# Patient Record
Sex: FEMALE | Race: OTHER | HISPANIC OR LATINO | Employment: UNEMPLOYED | URBAN - METROPOLITAN AREA
[De-identification: names, ages, dates, MRNs, and addresses within clinical notes are randomized per-mention and may not be internally consistent; named-entity substitution may affect disease eponyms.]

---

## 2017-10-19 ENCOUNTER — GENERIC CONVERSION - ENCOUNTER (OUTPATIENT)
Dept: OTHER | Facility: OTHER | Age: 62
End: 2017-10-19

## 2018-01-15 NOTE — MISCELLANEOUS
Please contact our office at your convenience  It is important that we speak to you  REGARDING:   Porfavor contacte a nuestra oficina  Es muy  importante que hablemos con usted   SOBRE:          Scheduling an Appointment/ Programar jacey Sylvia          Rescheduling an Appointment/ Re-programar  jacey Sylvia     Cancelling an Appointment/Cancelar rondon Sylvia     Your Lab/ X-ray Results/Resultados         Updating your Phone number or Address/ Actualizar ronodn [employer]           X Verify your Primary Providor/ Verificar rondon Proveedor primario     Other/Otro:    Please Contact Our Office to Schedule Your Appointment  It  is Very Important That You See Your Provider for your  Routine Medical Care  If you are seeing a New Providor,  Please Contact our Office so we can update our Records  Thank You  Comuníquese con nuestra oficina para programar rondon sylvia  Es Muy Importante que usted vipul rondon proveedor  para rondon   [address] [facility] Floor de [place]  Si usted esta viendo un Proveedor  [place], [place] con Ricks [employer]o oficina para actualizar   nuestro registros  Leslie

## 2018-06-08 LAB
ABSOL LYMPHOCYTES (HISTORICAL): 1.6 K/UL (ref 0.5–4)
ALBUMIN SERPL BCP-MCNC: 3.9 G/DL (ref 3–5.2)
ALP SERPL-CCNC: 83 U/L (ref 43–122)
ALT SERPL W P-5'-P-CCNC: 25 U/L (ref 9–52)
ANION GAP SERPL CALCULATED.3IONS-SCNC: 10 MMOL/L (ref 5–14)
AST SERPL W P-5'-P-CCNC: 20 U/L (ref 14–36)
BASOPHILS # BLD AUTO: 0.1 K/UL (ref 0–0.1)
BASOPHILS # BLD AUTO: 1 % (ref 0–1)
BILIRUB SERPL-MCNC: 0.3 MG/DL
BUN SERPL-MCNC: 25 MG/DL (ref 5–25)
CALCIUM SERPL-MCNC: 9.4 MG/DL (ref 8.4–10.2)
CHLORIDE SERPL-SCNC: 104 MEQ/L (ref 97–108)
CHOLEST SERPL-MCNC: 177 MG/DL
CHOLEST/HDLC SERPL: 2.8 {RATIO}
CO2 SERPL-SCNC: 27 MMOL/L (ref 22–30)
COMMENT (HISTORICAL): ABNORMAL
CREATINE, SERUM (HISTORICAL): 0.77 MG/DL (ref 0.6–1.2)
DEPRECATED RDW RBC AUTO: 26.5 %
EGFR (HISTORICAL): >60 ML/MIN/1.73 M2
EOSINOPHIL # BLD AUTO: 0.2 K/UL (ref 0–0.4)
EOSINOPHIL NFR BLD AUTO: 3 % (ref 0–6)
EST. AVERAGE GLUCOSE BLD GHB EST-MCNC: 146 MG/DL
GLUCOSE FASTING (HISTORICAL): 124 MG/DL (ref 70–99)
HBA1C MFR BLD HPLC: 6.7 %
HCT VFR BLD AUTO: 35.6 % (ref 36–46)
HDLC SERPL-MCNC: 64 MG/DL
HGB BLD-MCNC: 11.4 G/DL (ref 12–16)
LDL/HDL RATIO (HISTORICAL): 1.5
LDLC SERPL CALC-MCNC: 94 MG/DL
LYMPHOCYTES NFR BLD AUTO: 25 % (ref 25–45)
MCH RBC QN AUTO: 24.3 PG (ref 26–34)
MCHC RBC AUTO-ENTMCNC: 32 % (ref 31–36)
MCV RBC AUTO: 76 FL (ref 80–100)
MONOCYTES # BLD AUTO: 0.4 K/UL (ref 0.2–0.9)
MONOCYTES NFR BLD AUTO: 7 % (ref 1–10)
NEUTROPHILS ABS COUNT (HISTORICAL): 4.1 K/UL (ref 1.8–7.8)
NEUTS SEG NFR BLD AUTO: 64 % (ref 45–65)
PLATELET # BLD AUTO: 369 K/MCL (ref 150–450)
POTASSIUM SERPL-SCNC: 5.1 MEQ/L (ref 3.6–5)
RBC # BLD AUTO: 4.68 M/MCL (ref 4–5.2)
RBC MORPHOLOGY (HISTORICAL): ABNORMAL
SODIUM SERPL-SCNC: 141 MEQ/L (ref 137–147)
T4 FREE SERPL-MCNC: 1.13 NG/DL (ref 0.78–2.19)
TOTAL PROTEIN (HISTORICAL): 7.5 G/DL (ref 5.9–8.4)
TRIGL SERPL-MCNC: 95 MG/DL
TSH SERPL DL<=0.05 MIU/L-ACNC: 1.36 UIU/ML (ref 0.47–4.68)
VITAMIN D25-HYDROXY (HISTORICAL): 28.3 NG/ML (ref 30–100)
VLDLC SERPL CALC-MCNC: 19 MG/DL (ref 0–40)
WBC # BLD AUTO: 6.4 K/MCL (ref 4.5–11)

## 2018-10-26 ENCOUNTER — PATIENT OUTREACH (OUTPATIENT)
Dept: FAMILY MEDICINE CLINIC | Facility: CLINIC | Age: 63
End: 2018-10-26

## 2018-10-26 ENCOUNTER — OFFICE VISIT (OUTPATIENT)
Dept: FAMILY MEDICINE CLINIC | Facility: CLINIC | Age: 63
End: 2018-10-26
Payer: COMMERCIAL

## 2018-10-26 VITALS
WEIGHT: 154 LBS | RESPIRATION RATE: 17 BRPM | DIASTOLIC BLOOD PRESSURE: 96 MMHG | OXYGEN SATURATION: 98 % | SYSTOLIC BLOOD PRESSURE: 150 MMHG | BODY MASS INDEX: 29.1 KG/M2 | HEART RATE: 76 BPM | TEMPERATURE: 97.5 F

## 2018-10-26 DIAGNOSIS — Z79.4 CONTROLLED TYPE 2 DIABETES MELLITUS WITHOUT COMPLICATION, WITH LONG-TERM CURRENT USE OF INSULIN (HCC): Primary | ICD-10-CM

## 2018-10-26 DIAGNOSIS — Z59.89 DOES NOT HAVE HEALTH INSURANCE: ICD-10-CM

## 2018-10-26 DIAGNOSIS — E11.9 CONTROLLED TYPE 2 DIABETES MELLITUS WITHOUT COMPLICATION, WITH LONG-TERM CURRENT USE OF INSULIN (HCC): Primary | ICD-10-CM

## 2018-10-26 DIAGNOSIS — I10 ESSENTIAL HYPERTENSION: ICD-10-CM

## 2018-10-26 DIAGNOSIS — Z23 NEED FOR INFLUENZA VACCINATION: ICD-10-CM

## 2018-10-26 PROCEDURE — 3725F SCREEN DEPRESSION PERFORMED: CPT | Performed by: FAMILY MEDICINE

## 2018-10-26 PROCEDURE — 99213 OFFICE O/P EST LOW 20 MIN: CPT | Performed by: FAMILY MEDICINE

## 2018-10-26 RX ORDER — CARVEDILOL 6.25 MG/1
6.25 TABLET ORAL 2 TIMES DAILY WITH MEALS
Qty: 60 TABLET | Refills: 0 | Status: SHIPPED | OUTPATIENT
Start: 2018-10-26 | End: 2019-11-26 | Stop reason: ALTCHOICE

## 2018-10-26 RX ORDER — LISINOPRIL 40 MG/1
TABLET ORAL EVERY 24 HOURS
COMMUNITY
Start: 2017-07-31 | End: 2018-10-26 | Stop reason: SDUPTHER

## 2018-10-26 RX ORDER — ATORVASTATIN CALCIUM 20 MG/1
40 TABLET, FILM COATED ORAL EVERY 24 HOURS
COMMUNITY
Start: 2019-08-08 | End: 2019-08-08 | Stop reason: ALTCHOICE

## 2018-10-26 RX ORDER — GABAPENTIN 300 MG/1
CAPSULE ORAL 3 TIMES DAILY
COMMUNITY
Start: 2017-01-09 | End: 2019-11-26

## 2018-10-26 RX ORDER — BISOPROLOL FUMARATE AND HYDROCHLOROTHIAZIDE 5; 6.25 MG/1; MG/1
1 TABLET ORAL DAILY
COMMUNITY
End: 2019-11-26 | Stop reason: ALTCHOICE

## 2018-10-26 RX ORDER — AMLODIPINE BESYLATE 10 MG/1
10 TABLET ORAL EVERY 24 HOURS
Qty: 30 TABLET | Refills: 0 | Status: SHIPPED | OUTPATIENT
Start: 2018-10-26 | End: 2020-05-12 | Stop reason: SDUPTHER

## 2018-10-26 RX ORDER — LISINOPRIL 40 MG/1
40 TABLET ORAL EVERY 24 HOURS
Qty: 30 TABLET | Refills: 0 | Status: SHIPPED | OUTPATIENT
Start: 2018-10-26 | End: 2019-08-08 | Stop reason: SDUPTHER

## 2018-10-26 RX ORDER — GLIMEPIRIDE 4 MG/1
1 TABLET ORAL DAILY
COMMUNITY
End: 2019-11-26 | Stop reason: ALTCHOICE

## 2018-10-26 RX ORDER — AMLODIPINE BESYLATE 10 MG/1
TABLET ORAL EVERY 24 HOURS
COMMUNITY
Start: 2017-07-31 | End: 2018-10-26 | Stop reason: SDUPTHER

## 2018-10-26 RX ORDER — ASPIRIN 81 MG
100 TABLET, DELAYED RELEASE (ENTERIC COATED) ORAL EVERY 24 HOURS
COMMUNITY
Start: 2018-04-30 | End: 2019-11-26 | Stop reason: ALTCHOICE

## 2018-10-26 RX ORDER — LEVOTHYROXINE SODIUM 0.07 MG/1
1 TABLET ORAL DAILY
COMMUNITY
End: 2019-11-26 | Stop reason: ALTCHOICE

## 2018-10-26 SDOH — ECONOMIC STABILITY - INCOME SECURITY: OTHER PROBLEMS RELATED TO HOUSING AND ECONOMIC CIRCUMSTANCES: Z59.89

## 2018-10-26 NOTE — ASSESSMENT & PLAN NOTE
Lab Results   Component Value Date    HGBA1C 6 7 (H) 06/08/2018       No results for input(s): POCGLU in the last 72 hours      Blood Sugar Average: Last 72 hrs:     Patient currently has no insurance and is out of her insulin  Advised that we would have to send her to [facility] to purchase Novolin N OTC at a cost of $24 99 along with syringes and needles  Discontinued Basaglar 28U due to cost and switched to Novolin N 14U BID before breakfast and before dinner for continued diabetic control  Patient is controlled  Counseled extensively on diet and exercise  Glucometer not working, advised patient to check battery, if still not working will order new one once patient is insured  Switched PO regimen from janumet BID to metformin alone due to cost

## 2018-10-26 NOTE — ASSESSMENT & PLAN NOTE
Patient uncontrolled, currently out of medications and has no medical insurance  Counseled on diet and exercise, particularly consumption of low sodium and processed foods  Educated on home blood pressure logs  Continue current medication regimen with amlodipine, lisinopril, and carvedilol  All medications were sent to pharmacy, patient will have to pay out of pocket until her medical insurance is reinstated

## 2018-10-26 NOTE — PROGRESS NOTES
Assessment/Plan:    Essential hypertension  Patient uncontrolled, currently out of medications and has no medical insurance  Counseled on diet and exercise, particularly consumption of low sodium and processed foods  Educated on home blood pressure logs  Continue current medication regimen with amlodipine, lisinopril, and carvedilol  All medications were sent to pharmacy, patient will have to pay out of pocket until her medical insurance is reinstated       Controlled diabetes mellitus ([address] )  Lab Results   Component Value Date    HGBA1C 6 7 (H) 06/08/2018       No results for input(s): POCGLU in the last 72 hours  Blood Sugar Average: Last 72 hrs:     Patient currently has no insurance and is out of her insulin  Advised that we would have to send [facility] to [facility] to purchase Novolin N OTC at a cost of $24 99 along with syringes and needles  Discontinued Basaglar 28U due to cost and switched to Novolin N 14U BID before breakfast and before dinner for continued diabetic control  Patient is controlled  Counseled extensively on diet and exercise  Glucometer not working, advised patient to check battery, if still not working will order new one once patient is insured  Switched PO regimen from janumet BID to metformin alone due to cost         Diagnoses and all orders for this visit:    Controlled type 2 diabetes mellitus without complication, with long-term current use of insulin ([facility])  -     metFORMIN (GLUCOPHAGE) 1000 MG tablet; Take 1 tablet (1,000 mg total) by mouth 2 (two) times a day with meals  -     Ambulatory referral to social work care management program; Future    Need for influenza vaccination  -     influenza vaccine, [id number], quadrivalent, recombinant, PF, 0 5 mL, for patients 18 yr+ ([facility])    Essential hypertension  -     amLODIPine (NORVASC) 10 mg tablet; Take 1 tablet (10 mg total) by mouth every 24 hours  -     carvedilol (COREG) 6 25 mg tablet;  Take 1 tablet (6 25 mg total) by mouth 2 (two) times a day with meals  -     lisinopril (ZESTRIL) 40 mg tablet; Take 1 tablet (40 mg total) by mouth every 24 hours    Does not have health insurance  -     Ambulatory referral to social work care management program; Future    Other orders  -     bisoprolol-hydrochlorothiazide (ZIAC) 5-6 25 MG per tablet; Take 1 tablet by mouth daily  -     Docusate Sodium 100 MG capsule; Take 100 mg by mouth every 24 hours  -     gabapentin (NEURONTIN) 300 mg capsule; 3 (three) times a day  -     glimepiride (AMARYL) 4 mg tablet; Take 1 tablet by mouth daily  -     SITagliptin-MetFORMIN HCl ER (JANUMET XR)  MG TB24; every 24 hours  -     insulin glargine (LANTUS SOLOSTAR) 100 units/mL injection pen; inject 28 units by subcutaneous route at bedtime  -     levothyroxine 75 mcg tablet; Take 1 tablet by mouth daily  -     atorvastatin (LIPITOR) 20 mg tablet; every 24 hours  -     Discontinue: lisinopril (ZESTRIL) 40 mg tablet; every 24 hours  -     Discontinue: amLODIPine (NORVASC) 10 mg tablet; every 24 hours  -     insulin NPH (HumuLIN N,NovoLIN N) 100 Units/mL subcutaneous injection; Inject 14 Units under the skin 2 (two) times a day          Subjective:      Patient ID: Virgilio Peterson is a 59 y o  female  This is a 60 yo uninsured F who comes into the clinic today for follow up of her diabetes and HTN  Patient has no complaints today ans has been working on getting her insurance reinstated for ~1 month  She had to cancel her previous endocrine appointment due to not having insurance  Medication Refill   Pertinent negatives include no abdominal pain, arthralgias, chest pain, chills, congestion, coughing, fatigue, fever, headaches, myalgias, nausea, neck pain, rash, sore throat or vomiting         The following portions of the patient's history were reviewed and updated as appropriate: allergies, current medications, past family history, past medical history, past social history, past surgical history and problem list     Review of Systems   Constitutional: Negative for appetite change, chills, fatigue, fever and unexpected weight change  HENT: Negative for congestion, ear pain, rhinorrhea, sinus pain, sinus pressure and sore throat  Eyes: Negative for visual disturbance  Respiratory: Negative for cough, chest tightness, shortness of breath and wheezing  Cardiovascular: Negative for chest pain, palpitations and leg swelling  Gastrointestinal: Negative for abdominal pain, constipation, diarrhea, nausea and vomiting  Genitourinary: Negative for dysuria and flank pain  Musculoskeletal: Negative for arthralgias, back pain, myalgias and neck pain  Skin: Negative for rash  Neurological: Negative for dizziness, light-headedness and headaches  Psychiatric/Behavioral: Negative for suicidal ideas  Objective:      /96 (BP Location: Left arm, Patient Position: Sitting, Cuff Size: Adult)   Pulse 76   Temp 97 5 °F (36 4 °C) (Temporal)   Resp 17   Wt 69 9 kg (154 lb)   SpO2 98%   BMI 29 10 kg/m²          Physical Exam   Constitutional: She is oriented to person, place, and time  She appears well-developed and well-nourished  HENT:   Head: Normocephalic and atraumatic  Pulmonary/Chest: Effort normal    Abdominal: She exhibits no distension  Musculoskeletal: Normal range of motion  Neurological: She is alert and oriented to person, place, and time  Skin: Skin is warm and dry  Psychiatric: She has a normal mood and affect   Her behavior is normal  Judgment and thought content normal

## 2018-12-11 ENCOUNTER — TRANSCRIBE ORDERS (OUTPATIENT)
Dept: ADMINISTRATIVE | Facility: HOSPITAL | Age: 63
End: 2018-12-11

## 2018-12-11 ENCOUNTER — APPOINTMENT (OUTPATIENT)
Dept: LAB | Facility: HOSPITAL | Age: 63
End: 2018-12-11
Payer: COMMERCIAL

## 2018-12-11 DIAGNOSIS — Z79.899 NEED FOR PROPHYLACTIC CHEMOTHERAPY: Primary | ICD-10-CM

## 2018-12-11 DIAGNOSIS — E55.9 AVITAMINOSIS D: ICD-10-CM

## 2018-12-11 DIAGNOSIS — E66.3 SEVERELY OVERWEIGHT: ICD-10-CM

## 2018-12-11 DIAGNOSIS — E13.43 DIABETIC AUTONOMIC NEUROPATHY ASSOCIATED WITH OTHER SPECIFIED DIABETES MELLITUS (HCC): ICD-10-CM

## 2018-12-11 DIAGNOSIS — E78.2 MIXED HYPERLIPIDEMIA: ICD-10-CM

## 2018-12-11 DIAGNOSIS — I10 ESSENTIAL HYPERTENSION, MALIGNANT: ICD-10-CM

## 2018-12-11 DIAGNOSIS — Z79.899 NEED FOR PROPHYLACTIC CHEMOTHERAPY: ICD-10-CM

## 2018-12-11 LAB
ALBUMIN SERPL BCP-MCNC: 3.9 G/DL (ref 3–5.2)
ALP SERPL-CCNC: 79 U/L (ref 43–122)
ALT SERPL W P-5'-P-CCNC: 25 U/L (ref 9–52)
ANION GAP SERPL CALCULATED.3IONS-SCNC: 4 MMOL/L (ref 5–14)
AST SERPL W P-5'-P-CCNC: 20 U/L (ref 14–36)
BASOPHILS # BLD AUTO: 0 THOUSANDS/ΜL (ref 0–0.1)
BASOPHILS NFR BLD AUTO: 1 % (ref 0–1)
BILIRUB SERPL-MCNC: 0.2 MG/DL
BUN SERPL-MCNC: 24 MG/DL (ref 5–25)
CALCIUM SERPL-MCNC: 9.3 MG/DL (ref 8.4–10.2)
CHLORIDE SERPL-SCNC: 101 MMOL/L (ref 97–108)
CHOLEST SERPL-MCNC: 206 MG/DL
CO2 SERPL-SCNC: 31 MMOL/L (ref 22–30)
CREAT SERPL-MCNC: 0.68 MG/DL (ref 0.6–1.2)
EOSINOPHIL # BLD AUTO: 0.2 THOUSAND/ΜL (ref 0–0.4)
EOSINOPHIL NFR BLD AUTO: 3 % (ref 0–6)
ERYTHROCYTE [DISTWIDTH] IN BLOOD BY AUTOMATED COUNT: 15.6 %
EST. AVERAGE GLUCOSE BLD GHB EST-MCNC: 260 MG/DL
GFR SERPL CREATININE-BSD FRML MDRD: 94 ML/MIN/1.73SQ M
GLUCOSE P FAST SERPL-MCNC: 184 MG/DL (ref 70–99)
HBA1C MFR BLD: 10.7 % (ref 4.2–6.3)
HCT VFR BLD AUTO: 40.4 % (ref 36–46)
HDLC SERPL-MCNC: 53 MG/DL (ref 40–59)
HGB BLD-MCNC: 13 G/DL (ref 12–16)
LDLC SERPL CALC-MCNC: 122 MG/DL
LYMPHOCYTES # BLD AUTO: 1.8 THOUSANDS/ΜL (ref 0.5–4)
LYMPHOCYTES NFR BLD AUTO: 34 % (ref 25–45)
MCH RBC QN AUTO: 27.1 PG (ref 26–34)
MCHC RBC AUTO-ENTMCNC: 32.2 G/DL (ref 31–36)
MCV RBC AUTO: 84 FL (ref 80–100)
MONOCYTES # BLD AUTO: 0.5 THOUSAND/ΜL (ref 0.2–0.9)
MONOCYTES NFR BLD AUTO: 9 % (ref 1–10)
NEUTROPHILS # BLD AUTO: 2.9 THOUSANDS/ΜL (ref 1.8–7.8)
NEUTS SEG NFR BLD AUTO: 53 % (ref 45–65)
NONHDLC SERPL-MCNC: 153 MG/DL
PLATELET # BLD AUTO: 294 THOUSANDS/UL (ref 150–450)
PMV BLD AUTO: 9 FL (ref 8.9–12.7)
POTASSIUM SERPL-SCNC: 4.6 MMOL/L (ref 3.6–5)
PROT SERPL-MCNC: 7.2 G/DL (ref 5.9–8.4)
RBC # BLD AUTO: 4.8 MILLION/UL (ref 4–5.2)
SODIUM SERPL-SCNC: 136 MMOL/L (ref 137–147)
TRIGL SERPL-MCNC: 153 MG/DL
TSH SERPL DL<=0.05 MIU/L-ACNC: 5.25 UIU/ML (ref 0.47–4.68)
WBC # BLD AUTO: 5.4 THOUSAND/UL (ref 4.5–11)

## 2018-12-11 PROCEDURE — 80061 LIPID PANEL: CPT

## 2018-12-11 PROCEDURE — 85025 COMPLETE CBC W/AUTO DIFF WBC: CPT

## 2018-12-11 PROCEDURE — 36415 COLL VENOUS BLD VENIPUNCTURE: CPT

## 2018-12-11 PROCEDURE — 83036 HEMOGLOBIN GLYCOSYLATED A1C: CPT | Performed by: INTERNAL MEDICINE

## 2018-12-11 PROCEDURE — 80053 COMPREHEN METABOLIC PANEL: CPT

## 2018-12-11 PROCEDURE — 84443 ASSAY THYROID STIM HORMONE: CPT

## 2019-03-12 ENCOUNTER — TRANSCRIBE ORDERS (OUTPATIENT)
Dept: ADMINISTRATIVE | Facility: HOSPITAL | Age: 64
End: 2019-03-12

## 2019-03-12 ENCOUNTER — APPOINTMENT (OUTPATIENT)
Dept: LAB | Facility: HOSPITAL | Age: 64
End: 2019-03-12
Payer: COMMERCIAL

## 2019-03-12 DIAGNOSIS — Z79.899 NEED FOR PROPHYLACTIC CHEMOTHERAPY: Primary | ICD-10-CM

## 2019-03-12 DIAGNOSIS — E66.3 SEVERELY OVERWEIGHT: ICD-10-CM

## 2019-03-12 DIAGNOSIS — E78.00 PURE HYPERCHOLESTEROLEMIA: ICD-10-CM

## 2019-03-12 DIAGNOSIS — Z79.4 ENCOUNTER FOR LONG-TERM (CURRENT) USE OF INSULIN (HCC): ICD-10-CM

## 2019-03-12 DIAGNOSIS — Z79.899 NEED FOR PROPHYLACTIC CHEMOTHERAPY: ICD-10-CM

## 2019-03-12 DIAGNOSIS — I10 ESSENTIAL HYPERTENSION, MALIGNANT: ICD-10-CM

## 2019-03-12 DIAGNOSIS — E11.21 DIABETIC GLOMERULOPATHY (HCC): ICD-10-CM

## 2019-03-12 DIAGNOSIS — E55.9 AVITAMINOSIS D: ICD-10-CM

## 2019-03-12 LAB
ALBUMIN SERPL BCP-MCNC: 3.7 G/DL (ref 3–5.2)
ALP SERPL-CCNC: 76 U/L (ref 43–122)
ALT SERPL W P-5'-P-CCNC: 26 U/L (ref 9–52)
ANION GAP SERPL CALCULATED.3IONS-SCNC: 6 MMOL/L (ref 5–14)
AST SERPL W P-5'-P-CCNC: 29 U/L (ref 14–36)
BILIRUB SERPL-MCNC: 0.2 MG/DL
BUN SERPL-MCNC: 19 MG/DL (ref 5–25)
CALCIUM SERPL-MCNC: 9.2 MG/DL (ref 8.4–10.2)
CHLORIDE SERPL-SCNC: 102 MMOL/L (ref 97–108)
CHOLEST SERPL-MCNC: 153 MG/DL
CO2 SERPL-SCNC: 29 MMOL/L (ref 22–30)
CREAT SERPL-MCNC: 0.78 MG/DL (ref 0.6–1.2)
CREAT UR-MCNC: 48.3 MG/DL
EST. AVERAGE GLUCOSE BLD GHB EST-MCNC: 203 MG/DL
GFR SERPL CREATININE-BSD FRML MDRD: 82 ML/MIN/1.73SQ M
GLUCOSE P FAST SERPL-MCNC: 136 MG/DL (ref 70–99)
HBA1C MFR BLD: 8.7 % (ref 4.2–6.3)
HDLC SERPL-MCNC: 51 MG/DL (ref 40–59)
LDLC SERPL CALC-MCNC: 72 MG/DL
MICROALBUMIN UR-MCNC: 854 MG/L (ref 0–20)
MICROALBUMIN/CREAT 24H UR: 1768 MG/G CREATININE (ref 0–30)
NONHDLC SERPL-MCNC: 102 MG/DL
POTASSIUM SERPL-SCNC: 5.4 MMOL/L (ref 3.6–5)
PROT SERPL-MCNC: 7 G/DL (ref 5.9–8.4)
SODIUM SERPL-SCNC: 137 MMOL/L (ref 137–147)
T4 FREE SERPL-MCNC: 0.91 NG/DL (ref 0.76–1.46)
TRIGL SERPL-MCNC: 150 MG/DL
TSH SERPL DL<=0.05 MIU/L-ACNC: 6.45 UIU/ML (ref 0.47–4.68)

## 2019-03-12 PROCEDURE — 84439 ASSAY OF FREE THYROXINE: CPT

## 2019-03-12 PROCEDURE — 83036 HEMOGLOBIN GLYCOSYLATED A1C: CPT | Performed by: INTERNAL MEDICINE

## 2019-03-12 PROCEDURE — 82570 ASSAY OF URINE CREATININE: CPT | Performed by: INTERNAL MEDICINE

## 2019-03-12 PROCEDURE — 36415 COLL VENOUS BLD VENIPUNCTURE: CPT | Performed by: INTERNAL MEDICINE

## 2019-03-12 PROCEDURE — 3062F POS MACROALBUMINURIA REV: CPT | Performed by: INTERNAL MEDICINE

## 2019-03-12 PROCEDURE — 82043 UR ALBUMIN QUANTITATIVE: CPT | Performed by: INTERNAL MEDICINE

## 2019-03-12 PROCEDURE — 80053 COMPREHEN METABOLIC PANEL: CPT

## 2019-03-12 PROCEDURE — 84443 ASSAY THYROID STIM HORMONE: CPT

## 2019-03-12 PROCEDURE — 80061 LIPID PANEL: CPT

## 2019-03-12 PROCEDURE — 86376 MICROSOMAL ANTIBODY EACH: CPT

## 2019-03-13 LAB — THYROPEROXIDASE AB SERPL-ACNC: 12 IU/ML (ref 0–34)

## 2019-07-09 ENCOUNTER — TRANSCRIBE ORDERS (OUTPATIENT)
Dept: ADMINISTRATIVE | Facility: HOSPITAL | Age: 64
End: 2019-07-09

## 2019-07-09 ENCOUNTER — APPOINTMENT (OUTPATIENT)
Dept: LAB | Facility: HOSPITAL | Age: 64
End: 2019-07-09
Payer: COMMERCIAL

## 2019-07-09 DIAGNOSIS — E55.9 VITAMIN D DEFICIENCY DISEASE: ICD-10-CM

## 2019-07-09 DIAGNOSIS — E66.3 SEVERELY OVERWEIGHT: ICD-10-CM

## 2019-07-09 DIAGNOSIS — E78.5 HYPERLIPIDEMIA, UNSPECIFIED HYPERLIPIDEMIA TYPE: ICD-10-CM

## 2019-07-09 DIAGNOSIS — E11.21 DIABETIC GLOMERULOPATHY (HCC): ICD-10-CM

## 2019-07-09 DIAGNOSIS — I10 ESSENTIAL HYPERTENSION, MALIGNANT: ICD-10-CM

## 2019-07-09 DIAGNOSIS — Z79.4 ENCOUNTER FOR LONG-TERM (CURRENT) USE OF INSULIN (HCC): ICD-10-CM

## 2019-07-09 DIAGNOSIS — R94.6 NONSPECIFIC ABNORMAL RESULTS OF THYROID FUNCTION STUDY: ICD-10-CM

## 2019-07-09 DIAGNOSIS — R94.6 NONSPECIFIC ABNORMAL RESULTS OF THYROID FUNCTION STUDY: Primary | ICD-10-CM

## 2019-07-09 LAB
ALBUMIN SERPL BCP-MCNC: 3.8 G/DL (ref 3–5.2)
ALP SERPL-CCNC: 69 U/L (ref 43–122)
ALT SERPL W P-5'-P-CCNC: 19 U/L (ref 9–52)
ANION GAP SERPL CALCULATED.3IONS-SCNC: 9 MMOL/L (ref 5–14)
AST SERPL W P-5'-P-CCNC: 21 U/L (ref 14–36)
BILIRUB SERPL-MCNC: 0.4 MG/DL
BUN SERPL-MCNC: 28 MG/DL (ref 5–25)
CALCIUM SERPL-MCNC: 9.2 MG/DL (ref 8.4–10.2)
CHLORIDE SERPL-SCNC: 103 MMOL/L (ref 97–108)
CO2 SERPL-SCNC: 28 MMOL/L (ref 22–30)
CREAT SERPL-MCNC: 0.82 MG/DL (ref 0.6–1.2)
EST. AVERAGE GLUCOSE BLD GHB EST-MCNC: 226 MG/DL
GFR SERPL CREATININE-BSD FRML MDRD: 76 ML/MIN/1.73SQ M
GLUCOSE P FAST SERPL-MCNC: 114 MG/DL (ref 70–99)
HBA1C MFR BLD: 9.5 % (ref 4.2–6.3)
POTASSIUM SERPL-SCNC: 4.8 MMOL/L (ref 3.6–5)
PROT SERPL-MCNC: 7.3 G/DL (ref 5.9–8.4)
SODIUM SERPL-SCNC: 140 MMOL/L (ref 137–147)
T4 FREE SERPL-MCNC: 0.95 NG/DL (ref 0.76–1.46)
TSH SERPL DL<=0.05 MIU/L-ACNC: 5.19 UIU/ML (ref 0.47–4.68)

## 2019-07-09 PROCEDURE — 84439 ASSAY OF FREE THYROXINE: CPT

## 2019-07-09 PROCEDURE — 83036 HEMOGLOBIN GLYCOSYLATED A1C: CPT

## 2019-07-09 PROCEDURE — 36415 COLL VENOUS BLD VENIPUNCTURE: CPT

## 2019-07-09 PROCEDURE — 84443 ASSAY THYROID STIM HORMONE: CPT

## 2019-07-09 PROCEDURE — 80053 COMPREHEN METABOLIC PANEL: CPT

## 2019-07-11 LAB
LEFT EYE DIABETIC RETINOPATHY: NORMAL
RIGHT EYE DIABETIC RETINOPATHY: NORMAL

## 2019-07-11 PROCEDURE — 2022F DILAT RTA XM EVC RTNOPTHY: CPT | Performed by: INTERNAL MEDICINE

## 2019-08-08 ENCOUNTER — OFFICE VISIT (OUTPATIENT)
Dept: FAMILY MEDICINE CLINIC | Facility: CLINIC | Age: 64
End: 2019-08-08

## 2019-08-08 VITALS
HEART RATE: 76 BPM | BODY MASS INDEX: 28.53 KG/M2 | SYSTOLIC BLOOD PRESSURE: 130 MMHG | RESPIRATION RATE: 16 BRPM | OXYGEN SATURATION: 95 % | WEIGHT: 151 LBS | TEMPERATURE: 97.6 F | DIASTOLIC BLOOD PRESSURE: 76 MMHG

## 2019-08-08 DIAGNOSIS — I10 ESSENTIAL HYPERTENSION: ICD-10-CM

## 2019-08-08 DIAGNOSIS — Z00.00 HEALTHCARE MAINTENANCE: ICD-10-CM

## 2019-08-08 DIAGNOSIS — Z11.59 ENCOUNTER FOR HEPATITIS C SCREENING TEST FOR LOW RISK PATIENT: ICD-10-CM

## 2019-08-08 DIAGNOSIS — Z79.4 CONTROLLED TYPE 2 DIABETES MELLITUS WITHOUT COMPLICATION, WITH LONG-TERM CURRENT USE OF INSULIN (HCC): Primary | ICD-10-CM

## 2019-08-08 DIAGNOSIS — E11.9 CONTROLLED TYPE 2 DIABETES MELLITUS WITHOUT COMPLICATION, WITH LONG-TERM CURRENT USE OF INSULIN (HCC): Primary | ICD-10-CM

## 2019-08-08 PROCEDURE — 3078F DIAST BP <80 MM HG: CPT | Performed by: INTERNAL MEDICINE

## 2019-08-08 PROCEDURE — 99214 OFFICE O/P EST MOD 30 MIN: CPT | Performed by: INTERNAL MEDICINE

## 2019-08-08 PROCEDURE — 4010F ACE/ARB THERAPY RXD/TAKEN: CPT | Performed by: INTERNAL MEDICINE

## 2019-08-08 PROCEDURE — 3075F SYST BP GE 130 - 139MM HG: CPT | Performed by: INTERNAL MEDICINE

## 2019-08-08 RX ORDER — LANCETS
EACH MISCELLANEOUS 2 TIMES DAILY
Refills: 5 | COMMUNITY
Start: 2019-07-14 | End: 2020-02-11 | Stop reason: SDUPTHER

## 2019-08-08 RX ORDER — ISOPROPYL ALCOHOL 70 ML/100ML
SWAB TOPICAL
Refills: 3 | COMMUNITY
Start: 2019-07-17

## 2019-08-08 RX ORDER — ATORVASTATIN CALCIUM 40 MG/1
40 TABLET, FILM COATED ORAL DAILY
Qty: 90 TABLET | Refills: 3 | Status: SHIPPED | OUTPATIENT
Start: 2019-08-08 | End: 2020-08-26 | Stop reason: SDUPTHER

## 2019-08-08 RX ORDER — BLOOD SUGAR DIAGNOSTIC
STRIP MISCELLANEOUS 2 TIMES DAILY
Refills: 5 | COMMUNITY
Start: 2019-07-20 | End: 2020-12-08 | Stop reason: SDUPTHER

## 2019-08-08 RX ORDER — LISINOPRIL 40 MG/1
40 TABLET ORAL EVERY 24 HOURS
Qty: 30 TABLET | Refills: 3 | Status: SHIPPED | OUTPATIENT
Start: 2019-08-08 | End: 2020-05-12 | Stop reason: SDUPTHER

## 2019-08-08 RX ORDER — ERGOCALCIFEROL 1.25 MG/1
CAPSULE ORAL
Refills: 2 | COMMUNITY
Start: 2019-07-22 | End: 2019-11-26

## 2019-08-08 NOTE — ASSESSMENT & PLAN NOTE
-Patient completed mammogram (05/28/19) and cervical exam (05/24/19) in [facility]  Tests were unremarkable  - Colonoscopy (04/05/17) with normal findings   -Patient counseled on Hepatitis C screening, patient agreed to my recommendations   Hepatitis C order sent for patient to complete prior to next visit

## 2019-08-08 NOTE — ASSESSMENT & PLAN NOTE
Lab Results   Component Value Date    HGBA1C 9 5 (H) 07/09/2019       No results for input(s): POCGLU in the last 72 hours  Blood Sugar Average: Last 72 hrs:  -Uncontrolled, HbA1C not at goal (preferable to be around 7)  -Patient advised to continue taking her diabetes medications daily: Basaglar 32 units in the morning, Janumet XR 50/1000 BID and Jardiance 25 mg     -Patient counseled to check her BG fasting, 2 hours after breakfast, 2 hours after lunch, and 2 hours after dinner  The readings should be done 2 weeks prior to her endocrinologist appt  -Patient has an endocrinologist appt scheduled for end of September  Will leave any changes with diabetes medications at the discretion of her endocrinologist  -Continue to adhere to a strict diabetic diet and exercise regimen  -Last ophthalmology appt (07/11/19) showing mild, non-proliferative diabetic retinopathy  Continue to follow up for further care   -Patient is not optimally controlled with Atorvastatin  Advised patient she needs to be on a moderate intensity regimen   Patient agreed with my plan, Atorvastatin 40 mg, 1 tablet daily started today  -Lipid panel lab to be completed prior to next endocrinology visit in September  -F/u in 3 months

## 2019-08-08 NOTE — ASSESSMENT & PLAN NOTE
-Well-controlled BP, at goal  -Patient advised to adhere with her BP meds: Amlodipine 10 mg tablet, Carvedilol 6 25 mg BID and Lisinopril 40 mg tablet   -Lisinopril 40 mg refilled today   -Low salt diet and exercise regimen

## 2019-08-08 NOTE — PROGRESS NOTES
Assessment/Plan:    Controlled diabetes mellitus ([facility] [address] )  Lab Results   Component Value Date    HGBA1C 9 5 (H) 07/09/2019       No results for input(s): POCGLU in the last 72 hours  Blood Sugar Average: Last 72 hrs:  -Uncontrolled, HbA1C not at goal (preferable to be around 7)  -Patient advised to continue taking her diabetes medications daily: Basaglar 32 units in the morning, Janumet XR 50/1000 BID and Jardiance 25 mg     -Patient counseled to check her BG fasting, 2 hours after breakfast, 2 hours after lunch, and 2 hours after dinner  The readings should be done 2 weeks prior to her endocrinologist appt  -Patient has an endocrinologist appt scheduled for end of September  Will leave any changes with diabetes medications at the discretion of her endocrinologist  -Continue to adhere to a strict diabetic diet and exercise regimen  -Last ophthalmology appt (07/11/19) showing mild, non-proliferative diabetic retinopathy  Continue to follow up for further care   -Patient is not optimally controlled with Atorvastatin  Advised patient she needs to be on a moderate intensity regimen  Patient agreed with my plan, Atorvastatin 40 mg, 1 tablet daily started today  -Lipid panel lab to be completed prior to next endocrinology visit in September  -F/u in 3 months       Essential hypertension  -Well-controlled BP, at goal  -Patient advised to adhere with her BP meds: Amlodipine 10 mg tablet, Carvedilol 6 25 mg BID and Lisinopril 40 mg tablet   -Lisinopril 40 mg refilled today   -Low salt diet and exercise regimen     Healthcare maintenance  -Patient completed mammogram (05/28/19) and cervical exam (05/24/19) in [place]  Tests were unremarkable  - Colonoscopy (04/05/17) with normal findings   -Patient counseled on Hepatitis C screening, patient agreed to my recommendations   Hepatitis C order sent for patient to complete prior to next visit         Diagnoses and all orders for this visit:    Controlled type 2 diabetes mellitus without complication, with long-term current use of insulin ([facility])  -     atorvastatin (LIPITOR) 40 mg tablet; Take 1 tablet (40 mg total) by mouth daily  -     Lipid Panel with Direct LDL reflex; Future    Essential hypertension  -     lisinopril (ZESTRIL) 40 mg tablet; Take 1 tablet (40 mg total) by mouth every 24 hours    Encounter for hepatitis C screening test for low risk patient  -     Hepatitis C antibody; Future    Healthcare maintenance    Other orders  -     Cancel: Mammo screening bilateral w 3d & cad; Future  -     ergocalciferol (VITAMIN D2) 50,000 units; TAKE ONE SOFTGEL CAPSULE BY MOUTH ONCE EVERY WEEK WITH DINNER  -     ACCU-CHEK FASTCLIX LANCETS MISC; 2 (two) times a day Test  -     ACCU-CHEK GUIDE test strip; 2 (two) times a day Test  -     Alcohol Swabs (EASY TOUCH ALCOHOL PREP MEDIUM) 70 % PADS; USE 2 TO 3 X PER DAY          Subjective:      Patient ID: Melyssa Melendrez is a 62 y o  female with a PMH of essential HTN, type 2 DM, subclinical hypothyroidism who presents today to the office for follow up of her chronic conditions  Patient states she is compliant with taking all her medications  Patient's most recent HbA1C is 9 5 (07/09/2019)  Patient reports she checks her BG at home, in the am range of 110 and in the afternoon around 140  Patient denies polyuria, polydipsia, or polyphagia  Patient's describes numbness in her lower extremities, specially her toes b/l  Patient is taking Gabapentin 300 mg TID  Patient adds she went to [facility] and had her mammogram and cervical exam completed  Patient brings a copy of her tests with her today  Patient denies fevers, chills, headaches, dizziness, chest pain, shortness of breath, abdominal pain, nausea, vomiting, diarrhea or constipation        HPI    The following portions of the patient's history were reviewed and updated as appropriate: allergies, current medications, past family history, past medical history, past social history, past surgical history and problem list     Review of Systems   Constitutional: Positive for fatigue  Negative for appetite change, chills and fever  HENT: Negative for congestion, sinus pressure, sinus pain, sore throat and trouble swallowing  Eyes: Negative for visual disturbance  Respiratory: Negative for cough, chest tightness and shortness of breath  Cardiovascular: Negative for chest pain, palpitations and leg swelling  Gastrointestinal: Negative for abdominal distention, abdominal pain, blood in stool, constipation, diarrhea, nausea and vomiting  Endocrine: Negative for cold intolerance, heat intolerance, polydipsia, polyphagia and polyuria  Genitourinary: Negative for difficulty urinating, dysuria, frequency and urgency  Musculoskeletal: Negative for back pain and gait problem  Skin: Negative for rash  Neurological: Positive for numbness  Negative for dizziness, tremors and headaches  LE numbness and warmth, right and left toes predominantly    Hematological: Negative for adenopathy  Psychiatric/Behavioral: The patient is not nervous/anxious  Objective:      /76 (BP Location: Left arm, Patient Position: Sitting, Cuff Size: Adult)   Pulse 76   Temp 97 6 °F (36 4 °C)   Resp 16   Wt 68 5 kg (151 lb)   SpO2 95%   BMI 28 53 kg/m²     Patient's shoes and socks removed  Right Foot/Ankle   Right Foot Inspection  Skin Exam: skin not intact, no warmth and no erythema                          Toe Exam: ROM and strength within normal limits  Sensory       Monofilament testing: diminished  Vascular    The right DP pulse is 2+  The right PT pulse is 2+  Left Foot/Ankle  Left Foot Inspection  Skin Exam: skin not intact, no warmth and no erythema                         Toe Exam: ROM and strength within normal limits                   Sensory       Monofilament: diminished  Vascular    The left DP pulse is 2+  The left PT pulse is 2+     Assign Risk Category:  No deformity present; Loss of protective sensation;        Risk: 1           Physical Exam   Constitutional: She is oriented to person, place, and time  She appears well-developed and well-nourished  No distress  HENT:   Head: Normocephalic and atraumatic  Right Ear: External ear normal    Left Ear: External ear normal    Mouth/Throat: Oropharynx is clear and moist    Eyes: Pupils are equal, round, and reactive to light  Conjunctivae and EOM are normal    Neck: Normal range of motion  Neck supple  Cardiovascular: Normal rate, regular rhythm, normal heart sounds and intact distal pulses  Pulses:       Dorsalis pedis pulses are 2+ on the right side, and 2+ on the left side  Posterior tibial pulses are 2+ on the right side, and 2+ on the left side  Pulmonary/Chest: Effort normal and breath sounds normal  No respiratory distress  Abdominal: Soft  Bowel sounds are normal  She exhibits no distension  There is no tenderness  There is no rebound and no guarding  Musculoskeletal: Normal range of motion  Feet:    1+ pitting edema in RLE and LLE    Feet:   Right Foot:   Skin Integrity: Negative for erythema or warmth  Left Foot:   Skin Integrity: Negative for erythema or warmth  Lymphadenopathy:     She has no cervical adenopathy  Neurological: She is alert and oriented to person, place, and time  She displays normal reflexes  She exhibits normal muscle tone  Skin: Skin is warm  No rash noted  No erythema  Psychiatric: She has a normal mood and affect  Her behavior is normal    Vitals reviewed

## 2019-08-16 ENCOUNTER — APPOINTMENT (OUTPATIENT)
Dept: LAB | Facility: HOSPITAL | Age: 64
End: 2019-08-16
Payer: COMMERCIAL

## 2019-08-16 ENCOUNTER — TELEPHONE (OUTPATIENT)
Dept: FAMILY MEDICINE CLINIC | Facility: CLINIC | Age: 64
End: 2019-08-16

## 2019-08-16 DIAGNOSIS — Z79.4 CONTROLLED TYPE 2 DIABETES MELLITUS WITHOUT COMPLICATION, WITH LONG-TERM CURRENT USE OF INSULIN (HCC): ICD-10-CM

## 2019-08-16 DIAGNOSIS — E11.9 CONTROLLED TYPE 2 DIABETES MELLITUS WITHOUT COMPLICATION, WITH LONG-TERM CURRENT USE OF INSULIN (HCC): ICD-10-CM

## 2019-08-16 DIAGNOSIS — Z11.59 ENCOUNTER FOR HEPATITIS C SCREENING TEST FOR LOW RISK PATIENT: ICD-10-CM

## 2019-08-16 LAB
CHOLEST SERPL-MCNC: 154 MG/DL
HDLC SERPL-MCNC: 49 MG/DL (ref 40–59)
LDLC SERPL CALC-MCNC: 82 MG/DL
TRIGL SERPL-MCNC: 116 MG/DL

## 2019-08-16 PROCEDURE — 86803 HEPATITIS C AB TEST: CPT

## 2019-08-16 PROCEDURE — 36415 COLL VENOUS BLD VENIPUNCTURE: CPT

## 2019-08-16 PROCEDURE — 80061 LIPID PANEL: CPT

## 2019-08-16 NOTE — TELEPHONE ENCOUNTER
Called patient to discuss lipid panel, no response  Left a voicemail to call me back  Will follow up

## 2019-08-17 LAB — HCV AB SER QL: NORMAL

## 2019-08-19 ENCOUNTER — TELEPHONE (OUTPATIENT)
Dept: FAMILY MEDICINE CLINIC | Facility: CLINIC | Age: 64
End: 2019-08-19

## 2019-08-19 NOTE — TELEPHONE ENCOUNTER
Called the patient today to discuss lab results  Explained to the patient that her triglycerides are elevated and this can be attributed to her sugars  I advised patient to adhere to a diabetic diet and continue to exercise daily  Also, patient was started on Atorvastatin 40 mg during last visit and advised to continue taking 1 tablet daily  Patient understands the plan and voices no concerns at this time

## 2019-10-21 ENCOUNTER — TRANSCRIBE ORDERS (OUTPATIENT)
Dept: ADMINISTRATIVE | Facility: HOSPITAL | Age: 64
End: 2019-10-21

## 2019-10-21 ENCOUNTER — APPOINTMENT (OUTPATIENT)
Dept: LAB | Facility: HOSPITAL | Age: 64
End: 2019-10-21
Payer: COMMERCIAL

## 2019-10-21 DIAGNOSIS — E55.9 AVITAMINOSIS D: ICD-10-CM

## 2019-10-21 DIAGNOSIS — I10 ESSENTIAL HYPERTENSION, MALIGNANT: ICD-10-CM

## 2019-10-21 DIAGNOSIS — E78.5 HYPERLIPIDEMIA, UNSPECIFIED HYPERLIPIDEMIA TYPE: ICD-10-CM

## 2019-10-21 DIAGNOSIS — Z79.4 ENCOUNTER FOR LONG-TERM (CURRENT) USE OF INSULIN (HCC): ICD-10-CM

## 2019-10-21 DIAGNOSIS — E66.3 SEVERELY OVERWEIGHT: ICD-10-CM

## 2019-10-21 DIAGNOSIS — E11.65 TYPE II DIABETES MELLITUS WITH HYPEROSMOLARITY, UNCONTROLLED (HCC): ICD-10-CM

## 2019-10-21 DIAGNOSIS — E11.00 TYPE II DIABETES MELLITUS WITH HYPEROSMOLARITY, UNCONTROLLED (HCC): ICD-10-CM

## 2019-10-21 DIAGNOSIS — R94.6 NONSPECIFIC ABNORMAL RESULTS OF THYROID FUNCTION STUDY: ICD-10-CM

## 2019-10-21 DIAGNOSIS — Z79.899 ENCOUNTER FOR LONG-TERM (CURRENT) USE OF OTHER MEDICATIONS: ICD-10-CM

## 2019-10-21 DIAGNOSIS — Z79.899 ENCOUNTER FOR LONG-TERM (CURRENT) USE OF OTHER MEDICATIONS: Primary | ICD-10-CM

## 2019-10-21 LAB
25(OH)D3 SERPL-MCNC: 33.3 NG/ML (ref 30–100)
ALBUMIN SERPL BCP-MCNC: 3.7 G/DL (ref 3–5.2)
ALP SERPL-CCNC: 78 U/L (ref 43–122)
ALT SERPL W P-5'-P-CCNC: 29 U/L (ref 9–52)
ANION GAP SERPL CALCULATED.3IONS-SCNC: 5 MMOL/L (ref 5–14)
AST SERPL W P-5'-P-CCNC: 33 U/L (ref 14–36)
BILIRUB SERPL-MCNC: 0.2 MG/DL
BUN SERPL-MCNC: 35 MG/DL (ref 5–25)
CALCIUM SERPL-MCNC: 8.8 MG/DL (ref 8.4–10.2)
CHLORIDE SERPL-SCNC: 105 MMOL/L (ref 97–108)
CO2 SERPL-SCNC: 32 MMOL/L (ref 22–30)
CREAT SERPL-MCNC: 1.13 MG/DL (ref 0.6–1.2)
EST. AVERAGE GLUCOSE BLD GHB EST-MCNC: 180 MG/DL
GFR SERPL CREATININE-BSD FRML MDRD: 52 ML/MIN/1.73SQ M
GLUCOSE P FAST SERPL-MCNC: 91 MG/DL (ref 70–99)
HBA1C MFR BLD: 7.9 % (ref 4.2–6.3)
POTASSIUM SERPL-SCNC: 5 MMOL/L (ref 3.6–5)
PROT SERPL-MCNC: 7.1 G/DL (ref 5.9–8.4)
SODIUM SERPL-SCNC: 142 MMOL/L (ref 137–147)
TSH SERPL DL<=0.05 MIU/L-ACNC: 6.12 UIU/ML (ref 0.47–4.68)

## 2019-10-21 PROCEDURE — 84443 ASSAY THYROID STIM HORMONE: CPT

## 2019-10-21 PROCEDURE — 83036 HEMOGLOBIN GLYCOSYLATED A1C: CPT | Performed by: INTERNAL MEDICINE

## 2019-10-21 PROCEDURE — 80053 COMPREHEN METABOLIC PANEL: CPT

## 2019-10-21 PROCEDURE — 82306 VITAMIN D 25 HYDROXY: CPT

## 2019-10-21 PROCEDURE — 36415 COLL VENOUS BLD VENIPUNCTURE: CPT

## 2019-11-26 ENCOUNTER — OFFICE VISIT (OUTPATIENT)
Dept: FAMILY MEDICINE CLINIC | Facility: CLINIC | Age: 64
End: 2019-11-26

## 2019-11-26 ENCOUNTER — TELEPHONE (OUTPATIENT)
Dept: FAMILY MEDICINE CLINIC | Facility: CLINIC | Age: 64
End: 2019-11-26

## 2019-11-26 VITALS
BODY MASS INDEX: 28.52 KG/M2 | OXYGEN SATURATION: 99 % | WEIGHT: 155 LBS | TEMPERATURE: 97.3 F | SYSTOLIC BLOOD PRESSURE: 158 MMHG | RESPIRATION RATE: 20 BRPM | DIASTOLIC BLOOD PRESSURE: 84 MMHG | HEART RATE: 68 BPM | HEIGHT: 62 IN

## 2019-11-26 DIAGNOSIS — R01.1 SYSTOLIC MURMUR: ICD-10-CM

## 2019-11-26 DIAGNOSIS — I50.32 CHRONIC DIASTOLIC HEART FAILURE (HCC): ICD-10-CM

## 2019-11-26 DIAGNOSIS — R79.89 ELEVATED TSH: ICD-10-CM

## 2019-11-26 DIAGNOSIS — I10 ESSENTIAL HYPERTENSION: ICD-10-CM

## 2019-11-26 DIAGNOSIS — E11.65 TYPE 2 DIABETES MELLITUS WITH HYPERGLYCEMIA, UNSPECIFIED WHETHER LONG TERM INSULIN USE (HCC): Primary | ICD-10-CM

## 2019-11-26 DIAGNOSIS — E78.49 OTHER HYPERLIPIDEMIA: ICD-10-CM

## 2019-11-26 DIAGNOSIS — Z28.21 REFUSED INFLUENZA VACCINE: ICD-10-CM

## 2019-11-26 PROBLEM — E78.5 HYPERLIPEMIA: Status: ACTIVE | Noted: 2019-10-17

## 2019-11-26 PROCEDURE — 99214 OFFICE O/P EST MOD 30 MIN: CPT | Performed by: FAMILY MEDICINE

## 2019-11-26 RX ORDER — CARVEDILOL 12.5 MG/1
25 TABLET ORAL 2 TIMES DAILY WITH MEALS
Refills: 1 | COMMUNITY
Start: 2019-11-17 | End: 2020-05-12 | Stop reason: SDUPTHER

## 2019-11-26 RX ORDER — ERGOCALCIFEROL (VITAMIN D2) 1250 MCG
50000 CAPSULE ORAL
COMMUNITY
Start: 2019-10-22

## 2019-11-26 RX ORDER — FUROSEMIDE 20 MG/1
20 TABLET ORAL DAILY
COMMUNITY
Start: 2019-10-22 | End: 2020-01-15 | Stop reason: SDUPTHER

## 2019-11-26 RX ORDER — GABAPENTIN 300 MG/1
300 CAPSULE ORAL 3 TIMES DAILY
COMMUNITY
Start: 2019-09-23 | End: 2019-12-26 | Stop reason: SDUPTHER

## 2019-11-26 NOTE — ASSESSMENT & PLAN NOTE
Wt Readings from Last 3 Encounters:   11/26/19 70 3 kg (155 lb)   08/08/19 68 5 kg (151 lb)   10/26/18 69 9 kg (154 lb)     Recent diagnosis of diastolic heart failure in October 2019 per [address] chart review   Patient was diuresed during hospitalization and discharged home on Lasix 20 mg  Patient compliant with taking Lasix 20 mg daily  Currently, patient reports improvement in her LE edema as well as resolved dyspnea   Most recent echo (10/17/2019) results: Normal left ventricular chamber size  Normal left ventricular systolic function  Normal regional wall motion  Interventricular septal wall thickness is mildy increased  Posterior wall thickness is mildly increased  Estimated left ventricular ejection fraction is 60%  Normal right ventricular size and function  Mild pulmonary hypertension (PASP 38-50 mm Hg)  Mild diastolic dysfunction with normal filling pressures  Restricted leaflet motion with aortic valve calcification  Unable to exclude    bicuspid aortic valve  Mild aortic stenosis    No aortic regurgitation     Daily weights  Continue to restrict salt intake in the food and exercise regimen   Will monitor and f/u in 2-3 months

## 2019-11-26 NOTE — ASSESSMENT & PLAN NOTE
BP slightly elevated during this visit, 158/84  BP goal <140/90  Patient admits compliance with her medications: Amlodipine 10 mg, Coreg 12 5 BID, Lisinopril 40 mg and recent add on Lasix 20 mg daily  Patient advised to cut down on salt at home and adhere to an exercise regimen   Will follow in 2-3 months

## 2019-11-26 NOTE — ASSESSMENT & PLAN NOTE
ASCVD risk: 10 6%  Patient requiring moderate intensity statin therapy  Continue Lipitor 40 mg daily

## 2019-11-26 NOTE — TELEPHONE ENCOUNTER
Patient wanted to change pharmacy to  Ashlee Schilling [address] [facility]

## 2019-11-26 NOTE — PROGRESS NOTES
Assessment/Plan:    Type 2 diabetes mellitus with hyperglycemia (HCC)    Lab Results   Component Value Date    HGBA1C 7 9 (H) 10/21/2019     Controlled at this time  Most current HbA1c is 7 9, trending down from 9 5  Patient reports checking BG at home, lowest reading  in the morning and highest 150-170 in the afternoon  Patient forgot to bring the logs with her to the appointment  Advised patient to continue to check BG at home and bring the log next time    Patient admits to one episode of hypoglycemia at home (BG 70) and resolved after a sugary snack  Patient follow us with Dr Maritza De La Torre for her diabetes management  Patient states his office is too far and would like to come to our office for further care and management  Patient's current regimen, per Dr Maritza De La Torre last note, is as follows :Basaglar 30 units qhs, Jardiance 25 mg daily, Janumet XR  mg BID  Patient compliant with taking all these meds daily  No side effects reported   Continue to adhere to a diabetic diet and exercise regimen  Continue Gabapentin 300 mg TID daily   F/u in 2-3 months       Essential hypertension  BP slightly elevated during this visit, 158/84  BP goal <140/90  Patient admits compliance with her medications: Amlodipine 10 mg, Coreg 12 5 BID, Lisinopril 40 mg and recent add on Lasix 20 mg daily  Patient advised to cut down on salt at home and adhere to an exercise regimen   Will follow in 2-3 months       Chronic diastolic heart failure ([facility] [address] )  Wt Readings from Last 3 Encounters:   11/26/19 70 3 kg (155 lb)   08/08/19 68 5 kg (151 lb)   10/26/18 69 9 kg (154 lb)     Recent diagnosis of diastolic heart failure in October 2019 per [facility] chart review   Patient was diuresed during hospitalization and discharged home on Lasix 20 mg     Patient compliant with taking Lasix 20 mg daily  Currently, patient reports improvement in her LE edema as well as resolved dyspnea   Most recent echo (10/17/2019) results: Normal left ventricular chamber size  Normal left ventricular systolic function  Normal regional wall motion  Interventricular septal wall thickness is mildy increased  Posterior wall thickness is mildly increased  Estimated left ventricular ejection fraction is 60%  Normal right ventricular size and function  Mild pulmonary hypertension (PASP 38-50 mm Hg)  Mild diastolic dysfunction with normal filling pressures  Restricted leaflet motion with aortic valve calcification  Unable to exclude    bicuspid aortic valve  Mild aortic stenosis    No aortic regurgitation  Daily weights  Continue to restrict salt intake in the food and exercise regimen   Will monitor and f/u in 2-3 months             Elevated TSH  Patient's most recent TSH 6 120  Patient asymptomatic for hypothyroidism   This is possibly a subclinical hypothyroidism case, will order T4 and TSH surveillance   Will continue to monitor         Systolic murmur  3/6 systolic murmur, mild AS per most recent echo   Echo (10/17/2019): Normal left ventricular chamber size  Normal left ventricular systolic function  Normal regional wall motion  Interventricular septal wall thickness is mildy increased  Posterior wall thickness is mildly increased  Estimated left ventricular ejection fraction is 60%  Normal right ventricular size and function  Mild pulmonary hypertension (PASP 38-50 mm Hg)  Mild diastolic dysfunction with normal filling pressures  Restricted leaflet motion with aortic valve calcification  Unable to exclude    bicuspid aortic valve  Mild aortic stenosis    No aortic regurgitation     Will continue to monitor      Hyperlipemia  ASCVD risk: 10 6%  Patient requiring moderate intensity statin therapy  Continue Lipitor 40 mg daily     Refused influenza vaccine  Counseled patient about influenza complications in elderly population and higher risks of hospitalization with the flu in unvaccinated population   Patient refused flu vaccine       Diagnoses and all orders for this visit:    Type 2 diabetes mellitus with hyperglycemia, unspecified whether long term insulin use (HCC)    Chronic diastolic heart failure (HCC)  -     Basic metabolic panel; Future    Elevated TSH  -     TSH, 3rd generation with Free T4 reflex; Future    Essential hypertension    Systolic murmur    Other hyperlipidemia    Refused influenza vaccine    Other orders  -     ergocalciferol (ERGOCALCIFEROL) 1 25 MG (63524 UT) capsule; Take 50,000 Units by mouth every 7 days  -     furosemide (LASIX) 20 mg tablet; Take 20 mg by mouth daily  -     gabapentin (NEURONTIN) 300 mg capsule; Take 300 mg by mouth 3 (three) times a day  -     insulin glargine (BASAGLAR KWIKPEN) 100 units/mL injection pen; Inject 30 Units under the skin daily at bedtime  -     Empagliflozin (JARDIANCE) 25 MG TABS; Take 1 tablet by mouth daily with breakfast  -     carvedilol (COREG) 12 5 mg tablet; Take 12 5 mg by mouth 2 (two) times a day with meals          Subjective:      Patient ID: Burgess Horn is a 62 y o  female who presents today to the office for follow up visit of her chronic medical conditions  HPI     Patient states she was hospitalized on October 21, 2019 for worsening shortness of breath, uncontrolled HTN and lower extremity edema  Patient was diagnosed during her hospitalization with chronic diastolic heart failure per echo study and started on Lasix 20 mg daily  Patient states she is compliant with taking all her other medications  Patient is voicing concern about the location of her endocrinologist being too far and would like to see a specialist closer  Currently, patient is doing well and has no other concerns       The following portions of the patient's history were reviewed and updated as appropriate: allergies, current medications, past family history, past medical history, past social history, past surgical history and problem list     Review of Systems   Constitutional: Negative for chills, fatigue and fever    HENT: Negative for sore throat and trouble swallowing  Eyes: Negative for visual disturbance  Respiratory: Negative for cough and shortness of breath  Cardiovascular: Positive for leg swelling  Negative for chest pain and palpitations  Gastrointestinal: Negative for abdominal distention, abdominal pain, blood in stool, constipation, diarrhea, nausea and vomiting  Genitourinary: Negative for dysuria and hematuria  Musculoskeletal: Negative for gait problem  Skin: Negative for rash  Neurological: Negative for dizziness, weakness and headaches  Psychiatric/Behavioral: Negative for agitation  Objective:      /84   Pulse 68   Temp (!) 97 3 °F (36 3 °C) (Skin)   Resp 20   Ht 5' 2" (1 575 m)   Wt 70 3 kg (155 lb)   SpO2 99%   BMI 28 35 kg/m²          Physical Exam   Constitutional: She is oriented to person, place, and time  She appears well-developed and well-nourished  No distress  HENT:   Head: Normocephalic and atraumatic  Nose: Nose normal    Mouth/Throat: Oropharynx is clear and moist    Eyes: Conjunctivae and EOM are normal    Neck: Normal range of motion  Neck supple  Cardiovascular: Normal rate, regular rhythm and intact distal pulses  Murmur heard  3/6 systolic murmur    Pulmonary/Chest: Effort normal and breath sounds normal  No respiratory distress  Abdominal: Soft  Bowel sounds are normal  There is no tenderness  Musculoskeletal: She exhibits edema  1+ pitting edema b/l LE   Neurological: She is alert and oriented to person, place, and time  She displays normal reflexes  She exhibits normal muscle tone  Skin: Skin is warm  No rash noted  She is not diaphoretic  Psychiatric: She has a normal mood and affect  Her behavior is normal    Nursing note and vitals reviewed

## 2019-11-26 NOTE — ASSESSMENT & PLAN NOTE
Lab Results   Component Value Date    HGBA1C 7 9 (H) 10/21/2019     Controlled at this time  Most current HbA1c is 7 9, trending down from 9 5  Patient reports checking BG at home, lowest reading  in the morning and highest 150-170 in the afternoon  Patient forgot to bring the logs with her to the appointment  Advised patient to continue to check BG at home and bring the log next time    Patient admits to one episode of hypoglycemia at home (BG 70) and resolved after a sugary snack  Patient follow us with Dr Paris Ocampo for her diabetes management  Patient states his office is too far and would like to come to our office for further care and management  Patient's current regimen, per Dr Paris Ocampo last note, is as follows :Basaglar 30 units qhs, Jardiance 25 mg daily, Janumet XR  mg BID  Patient compliant with taking all these meds daily   No side effects reported   Continue to adhere to a diabetic diet and exercise regimen  Continue Gabapentin 300 mg TID daily   F/u in 2-3 months

## 2019-11-26 NOTE — ASSESSMENT & PLAN NOTE
Patient's most recent TSH 6 120  Patient asymptomatic for hypothyroidism   This is possibly a subclinical hypothyroidism case, will order T4 and TSH surveillance   Will continue to monitor

## 2019-11-26 NOTE — ASSESSMENT & PLAN NOTE
3/6 systolic murmur, mild AS per most recent echo   Echo (10/17/2019): Normal left ventricular chamber size  Normal left ventricular systolic function  Normal regional wall motion  Interventricular septal wall thickness is mildy increased  Posterior wall thickness is mildly increased  Estimated left ventricular ejection fraction is 60%  Normal right ventricular size and function  Mild pulmonary hypertension (PASP 38-50 mm Hg)  Mild diastolic dysfunction with normal filling pressures  Restricted leaflet motion with aortic valve calcification  Unable to exclude    bicuspid aortic valve  Mild aortic stenosis    No aortic regurgitation     Will continue to monitor

## 2019-11-27 PROBLEM — Z28.21 REFUSED INFLUENZA VACCINE: Status: ACTIVE | Noted: 2019-11-27

## 2019-11-27 NOTE — ASSESSMENT & PLAN NOTE
Counseled patient about influenza complications in elderly population and higher risks of hospitalization with the flu in unvaccinated population   Patient refused flu vaccine

## 2019-12-12 ENCOUNTER — LAB (OUTPATIENT)
Dept: LAB | Facility: HOSPITAL | Age: 64
End: 2019-12-12
Payer: COMMERCIAL

## 2019-12-12 DIAGNOSIS — I50.32 CHRONIC DIASTOLIC HEART FAILURE (HCC): ICD-10-CM

## 2019-12-12 DIAGNOSIS — R79.89 ELEVATED TSH: ICD-10-CM

## 2019-12-12 LAB
ANION GAP SERPL CALCULATED.3IONS-SCNC: 8 MMOL/L (ref 5–14)
BUN SERPL-MCNC: 28 MG/DL (ref 5–25)
CALCIUM SERPL-MCNC: 9.5 MG/DL (ref 8.4–10.2)
CHLORIDE SERPL-SCNC: 104 MMOL/L (ref 97–108)
CO2 SERPL-SCNC: 26 MMOL/L (ref 22–30)
CREAT SERPL-MCNC: 1 MG/DL (ref 0.6–1.2)
GFR SERPL CREATININE-BSD FRML MDRD: 60 ML/MIN/1.73SQ M
GLUCOSE P FAST SERPL-MCNC: 95 MG/DL (ref 70–99)
POTASSIUM SERPL-SCNC: 5.1 MMOL/L (ref 3.6–5)
SODIUM SERPL-SCNC: 138 MMOL/L (ref 137–147)
T4 FREE SERPL-MCNC: 1.1 NG/DL (ref 0.76–1.46)
TSH SERPL DL<=0.05 MIU/L-ACNC: 6.15 UIU/ML (ref 0.47–4.68)

## 2019-12-12 PROCEDURE — 84439 ASSAY OF FREE THYROXINE: CPT

## 2019-12-12 PROCEDURE — 36415 COLL VENOUS BLD VENIPUNCTURE: CPT

## 2019-12-12 PROCEDURE — 84443 ASSAY THYROID STIM HORMONE: CPT

## 2019-12-12 PROCEDURE — 80048 BASIC METABOLIC PNL TOTAL CA: CPT

## 2019-12-13 NOTE — RESULT ENCOUNTER NOTE
Called patient today to discuss lab results  I instructed patient as follows:    1  BMP: borderline elevation in K  Advised patient to avoid foods high in K such as bananas, avocados, beans, lentils  Discussed with patient that we can repeat blood work in 2-3 months when she comes back for follow up  2  TSH and free T4: Subclinical hypothyroidism, with elevation of TSH remaining stable and normal T4  Will monitor, patient asymptomatic  3  Stage 2 mild CKD: 2/2 to DM T2 and HTN  Will continue to monitor

## 2019-12-26 DIAGNOSIS — Z79.4 TYPE 2 DIABETES MELLITUS WITH HYPERGLYCEMIA, WITH LONG-TERM CURRENT USE OF INSULIN (HCC): Primary | ICD-10-CM

## 2019-12-26 DIAGNOSIS — E11.65 TYPE 2 DIABETES MELLITUS WITH HYPERGLYCEMIA, WITH LONG-TERM CURRENT USE OF INSULIN (HCC): Primary | ICD-10-CM

## 2019-12-26 RX ORDER — GABAPENTIN 300 MG/1
300 CAPSULE ORAL 3 TIMES DAILY
Qty: 90 CAPSULE | Refills: 2 | Status: SHIPPED | OUTPATIENT
Start: 2019-12-26 | End: 2020-01-20 | Stop reason: SDUPTHER

## 2020-01-15 ENCOUNTER — TELEPHONE (OUTPATIENT)
Dept: FAMILY MEDICINE CLINIC | Facility: CLINIC | Age: 65
End: 2020-01-15

## 2020-01-15 DIAGNOSIS — I50.32 CHRONIC DIASTOLIC HEART FAILURE (HCC): ICD-10-CM

## 2020-01-15 DIAGNOSIS — I10 ESSENTIAL HYPERTENSION: Primary | ICD-10-CM

## 2020-01-15 RX ORDER — FUROSEMIDE 20 MG/1
20 TABLET ORAL DAILY
Qty: 90 TABLET | Refills: 3 | Status: SHIPPED | OUTPATIENT
Start: 2020-01-15 | End: 2020-01-20 | Stop reason: SDUPTHER

## 2020-01-20 ENCOUNTER — TELEPHONE (OUTPATIENT)
Dept: FAMILY MEDICINE CLINIC | Facility: CLINIC | Age: 65
End: 2020-01-20

## 2020-01-20 DIAGNOSIS — I50.32 CHRONIC DIASTOLIC HEART FAILURE (HCC): ICD-10-CM

## 2020-01-20 DIAGNOSIS — E11.65 TYPE 2 DIABETES MELLITUS WITH HYPERGLYCEMIA, WITH LONG-TERM CURRENT USE OF INSULIN (HCC): ICD-10-CM

## 2020-01-20 DIAGNOSIS — I10 ESSENTIAL HYPERTENSION: ICD-10-CM

## 2020-01-20 DIAGNOSIS — Z79.4 TYPE 2 DIABETES MELLITUS WITH HYPERGLYCEMIA, WITH LONG-TERM CURRENT USE OF INSULIN (HCC): ICD-10-CM

## 2020-01-20 RX ORDER — FUROSEMIDE 20 MG/1
20 TABLET ORAL DAILY
Qty: 90 TABLET | Refills: 3 | Status: SHIPPED | OUTPATIENT
Start: 2020-01-20 | End: 2020-05-12 | Stop reason: ALTCHOICE

## 2020-01-20 RX ORDER — GABAPENTIN 300 MG/1
300 CAPSULE ORAL 3 TIMES DAILY
Qty: 90 CAPSULE | Refills: 2 | Status: SHIPPED | OUTPATIENT
Start: 2020-01-20 | End: 2020-02-11 | Stop reason: SDUPTHER

## 2020-01-20 NOTE — TELEPHONE ENCOUNTER
Pt is requesting refills on;    furosemide (LASIX) 20 mg tablet    gabapentin (NEURONTIN) 300 mg capsule

## 2020-02-11 ENCOUNTER — OFFICE VISIT (OUTPATIENT)
Dept: FAMILY MEDICINE CLINIC | Facility: CLINIC | Age: 65
End: 2020-02-11

## 2020-02-11 VITALS
OXYGEN SATURATION: 96 % | DIASTOLIC BLOOD PRESSURE: 82 MMHG | WEIGHT: 160 LBS | RESPIRATION RATE: 16 BRPM | TEMPERATURE: 98 F | BODY MASS INDEX: 29.26 KG/M2 | HEART RATE: 75 BPM | SYSTOLIC BLOOD PRESSURE: 140 MMHG

## 2020-02-11 DIAGNOSIS — I10 ESSENTIAL HYPERTENSION: ICD-10-CM

## 2020-02-11 DIAGNOSIS — E78.49 OTHER HYPERLIPIDEMIA: ICD-10-CM

## 2020-02-11 DIAGNOSIS — E11.65 TYPE 2 DIABETES MELLITUS WITH HYPERGLYCEMIA, WITH LONG-TERM CURRENT USE OF INSULIN (HCC): Primary | ICD-10-CM

## 2020-02-11 DIAGNOSIS — Z79.4 TYPE 2 DIABETES MELLITUS WITH HYPERGLYCEMIA, WITH LONG-TERM CURRENT USE OF INSULIN (HCC): Primary | ICD-10-CM

## 2020-02-11 DIAGNOSIS — Z00.00 HEALTHCARE MAINTENANCE: ICD-10-CM

## 2020-02-11 LAB — SL AMB POCT HEMOGLOBIN AIC: 7.7 (ref ?–6.5)

## 2020-02-11 PROCEDURE — 1036F TOBACCO NON-USER: CPT | Performed by: FAMILY MEDICINE

## 2020-02-11 PROCEDURE — T1015 CLINIC SERVICE: HCPCS | Performed by: FAMILY MEDICINE

## 2020-02-11 PROCEDURE — 3077F SYST BP >= 140 MM HG: CPT | Performed by: FAMILY MEDICINE

## 2020-02-11 PROCEDURE — 83036 HEMOGLOBIN GLYCOSYLATED A1C: CPT | Performed by: FAMILY MEDICINE

## 2020-02-11 PROCEDURE — 3079F DIAST BP 80-89 MM HG: CPT | Performed by: FAMILY MEDICINE

## 2020-02-11 PROCEDURE — 99214 OFFICE O/P EST MOD 30 MIN: CPT | Performed by: FAMILY MEDICINE

## 2020-02-11 RX ORDER — GABAPENTIN 300 MG/1
300 CAPSULE ORAL 3 TIMES DAILY
Qty: 90 CAPSULE | Refills: 2 | Status: SHIPPED | OUTPATIENT
Start: 2020-02-11 | End: 2020-05-12 | Stop reason: SDUPTHER

## 2020-02-11 RX ORDER — LANCETS
EACH MISCELLANEOUS 2 TIMES DAILY
Qty: 102 EACH | Refills: 5 | Status: SHIPPED | OUTPATIENT
Start: 2020-02-11 | End: 2020-11-20 | Stop reason: SDUPTHER

## 2020-02-11 RX ORDER — ATORVASTATIN CALCIUM 10 MG/1
TABLET, FILM COATED ORAL
COMMUNITY
Start: 2020-01-15 | End: 2020-08-26 | Stop reason: ALTCHOICE

## 2020-02-11 NOTE — ASSESSMENT & PLAN NOTE
Well Controlled  BP goal reviewed with patient <140/90  Patient compliant with her medications: Amlodipine 10 mg, Coreg 12 5 BID, Lisinopril 40 mg and Lasix 20 mg daily  Low salt diet and exercise regimen at least 30 min x 4 days/week

## 2020-02-11 NOTE — PROGRESS NOTES
Assessment/Plan:    Type 2 diabetes mellitus with hyperglycemia (HCC)    Lab Results   Component Value Date    HGBA1C 7 9 (H) 10/21/2019       Suboptimally controlled   HbA1c today's visit 7 7, trending down from 7 9  HbA1c goal reviewed with patient between 7-8  Current regimen: Basaglar 30 units qhs, Jardiance 25 mg daily, Janumet XR  mg BID  Endorses compliance with Basaglar and Janumet  Intermittently taking Jardiance in the evenings   BG ranges at home between , reviewed logs  Hypoglycemia events noted in the mornings  Explained to patient that Jardiance needs to be taken every day as well as Janumet  TDI requirement is 20 units  Will modify current Basaglar regimen from 30 units-->20 units daily, starting tomorrow morning since she's injected 30 units today  Continue Gabapentin 300 mg TID daily, refill given   Diabetic eye exam (07/11/2019): mild diabetic retinopathy b/l   Continue to adhere to a diabetic diet and exercise regimen  Reinforced the importance of counting carbs, no more than 150 g carbohydrates/day  AVS carbohydrate counting handout given   F/u in 3 months     Essential hypertension  Well Controlled  BP goal reviewed with patient <140/90  Patient compliant with her medications: Amlodipine 10 mg, Coreg 12 5 BID, Lisinopril 40 mg and Lasix 20 mg daily  Low salt diet and exercise regimen at least 30 min x 4 days/week      Hyperlipemia  ASCVD risk: 10 6%, on Lipitor 40 mg daily   Lipid panel (08/16/2019)  Continue same regimen   Compliment with low carb diet and exercise regimen     Healthcare maintenance  Patient states she completed mammogram (05/28/19) and cervical exam (05/24/19) in [facility] with benign findings   Advised patient to bring copies to the office to scan them as records   Colonoscopy (04/05/17) with normal findings   Hepatitis C Ab normal (08/16/2019)  Will schedule patient for annual visit next visit        Diagnoses and all orders for this visit:    Type 2 diabetes mellitus with hyperglycemia, with long-term current use of insulin (HCC)  -     gabapentin (NEURONTIN) 300 mg capsule; Take 1 capsule (300 mg total) by mouth 3 (three) times a day  -     ACCU-CHEK FASTCLIX LANCETS MISC; Inject under the skin 2 (two) times a day Test  -     POCT hemoglobin A1c    Essential hypertension    Other hyperlipidemia    Healthcare maintenance    Other orders  -     atorvastatin (LIPITOR) 10 mg tablet; TAKE 1 TABLET (10 MG TOTAL) BY MOUTH NIGHTLY  Subjective:      Patient ID: Funmilayo Reed is a 62 y o  female who presents today to the office for follow up visit of her chronic medical conditions  HPI     Patient reports being compliant at home with all her medications  Records BG at home in the mornings and evenings, and brings the log with her today  Noted hypoglycemia events (lowest 60) in the morning  Evening BG readings are recorded around 185 the highest   Patient reports that occasionally, based on her BG readings at night, she does not take Jardiance 25 mg to prevent hypoglycemia  States she tries to adhere to a healthy diabetic diet, however admits she doesn't count carbs  The following portions of the patient's history were reviewed and updated as appropriate: allergies, current medications, past family history, past medical history, past social history, past surgical history and problem list     Review of Systems   Constitutional: Negative for chills, fatigue and fever  HENT: Negative for sore throat and trouble swallowing  Eyes: Negative for visual disturbance  Respiratory: Negative for cough and shortness of breath  Cardiovascular: Negative for chest pain, palpitations and leg swelling  Gastrointestinal: Negative for abdominal distention, abdominal pain, blood in stool, constipation, diarrhea, nausea and vomiting  Genitourinary: Negative for dysuria and hematuria  Musculoskeletal: Negative for gait problem  Skin: Negative for rash  Neurological: Negative for dizziness, weakness and headaches  Psychiatric/Behavioral: Negative for agitation  Objective:      /82 (BP Location: Left arm, Patient Position: Sitting, Cuff Size: Large)   Pulse 75   Temp 98 °F (36 7 °C) (Temporal)   Resp 16   Wt 72 6 kg (160 lb)   SpO2 96%   BMI 29 26 kg/m²          Physical Exam   Constitutional: She is oriented to person, place, and time  She appears well-developed and well-nourished  No distress  HENT:   Head: Normocephalic and atraumatic  Nose: Nose normal    Mouth/Throat: Oropharynx is clear and moist    Eyes: Conjunctivae and EOM are normal    Neck: Normal range of motion  Neck supple  Cardiovascular: Normal rate and regular rhythm  Murmur heard  3/6 crescendo-decrescendo systolic murmur    Pulmonary/Chest: Effort normal and breath sounds normal    Abdominal: Soft  Bowel sounds are normal  She exhibits no distension  There is no tenderness  There is no guarding  Musculoskeletal: Normal range of motion  She exhibits edema  +1 BLE pitting edema, stable    Neurological: She is alert and oriented to person, place, and time  Skin: Skin is warm  No rash noted  She is not diaphoretic  Psychiatric: She has a normal mood and affect  Her behavior is normal  Judgment and thought content normal    Nursing note and vitals reviewed

## 2020-02-11 NOTE — ASSESSMENT & PLAN NOTE
Lab Results   Component Value Date    HGBA1C 7 9 (H) 10/21/2019       Suboptimally controlled   HbA1c today's visit 7 7, trending down from 7 9  HbA1c goal reviewed with patient between 7-8  Current regimen: Basaglar 30 units qhs, Jardiance 25 mg daily, Janumet XR  mg BID  Endorses compliance with Basaglar and Janumet  Intermittently taking Jardiance in the evenings   BG ranges at home between , reviewed logs  Hypoglycemia events noted in the mornings  Explained to patient that Jardiance needs to be taken every day as well as Janumet  TDI requirement is 20 units  Will modify current Basaglar regimen from 30 units-->20 units daily, starting tomorrow morning since she's injected 30 units today  Continue Gabapentin 300 mg TID daily, refill given   Diabetic eye exam (07/11/2019): mild diabetic retinopathy b/l   Continue to adhere to a diabetic diet and exercise regimen  Reinforced the importance of counting carbs, no more than 150 g carbohydrates/day   AVS carbohydrate counting handout given   F/u in 3 months

## 2020-02-11 NOTE — PATIENT INSTRUCTIONS
No mas de 150 g carbohydratos/patricia   Diabetes medicinas: Basaglar 20 units/patricia (empezando manana), Jardiance 25 mg and Janumet    Llamar la officina si tiene hypoglycemia en la manana despues de los cambios     Conteo básico de carbohidratos   CUIDADO AMBULATORIO:   El conteo de carbohidratos  es jacey manera de planificar onelia comidas contando la cantidad de carbohidratos de los alimentos  Isela Herber son los azúcares, almidones y fibras que se encuentran en las frutas, granos, verduras y productos lácteos  Los carbohidratos [employer] niveles de azúcar en la luda  El conteo de carbohidratos puede ayudarle a comer la cantidad [place] de carbohidratos para mantener onelia niveles de glucosa (azúcar) en luda bajo control  Lo que necesita saber sobre la planificación de las comidas utilizando el conteo de carbohidratos:  · Un dietista o un médico le ayudará a desarrollar un plan alimenticio saludable que trabajará mejor para usted  Le enseñarán cuántos carbohidratos debe consumir o beber en cada comida o merienda  Rondon plan alimenticio estará basado en rondon edad, peso, dieta usual y [facility]  Si usted tiene diabetes, también incluirá rondon nivel de azúcar en luda y medicamentos para la diabetes  Cuando usted está informado de la cantidad de carbohidratos que debe consumir, entonces puede decidir los tipos de alimentos que quiere comer  · Necesitará saber qué alimentos contienen carbohidratos y cuántos contienen  Lleva la cuenta de la cantidad de carbohidratos en alimentos y meriendas para poder seguir rondon plan alimenticio  No evite los carbohidratos ni omita comidas  Si usted no consume suficiente cantidad de carbohidratos u omite comidas, los niveles de azúcar en rondon luda pueden bajar excesivamente    Alimentos que contienen carbohidratos:   · Panes:  Cada porción de comida en la lista siguiente contiene cerca de 15 g de carbohidratos     ¨ 1 rebanada de pan (1 onza) o 1 tortilla de [place] o maíz (6 pulgadas)    ¨ La ½ de pan para hamburguesa o ¼ de jacey rosquilla tony (aproximadamente 1 onza)    ¨ 1 panqueque (4 pulgadas en diámetro y ¼ de [place])    · Cereales y granos:  El tamaño de las porciones de cereales listos para comer pueden variar  Morgan el tamaño de la porción y la cantidad de carbohidratos alistados en la etiqueta alimenticia  Cada porción de comida en la lista siguiente contiene cerca de 15 g de carbohidratos     ¨ ¾ taza de cereal seco, sin endulzar, listo para comer o ¼ taza de granola baja en grasa     ¨ ½ taza de leonardo u otros cereales cocidos     ¨ ?  taza de arroz o pasta cocida    · Frijoles y vegetales con almidón:  Cada porción de comida en la lista siguiente contiene cerca de 15 g de carbohidratos     ¨ ½ de taza de maíz, chícharos verdes, camote o puré de papa    ¨ ¼ de jacey papa tony horneada    ¨ ½ taza de frijoles, lentejas y guisantes (garbanzo, neri, habichuela, garcia, partido, de simeon anna)    · Gilbert y meriendas:  Cada porción de comida en la lista siguiente contiene cerca de 15 g de carbohidratos     ¨ 3 galletas de harina cuadradas u 8 galletas en forma de animalitos     ¨ 6 galletas saladas    ¨ 3 tazas de palomitas de [place] o ¾ onzas de pretzels, helena fritas o totopos    · Frutas:  Cada porción de comida en la lista siguiente contiene cerca de 15 g de carbohidratos     ¨ 1 pieza pequeña (4 onzas) de fruta fresca o ¾ a 1 taza de fruta fresca    ¨ ½ taza de fruta enlatada o congelada, envasada en jugo natural    ¨ ½ taza (4 onzas) de jugo de fruta sin azúcar    ¨ 2 cucharadas de fruta seca    · Alimentos azucarados o postres:  Cada porción de comida en la lista siguiente contiene cerca de 15 g de carbohidratos     ¨ 1 elizabeth de 2 pulgadas de pastel sin glaseado o brownie     ¨ 2 galletas dulces pequeñas    ¨ ½ taza de helado, yogur congelado o yogur congelado sin lactosa    ¨ ¼ de taza de sorbete    ¨ 1 cucharada de [place] regular, mermelada o jalea    ¨ 2 cucharadas de jarabe ligero    · Leche y yogur:  Armandbetty Barone contienen cerca de 12 g de carbohidratos por ración  ¨ 1 taza de leche sin grasa o baja en grasa    ¨ 1 taza de leche de soja    ¨ 1 taza de yogur sin grasa que ha sido endulzado con endulzante artificial    · Verduras sin almidón:  Cada porción de comida contiene cerca de 5 g de carbohidratos Rc porciones de vegetales sin almidón cuentan jazz 1 porción de carbohidratos  ¨ ½ de taza de verduras cocidas o 1 taza de verduras crudas [place] incluyen remolachas, bróculi, repollo, coliflor, pepino, champiñones, tomates y calabaza  ¨ ½ taza de jugo de verduras  Cómo utilizar el conteo de carbohidratos para planificar las comidas:   · [employer] las cantidades de carbohidratos usando el tamaño de las porciones:      ¨ Ejemplo de jacey montse de pasta:  Planifica comer pasta, ensalada mixta y un vaso de 8 onzas de [place]  Ramos médico le dice que puede consumir 4 porciones de carbohidratos para la montse  Jacey porción de carbohidratos de pasta es ? de taza  Jacey taza de pasta será igual a 3 porciones de carbohidratos  Un vaso de 8 onzas de leche se cuenta jazz 1 porción de carbohidratos  Estas cantidades de alimentos sería igual a 4 porciones de carbohidratos  Jacey taza de ensalada mixta no cuenta para las porciones de carbohidratos  · Cuente la cantidad de carbohidratos utilizando las etiquetas alimenticias:  Busque la cantidad total de los carbohidratos en los alimentos envasados leyendo la etiqueta alimenticia  Las etiquetas alimenticias explican el tamaño de la porción del alimento y la cantidad total de los carbohidratos en cada porción  Busque el tamaño de la porción en la etiqueta alimenticia y después decida cuántas porciones comerá  Multiplique el número de porciones que planea comer por la cantidad de carbohidratos por porción  ¨ Ejemplo de jacey merienda con jacey keegan singh:   Ramos plan de comidas le permite consumir 2 porciones de carbohidratos (30 gramos) jazz bocadillo  Planea comer 1 paquete de barras de granola, el cual contiene 2 barras  Según la etiqueta alimenticia, el tamaño de la porción en brinda paquete es 1 keegan  Cada porción (1 keegan) contiene 25 gramos de carbohidratos  La cantidad total de carbohidratos en el paquete de barras de granola sería 50 gramos  Basándose en ramos plan alimenticio, usted debe de comer sólo 1 keegan de granola  Acuda a onelia consultas de control con ramos médico según le indicaron  Anote onelia preguntas para que se acuerde de hacerlas divina onelia visitas  © 2017 [address] Information is for End User's use only and may not be sold, redistributed or otherwise used for commercial purposes  All illustrations and images included in CareNotes® are the copyrighted property of [employer]  or Landen Minaya  Esta información es sólo para uso en educación  Ramos intención no es darle un consejo médico sobre enfermedades o tratamientos  Colsulte con ramos Colette Nome farmacéutico antes de seguir cualquier régimen médico para saber si es seguro y efectivo para usted

## 2020-02-11 NOTE — ASSESSMENT & PLAN NOTE
ASCVD risk: 10 6%, on Lipitor 40 mg daily   Lipid panel (08/16/2019)  Continue same regimen   Compliment with low carb diet and exercise regimen

## 2020-02-11 NOTE — ASSESSMENT & PLAN NOTE
Patient states she completed mammogram (05/28/19) and cervical exam (05/24/19) in [facility] with benign findings   Advised patient to bring copies to the office to scan them as records   Colonoscopy (04/05/17) with normal findings   Hepatitis C Ab normal (08/16/2019)  Will schedule patient for annual visit next visit

## 2020-05-11 PROBLEM — Z12.11 ENCOUNTER FOR SCREENING COLONOSCOPY: Status: ACTIVE | Noted: 2020-05-11

## 2020-05-11 PROBLEM — Z12.31 ENCOUNTER FOR SCREENING MAMMOGRAM FOR BREAST CANCER: Status: ACTIVE | Noted: 2020-05-11

## 2020-05-12 ENCOUNTER — OFFICE VISIT (OUTPATIENT)
Dept: FAMILY MEDICINE CLINIC | Facility: CLINIC | Age: 65
End: 2020-05-12

## 2020-05-12 VITALS
BODY MASS INDEX: 29.63 KG/M2 | WEIGHT: 161 LBS | TEMPERATURE: 97.9 F | DIASTOLIC BLOOD PRESSURE: 80 MMHG | OXYGEN SATURATION: 95 % | RESPIRATION RATE: 16 BRPM | HEIGHT: 62 IN | HEART RATE: 73 BPM | SYSTOLIC BLOOD PRESSURE: 180 MMHG

## 2020-05-12 DIAGNOSIS — I50.32 CHRONIC DIASTOLIC HEART FAILURE (HCC): ICD-10-CM

## 2020-05-12 DIAGNOSIS — E78.49 OTHER HYPERLIPIDEMIA: ICD-10-CM

## 2020-05-12 DIAGNOSIS — Z12.11 ENCOUNTER FOR SCREENING COLONOSCOPY: ICD-10-CM

## 2020-05-12 DIAGNOSIS — R79.89 ELEVATED TSH: ICD-10-CM

## 2020-05-12 DIAGNOSIS — I10 ESSENTIAL HYPERTENSION: ICD-10-CM

## 2020-05-12 DIAGNOSIS — Z79.4 TYPE 2 DIABETES MELLITUS WITH HYPERGLYCEMIA, WITH LONG-TERM CURRENT USE OF INSULIN (HCC): ICD-10-CM

## 2020-05-12 DIAGNOSIS — Z12.31 ENCOUNTER FOR SCREENING MAMMOGRAM FOR BREAST CANCER: ICD-10-CM

## 2020-05-12 DIAGNOSIS — E11.65 TYPE 2 DIABETES MELLITUS WITH HYPERGLYCEMIA, WITH LONG-TERM CURRENT USE OF INSULIN (HCC): ICD-10-CM

## 2020-05-12 DIAGNOSIS — Z00.00 ENCOUNTER FOR WELLNESS EXAMINATION IN ADULT: Primary | ICD-10-CM

## 2020-05-12 LAB — SL AMB POCT HEMOGLOBIN AIC: 7.7 (ref ?–6.5)

## 2020-05-12 PROCEDURE — T1015 CLINIC SERVICE: HCPCS | Performed by: FAMILY MEDICINE

## 2020-05-12 PROCEDURE — 3008F BODY MASS INDEX DOCD: CPT | Performed by: FAMILY MEDICINE

## 2020-05-12 PROCEDURE — 99396 PREV VISIT EST AGE 40-64: CPT | Performed by: FAMILY MEDICINE

## 2020-05-12 PROCEDURE — 3051F HG A1C>EQUAL 7.0%<8.0%: CPT | Performed by: FAMILY MEDICINE

## 2020-05-12 PROCEDURE — 83036 HEMOGLOBIN GLYCOSYLATED A1C: CPT | Performed by: FAMILY MEDICINE

## 2020-05-12 RX ORDER — GABAPENTIN 300 MG/1
300 CAPSULE ORAL 3 TIMES DAILY
Qty: 90 CAPSULE | Refills: 2 | Status: SHIPPED | OUTPATIENT
Start: 2020-05-12 | End: 2020-09-22 | Stop reason: SDUPTHER

## 2020-05-12 RX ORDER — AMLODIPINE BESYLATE 10 MG/1
10 TABLET ORAL EVERY 24 HOURS
Qty: 30 TABLET | Refills: 0 | Status: SHIPPED | OUTPATIENT
Start: 2020-05-12 | End: 2020-08-17

## 2020-05-12 RX ORDER — CARVEDILOL 25 MG/1
25 TABLET ORAL 2 TIMES DAILY WITH MEALS
Start: 2020-05-12 | End: 2021-01-06 | Stop reason: SDUPTHER

## 2020-05-12 RX ORDER — HYDROCHLOROTHIAZIDE 12.5 MG/1
12.5 TABLET ORAL DAILY
Qty: 30 TABLET | Refills: 1 | Status: SHIPPED | OUTPATIENT
Start: 2020-05-12 | End: 2020-06-30 | Stop reason: SDUPTHER

## 2020-05-12 RX ORDER — FUROSEMIDE 20 MG/1
20 TABLET ORAL DAILY
Qty: 90 TABLET | Refills: 3 | Status: CANCELLED | OUTPATIENT
Start: 2020-05-12 | End: 2021-05-12

## 2020-05-12 RX ORDER — LISINOPRIL 40 MG/1
40 TABLET ORAL EVERY 24 HOURS
Qty: 30 TABLET | Refills: 3 | Status: SHIPPED | OUTPATIENT
Start: 2020-05-12 | End: 2020-06-30 | Stop reason: SDUPTHER

## 2020-05-19 ENCOUNTER — LAB (OUTPATIENT)
Dept: LAB | Facility: HOSPITAL | Age: 65
End: 2020-05-19
Payer: COMMERCIAL

## 2020-05-19 ENCOUNTER — CLINICAL SUPPORT (OUTPATIENT)
Dept: FAMILY MEDICINE CLINIC | Facility: CLINIC | Age: 65
End: 2020-05-19

## 2020-05-19 DIAGNOSIS — I10 ESSENTIAL HYPERTENSION: ICD-10-CM

## 2020-05-19 DIAGNOSIS — Z01.30 BP CHECK: Primary | ICD-10-CM

## 2020-05-19 DIAGNOSIS — R79.89 ELEVATED TSH: ICD-10-CM

## 2020-05-19 DIAGNOSIS — N18.30 CHRONIC RENAL DISEASE, STAGE 3, MODERATELY DECREASED GLOMERULAR FILTRATION RATE (GFR) BETWEEN 30-59 ML/MIN/1.73 SQUARE METER (HCC): Primary | ICD-10-CM

## 2020-05-19 LAB
ANION GAP SERPL CALCULATED.3IONS-SCNC: 6 MMOL/L (ref 5–14)
BUN SERPL-MCNC: 32 MG/DL (ref 5–25)
CALCIUM SERPL-MCNC: 9 MG/DL (ref 8.4–10.2)
CHLORIDE SERPL-SCNC: 103 MMOL/L (ref 97–108)
CO2 SERPL-SCNC: 28 MMOL/L (ref 22–30)
CREAT SERPL-MCNC: 1.21 MG/DL (ref 0.6–1.2)
GFR SERPL CREATININE-BSD FRML MDRD: 47 ML/MIN/1.73SQ M
GLUCOSE P FAST SERPL-MCNC: 97 MG/DL (ref 70–99)
POTASSIUM SERPL-SCNC: 5.1 MMOL/L (ref 3.6–5)
SODIUM SERPL-SCNC: 137 MMOL/L (ref 137–147)
TSH SERPL DL<=0.05 MIU/L-ACNC: 4.21 UIU/ML (ref 0.47–4.68)

## 2020-05-19 PROCEDURE — 80048 BASIC METABOLIC PNL TOTAL CA: CPT

## 2020-05-19 PROCEDURE — 84443 ASSAY THYROID STIM HORMONE: CPT

## 2020-05-19 PROCEDURE — 36415 COLL VENOUS BLD VENIPUNCTURE: CPT

## 2020-05-19 PROCEDURE — 3066F NEPHROPATHY DOC TX: CPT | Performed by: FAMILY MEDICINE

## 2020-05-21 ENCOUNTER — TELEPHONE (OUTPATIENT)
Dept: FAMILY MEDICINE CLINIC | Facility: CLINIC | Age: 65
End: 2020-05-21

## 2020-06-04 ENCOUNTER — APPOINTMENT (OUTPATIENT)
Dept: LAB | Facility: HOSPITAL | Age: 65
End: 2020-06-04
Payer: COMMERCIAL

## 2020-06-04 DIAGNOSIS — N18.30 CHRONIC RENAL DISEASE, STAGE 3, MODERATELY DECREASED GLOMERULAR FILTRATION RATE (GFR) BETWEEN 30-59 ML/MIN/1.73 SQUARE METER (HCC): ICD-10-CM

## 2020-06-04 LAB
ANION GAP SERPL CALCULATED.3IONS-SCNC: 9 MMOL/L (ref 5–14)
BUN SERPL-MCNC: 35 MG/DL (ref 5–25)
CALCIUM SERPL-MCNC: 8.9 MG/DL (ref 8.4–10.2)
CHLORIDE SERPL-SCNC: 102 MMOL/L (ref 97–108)
CO2 SERPL-SCNC: 26 MMOL/L (ref 22–30)
CREAT SERPL-MCNC: 1.19 MG/DL (ref 0.6–1.2)
GFR SERPL CREATININE-BSD FRML MDRD: 48 ML/MIN/1.73SQ M
GLUCOSE P FAST SERPL-MCNC: 196 MG/DL (ref 70–99)
POTASSIUM SERPL-SCNC: 4.9 MMOL/L (ref 3.6–5)
SODIUM SERPL-SCNC: 137 MMOL/L (ref 137–147)

## 2020-06-04 PROCEDURE — 36415 COLL VENOUS BLD VENIPUNCTURE: CPT

## 2020-06-04 PROCEDURE — 80048 BASIC METABOLIC PNL TOTAL CA: CPT

## 2020-06-30 ENCOUNTER — OFFICE VISIT (OUTPATIENT)
Dept: FAMILY MEDICINE CLINIC | Facility: CLINIC | Age: 65
End: 2020-06-30

## 2020-06-30 VITALS
RESPIRATION RATE: 14 BRPM | BODY MASS INDEX: 28.35 KG/M2 | WEIGHT: 155 LBS | HEART RATE: 64 BPM | SYSTOLIC BLOOD PRESSURE: 146 MMHG | DIASTOLIC BLOOD PRESSURE: 78 MMHG | OXYGEN SATURATION: 98 % | TEMPERATURE: 97 F

## 2020-06-30 DIAGNOSIS — K59.09 OTHER CONSTIPATION: ICD-10-CM

## 2020-06-30 DIAGNOSIS — Z79.4 TYPE 2 DIABETES MELLITUS WITH HYPERGLYCEMIA, WITH LONG-TERM CURRENT USE OF INSULIN (HCC): ICD-10-CM

## 2020-06-30 DIAGNOSIS — I10 ESSENTIAL HYPERTENSION: Primary | ICD-10-CM

## 2020-06-30 DIAGNOSIS — Z12.31 ENCOUNTER FOR SCREENING MAMMOGRAM FOR BREAST CANCER: ICD-10-CM

## 2020-06-30 DIAGNOSIS — Z12.11 ENCOUNTER FOR SCREENING COLONOSCOPY: ICD-10-CM

## 2020-06-30 DIAGNOSIS — E11.65 TYPE 2 DIABETES MELLITUS WITH HYPERGLYCEMIA, WITH LONG-TERM CURRENT USE OF INSULIN (HCC): ICD-10-CM

## 2020-06-30 DIAGNOSIS — N18.30 STAGE 3 CHRONIC KIDNEY DISEASE (HCC): ICD-10-CM

## 2020-06-30 PROBLEM — Z00.00 HEALTHCARE MAINTENANCE: Status: RESOLVED | Noted: 2019-08-08 | Resolved: 2020-06-30

## 2020-06-30 PROCEDURE — 3051F HG A1C>EQUAL 7.0%<8.0%: CPT | Performed by: FAMILY MEDICINE

## 2020-06-30 PROCEDURE — 1036F TOBACCO NON-USER: CPT | Performed by: FAMILY MEDICINE

## 2020-06-30 PROCEDURE — 4010F ACE/ARB THERAPY RXD/TAKEN: CPT | Performed by: FAMILY MEDICINE

## 2020-06-30 PROCEDURE — 99213 OFFICE O/P EST LOW 20 MIN: CPT | Performed by: FAMILY MEDICINE

## 2020-06-30 PROCEDURE — 3066F NEPHROPATHY DOC TX: CPT | Performed by: FAMILY MEDICINE

## 2020-06-30 PROCEDURE — 3077F SYST BP >= 140 MM HG: CPT | Performed by: FAMILY MEDICINE

## 2020-06-30 PROCEDURE — 3078F DIAST BP <80 MM HG: CPT | Performed by: FAMILY MEDICINE

## 2020-06-30 RX ORDER — LISINOPRIL 40 MG/1
40 TABLET ORAL EVERY 24 HOURS
Qty: 30 TABLET | Refills: 3 | Status: SHIPPED | OUTPATIENT
Start: 2020-06-30 | End: 2020-11-09

## 2020-06-30 RX ORDER — HYDROCHLOROTHIAZIDE 12.5 MG/1
12.5 TABLET ORAL DAILY
Qty: 30 TABLET | Refills: 1 | Status: SHIPPED | OUTPATIENT
Start: 2020-06-30 | End: 2020-09-09

## 2020-06-30 RX ORDER — DOCUSATE SODIUM 100 MG/1
100 CAPSULE, LIQUID FILLED ORAL 2 TIMES DAILY PRN
Qty: 20 CAPSULE | Refills: 0 | Status: SHIPPED | OUTPATIENT
Start: 2020-06-30 | End: 2021-01-06 | Stop reason: ALTCHOICE

## 2020-07-01 ENCOUNTER — TELEPHONE (OUTPATIENT)
Dept: FAMILY MEDICINE CLINIC | Facility: CLINIC | Age: 65
End: 2020-07-01

## 2020-07-01 NOTE — TELEPHONE ENCOUNTER
Patient was originally scheduled for VASC test 6/9/2020 but was a no show to scheduled appointment     Will attempt to reach again

## 2020-07-01 NOTE — TELEPHONE ENCOUNTER
[id number] spoke w/ patient and asked to call and schedule previously missed imaging  Patient given central scheduling number and will call to schedule

## 2020-07-01 NOTE — TELEPHONE ENCOUNTER
----- Message from Fred Koch MD sent at 6/30/2020  5:32 PM EDT -----  Regarding: renal studies  Shannon Manus,     I placed a VAS renal artery stenosis for this patient  Just wanted to make you aware so that this patient can be scheduled       Thank you,  Dr Noemi Ortiz

## 2020-07-07 NOTE — TELEPHONE ENCOUNTER
[id number] spoke w/ patient in regard to scheduling VASC test  Patient stated she had all scheduling information, and would be waiting for son to call and make appointment

## 2020-07-31 ENCOUNTER — HOSPITAL ENCOUNTER (OUTPATIENT)
Dept: NON INVASIVE DIAGNOSTICS | Facility: HOSPITAL | Age: 65
Discharge: HOME | End: 2020-07-31
Payer: COMMERCIAL

## 2020-07-31 DIAGNOSIS — I10 ESSENTIAL HYPERTENSION: ICD-10-CM

## 2020-07-31 PROCEDURE — 93975 VASCULAR STUDY: CPT

## 2020-08-01 PROCEDURE — 93975 VASCULAR STUDY: CPT | Performed by: SURGERY

## 2020-08-17 DIAGNOSIS — I10 ESSENTIAL HYPERTENSION: ICD-10-CM

## 2020-08-17 RX ORDER — AMLODIPINE BESYLATE 10 MG/1
TABLET ORAL
Qty: 30 TABLET | Refills: 3 | Status: SHIPPED | OUTPATIENT
Start: 2020-08-17 | End: 2020-11-20 | Stop reason: SDUPTHER

## 2020-08-26 DIAGNOSIS — Z79.4 CONTROLLED TYPE 2 DIABETES MELLITUS WITHOUT COMPLICATION, WITH LONG-TERM CURRENT USE OF INSULIN (HCC): ICD-10-CM

## 2020-08-26 DIAGNOSIS — E11.9 CONTROLLED TYPE 2 DIABETES MELLITUS WITHOUT COMPLICATION, WITH LONG-TERM CURRENT USE OF INSULIN (HCC): ICD-10-CM

## 2020-08-26 RX ORDER — ATORVASTATIN CALCIUM 40 MG/1
40 TABLET, FILM COATED ORAL DAILY
Qty: 90 TABLET | Refills: 3 | Status: SHIPPED | OUTPATIENT
Start: 2020-08-26 | End: 2021-01-06 | Stop reason: SDUPTHER

## 2020-09-09 DIAGNOSIS — I10 ESSENTIAL HYPERTENSION: ICD-10-CM

## 2020-09-09 RX ORDER — HYDROCHLOROTHIAZIDE 12.5 MG/1
TABLET ORAL
Qty: 30 TABLET | Refills: 0 | Status: SHIPPED | OUTPATIENT
Start: 2020-09-09 | End: 2020-09-30 | Stop reason: SDUPTHER

## 2020-09-21 DIAGNOSIS — E11.65 TYPE 2 DIABETES MELLITUS WITH HYPERGLYCEMIA, WITH LONG-TERM CURRENT USE OF INSULIN (HCC): ICD-10-CM

## 2020-09-21 DIAGNOSIS — Z79.4 TYPE 2 DIABETES MELLITUS WITH HYPERGLYCEMIA, WITH LONG-TERM CURRENT USE OF INSULIN (HCC): ICD-10-CM

## 2020-09-22 RX ORDER — GABAPENTIN 300 MG/1
CAPSULE ORAL
Qty: 90 CAPSULE | Refills: 0 | Status: SHIPPED | OUTPATIENT
Start: 2020-09-22 | End: 2020-09-30

## 2020-09-28 PROBLEM — E11.3313 TYPE 2 DIABETES MELLITUS WITH MODERATE NONPROLIFERATIVE RETINOPATHY OF BOTH EYES AND MACULAR EDEMA (HCC): Status: ACTIVE | Noted: 2019-10-17

## 2020-09-28 NOTE — ASSESSMENT & PLAN NOTE
Lab Results   Component Value Date    HGBA1C 9 6 (A) 09/30/2020     Not at goal  HbA1c trending up from 7 7-->9 6  This is most likely attributable to less exercise vs worsening diabetes  Denies recent prolonged illness or non-compliance with taking meds which can raise BG  Advised to bring BG log next visit    Continue Basaglar 20 units in the morning, Jardiance 25 mg daily, Janumet XR  mg BID  Discussed with patient to call office in 1 week to let me know about her fasting BG in order to optimize regimen     Consider d/c Jardiance, increasing Basaglar based on BG levels and continue with Metformin 500 mg BID  Continue to adhere to a diabetic diet, carb counting and exercise regimen  Given Cr clearance <60 (around 53) caution with Gabapentin usage, advise to take twice/day 300 mg and after 2 weeks once/day if she doesn't get symptomatic   Podiatry referral placed today, diabetic foot exam performed today in the office   Follow up with ophthalmology

## 2020-09-28 NOTE — ASSESSMENT & PLAN NOTE
Let mom know I sent rx to pharmacy, start 1-2 weeks before until returning then for 4 more weeks 1 pill/week Stage 3A CKD  GFR range within the last year 48-60; currently GFR maintaining in the high 40s, stable  Most likely etiology for CKD can be attributed to uncontrolled HTN, DM hx and poor PO hydration   For eGFR 30-59: will measure serum Ca, phosphate, vitamin D very 6-12 months and PTH every 12 months  EMILIANO study (07/31/20):  No evidence of significant arterial occlusive disease in the main right and left renal artery  Advised increased hydration and good glycemic control   Avoid nephrotoxic meds and oscillations in BP

## 2020-09-28 NOTE — ASSESSMENT & PLAN NOTE
BMI Counseling: Body mass index is 28 72 kg/m²  The BMI is above normal  Nutrition recommendations include reducing portion sizes, decreasing overall calorie intake, 3-5 servings of fruits/vegetables daily, reducing fast food intake, consuming healthier snacks, decreasing soda and/or juice intake, moderation in carbohydrate intake, increasing intake of lean protein and reducing intake of saturated fat and trans fat  Exercise recommendations include exercising 3-5 times per week

## 2020-09-28 NOTE — PROGRESS NOTES
Assessment/Plan:    Type 2 diabetes mellitus, with long-term current use of insulin (HCC)    Lab Results   Component Value Date    HGBA1C 9 6 (A) 09/30/2020     Not at goal  HbA1c trending up from 7 7-->9 6  This is most likely attributable to less exercise vs worsening diabetes  Denies recent prolonged illness or non-compliance with taking meds which can raise BG  Advised to bring BG log next visit    Continue Basaglar 20 units in the morning, Jardiance 25 mg daily, Janumet XR  mg BID  Discussed with patient to call office in 1 week to let me know about her fasting BG in order to optimize regimen  Consider d/c Jardiance, increasing Basaglar based on BG levels and continue with Metformin 500 mg BID  Continue to adhere to a diabetic diet, carb counting and exercise regimen  Given Cr clearance <60 (around 53) caution with Gabapentin usage, advise to take twice/day 300 mg and after 2 weeks once/day if she doesn't get symptomatic   Podiatry referral placed today, diabetic foot exam performed today in the office   Follow up with ophthalmology     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)    Lab Results   Component Value Date    HGBA1C 7 7 (A) 05/12/2020     Currently undergoing treatment with focal lase b/l  Continue to f/u with [facility] ophthalmology     Essential hypertension  Controlled  Slightly elevated today 148/70  BP goal < 140/90 per JNC 8 criteria  Continue with HCTZ 12 5 mg daily, Amlodipine 10 mg, Coreg 25 mg BID and Lisinopril 40 mg  Low salt diet and exercise regimen   Will check microalbumin/urine cr ratio   Follow up in 3 months     Hyperlipemia  Will repeat lipid panel   Continue with Lipitor 40 mg QHS  Adhere to low fat diet and exercise     BMI 28 0-28 9,adult  BMI Counseling: Body mass index is 28 72 kg/m²   The BMI is above normal  Nutrition recommendations include reducing portion sizes, decreasing overall calorie intake, 3-5 servings of fruits/vegetables daily, reducing fast food intake, consuming healthier snacks, decreasing soda and/or juice intake, moderation in carbohydrate intake, increasing intake of lean protein and reducing intake of saturated fat and trans fat  Exercise recommendations include exercising 3-5 times per week  Stage 3 chronic kidney disease (HCC)  Stage 3A CKD  GFR range within the last year 48-60; currently GFR maintaining in the high 40s, stable  Most likely etiology for CKD can be attributed to uncontrolled HTN, DM hx and poor PO hydration   For eGFR 30-59: will measure serum Ca, phosphate, vitamin D very 6-12 months and PTH every 12 months  EMILIANO study (07/31/20): No evidence of significant arterial occlusive disease in the main right and left renal artery  Advised increased hydration and good glycemic control   Avoid nephrotoxic meds and oscillations in BP    Influenza vaccination declined  Patient refused flu shot today, states she does not get the vaccine as she doesn't get sick with the flu        Diagnoses and all orders for this visit:    Type 2 diabetes mellitus with hyperglycemia, with long-term current use of insulin ([facility])  -     POCT hemoglobin A1c  -     gabapentin (NEURONTIN) 300 mg capsule; Take 1 capsule (300 mg total) by mouth 2 (two) times a day  -     Ambulatory referral to Podiatry; Future    Essential hypertension  -     Microalbumin / creatinine urine ratio  -     hydrochlorothiazide (HYDRODIURIL) 12 5 mg tablet; Take 1 tablet (12 5 mg total) by mouth daily    Type 2 diabetes mellitus with both eyes affected by moderate nonproliferative retinopathy and macular edema, with long-term current use of insulin ([facility])    Stage 3 chronic kidney disease ([facility])  -     PTH, intact; Future  -     CALCIUM & CALCIUM IONIZED; Future  -     Phosphorus; Future  -     Vitamin D 25 hydroxy; Future    Pure hypertriglyceridemia  -     Lipid Panel with Direct LDL reflex;  Future    BMI 28 0-28 9,adult    Screening for HIV (human immunodeficiency virus)  -     HIV 1/2 Antigen/Antibody (4th Generation) w Reflex [facility]; Future    Influenza vaccination declined    Diabetic polyneuropathy associated with type 2 diabetes mellitus ([facility] [address] )  -     Ambulatory referral to Podiatry; Future    Refused diphtheria-tetanus vaccine    Other orders  -     Cancel: influenza vaccine, quadrivalent, recombinant, PF, 0 5 mL, for patients 18 yr+ ([facility])          Subjective:      Patient ID: [place] is a 60 y o  female who presents today to the office for f/u of chronic conditions  HPI    Patient states she checks intermittently fasting BG ranging around 112-120  Denies recent hypoglycemia events  She tried to adhere to a diabetic diet  She is compliant with taking all her meds at home  Recently she stopped walking outdoors as much due to colder weather  Patient mentions she saw ophtalmologist last week, and she has another appt  Tomorrow  She has not f/u with podiatry  Endorses no immediate concerns at this time  The following portions of the patient's history were reviewed and updated as appropriate: allergies, current medications, past family history, past medical history, past social history, past surgical history and problem list     Review of Systems   Constitutional: Negative for chills, fatigue and fever  HENT: Negative for sore throat and trouble swallowing  Eyes: Negative for visual disturbance  Respiratory: Negative for cough and shortness of breath  Cardiovascular: Negative for chest pain and leg swelling  Gastrointestinal: Negative for abdominal distention, abdominal pain, blood in stool, constipation, diarrhea, nausea and vomiting  Genitourinary: Negative for dysuria and hematuria  Musculoskeletal: Negative for gait problem  Skin: Negative for rash  Neurological: Negative for dizziness, weakness and headaches  Psychiatric/Behavioral: Negative for agitation           Objective:      /70 (BP Location: Left arm, Patient Position: Sitting, Cuff Size: Adult)   Pulse 91   Temp 97 6 °F (36 4 °C) (Temporal)   Resp 16   Wt 71 2 kg (157 lb)   SpO2 99%   BMI 28 72 kg/m²        Physical Exam  Vitals signs and nursing note reviewed  Constitutional:       General: She is not in acute distress  Appearance: Normal appearance  She is well-developed  She is not ill-appearing, toxic-appearing or diaphoretic  Comments: Overweight    HENT:      Head: Normocephalic and atraumatic  Nose: Nose normal    Eyes:      Extraocular Movements: Extraocular movements intact  Conjunctiva/sclera: Conjunctivae normal       Pupils: Pupils are equal, round, and reactive to light  Neck:      Musculoskeletal: Normal range of motion and neck supple  Cardiovascular:      Rate and Rhythm: Normal rate and regular rhythm  Pulses: no weak pulses          Dorsalis pedis pulses are 2+ on the right side and 2+ on the left side  Heart sounds: Murmur present  Comments: 3/6 crescendo-decrescendo systolic murmur     Pulmonary:      Effort: Pulmonary effort is normal  No respiratory distress  Breath sounds: Normal breath sounds  Abdominal:      General: Bowel sounds are normal       Palpations: Abdomen is soft  Tenderness: There is no abdominal tenderness  Musculoskeletal: Normal range of motion  General: No tenderness  Feet:       Comments: Trace BLE edema    Feet:      Right foot:      Skin integrity: No erythema  Left foot:      Skin integrity: No erythema  Skin:     General: Skin is warm  Findings: No rash  Neurological:      Mental Status: She is alert and oriented to person, place, and time  Psychiatric:         Mood and Affect: Mood normal          Behavior: Behavior normal          Thought Content: Thought content normal          Judgment: Judgment normal          Patient's shoes and socks removed  Right Foot/Ankle   Right Foot Inspection  Skin Exam: no erythema and no pre-ulcer                          Toe Exam: ROM and strength within normal limitsno swelling and no tenderness  Sensory       Monofilament testing: diminished  Vascular    The right DP pulse is 2+  Left Foot/Ankle  Left Foot Inspection  Skin Exam: no erythema and no pre-ulcer                         Toe Exam: ROM and strength within normal limitsno swelling and no tenderness                   Sensory       Monofilament: diminished  Vascular    The left DP pulse is 2+  Assign Risk Category:  No deformity present; Loss of protective sensation;  No weak pulses       Risk: 1

## 2020-09-28 NOTE — ASSESSMENT & PLAN NOTE
Controlled  Slightly elevated today 148/70  BP goal < 140/90 per JNC 8 criteria  Continue with HCTZ 12 5 mg daily, Amlodipine 10 mg, Coreg 25 mg BID and Lisinopril 40 mg  Low salt diet and exercise regimen   Will check microalbumin/urine cr ratio   Follow up in 3 months

## 2020-09-28 NOTE — ASSESSMENT & PLAN NOTE
Lab Results   Component Value Date    HGBA1C 7 7 (A) 05/12/2020     Currently undergoing treatment with focal lase b/l  Continue to f/u with [facility] ophthalmology

## 2020-09-30 ENCOUNTER — OFFICE VISIT (OUTPATIENT)
Dept: FAMILY MEDICINE CLINIC | Facility: CLINIC | Age: 65
End: 2020-09-30

## 2020-09-30 VITALS
DIASTOLIC BLOOD PRESSURE: 70 MMHG | RESPIRATION RATE: 16 BRPM | WEIGHT: 157 LBS | OXYGEN SATURATION: 99 % | TEMPERATURE: 97.6 F | BODY MASS INDEX: 28.72 KG/M2 | HEART RATE: 91 BPM | SYSTOLIC BLOOD PRESSURE: 148 MMHG

## 2020-09-30 DIAGNOSIS — E11.42 DIABETIC POLYNEUROPATHY ASSOCIATED WITH TYPE 2 DIABETES MELLITUS (HCC): ICD-10-CM

## 2020-09-30 DIAGNOSIS — Z11.4 SCREENING FOR HIV (HUMAN IMMUNODEFICIENCY VIRUS): ICD-10-CM

## 2020-09-30 DIAGNOSIS — E11.65 TYPE 2 DIABETES MELLITUS WITH HYPERGLYCEMIA, WITH LONG-TERM CURRENT USE OF INSULIN (HCC): Primary | ICD-10-CM

## 2020-09-30 DIAGNOSIS — E11.3313 TYPE 2 DIABETES MELLITUS WITH BOTH EYES AFFECTED BY MODERATE NONPROLIFERATIVE RETINOPATHY AND MACULAR EDEMA, WITH LONG-TERM CURRENT USE OF INSULIN (HCC): ICD-10-CM

## 2020-09-30 DIAGNOSIS — Z79.4 TYPE 2 DIABETES MELLITUS WITH HYPERGLYCEMIA, WITH LONG-TERM CURRENT USE OF INSULIN (HCC): Primary | ICD-10-CM

## 2020-09-30 DIAGNOSIS — N18.30 STAGE 3 CHRONIC KIDNEY DISEASE (HCC): ICD-10-CM

## 2020-09-30 DIAGNOSIS — Z79.4 TYPE 2 DIABETES MELLITUS WITH BOTH EYES AFFECTED BY MODERATE NONPROLIFERATIVE RETINOPATHY AND MACULAR EDEMA, WITH LONG-TERM CURRENT USE OF INSULIN (HCC): ICD-10-CM

## 2020-09-30 DIAGNOSIS — I10 ESSENTIAL HYPERTENSION: ICD-10-CM

## 2020-09-30 DIAGNOSIS — Z28.21 INFLUENZA VACCINATION DECLINED: ICD-10-CM

## 2020-09-30 DIAGNOSIS — Z28.21 REFUSED DIPHTHERIA-TETANUS VACCINE: ICD-10-CM

## 2020-09-30 DIAGNOSIS — E78.1 PURE HYPERTRIGLYCERIDEMIA: ICD-10-CM

## 2020-09-30 LAB — SL AMB POCT HEMOGLOBIN AIC: 9.6 (ref ?–6.5)

## 2020-09-30 PROCEDURE — 1036F TOBACCO NON-USER: CPT | Performed by: FAMILY MEDICINE

## 2020-09-30 PROCEDURE — 3725F SCREEN DEPRESSION PERFORMED: CPT | Performed by: FAMILY MEDICINE

## 2020-09-30 PROCEDURE — 99213 OFFICE O/P EST LOW 20 MIN: CPT | Performed by: FAMILY MEDICINE

## 2020-09-30 PROCEDURE — 83036 HEMOGLOBIN GLYCOSYLATED A1C: CPT | Performed by: FAMILY MEDICINE

## 2020-09-30 PROCEDURE — 3046F HEMOGLOBIN A1C LEVEL >9.0%: CPT | Performed by: FAMILY MEDICINE

## 2020-09-30 RX ORDER — HYDROCHLOROTHIAZIDE 12.5 MG/1
12.5 TABLET ORAL DAILY
Qty: 30 TABLET | Refills: 3 | Status: SHIPPED | OUTPATIENT
Start: 2020-09-30 | End: 2020-12-08 | Stop reason: ALTCHOICE

## 2020-09-30 RX ORDER — GABAPENTIN 300 MG/1
300 CAPSULE ORAL 2 TIMES DAILY
Qty: 90 CAPSULE | Refills: 0 | Status: SHIPPED | OUTPATIENT
Start: 2020-09-30 | End: 2021-01-06 | Stop reason: SDUPTHER

## 2020-09-30 NOTE — PATIENT INSTRUCTIONS
Lifestyle Medicine Tip Sheet- Croatian    1  Coma alimentos predominantemente menos procesados, jazz comida rápida, cenas de televisión y tocino  2  Coma cerca de la naturaleza (mercados de agricultores, productos frescos o congelados)    3  Coma jacey dieta predominantemente basada en plantas  a  Verdes frondosos oscuros sarah   b  Frutas y vegetales  c   Granos integrales: sabi integral, apenas, bayas de sabi, quinua, leonardo cortada en ngozi, arroz integral, pasta integral   d  Legumbres: frijoles, frijoles pintos, frijoles blancos, frijoles negros, garbanzos (garbanzos), frijoles lima (maduros, secos), arvejas, lentejas y edamame (frijoles de soya verdes)      4  Al menos la mitad del plato debe contener frutas o verduras  5  El líquido debe ser predominantemente agua (limite el refresco y [place])    6  Usha el tamaño de la porción  7  ¿Qué alimentos daphnie evitar o limitar? - Grasas: Específicamente saturadas y grasas trans  Se encuentran en margarinas, muchas comidas rápidas y algunos productos horneados comprados en la sneha  Las grasas saturadas y grasas trans pueden elevar ronodn nivel de colesterol y rondon probabilidad de contraer enfermedades cardíacas  - Cuando cocine, es mejor no usar aceites, ahley si es necesario, trate de limitar la cantidad de aceite utilizado, ya que el aceite contiene muchas calorías por volumen y es muy poco saludable cuando se calienta divina la cocción   - Azúcar: limite o evite el azúcar, los dulces y los granos refinados  Los granos refinados se encuentran en el pan garcia, el arroz garcia, la mayoría de las formas de pasta y la mayoría de los alimentos "aperitivos" envasados  - Intente no cocinar con lan y evite agregar lan adicional a onelia comidas  - Diannia Putt: los estudios ontiveros demostrado que comer mucha mariah Bonilla riesgo de ciertos problemas de [place], jazz enfermedades cardíacas y cáncer  8  7-9 horas de sueño    9   Ejercicio diario mínimo de 30 minutos (caminando alrededor de la christine)    10  Socialización (amigos y familiares)    - Explora tu vecindario  Ve al parque, pasa tiempo en la biblioteca  Si está interesado, puede leer más sobre las opciones de alimentos saludables en los siguientes sitios web:  a  NutritionHubspan  org  b  Home cooking recipes: https://www jessFanbaseannamaria com/  c  http://brian info/  d  Familydoctor  org  Conteo básico de carbohidratos   CUIDADO AMBULATORIO:   El conteo de carbohidratos  es Natividad de planificar onelia comidas contando la cantidad de carbohidratos de los alimentos  Daren Ours son los azúcares, almidones y fibras que se encuentran en las frutas, granos, verduras y productos lácteos  Los carbohidratos [employer] niveles de azúcar en la luda  El conteo de carbohidratos puede ayudarle a comer la cantidad [place] de carbohidratos para mantener onelia niveles de glucosa (azúcar) en luda bajo control  Lo que necesita saber sobre la planificación de las comidas utilizando el conteo de carbohidratos:  · Un dietista o un médico le ayudará a desarrollar un plan alimenticio saludable que trabajará mejor para usted  Le enseñarán cuántos carbohidratos debe consumir o beber en cada comida o merienda  Rondon plan alimenticio estará basado en rondon edad, peso, dieta usual y [facility]  Si usted tiene diabetes, también incluirá rondon nivel de azúcar en luda y medicamentos para la diabetes  Cuando usted está informado de la cantidad de carbohidratos que debe consumir, entonces puede decidir los tipos de alimentos que quiere comer  · Necesitará saber qué alimentos contienen carbohidratos y cuántos contienen  Lleva la cuenta de la cantidad de carbohidratos en alimentos y meriendas para poder seguir rondon plan alimenticio  No evite los carbohidratos ni omita comidas   Si usted no consume suficiente cantidad de carbohidratos u omite comidas, los niveles de azúcar en rondon luda pueden bajar excesivamente  Alimentos que contienen carbohidratos:   · Panes:  Cada porción de comida en la lista siguiente contiene cerca de 15 g de carbohidratos     ¨ 1 rebanada de pan (1 onza) o 1 tortilla de harina o maíz (6 pulgadas)    ¨ La ½ de pan para hamburguesa o ¼ de jacey rosquilla tony (aproximadamente 1 onza)    ¨ 1 panqueque (4 pulgadas en diámetro y ¼ de [place])    · Cereales y granos:  El tamaño de las porciones de cereales listos para comer pueden variar  Morgan el tamaño de la porción y la cantidad de carbohidratos alistados en la etiqueta alimenticia  Cada porción de comida en la lista siguiente contiene cerca de 15 g de carbohidratos     ¨ ¾ taza de cereal seco, sin endulzar, listo para comer o ¼ taza de granola baja en grasa     ¨ ½ taza de leonardo u otros cereales cocidos     ¨ ?  taza de arroz o pasta cocida    · Frijoles y vegetales con almidón:  Cada porción de comida en la lista siguiente contiene cerca de 15 g de carbohidratos     ¨ ½ de taza de maíz, chícharos verdes, camote o puré de papa    ¨ ¼ de jacey papa tony horneada    ¨ ½ taza de frijoles, lentejas y guisantes (garbanzo, neri, habichuela, garcia, partido, de simeon anna)    · Gilbert y meriendas:  Cada porción de comida en la lista siguiente contiene cerca de 15 g de carbohidratos     ¨ 3 galletas de harina cuadradas u 8 galletas en forma de animalitos     ¨ 6 galletas saladas    ¨ 3 tazas de palomitas de [place] o ¾ onzas de pretzels, helena fritas o totopos    · Frutas:  Cada porción de comida en la lista siguiente contiene cerca de 15 g de carbohidratos     ¨ 1 pieza pequeña (4 onzas) de fruta fresca o ¾ a 1 taza de fruta fresca    ¨ ½ taza de fruta enlatada o congelada, envasada en jugo natural    ¨ ½ taza (4 onzas) de [place] de fruta sin azúcar    ¨ 2 cucharadas de fruta seca    · Alimentos azucarados o postres:  Cada porción de comida en la lista siguiente contiene cerca de 15 g de carbohidratos ¨ 1 elizabeth de 2 pulgadas de pastel sin glaseado o brownie     ¨ 2 galletas dulces pequeñas    ¨ ½ taza de hel[place], yogur congelado o yogur congelado sin lactosa    ¨ ¼ de taza de sorbete    ¨ 1 cucharada de [place] regular, mermelada o [place]    ¨ 2 cucharadas de jarabe ligero    · Exira y yogur:  Los alimentos derivados de la leche contienen cerca de 12 g de carbohidratos por ración  ¨ 1 taza de leche sin grasa o baja en grasa    ¨ 1 taza de leche de soja    ¨ 1 taza de yogur sin grasa que ha sido endulzado con endulzante artificial    · Verduras sin almidón:  Cada porción de comida contiene cerca de 5 g de carbohidratos Rc porciones de vegetales sin almidón cuentan jazz 1 porción de carbohidratos  ¨ ½ de taza de verduras cocidas o 1 taza de verduras crudas Estas incluyen remolachas, bróculi, repollo, coliflor, pepino, champiñones, tomates y calabaza  ¨ ½ taza de jugo de verduras  Cómo utilizar el conteo de carbohidratos para planificar las comidas:   · [employer] las cantidades de carbohidratos usando el tamaño de las porciones:      ¨ Ejemplo de jacey montse de pasta:  Planifica comer pasta, ensalada mixta y un vaso de 8 onzas de [place]  Ramos médico le dice que puede consumir 4 porciones de carbohidratos para la montse  Jacey porción de carbohidratos de pasta es ? de taza  Jacey taza de pasta será igual a 3 porciones de carbohidratos  Un vaso de 8 onzas de leche se cuenta jazz 1 porción de carbohidratos  Estas cantidades de alimentos sería igual a 4 porciones de carbohidratos  Jacey taza de ensalada mixta no cuenta para las porciones de carbohidratos  · Cuente la cantidad de carbohidratos utilizando las etiquetas alimenticias:  Busque la cantidad total de los carbohidratos en los alimentos envasados leyendo la etiqueta alimenticia  Las etiquetas alimenticias explican el tamaño de la porción del alimento y la cantidad total de los carbohidratos en cada porción   Busque el tamaño de la porción en la etiqueta alimenticia y después decida cuántas porciones comerá  Multiplique el número de porciones que planea comer por la cantidad de carbohidratos por porción  ¨ Ejemplo de jacey merienda con jacey keegan de granola: Rondon plan de comidas le permite consumir 2 porciones de carbohidratos (30 gramos) jazz bocadillo  Planea comer 1 paquete de barras de granola, el cual contiene 2 barras  Según la etiqueta alimenticia, el tamaño de la porción en brinda paquete es 1 keegan  Cada porción (1 keegan) contiene 25 gramos de carbohidratos  La cantidad total de carbohidratos en el paquete de barras de granola sería 50 gramos  Basándose en rondon plan alimenticio, usted debe de comer sólo 1 keegan de granola  Acuda a onelia consultas de control con rondon médico según le indicaron  Anote onelia preguntas para que se acuerde de hacerlas divina onelia visitas  © 2017 [facility] Information is for End User's use only and may not be sold, redistributed or otherwise used for commercial purposes  All illustrations and images included in CareNotes® are the copyrighted property of A D A M , Inc  or Landen Minaya  Esta información es sólo para uso en educación  Rondon intención no es darle un consejo médico sobre enfermedades o tratamientos  Colsulte con rondon Cally Poncho farmacéutico antes de seguir cualquier régimen médico para saber si es seguro y efectivo para usted

## 2020-10-01 NOTE — ASSESSMENT & PLAN NOTE
Patient refused flu shot today, states she does not get the vaccine as she doesn't get sick with the flu

## 2020-10-07 ENCOUNTER — LAB (OUTPATIENT)
Dept: LAB | Facility: HOSPITAL | Age: 65
End: 2020-10-07
Payer: COMMERCIAL

## 2020-10-07 DIAGNOSIS — N18.31 STAGE 3A CHRONIC KIDNEY DISEASE (HCC): Primary | ICD-10-CM

## 2020-10-07 DIAGNOSIS — Z11.4 SCREENING FOR HIV (HUMAN IMMUNODEFICIENCY VIRUS): ICD-10-CM

## 2020-10-07 DIAGNOSIS — E78.1 PURE HYPERTRIGLYCERIDEMIA: ICD-10-CM

## 2020-10-07 DIAGNOSIS — N18.30 STAGE 3 CHRONIC KIDNEY DISEASE (HCC): ICD-10-CM

## 2020-10-07 LAB
25(OH)D3 SERPL-MCNC: 62.7 NG/ML (ref 30–100)
CA-I BLD-SCNC: 1.16 MMOL/L (ref 1.12–1.32)
CALCIUM SERPL-MCNC: 9.3 MG/DL (ref 8.4–10.2)
CHOLEST SERPL-MCNC: 168 MG/DL
CREAT UR-MCNC: 90.7 MG/DL
HDLC SERPL-MCNC: 47 MG/DL
LDLC SERPL CALC-MCNC: 93 MG/DL
MICROALBUMIN UR-MCNC: 2050 MG/L (ref 0–20)
MICROALBUMIN/CREAT 24H UR: 2260 MG/G CREATININE (ref 0–30)
PHOSPHATE SERPL-MCNC: 5 MG/DL (ref 2.5–4.8)
PTH-INTACT SERPL-MCNC: 142 PG/ML (ref 16.7–78.9)
TRIGL SERPL-MCNC: 138 MG/DL

## 2020-10-07 PROCEDURE — 82043 UR ALBUMIN QUANTITATIVE: CPT | Performed by: FAMILY MEDICINE

## 2020-10-07 PROCEDURE — 80061 LIPID PANEL: CPT

## 2020-10-07 PROCEDURE — 82330 ASSAY OF CALCIUM: CPT

## 2020-10-07 PROCEDURE — 82570 ASSAY OF URINE CREATININE: CPT | Performed by: FAMILY MEDICINE

## 2020-10-07 PROCEDURE — 36415 COLL VENOUS BLD VENIPUNCTURE: CPT

## 2020-10-07 PROCEDURE — 3062F POS MACROALBUMINURIA REV: CPT | Performed by: FAMILY MEDICINE

## 2020-10-07 PROCEDURE — 82310 ASSAY OF CALCIUM: CPT

## 2020-10-07 PROCEDURE — 84100 ASSAY OF PHOSPHORUS: CPT

## 2020-10-07 PROCEDURE — 83970 ASSAY OF PARATHORMONE: CPT

## 2020-10-07 PROCEDURE — 82306 VITAMIN D 25 HYDROXY: CPT

## 2020-10-07 PROCEDURE — 87389 HIV-1 AG W/HIV-1&-2 AB AG IA: CPT

## 2020-10-08 ENCOUNTER — TELEPHONE (OUTPATIENT)
Dept: NEPHROLOGY | Facility: CLINIC | Age: 65
End: 2020-10-08

## 2020-10-08 LAB — HIV 1+2 AB+HIV1 P24 AG SERPL QL IA: NORMAL

## 2020-11-09 DIAGNOSIS — I10 ESSENTIAL HYPERTENSION: ICD-10-CM

## 2020-11-09 PROCEDURE — 4010F ACE/ARB THERAPY RXD/TAKEN: CPT | Performed by: INTERNAL MEDICINE

## 2020-11-09 RX ORDER — LISINOPRIL 40 MG/1
TABLET ORAL
Qty: 30 TABLET | Refills: 3 | Status: SHIPPED | OUTPATIENT
Start: 2020-11-09 | End: 2020-12-08

## 2020-11-12 ENCOUNTER — CONSULT (OUTPATIENT)
Dept: NEPHROLOGY | Facility: CLINIC | Age: 65
End: 2020-11-12
Payer: COMMERCIAL

## 2020-11-12 VITALS
DIASTOLIC BLOOD PRESSURE: 80 MMHG | HEIGHT: 61 IN | HEART RATE: 78 BPM | SYSTOLIC BLOOD PRESSURE: 160 MMHG | WEIGHT: 161.8 LBS | TEMPERATURE: 97.2 F | BODY MASS INDEX: 30.55 KG/M2

## 2020-11-12 DIAGNOSIS — E78.1 PURE HYPERTRIGLYCERIDEMIA: ICD-10-CM

## 2020-11-12 DIAGNOSIS — Z79.4 TYPE 2 DIABETES MELLITUS WITH BOTH EYES AFFECTED BY MODERATE NONPROLIFERATIVE RETINOPATHY AND MACULAR EDEMA, WITH LONG-TERM CURRENT USE OF INSULIN (HCC): ICD-10-CM

## 2020-11-12 DIAGNOSIS — I10 ESSENTIAL HYPERTENSION: ICD-10-CM

## 2020-11-12 DIAGNOSIS — N18.31 STAGE 3A CHRONIC KIDNEY DISEASE (HCC): Primary | ICD-10-CM

## 2020-11-12 DIAGNOSIS — E11.3313 TYPE 2 DIABETES MELLITUS WITH BOTH EYES AFFECTED BY MODERATE NONPROLIFERATIVE RETINOPATHY AND MACULAR EDEMA, WITH LONG-TERM CURRENT USE OF INSULIN (HCC): ICD-10-CM

## 2020-11-12 PROCEDURE — 99244 OFF/OP CNSLTJ NEW/EST MOD 40: CPT | Performed by: INTERNAL MEDICINE

## 2020-11-12 PROCEDURE — 3066F NEPHROPATHY DOC TX: CPT | Performed by: INTERNAL MEDICINE

## 2020-11-12 PROCEDURE — 3079F DIAST BP 80-89 MM HG: CPT | Performed by: INTERNAL MEDICINE

## 2020-11-12 PROCEDURE — 3008F BODY MASS INDEX DOCD: CPT | Performed by: INTERNAL MEDICINE

## 2020-11-12 PROCEDURE — 3077F SYST BP >= 140 MM HG: CPT | Performed by: INTERNAL MEDICINE

## 2020-11-12 PROCEDURE — 1036F TOBACCO NON-USER: CPT | Performed by: INTERNAL MEDICINE

## 2020-11-17 ENCOUNTER — LAB (OUTPATIENT)
Dept: LAB | Facility: HOSPITAL | Age: 65
End: 2020-11-17
Attending: INTERNAL MEDICINE
Payer: COMMERCIAL

## 2020-11-17 DIAGNOSIS — N18.31 STAGE 3A CHRONIC KIDNEY DISEASE (HCC): ICD-10-CM

## 2020-11-17 LAB
ALBUMIN SERPL BCP-MCNC: 3.9 G/DL (ref 3–5.2)
ANION GAP SERPL CALCULATED.3IONS-SCNC: 6 MMOL/L (ref 5–14)
BUN SERPL-MCNC: 36 MG/DL (ref 5–25)
CALCIUM SERPL-MCNC: 8.7 MG/DL (ref 8.4–10.2)
CHLORIDE SERPL-SCNC: 104 MMOL/L (ref 97–108)
CO2 SERPL-SCNC: 28 MMOL/L (ref 22–30)
CREAT SERPL-MCNC: 1.87 MG/DL (ref 0.6–1.2)
GFR SERPL CREATININE-BSD FRML MDRD: 28 ML/MIN/1.73SQ M
GLUCOSE P FAST SERPL-MCNC: 201 MG/DL (ref 70–99)
PHOSPHATE SERPL-MCNC: 4.2 MG/DL (ref 2.5–4.8)
POTASSIUM SERPL-SCNC: 5 MMOL/L (ref 3.6–5)
SODIUM SERPL-SCNC: 138 MMOL/L (ref 137–147)

## 2020-11-17 PROCEDURE — 36415 COLL VENOUS BLD VENIPUNCTURE: CPT

## 2020-11-17 PROCEDURE — 80069 RENAL FUNCTION PANEL: CPT

## 2020-11-18 ENCOUNTER — TELEPHONE (OUTPATIENT)
Dept: NEPHROLOGY | Facility: CLINIC | Age: 65
End: 2020-11-18

## 2020-11-18 DIAGNOSIS — N18.31 STAGE 3A CHRONIC KIDNEY DISEASE (HCC): Primary | ICD-10-CM

## 2020-11-19 ENCOUNTER — TELEPHONE (OUTPATIENT)
Dept: FAMILY MEDICINE CLINIC | Facility: CLINIC | Age: 65
End: 2020-11-19

## 2020-11-20 DIAGNOSIS — E11.3313 TYPE 2 DIABETES MELLITUS WITH BOTH EYES AFFECTED BY MODERATE NONPROLIFERATIVE RETINOPATHY AND MACULAR EDEMA, WITH LONG-TERM CURRENT USE OF INSULIN (HCC): Primary | ICD-10-CM

## 2020-11-20 DIAGNOSIS — Z79.4 TYPE 2 DIABETES MELLITUS WITH BOTH EYES AFFECTED BY MODERATE NONPROLIFERATIVE RETINOPATHY AND MACULAR EDEMA, WITH LONG-TERM CURRENT USE OF INSULIN (HCC): Primary | ICD-10-CM

## 2020-11-20 DIAGNOSIS — Z79.4 TYPE 2 DIABETES MELLITUS WITH HYPERGLYCEMIA, WITH LONG-TERM CURRENT USE OF INSULIN (HCC): ICD-10-CM

## 2020-11-20 DIAGNOSIS — E11.65 TYPE 2 DIABETES MELLITUS WITH HYPERGLYCEMIA, WITH LONG-TERM CURRENT USE OF INSULIN (HCC): ICD-10-CM

## 2020-11-20 DIAGNOSIS — I10 ESSENTIAL HYPERTENSION: ICD-10-CM

## 2020-11-20 RX ORDER — AMLODIPINE BESYLATE 10 MG/1
10 TABLET ORAL DAILY
Qty: 30 TABLET | Refills: 3 | Status: SHIPPED | OUTPATIENT
Start: 2020-11-20 | End: 2021-01-06 | Stop reason: SDUPTHER

## 2020-11-20 RX ORDER — SITAGLIPTIN AND METFORMIN HYDROCHLORIDE 1000; 50 MG/1; MG/1
1 TABLET, FILM COATED, EXTENDED RELEASE ORAL DAILY
Status: CANCELLED | OUTPATIENT
Start: 2020-11-20

## 2020-11-20 RX ORDER — LANCETS
EACH MISCELLANEOUS 2 TIMES DAILY
Qty: 102 EACH | Refills: 5 | Status: SHIPPED | OUTPATIENT
Start: 2020-11-20 | End: 2020-12-08 | Stop reason: SDUPTHER

## 2020-11-20 RX ORDER — INSULIN GLARGINE 100 [IU]/ML
20 INJECTION, SOLUTION SUBCUTANEOUS
Qty: 5 PEN | Refills: 2 | Status: SHIPPED | OUTPATIENT
Start: 2020-11-20 | End: 2020-12-08

## 2020-11-25 ENCOUNTER — LAB (OUTPATIENT)
Dept: LAB | Facility: HOSPITAL | Age: 65
End: 2020-11-25
Attending: INTERNAL MEDICINE
Payer: COMMERCIAL

## 2020-11-25 DIAGNOSIS — N18.31 STAGE 3A CHRONIC KIDNEY DISEASE (HCC): ICD-10-CM

## 2020-11-25 LAB
ANION GAP SERPL CALCULATED.3IONS-SCNC: 5 MMOL/L (ref 5–14)
BACTERIA UR QL AUTO: ABNORMAL /HPF
BILIRUB UR QL STRIP: NEGATIVE
BUN SERPL-MCNC: 34 MG/DL (ref 5–25)
CALCIUM SERPL-MCNC: 8.5 MG/DL (ref 8.4–10.2)
CHLORIDE SERPL-SCNC: 105 MMOL/L (ref 97–108)
CLARITY UR: CLEAR
CO2 SERPL-SCNC: 27 MMOL/L (ref 22–30)
COLOR UR: ABNORMAL
CREAT SERPL-MCNC: 1.45 MG/DL (ref 0.6–1.2)
GFR SERPL CREATININE-BSD FRML MDRD: 38 ML/MIN/1.73SQ M
GLUCOSE P FAST SERPL-MCNC: 138 MG/DL (ref 70–99)
GLUCOSE UR STRIP-MCNC: ABNORMAL MG/DL
HGB UR QL STRIP.AUTO: NEGATIVE
KETONES UR STRIP-MCNC: NEGATIVE MG/DL
LEUKOCYTE ESTERASE UR QL STRIP: 100
MUCOUS THREADS UR QL AUTO: ABNORMAL
NITRITE UR QL STRIP: NEGATIVE
NON-SQ EPI CELLS URNS QL MICRO: ABNORMAL /HPF
PH UR STRIP.AUTO: 6 [PH]
POTASSIUM SERPL-SCNC: 4.9 MMOL/L (ref 3.6–5)
PROT UR STRIP-MCNC: >=500 MG/DL
RBC #/AREA URNS AUTO: ABNORMAL /HPF
SODIUM SERPL-SCNC: 137 MMOL/L (ref 137–147)
SP GR UR STRIP.AUTO: 1.01 (ref 1–1.04)
UROBILINOGEN UA: NEGATIVE MG/DL
WBC #/AREA URNS AUTO: ABNORMAL /HPF

## 2020-11-25 PROCEDURE — 80048 BASIC METABOLIC PNL TOTAL CA: CPT

## 2020-11-25 PROCEDURE — 36415 COLL VENOUS BLD VENIPUNCTURE: CPT

## 2020-11-25 PROCEDURE — 81001 URINALYSIS AUTO W/SCOPE: CPT

## 2020-12-01 ENCOUNTER — TELEPHONE (OUTPATIENT)
Dept: NEPHROLOGY | Facility: CLINIC | Age: 65
End: 2020-12-01

## 2020-12-01 DIAGNOSIS — N18.31 STAGE 3A CHRONIC KIDNEY DISEASE (HCC): Primary | ICD-10-CM

## 2020-12-08 ENCOUNTER — TELEMEDICINE (OUTPATIENT)
Dept: FAMILY MEDICINE CLINIC | Facility: CLINIC | Age: 65
End: 2020-12-08

## 2020-12-08 DIAGNOSIS — I50.32 CHRONIC DIASTOLIC HEART FAILURE (HCC): ICD-10-CM

## 2020-12-08 DIAGNOSIS — R60.0 BILATERAL LOWER EXTREMITY EDEMA: ICD-10-CM

## 2020-12-08 DIAGNOSIS — E11.65 TYPE 2 DIABETES MELLITUS WITH HYPERGLYCEMIA, WITH LONG-TERM CURRENT USE OF INSULIN (HCC): ICD-10-CM

## 2020-12-08 DIAGNOSIS — Z79.4 TYPE 2 DIABETES MELLITUS WITH HYPERGLYCEMIA, WITH LONG-TERM CURRENT USE OF INSULIN (HCC): ICD-10-CM

## 2020-12-08 DIAGNOSIS — I10 ESSENTIAL HYPERTENSION: ICD-10-CM

## 2020-12-08 DIAGNOSIS — T78.3XXD ANGIOEDEMA, SUBSEQUENT ENCOUNTER: Primary | ICD-10-CM

## 2020-12-08 PROBLEM — T78.3XXA ANGIOEDEMA: Status: ACTIVE | Noted: 2020-12-08

## 2020-12-08 PROCEDURE — G2012 BRIEF CHECK IN BY MD/QHP: HCPCS | Performed by: FAMILY MEDICINE

## 2020-12-08 RX ORDER — BLOOD SUGAR DIAGNOSTIC
1 STRIP MISCELLANEOUS 2 TIMES DAILY
Qty: 100 EACH | Refills: 5 | Status: SHIPPED | OUTPATIENT
Start: 2020-12-08 | End: 2021-01-06 | Stop reason: SDUPTHER

## 2020-12-08 RX ORDER — LANCETS
EACH MISCELLANEOUS 2 TIMES DAILY
Qty: 102 EACH | Refills: 5 | Status: SHIPPED | OUTPATIENT
Start: 2020-12-08 | End: 2021-01-06 | Stop reason: SDUPTHER

## 2020-12-08 RX ORDER — TORSEMIDE 20 MG/1
20 TABLET ORAL DAILY
Qty: 30 TABLET | Refills: 0 | Status: SHIPPED | OUTPATIENT
Start: 2020-12-08 | End: 2021-01-06 | Stop reason: SDUPTHER

## 2020-12-08 RX ORDER — INSULIN GLARGINE 100 [IU]/ML
20 INJECTION, SOLUTION SUBCUTANEOUS
Qty: 5 PEN | Refills: 2 | Status: SHIPPED | OUTPATIENT
Start: 2020-12-08 | End: 2021-01-06 | Stop reason: SDUPTHER

## 2020-12-08 RX ORDER — LANCETS
EACH MISCELLANEOUS 2 TIMES DAILY
Qty: 102 EACH | Refills: 5 | Status: SHIPPED | OUTPATIENT
Start: 2020-12-08 | End: 2020-12-08

## 2020-12-09 ENCOUNTER — TELEPHONE (OUTPATIENT)
Dept: FAMILY MEDICINE CLINIC | Facility: CLINIC | Age: 65
End: 2020-12-09

## 2020-12-14 ENCOUNTER — TELEPHONE (OUTPATIENT)
Dept: FAMILY MEDICINE CLINIC | Facility: CLINIC | Age: 65
End: 2020-12-14

## 2020-12-15 ENCOUNTER — LAB (OUTPATIENT)
Dept: LAB | Facility: HOSPITAL | Age: 65
End: 2020-12-15
Attending: INTERNAL MEDICINE
Payer: COMMERCIAL

## 2020-12-15 DIAGNOSIS — N18.31 STAGE 3A CHRONIC KIDNEY DISEASE (HCC): ICD-10-CM

## 2020-12-15 LAB
ANION GAP SERPL CALCULATED.3IONS-SCNC: 2 MMOL/L (ref 5–14)
BUN SERPL-MCNC: 30 MG/DL (ref 5–25)
CALCIUM SERPL-MCNC: 8.4 MG/DL (ref 8.4–10.2)
CHLORIDE SERPL-SCNC: 102 MMOL/L (ref 97–108)
CO2 SERPL-SCNC: 36 MMOL/L (ref 22–30)
CREAT SERPL-MCNC: 1.37 MG/DL (ref 0.6–1.2)
GFR SERPL CREATININE-BSD FRML MDRD: 41 ML/MIN/1.73SQ M
GLUCOSE P FAST SERPL-MCNC: 63 MG/DL (ref 70–99)
POTASSIUM SERPL-SCNC: 5 MMOL/L (ref 3.6–5)
SODIUM SERPL-SCNC: 140 MMOL/L (ref 137–147)

## 2020-12-15 PROCEDURE — 80048 BASIC METABOLIC PNL TOTAL CA: CPT

## 2020-12-15 PROCEDURE — 36415 COLL VENOUS BLD VENIPUNCTURE: CPT

## 2020-12-16 ENCOUNTER — TELEPHONE (OUTPATIENT)
Dept: NEPHROLOGY | Facility: CLINIC | Age: 65
End: 2020-12-16

## 2020-12-16 DIAGNOSIS — N18.31 STAGE 3A CHRONIC KIDNEY DISEASE (HCC): Primary | ICD-10-CM

## 2021-01-05 NOTE — PROGRESS NOTES
Assessment/Plan:    Type 2 diabetes mellitus, with long-term current use of insulin ([facility])    Lab Results   Component Value Date    HGBA1C 7 6 (A) 01/06/2021     Improving, close to goal   HbA1c goal of 7, currently A1c trending down from 9 6-->7 6  Due to GFR <30 Janumet and Jardiance recently d/c   Continue with Lantus  20 units in the morning    Discussed with patient to record fasting BG at home and bring logs to next visit   Mentions she canceled endocrinology appt as she had the appt today with me  Discussed there is no need to see endocrinology now that her BG are better controlled   Adhere to a diabetic diet   Continue with podiatry and ophthalmology regular check ups     Chronic diastolic heart failure ([facility] [address] )  Wt Readings from Last 3 Encounters:   01/06/21 68 3 kg (150 lb 9 6 oz)   11/12/20 73 4 kg (161 lb 12 8 oz)   09/30/20 71 2 kg (157 lb)     BLE edema resolved as well as b/l periorbital swelling  Denies any chest pain, SOB, GI sx   Weight at home (12/08): 182 lbs, today's weight -->150 lbs (back to her dry weight)  Continue with Torsemide 20 mg daily  Renal function stable  F/u with nephrology in 5 months   Strict ED precautions given if she develops new sx such as SOB and worsening edema  Adhere to low salt diet and trend weights at home               Diagnoses and all orders for this visit:    Type 2 diabetes mellitus with stage 3b chronic kidney disease, with long-term current use of insulin ([facility])  -     POCT hemoglobin A1c    Chronic diastolic heart failure (HCC)    Essential hypertension  -     carvedilol (COREG) 25 mg tablet; Take 1 tablet (25 mg total) by mouth 2 (two) times a day with meals  -     amLODIPine (NORVASC) 10 mg tablet; Take 1 tablet (10 mg total) by mouth daily    Type 2 diabetes mellitus with hyperglycemia, with long-term current use of insulin ([facility])  -     gabapentin (NEURONTIN) 300 mg capsule;  Take 1 capsule (300 mg total) by mouth 2 (two) times a day  -     insulin glargine (Lantus SoloStar) 100 units/mL injection pen; Inject 20 Units under the skin daily with breakfast  -     Accu-Chek Guide test strip; Use 1 each 2 (two) times a day Test  -     Accu-Chek FastClix Lancets MISC; Inject under the skin 2 (two) times a day Test    Bilateral lower extremity edema  -     torsemide (DEMADEX) 20 mg tablet; Take 1 tablet (20 mg total) by mouth daily    Controlled type 2 diabetes mellitus without complication, with long-term current use of insulin (HCC)  -     atorvastatin (LIPITOR) 40 mg tablet; Take 1 tablet (40 mg total) by mouth daily          Subjective:      Patient ID: Conchita Raphael is a 60 y o  female who presents today for chronic conditions f/u visit  HPI    Endorses no immediate concerns  Mentions she is leaving to [facility] on 77/57  Reports her BLE edema resolved since she was started on Torsemide  States she feels well and her weight returned to baseline  Patient states she checks BG at home highest reading 130 and lowest 115  Compliant at home with taking Lantus 20 QHS  Denies any medication side effects with her current home regimen  Denies any recent hypoglycemic events  Tried to adhere to a diabetic diet  Last ophthalmology appointment was 12/21/2020  Currently, patient denies microvascular sx (blurry vision, visual disturbances, polyuria, tingling, numbness, pain) and macrovascular sx (chest pain, palpitation, excertional and rest shortness of breath, lower ext  swelling)  The following portions of the patient's history were reviewed and updated as appropriate: allergies, current medications, past family history, past medical history, past social history, past surgical history and problem list     Review of Systems   Constitutional: Negative for chills, fatigue and fever  HENT: Negative for sore throat and trouble swallowing  Eyes: Negative for visual disturbance  Respiratory: Negative for cough and shortness of breath      Cardiovascular: Negative for chest pain and leg swelling  Gastrointestinal: Negative for abdominal pain, blood in stool, constipation, diarrhea, nausea and vomiting  Genitourinary: Negative for dysuria and hematuria  Musculoskeletal: Negative for gait problem  Skin: Negative for rash  Neurological: Negative for dizziness and headaches  Psychiatric/Behavioral: Negative for agitation  Objective:      /84 (BP Location: Left arm, Patient Position: Sitting, Cuff Size: Standard)   Pulse 77   Temp 97 6 °F (36 4 °C) (Temporal)   Resp 18   Ht 5' 1" (1 549 m)   Wt 68 3 kg (150 lb 9 6 oz)   SpO2 99%   BMI 28 46 kg/m²        Physical Exam  Vitals signs and nursing note reviewed  Constitutional:       General: She is not in acute distress  Appearance: Normal appearance  She is well-developed  She is not ill-appearing, toxic-appearing or diaphoretic  HENT:      Head: Normocephalic and atraumatic  Nose: Nose normal    Eyes:      Extraocular Movements: Extraocular movements intact  Conjunctiva/sclera: Conjunctivae normal       Pupils: Pupils are equal, round, and reactive to light  Neck:      Musculoskeletal: Normal range of motion and neck supple  Cardiovascular:      Rate and Rhythm: Normal rate and regular rhythm  Heart sounds: Murmur ( 3/6 crescendo-decrescendo systolic murmur) present  Pulmonary:      Effort: Pulmonary effort is normal  No respiratory distress  Breath sounds: Normal breath sounds  Abdominal:      General: Bowel sounds are normal       Palpations: Abdomen is soft  Tenderness: There is no abdominal tenderness  Musculoskeletal: Normal range of motion  General: No tenderness  Right lower leg: Edema (+1 pitting edema up to ankle level ) present  Comments: Trace LLE edema    Skin:     General: Skin is warm  Findings: No rash  Neurological:      Mental Status: She is alert and oriented to person, place, and time     Psychiatric:         Mood and Affect: Mood normal          Behavior: Behavior normal          Thought Content:  Thought content normal          Judgment: Judgment normal

## 2021-01-06 ENCOUNTER — OFFICE VISIT (OUTPATIENT)
Dept: FAMILY MEDICINE CLINIC | Facility: CLINIC | Age: 66
End: 2021-01-06

## 2021-01-06 VITALS
WEIGHT: 150.6 LBS | HEIGHT: 61 IN | OXYGEN SATURATION: 99 % | TEMPERATURE: 97.6 F | RESPIRATION RATE: 18 BRPM | DIASTOLIC BLOOD PRESSURE: 84 MMHG | HEART RATE: 77 BPM | BODY MASS INDEX: 28.43 KG/M2 | SYSTOLIC BLOOD PRESSURE: 140 MMHG

## 2021-01-06 DIAGNOSIS — R60.0 BILATERAL LOWER EXTREMITY EDEMA: ICD-10-CM

## 2021-01-06 DIAGNOSIS — Z79.4 TYPE 2 DIABETES MELLITUS WITH HYPERGLYCEMIA, WITH LONG-TERM CURRENT USE OF INSULIN (HCC): ICD-10-CM

## 2021-01-06 DIAGNOSIS — E11.65 TYPE 2 DIABETES MELLITUS WITH HYPERGLYCEMIA, WITH LONG-TERM CURRENT USE OF INSULIN (HCC): ICD-10-CM

## 2021-01-06 DIAGNOSIS — E11.9 CONTROLLED TYPE 2 DIABETES MELLITUS WITHOUT COMPLICATION, WITH LONG-TERM CURRENT USE OF INSULIN (HCC): ICD-10-CM

## 2021-01-06 DIAGNOSIS — I50.32 CHRONIC DIASTOLIC HEART FAILURE (HCC): ICD-10-CM

## 2021-01-06 DIAGNOSIS — E11.22 TYPE 2 DIABETES MELLITUS WITH STAGE 3B CHRONIC KIDNEY DISEASE, WITH LONG-TERM CURRENT USE OF INSULIN (HCC): Primary | ICD-10-CM

## 2021-01-06 DIAGNOSIS — I10 ESSENTIAL HYPERTENSION: ICD-10-CM

## 2021-01-06 DIAGNOSIS — N18.32 TYPE 2 DIABETES MELLITUS WITH STAGE 3B CHRONIC KIDNEY DISEASE, WITH LONG-TERM CURRENT USE OF INSULIN (HCC): Primary | ICD-10-CM

## 2021-01-06 DIAGNOSIS — Z79.4 CONTROLLED TYPE 2 DIABETES MELLITUS WITHOUT COMPLICATION, WITH LONG-TERM CURRENT USE OF INSULIN (HCC): ICD-10-CM

## 2021-01-06 DIAGNOSIS — Z79.4 TYPE 2 DIABETES MELLITUS WITH STAGE 3B CHRONIC KIDNEY DISEASE, WITH LONG-TERM CURRENT USE OF INSULIN (HCC): Primary | ICD-10-CM

## 2021-01-06 LAB — SL AMB POCT HEMOGLOBIN AIC: 7.6 (ref ?–6.5)

## 2021-01-06 PROCEDURE — 83036 HEMOGLOBIN GLYCOSYLATED A1C: CPT | Performed by: FAMILY MEDICINE

## 2021-01-06 PROCEDURE — 1036F TOBACCO NON-USER: CPT | Performed by: FAMILY MEDICINE

## 2021-01-06 PROCEDURE — 3008F BODY MASS INDEX DOCD: CPT | Performed by: FAMILY MEDICINE

## 2021-01-06 PROCEDURE — 3066F NEPHROPATHY DOC TX: CPT | Performed by: FAMILY MEDICINE

## 2021-01-06 PROCEDURE — 99213 OFFICE O/P EST LOW 20 MIN: CPT | Performed by: FAMILY MEDICINE

## 2021-01-06 PROCEDURE — 3077F SYST BP >= 140 MM HG: CPT | Performed by: FAMILY MEDICINE

## 2021-01-06 PROCEDURE — 3079F DIAST BP 80-89 MM HG: CPT | Performed by: FAMILY MEDICINE

## 2021-01-06 PROCEDURE — 3051F HG A1C>EQUAL 7.0%<8.0%: CPT | Performed by: FAMILY MEDICINE

## 2021-01-06 RX ORDER — GABAPENTIN 300 MG/1
300 CAPSULE ORAL 2 TIMES DAILY
Qty: 90 CAPSULE | Refills: 2 | Status: SHIPPED | OUTPATIENT
Start: 2021-01-06 | End: 2021-12-14 | Stop reason: HOSPADM

## 2021-01-06 RX ORDER — AMLODIPINE BESYLATE 10 MG/1
10 TABLET ORAL DAILY
Qty: 30 TABLET | Refills: 3 | Status: SHIPPED | OUTPATIENT
Start: 2021-01-06 | End: 2021-08-05 | Stop reason: SINTOL

## 2021-01-06 RX ORDER — TORSEMIDE 20 MG/1
20 TABLET ORAL DAILY
Qty: 30 TABLET | Refills: 2 | Status: SHIPPED | OUTPATIENT
Start: 2021-01-06 | End: 2021-04-27

## 2021-01-06 RX ORDER — INSULIN GLARGINE 100 [IU]/ML
20 INJECTION, SOLUTION SUBCUTANEOUS
Qty: 5 PEN | Refills: 3 | Status: SHIPPED | OUTPATIENT
Start: 2021-01-06 | End: 2021-04-07

## 2021-01-06 RX ORDER — ATORVASTATIN CALCIUM 40 MG/1
40 TABLET, FILM COATED ORAL DAILY
Qty: 90 TABLET | Refills: 3 | Status: SHIPPED | OUTPATIENT
Start: 2021-01-06

## 2021-01-06 RX ORDER — CARVEDILOL 25 MG/1
25 TABLET ORAL 2 TIMES DAILY WITH MEALS
Qty: 60 TABLET | Refills: 3 | Status: SHIPPED | OUTPATIENT
Start: 2021-01-06 | End: 2022-06-15

## 2021-01-06 RX ORDER — LANCETS
EACH MISCELLANEOUS 2 TIMES DAILY
Qty: 102 EACH | Refills: 5 | Status: SHIPPED | OUTPATIENT
Start: 2021-01-06

## 2021-01-06 RX ORDER — BLOOD SUGAR DIAGNOSTIC
1 STRIP MISCELLANEOUS 2 TIMES DAILY
Qty: 100 EACH | Refills: 5 | Status: SHIPPED | OUTPATIENT
Start: 2021-01-06 | End: 2021-12-14 | Stop reason: HOSPADM

## 2021-01-06 NOTE — ASSESSMENT & PLAN NOTE
Wt Readings from Last 3 Encounters:   01/06/21 68 3 kg (150 lb 9 6 oz)   11/12/20 73 4 kg (161 lb 12 8 oz)   09/30/20 71 2 kg (157 lb)     BLE edema resolved as well as b/l periorbital swelling  Denies any chest pain, SOB, GI sx   Weight at home (12/08): 182 lbs, today's weight -->150 lbs (back to her dry weight)  Continue with Torsemide 20 mg daily  Renal function stable   F/u with nephrology in 5 months   Strict ED precautions given if she develops new sx such as SOB and worsening edema  Adhere to low salt diet and trend weights at home

## 2021-01-06 NOTE — ASSESSMENT & PLAN NOTE
Lab Results   Component Value Date    HGBA1C 7 6 (A) 01/06/2021     Improving, close to goal   HbA1c goal of 7, currently A1c trending down from 9 6-->7 6  Due to GFR <30 Janumet and Jardiance recently d/c   Continue with Lantus  20 units in the morning    Discussed with patient to record fasting BG at home and bring logs to next visit   Mentions she canceled endocrinology appt as she had the appt today with me   Discussed there is no need to see endocrinology now that her BG are better controlled   Adhere to a diabetic diet   Continue with podiatry and ophthalmology regular check ups

## 2021-01-06 NOTE — PATIENT INSTRUCTIONS
Lifestyle Medicine Tip Sheet- Guyanese    1  Coma alimentos predominantemente menos procesados, jazz comida rápida, cenas de televisión y tocino  2  Coma cerca de la naturaleza (mercados de agricultores, productos frescos o congelados)    3  Coma jacey dieta predominantemente basada en plantas  a  Verdes frondosos oscuros sarah   b  Frutas y vegetales  c   Granos integrales: sabi integral, apenas, bayas de sabi, quinua, leonardo cortada en ngozi, arroz integral, pasta integral   d  Legumbres: frijoles, frijoles pintos, frijoles blancos, frijoles negros, garbanzos (garbanzos), frijoles lima (maduros, secos), arvejas, lentejas y edamame (frijoles de soya verdes)      4  Al menos la mitad del plato debe contener frutas o verduras  5  El líquido debe ser predominantemente agua (limite el refresco y [place])    6  Usha el tamaño de la porción  7  ¿Qué alimentos daphnie evitar o limitar? - Grasas: Específicamente saturadas y grasas trans  Se encuentran en margarinas, muchas comidas rápidas y algunos productos horneados comprados en la sneha  Las grasas saturadas y grasas trans pueden elevar rondon nivel de colesterol y rondon probabilidad de contraer enfermedades cardíacas  - Cuando cocine, es mejor no usar aceites, haley si es necesario, trate de limitar la cantidad de aceite utilizado, ya que el aceite contiene muchas calorías por volumen y es muy poco saludable cuando se calienta divina la cocción   - Azúcar: limite o evite el azúcar, los dulces y los granos refinados  Los granos refinados se encuentran en el pan garcia, el arroz garcia, la mayoría de las formas de pasta y la mayoría de los alimentos "aperitivos" envasados  - Intente no cocinar con lan y evite agregar lan adicional a onelia comidas  - Katerin Espino: los estudios ontiveros demostrado que comer mucha mariah Bonilla riesgo de ciertos problemas de [place], jazz enfermedades cardíacas y cáncer  8  7-9 horas de sueño    9   Ejercicio diario mínimo de 30 minutos (caminando alrededor de la christine)    10  Socialización (amigos y familiares)    - Explora tu vecindario  Ve al parque, pasa tiempo en la biblioteca  Si está interesado, puede leer más sobre las opciones de alimentos saludables en los siguientes sitios web:  a  NutritionTapZilla  org  b  Home cooking recipes: https://www ISIS sentronics/  c  http://brian info/  d  Familydoctor  org

## 2021-02-24 DIAGNOSIS — E11.22 TYPE 2 DIABETES MELLITUS WITH STAGE 3B CHRONIC KIDNEY DISEASE, WITH LONG-TERM CURRENT USE OF INSULIN (HCC): Primary | ICD-10-CM

## 2021-02-24 DIAGNOSIS — N18.32 TYPE 2 DIABETES MELLITUS WITH STAGE 3B CHRONIC KIDNEY DISEASE, WITH LONG-TERM CURRENT USE OF INSULIN (HCC): Primary | ICD-10-CM

## 2021-02-24 DIAGNOSIS — Z79.4 TYPE 2 DIABETES MELLITUS WITH STAGE 3B CHRONIC KIDNEY DISEASE, WITH LONG-TERM CURRENT USE OF INSULIN (HCC): Primary | ICD-10-CM

## 2021-02-24 PROCEDURE — 3066F NEPHROPATHY DOC TX: CPT | Performed by: NURSE PRACTITIONER

## 2021-03-05 DIAGNOSIS — N18.32 TYPE 2 DIABETES MELLITUS WITH STAGE 3B CHRONIC KIDNEY DISEASE, WITH LONG-TERM CURRENT USE OF INSULIN (HCC): Primary | ICD-10-CM

## 2021-03-05 DIAGNOSIS — E11.22 TYPE 2 DIABETES MELLITUS WITH STAGE 3B CHRONIC KIDNEY DISEASE, WITH LONG-TERM CURRENT USE OF INSULIN (HCC): Primary | ICD-10-CM

## 2021-03-05 DIAGNOSIS — Z79.4 TYPE 2 DIABETES MELLITUS WITH STAGE 3B CHRONIC KIDNEY DISEASE, WITH LONG-TERM CURRENT USE OF INSULIN (HCC): Primary | ICD-10-CM

## 2021-03-05 RX ORDER — BLOOD SUGAR DIAGNOSTIC
STRIP MISCELLANEOUS
Qty: 100 EACH | Refills: 3 | Status: SHIPPED | OUTPATIENT
Start: 2021-03-05 | End: 2021-12-14 | Stop reason: HOSPADM

## 2021-03-05 RX ORDER — BLOOD-GLUCOSE METER
EACH MISCELLANEOUS 2 TIMES DAILY
Qty: 1 KIT | Refills: 0 | Status: SHIPPED | OUTPATIENT
Start: 2021-03-05 | End: 2021-12-05 | Stop reason: CLARIF

## 2021-03-05 RX ORDER — LANCETS
EACH MISCELLANEOUS
Qty: 100 EACH | Refills: 3 | Status: SHIPPED | OUTPATIENT
Start: 2021-03-05 | End: 2021-12-05 | Stop reason: CLARIF

## 2021-03-15 ENCOUNTER — APPOINTMENT (OUTPATIENT)
Dept: LAB | Facility: HOSPITAL | Age: 66
End: 2021-03-15
Attending: INTERNAL MEDICINE
Payer: COMMERCIAL

## 2021-03-15 DIAGNOSIS — N18.31 STAGE 3A CHRONIC KIDNEY DISEASE (HCC): Primary | ICD-10-CM

## 2021-03-15 DIAGNOSIS — N18.31 STAGE 3A CHRONIC KIDNEY DISEASE (HCC): ICD-10-CM

## 2021-03-15 LAB
ANION GAP SERPL CALCULATED.3IONS-SCNC: 5 MMOL/L (ref 5–14)
BUN SERPL-MCNC: 61 MG/DL (ref 5–25)
CALCIUM SERPL-MCNC: 8.6 MG/DL (ref 8.4–10.2)
CHLORIDE SERPL-SCNC: 99 MMOL/L (ref 97–108)
CO2 SERPL-SCNC: 30 MMOL/L (ref 22–30)
CREAT SERPL-MCNC: 2.96 MG/DL (ref 0.6–1.2)
GFR SERPL CREATININE-BSD FRML MDRD: 16 ML/MIN/1.73SQ M
GLUCOSE SERPL-MCNC: 106 MG/DL (ref 70–99)
POTASSIUM SERPL-SCNC: 6 MMOL/L (ref 3.6–5)
SODIUM SERPL-SCNC: 134 MMOL/L (ref 137–147)

## 2021-03-15 PROCEDURE — 36415 COLL VENOUS BLD VENIPUNCTURE: CPT

## 2021-03-15 PROCEDURE — 80048 BASIC METABOLIC PNL TOTAL CA: CPT

## 2021-03-16 ENCOUNTER — TELEPHONE (OUTPATIENT)
Dept: NEPHROLOGY | Facility: CLINIC | Age: 66
End: 2021-03-16

## 2021-03-16 NOTE — TELEPHONE ENCOUNTER
I have called and spoke with patient personally  She was recently hospitalized at [facility] between 3 kidney injury 03/6-03/11 with acute kidney injury, creatinine 2 2 on admission that improved down to 1 9 on discharge day  Patient had a hospital follow-up visit scheduled with me this afternoon but her repeat labs shows worsening kidney function with creatinine up to 2 96 as well as hyperkalemia with potassium of 6 0  Recommend to go to the emergency department for urgent evaluation and treatment of hyperkalemia as well as acute kidney injury  Patient initially was hesitant and reluctatnt but eventually agreed with plan  She will go to [facility] ER today to be evaluated  Lisa, please cancel apt    Thanks,        I cc patient's PCP to keep her updated

## 2021-03-16 NOTE — TELEPHONE ENCOUNTER
Pt was contacted via translation line,  # [id number]  Pt reported to [facility] ED on 3/6 with complaints of weakness and fatigue  While in the ED pt was found to have elevated BP with systolic's in the 686'Q  REI was also noted with Cr of 2 24, baseline is around 1 4  She was d/c on 3/11 with a Cr of 1 94 and K of 4 2  Repeat labs that were done yesterday 3/15 shows a Cr of 2 96 and K of 6 0  Pt's lab results were discussed with Dr Arielle Bailey via telephone  Recommendations are that pt should report to the ED for evaluation and treatment of REI and hyperkalemia  I have attempted to contact both pt as well as her emergency contact that is listed but was unable to get in contact with either  I have left two messages requesting that the pt report straight to the ED for elevated potassium and creatinine levels  I have also asked that she contact the office to verify that she did receive my message

## 2021-03-16 NOTE — TELEPHONE ENCOUNTER
At this time pt is refusing to seek emergency treatment  Stating that she feels fine and that she would like to keep her appointment for this afternoon  I did go over with pt the risks of not seeking emergency treatment for her potassium level  Pt has verbalized understanding of all that was discussed  Again, I strongly advised that pt seek emergency treatment  I have asked that pt watch for any symptoms associated with high potassium levels such as nausea/vomiting, fatigue weakness, chest pain, palpitations and muscle weakness

## 2021-03-17 ENCOUNTER — TELEPHONE (OUTPATIENT)
Dept: FAMILY MEDICINE CLINIC | Facility: CLINIC | Age: 66
End: 2021-03-17

## 2021-03-17 NOTE — TELEPHONE ENCOUNTER
Called Katheryn Arvizu to see how she was doing after hospital d/c  She said she is doing well and is ok with appt that was scheduled for her April 7 with Dr Phi Christopher  She will call back if she has any issues or concerns before then

## 2021-03-24 DIAGNOSIS — N17.9 AKI (ACUTE KIDNEY INJURY) (HCC): ICD-10-CM

## 2021-03-24 DIAGNOSIS — N18.31 STAGE 3A CHRONIC KIDNEY DISEASE (HCC): Primary | ICD-10-CM

## 2021-03-29 ENCOUNTER — APPOINTMENT (OUTPATIENT)
Dept: LAB | Facility: HOSPITAL | Age: 66
End: 2021-03-29
Attending: INTERNAL MEDICINE
Payer: COMMERCIAL

## 2021-03-29 ENCOUNTER — TELEPHONE (OUTPATIENT)
Dept: NEPHROLOGY | Facility: CLINIC | Age: 66
End: 2021-03-29

## 2021-03-29 DIAGNOSIS — N18.31 STAGE 3A CHRONIC KIDNEY DISEASE (HCC): ICD-10-CM

## 2021-03-29 DIAGNOSIS — N17.9 AKI (ACUTE KIDNEY INJURY) (HCC): ICD-10-CM

## 2021-03-29 PROBLEM — E55.9 VITAMIN D DEFICIENCY: Status: ACTIVE | Noted: 2021-03-29

## 2021-03-29 LAB
ALBUMIN SERPL BCP-MCNC: 3.9 G/DL (ref 3–5.2)
ANION GAP SERPL CALCULATED.3IONS-SCNC: 7 MMOL/L (ref 5–14)
BUN SERPL-MCNC: 55 MG/DL (ref 5–25)
CALCIUM SERPL-MCNC: 8.9 MG/DL (ref 8.4–10.2)
CHLORIDE SERPL-SCNC: 99 MMOL/L (ref 97–108)
CO2 SERPL-SCNC: 31 MMOL/L (ref 22–30)
CREAT SERPL-MCNC: 2.5 MG/DL (ref 0.6–1.2)
GFR SERPL CREATININE-BSD FRML MDRD: 20 ML/MIN/1.73SQ M
GLUCOSE P FAST SERPL-MCNC: 201 MG/DL (ref 70–99)
PHOSPHATE SERPL-MCNC: 5.5 MG/DL (ref 2.5–4.8)
POTASSIUM SERPL-SCNC: 4.7 MMOL/L (ref 3.6–5)
SODIUM SERPL-SCNC: 137 MMOL/L (ref 137–147)

## 2021-03-29 PROCEDURE — 36415 COLL VENOUS BLD VENIPUNCTURE: CPT

## 2021-03-29 PROCEDURE — 80069 RENAL FUNCTION PANEL: CPT

## 2021-03-29 NOTE — TELEPHONE ENCOUNTER
Left message for patient to return call reminding her of appointment on 3/30/21 with Doug Park and there is blood and urine to be done for the appointment

## 2021-03-29 NOTE — PROGRESS NOTES
OFFICE FOLLOW UP - Nephrology   Lela IshmaelWandy 60 y o  female MRN: [id number]       ASSESSMENT and PLAN:  Diagnoses and all orders for this visit:    Stage 3a chronic kidney disease  -     Microalbumin / creatinine urine ratio; Future  -     Renal function panel; Future  -     Urinalysis with microscopic; Future  -     Renal function panel; Standing  -     Renal function panel    Type 2 diabetes mellitus with stage 3b chronic kidney disease, with long-term current use of insulin (HCC)    Essential hypertension    Chronic diastolic heart failure (HCC)    Elevated TSH    Vitamin D deficiency  -     Vitamin D 25 hydroxy; Future  -     Phosphorus; Future         Recent acute kidney injury on CKD stage IIIB:   Baseline creatinine 1 0-1 2 since 2019 with GFR of 40 to 50's  Chronic disease suspected secondary to diabetic nephropathy, hypertensive nephrosclerosis  Most recent renal imaging negative for hydro, mildly increased echogenicity of the renal parenchyma suggesting medical renal disease  Most recent urinalysis shows  protein and glucose  The patient follows with Dr Lizbeth Arrieta  -  Most recent creatinine improved to 2 5  With GFR of 20  Noted to be 2 96 on 03/15 instructed to go to emergency department  -  Maintained on renally dosed Gabapentin   -  Recommend avoiding nephrotoxins including Motrin/ ibuprofen  -  Will check urine microalbumin to creatinine ratio to quantify protein  -  check labs and UA prior to next appointment  -  will follow up in 3 months  -  check monthly RFP  -  understanding kidney disease booklet provided today  Microalbuminuria: Suspected secondary to diabetes  Currently not following with endocrinology and following with PCP for diabetic control   -  check  microalbumin to creatinine ratio prior to next appointment  -  declined enodocrinology consult  Hypertension:  Renal artery Doppler negative for renal artery stenosis  BP acceptable     -  Recommend low-salt diet   -  Recommend checking blood pressure periodically and keeping record  -  Goal systolic blood pressure less than 140/90   -  Currently maintained on  Amlodipine 10 mg daily, carvedilol 25 mg  B i d , torsemide 20 mg daily  Chronic diastolic CHF/pulmonary hypertension:  Most recent weight around 150 lb  Most recent echo per [facility] shows EF of 44%  -  Currently on torsemide 20 mg daily  -  Current weight 148 lbs  -  Recommend checking weight daily  Call office for 2-3 lb weight gain in 1 day or 5 lb in 1 week  -  Instructed to follow 1 8 L per day fluid restriction  Hyperkalemia: resolved  Most recent potassium 4 7  Diabetes:  Complicated by retinopathy, suspected neuropathy  Most recent A1c 12 0  Following with PCP  Currently on renally dosed gabapentin  Goal A1c less than 7%  -  Continue insulin as prescribed  -  Recommend endocrinology follow up  Patient declining   -  Would benefit from dietician consult  Vitamin-D deficiency:  Continues on ergo calciferol 50,000 units weekly  -  Check vitamin-D level  MBD in CKD:  -  Most recent phosphorus level 5 5   -  Instructed to follow low phosphorus diet  -  If phosphorus level remains elevated, consider starting phosphorus binder for remains elevated  -  Will check phosphorus level prior to next appointment  Other:   Hyperlipidemia  Patient will follow up in 3 months  with   Dr Spence  Age related screening: Your primary caregiver may do yearly screening for colorectal cancer  It is recommended in all men and women over 48years old  You may have screening earlier if you have colon disease or a family history of colorectal cancer  HPI: Carol Kamara is a 60 y o  female With past medical history of CKD stage 3, hypertension, diabetes, diastolic CHF/pulmonary hypertension, vitamin-D deficiency, hyperlipidemia, who is here for routine follow-up for CKD stage 3       Patient had emergency visit on 03/16 to [facility] secondary to  asymptomatic hyperkalemia  She was sent in by nephrologist for acute kidney injury and elevated potassium  Unfortunately she missed her office visit due to this  She also had admission to emergency department on 03/06 with hypertension and acute kidney injury  She was placed on amlodipine 10 mg daily and clonidine 0 2 mg 3 times per day  Her Coreg was increased to 25 mg 2 times per day  The patient is primarily [address] speaking   was used for visit today  The patient denies chest pain or shortness of breath  She states she is doing well  She denies nausea, vomiting, diarrhea  She states she is eating and drinking  She denies issues with urination  Her blood pressure room weight is acceptable  She denies utilizing NSAIDs  ROS:   A complete review of systems was done  Pertinent positives and negatives as noted in the HPI, otherwise the review of systems is negative      Allergies: Lisinopril    Medications:   Current Outpatient Medications:     Accu-Chek FastClix Lancets MISC, Inject under the skin 2 (two) times a day Test, Disp: 102 each, Rfl: 5    Accu-Chek Guide test strip, Use 1 each 2 (two) times a day Test, Disp: 100 each, Rfl: 5    Alcohol Swabs (EASY TOUCH ALCOHOL PREP MEDIUM) 70 % PADS, USE 2 TO 3 X PER DAY, Disp: , Rfl: 3    amLODIPine (NORVASC) 10 mg tablet, Take 1 tablet (10 mg total) by mouth daily, Disp: 30 tablet, Rfl: 3    atorvastatin (LIPITOR) 40 mg tablet, Take 1 tablet (40 mg total) by mouth daily, Disp: 90 tablet, Rfl: 3    Blood Glucose Monitoring Suppl (OneTouch Verio) w/Device KIT, Use 2 (two) times a day, Disp: 1 kit, Rfl: 0    carvedilol (COREG) 25 mg tablet, Take 1 tablet (25 mg total) by mouth 2 (two) times a day with meals, Disp: 60 tablet, Rfl: 3    ergocalciferol (ERGOCALCIFEROL) 1 25 MG (41812 UT) capsule, Take 50,000 Units by mouth every 7 days, Disp: , Rfl:     gabapentin (NEURONTIN) 300 mg capsule, Take 1 capsule (300 mg total) by mouth 2 (two) times a day, Disp: 90 capsule, Rfl: 2    glucose blood (OneTouch Verio) test strip, Use as instructed, Disp: 100 each, Rfl: 3    insulin glargine (Lantus SoloStar) 100 units/mL injection pen, Inject 20 Units under the skin daily with breakfast, Disp: 5 pen, Rfl: 3    Lancets (onetouch ultrasoft) lancets, Use as instructed, Disp: 100 each, Rfl: 3    torsemide (DEMADEX) 20 mg tablet, Take 1 tablet (20 mg total) by mouth daily, Disp: 30 tablet, Rfl: 2    Past Medical History:   Diagnosis Date    Chronic kidney disease     Diabetes mellitus ([address] )     Hyperlipidemia     Hypertension     Pneumonia      Past Surgical History:   Procedure Laterality Date    EYE SURGERY       Family History   Problem Relation Age of Onset    Hypertension Mother     Diabetes Father     Hypertension Sister     Diabetes Sister     Hypertension Brother       reports that she has never smoked  She has never used smokeless tobacco       Physical Exam:   Vitals:    03/30/21 0847   BP: 138/94   BP Location: Left arm   Patient Position: Sitting   Cuff Size: Large   Pulse: 94   Resp: 16   Weight: 67 1 kg (148 lb)   Height: 5' 1" (1 549 m)     Body mass index is 27 96 kg/m²      General: no acute distress   Eyes: conjunctivae pink, anicteric sclerae  ENT: mucous membranes moist  Neck: supple, no JVD  Chest: clear to auscultation bilaterally with no wheezes, rale or rhochi  CVS: regular rate and rhythm   Abdomen: soft, non-tender, non-distended  Extremities: no lower extremity edema   Skin: no rash  Neuro: awake and alert       Lab Results:  Results for orders placed or performed in visit on 03/29/21   Renal function panel   Result Value Ref Range    Albumin 3 9 3 0 - 5 2 g/dL    Calcium 8 9 8 4 - 10 2 mg/dL    Phosphorus 5 5 (H) 2 5 - 4 8 mg/dL    BUN 55 (H) 5 - 25 mg/dL    Creatinine 2 50 (H) 0 60 - 1 20 mg/dL    Sodium 137 137 - 147 mmol/L    Potassium 4 7 3 6 - 5 0 mmol/L Chloride 99 97 - 108 mmol/L    CO2 31 (H) 22 - 30 mmol/L    ANION GAP 7 5 - 14 mmol/L    eGFR 20 (L) >60 ml/min/1 73sq m    Glucose, Fasting 201 (H) 70 - 99 mg/dL       Results from last 7 days   Lab Units 03/29/21  1003   POTASSIUM mmol/L 4 7   CHLORIDE mmol/L 99   CO2 mmol/L 31*   BUN mg/dL 55*   CREATININE mg/dL 2 50*   CALCIUM mg/dL 8 9   PHOSPHORUS mg/dL 5 5*         Portions of the record may have been created with voice recognition software  Occasional wrong word or "sound a like" substitutions may have occurred due to the inherent limitations of voice recognition software  Read the chart carefully and recognize, using context, where substitutions have occurred  If you have any questions, please contact the dictating provider

## 2021-03-30 ENCOUNTER — OFFICE VISIT (OUTPATIENT)
Dept: NEPHROLOGY | Facility: CLINIC | Age: 66
End: 2021-03-30
Payer: COMMERCIAL

## 2021-03-30 VITALS
SYSTOLIC BLOOD PRESSURE: 138 MMHG | DIASTOLIC BLOOD PRESSURE: 94 MMHG | HEIGHT: 61 IN | WEIGHT: 148 LBS | HEART RATE: 94 BPM | BODY MASS INDEX: 27.94 KG/M2 | RESPIRATION RATE: 16 BRPM

## 2021-03-30 DIAGNOSIS — E11.22 TYPE 2 DIABETES MELLITUS WITH STAGE 3B CHRONIC KIDNEY DISEASE, WITH LONG-TERM CURRENT USE OF INSULIN (HCC): ICD-10-CM

## 2021-03-30 DIAGNOSIS — N18.31 STAGE 3A CHRONIC KIDNEY DISEASE (HCC): Primary | ICD-10-CM

## 2021-03-30 DIAGNOSIS — I50.32 CHRONIC DIASTOLIC HEART FAILURE (HCC): ICD-10-CM

## 2021-03-30 DIAGNOSIS — I10 ESSENTIAL HYPERTENSION: ICD-10-CM

## 2021-03-30 DIAGNOSIS — R79.89 ELEVATED TSH: ICD-10-CM

## 2021-03-30 DIAGNOSIS — N18.32 TYPE 2 DIABETES MELLITUS WITH STAGE 3B CHRONIC KIDNEY DISEASE, WITH LONG-TERM CURRENT USE OF INSULIN (HCC): ICD-10-CM

## 2021-03-30 DIAGNOSIS — E55.9 VITAMIN D DEFICIENCY: ICD-10-CM

## 2021-03-30 DIAGNOSIS — Z79.4 TYPE 2 DIABETES MELLITUS WITH STAGE 3B CHRONIC KIDNEY DISEASE, WITH LONG-TERM CURRENT USE OF INSULIN (HCC): ICD-10-CM

## 2021-03-30 PROCEDURE — 3080F DIAST BP >= 90 MM HG: CPT | Performed by: NURSE PRACTITIONER

## 2021-03-30 PROCEDURE — 1036F TOBACCO NON-USER: CPT | Performed by: NURSE PRACTITIONER

## 2021-03-30 PROCEDURE — 99214 OFFICE O/P EST MOD 30 MIN: CPT | Performed by: NURSE PRACTITIONER

## 2021-03-30 PROCEDURE — 3075F SYST BP GE 130 - 139MM HG: CPT | Performed by: NURSE PRACTITIONER

## 2021-03-30 PROCEDURE — 3008F BODY MASS INDEX DOCD: CPT | Performed by: NURSE PRACTITIONER

## 2021-03-30 NOTE — PATIENT INSTRUCTIONS
You are here today for follow-up on your kidneys  Your most recent creatinine is slightly improved to 2 5 but is still elevated higher than her baseline  Please continue to avoid any Motrin or ibuprofen  Please continue to hydrate herself  It is safe to take Tylenol for pain  We will check blood work monthly  We will see you in 3 months for routine follow-up  We will repeat urine studies prior to your next appointment  Please continue to follow a diabetic diet  Take all of your medications as prescribed  Please check her blood pressure periodically at home and keep a record  Please call the office if consistently greater than 140/90  Please check her weight daily at home  If you gain 2-3 lb in 1 day or 5 lb in 1 week, please call the office so that we can adjust your water pill  Your potassium level is now normal   We will check a vitamin-D level prior to your next appointment  Please read the handout provided today  Thank you

## 2021-04-07 ENCOUNTER — OFFICE VISIT (OUTPATIENT)
Dept: FAMILY MEDICINE CLINIC | Facility: CLINIC | Age: 66
End: 2021-04-07

## 2021-04-07 VITALS
BODY MASS INDEX: 28.51 KG/M2 | TEMPERATURE: 97.7 F | WEIGHT: 151 LBS | OXYGEN SATURATION: 96 % | DIASTOLIC BLOOD PRESSURE: 104 MMHG | RESPIRATION RATE: 18 BRPM | SYSTOLIC BLOOD PRESSURE: 164 MMHG | HEART RATE: 73 BPM | HEIGHT: 61 IN

## 2021-04-07 DIAGNOSIS — I10 ESSENTIAL HYPERTENSION: ICD-10-CM

## 2021-04-07 DIAGNOSIS — E11.22 TYPE 2 DIABETES MELLITUS WITH STAGE 3B CHRONIC KIDNEY DISEASE, WITH LONG-TERM CURRENT USE OF INSULIN (HCC): Primary | ICD-10-CM

## 2021-04-07 DIAGNOSIS — Z79.4 TYPE 2 DIABETES MELLITUS WITH STAGE 3B CHRONIC KIDNEY DISEASE, WITH LONG-TERM CURRENT USE OF INSULIN (HCC): Primary | ICD-10-CM

## 2021-04-07 DIAGNOSIS — N18.32 TYPE 2 DIABETES MELLITUS WITH STAGE 3B CHRONIC KIDNEY DISEASE, WITH LONG-TERM CURRENT USE OF INSULIN (HCC): Primary | ICD-10-CM

## 2021-04-07 DIAGNOSIS — Z79.4 TYPE 2 DIABETES MELLITUS WITH HYPERGLYCEMIA, WITH LONG-TERM CURRENT USE OF INSULIN (HCC): ICD-10-CM

## 2021-04-07 DIAGNOSIS — E11.65 TYPE 2 DIABETES MELLITUS WITH HYPERGLYCEMIA, WITH LONG-TERM CURRENT USE OF INSULIN (HCC): ICD-10-CM

## 2021-04-07 PROCEDURE — 99213 OFFICE O/P EST LOW 20 MIN: CPT | Performed by: FAMILY MEDICINE

## 2021-04-07 PROCEDURE — 3008F BODY MASS INDEX DOCD: CPT | Performed by: NURSE PRACTITIONER

## 2021-04-07 RX ORDER — CLONIDINE HYDROCHLORIDE 0.2 MG/1
0.2 TABLET ORAL 3 TIMES DAILY
COMMUNITY
Start: 2021-03-11 | End: 2021-06-23 | Stop reason: SDUPTHER

## 2021-04-07 RX ORDER — INSULIN GLARGINE 100 [IU]/ML
30 INJECTION, SOLUTION SUBCUTANEOUS
Start: 2021-04-07 | End: 2021-06-23 | Stop reason: SDUPTHER

## 2021-04-07 RX ORDER — CHLORTHALIDONE 25 MG/1
25 TABLET ORAL DAILY
COMMUNITY
Start: 2021-03-20 | End: 2021-04-07 | Stop reason: ALTCHOICE

## 2021-04-07 RX ORDER — PEN NEEDLE, DIABETIC 32GX 5/32"
NEEDLE, DISPOSABLE MISCELLANEOUS DAILY
Qty: 100 EACH | Refills: 2 | Status: SHIPPED | OUTPATIENT
Start: 2021-04-07

## 2021-04-07 NOTE — ASSESSMENT & PLAN NOTE
Lab Results   Component Value Date    HGBA1C 12 0 (H) 03/07/2021     Not at goal  HbA1c goal of 7, currently A1c trending up from 7 6-->12  Due to GFR <30 Janumet and Jardiance recently d/c  Patient was started in the hospital during her March 2021 admission on Janumet  mg BID  Due to her deteriorating renal function, she cannot be on Janumet  Continue with Lantus 30 units QAM for now   Discussed with patient due to worsening hyperglycemia, difficulty achieving BG goal as well as worsening renal function, she will need to follow with endocrinology for further recommendations and management  She used to see Dr Burke Crum in the past, will call tomorrow and make an appt  sooner than later     Adhere to a diabetic diet and exercise   Continue with podiatry and ophthalmology regular check ups

## 2021-04-07 NOTE — PROGRESS NOTES
Assessment/Plan:    Essential hypertension  BP goal <140/90  Initial /102; Repeat BP: 164/104  Continue with Amlodipine 10 mg HS, Torsemide 20 mg daily, Clonidine 0 2 mg TID  Increased Carvedilol from 1 tablet BID to 1 1/2 BID  D/C Chlorthalidone due to side effects  Renal artery Doppler: negative for renal artery stenosis  Adhere to low salt diet and exercise  F/u in 2-3 weeks for BP check with nurse and in 1 month with me    Type 2 diabetes mellitus, with long-term current use of insulin [facility]    Lab Results   Component Value Date    HGBA1C 12 0 (H) 03/07/2021     Not at goal  HbA1c goal of 7, currently A1c trending up from 7 6-->12  Due to GFR <30 Janumet and Jardiance recently d/c  Patient was started in the hospital during her March 2021 admission on Janumet  mg BID  Due to her deteriorating renal function, she cannot be on Janumet  Continue with Lantus 30 units QAM for now   Discussed with patient due to worsening hyperglycemia, difficulty achieving BG goal as well as worsening renal function, she will need to follow with endocrinology for further recommendations and management  She used to see Dr Buckley Overall in the past, will call tomorrow and make an appt  sooner than later  Adhere to a diabetic diet and exercise   Continue with podiatry and ophthalmology regular check ups        Diagnoses and all orders for this visit:    Type 2 diabetes mellitus with stage 3b chronic kidney disease, with long-term current use of insulin ([facility])  -     Insulin Pen Needle (Unifine Pentips) 32G X 4 MM MISC; Use daily    Essential hypertension    Type 2 diabetes mellitus with hyperglycemia, with long-term current use of insulin (HCC)  -     insulin glargine (Lantus SoloStar) 100 units/mL injection pen; Inject 30 Units under the skin daily with breakfast    Other orders  -     cloNIDine (CATAPRES) 0 2 mg tablet; Take 0 2 mg by mouth Three times a day  -     Discontinue: chlorthalidone 25 mg tablet;  Take 25 mg by mouth daily  -     Cancel: POCT hemoglobin A1c          Subjective:      Patient ID: Renae Pruitt is a 60 y o  female  HPI     Patient presents today for chronic conditions f/u visit  History of Present Illness:     Terese Calixto  is a  60 y o  F with PMH of type 2 diabetes with long term use of insulin over 3 years ago  She was diagnosed with Type 2 Diabetes Mellitus 15 years ago  Reports complications of neuropathy, CKD, retinopathy  Denies hx foot ulcer/PVD  Denies recent severe hypoglycemic or severe hyperglycemic episodes  Compliant with current regimen  Home glucose monitoring: are performed regularly 1x per day  Home blood glucose readings:   Before breakfast: 112-180  Before dinner: She doesn't check  Current regimen: Lantus 30 U QAM (changed from 20-->30 U during March hospitalization)  Was d/c off Janumet and Jardiance due to deteriorating renal function  Last Eye Exam: Monday, 04/05/21  Last Foot Exam: <6 months ago  Has hypertension: Taking Amlodipine 10 mg, Torsemide 20 mg, Carvedilol 25 mg BID and Clonidine 0 3 mg TID  She hasn't been taking Chlorthalidone 2/2 to adverse effects such as lightheadedness, and unsteady gait  Has hyperlipidemia: Taking Lipitor 40 mg HS  The following portions of the patient's history were reviewed and updated as appropriate: allergies, current medications, past family history, past medical history, past social history, past surgical history and problem list     Review of Systems   Constitutional: Negative for chills and fever  HENT: Negative for sore throat and trouble swallowing  Eyes: Negative for visual disturbance  Respiratory: Negative for cough and shortness of breath  Cardiovascular: Positive for leg swelling (improved)  Negative for chest pain  Gastrointestinal: Negative for abdominal pain, blood in stool, constipation, diarrhea, nausea and vomiting  Genitourinary: Negative for dysuria and hematuria  Musculoskeletal: Negative for gait problem  Skin: Negative for rash  Neurological: Negative for dizziness and headaches  Psychiatric/Behavioral: Negative for agitation  Objective:    BP (!) 164/104 (BP Location: Left arm, Patient Position: Sitting, Cuff Size: Standard)   Pulse 73   Temp 97 7 °F (36 5 °C) (Temporal)   Resp 18   Ht 5' 1" (1 549 m)   Wt 68 5 kg (151 lb)   SpO2 96%   BMI 28 53 kg/m²        Physical Exam  Vitals signs and nursing note reviewed  Constitutional:       General: She is not in acute distress  Appearance: Normal appearance  She is well-developed  She is not ill-appearing, toxic-appearing or diaphoretic  HENT:      Head: Normocephalic and atraumatic  Nose: Nose normal       Mouth/Throat:      Mouth: Mucous membranes are moist    Eyes:      Extraocular Movements: Extraocular movements intact  Conjunctiva/sclera: Conjunctivae normal       Pupils: Pupils are equal, round, and reactive to light  Neck:      Musculoskeletal: Normal range of motion and neck supple  Cardiovascular:      Rate and Rhythm: Normal rate and regular rhythm  Heart sounds: Murmur (3/6 crescendo-decrescendo systolic murmur) present  Pulmonary:      Effort: Pulmonary effort is normal  No respiratory distress  Breath sounds: Normal breath sounds  Abdominal:      General: Bowel sounds are normal       Palpations: Abdomen is soft  Tenderness: There is no abdominal tenderness  Musculoskeletal: Normal range of motion  General: No tenderness  Right lower leg: Edema (+1 pitting, improved) present  Left lower leg: Edema (+1 pitting, improved) present  Skin:     General: Skin is warm  Findings: No rash  Neurological:      Mental Status: She is alert and oriented to person, place, and time  Psychiatric:         Mood and Affect: Mood normal          Behavior: Behavior normal          Thought Content:  Thought content normal          Judgment: Judgment normal

## 2021-04-07 NOTE — PATIENT INSTRUCTIONS
Para la eitan presion chayito--> Amlodipine 10 mg jacey tableta en la noche, Torsemide 20 mg, jacey tableta en la manana, Clonidine 0 2 mg gris veces al patricia  Incrementar el Carvedilol para jacey tableta y [employer] al patricia  Regresa en 2 semanas con la infermera para chequeo de presion  Hipertensión   LO QUE NECESITA SABER:   La hipertensión es la presión arterial eitan  La presión arterial es la fuerza que ejerce la luda al [employer] contra las cardenas de las arterias  La hipertensión causa que rondon presión arterial se eleve tanto que rondon corazón se ve forzado a trabajar mucho más candida que lo normal  [place] puede dañar rondon corazón  Puede que no se conozca la causa de la hipertensión  [place] se denomina hipertensión esencial o primaria  La hipertensión causada por otra afección médica, tales jazz la enfermedad renal, se denomina hipertensión secundaria  INSTRUCCIONES SOBRE [facility]:   Llame al 911 en april de presentar lo siguiente:  · Usted tiene dolor en el pecho  · Tiene alguno de los siguientes signos de un ataque cardíaco:      ? Estrujamiento, presión o tensión en rondon pecho    ? Usted también podría presentar alguno de los siguientes:     § Malestar o dolor en rondon espalda, toshia, mandíbula, abdomen, o brazo    § Falta de [employer] o vómitos    § Desvanecimiento o sudor frío repentino    · Usted se siente confundido o tiene dificultad para hablar  · Repentinamente se siente aturdido o con dificultad para respirar  Regrese a la gianluca de emergencias si:  · Usted tiene un candida dolor de jonnie o pérdida de la visión  · Usted tiene debilidad en un brazo o en jacey pierna  Comuníquese con rondon médico si:  · Usted se siente mareado, confundido, somnoliento o jazz si se fuera a desmayar  · Usted ha estado tomando medicamento para la presión arterial haley todavía está en un nivel más elevado del que rondon médico le indicó que debería estar      · Usted tiene preguntas o inquietudes acerca de rondon Melford Bran  Medicamentos: Es posible que usted necesite alguno de los siguientes:  · Los antihipertensivos podrían usarse para ayudar a disminuir la presión arterial  Varios tipos de medicamentos están disponibles  Rondon médico elegirá medicamentos basados en el tipo de hipertensión que tiene  Es posible que necesite más de un tipo de [place]  [place] el medicamento exactamente jazz indicado  · Los diuréticos ayudan a eliminar el exceso de líquido que se acumula en el organismo  [place] ayudará a bajar rondon presión arterial  Es posible que orine más seguido mientras chayito brinda medicamento  · Los medicamentos para el colesterol ayudan a bajar los niveles de [place]  Un nivel bajo de colesterol ayuda a prevenir enfermedades cardíacas y facilita el control de la presión arterial     · [place] onelia medicamentos jazz se le haya indicado  Consulte con rondon médico si usted carolynn que rondon medicamento no le está ayudando o si presenta efectos secundarios  Infórmele si es alérgico a cualquier medicamento  Mantenga jacey lista actualizada de los [place], las vitaminas y los productos herbales que chayito  Incluya los siguientes datos de los medicamentos: cantidad, frecuencia y motivo de administración  Traiga con usted la lista o los envases de las píldoras a onelia citas de seguimiento  Lleve la lista de los medicamentos con usted en april de jacey emergencia  Acuda a onelia consultas de control con rondon médico según le indicaron  Usted necesitará regresar para medir rondon presión arterial y realizar otros exámenes de laboratorio  Anote onelia preguntas para que se acuerde de hacerlas divina onelia visitas  [place] de la hipertensión:      · M[place] Dec presión arterial normal es 119/79 o inferior   Rondon médico podría comprobar rondon presión arterial solamente cada año si se mantiene en un nivel normal     · Jacey presión arterial elevada es de 120/79 a 129/79   A veces esto se llama prehipertensión   Rondon médico puede sugerir cambios de estilo de divina para ayudar a bajar la presión arterial a un nivel normal  Entonces él podría comprobar rondon presión otra vez en 3 a 6 meses  · La etapa 1 de la hipertensión es de 130/80  a 139/89   Rondon médico podría recomendar cambios de estilo de divina, medicamentos y controles cada 3 a 6 meses hasta que rondon presión arterial esté controlada  · La etapa 2 de la hipertensión es de 140/90 o mayor   Rondon médico le recomendará cambios en el estilo de divina y le indicará 2 clases de medicamentos para la hipertensión  También necesitará controlar rondon presión arterial cada mes hasta que esté controlada  Maneje rondon hipertensión:  · Controle rondon presión arterial en casa  Evite fumar, consumir cafeína y hacer ejercicio al menos 30 minutos antes de controlar rondon presión arterial  Siéntese y descanse por 5 minutos antes de tomarse la presión arterial  Extienda rondon brazo y apóyelo en jacey superficie plana  Rondon brazo debe estar a la misma altura que rondon corazón  Siga las instrucciones que vienen con el monitor para la presión arterial  Controle rondon presión arterial 2 veces, con diferencia de 1 minuto, antes de roldan rondon medicamento por la mañana  También controle rondon presión arterial antes de la montse  Mantenga un registro de rondon peso y llévelo con usted a las citas de control  Pregúntele a rondon médico cuál debería ser rondon presión arterial          · Controle cualquier otra condición médica que usted tenga  Algunas condiciones médicas jazz la diabetes pueden aumentar rondon riesgo de hipertensión  [address] rondon médico y tómese onelia medicamentos según dichas instrucciones  · Pregunte [employer]  Ciertos medicamentos pueden aumentar rondon presión arterial  Los ejemplos incluyen las píldoras anticonceptivas orales, los descongestivos, los suplementos herbales y los [place], jazz el ibuprofeno  Rondon médico puede indicarle qué medicamentos son seguros para usted  Estos medicamentos incluyen los recetados y de [place]      Cambios de estilo de divina que usted puede hacer para manejar la hipertensión:  · Limite el sodio (la sal) jazz se le haya indicado  Demasiado sodio puede afectar el equilibrio de líquidos  Revise las etiquetas para buscar alimentos bajos en sodio o sin sal agregada  Algunos alimentos bajos en sodio utilizan sales de potasio para añadir sabor  Demasiado potasio también puede causar problemas de [place]  Rondon médico le dirá qué cantidad de sodio y potasio es sullivan para el consumo en un día  Él puede recomendarle que limite el sodio a 2,300 mg al día  · Siga el plan de comidas recomendado por rondon médico  Un dietista o médico puede darle más información sobre planes de bajo contenido de sodio o el plan de alimentación DASH (enfoques dietéticos para detener la hipertensión)  El plan DASH es bajo en sodio, grasas saturadas y grasa total  Es alto en potasio, calcio y [place]  · Ejercítese para mantener un peso saludable  Realice actividad física por lo menos 30 minutos al día, la mayoría de los días de la [place] ayudará a bajar rondon presión arterial  Pregunte a rondon médico acerca del mejor plan de ejercicio para usted  · [address] [facility] [address]  Es posible que esto contribuya a bajar rondon presión arterial  Aprenda sobre formas de [place], jazz respiración profunda o escuchar música  · Limite el consumo de alcohol según le indicaron  El alcohol puede aumentar la presión arterial  Un trago equivale a 12 onzas de cerveza, 5 onzas de vino o 1 onza y ½ de licor  · No fume  La nicotina y otros químicos en los cigarrillos y cigarros pueden aumentar rondon presión arterial y también provocar daño al pulmón  Pida información a rondon médico si usted actualmente fuma y necesita ayuda para dejar de fumar  Los cigarrillos electrónicos o el tabaco sin humo igualmente contienen nicotina  Consulte con rondon médico antes de [employer]         © Copyright [address] Information is for End User's use only and may not be sold, redistributed or otherwise used for commercial purposes  All illustrations and images included in CareNotes® are the copyrighted property of A MIRTHA A Monica YANCEY  or [address] [place] [address] es sólo para uso en educación  Rondon intención no es darle un consejo médico sobre enfermedades o tratamientos  Colsulte con rondon Edin Pinta farmacéutico antes de seguir cualquier régimen médico para saber si es seguro y efectivo para usted

## 2021-04-07 NOTE — ASSESSMENT & PLAN NOTE
BP goal <140/90  Initial /102; Repeat BP: 164/104  Continue with Amlodipine 10 mg HS, Torsemide 20 mg daily, Clonidine 0 2 mg TID  Increased Carvedilol from 1 tablet BID to 1 1/2 BID  D/C Chlorthalidone due to side effects  Renal artery Doppler: negative for renal artery stenosis  Adhere to low salt diet and exercise     F/u in 2-3 weeks for BP check with nurse and in 1 month with me

## 2021-04-21 ENCOUNTER — CLINICAL SUPPORT (OUTPATIENT)
Dept: FAMILY MEDICINE CLINIC | Facility: CLINIC | Age: 66
End: 2021-04-21

## 2021-04-21 VITALS — SYSTOLIC BLOOD PRESSURE: 142 MMHG | DIASTOLIC BLOOD PRESSURE: 80 MMHG

## 2021-04-21 DIAGNOSIS — I10 HYPERTENSION, UNSPECIFIED TYPE: Primary | ICD-10-CM

## 2021-04-27 ENCOUNTER — APPOINTMENT (OUTPATIENT)
Dept: LAB | Facility: HOSPITAL | Age: 66
End: 2021-04-27
Payer: COMMERCIAL

## 2021-04-27 DIAGNOSIS — N18.6 TYPE 2 DIABETES MELLITUS WITH ESRD (END-STAGE RENAL DISEASE) (HCC): ICD-10-CM

## 2021-04-27 DIAGNOSIS — N18.31 STAGE 3A CHRONIC KIDNEY DISEASE (HCC): ICD-10-CM

## 2021-04-27 DIAGNOSIS — R94.6 ABNORMAL RESULTS OF THYROID FUNCTION STUDIES: Primary | ICD-10-CM

## 2021-04-27 DIAGNOSIS — R60.0 BILATERAL LOWER EXTREMITY EDEMA: ICD-10-CM

## 2021-04-27 DIAGNOSIS — E11.22 TYPE 2 DIABETES MELLITUS WITH ESRD (END-STAGE RENAL DISEASE) (HCC): ICD-10-CM

## 2021-04-27 DIAGNOSIS — E55.9 VITAMIN D DEFICIENCY: ICD-10-CM

## 2021-04-27 LAB
25(OH)D3 SERPL-MCNC: 25.2 NG/ML (ref 30–100)
ALBUMIN SERPL BCP-MCNC: 3.5 G/DL (ref 3–5.2)
ALBUMIN SERPL BCP-MCNC: 3.6 G/DL (ref 3–5.2)
ALP SERPL-CCNC: 74 U/L (ref 43–122)
ALT SERPL W P-5'-P-CCNC: 22 U/L
ANION GAP SERPL CALCULATED.3IONS-SCNC: 5 MMOL/L (ref 5–14)
ANION GAP SERPL CALCULATED.3IONS-SCNC: 6 MMOL/L (ref 5–14)
AST SERPL W P-5'-P-CCNC: 25 U/L (ref 14–36)
BACTERIA UR QL AUTO: ABNORMAL /HPF
BASOPHILS # BLD AUTO: 0 THOUSANDS/ΜL (ref 0–0.1)
BASOPHILS NFR BLD AUTO: 1 % (ref 0–1)
BILIRUB SERPL-MCNC: 0.25 MG/DL
BILIRUB UR QL STRIP: NEGATIVE
BUN SERPL-MCNC: 37 MG/DL (ref 5–25)
BUN SERPL-MCNC: 37 MG/DL (ref 5–25)
CALCIUM SERPL-MCNC: 8.9 MG/DL (ref 8.4–10.2)
CALCIUM SERPL-MCNC: 8.9 MG/DL (ref 8.4–10.2)
CHLORIDE SERPL-SCNC: 103 MMOL/L (ref 97–108)
CHLORIDE SERPL-SCNC: 104 MMOL/L (ref 97–108)
CHOLEST SERPL-MCNC: 174 MG/DL
CLARITY UR: CLEAR
CO2 SERPL-SCNC: 29 MMOL/L (ref 22–30)
CO2 SERPL-SCNC: 32 MMOL/L (ref 22–30)
COLOR UR: ABNORMAL
CREAT SERPL-MCNC: 2.07 MG/DL (ref 0.6–1.2)
CREAT SERPL-MCNC: 2.08 MG/DL (ref 0.6–1.2)
EOSINOPHIL # BLD AUTO: 0.2 THOUSAND/ΜL (ref 0–0.4)
EOSINOPHIL NFR BLD AUTO: 3 % (ref 0–6)
ERYTHROCYTE [DISTWIDTH] IN BLOOD BY AUTOMATED COUNT: 18.7 %
EST. AVERAGE GLUCOSE BLD GHB EST-MCNC: 263 MG/DL
GFR SERPL CREATININE-BSD FRML MDRD: 25 ML/MIN/1.73SQ M
GFR SERPL CREATININE-BSD FRML MDRD: 25 ML/MIN/1.73SQ M
GLUCOSE P FAST SERPL-MCNC: 121 MG/DL (ref 70–99)
GLUCOSE P FAST SERPL-MCNC: 131 MG/DL (ref 70–99)
GLUCOSE UR STRIP-MCNC: NEGATIVE MG/DL
HBA1C MFR BLD: 10.8 %
HCT VFR BLD AUTO: 28.5 % (ref 36–46)
HDLC SERPL-MCNC: 54 MG/DL
HGB BLD-MCNC: 9.3 G/DL (ref 12–16)
HGB UR QL STRIP.AUTO: 10
KETONES UR STRIP-MCNC: NEGATIVE MG/DL
LDLC SERPL CALC-MCNC: 105 MG/DL
LEUKOCYTE ESTERASE UR QL STRIP: 100
LYMPHOCYTES # BLD AUTO: 1.9 THOUSANDS/ΜL (ref 0.5–4)
LYMPHOCYTES NFR BLD AUTO: 30 % (ref 25–45)
MCH RBC QN AUTO: 26.2 PG (ref 26–34)
MCHC RBC AUTO-ENTMCNC: 32.8 G/DL (ref 31–36)
MCV RBC AUTO: 80 FL (ref 80–100)
MONOCYTES # BLD AUTO: 0.7 THOUSAND/ΜL (ref 0.2–0.9)
MONOCYTES NFR BLD AUTO: 11 % (ref 1–10)
NEUTROPHILS # BLD AUTO: 3.6 THOUSANDS/ΜL (ref 1.8–7.8)
NEUTS SEG NFR BLD AUTO: 55 % (ref 45–65)
NITRITE UR QL STRIP: NEGATIVE
NON-SQ EPI CELLS URNS QL MICRO: ABNORMAL /HPF
NONHDLC SERPL-MCNC: 120 MG/DL
PH UR STRIP.AUTO: 6 [PH]
PHOSPHATE SERPL-MCNC: 4.8 MG/DL (ref 2.5–4.8)
PLATELET # BLD AUTO: 250 THOUSANDS/UL (ref 150–450)
PMV BLD AUTO: 8.7 FL (ref 8.9–12.7)
POTASSIUM SERPL-SCNC: 4.9 MMOL/L (ref 3.6–5)
POTASSIUM SERPL-SCNC: 5.1 MMOL/L (ref 3.6–5)
PROT SERPL-MCNC: 6.9 G/DL (ref 5.9–8.4)
PROT UR STRIP-MCNC: ABNORMAL MG/DL
PTH-INTACT SERPL-MCNC: 106.5 PG/ML (ref 16.7–78.9)
RBC # BLD AUTO: 3.57 MILLION/UL (ref 4–5.2)
RBC #/AREA URNS AUTO: ABNORMAL /HPF
SODIUM SERPL-SCNC: 139 MMOL/L (ref 137–147)
SODIUM SERPL-SCNC: 140 MMOL/L (ref 137–147)
SP GR UR STRIP.AUTO: 1.01 (ref 1–1.04)
T4 SERPL-MCNC: 10.1 UG/DL (ref 4.7–13.3)
TRIGL SERPL-MCNC: 73 MG/DL
TSH SERPL DL<=0.05 MIU/L-ACNC: 0.88 UIU/ML (ref 0.47–4.68)
UROBILINOGEN UA: NEGATIVE MG/DL
WBC # BLD AUTO: 6.4 THOUSAND/UL (ref 4.5–11)
WBC #/AREA URNS AUTO: ABNORMAL /HPF

## 2021-04-27 PROCEDURE — 82306 VITAMIN D 25 HYDROXY: CPT

## 2021-04-27 PROCEDURE — 83970 ASSAY OF PARATHORMONE: CPT

## 2021-04-27 PROCEDURE — 80069 RENAL FUNCTION PANEL: CPT | Performed by: NURSE PRACTITIONER

## 2021-04-27 PROCEDURE — 85025 COMPLETE CBC W/AUTO DIFF WBC: CPT

## 2021-04-27 PROCEDURE — 84443 ASSAY THYROID STIM HORMONE: CPT

## 2021-04-27 PROCEDURE — 80053 COMPREHEN METABOLIC PANEL: CPT

## 2021-04-27 PROCEDURE — 36415 COLL VENOUS BLD VENIPUNCTURE: CPT | Performed by: NURSE PRACTITIONER

## 2021-04-27 PROCEDURE — 84436 ASSAY OF TOTAL THYROXINE: CPT

## 2021-04-27 PROCEDURE — 80061 LIPID PANEL: CPT

## 2021-04-27 PROCEDURE — 3046F HEMOGLOBIN A1C LEVEL >9.0%: CPT | Performed by: NURSE PRACTITIONER

## 2021-04-27 PROCEDURE — 81001 URINALYSIS AUTO W/SCOPE: CPT

## 2021-04-27 PROCEDURE — 83036 HEMOGLOBIN GLYCOSYLATED A1C: CPT

## 2021-04-27 RX ORDER — TORSEMIDE 20 MG/1
TABLET ORAL
Qty: 30 TABLET | Refills: 2 | Status: SHIPPED | OUTPATIENT
Start: 2021-04-27 | End: 2021-08-05

## 2021-04-28 ENCOUNTER — TELEPHONE (OUTPATIENT)
Dept: NEPHROLOGY | Facility: CLINIC | Age: 66
End: 2021-04-28

## 2021-04-28 NOTE — TELEPHONE ENCOUNTER
Called patient inform her that her creatinine improved and her potasium was slightly elevated   Patient stated she is not taking any potasium supplement  I made the patient aware to limit her potasium in her food   Pt understood had no question or concern

## 2021-04-28 NOTE — TELEPHONE ENCOUNTER
----- Message from Jesus Vance sent at 4/28/2021  8:29 AM EDT -----  Please call patient letter know her most recent creatinine was 2 07 which is improved from previous  Her potassium is slightly elevated at 5 1  Please ensure she is not taking any potassium supplements  She should limit potassium in her food    She is due for her next follow-up appointment in June   ----- Message -----  From: Lab, Background User  Sent: 4/27/2021  10:26 AM EDT  To: Susie Barriga

## 2021-05-10 ENCOUNTER — OFFICE VISIT (OUTPATIENT)
Dept: FAMILY MEDICINE CLINIC | Facility: CLINIC | Age: 66
End: 2021-05-10

## 2021-05-10 VITALS
HEIGHT: 61 IN | SYSTOLIC BLOOD PRESSURE: 130 MMHG | RESPIRATION RATE: 16 BRPM | BODY MASS INDEX: 30.02 KG/M2 | HEART RATE: 55 BPM | OXYGEN SATURATION: 98 % | TEMPERATURE: 98 F | DIASTOLIC BLOOD PRESSURE: 72 MMHG | WEIGHT: 159 LBS

## 2021-05-10 DIAGNOSIS — E66.09 CLASS 1 OBESITY DUE TO EXCESS CALORIES WITHOUT SERIOUS COMORBIDITY WITH BODY MASS INDEX (BMI) OF 30.0 TO 30.9 IN ADULT: ICD-10-CM

## 2021-05-10 DIAGNOSIS — I10 ESSENTIAL HYPERTENSION: Primary | ICD-10-CM

## 2021-05-10 DIAGNOSIS — E78.1 PURE HYPERTRIGLYCERIDEMIA: ICD-10-CM

## 2021-05-10 PROBLEM — E66.811 CLASS 1 OBESITY DUE TO EXCESS CALORIES WITHOUT SERIOUS COMORBIDITY WITH BODY MASS INDEX (BMI) OF 30.0 TO 30.9 IN ADULT: Status: ACTIVE | Noted: 2020-05-12

## 2021-05-10 PROCEDURE — 99213 OFFICE O/P EST LOW 20 MIN: CPT | Performed by: INTERNAL MEDICINE

## 2021-05-10 RX ORDER — CHLORTHALIDONE 25 MG/1
25 TABLET ORAL DAILY
COMMUNITY
Start: 2021-03-20 | End: 2021-05-10 | Stop reason: ALTCHOICE

## 2021-05-10 RX ORDER — GLIMEPIRIDE 2 MG/1
2 TABLET ORAL DAILY
COMMUNITY
Start: 2021-05-04 | End: 2021-09-17 | Stop reason: HOSPADM

## 2021-05-10 RX ORDER — LINAGLIPTIN 5 MG/1
5 TABLET, FILM COATED ORAL DAILY
COMMUNITY
Start: 2021-05-04 | End: 2022-06-10

## 2021-05-10 RX ORDER — TERAZOSIN 5 MG/1
10 CAPSULE ORAL
COMMUNITY
Start: 2021-05-04 | End: 2022-06-15

## 2021-05-10 NOTE — PROGRESS NOTES
Assessment/Plan:    Essential hypertension  BP goal <140/90, controlled  Patient seen by endocrinology recently and changes made to her HTN regimen  Torsemide was increased to 20 mg BID, and Terazosin 5 mg HS was added  Continue with Amlodipine 10 mg HS, Clonidine 0 2 mg BID and Carvedilol1 1/2 BID  Advised patient to discuss with nephrology at next visit regarding Clonidine medication  For now take medication twice daily given BP well controlled and adverse effect of somnolence and weakness  Renal artery Doppler: negative for renal artery stenosis  Watch weight closely  Fluid overload can also shift weight and result in BP abnormal regulation  Adhere to low salt diet and exercise  Class 1 obesity due to excess calories without serious comorbidity with body mass index (BMI) of 30 0 to 30 9 in adult  BMI Counseling: Body mass index is 30 04 kg/m²  The BMI is above normal  Nutrition recommendations include reducing portion sizes, decreasing overall calorie intake, 3-5 servings of fruits/vegetables daily, reducing fast food intake, consuming healthier snacks, decreasing soda and/or juice intake, moderation in carbohydrate intake, increasing intake of lean protein and reducing intake of saturated fat and trans fat  Exercise recommendations include exercising 3-5 times per week  Hyperlipemia  Last lipid panel (April 2021), stable  TG normalized   Continue with Lipitor 40 mg QHS  Adhere to low fat diet and exercise        Diagnoses and all orders for this visit:    Essential hypertension    Pure hypertriglyceridemia    Class 1 obesity due to excess calories without serious comorbidity with body mass index (BMI) of 30 0 to 30 9 in adult    Other orders  -     glimepiride (AMARYL) 2 mg tablet; Take 2 mg by mouth daily  -     linaGLIPtin (Tradjenta) 5 MG TABS; Take 5 mg by mouth daily  -     terazosin (HYTRIN) 5 mg capsule; Take 5 mg by mouth  -     Discontinue: chlorthalidone 25 mg tablet;  Take 25 mg by mouth daily          Subjective:      Patient ID: Blake Alvarado is a 60 y o  female  HPI     Patient presents today for HTN f/u visit  States she's been taking Clonidine BID instead of TID due to increased somnolence and weakness  Otherwise she endorses compliance with all BP medications as prescribed  She has recently established care with endocrinologist and due to increased BP at the visit she was prescribed Terazosin 5 mg HS and Torsemide was increased BID  Her T2DM was also optimized by endocrinologist with the addition of Glimepiride 2 mg and Tradjenta 5 mg daily in conjunction with decreasing Lantus to 26 U HS  Patient states she is overall doing well and does not endorse any further concerns at this time  Upcoming visit with nephrologist in June  The following portions of the patient's history were reviewed and updated as appropriate: allergies, current medications, past family history, past medical history, past social history, past surgical history and problem list     Review of Systems   Constitutional: Negative for chills and fever  HENT: Negative for sore throat and trouble swallowing  Eyes: Negative for visual disturbance  Respiratory: Negative for cough and shortness of breath  Cardiovascular: Negative for chest pain and leg swelling  Gastrointestinal: Negative for abdominal pain, blood in stool, constipation, diarrhea, nausea and vomiting  Genitourinary: Negative for dysuria and hematuria  Musculoskeletal: Negative for gait problem  Neurological: Negative for dizziness and headaches  Psychiatric/Behavioral: Negative for agitation  Objective:      /72 (BP Location: Left arm, Patient Position: Sitting, Cuff Size: Standard)   Pulse 55   Temp 98 °F (36 7 °C) (Temporal)   Resp 16   Ht 5' 1" (1 549 m)   Wt 72 1 kg (159 lb)   SpO2 98%   BMI 30 04 kg/m²          Physical Exam  Vitals signs and nursing note reviewed     Constitutional: General: She is not in acute distress  Appearance: Normal appearance  She is well-developed  She is obese  She is not ill-appearing, toxic-appearing or diaphoretic  HENT:      Head: Normocephalic and atraumatic  Nose: Nose normal    Eyes:      General:         Right eye: No discharge  Left eye: No discharge  Conjunctiva/sclera: Conjunctivae normal    Neck:      Musculoskeletal: Normal range of motion and neck supple  Cardiovascular:      Rate and Rhythm: Normal rate and regular rhythm  Heart sounds: Murmur (3/6 crescendo-decrescendo systolic murmur) present  Pulmonary:      Effort: Pulmonary effort is normal  No respiratory distress  Breath sounds: Normal breath sounds  Abdominal:      General: Bowel sounds are normal       Palpations: Abdomen is soft  Tenderness: There is no abdominal tenderness  Musculoskeletal: Normal range of motion  General: No tenderness  Right lower leg: Edema (trace) present  Left lower leg: Edema (trace) present  Skin:     General: Skin is warm  Findings: No rash  Neurological:      Mental Status: She is alert and oriented to person, place, and time  Psychiatric:         Mood and Affect: Mood normal          Behavior: Behavior normal          Thought Content:  Thought content normal          Judgment: Judgment normal

## 2021-05-10 NOTE — PATIENT INSTRUCTIONS
Lifestyle Medicine Tip Sheet- Kyrgyz    1  Coma alimentos predominantemente menos procesados, jazz comida rápida, cenas de televisión y tocino  2  Coma cerca de la naturaleza (mercados de agricultores, productos frescos o congelados)    3  Coma jacey dieta predominantemente basada en plantas  a  Verdes frondosos oscuros sarah   b  Frutas y vegetales  c   Granos integrales: sabi integral, apenas, bayas de sabi, quinua, leonardo cortada en ngozi, arroz integral, pasta integral   d  Legumbres: frijoles, frijoles pintos, frijoles blancos, frijoles negros, garbanzos (garbanzos), frijoles lima (maduros, secos), arvejas, lentejas y edamame (frijoles de soya verdes)      4  Al menos la mitad del plato debe contener frutas o verduras  5  El líquido debe ser predominantemente agua (limite el refresco y [place])    6  Usha el tamaño de la porción  7  ¿Qué alimentos daphnie evitar o limitar? - Grasas: Específicamente saturadas y grasas trans  Se encuentran en margarinas, muchas comidas rápidas y algunos productos horneados comprados en la sneha  Las grasas saturadas y grasas trans pueden elevar rondon nivel de colesterol y rondon probabilidad de contraer enfermedades cardíacas  - Cuando cocine, es mejor no usar aceites, haley si es necesario, trate de limitar la cantidad de aceite utilizado, ya que el aceite contiene muchas calorías por volumen y es muy poco saludable cuando se calienta divina la cocción   - Azúcar: limite o evite el azúcar, los dulces y los granos refinados  Los granos refinados se encuentran en el pan garcia, el arroz garcia, la mayoría de las formas de pasta y la mayoría de los alimentos "aperitivos" envasados  - Intente no cocinar con lan y evite agregar lan adicional a onelia comidas  - Sonja Leon: los estudios ontiveros demostrado que comer mucha mariah Bonilla riesgo de ciertos problemas de [place], jazz enfermedades cardíacas y cáncer  8  7-9 horas de sueño    9   Ejercicio diario mínimo de 30 minutos (caminando alrededor de la christine)    10  Socialización (amigos y familiares)    - Explora tu vecindario  Ve al parque, pasa tiempo en la biblioteca  Si está interesado, puede leer más sobre las opciones de alimentos saludables en los siguientes sitios web:  a  NutritionArtspace  org  b  Home cooking recipes: https://www Equities.com/  c  http://brian info/  d  Familydoctor  org  ;mariana

## 2021-05-10 NOTE — ASSESSMENT & PLAN NOTE
BP goal <140/90, controlled  Patient seen by endocrinology recently and changes made to her HTN regimen  Torsemide was increased to 20 mg BID, and Terazosin 5 mg HS was added  Continue with Amlodipine 10 mg HS, Clonidine 0 2 mg BID and Carvedilol1 1/2 BID  Advised patient to discuss with nephrology at next visit regarding Clonidine medication  For now take medication twice daily given BP well controlled and adverse effect of somnolence and weakness  Renal artery Doppler: negative for renal artery stenosis  Watch weight closely  Fluid overload can also shift weight and result in BP abnormal regulation  Adhere to low salt diet and exercise

## 2021-05-10 NOTE — ASSESSMENT & PLAN NOTE
BMI Counseling: Body mass index is 30 04 kg/m²  The BMI is above normal  Nutrition recommendations include reducing portion sizes, decreasing overall calorie intake, 3-5 servings of fruits/vegetables daily, reducing fast food intake, consuming healthier snacks, decreasing soda and/or juice intake, moderation in carbohydrate intake, increasing intake of lean protein and reducing intake of saturated fat and trans fat  Exercise recommendations include exercising 3-5 times per week

## 2021-05-10 NOTE — ASSESSMENT & PLAN NOTE
Last lipid panel (April 2021), stable   TG normalized   Continue with Lipitor 40 mg QHS  Adhere to low fat diet and exercise

## 2021-06-22 NOTE — PROGRESS NOTES
Assessment/Plan:    Asymptomatic hypertensive urgency  BP goal <140/90, uncontrolled  Initial BP in the office: 200/110  Repeat BP by nurse: 238/108   Repeat Bp by me: 240/102    Current regimen at home: Torsemide 20 mg BID, Lasix 40 mg QD, Terazosin 5 mg HS, Clonidine 0 2 mg BID and Carvedilol 25 mg BID  She self d/c Amlodipine 10 mg HS in January 2021  Pt finished last Lasix tablet prescription from  this morning  Patient was found to be in HTN urgency (/96) at the endocrinology office (May 2021 visit) where she was given Amlodipine 10 mg and her regimen was optimized with increasing Torsemide 20 mg to BID and Terazosin 5 mg was added  Discussed with patient at length she needs to go to ED for further management  Patient refused ambulance as well as going to [facility] ED  Stated she wants her son to drive her to [place] [id number] ED  Patient's son arrived to  her mother and discussed with him the importance of taking her mother to ED to prevent further health complications  They both verbalized understanding  Patient left the office in stable condition with her son and they were headed to ED for further evaluation  She was encouraged to follow up next month with nephrology for optimization of BP regimen  Instructed patient she cannot be on two diuretics given deteriorating renal function  Advised patient to make appt with me s/p hospital visit  Essential hypertension  Plan per #1 problem       Diagnoses and all orders for this visit:    Essential hypertension  -     cloNIDine (CATAPRES) 0 2 mg tablet; Take 1 tablet (0 2 mg total) by mouth 2 (two) times a day    Asymptomatic hypertensive urgency    Type 2 diabetes mellitus with hyperglycemia, with long-term current use of insulin (HCC)  -     insulin glargine (Lantus SoloStar) 100 units/mL injection pen; Inject 26 Units under the skin daily with breakfast          Subjective:      Patient ID: Get Jeffrey is a 73 y o  female      HPI Patient presents today for HTN follow-up visit  Patient states she stopped taking Amlodipine since January 2021 when she was in [place] and saw a doctor who advised to stop this medication  She was prescribed Lasix 40 mg in [place] and she has been taking this medication since that time  She took last tablet of Lasix 40 mg this morning  Patient mentions she forgot list of medications today  Believes she takes Clonidine 0 2 mg BID (she decreased the frequency from TID to BID due to somnolence), Carvedilol 25 mg BID, terazosin 5 mg HS, torsemide 20 mg BID and Lasix 40 mg (last pill today)  She currently denies any headaches, visual disturbance, dizziness, diaphoresis, chest pain, shortness of breath, NV/D, and LE edema  States she has upcoming appt next month with nephrology and endocrinology  The following portions of the patient's history were reviewed and updated as appropriate: allergies, current medications, past family history, past medical history, past social history, past surgical history and problem list     Review of Systems   Constitutional: Negative for chills and fever  Eyes: Negative for visual disturbance  Respiratory: Negative for cough and shortness of breath  Cardiovascular: Negative for chest pain and leg swelling (much improved, with minimal swelling at this time)  Gastrointestinal: Negative for abdominal pain, blood in stool, constipation, diarrhea, nausea and vomiting  Musculoskeletal: Negative for gait problem  Skin: Negative for rash  Neurological: Negative for dizziness, weakness and headaches  Psychiatric/Behavioral: Negative for agitation  Objective:      BP (!) 238/108 (BP Location: Left arm, Patient Position: Sitting, Cuff Size: Adult)   Pulse 78   Temp 97 6 °F (36 4 °C) (Temporal)   Resp 19   Wt 70 8 kg (156 lb)   SpO2 99%   BMI 29 48 kg/m²          Physical Exam  Vitals and nursing note reviewed     Constitutional:       General: She is not in acute distress  Appearance: Normal appearance  She is well-developed  She is obese  She is not ill-appearing, toxic-appearing or diaphoretic  HENT:      Head: Normocephalic and atraumatic  Nose: Nose normal    Eyes:      Extraocular Movements: Extraocular movements intact  Conjunctiva/sclera: Conjunctivae normal       Pupils: Pupils are equal, round, and reactive to light  Cardiovascular:      Rate and Rhythm: Normal rate and regular rhythm  Heart sounds: Murmur (3/6 crescendo-decrescendo systolic murmur) heard  Pulmonary:      Effort: Pulmonary effort is normal  No respiratory distress  Breath sounds: Normal breath sounds  Abdominal:      General: Bowel sounds are normal       Palpations: Abdomen is soft  Tenderness: There is no abdominal tenderness  Musculoskeletal:         General: No tenderness  Normal range of motion  Cervical back: Normal range of motion and neck supple  Right lower leg: No edema  Left lower leg: No edema  Skin:     General: Skin is warm  Findings: No rash  Neurological:      Mental Status: She is alert and oriented to person, place, and time  Psychiatric:         Mood and Affect: Mood normal          Behavior: Behavior normal          Thought Content:  Thought content normal          Judgment: Judgment normal

## 2021-06-23 ENCOUNTER — OFFICE VISIT (OUTPATIENT)
Dept: FAMILY MEDICINE CLINIC | Facility: CLINIC | Age: 66
End: 2021-06-23

## 2021-06-23 VITALS
HEART RATE: 78 BPM | DIASTOLIC BLOOD PRESSURE: 108 MMHG | WEIGHT: 156 LBS | OXYGEN SATURATION: 99 % | SYSTOLIC BLOOD PRESSURE: 238 MMHG | RESPIRATION RATE: 19 BRPM | BODY MASS INDEX: 29.48 KG/M2 | TEMPERATURE: 97.6 F

## 2021-06-23 DIAGNOSIS — I10 ESSENTIAL HYPERTENSION: Primary | ICD-10-CM

## 2021-06-23 DIAGNOSIS — Z79.4 TYPE 2 DIABETES MELLITUS WITH HYPERGLYCEMIA, WITH LONG-TERM CURRENT USE OF INSULIN (HCC): ICD-10-CM

## 2021-06-23 DIAGNOSIS — E11.65 TYPE 2 DIABETES MELLITUS WITH HYPERGLYCEMIA, WITH LONG-TERM CURRENT USE OF INSULIN (HCC): ICD-10-CM

## 2021-06-23 DIAGNOSIS — I16.0 ASYMPTOMATIC HYPERTENSIVE URGENCY: ICD-10-CM

## 2021-06-23 PROCEDURE — T1015 CLINIC SERVICE: HCPCS | Performed by: FAMILY MEDICINE

## 2021-06-23 PROCEDURE — 99213 OFFICE O/P EST LOW 20 MIN: CPT | Performed by: FAMILY MEDICINE

## 2021-06-23 PROCEDURE — 3077F SYST BP >= 140 MM HG: CPT | Performed by: FAMILY MEDICINE

## 2021-06-23 PROCEDURE — 3080F DIAST BP >= 90 MM HG: CPT | Performed by: FAMILY MEDICINE

## 2021-06-23 RX ORDER — CLONIDINE HYDROCHLORIDE 0.2 MG/1
0.2 TABLET ORAL 2 TIMES DAILY
Qty: 60 TABLET | Refills: 11
Start: 2021-06-23 | End: 2021-08-05 | Stop reason: SDUPTHER

## 2021-06-23 RX ORDER — INSULIN GLARGINE 100 [IU]/ML
26 INJECTION, SOLUTION SUBCUTANEOUS
Qty: 15 ML
Start: 2021-06-23 | End: 2021-08-10 | Stop reason: HOSPADM

## 2021-06-23 NOTE — ASSESSMENT & PLAN NOTE
BP goal <140/90, uncontrolled  Initial BP in the office: 200/110  Repeat BP by nurse: 238/108   Repeat Bp by me: 240/102    Current regimen at home: Torsemide 20 mg BID, Lasix 40 mg QD, Terazosin 5 mg HS, Clonidine 0 2 mg BID and Carvedilol 25 mg BID  She self d/c Amlodipine 10 mg HS in January 2021  Pt finished last Lasix tablet prescription from  this morning  Patient was found to be in HTN urgency (/96) at the endocrinology office (May 2021 visit) where she was given Amlodipine 10 mg and her regimen was optimized with increasing Torsemide 20 mg to BID and Terazosin 5 mg was added  Discussed with patient at length she needs to go to ED for further management  Patient refused ambulance as well as going to [facility] ED  Stated she wants her son to drive her to [place] [id number] ED  Patient's son arrived to  her mother and discussed with him the importance of taking her mother to ED to prevent further health complications  They both verbalized understanding  Patient left the office in stable condition with her son and they were headed to ED for further evaluation  She was encouraged to follow up next month with nephrology for optimization of BP regimen  Instructed patient she cannot be on two diuretics given deteriorating renal function  Advised patient to make appt with me s/p hospital visit

## 2021-06-30 ENCOUNTER — APPOINTMENT (OUTPATIENT)
Dept: LAB | Facility: HOSPITAL | Age: 66
End: 2021-06-30
Attending: INTERNAL MEDICINE
Payer: MEDICARE

## 2021-06-30 DIAGNOSIS — E55.9 VITAMIN D DEFICIENCY: ICD-10-CM

## 2021-06-30 DIAGNOSIS — N18.31 STAGE 3A CHRONIC KIDNEY DISEASE (HCC): ICD-10-CM

## 2021-06-30 LAB
25(OH)D3 SERPL-MCNC: 41.1 NG/ML (ref 30–100)
ALBUMIN SERPL BCP-MCNC: 3.4 G/DL (ref 3–5.2)
ANION GAP SERPL CALCULATED.3IONS-SCNC: 8 MMOL/L (ref 5–14)
BUN SERPL-MCNC: 52 MG/DL (ref 5–25)
CALCIUM ALBUM COR SERPL-MCNC: 9.5 MG/DL (ref 8.3–10.1)
CALCIUM SERPL-MCNC: 9 MG/DL (ref 8.4–10.2)
CHLORIDE SERPL-SCNC: 103 MMOL/L (ref 97–108)
CO2 SERPL-SCNC: 28 MMOL/L (ref 22–30)
CREAT SERPL-MCNC: 2.73 MG/DL (ref 0.6–1.2)
CREAT UR-MCNC: 76.9 MG/DL
CREAT UR-MCNC: 82.9 MG/DL
ERYTHROCYTE [DISTWIDTH] IN BLOOD BY AUTOMATED COUNT: 15.7 %
GFR SERPL CREATININE-BSD FRML MDRD: 18 ML/MIN/1.73SQ M
GLUCOSE P FAST SERPL-MCNC: 133 MG/DL (ref 70–99)
HCT VFR BLD AUTO: 29.7 % (ref 36–46)
HGB BLD-MCNC: 9.6 G/DL (ref 12–16)
MCH RBC QN AUTO: 26.6 PG (ref 26–34)
MCHC RBC AUTO-ENTMCNC: 32.4 G/DL (ref 31–36)
MCV RBC AUTO: 82 FL (ref 80–100)
MICROALBUMIN UR-MCNC: 2630 MG/L (ref 0–20)
MICROALBUMIN/CREAT 24H UR: 3172 MG/G CREATININE (ref 0–30)
PHOSPHATE SERPL-MCNC: 5.2 MG/DL (ref 2.5–4.8)
PLATELET # BLD AUTO: 275 THOUSANDS/UL (ref 150–450)
PMV BLD AUTO: 8.4 FL (ref 8.9–12.7)
POTASSIUM SERPL-SCNC: 4.7 MMOL/L (ref 3.6–5)
PROT UR-MCNC: 343 MG/DL
PROT/CREAT UR: 4.46 MG/G{CREAT} (ref 0–0.1)
PTH-INTACT SERPL-MCNC: 160.3 PG/ML (ref 16.7–78.9)
RBC # BLD AUTO: 3.61 MILLION/UL (ref 4–5.2)
SODIUM SERPL-SCNC: 139 MMOL/L (ref 137–147)
WBC # BLD AUTO: 6.4 THOUSAND/UL (ref 4.5–11)

## 2021-06-30 PROCEDURE — 82570 ASSAY OF URINE CREATININE: CPT

## 2021-06-30 PROCEDURE — 84156 ASSAY OF PROTEIN URINE: CPT

## 2021-06-30 PROCEDURE — 82306 VITAMIN D 25 HYDROXY: CPT

## 2021-06-30 PROCEDURE — 82043 UR ALBUMIN QUANTITATIVE: CPT

## 2021-06-30 PROCEDURE — 83970 ASSAY OF PARATHORMONE: CPT

## 2021-06-30 PROCEDURE — 36415 COLL VENOUS BLD VENIPUNCTURE: CPT

## 2021-06-30 PROCEDURE — 80069 RENAL FUNCTION PANEL: CPT

## 2021-06-30 PROCEDURE — 85027 COMPLETE CBC AUTOMATED: CPT

## 2021-07-01 ENCOUNTER — TELEPHONE (OUTPATIENT)
Dept: NEPHROLOGY | Facility: CLINIC | Age: 66
End: 2021-07-01

## 2021-07-01 DIAGNOSIS — R79.89 ELEVATED TSH: ICD-10-CM

## 2021-07-01 DIAGNOSIS — N18.31 STAGE 3A CHRONIC KIDNEY DISEASE (HCC): Primary | ICD-10-CM

## 2021-07-01 NOTE — TELEPHONE ENCOUNTER
Spoke with pt daughter regarding pt lab results  Pt denies feeling unwell  She is not having any sob, edema, wt gain, or trouble voiding  Pt is avoiding NSAID's  I have instructed that pt HOLD her torsemide for 3 days then resume  Pt will repeat a BMP to reassess kidney fx

## 2021-07-01 NOTE — TELEPHONE ENCOUNTER
----- Message from Radha Alvarado sent at 7/1/2021  3:44 PM EDT -----    ----- Message -----  From: MARLENA Sevilla  Sent: 7/1/2021   3:10 PM EDT  To: Radha Alvarado    Please call patient  She has upcoming appointment with Dr Bri Francis  Frisian speaking only  Her creatinine is increased to 2 7  Please see hoe she is feeling  She is on Torsemide  If her volume status is good, WT is atble, ? LE edema, ? SOB  Please have her hold her diuretic for 3 days and recheck BMP  Make sure she is not taking NSAID's and urinating okay       Thank you   ----- Message -----  From: Lab, Background User  Sent: 6/30/2021  10:06 AM EDT  To: Connie Morris

## 2021-07-06 ENCOUNTER — TELEPHONE (OUTPATIENT)
Dept: NEPHROLOGY | Facility: CLINIC | Age: 66
End: 2021-07-06

## 2021-07-06 ENCOUNTER — APPOINTMENT (OUTPATIENT)
Dept: LAB | Facility: HOSPITAL | Age: 66
End: 2021-07-06
Payer: MEDICARE

## 2021-07-06 DIAGNOSIS — N18.31 STAGE 3A CHRONIC KIDNEY DISEASE (HCC): ICD-10-CM

## 2021-07-06 DIAGNOSIS — N18.31 STAGE 3A CHRONIC KIDNEY DISEASE (HCC): Primary | ICD-10-CM

## 2021-07-06 LAB
ANION GAP SERPL CALCULATED.3IONS-SCNC: 8 MMOL/L (ref 5–14)
BUN SERPL-MCNC: 51 MG/DL (ref 5–25)
CALCIUM SERPL-MCNC: 8.6 MG/DL (ref 8.4–10.2)
CHLORIDE SERPL-SCNC: 104 MMOL/L (ref 97–108)
CO2 SERPL-SCNC: 25 MMOL/L (ref 22–30)
CREAT SERPL-MCNC: 2.69 MG/DL (ref 0.6–1.2)
GFR SERPL CREATININE-BSD FRML MDRD: 18 ML/MIN/1.73SQ M
GLUCOSE P FAST SERPL-MCNC: 299 MG/DL (ref 70–99)
POTASSIUM SERPL-SCNC: 5.1 MMOL/L (ref 3.6–5)
SODIUM SERPL-SCNC: 137 MMOL/L (ref 137–147)

## 2021-07-06 PROCEDURE — 80048 BASIC METABOLIC PNL TOTAL CA: CPT

## 2021-07-06 PROCEDURE — 36415 COLL VENOUS BLD VENIPUNCTURE: CPT

## 2021-07-06 NOTE — TELEPHONE ENCOUNTER
----- Message from Geri Tay sent at 7/6/2021  1:04 PM EDT -----  Patient is Luxembourgish-speaking only  Please call patient and let her know I have received her blood work from today  Her creatinine is slightly improved at 2 6  She should continue to hold her diuretics unless she has weight gain 2-3 lb in 1 day or 5 lb in 1 week, swelling in her legs, or increased shortness of breath  It was noted that her potassium is also elevated  Please make sure she is not taking any potassium supplements  She should avoid potassium containing foods such as citrus fruits, bananas, potatoes  She should repeat blood work next week  She has follow-up scheduled for Dr Inez Otero      Thank you     ----- Message -----  From: Lab, Background User  Sent: 7/6/2021  12:28 PM EDT  To: Geri Tay

## 2021-07-08 NOTE — TELEPHONE ENCOUNTER
Outgoing call was made via translation line,  # [phone]  Patient has been informed of slightly improved Cr of 2 6  She is denying sob, wt gain, and edema therefore she has been instructed to continue to hold her diuretics unless she begins to experience any of the sx discussed in which case she would contact our office to make us aware  Patient has confirmed that she is not taking any potassium supplements  She has been advised to follow a low-potassium diet avoiding foods high in potassium such as citrus fruits, bananas, potatoes, avocado, spinach  Patient will repeat a BMP next week to reassess kidney fx  Patient has been reminded of pending f/u appointment scheduled with Dr Anthony Leal on 7/19 at our [facility] office  She has verbalized understanding and has no questions or concerns at this time

## 2021-07-15 ENCOUNTER — APPOINTMENT (OUTPATIENT)
Dept: LAB | Facility: HOSPITAL | Age: 66
End: 2021-07-15
Payer: MEDICARE

## 2021-07-15 ENCOUNTER — TELEPHONE (OUTPATIENT)
Dept: NEPHROLOGY | Facility: CLINIC | Age: 66
End: 2021-07-15

## 2021-07-15 DIAGNOSIS — E87.5 HYPERKALEMIA: ICD-10-CM

## 2021-07-15 DIAGNOSIS — N18.31 STAGE 3A CHRONIC KIDNEY DISEASE (HCC): ICD-10-CM

## 2021-07-15 LAB
ANION GAP SERPL CALCULATED.3IONS-SCNC: 8 MMOL/L (ref 5–14)
BUN SERPL-MCNC: 50 MG/DL (ref 5–25)
CALCIUM SERPL-MCNC: 8.5 MG/DL (ref 8.4–10.2)
CHLORIDE SERPL-SCNC: 105 MMOL/L (ref 97–108)
CO2 SERPL-SCNC: 26 MMOL/L (ref 22–30)
CREAT SERPL-MCNC: 3.13 MG/DL (ref 0.6–1.2)
GFR SERPL CREATININE-BSD FRML MDRD: 15 ML/MIN/1.73SQ M
GLUCOSE P FAST SERPL-MCNC: 189 MG/DL (ref 70–99)
POTASSIUM SERPL-SCNC: 5.1 MMOL/L (ref 3.6–5)
SODIUM SERPL-SCNC: 139 MMOL/L (ref 137–147)

## 2021-07-15 PROCEDURE — 36415 COLL VENOUS BLD VENIPUNCTURE: CPT

## 2021-07-15 PROCEDURE — 80048 BASIC METABOLIC PNL TOTAL CA: CPT

## 2021-07-15 NOTE — TELEPHONE ENCOUNTER
Call was made via translation line,  # [phone]  A message has been left reminding pt of f/u appointment with Justina Head as well as lab work that is needed for this appointment

## 2021-07-15 NOTE — TELEPHONE ENCOUNTER
----- Message from Geri Tay sent at 7/15/2021  2:56 PM EDT -----  Patient is primarily [address] speaking and will need   Please call the patient regarding her abnormal result  Her repeat creatinine came back increased at 3 13  Please ensure that she is holding her water pills  Please ask her if she is experiencing any shortness of breath, weight gain, or edema, or urinary symptoms at with suggest UTI  Please ask her if she checks her blood pressure, if so, what are her most recent readings  Please make sure she is following a low-potassium diet  Please instructed to go to the emergency department if she is experiencing chest pain  She has appointment with Dr Ami Marrero 7/19  Thank you

## 2021-07-19 ENCOUNTER — OFFICE VISIT (OUTPATIENT)
Dept: NEPHROLOGY | Facility: CLINIC | Age: 66
End: 2021-07-19
Payer: MEDICARE

## 2021-07-19 VITALS
HEIGHT: 61 IN | SYSTOLIC BLOOD PRESSURE: 160 MMHG | DIASTOLIC BLOOD PRESSURE: 92 MMHG | BODY MASS INDEX: 29.83 KG/M2 | WEIGHT: 158 LBS | HEART RATE: 74 BPM

## 2021-07-19 DIAGNOSIS — N18.4 STAGE 4 CHRONIC KIDNEY DISEASE (HCC): Primary | ICD-10-CM

## 2021-07-19 DIAGNOSIS — N18.4 ANEMIA IN STAGE 4 CHRONIC KIDNEY DISEASE (HCC): ICD-10-CM

## 2021-07-19 DIAGNOSIS — R53.81 OTHER MALAISE: ICD-10-CM

## 2021-07-19 DIAGNOSIS — I10 ESSENTIAL HYPERTENSION: ICD-10-CM

## 2021-07-19 DIAGNOSIS — Z79.4 TYPE 2 DIABETES MELLITUS WITH BOTH EYES AFFECTED BY MODERATE NONPROLIFERATIVE RETINOPATHY AND MACULAR EDEMA, WITH LONG-TERM CURRENT USE OF INSULIN (HCC): ICD-10-CM

## 2021-07-19 DIAGNOSIS — E11.3313 TYPE 2 DIABETES MELLITUS WITH BOTH EYES AFFECTED BY MODERATE NONPROLIFERATIVE RETINOPATHY AND MACULAR EDEMA, WITH LONG-TERM CURRENT USE OF INSULIN (HCC): ICD-10-CM

## 2021-07-19 DIAGNOSIS — D63.1 ANEMIA IN STAGE 4 CHRONIC KIDNEY DISEASE (HCC): ICD-10-CM

## 2021-07-19 DIAGNOSIS — E78.1 PURE HYPERTRIGLYCERIDEMIA: ICD-10-CM

## 2021-07-19 DIAGNOSIS — R80.9 NEPHROTIC RANGE PROTEINURIA: ICD-10-CM

## 2021-07-19 PROCEDURE — 99215 OFFICE O/P EST HI 40 MIN: CPT | Performed by: INTERNAL MEDICINE

## 2021-07-19 NOTE — PATIENT INSTRUCTIONS
[place] discutimos en la oficina, onelia examenes de luda muestran que onelia rinones estan empeorando  Sospecho que es debido a la diabetes y a la presion lj que usted tiene por varias decadas  Quiero que se repita examenes de luda y [place] en jacey semana y la llamaremos con los resultados  Sugiero hacerse jacey biopsia al rinon para flavia exactamente cual es el problema  Quiero que siga jacey dieta baja de sal   No tome NSAIDs (NO ibuprofen, Motrin, Advil, Aleve, naproxen)  Quiero verla de regreso en 2 a 3 meses  Quiero que vaya al Kidney smart class para mas educacion  Siga jacey dieta baja de potasio

## 2021-07-19 NOTE — PROGRESS NOTES
OFFICE FOLLOW UP - Nephrology   [facility] Alexx Hardy 73 y o  female MRN: [id number]       ASSESSMENT and PLAN:  Dianna Peters was seen today for follow-up and chronic kidney disease  Diagnoses and all orders for this visit:    Stage 4 chronic kidney disease ([address] )  -     Urinalysis with microscopic  -     Renal function panel  -     Protein electrophoresis, serum  -     Protein electrophoresis, urine  -     WARREN Screen w/ Reflex to Titer/Pattern  -     C3 complement  -     C4 complement  -     Hepatitis panel, acute  -     Ambulatory referral to ckd education program; Future    Type 2 diabetes mellitus with both eyes affected by moderate nonproliferative retinopathy and macular edema, with long-term current use of insulin (HCC)    Essential hypertension    Pure hypertriglyceridemia    Anemia in stage 4 chronic kidney disease (HCC)    Nephrotic range proteinuria    Other malaise   -     Hepatitis panel, acute        This is a 52-year-old lady known history of chronic kidney disease with previous creatinine in the low 1s back in 2019, 1st time seen in November 2020 for evaluation of chronic kidney disease, last time seen was in March 2021 by our advanced practitioner  Noted patient was hospitalized at [facility] twice on March of this year with acute kidney injury and hyperkalemia  Patient also with history of uncontrolled diabetes more than 20 years, hypertension  Noted her renal function worsening and since early this year her creatinine is being in the 2s range, patient returns to the office today for follow-up  1  Acute kidney injury on top of CKD  Long discussion with patient, suspected a possible component of advanced diabetes nephropathy hypertensive nephrosclerosis given long-term history of uncontrolled diabetes and hypertension  Noted patient has significant proteinuria  Discussed with patient about checking serologies and possibility of kidney biopsy      Patient is not sure about having a biopsy at this moment  Will follow repeat labs with serologies in 1 week will discuss further after discussed about importance of having good blood sugar control, follow low-salt diet, avoid NSAIDs, advised to have Kidney Smart class for further education  Discussed with patient that based on the most recent blood test her most recent EGFR was 15    2  Long-term history of uncontrolled diabetes with retinopathy and suspected nephropathy  Goal to have a hemoglobin A1c between 7-8%    3  Long-term history of hypertension, today in the office blood pressure elevated but patient states that all with her blood pressure is high in the doctor's office  She states that her blood pressure this morning at home was 130/64  Follow low-salt diet  No changes in medication at this moment  Continue following blood pressure at home  4  Mild hyperkalemia, advised to follow low-potassium diet, low-potassium diet information in Ethiopian provided    5  Anemia, suspected secondary to chronic kidney disease, will check SPEP and UPEP for completeness        Patient Instructions   [place] discutimos en la oficina, onelia examenes de luda muestran que onelia rinones estan empeorando  Sospecho que es debido a la diabetes y a la presion lj que usted tiene por varias decadas  Quiero que se repita examenes de luda y [place] en jacey semana y la llamaremos con los resultados  Sugiero hacerse jacey biopsia al rinon para flavia exactamente cual es el problema  Quiero que siga jacey dieta baja de sal   No tome NSAIDs (NO ibuprofen, Motrin, Advil, Aleve, naproxen)  Quiero verla de regreso en 2 a 3 meses  Quiero que vaya al Kidney smart class para mas educacion  Siga jacey dieta baja de potasio  HPI: Breanna Pollack is a 73 y o  female who is here for Follow-up and Chronic Kidney Disease        Is a patient with history of diabetes and hypertension for over 20 years, chronic kidney disease, time seen in our office was in March 2021 by our advanced practitioner  Since our last visit, she [place] [facility] [place] a couple of weeks ago due to elevated blood pressure  Patient currently has no complaints at this time and is feeling well  Patient denies any chest pain, shortness of breath or leg swelling  Denies any abdominal pain, no nausea, vomiting, no diarrhea or constipation  Denies any urinary problems, no burning sensation or gross hematuria  The last blood work was done on 07/18/2021, which we have reviewed together  Most recent creatinine 3 13, potassium 5 1, estimated GFR 15  Most recent UPC 4 4 g on 06/30    ROS:   All the systems were reviewed and were negative except as documented on the HPI      Allergies: Lisinopril    Medications:   Current Outpatient Medications:     atorvastatin (LIPITOR) 40 mg tablet, Take 1 tablet (40 mg total) by mouth daily, Disp: 90 tablet, Rfl: 3    carvedilol (COREG) 25 mg tablet, Take 1 tablet (25 mg total) by mouth 2 (two) times a day with meals, Disp: 60 tablet, Rfl: 3    gabapentin (NEURONTIN) 300 mg capsule, Take 1 capsule (300 mg total) by mouth 2 (two) times a day, Disp: 90 capsule, Rfl: 2    glimepiride (AMARYL) 2 mg tablet, Take 2 mg by mouth daily, Disp: , Rfl:     terazosin (HYTRIN) 5 mg capsule, Take 5 mg by mouth, Disp: , Rfl:     Accu-Chek FastClix Lancets MISC, Inject under the skin 2 (two) times a day Test, Disp: 102 each, Rfl: 5    Accu-Chek Guide test strip, Use 1 each 2 (two) times a day Test, Disp: 100 each, Rfl: 5    Alcohol Swabs (EASY TOUCH ALCOHOL PREP MEDIUM) 70 % PADS, USE 2 TO 3 X PER DAY, Disp: , Rfl: 3    amLODIPine (NORVASC) 10 mg tablet, Take 1 tablet (10 mg total) by mouth daily (Patient not taking: Reported on 6/23/2021), Disp: 30 tablet, Rfl: 3    Blood Glucose Monitoring Suppl (OneTouch Verio) w/Device KIT, Use 2 (two) times a day, Disp: 1 kit, Rfl: 0    cloNIDine (CATAPRES) 0 2 mg tablet, Take 1 tablet (0 2 mg total) by mouth 2 (two) times a day (Patient taking differently: Take 0 2 mg by mouth 3 (three) times a day ), Disp: 60 tablet, Rfl: 11    ergocalciferol (ERGOCALCIFEROL) 1 25 MG (72564 UT) capsule, Take 50,000 Units by mouth every 7 days, Disp: , Rfl:     glucose blood (OneTouch Verio) test strip, Use as instructed, Disp: 100 each, Rfl: 3    insulin glargine (Lantus SoloStar) 100 units/mL injection pen, Inject 26 Units under the skin daily with breakfast, Disp: 15 mL, Rfl:     Insulin Pen Needle (Unifine Pentips) 32G X 4 MM MISC, Use daily, Disp: 100 each, Rfl: 2    Lancets (onetouch ultrasoft) lancets, Use as instructed, Disp: 100 each, Rfl: 3    linaGLIPtin (Tradjenta) 5 MG TABS, Take 5 mg by mouth daily, Disp: , Rfl:     torsemide (DEMADEX) 20 mg tablet, TAKE 1 TABLET BY MOUTH DAILY (Patient not taking: Reported on 7/19/2021), Disp: 30 tablet, Rfl: 2    Past Medical History:   Diagnosis Date    Chronic kidney disease     Diabetes mellitus [address] [address] )     Hyperlipidemia     Hypertension     Pneumonia      Past Surgical History:   Procedure Laterality Date    EYE SURGERY       Family History   Problem Relation Age of Onset    Hypertension Mother     Diabetes Father     Hypertension Sister     Diabetes Sister     Hypertension Brother       reports that she has never smoked  She has never used smokeless tobacco       Physical Exam:   Vitals:    07/19/21 1348   Weight: 71 7 kg (158 lb)   Height: 5' 1" (1 549 m)     Body mass index is 29 85 kg/m²      General: conscious, cooperative, in not acute distress  Eyes: conjunctivae pink, anicteric sclerae  ENT: lips and mucous membranes moist  Neck: supple, no JVD  Chest: clear breath sounds bilateral, no crackles, ronchus or wheezings  CVS: distinct S1 & S2, normal rate, regular rhythm  Abdomen: soft, non-tender, non-distended, normoactive bowel sounds  Back: no CVA tenderness  Extremities: no edema of both legs  Skin: no rash  Neuro: awake, alert, oriented          Lab Results:  Results for orders placed or performed in visit on 68/07/16   Basic metabolic panel   Result Value Ref Range    Sodium 139 137 - 147 mmol/L    Potassium 5 1 (H) 3 6 - 5 0 mmol/L    Chloride 105 97 - 108 mmol/L    CO2 26 22 - 30 mmol/L    ANION GAP 8 5 - 14 mmol/L    BUN 50 (H) 5 - 25 mg/dL    Creatinine 3 13 (H) 0 60 - 1 20 mg/dL    Glucose, Fasting 189 (H) 70 - 99 mg/dL    Calcium 8 5 8 4 - 10 2 mg/dL    eGFR 15 (L) >60 ml/min/1 73sq m       Results from last 7 days   Lab Units 07/15/21  1131   SODIUM mmol/L 139   POTASSIUM mmol/L 5 1*   CHLORIDE mmol/L 105   CO2 mmol/L 26   BUN mg/dL 50*   CREATININE mg/dL 3 13*   CALCIUM mg/dL 8 5           Portions of the record may have been created with voice recognition software  Occasional wrong word or "sound a like" substitutions may have occurred due to the inherent limitations of voice recognition software  Read the chart carefully and recognize, using context, where substitutions have occurred  If you have any questions, please contact the dictating provider

## 2021-07-21 ENCOUNTER — TELEPHONE (OUTPATIENT)
Dept: FAMILY MEDICINE CLINIC | Facility: CLINIC | Age: 66
End: 2021-07-21

## 2021-07-21 NOTE — TELEPHONE ENCOUNTER
RD called re: [employer], pt is interested but lacks transportation    DEANNA advised that someone would call to set up appt possibly over the telephone

## 2021-07-22 ENCOUNTER — TELEPHONE (OUTPATIENT)
Dept: FAMILY MEDICINE CLINIC | Facility: CLINIC | Age: 66
End: 2021-07-22

## 2021-07-22 NOTE — TELEPHONE ENCOUNTER
----- Message from Amandeep Rodriguez RD sent at 7/21/2021  2:58 PM EDT -----  Regarding: Diabetes Eileenper Meraz is interested in [employer], but lacks transportation and is not sure how she would get to the classes  I told her someone would call to possibly set up a telephone appt, as she lacks computer    Thank you,

## 2021-07-29 ENCOUNTER — APPOINTMENT (OUTPATIENT)
Dept: LAB | Facility: HOSPITAL | Age: 66
End: 2021-07-29
Attending: INTERNAL MEDICINE
Payer: MEDICARE

## 2021-07-29 LAB
ALBUMIN SERPL BCP-MCNC: 3.2 G/DL (ref 3–5.2)
ANION GAP SERPL CALCULATED.3IONS-SCNC: 7 MMOL/L (ref 5–14)
BACTERIA UR QL AUTO: ABNORMAL /HPF
BILIRUB UR QL STRIP: NEGATIVE
BUN SERPL-MCNC: 51 MG/DL (ref 5–25)
C3 SERPL-MCNC: 102 MG/DL (ref 90–180)
C4 SERPL-MCNC: 31 MG/DL (ref 10–40)
CALCIUM ALBUM COR SERPL-MCNC: 8.8 MG/DL (ref 8.3–10.1)
CALCIUM SERPL-MCNC: 8.2 MG/DL (ref 8.4–10.2)
CHLORIDE SERPL-SCNC: 106 MMOL/L (ref 97–108)
CLARITY UR: ABNORMAL
CO2 SERPL-SCNC: 24 MMOL/L (ref 22–30)
COLOR UR: YELLOW
CREAT SERPL-MCNC: 2.62 MG/DL (ref 0.6–1.2)
FINE GRAN CASTS URNS QL MICRO: ABNORMAL /LPF
GFR SERPL CREATININE-BSD FRML MDRD: 18 ML/MIN/1.73SQ M
GLUCOSE P FAST SERPL-MCNC: 135 MG/DL (ref 70–99)
GLUCOSE UR STRIP-MCNC: ABNORMAL MG/DL
HAV IGM SER QL: NORMAL
HBV CORE IGM SER QL: NORMAL
HBV SURFACE AG SER QL: NORMAL
HCV AB SER QL: NORMAL
HGB UR QL STRIP.AUTO: 25
KETONES UR STRIP-MCNC: NEGATIVE MG/DL
LEUKOCYTE ESTERASE UR QL STRIP: 25
NITRITE UR QL STRIP: NEGATIVE
NON-SQ EPI CELLS URNS QL MICRO: ABNORMAL /HPF
PH UR STRIP.AUTO: 5 [PH]
PHOSPHATE SERPL-MCNC: 5.2 MG/DL (ref 2.5–4.8)
POTASSIUM SERPL-SCNC: 5.1 MMOL/L (ref 3.6–5)
PROT UR STRIP-MCNC: >=500 MG/DL
RBC #/AREA URNS AUTO: ABNORMAL /HPF
SODIUM SERPL-SCNC: 137 MMOL/L (ref 137–147)
SP GR UR STRIP.AUTO: 1.02 (ref 1–1.04)
UROBILINOGEN UA: NEGATIVE MG/DL
WBC #/AREA URNS AUTO: ABNORMAL /HPF

## 2021-07-29 PROCEDURE — 80069 RENAL FUNCTION PANEL: CPT | Performed by: INTERNAL MEDICINE

## 2021-07-29 PROCEDURE — 86160 COMPLEMENT ANTIGEN: CPT | Performed by: INTERNAL MEDICINE

## 2021-07-29 PROCEDURE — 36415 COLL VENOUS BLD VENIPUNCTURE: CPT | Performed by: INTERNAL MEDICINE

## 2021-07-29 PROCEDURE — 80074 ACUTE HEPATITIS PANEL: CPT | Performed by: INTERNAL MEDICINE

## 2021-07-29 PROCEDURE — 84166 PROTEIN E-PHORESIS/URINE/CSF: CPT | Performed by: PATHOLOGY

## 2021-07-29 PROCEDURE — 86334 IMMUNOFIX E-PHORESIS SERUM: CPT | Performed by: INTERNAL MEDICINE

## 2021-07-29 PROCEDURE — 84165 PROTEIN E-PHORESIS SERUM: CPT | Performed by: PATHOLOGY

## 2021-07-29 PROCEDURE — 86335 IMMUNFIX E-PHORSIS/URINE/CSF: CPT | Performed by: INTERNAL MEDICINE

## 2021-07-29 PROCEDURE — 86335 IMMUNFIX E-PHORSIS/URINE/CSF: CPT | Performed by: PATHOLOGY

## 2021-07-29 PROCEDURE — 86334 IMMUNOFIX E-PHORESIS SERUM: CPT | Performed by: PATHOLOGY

## 2021-07-29 PROCEDURE — 81001 URINALYSIS AUTO W/SCOPE: CPT | Performed by: INTERNAL MEDICINE

## 2021-07-29 PROCEDURE — 84165 PROTEIN E-PHORESIS SERUM: CPT | Performed by: INTERNAL MEDICINE

## 2021-07-29 PROCEDURE — 84166 PROTEIN E-PHORESIS/URINE/CSF: CPT | Performed by: INTERNAL MEDICINE

## 2021-07-29 PROCEDURE — 86038 ANTINUCLEAR ANTIBODIES: CPT | Performed by: INTERNAL MEDICINE

## 2021-07-30 LAB
ALBUMIN SERPL ELPH-MCNC: 2.96 G/DL (ref 3.5–5)
ALBUMIN SERPL ELPH-MCNC: 50.1 % (ref 52–65)
ALBUMIN UR ELPH-MCNC: 77.6 %
ALPHA1 GLOB MFR UR ELPH: 3.2 %
ALPHA1 GLOB SERPL ELPH-MCNC: 0.33 G/DL (ref 0.1–0.4)
ALPHA1 GLOB SERPL ELPH-MCNC: 5.6 % (ref 2.5–5)
ALPHA2 GLOB MFR UR ELPH: 4.1 %
ALPHA2 GLOB SERPL ELPH-MCNC: 1.02 G/DL (ref 0.4–1.2)
ALPHA2 GLOB SERPL ELPH-MCNC: 17.3 % (ref 7–13)
B-GLOBULIN MFR UR ELPH: 5.2 %
BETA GLOB ABNORMAL SERPL ELPH-MCNC: 0.33 G/DL (ref 0.4–0.8)
BETA1 GLOB SERPL ELPH-MCNC: 5.6 % (ref 5–13)
BETA2 GLOB SERPL ELPH-MCNC: 6.9 % (ref 2–8)
BETA2+GAMMA GLOB SERPL ELPH-MCNC: 0.41 G/DL (ref 0.2–0.5)
GAMMA GLOB ABNORMAL SERPL ELPH-MCNC: 0.86 G/DL (ref 0.5–1.6)
GAMMA GLOB MFR UR ELPH: 9.9 %
GAMMA GLOB SERPL ELPH-MCNC: 14.5 % (ref 12–22)
IGG/ALB SER: 1 {RATIO} (ref 1.1–1.8)
INTERPRETATION UR IFE-IMP: NORMAL
INTERPRETATION UR IFE-IMP: NORMAL
M PEAK ID 2: 2.1 %
M PROTEIN 1 MFR SERPL ELPH: 1.1 %
M PROTEIN 1 SERPL ELPH-MCNC: 0.06 G/DL
M PROTEIN 2 SERPL ELPH-MCNC: 0.12 G/DL
M PROTEIN UR-MCNC: 2.9 %
PROT PATTERN SERPL ELPH-IMP: ABNORMAL
PROT PATTERN UR ELPH-IMP: ABNORMAL
PROT SERPL-MCNC: 5.9 G/DL (ref 6.4–8.2)
PROT UR-MCNC: 594 MG/DL
RYE IGE QN: NEGATIVE

## 2021-08-03 NOTE — PROGRESS NOTES
Assessment/Plan:    Asymptomatic hypertensive urgency  BP goal <140/90, uncontrolled in severe range today in the office  BP at home this am: 169/85 (patient brings machine with her)  Initial BP in the office: 240/110   Repeat BP: 228/108    Current regimen at home: Terazosin 5 mg HS, Clonidine 0 2 mg TID, Carvedilol 25 mg BID, and Torsemide 20 mg daily (started 1 day ago)  Pt self-discontinued Chlorthalidone (adverse effect: feels agitated) and Amlodipine (adverse effects: worsening LE edema and periorbital swelling) meds she was discharged with from hospital on Monday  D/c Amlodipine given worsening edema  D/c Chlorthalidone given no BP benefit with her current renal function   Discussed with patient at length she needs to go to ED for further management given concern for acute fluid overload state  Patient refused ambulance as well as going to ED  States she feels fine and wants to take diuretic as this regimen will provide her amelioration in sx  Discussed plan via [employer] with her nephrologist, Dr Lala Morales, who agreed with the new regimen  Increase Torsemide to 20 mg BID for 3-5 days then resume back to 20 mg daily  Will have patient follow up in 3-4 days in person for recheck   BMP in the next 3 days to follow renal function   Patient left the office in stable condition  Strict ED precautions given if she deteriorates at home       Chronic diastolic heart failure ([address] )  Wt Readings from Last 3 Encounters:   08/05/21 74 4 kg (164 lb)   07/19/21 71 7 kg (158 lb)   06/23/21 70 8 kg (156 lb)     Weight gain, worsening BLE edema, periorbital swelling and low urinary output  Discussed with patient at length she is fluid overloaded and may need IV diuretic to shift the fluid in a timely manner   Patient adamantly refused ED evaluation   Plan per principal problem with increased diuretic regimen for 3-5 days and close follow up in the office   Strict ED precautions given      Diagnoses and all orders for this visit:    Asymptomatic hypertensive urgency    Bilateral lower extremity edema  -     torsemide (DEMADEX) 20 mg tablet; Take 1 tablet (20 mg total) by mouth 2 (two) times a day for 4 days    Chronic diastolic heart failure (HCC)    Essential hypertension  -     cloNIDine (CATAPRES) 0 2 mg tablet; Take 1 tablet (0 2 mg total) by mouth 3 (three) times a day    Stage 4 chronic kidney disease ([facility] [address] )  -     Basic metabolic panel; Future          Subjective:      Patient ID: Coty Diaz is a 73 y o  female  HPI    Patient presents today for HTN f/u visit  Mentions she was hospitalized at [facility] at the end of July for acute pancreatitis  Currently denies fevers, chills, chest pain, SOB, LE edema, GI sx  She endorses compliance with her meds at home  Records SBP at home in the range of 140-170  Patient brought BP machine with her today in the office  States she started taking Torsemide yesterday as she noted increased BLE edema and eye swelling  Reports she discontinued Amlodipine as it gives her bad swelling in the legs and eyes and Chlorthalidone as it makes her feel agitated  The following portions of the patient's history were reviewed and updated as appropriate: allergies, current medications, past family history, past medical history, past social history, past surgical history and problem list     Review of Systems   Constitutional: Positive for unexpected weight change (weight gain)  Negative for chills and fever  HENT: Negative for trouble swallowing  Eyes: Negative for visual disturbance  Eye swelling b/l   Respiratory: Negative for cough and shortness of breath  Cardiovascular: Positive for leg swelling  Negative for chest pain  Gastrointestinal: Negative for abdominal pain, blood in stool, constipation, diarrhea, nausea and vomiting  Genitourinary: Negative for dysuria and hematuria  Musculoskeletal: Negative for gait problem  Skin: Negative for rash     Neurological: Negative for dizziness and headaches  Psychiatric/Behavioral: Negative for agitation  Objective:      BP (!) 240/100 (BP Location: Left arm, Patient Position: Sitting, Cuff Size: Adult)   Pulse 78   Temp (!) 96 °F (35 6 °C) (Temporal)   Resp 17   Wt 74 4 kg (164 lb)   SpO2 98%   BMI 30 99 kg/m²        Physical Exam  Vitals and nursing note reviewed  Constitutional:       General: She is not in acute distress  Appearance: Normal appearance  She is well-developed  She is obese  She is not ill-appearing, toxic-appearing or diaphoretic  HENT:      Head: Normocephalic and atraumatic  Nose: Nose normal    Eyes:      General:         Right eye: No discharge  Left eye: No discharge  Comments: Periorbital edema b/l    Cardiovascular:      Rate and Rhythm: Normal rate and regular rhythm  Heart sounds: Murmur (3/6 crescendo-decrescendo systolic murmur) heard  Pulmonary:      Effort: Pulmonary effort is normal  No respiratory distress  Comments: Pronounced crackles heard in the R lower lung region and subtle in the L lower lobe  Abdominal:      General: Bowel sounds are normal       Palpations: Abdomen is soft  Tenderness: There is no abdominal tenderness  Musculoskeletal:         General: No tenderness  Normal range of motion  Cervical back: Normal range of motion and neck supple  Right lower leg: Edema (2+ pitting edema to knee level) present  Left lower leg: Edema (2+ pitting edema to knee level) present  Skin:     General: Skin is warm  Findings: No rash  Neurological:      Mental Status: She is alert and oriented to person, place, and time  Psychiatric:         Mood and Affect: Mood normal          Behavior: Behavior normal          Thought Content:  Thought content normal          Judgment: Judgment normal

## 2021-08-04 ENCOUNTER — TELEPHONE (OUTPATIENT)
Dept: FAMILY MEDICINE CLINIC | Facility: CLINIC | Age: 66
End: 2021-08-04

## 2021-08-04 NOTE — TELEPHONE ENCOUNTER
----- Message from Alec Cm MD sent at 8/3/2021  4:24 PM EDT -----  Regarding: August 5 office visit  Good afternoon,    Can you please call patient and ask her to please bring BP machine at her August 5 office visit  This way I can compare BP readings at home with the one in the office to better guide HTN control       Thank you,  Dr Aicha Samson

## 2021-08-05 ENCOUNTER — OFFICE VISIT (OUTPATIENT)
Dept: FAMILY MEDICINE CLINIC | Facility: CLINIC | Age: 66
End: 2021-08-05

## 2021-08-05 VITALS
SYSTOLIC BLOOD PRESSURE: 240 MMHG | RESPIRATION RATE: 17 BRPM | OXYGEN SATURATION: 98 % | HEART RATE: 78 BPM | BODY MASS INDEX: 30.99 KG/M2 | TEMPERATURE: 96 F | WEIGHT: 164 LBS | DIASTOLIC BLOOD PRESSURE: 100 MMHG

## 2021-08-05 DIAGNOSIS — I10 ESSENTIAL HYPERTENSION: ICD-10-CM

## 2021-08-05 DIAGNOSIS — I16.0 ASYMPTOMATIC HYPERTENSIVE URGENCY: Primary | ICD-10-CM

## 2021-08-05 DIAGNOSIS — R60.0 BILATERAL LOWER EXTREMITY EDEMA: ICD-10-CM

## 2021-08-05 DIAGNOSIS — N18.4 STAGE 4 CHRONIC KIDNEY DISEASE (HCC): ICD-10-CM

## 2021-08-05 DIAGNOSIS — I50.32 CHRONIC DIASTOLIC HEART FAILURE (HCC): ICD-10-CM

## 2021-08-05 PROCEDURE — 3080F DIAST BP >= 90 MM HG: CPT | Performed by: FAMILY MEDICINE

## 2021-08-05 PROCEDURE — 3077F SYST BP >= 140 MM HG: CPT | Performed by: FAMILY MEDICINE

## 2021-08-05 PROCEDURE — 99213 OFFICE O/P EST LOW 20 MIN: CPT | Performed by: FAMILY MEDICINE

## 2021-08-05 RX ORDER — CLONIDINE HYDROCHLORIDE 0.2 MG/1
0.2 TABLET ORAL 3 TIMES DAILY
Start: 2021-08-05 | End: 2021-09-17 | Stop reason: HOSPADM

## 2021-08-05 RX ORDER — TORSEMIDE 20 MG/1
20 TABLET ORAL 2 TIMES DAILY
Qty: 8 TABLET | Refills: 0 | Status: SHIPPED | OUTPATIENT
Start: 2021-08-05 | End: 2021-08-23 | Stop reason: SDUPTHER

## 2021-08-05 NOTE — PATIENT INSTRUCTIONS
Hipertensão   O QUE VOCÊ PRECISA SABER:   Hipertensão é pressão arterial eitan  A pressão arterial é a força de seu sangue movendo-se pelas cardenas de suas artérias  A hipertensão ward com que a pressão arterial fique tão eitan que ward com que seu coração tenha que trabalhar [employer] do que o normal  Isso pode prejudicar o coração  A causa da hipertensão pode ser desconhecida  Isso é chamado de hipertensão essencial ou primária  A hipertensão causada por outro problema de saúde, jazz doença renal, é chamada de hipertensão secundária  INSTRUÇÕES APÓS A EITAN:   Ligue ou peça para alguém ligar para o número de emergência local (911 nos EUA) se:  · Você tiver jewel no peito  · Você tiver qualquer [employer] seguintes sinais de um ataque cardíaco:      ? Compressão, pressão ou jewel no peito    ? Você também pode  ter qualquer [employer] seguintes:     § Jewel ou desconforto emperatriz sandra, pescoço, maxilar, estômago ou braço    § Falta de ar    § Náuseas ou vômito    § Vertigem ou suor frio repentino    · Você estiver confuso ou tiver dificuldade para falar  · Você sentir repentinamente tontura ou dificuldade para respirar  Volte à emergência se:  · Você tiver julien jewel de cabeça forte ou perda de visão  · Você tiver fraqueza em [facility] ou [facility]  Ligue para o seu cuidador se:  · Você sentir que vai desmaiar, que está tonto, confuso ou sonolento  · Você estiver tomando seu medicamento para pressão arterial, mas paulina está mais eitan do que seu médico diz que deveria estar  · Você tiver dúvidas ou preocupações quanto ao seu problema de saúde ou jazz lidar com tawana  Medicamentos: Você pode precisar de:  · Anti-hipertensivos podem ser usados para ajudar a reduzir sua pressão arterial  Existem diversos tipos de medicamentos disponíveis  Seu médico escolherá os medicamentos com base no tipo de hipertensão que você tiver  Você pode precisar de mais de um tipo de medicamento   [address] orient[address]  · Diuréticos ajudam a diminuir fluido em excesso acumulado em seu corpo  Isaac Pollen a diminuir sua pressão arterial  É possível que você urine com mais frequência enquanto chayito brinda medicamento  · Medicamento para o colesterol ajuda a reduzir o nível de colesterol  O baixo nível de colesterol ajuda a prevenir doenças do coração e torna mais fácil controlar a pressão arterial     · [place] seu medicamento conforme orientado  Entre em contato com o seu médico se achar que o medicamento não está ajudando ou se você apresentar efeitos colaterais  Informe a tawana se você for alérgico a algum medicamento  Mantenha julien lista de todos os medicamentos, vitaminas e ervas (fitoterápicos) que você chayito  Inclua a quantidade, quando e por que os [place]  Leve a lista ou os frascos de comprimidos às consultas de acompanhamento  Leve sua lista de medicamentos consigo em april de emergência  Faça o acompanhamento com o médico conforme orientado: Você precisará voltar para verificar sua pressão arterial e fazer outros exames  Anote as dúvidas que você tiver para se lembrar de perguntar sobre elas divina as consultas  Estágios de hipertensão:      · A pressão arterial normal é 119/79 ou mais baixa   É possível que seu médico verifique sua pressão arterial somente julien vez por ano se paulina permanecer em um nível normal     · A pressão arterial lj é de 120/79 a 129/79   Algumas vezes isso é chamado de pré-hipertensão  O médico poderá sugerir CHERI Valdes em seu estilo de divina para ajudar a reduzir sua pressão arterial até um nível normal  Então, tawana(a) poderá verificá-la novamente em 3 ou 6 meses  · A hipertensão de estágio 1 é de 130/80  a 139/89   O médico poderá sugerir CHERI Valdes em seu estilo de divina, [place] e verificações a cada 3 ou 6 meses até que a pressão arterial esteja sob controle  · A hipertensão de estágio 2 é de 140/90 ou mais lj    O médico poderá sugerir CHERI Valdes em seu estilo de divina e fazer com que você use dois tipos de medicamento para hipertensão  Além disso, você também precisará verificar a pressão arterial mensalmente até que paulina esteja sob controle  Controle a hipertensão:  · Verifique sua pressão arterial em casa  Evite fumar, ingerir cafeína e se exercitar pelo menos 30 minutos antes de verificar a pressão arterial  Sente e repouse por 5 minutos antes de tirar a pressão arterial  Estique seu braço e o apoie em julien superfície plana  Seu braço deverá ficar no mesmo nível do coração  Siga as instruções que vêm com o monitor de pressão arterial  Verifique sua pressão arterial duas vezes, com intervalo de 1 minuto entre elas, antes de roldan o [place] [place]  Darrol [place] pressão arterial também antes da refeição noturna  Mantenha um registro de suas leituras de pressão e traga-as para suas consultas de acompanhamento  Pergunte ao seu médico qual deveria ser sua pressão arterial          · Gerencie quaisquer problemas de saúde que você tenha  Problemas de saúde jazz diabetes podem aumentar o risco de você ter hipertensão  Siga as instruções de seu médico e tome todos os medicamentos conforme indicado  · Pergunte sobre todos os medicamentos  Alguns medicamentos podem aumentar a pressão arterial  Entre os exemplos estão: pílulas anticoncepcionais, descongestionantes, suplementos de ervas e AINEs, jazz ibuprofeno  Seu médico pode informar sobre quais medicamentos são seguros para você  Isso inclui medicamentos com e kenia prescrição médica  Mudanças no estilo de divina que você pode fazer para controlar a hipertensão: Seu médico pode recomendar que você trabalhe com julien equipe para controlar a hipertensão  A equipe pode incluir especialistas médicos, jazz um nutricionista, um fisioterapeuta e um terapeuta comportamental  Seus familiares podem ser incluídos para ajudá-lo a criar mudanças de estilo de divina  · Limite o uso de sódio (sal) conforme orientado   Excesso de sódio pode afetar o Azeb Inks de fluidos  Verifique os rótulos para encontrar alimentos com baixo teor de sódio ou kenia adição de sal  Alguns alimentos com baixo teor de sódio usam sais de potássio para afia sabor  Muito potássio também pode causar problemas de saúde  Seu médico dirá a você a quantidade de potássio e sódio sullivan para ingerir em um patricia  Lila(a) pode recomendar que você limite a 2 300 mg de sódio por patricia  · Siga o plano de refeições recomendado por seu médico  Seu nutricionista ou médico podem afia a você mais informações sobre planos de alimentação com baixo teor de sódio ou o plano de alimentação DASH (Dietary Approaches to Stop Hypertension, [place] Dietéticas para Deter a Hipertensão)  O plano DASH (Dietary Approaches to Stop Hypertension -- Medidas para combate à hipertensão por meio de dieta) possui nível baixo de sódio, açúcar processado, gorduras não saudáveis e gordura total  É rico em potássio, cálcio e fibras  Veras podem ser encontrados em legumes e verduras, frutas e alimentos integrais  · Faça atividades físicas ao nils do patricia  A atividade física, jazz o exercício, pode ajudar a controlar sua pressão arterial e seu peso  Faça atividades físicas por pelo menos 30 minutos por patricia, na [employer]  Inclua exercícios aeróbicos, jazz caminhar ou andar de bicicleta  Também inclua treinamento de resistência pelo menos 2 vezes por semana  Seus profissionais de saúde podem ajudá-lo a criar um programa de atividade física  · Diminua o estresse  Isso ajudará a diminuir sua pressão arterial  Conheça diane formas de relaxar, jazz a respiração profunda ou o escutar música  · Limite o consumo de álcool conforme indicado  O consumo de álcool pode aumentar a pressão arterial  Elizabeth dose de bebida alcoólica corresponde a 12 onças de cerveja, 5 onças de vinho ou 1½ onça de licor  · Não fume   Nicotina e outros produtos químicos nos cigarros e charutos podem aumentar sua pressão arterial e também causar danos nos pulmões  Se você fuma e precisa de ajuda para parar de fumar, peça informações a seu médico  Cigarros eletrônicos ou tabaco kenia fumaça também contêm nicotina  Fale com seu médico antes de usar esses produtos  © Copyright [employer] 2021 Information is for End User's use only and may not be sold, redistributed or otherwise used for commercial purposes  All illustrations and images included in CareNotes® are the copyrighted property of A D A M , Inc  or [address] [place] [address] Nathalie   The above information is an  only  It is not intended as medical advice for individual conditions or treatments  Talk to your doctor, nurse or pharmacist before following any medical regimen to see if it is safe and effective for you

## 2021-08-06 NOTE — ASSESSMENT & PLAN NOTE
Wt Readings from Last 3 Encounters:   08/05/21 74 4 kg (164 lb)   07/19/21 71 7 kg (158 lb)   06/23/21 70 8 kg (156 lb)     Weight gain, worsening BLE edema, periorbital swelling and low urinary output  Discussed with patient at length she is fluid overloaded and may need IV diuretic to shift the fluid in a timely manner   Patient adamantly refused ED evaluation   Plan per principal problem with increased diuretic regimen for 3-5 days and close follow up in the office   Strict ED precautions given

## 2021-08-06 NOTE — ASSESSMENT & PLAN NOTE
BP goal <140/90, uncontrolled in severe range today in the office  BP at home this am: 169/85 (patient brings machine with her)  Initial BP in the office: 240/110   Repeat BP: 228/108    Current regimen at home: Terazosin 5 mg HS, Clonidine 0 2 mg TID, Carvedilol 25 mg BID, and Torsemide 20 mg daily (started 1 day ago)  Pt self-discontinued Chlorthalidone (adverse effect: feels agitated) and Amlodipine (adverse effects: worsening LE edema and periorbital swelling) meds she was discharged with from hospital on Monday  D/c Amlodipine given worsening edema  D/c Chlorthalidone given no BP benefit with her current renal function   Discussed with patient at length she needs to go to ED for further management given concern for acute fluid overload state  Patient refused ambulance as well as going to ED  States she feels fine and wants to take diuretic as this regimen will provide her amelioration in sx  Discussed plan via [employer] with her nephrologist, Dr Brittany Sands, who agreed with the new regimen  Increase Torsemide to 20 mg BID for 3-5 days then resume back to 20 mg daily  Will have patient follow up in 3-4 days in person for recheck   BMP in the next 3 days to follow renal function   Patient left the office in stable condition  Strict ED precautions given if she deteriorates at home

## 2021-08-08 ENCOUNTER — APPOINTMENT (EMERGENCY)
Dept: RADIOLOGY | Facility: HOSPITAL | Age: 66
DRG: 305 | End: 2021-08-08
Payer: MEDICARE

## 2021-08-08 ENCOUNTER — HOSPITAL ENCOUNTER (INPATIENT)
Facility: HOSPITAL | Age: 66
LOS: 1 days | Discharge: HOME | DRG: 305 | End: 2021-08-10
Attending: EMERGENCY MEDICINE | Admitting: HOSPITALIST
Payer: MEDICARE

## 2021-08-08 DIAGNOSIS — I16.0 HYPERTENSIVE URGENCY: Primary | ICD-10-CM

## 2021-08-08 DIAGNOSIS — R73.9 HYPERGLYCEMIA: ICD-10-CM

## 2021-08-08 DIAGNOSIS — N18.4 STAGE 4 CHRONIC KIDNEY DISEASE (HCC): ICD-10-CM

## 2021-08-08 DIAGNOSIS — R74.8 ELEVATED LIPASE: ICD-10-CM

## 2021-08-08 DIAGNOSIS — D64.9 ANEMIA: ICD-10-CM

## 2021-08-08 LAB
ALBUMIN SERPL BCP-MCNC: 2.7 G/DL (ref 3.5–5)
ALP SERPL-CCNC: 86 U/L (ref 46–116)
ALT SERPL W P-5'-P-CCNC: 21 U/L (ref 12–78)
ANION GAP SERPL CALCULATED.3IONS-SCNC: 7 MMOL/L (ref 4–13)
AST SERPL W P-5'-P-CCNC: 19 U/L (ref 5–45)
BACTERIA UR QL AUTO: ABNORMAL /HPF
BASOPHILS # BLD AUTO: 0.04 THOUSANDS/ΜL (ref 0–0.1)
BASOPHILS NFR BLD AUTO: 1 % (ref 0–1)
BILIRUB DIRECT SERPL-MCNC: 0.07 MG/DL (ref 0–0.2)
BILIRUB SERPL-MCNC: 0.14 MG/DL (ref 0.2–1)
BILIRUB UR QL STRIP: NEGATIVE
BILIRUB UR QL STRIP: NEGATIVE
BUN SERPL-MCNC: 50 MG/DL (ref 5–25)
CALCIUM SERPL-MCNC: 7.4 MG/DL (ref 8.3–10.1)
CHLORIDE SERPL-SCNC: 101 MMOL/L (ref 100–108)
CLARITY UR: CLEAR
CLARITY UR: CLEAR
CO2 SERPL-SCNC: 31 MMOL/L (ref 21–32)
COLOR UR: YELLOW
COLOR UR: YELLOW
CREAT SERPL-MCNC: 3.05 MG/DL (ref 0.6–1.3)
EOSINOPHIL # BLD AUTO: 0.22 THOUSAND/ΜL (ref 0–0.61)
EOSINOPHIL NFR BLD AUTO: 3 % (ref 0–6)
ERYTHROCYTE [DISTWIDTH] IN BLOOD BY AUTOMATED COUNT: 15.2 % (ref 11.6–15.1)
GFR SERPL CREATININE-BSD FRML MDRD: 15 ML/MIN/1.73SQ M
GLUCOSE SERPL-MCNC: 210 MG/DL (ref 65–140)
GLUCOSE SERPL-MCNC: 251 MG/DL (ref 65–140)
GLUCOSE SERPL-MCNC: 260 MG/DL (ref 65–140)
GLUCOSE UR STRIP-MCNC: ABNORMAL MG/DL
GLUCOSE UR STRIP-MCNC: ABNORMAL MG/DL
HCT VFR BLD AUTO: 27.2 % (ref 34.8–46.1)
HGB BLD-MCNC: 8.5 G/DL (ref 11.5–15.4)
HGB UR QL STRIP.AUTO: ABNORMAL
HGB UR QL STRIP.AUTO: ABNORMAL
IMM GRANULOCYTES # BLD AUTO: 0.02 THOUSAND/UL (ref 0–0.2)
IMM GRANULOCYTES NFR BLD AUTO: 0 % (ref 0–2)
KETONES UR STRIP-MCNC: NEGATIVE MG/DL
KETONES UR STRIP-MCNC: NEGATIVE MG/DL
LEUKOCYTE ESTERASE UR QL STRIP: NEGATIVE
LEUKOCYTE ESTERASE UR QL STRIP: NEGATIVE
LIPASE SERPL-CCNC: 707 U/L (ref 73–393)
LYMPHOCYTES # BLD AUTO: 1.38 THOUSANDS/ΜL (ref 0.6–4.47)
LYMPHOCYTES NFR BLD AUTO: 17 % (ref 14–44)
MCH RBC QN AUTO: 26.2 PG (ref 26.8–34.3)
MCHC RBC AUTO-ENTMCNC: 31.3 G/DL (ref 31.4–37.4)
MCV RBC AUTO: 84 FL (ref 82–98)
MONOCYTES # BLD AUTO: 0.68 THOUSAND/ΜL (ref 0.17–1.22)
MONOCYTES NFR BLD AUTO: 9 % (ref 4–12)
NEUTROPHILS # BLD AUTO: 5.68 THOUSANDS/ΜL (ref 1.85–7.62)
NEUTS SEG NFR BLD AUTO: 70 % (ref 43–75)
NITRITE UR QL STRIP: NEGATIVE
NITRITE UR QL STRIP: NEGATIVE
NON-SQ EPI CELLS URNS QL MICRO: ABNORMAL /HPF
NRBC BLD AUTO-RTO: 0 /100 WBCS
PH UR STRIP.AUTO: 7 [PH] (ref 4.5–8)
PH UR STRIP.AUTO: 7 [PH] (ref 4.5–8)
PLATELET # BLD AUTO: 328 THOUSANDS/UL (ref 149–390)
PMV BLD AUTO: 10.5 FL (ref 8.9–12.7)
POTASSIUM SERPL-SCNC: 4.9 MMOL/L (ref 3.5–5.3)
PROT SERPL-MCNC: 6.7 G/DL (ref 6.4–8.2)
PROT UR STRIP-MCNC: ABNORMAL MG/DL
PROT UR STRIP-MCNC: ABNORMAL MG/DL
RBC # BLD AUTO: 3.25 MILLION/UL (ref 3.81–5.12)
RBC #/AREA URNS AUTO: ABNORMAL /HPF
SODIUM SERPL-SCNC: 139 MMOL/L (ref 136–145)
SP GR UR STRIP.AUTO: 1.01 (ref 1–1.03)
SP GR UR STRIP.AUTO: 1.02 (ref 1–1.03)
TROPONIN I SERPL-MCNC: <0.02 NG/ML
UROBILINOGEN UR QL STRIP.AUTO: 0.2 E.U./DL
UROBILINOGEN UR QL STRIP.AUTO: 0.2 E.U./DL
WBC # BLD AUTO: 8.02 THOUSAND/UL (ref 4.31–10.16)
WBC #/AREA URNS AUTO: ABNORMAL /HPF

## 2021-08-08 PROCEDURE — 85025 COMPLETE CBC W/AUTO DIFF WBC: CPT | Performed by: EMERGENCY MEDICINE

## 2021-08-08 PROCEDURE — 96374 THER/PROPH/DIAG INJ IV PUSH: CPT

## 2021-08-08 PROCEDURE — 71046 X-RAY EXAM CHEST 2 VIEWS: CPT

## 2021-08-08 PROCEDURE — 83690 ASSAY OF LIPASE: CPT | Performed by: EMERGENCY MEDICINE

## 2021-08-08 PROCEDURE — 36415 COLL VENOUS BLD VENIPUNCTURE: CPT | Performed by: EMERGENCY MEDICINE

## 2021-08-08 PROCEDURE — 82948 REAGENT STRIP/BLOOD GLUCOSE: CPT

## 2021-08-08 PROCEDURE — 84484 ASSAY OF TROPONIN QUANT: CPT | Performed by: EMERGENCY MEDICINE

## 2021-08-08 PROCEDURE — 99220 PR INITIAL OBSERVATION CARE/DAY 70 MINUTES: CPT | Performed by: STUDENT IN AN ORGANIZED HEALTH CARE EDUCATION/TRAINING PROGRAM

## 2021-08-08 PROCEDURE — 99285 EMERGENCY DEPT VISIT HI MDM: CPT

## 2021-08-08 PROCEDURE — 99285 EMERGENCY DEPT VISIT HI MDM: CPT | Performed by: EMERGENCY MEDICINE

## 2021-08-08 PROCEDURE — 80048 BASIC METABOLIC PNL TOTAL CA: CPT | Performed by: EMERGENCY MEDICINE

## 2021-08-08 PROCEDURE — 80076 HEPATIC FUNCTION PANEL: CPT | Performed by: EMERGENCY MEDICINE

## 2021-08-08 PROCEDURE — 93005 ELECTROCARDIOGRAM TRACING: CPT

## 2021-08-08 PROCEDURE — 81001 URINALYSIS AUTO W/SCOPE: CPT

## 2021-08-08 RX ORDER — CARVEDILOL 25 MG/1
25 TABLET ORAL 2 TIMES DAILY WITH MEALS
Status: DISCONTINUED | OUTPATIENT
Start: 2021-08-09 | End: 2021-08-10 | Stop reason: HOSPADM

## 2021-08-08 RX ORDER — CLONIDINE HYDROCHLORIDE 0.1 MG/1
0.2 TABLET ORAL 3 TIMES DAILY
Status: DISCONTINUED | OUTPATIENT
Start: 2021-08-08 | End: 2021-08-10 | Stop reason: HOSPADM

## 2021-08-08 RX ORDER — LABETALOL 20 MG/4 ML (5 MG/ML) INTRAVENOUS SYRINGE
10 ONCE
Status: COMPLETED | OUTPATIENT
Start: 2021-08-08 | End: 2021-08-08

## 2021-08-08 RX ORDER — TERAZOSIN 5 MG/1
5 CAPSULE ORAL
Status: DISCONTINUED | OUTPATIENT
Start: 2021-08-08 | End: 2021-08-10 | Stop reason: HOSPADM

## 2021-08-08 RX ORDER — ONDANSETRON 2 MG/ML
4 INJECTION INTRAMUSCULAR; INTRAVENOUS EVERY 6 HOURS PRN
Status: DISCONTINUED | OUTPATIENT
Start: 2021-08-08 | End: 2021-08-10 | Stop reason: HOSPADM

## 2021-08-08 RX ORDER — TORSEMIDE 20 MG/1
20 TABLET ORAL DAILY
Status: DISCONTINUED | OUTPATIENT
Start: 2021-08-09 | End: 2021-08-10 | Stop reason: HOSPADM

## 2021-08-08 RX ORDER — INSULIN GLARGINE 100 [IU]/ML
18 INJECTION, SOLUTION SUBCUTANEOUS EVERY MORNING
Status: DISCONTINUED | OUTPATIENT
Start: 2021-08-09 | End: 2021-08-10 | Stop reason: HOSPADM

## 2021-08-08 RX ORDER — GABAPENTIN 300 MG/1
300 CAPSULE ORAL 2 TIMES DAILY
Status: DISCONTINUED | OUTPATIENT
Start: 2021-08-08 | End: 2021-08-10 | Stop reason: HOSPADM

## 2021-08-08 RX ORDER — HEPARIN SODIUM 5000 [USP'U]/ML
5000 INJECTION, SOLUTION INTRAVENOUS; SUBCUTANEOUS EVERY 8 HOURS SCHEDULED
Status: DISCONTINUED | OUTPATIENT
Start: 2021-08-08 | End: 2021-08-10 | Stop reason: HOSPADM

## 2021-08-08 RX ORDER — HYDRALAZINE HYDROCHLORIDE 10 MG/1
10 TABLET, FILM COATED ORAL DAILY
Status: DISCONTINUED | OUTPATIENT
Start: 2021-08-08 | End: 2021-08-09

## 2021-08-08 RX ORDER — DOCUSATE SODIUM 100 MG/1
100 CAPSULE, LIQUID FILLED ORAL 2 TIMES DAILY
Status: DISCONTINUED | OUTPATIENT
Start: 2021-08-08 | End: 2021-08-10 | Stop reason: HOSPADM

## 2021-08-08 RX ORDER — CALCIUM CARBONATE 200(500)MG
1000 TABLET,CHEWABLE ORAL DAILY PRN
Status: DISCONTINUED | OUTPATIENT
Start: 2021-08-08 | End: 2021-08-10 | Stop reason: HOSPADM

## 2021-08-08 RX ORDER — ACETAMINOPHEN 325 MG/1
650 TABLET ORAL EVERY 6 HOURS PRN
Status: DISCONTINUED | OUTPATIENT
Start: 2021-08-08 | End: 2021-08-10 | Stop reason: HOSPADM

## 2021-08-08 RX ADMIN — LABETALOL 20 MG/4 ML (5 MG/ML) INTRAVENOUS SYRINGE 10 MG: at 20:04

## 2021-08-08 RX ADMIN — LABETALOL 20 MG/4 ML (5 MG/ML) INTRAVENOUS SYRINGE 10 MG: at 19:18

## 2021-08-08 RX ADMIN — HYDRALAZINE HYDROCHLORIDE 10 MG: 10 TABLET, FILM COATED ORAL at 20:58

## 2021-08-08 RX ADMIN — CLONIDINE HYDROCHLORIDE 0.2 MG: 0.1 TABLET ORAL at 22:33

## 2021-08-08 RX ADMIN — HEPARIN SODIUM 5000 UNITS: 5000 INJECTION INTRAVENOUS; SUBCUTANEOUS at 22:33

## 2021-08-08 RX ADMIN — GABAPENTIN 300 MG: 300 CAPSULE ORAL at 22:33

## 2021-08-08 NOTE — ASSESSMENT & PLAN NOTE
Patient presents to the ED with high blood pressure-->235/97  · Was evaluated by PCP on 8/5 and noted to have a BP of 240/110, PCP recommended evaluation in the ED at that time however patient refused  Medication adjustments were made  · amlodipine and chlorthalidone d/c at that time secondary to adverse effects  · Patient was to increase torsemide to 20mg BIDx3-5 days, then return to 20mg thereafter-patient reports that she did start 20 mg b i d  And has been taking this as prescribed  · She reports that she is taking her blood pressure today and noted that it was over 200  Additionally states that she felt off all day  · Status post IV labetalol in the emergency department  · Will add labetalol on a p r n   Basis  · Maintained on terazosin 5 mg HS, clonidine 0 2 t i d , carvedilol 25 b i d , will continue  · Will add hydralazine 10 daily  · Monitor blood pressure per unit routine

## 2021-08-08 NOTE — ED PROVIDER NOTES
History  Chief Complaint   Patient presents with    Hypertension     states with pressure of 190/96,  c/o feeling warm in her abd     74 yo F h/o HTN, IDDM presenting for evaluation of HTN and abdomen feeling "cold"  Mongolian speaking, son at bedside providing translation  She states that sx started this afternoon and describes her abdomen as feeling "cold" (discrepancy from triage)  She denies any abdominal pain, CP, SOB, fever, lightheadedness/dizziness, N/V/D/C, urinary complaints  She checked her BP because of this feeling and noted it was high  She has been having issues with HTN, recently admitted at [facility] 7/23 for HTN, REI and possible pancreatitis  She followed up with her family doctor on 8/5 (3 days ago), per records:  /110 then 228/109  - Terazosin 5 mg HS, Clonidine 0 2 mg TID, Carvedilol 25 mg BID, and Torsemide 20 mg daily (started 1 day ago)  Pt self-discontinued Chlorthalidone (adverse effect: feels agitated) and Amlodipine (adverse effects: worsening LE edema and periorbital swelling) meds she was discharged with from hospital on Monday  Plan: Increase Torsemide to 20 mg BID for 3-5 days then resume back to 20 mg daily  She states that she has been taking all her meds as prescribed  States her Bp this morning was "low" around 110  States accuchecks WNL at home, 120s-130s    MDM: 74 yo F with abnormal abdominal sensation/HTN- will check for end organ damage, follow BP and treat as needed             Wt Readings from Last 3 Encounters:   08/08/21 71 9 kg (158 lb 8 2 oz)   08/05/21 74 4 kg (164 lb)   07/19/21 71 7 kg (158 lb)         Prior to Admission Medications   Prescriptions Last Dose Informant Patient Reported? Taking?    Accu-Chek FastClix Lancets MISC 8/8/2021 at Unknown time  No Yes   Sig: Inject under the skin 2 (two) times a day Test   Accu-Chek Guide test strip 8/8/2021 at Unknown time  No Yes   Sig: Use 1 each 2 (two) times a day Test   Alcohol Swabs (EASY TOUCH ALCOHOL PREP MEDIUM) 70 % PADS 8/8/2021 at Unknown time  Yes Yes   Sig: USE 2 TO 3 X PER DAY   Blood Glucose Monitoring Suppl (OneTouch Verio) w/Device KIT 8/8/2021  No Yes   Sig: Use 2 (two) times a day   Insulin Pen Needle (Unifine Pentips) 32G X 4 MM MISC 8/8/2021 at Unknown time  No Yes   Sig: Use daily   Lancets (onetouch ultrasoft) lancets 8/8/2021 at Unknown time  No Yes   Sig: Use as instructed   atorvastatin (LIPITOR) 40 mg tablet Not Taking at Unknown time  No No   Sig: Take 1 tablet (40 mg total) by mouth daily   Patient not taking: Reported on 8/8/2021   carvedilol (COREG) 25 mg tablet 8/8/2021 at 1500  No Yes   Sig: Take 1 tablet (25 mg total) by mouth 2 (two) times a day with meals   cloNIDine (CATAPRES) 0 2 mg tablet 8/8/2021 at 1500  No Yes   Sig: Take 1 tablet (0 2 mg total) by mouth 3 (three) times a day   ergocalciferol (ERGOCALCIFEROL) 1 25 MG (27435 UT) capsule Unknown at Unknown time  Yes No   Sig: Take 50,000 Units by mouth every 7 days   gabapentin (NEURONTIN) 300 mg capsule 8/8/2021 at 1500  No Yes   Sig: Take 1 capsule (300 mg total) by mouth 2 (two) times a day   glimepiride (AMARYL) 2 mg tablet 8/7/2021 at 1800  Yes Yes   Sig: Take 2 mg by mouth daily   glucose blood (OneTouch Verio) test strip 8/8/2021 at Unknown time  No Yes   Sig: Use as instructed   insulin glargine (Lantus SoloStar) 100 units/mL injection pen 8/8/2021 at 0800  No Yes   Sig: Inject 26 Units under the skin daily with breakfast   linaGLIPtin (Tradjenta) 5 MG TABS 8/8/2021 at 0900  Yes Yes   Sig: Take 5 mg by mouth daily   terazosin (HYTRIN) 5 mg capsule 8/8/2021 at 1500  Yes Yes   Sig: Take 5 mg by mouth   torsemide (DEMADEX) 20 mg tablet 8/8/2021 at 0800  No Yes   Sig: Take 1 tablet (20 mg total) by mouth 2 (two) times a day for 4 days      Facility-Administered Medications: None       Past Medical History:   Diagnosis Date    Chronic kidney disease     Diabetes mellitus ([facility] [address] )     Hyperlipidemia     Hypertension     Pneumonia        Past Surgical History:   Procedure Laterality Date    EYE SURGERY         Family History   Problem Relation Age of Onset    Hypertension Mother     Diabetes Father     Hypertension Sister     Diabetes Sister     Hypertension Brother      I have reviewed and agree with the history as documented  E-Cigarette/Vaping     E-Cigarette/Vaping Substances     Social History     Tobacco Use    Smoking status: Never Smoker    Smokeless tobacco: Never Used    Tobacco comment: NO TOBACCO USE   Substance Use Topics    Alcohol use: Not on file    Drug use: Not on file       Review of Systems   Constitutional: Negative for chills, fever and unexpected weight change  HENT: Negative for ear pain, rhinorrhea and sore throat  Eyes: Negative for pain and visual disturbance  Respiratory: Negative for cough and shortness of breath  Cardiovascular: Negative for chest pain and leg swelling  Gastrointestinal: Negative for abdominal pain, constipation, diarrhea, nausea and vomiting  Endocrine: Negative for polydipsia, polyphagia and polyuria  Genitourinary: Negative for dysuria, frequency, hematuria and urgency  Musculoskeletal: Negative for back pain, myalgias and neck pain  Skin: Negative for color change and rash  Allergic/Immunologic: Negative for environmental allergies and immunocompromised state  Neurological: Negative for dizziness, weakness, light-headedness, numbness and headaches  Hematological: Negative for adenopathy  Does not bruise/bleed easily  Psychiatric/Behavioral: Negative for agitation and confusion  All other systems reviewed and are negative  Physical Exam  Physical Exam  Vitals and nursing note reviewed  Constitutional:       Appearance: Normal appearance  She is well-developed  HENT:      Head: Normocephalic and atraumatic  Nose: Nose normal    Eyes:      Extraocular Movements: Extraocular movements intact        Conjunctiva/sclera: Conjunctivae normal       Pupils: Pupils are equal, round, and reactive to light  Comments: Normal fundoscopic exam   Cardiovascular:      Rate and Rhythm: Normal rate and regular rhythm  Heart sounds: Normal heart sounds  Pulmonary:      Effort: Pulmonary effort is normal  No respiratory distress  Breath sounds: Normal breath sounds  No stridor  No wheezing or rales  Chest:      Chest wall: No tenderness  Abdominal:      General: There is no distension  Palpations: Abdomen is soft  Tenderness: There is no abdominal tenderness  There is no guarding or rebound  Comments: No abdominal ttp, negative Kiser's sign   Musculoskeletal:         General: No swelling, tenderness or deformity  Cervical back: Normal range of motion and neck supple  Skin:     General: Skin is warm and dry  Findings: No rash  Neurological:      General: No focal deficit present  Mental Status: She is alert and oriented to person, place, and time  Motor: No abnormal muscle tone  Coordination: Coordination normal       Comments: Strength equal throughout, sensation grossly intact, normal coordination   Psychiatric:         Thought Content:  Thought content normal          Judgment: Judgment normal          Vital Signs  ED Triage Vitals [08/08/21 1739]   Temperature Pulse Respirations Blood Pressure SpO2   98 1 °F (36 7 °C) 69 18 (!) 235/97 100 %      Temp Source Heart Rate Source Patient Position - Orthostatic VS BP Location FiO2 (%)   Oral Monitor Sitting Left arm --      Pain Score       No Pain           Vitals:    08/08/21 2057 08/08/21 2115 08/08/21 2125 08/08/21 2230   BP: (!) 231/98 (!) 219/95 (!) 186/88 (!) 186/90   Pulse: 78 76 78 80   Patient Position - Orthostatic VS: Lying Lying Lying Lying         Visual Acuity      ED Medications  Medications   cloNIDine (CATAPRES) tablet 0 2 mg (0 2 mg Oral Given 8/8/21 2233)   carvedilol (COREG) tablet 25 mg (has no administration in time range) gabapentin (NEURONTIN) capsule 300 mg (300 mg Oral Given 8/8/21 2233)   insulin glargine (LANTUS) subcutaneous injection 18 Units 0 18 mL (has no administration in time range)   terazosin (HYTRIN) capsule 5 mg (5 mg Oral Given 8/9/21 0002)   torsemide (DEMADEX) tablet 20 mg (has no administration in time range)   acetaminophen (TYLENOL) tablet 650 mg (has no administration in time range)   docusate sodium (COLACE) capsule 100 mg (100 mg Oral Refused 8/8/21 2234)   ondansetron (ZOFRAN) injection 4 mg (has no administration in time range)   calcium carbonate (TUMS) chewable tablet 1,000 mg (has no administration in time range)   heparin (porcine) subcutaneous injection 5,000 Units (5,000 Units Subcutaneous Given 8/8/21 2233)   hydrALAZINE (APRESOLINE) tablet 10 mg (10 mg Oral Given 8/8/21 2058)   Labetalol HCl (NORMODYNE) injection 10 mg (10 mg Intravenous Given 8/8/21 1918)   Labetalol HCl (NORMODYNE) injection 10 mg (10 mg Intravenous Given 8/8/21 2004)       Diagnostic Studies  Results Reviewed     Procedure Component Value Units Date/Time    Urine Microscopic [[id number]]  (Abnormal) Collected: 08/08/21 1826    Lab Status: Final result Specimen: Urine Updated: 08/08/21 2103     RBC, UA 1-2 /hpf      WBC, UA 0-1 /hpf      Epithelial Cells Occasional /hpf      Bacteria, UA Occasional /hpf     Urine Macroscopic, POC [[id number]]  (Abnormal) Collected: 08/08/21 1826    Lab Status: Final result Specimen: Urine Updated: 08/08/21 2023     Color, UA Yellow     Clarity, UA Clear     pH, UA 7 0     Leukocytes, UA Negative     Nitrite, UA Negative     Protein,  (2+) mg/dl      Glucose,  (1/10%) mg/dl      Ketones, UA Negative mg/dl      Urobilinogen, UA 0 2 E U /dl      Bilirubin, UA Negative     Blood, UA Trace     Specific [place], UA 1 015    Narrative:      CLINITEK RESULT    Urine Macroscopic, POC [[id number]]  (Abnormal) Collected: 08/08/21 1820    Lab Status: Final result Specimen: Urine Updated: 08/08/21 2023     Color, UA Yellow     Clarity, UA Clear     pH, UA 7 0     Leukocytes, UA Negative     Nitrite, UA Negative     Protein,  (2+) mg/dl      Glucose,  (1/10%) mg/dl      Ketones, UA Negative mg/dl      Urobilinogen, UA 0 2 E U /dl      Bilirubin, UA Negative     Blood, UA Trace     Specific [place], UA 1 020    Narrative:      CLINITEK RESULT    CBC and differential [[id number]]  (Abnormal) Collected: 08/08/21 1807    Lab Status: Final result Specimen: Blood from Arm, Left Updated: 08/08/21 1959     WBC 8 02 Thousand/uL      RBC 3 25 Million/uL      Hemoglobin 8 5 g/dL      Hematocrit 27 2 %      MCV 84 fL      MCH 26 2 pg      MCHC 31 3 g/dL      RDW 15 2 %      MPV 10 5 fL      Platelets 112 Thousands/uL      nRBC 0 /100 WBCs      Neutrophils Relative 70 %      Immat GRANS % 0 %      Lymphocytes Relative 17 %      Monocytes Relative 9 %      Eosinophils Relative 3 %      Basophils Relative 1 %      Neutrophils Absolute 5 68 Thousands/µL      Immature Grans Absolute 0 02 Thousand/uL      Lymphocytes Absolute 1 38 Thousands/µL      Monocytes Absolute 0 68 Thousand/µL      Eosinophils Absolute 0 22 Thousand/µL      Basophils Absolute 0 04 Thousands/µL     Basic metabolic panel [[id number]]  (Abnormal) Collected: 08/08/21 1807    Lab Status: Final result Specimen: Blood from Arm, Left Updated: 08/08/21 1959     Sodium 139 mmol/L      Potassium 4 9 mmol/L      Chloride 101 mmol/L      CO2 31 mmol/L      ANION GAP 7 mmol/L      BUN 50 mg/dL      Creatinine 3 05 mg/dL      Glucose 260 mg/dL      Calcium 7 4 mg/dL      eGFR 15 ml/min/1 73sq m     Narrative:      [facility] guidelines for Chronic Kidney Disease (CKD):     Stage 1 with normal or high GFR (GFR > 90 mL/min/1 73 square meters)    Stage 2 Mild CKD (GFR = 60-89 mL/min/1 73 square meters)    Stage 3A Moderate CKD (GFR = 45-59 mL/min/1 73 square meters)    Stage 3B Moderate CKD (GFR = 30-44 mL/min/1 73 square meters)   Stage 4 Severe CKD (GFR = 15-29 mL/min/1 73 square meters)    Stage 5 End Stage CKD (GFR <15 mL/min/1 73 square meters)  Note: GFR calculation is accurate only with a steady state creatinine    Lipase [[id number]]  (Abnormal) Collected: 08/08/21 1807    Lab Status: Final result Specimen: Blood from Arm, Left Updated: 08/08/21 1959     Lipase 707 u/L     Hepatic function panel [[id number]]  (Abnormal) Collected: 08/08/21 1807    Lab Status: Final result Specimen: Blood from Arm, Left Updated: 08/08/21 1959     Total Bilirubin 0 14 mg/dL      Bilirubin, Direct 0 07 mg/dL      Alkaline Phosphatase 86 U/L      AST 19 U/L      ALT 21 U/L      Total Protein 6 7 g/dL      Albumin 2 7 g/dL     Troponin I [[id number]]  (Normal) Collected: 08/08/21 1807    Lab Status: Final result Specimen: Blood from Arm, Left Updated: 08/08/21 1959     Troponin I <0 02 ng/mL     Fingerstick Glucose (POCT) [[id number]]  (Abnormal) Collected: 08/08/21 1805    Lab Status: Final result Updated: 08/08/21 1806     POC Glucose 251 mg/dl                  XR chest 2 views    (Results Pending)        CXR interpreted by myself, no acute abn      Procedures  Procedures         ED Course  ED Course as of Aug 09 0116   Sun Aug 08, 2021   1752 EKG: NSR @ 78 bpm, normal axis, prolonged qtc 499, nonspecific st cahnges, t wave inversion lead avL      1812 POC Glucose(!): 251   1859 Lab resulting in Epic is down per paper results, significant for lipase 707, glucose 260, BUN 50, Cr  3 05  troponin is <0 02  Urine: glu 100, prot 100 mg/dL      1904 Cr 2 82 on 7/25, notes stated her baseline was more around 2 1918 Goal - 188 for 20-25% reduction      2003 Hemoglobin(!): 8 5                             SBIRT 22yo+      Most Recent Value   SBIRT (23 yo +)   In order to provide better care to our patients, we are screening all of our patients for alcohol and drug use  Would it be okay to ask you these screening questions?   No Filed at: 08/08/2021 9307 MDM  Number of Diagnoses or Management Options  Anemia  Elevated lipase  Hyperglycemia  Hypertensive urgency  Diagnosis management comments: 74 yo F presenting with abnormal abdominal sensation, hard to describe and found to have very elevated blood pressure on arrival  Continued to remain elevated  This was a recent problem and had recent hospitalization for the same with medication changes  Found to have hyperglycemia and possibly elevated Cr from her baseline  Elevated lipase without abdominal pain/N/V, unable to trend from [facility] where she reports recently having pancreatitis  Gave labetalol in ED for BP  Admitted for further BP control and workup as needed       Amount and/or Complexity of Data Reviewed  Clinical lab tests: ordered and reviewed  Tests in the radiology section of CPT®: ordered and reviewed  Tests in the medicine section of CPT®: ordered and reviewed  Review and summarize past medical records: yes  Independent visualization of images, tracings, or specimens: yes        Disposition  Final diagnoses:   Hypertensive urgency   Elevated lipase   Hyperglycemia   Anemia     Time reflects when diagnosis was documented in both MDM as applicable and the Disposition within this note     Time User Action Codes Description Comment    8/8/2021  7:14 PM Emir Ramírez Add [I16 0] Hypertensive urgency     8/8/2021  7:14 PM Emir Ramírez Add [R74 8] Elevated lipase     8/8/2021  7:21 PM Emir Ramírez Add [R73 9] Hyperglycemia     8/8/2021  8:03 PM Emir Ramírez Add [D64 9] Anemia     8/8/2021  8:25 PM Zoey Bell Add [N18 4] Stage 4 chronic kidney disease [facility])       ED Disposition     ED Disposition Condition Date/Time Comment    Admit Stable Sun Aug 8, 2021  7:15 PM Case was discussed with FELICIA and the patient's admission status was agreed to be Admission Status: inpatient status to the service of Dr [facility]GUEZ [facility]           Follow-up Information    None         Current Discharge Medication List      CONTINUE these medications which have NOT CHANGED    Details   ! ! Accu-Chek FastClix Lancets MISC Inject under the skin 2 (two) times a day Test  Qty: 102 each, Refills: 5    Associated Diagnoses: Type 2 diabetes mellitus with hyperglycemia, with long-term current use of insulin ([address] )      ! !  Accu-Chek Guide test strip Use 1 each 2 (two) times a day Test  Qty: 100 each, Refills: 5    Associated Diagnoses: Type 2 diabetes mellitus with hyperglycemia, with long-term current use of insulin ([facility])      Alcohol Swabs (EASY TOUCH ALCOHOL PREP MEDIUM) 70 % PADS USE 2 TO 3 X PER DAY  Refills: 3      Blood Glucose Monitoring Suppl (OneTouch Verio) w/Device KIT Use 2 (two) times a day  Qty: 1 kit, Refills: 0    Associated Diagnoses: Type 2 diabetes mellitus with stage 3b chronic kidney disease, with long-term current use of insulin ([facility])      carvedilol (COREG) 25 mg tablet Take 1 tablet (25 mg total) by mouth 2 (two) times a day with meals  Qty: 60 tablet, Refills: 3    Associated Diagnoses: Essential hypertension      cloNIDine (CATAPRES) 0 2 mg tablet Take 1 tablet (0 2 mg total) by mouth 3 (three) times a day    Associated Diagnoses: Essential hypertension      gabapentin (NEURONTIN) 300 mg capsule Take 1 capsule (300 mg total) by mouth 2 (two) times a day  Qty: 90 capsule, Refills: 2    Associated Diagnoses: Type 2 diabetes mellitus with hyperglycemia, with long-term current use of insulin ([facility])      glimepiride (AMARYL) 2 mg tablet Take 2 mg by mouth daily      !! glucose blood (OneTouch Verio) test strip Use as instructed  Qty: 100 each, Refills: 3    Associated Diagnoses: Type 2 diabetes mellitus with stage 3b chronic kidney disease, with long-term current use of insulin ([facility])      insulin glargine (Lantus SoloStar) 100 units/mL injection pen Inject 26 Units under the skin daily with breakfast  Qty: 15 mL    Associated Diagnoses: Type 2 diabetes mellitus with hyperglycemia, with long-term current use of insulin ([facility]) Insulin Pen Needle (Unifine Pentips) 32G X 4 MM MISC Use daily  Qty: 100 each, Refills: 2    Associated Diagnoses: Type 2 diabetes mellitus with stage 3b chronic kidney disease, with long-term current use of insulin ([address] )      ! ! Lancets (onetouch ultrasoft) lancets Use as instructed  Qty: 100 each, Refills: 3    Associated Diagnoses: Type 2 diabetes mellitus with stage 3b chronic kidney disease, with long-term current use of insulin ([facility])      linaGLIPtin (Tradjenta) 5 MG TABS Take 5 mg by mouth daily      terazosin (HYTRIN) 5 mg capsule Take 5 mg by mouth      torsemide (DEMADEX) 20 mg tablet Take 1 tablet (20 mg total) by mouth 2 (two) times a day for 4 days  Qty: 8 tablet, Refills: 0    Associated Diagnoses: Bilateral lower extremity edema      atorvastatin (LIPITOR) 40 mg tablet Take 1 tablet (40 mg total) by mouth daily  Qty: 90 tablet, Refills: 3    Associated Diagnoses: Controlled type 2 diabetes mellitus without complication, with long-term current use of insulin ([facility])      ergocalciferol (ERGOCALCIFEROL) 1 25 MG (95516 UT) capsule Take 50,000 Units by mouth every 7 days       ! ! - Potential duplicate medications found  Please discuss with provider  No discharge procedures on file      PDMP Review     None          ED Provider  Electronically Signed by           Cunog Moise DO  08/09/21 0116

## 2021-08-08 NOTE — ED NOTES
Henok Mazariegos (son): [phone] (daughter): [phone]    Please contact and keep updated on Pt's plan of care        [facility]  76/82/[phone]

## 2021-08-09 ENCOUNTER — TELEPHONE (OUTPATIENT)
Dept: NEPHROLOGY | Facility: CLINIC | Age: 66
End: 2021-08-09

## 2021-08-09 DIAGNOSIS — D47.2 BICLONAL GAMMOPATHY: Primary | ICD-10-CM

## 2021-08-09 LAB
ANION GAP SERPL CALCULATED.3IONS-SCNC: 7 MMOL/L (ref 4–13)
ATRIAL RATE: 78 BPM
BASOPHILS # BLD AUTO: 0.04 THOUSANDS/ΜL (ref 0–0.1)
BASOPHILS NFR BLD AUTO: 1 % (ref 0–1)
BUN SERPL-MCNC: 49 MG/DL (ref 5–25)
CALCIUM SERPL-MCNC: 7.7 MG/DL (ref 8.3–10.1)
CHLORIDE SERPL-SCNC: 104 MMOL/L (ref 100–108)
CO2 SERPL-SCNC: 30 MMOL/L (ref 21–32)
CREAT SERPL-MCNC: 2.76 MG/DL (ref 0.6–1.3)
EOSINOPHIL # BLD AUTO: 0.2 THOUSAND/ΜL (ref 0–0.61)
EOSINOPHIL NFR BLD AUTO: 3 % (ref 0–6)
ERYTHROCYTE [DISTWIDTH] IN BLOOD BY AUTOMATED COUNT: 15.2 % (ref 11.6–15.1)
GFR SERPL CREATININE-BSD FRML MDRD: 17 ML/MIN/1.73SQ M
GLUCOSE P FAST SERPL-MCNC: 253 MG/DL (ref 65–99)
GLUCOSE SERPL-MCNC: 152 MG/DL (ref 65–140)
GLUCOSE SERPL-MCNC: 175 MG/DL (ref 65–140)
GLUCOSE SERPL-MCNC: 180 MG/DL (ref 65–140)
GLUCOSE SERPL-MCNC: 253 MG/DL (ref 65–140)
GLUCOSE SERPL-MCNC: 283 MG/DL (ref 65–140)
HCT VFR BLD AUTO: 25.9 % (ref 34.8–46.1)
HGB BLD-MCNC: 8.2 G/DL (ref 11.5–15.4)
IMM GRANULOCYTES # BLD AUTO: 0.01 THOUSAND/UL (ref 0–0.2)
IMM GRANULOCYTES NFR BLD AUTO: 0 % (ref 0–2)
LYMPHOCYTES # BLD AUTO: 1.99 THOUSANDS/ΜL (ref 0.6–4.47)
LYMPHOCYTES NFR BLD AUTO: 33 % (ref 14–44)
MCH RBC QN AUTO: 26.2 PG (ref 26.8–34.3)
MCHC RBC AUTO-ENTMCNC: 31.7 G/DL (ref 31.4–37.4)
MCV RBC AUTO: 83 FL (ref 82–98)
MONOCYTES # BLD AUTO: 0.57 THOUSAND/ΜL (ref 0.17–1.22)
MONOCYTES NFR BLD AUTO: 10 % (ref 4–12)
NEUTROPHILS # BLD AUTO: 3.21 THOUSANDS/ΜL (ref 1.85–7.62)
NEUTS SEG NFR BLD AUTO: 53 % (ref 43–75)
NRBC BLD AUTO-RTO: 0 /100 WBCS
P AXIS: 47 DEGREES
PLATELET # BLD AUTO: 306 THOUSANDS/UL (ref 149–390)
PMV BLD AUTO: 10.3 FL (ref 8.9–12.7)
POTASSIUM SERPL-SCNC: 4 MMOL/L (ref 3.5–5.3)
PR INTERVAL: 168 MS
QRS AXIS: 67 DEGREES
QRSD INTERVAL: 80 MS
QT INTERVAL: 438 MS
QTC INTERVAL: 499 MS
RBC # BLD AUTO: 3.13 MILLION/UL (ref 3.81–5.12)
SODIUM SERPL-SCNC: 141 MMOL/L (ref 136–145)
T WAVE AXIS: 106 DEGREES
VENTRICULAR RATE: 78 BPM
WBC # BLD AUTO: 6.02 THOUSAND/UL (ref 4.31–10.16)

## 2021-08-09 PROCEDURE — 99214 OFFICE O/P EST MOD 30 MIN: CPT | Performed by: INTERNAL MEDICINE

## 2021-08-09 PROCEDURE — 99232 SBSQ HOSP IP/OBS MODERATE 35: CPT | Performed by: HOSPITALIST

## 2021-08-09 PROCEDURE — 85025 COMPLETE CBC W/AUTO DIFF WBC: CPT | Performed by: PHYSICIAN ASSISTANT

## 2021-08-09 PROCEDURE — 80048 BASIC METABOLIC PNL TOTAL CA: CPT | Performed by: PHYSICIAN ASSISTANT

## 2021-08-09 PROCEDURE — 93010 ELECTROCARDIOGRAM REPORT: CPT | Performed by: INTERNAL MEDICINE

## 2021-08-09 PROCEDURE — 82948 REAGENT STRIP/BLOOD GLUCOSE: CPT

## 2021-08-09 RX ORDER — HYDRALAZINE HYDROCHLORIDE 10 MG/1
10 TABLET, FILM COATED ORAL EVERY 8 HOURS SCHEDULED
Status: DISCONTINUED | OUTPATIENT
Start: 2021-08-09 | End: 2021-08-10 | Stop reason: HOSPADM

## 2021-08-09 RX ADMIN — CLONIDINE HYDROCHLORIDE 0.2 MG: 0.1 TABLET ORAL at 21:07

## 2021-08-09 RX ADMIN — CARVEDILOL 25 MG: 25 TABLET, FILM COATED ORAL at 17:26

## 2021-08-09 RX ADMIN — GABAPENTIN 300 MG: 300 CAPSULE ORAL at 17:26

## 2021-08-09 RX ADMIN — TORSEMIDE 20 MG: 20 TABLET ORAL at 08:15

## 2021-08-09 RX ADMIN — HYDRALAZINE HYDROCHLORIDE 10 MG: 10 TABLET, FILM COATED ORAL at 14:35

## 2021-08-09 RX ADMIN — DOCUSATE SODIUM 100 MG: 100 CAPSULE ORAL at 17:26

## 2021-08-09 RX ADMIN — INSULIN GLARGINE 18 UNITS: 100 INJECTION, SOLUTION SUBCUTANEOUS at 08:15

## 2021-08-09 RX ADMIN — HYDRALAZINE HYDROCHLORIDE 10 MG: 10 TABLET, FILM COATED ORAL at 05:10

## 2021-08-09 RX ADMIN — HEPARIN SODIUM 5000 UNITS: 5000 INJECTION INTRAVENOUS; SUBCUTANEOUS at 05:10

## 2021-08-09 RX ADMIN — CLONIDINE HYDROCHLORIDE 0.2 MG: 0.1 TABLET ORAL at 08:15

## 2021-08-09 RX ADMIN — GABAPENTIN 300 MG: 300 CAPSULE ORAL at 08:15

## 2021-08-09 RX ADMIN — TERAZOSIN HYDROCHLORIDE 5 MG: 5 CAPSULE ORAL at 22:08

## 2021-08-09 RX ADMIN — HEPARIN SODIUM 5000 UNITS: 5000 INJECTION INTRAVENOUS; SUBCUTANEOUS at 22:07

## 2021-08-09 RX ADMIN — HEPARIN SODIUM 5000 UNITS: 5000 INJECTION INTRAVENOUS; SUBCUTANEOUS at 14:35

## 2021-08-09 RX ADMIN — CLONIDINE HYDROCHLORIDE 0.2 MG: 0.1 TABLET ORAL at 17:00

## 2021-08-09 RX ADMIN — TERAZOSIN HYDROCHLORIDE 5 MG: 5 CAPSULE ORAL at 00:02

## 2021-08-09 RX ADMIN — CARVEDILOL 25 MG: 25 TABLET, FILM COATED ORAL at 08:15

## 2021-08-09 RX ADMIN — HYDRALAZINE HYDROCHLORIDE 10 MG: 10 TABLET, FILM COATED ORAL at 22:07

## 2021-08-09 RX ADMIN — DOCUSATE SODIUM 100 MG: 100 CAPSULE ORAL at 08:15

## 2021-08-09 NOTE — TELEPHONE ENCOUNTER
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

## 2021-08-09 NOTE — UTILIZATION REVIEW
Initial Clinical Review    Admission: Date/Time/Statement:   Admission Orders (From admission, onward)     Ordered        08/08/21 2002  Place in Observation  Once                   Orders Placed This Encounter   Procedures    Place in Observation     Standing Status:   Standing     Number of Occurrences:   1     Order Specific Question:   Level of Care     Answer:   Med Surg [16]     ED Arrival Information     Expected Arrival Acuity    - 8/8/2021 17:30 Urgent         Means of arrival Escorted by Service Admission type    Walk-In Family Member Hospitalist Urgent         Arrival complaint    high blood pressure        Chief Complaint   Patient presents with    Hypertension     states with pressure of 190/96,  c/o feeling warm in her abd       Initial Presentation:       73year old female presents to ed from home for evaluation and treatment of hypertension  SBP> 200 DBP >100   Clinical assessment significant for Hb 8 5, Bun 50, Cr  34 05,GFR 15, glucose 260  Imaging shows increased diffuse ground glass density  Eklg for non specific ST abnormality, prolonged QT  Treated in ef with iv labetalol, po hydralazine  Admit to observation for asymptomatic hypertension  Plan includes uptitrate hydralazine tid    Consult renal          Date: 8-9-21  Day 2: observation    Consult Nephrology    71-year-old female with a history of resistant hypertension and stage 4 chronic kidney disease with type 2 diabetes presents with hypertensive urgency    She was admitted with blood pressures of 220/100  Above 4 days ago had discontinued chlorthalidone and amlodipine  Also as an outpatient is on clonidine 0 2 mg 3 times daily and carvedilol 25 mg 2 times daily  She has no edema  No current symptoms    Patient's torsemide is on hold his creatinine is elevated  Restarted oral blood pressure medications avoid hypotension  May have better luck with clonidine patch as a rebound from clonidine may have contributed  Secondary workup has been done in the past  CKD itself could induce hypertension has well  Will continue to monitor on oral medications and the addition of hydralazine          ED Triage Vitals [08/08/21 1739]   98 1 °F (36 7 °C) 69 18 (!) 235/97 100 %      Oral Monitor         No Pain          08/09/21 71 6 kg (157 lb 13 6 oz)     Additional Vital Signs:     Date/Time  Temp  Pulse  Resp  BP  MAP   SpO2  O2 Device   08/09/21 12:42:02  --  85  --  151/100  117  93 %  --   08/09/21 0800  --  --  --  --  --  --  None (Room air)   08/09/21 07:02:47  97 8 °F (36 6 °C)  77  16  169/83  112  96 %  --   08/09/21 03:41:44  --  84  --  176/90Abnormal   119  96 %  --   08/08/21 22:30:17  --  80  18  186/90Abnormal   122  93 %  --   08/08/21 21:25:10  98 1 °F (36 7 °C)  78  18  186/88Abnormal   121  98 %  None (Room air)   08/08/21 2115  --  76  16  219/95Abnormal   --  100 %  None (Room air)   08/08/21 2057  --  78  14  231/98Abnormal   --  98 %  None (Room air)   08/08/21 2030  --  78  18  221/94Abnormal   --  100 %  None (Room air)   08/08/21 1953  --  79  15  207/90Abnormal   --  100 %  None (Room air)   08/08/21 1931  --  80  20  219/98Abnormal   --  100 %  None (Room air)   08/08/21 1917  --  77  22  211/98Abnormal   --  100 %  None (Room air)   08/08/21 1830  --  78  20  217/102Abnormal   147  100 %  --   08/08/21 1759  --  --  --  227/105Abnormal   --  --  --   08/08/21 1744  --  --  --  223/102Abnormal   --  --  --             Pertinent Labs/Diagnostic Test Results:       Collection Time Result Time Vent R Atrial R HI Int  QRSD Int  QT Int  QTC Int  P Axis QRS Axis T Wave Ax    08/08/21 17:48:22 08/09/21 08:32:07 78 78 168 80 438 499 47 67 106                     Normal sinus rhythm   Nonspecific ST abnormality   Prolonged QT   Abnormal ECG   No previous ECGs available                     XR chest 2 views   Final  (08/09 1118)      Mild diffuse interstitial prominence with increased groundglass density    Question related to interstitial edema or infiltrate              Results from last 7 days   Lab Units 08/09/21  0515 08/08/21  1807   WBC Thousand/uL 6 02 8 02   HEMOGLOBIN g/dL 8 2* 8 5*   HEMATOCRIT % 25 9* 27 2*   PLATELETS Thousands/uL 306 328   NEUTROS ABS Thousands/µL 3 21 5 68         Results from last 7 days   Lab Units 08/09/21  0515 08/08/21  1807   SODIUM mmol/L 141 139   POTASSIUM mmol/L 4 0 4 9   CHLORIDE mmol/L 104 101   CO2 mmol/L 30 31   ANION GAP mmol/L 7 7   BUN mg/dL 49* 50*   CREATININE mg/dL 2 76* 3 05*   EGFR ml/min/1 73sq m 17 15   CALCIUM mg/dL 7 7* 7 4*     Results from last 7 days   Lab Units 08/08/21  1807   AST U/L 19   ALT U/L 21   ALK PHOS U/L 86   TOTAL PROTEIN g/dL 6 7   ALBUMIN g/dL 2 7*   TOTAL BILIRUBIN mg/dL 0 14*   BILIRUBIN DIRECT mg/dL 0 07     Results from last 7 days   Lab Units 08/09/21  1102 08/09/21  0707 08/08/21  2139 08/08/21  1805   POC GLUCOSE mg/dl 152* 175* 210* 251*     Results from last 7 days   Lab Units 08/09/21  0515 08/08/21  1807   GLUCOSE RANDOM mg/dL 253* 260*       Results from last 7 days   Lab Units 08/08/21  1807   TROPONIN I ng/mL <0 02       Results from last 7 days   Lab Units 08/08/21  1807   LIPASE u/L 707*       Results from last 7 days   Lab Units 08/08/21  1826 08/08/21  1820   CLARITY UA  Clear Clear   COLOR UA  Yellow Yellow   SPEC GRAV UA  1 015 1 020   PH UA  7 0 7 0   GLUCOSE UA mg/dl 100 (1/10%)* 100 (1/10%)*   KETONES UA mg/dl Negative Negative   BLOOD UA  Trace* Trace*   PROTEIN UA mg/dl 100 (2+)* 100 (2+)*   NITRITE UA  Negative Negative   BILIRUBIN UA  Negative Negative   UROBILINOGEN UA E U /dl 0 2 0 2   LEUKOCYTES UA  Negative Negative   WBC UA /hpf 0-1*  --    RBC UA /hpf 1-2*  --    BACTERIA UA /hpf Occasional  --    EPITHELIAL CELLS WET PREP /hpf Occasional  --          ED Treatment:   Medication Administration from 08/08/2021 1730 to 08/08/2021 2126       Date/Time Order Dose Route Action     08/08/2021 1918 Labetalol HCl (NORMODYNE) injection 10 mg 10 mg Intravenous Given     08/08/2021 2004 Labetalol HCl (NORMODYNE) injection 10 mg 10 mg Intravenous Given     08/08/2021 2058 hydrALAZINE (APRESOLINE) tablet 10 mg 10 mg Oral Given        Past Medical History:   Diagnosis    Chronic kidney disease    Diabetes mellitus ([facility] [address] )    Hyperlipidemia    Hypertension    Pneumonia     Present on Admission:   Stage 4 chronic kidney disease ([facility] [address] )   Asymptomatic hypertensive urgency   Chronic diastolic heart failure (HCC)   Anemia in stage 4 chronic kidney disease (HCC)      Admitting Diagnosis:     Anemia [D64 9]  BP (high blood pressure) [I10]  Hyperglycemia [R73 9]  Hypertensive urgency [I16 0]  Elevated lipase [R74 8]  Stage 4 chronic kidney disease (HCC) [N18 4]    Age/Sex: 73 y o  female    Scheduled Medications:  carvedilol, 25 mg, Oral, BID With Meals  cloNIDine, 0 2 mg, Oral, TID  docusate sodium, 100 mg, Oral, BID  gabapentin, 300 mg, Oral, BID  heparin (porcine), 5,000 Units, Subcutaneous, Q8H DEBBIE  hydrALAZINE, 10 mg, Oral, Q8H DEBBIE  insulin glargine, 18 Units, Subcutaneous, QAM  terazosin, 5 mg, Oral, HS  torsemide, 20 mg, Oral, Daily      Continuous IV Infusions:     PRN Meds:  acetaminophen, 650 mg, Oral, Q6H PRN  calcium carbonate, 1,000 mg, Oral, Daily PRN  ondansetron, 4 mg, Intravenous, Q6H PRN        IP CONSULT TO NEPHROLOGY    Network Utilization Review Department  ATTENTION: Please call with any questions or concerns to [phone] and carefully listen to the prompts so that you are directed to the right person  All voicemails are confidential   Mildred Havers all requests for admission clinical reviews, approved or denied determinations and any other requests to dedicated fax number below belonging to the campus where the patient is receiving treatment   List of dedicated fax numbers for the Facilities:  [address] DENIALS (Administrative/Medical Necessity) [phone]   [address] (Maternity/NICU/Pediatrics) [phone] [address] [place] [address] [address] [place] [address] [address] [place] [facility] [facility] [address] [place] [address] [address] [place] [address] [phone]

## 2021-08-09 NOTE — CONSULTS
Consultation - Nephrology   [facility] Ahsan Urrutia 73 y o  female MRN: [id number]  Unit/Bed#: [place] 68 2 -02 Encounter: [id number]      Assessment/Plan     Assessment:   Hypertensive urgency with resistant hypertension    ·  patient home medications-terazosin 5 mg at HS, clonidine 0 2 mg t i d , carvedilol 25 mg b i d , torsemide 20 mg b i d ( recently increased to b i d )  Patient discontinue chlorthalidone, amlodipine  ·  patient has been started on hydralazine 10 mg t i d   ·  consider decreasing torsemide to 20 mg once a day  ·   VAS renal did not show any significant renal artery stenosis  ·  x-ray chest from this morning showed mild diffuse interstitial prominence with increased ground-glass density  ·  stop Bang score-  Unable to ask history part,  Will call patient's family tomorrow  ·   Renal ultrasound in July shows trace right perinephric free fluid without evidence of right or left hydronephrosis  Right kidney- 9 3 cm in length, normal thickness, echogenicity, left kidney 10 7 cm in length normal parenchyma, echogenicity     plan  ·  recommend  Renin aldosterone ratio  ·  recommend sleep apnea test   ·  monitor blood pressure  ·  continue home medications and hydralazine 10 mg t i d   ·  antihypertensive p r n  for SBP greater than 180     REI on CKD stage 4    ·  unable to establish baseline creatinine  ·  creatinine ranged from 2 02  To 3 13  ·  peak creatinine 3 13 on 07/15, on presentation had a creatinine of 3 05  ·  EGFR ranging from 15-18 since June    ·  C3, C4, WARREN normal  ·   Likely associated with hypertensive urgency,  Possible component of diabetic nephropathy, hypertensive nephrosclerosis  ·  proteinuria  ·  blood pressure control-  See plan above  ·  avoid nephrotoxins  ·  avoid hypotension  ·  renally dose medications     2 diabetes mellitus with long-term use of insulin with complications- retinopathy and nephropathy    ·  most recent hemoglobin A1c 9 5  ·  microalbumin to creatinine ratio- 3172 in June 2021,  Significant elevation from October 2020  ·  blood glucose management-  Management per primary  ·  see plan as above for CKD     Anemia in CKD    ·  baseline hemoglobin 9  ·  on presentation -8 5  ·  iron panel done on 07/25,  Ferritin - 71, iron- 69, transferrin- 155,  TIBC-192  ·  Associated with iron deficiency  ·  consider oral iron supplementation  ·  repeat iron panel in 4 weeks         will discuss with attending  Final recommendations will be made by attending      History of Present Illness   Physician Requesting Consult: Simona Gamboa DO  Reason for Consult / Principal Problem:  Hypertensive urgency, stage IV CKD  Hx and PE limited by:   HPI: Mary Hook is a 73y o  year old female  With past medical history of assistant hypertension, stage IV CKD, insulin-dependent diabetes presenting with hypertensive urgency  patient was admitted yesterday for elevated blood pressure, on  Admission had a blood pressure of 223/102  Received labetalol twice 10 mg   she was seen by PCP 4 days ago for significant elevated blood pressure and increased her torsemide  Even with medication complaints patient had increased blood pressure  of note,  Patient  Self-discontinued chlorthalidone, amlodipine for side effects  Patient follows up with Dr Basil singh as outpatient  History obtained from chart review  Patient is Korean-speaking unable to understand questions   denies complaints of chest pain, headache, blurred vision  States does not have any abdominal pain  Normal appetite      Inpatient consult to Nephrology  Consult performed by: Hung Cardozo MD  Consult ordered by: Dasia Munguia PA-C          Review of Systems   Constitutional: Negative for activity change, appetite change, chills, diaphoresis, fatigue, fever and unexpected weight change  HENT: Negative  Eyes: Negative  Respiratory: Negative  Cardiovascular: Negative  Gastrointestinal: Negative  Genitourinary: Negative  Skin: Negative  Historical Information   Past Medical History:   Diagnosis Date    Chronic kidney disease     Diabetes mellitus ([address] )     Hyperlipidemia     Hypertension     Pneumonia      Past Surgical History:   Procedure Laterality Date    EYE SURGERY       Social History   Social History     Substance and Sexual Activity   Alcohol Use None     Social History     Substance and Sexual Activity   Drug Use Not on file     E-Cigarette/Vaping     E-Cigarette/Vaping Substances     Social History     Tobacco Use   Smoking Status Never Smoker   Smokeless Tobacco Never Used   Tobacco Comment    NO TOBACCO USE     Family History   Problem Relation Age of Onset    Hypertension Mother     Diabetes Father     Hypertension Sister     Diabetes Sister     Hypertension Brother        Meds/Allergies   all current active meds have been reviewed    Allergies   Allergen Reactions    Amlodipine Edema and Eye Swelling    Lisinopril Angioedema     Bilateral periorbital edema that was significant       Objective   No intake or output data in the 24 hours ending 08/09/21 0857    Invasive Devices:        Physical Exam  Constitutional:       Appearance: Normal appearance  HENT:      Head: Normocephalic and atraumatic  Mouth/Throat:      Mouth: Mucous membranes are dry  Pharynx: Oropharynx is clear  Cardiovascular:      Rate and Rhythm: Normal rate and regular rhythm  Heart sounds: Murmur heard  Pulmonary:      Effort: Pulmonary effort is normal       Breath sounds: Normal breath sounds  Abdominal:      General: Bowel sounds are normal       Palpations: Abdomen is soft  Musculoskeletal:         General: Normal range of motion  Cervical back: Normal range of motion and neck supple  Skin:     General: Skin is warm and dry  Capillary Refill: Capillary refill takes less than 2 seconds  Neurological:      General: No focal deficit present        Mental Status: She is alert and oriented to person, place, and time  Current Weight: Weight - Scale: 71 6 kg (157 lb 13 6 oz)  First Weight: Weight - Scale: 71 9 kg (158 lb 8 2 oz)    Lab Results:    I have personally reviewed pertinent labs  CBC:   Lab Results   Component Value Date    WBC 6 02 08/09/2021    HGB 8 2 (L) 08/09/2021    HCT 25 9 (L) 08/09/2021    MCV 83 08/09/2021     08/09/2021    MCH 26 2 (L) 08/09/2021    MCHC 31 7 08/09/2021    RDW 15 2 (H) 08/09/2021    MPV 10 3 08/09/2021    NRBC 0 08/09/2021     CMP:   Lab Results   Component Value Date    SODIUM 141 08/09/2021    K 4 0 08/09/2021     08/09/2021    CO2 30 08/09/2021    BUN 49 (H) 08/09/2021    CREATININE 2 76 (H) 08/09/2021    CALCIUM 7 7 (L) 08/09/2021    AST 19 08/08/2021    ALT 21 08/08/2021    ALKPHOS 86 08/08/2021    EGFR 17 08/09/2021     Phosphorus: No results found for: PHOS  Magnesium: No results found for: MG  Urinalysis:   Lab Results   Component Value Date    COLORU Yellow 08/08/2021    CLARITYU Clear 08/08/2021    SPECGRAV 1 015 08/08/2021    PHUR 7 0 08/08/2021    LEUKOCYTESUR Negative 08/08/2021    NITRITE Negative 08/08/2021    GLUCOSEU 100 (1/10%) (A) 08/08/2021    KETONESU Negative 08/08/2021    BILIRUBINUR Negative 08/08/2021    BLOODU Trace (A) 08/08/2021       EKG, Pathology, and Other Studies:  EKG reviewed    Counseling / Coordination of Care  Total floor / unit time spent today  minutes  Greater than 50% of total time was spent with the patient and / or family counseling and / or coordination of care   A description of the counseling / coordination of care:  Please refer to attending's note

## 2021-08-09 NOTE — ASSESSMENT & PLAN NOTE
· With elevated lipase at 7:07 a m   · Of note, was admitted to [facility] approximately 2 weeks ago for acute pancreatitis  · Currently not complaining of any abdominal pain, suspect likely inflammation following acute pancreatitis episode

## 2021-08-09 NOTE — PLAN OF CARE
Problem: PAIN - ADULT  Goal: Verbalizes/displays adequate comfort level or baseline comfort level  Description: Interventions:  - Encourage patient to monitor pain and request assistance  - Assess pain using appropriate pain scale  - Administer analgesics based on type and severity of pain and evaluate response  - Implement non-pharmacological measures as appropriate and evaluate response  - Consider cultural and social influences on pain and pain management  - Notify physician/advanced practitioner if interventions unsuccessful or patient reports new pain  Outcome: Progressing     Problem: INFECTION - ADULT  Goal: Absence or prevention of progression during hospitalization  Description: INTERVENTIONS:  - Assess and monitor for signs and symptoms of infection  - Monitor lab/diagnostic results  - Monitor all insertion sites, i e  indwelling lines, tubes, and drains  - Monitor endotracheal if appropriate and nasal secretions for changes in amount and color  - [place] appropriate cooling/warming therapies per order  - Administer medications as ordered  - Instruct and encourage patient and family to use good hand hygiene technique  - Identify and instruct in appropriate isolation precautions for identified infection/condition  Outcome: Progressing  Goal: Absence of fever/infection during neutropenic period  Description: INTERVENTIONS:  - Monitor WBC    Outcome: Progressing     Problem: SAFETY ADULT  Goal: Patient will remain free of falls  Description: INTERVENTIONS:  - Educate patient/family on patient safety including physical limitations  - Instruct patient to call for assistance with activity   - Consult OT/PT to assist with strengthening/mobility   - Keep Call bell within reach  - Keep bed low and locked with side rails adjusted as appropriate  - Keep care items and personal belongings within reach  - Initiate and maintain comfort rounds  - Make Fall Risk Sign visible to staff  - Apply yellow socks and bracelet for high fall risk patients  - Consider moving patient to room near nurses station  Outcome: Progressing  Goal: Maintain or return to baseline ADL function  Description: INTERVENTIONS:  -  Assess patient's ability to carry out ADLs; assess patient's baseline for ADL function and identify physical deficits which impact ability to perform ADLs (bathing, care of mouth/teeth, toileting, grooming, dressing, etc )  - Assess/evaluate cause of self-care deficits   - Assess range of motion  - Assess patient's mobility; develop plan if impaired  - Assess patient's need for assistive devices and provide as appropriate  - Encourage maximum independence but intervene and supervise when necessary  - Involve family in performance of ADLs  - Assess for home care needs following discharge   - Consider OT consult to assist with ADL evaluation and planning for discharge  - Provide patient education as appropriate  Outcome: Progressing  Goal: Maintains/Returns to pre admission functional level  Description: INTERVENTIONS:  - Perform BMAT or MOVE assessment daily    - Set and communicate daily mobility goal to care team and patient/family/caregiver     - Collaborate with rehabilitation services on mobility goals if consulted  - Out of bed for toileting  - Record patient progress and toleration of activity level   Outcome: Progressing     Problem: DISCHARGE PLANNING  Goal: Discharge to home or other facility with appropriate resources  Description: INTERVENTIONS:  - Identify barriers to discharge w/patient and caregiver  - Arrange for needed discharge resources and transportation as appropriate  - Identify discharge learning needs (meds, wound care, etc )  - Arrange for interpretive services to assist at discharge as needed  - Refer to Case Management Department for coordinating discharge planning if the patient needs post-hospital services based on physician/advanced practitioner order or complex needs related to functional status, cognitive ability, or social support system  Outcome: Progressing     Problem: Knowledge Deficit  Goal: Patient/family/caregiver demonstrates understanding of disease process, treatment plan, medications, and discharge instructions  Description: Complete learning assessment and assess knowledge base  Interventions:  - Provide teaching at level of understanding  - Provide teaching via preferred learning methods  Outcome: Progressing     Problem: NEUROSENSORY - ADULT  Goal: Achieves stable or improved neurological status  Description: INTERVENTIONS  - Monitor and report changes in neurological status  - Monitor vital signs such as temperature, blood pressure, glucose, and any other labs ordered   - Initiate measures to prevent increased intracranial pressure  - Monitor for seizure activity and implement precautions if appropriate      Outcome: Progressing  Goal: Remains free of injury related to seizures activity  Description: INTERVENTIONS  - Maintain airway, patient safety  and administer oxygen as ordered  - Monitor patient for seizure activity, document and report duration and description of seizure to physician/advanced practitioner  - If seizure occurs,  ensure patient safety during seizure  - Reorient patient post seizure  - Seizure pads on all 4 side rails  - Instruct patient/family to notify RN of any seizure activity including if an aura is experienced  - Instruct patient/family to call for assistance with activity based on nursing assessment  - Administer anti-seizure medications if ordered    Outcome: Progressing  Goal: Achieves maximal functionality and self care  Description: INTERVENTIONS  - Monitor swallowing and airway patency with patient fatigue and changes in neurological status  - Encourage and assist patient to increase activity and self care     - Encourage visually impaired, hearing impaired and aphasic patients to use assistive/communication devices  Outcome: Progressing     Problem: CARDIOVASCULAR - ADULT  Goal: Maintains optimal cardiac output and hemodynamic stability  Description: INTERVENTIONS:  - Monitor I/O, vital signs and rhythm  - Monitor for S/S and trends of decreased cardiac output  - Administer and titrate ordered vasoactive medications to optimize hemodynamic stability  - Assess quality of pulses, skin color and temperature  - Assess for signs of decreased coronary artery perfusion  - Instruct patient to report change in severity of symptoms  Outcome: Progressing  Goal: Absence of cardiac dysrhythmias or at baseline rhythm  Description: INTERVENTIONS:  - Continuous cardiac monitoring, vital signs, obtain 12 lead EKG if ordered  - Administer antiarrhythmic and heart rate control medications as ordered  - Monitor electrolytes and administer replacement therapy as ordered  Outcome: Progressing     Problem: RESPIRATORY - ADULT  Goal: Achieves optimal ventilation and oxygenation  Description: INTERVENTIONS:  - Assess for changes in respiratory status  - Assess for changes in mentation and behavior  - Position to facilitate oxygenation and minimize respiratory effort  - Oxygen administered by appropriate delivery if ordered  - Initiate smoking cessation education as indicated  - Encourage broncho-pulmonary hygiene including cough, deep breathe, Incentive Spirometry  - Assess the need for suctioning and aspirate as needed  - Assess and instruct to report SOB or any respiratory difficulty  - Respiratory Therapy support as indicated  Outcome: Progressing     Problem: GASTROINTESTINAL - ADULT  Goal: Minimal or absence of nausea and/or vomiting  Description: INTERVENTIONS:  - Administer IV fluids if ordered to ensure adequate hydration  - Maintain NPO status until nausea and vomiting are resolved  - Nasogastric tube if ordered  - Administer ordered antiemetic medications as needed  - Provide nonpharmacologic comfort measures as appropriate  - Advance diet as tolerated, if ordered  - Consider nutrition services referral to assist patient with adequate nutrition and appropriate food choices  Outcome: Progressing  Goal: Maintains or returns to baseline bowel function  Description: INTERVENTIONS:  - Assess bowel function  - Encourage oral fluids to ensure adequate hydration  - Administer IV fluids if ordered to ensure adequate hydration  - Administer ordered medications as needed  - Encourage mobilization and activity  - Consider nutritional services referral to assist patient with adequate nutrition and appropriate food choices  Outcome: Progressing  Goal: Maintains adequate nutritional intake  Description: INTERVENTIONS:  - Monitor percentage of each meal consumed  - Identify factors contributing to decreased intake, treat as appropriate  - Assist with meals as needed  - Monitor I&O, weight, and lab values if indicated  - Obtain nutrition services referral as needed  Outcome: Progressing  Goal: Oral mucous membranes remain intact  Description: INTERVENTIONS  - Assess oral mucosa and hygiene practices  - Implement preventative oral hygiene regimen  - Implement oral medicated treatments as ordered  - Initiate Nutrition services referral as needed  Outcome: Progressing     Problem: GENITOURINARY - ADULT  Goal: Maintains or returns to baseline urinary function  Description: INTERVENTIONS:  - Assess urinary function  - Encourage oral fluids to ensure adequate hydration if ordered  - Administer IV fluids as ordered to ensure adequate hydration  - Administer ordered medications as needed  - Offer frequent toileting  - Follow urinary retention protocol if ordered  Outcome: Progressing  Goal: Absence of urinary retention  Description: INTERVENTIONS:  - Assess patients ability to void and empty bladder  - Monitor I/O  - Bladder scan as needed  - Discuss with physician/AP medications to alleviate retention as needed  - Discuss catheterization for long term situations as appropriate  Outcome: Progressing Problem: METABOLIC, FLUID AND ELECTROLYTES - ADULT  Goal: Electrolytes maintained within normal limits  Description: INTERVENTIONS:  - Monitor labs and assess patient for signs and symptoms of electrolyte imbalances  - Administer electrolyte replacement as ordered  - Monitor response to electrolyte replacements, including repeat lab results as appropriate  - Instruct patient on fluid and nutrition as appropriate  Outcome: Progressing  Goal: Fluid balance maintained  Description: INTERVENTIONS:  - Monitor labs   - Monitor I/O and WT  - Instruct patient on fluid and nutrition as appropriate  - Assess for signs & symptoms of volume excess or deficit  Outcome: Progressing  Goal: Glucose maintained within target range  Description: INTERVENTIONS:  - Monitor Blood Glucose as ordered  - Assess for signs and symptoms of hyperglycemia and hypoglycemia  - Administer ordered medications to maintain glucose within target range  - Assess nutritional intake and initiate nutrition service referral as needed  Outcome: Progressing     Problem: SKIN/TISSUE INTEGRITY - ADULT  Goal: Skin Integrity remains intact(Skin Breakdown Prevention)  Description: Assess:  -Inspect skin when repositioning, toileting, and assisting with ADLS  -Assess extremities for adequate circulation and sensation     Bed Management:  -Have minimal linens on bed & keep smooth, unwrinkled  -Change linens as needed when moist       Toileting:  -Offer bedside commode    Activity:  -Encourage activity and walks on unit  -Encourage or provide ROM exercises   -Use appropriate equipment to lift or move patient in bed    Skin Care:  -Avoid use of baby powder, tape, friction and shearing, hot water or constrictive clothing  -Do not massage red bony areas    Outcome: Progressing  Goal: Incision(s), wounds(s) or drain site(s) healing without S/S of infection  Description: INTERVENTIONS  - Assess and document dressing, incision, wound bed, drain sites and surrounding tissue  - Provide patient and family education  Outcome: Progressing  Goal: Pressure injury heals and does not worsen  Description: Interventions:  - Implement low air loss mattress or specialty surface (Criteria met)  - Apply silicone foam dressing   - Apply fecal or urinary incontinence containment device   - Utilize friction reducing device or surface for transfers   - Consider nutrition services referral as needed  Outcome: Progressing     Problem: HEMATOLOGIC - ADULT  Goal: Maintains hematologic stability  Description: INTERVENTIONS  - Assess for signs and symptoms of bleeding or hemorrhage  - Monitor labs  - Administer supportive blood products/factors as ordered and appropriate  Outcome: Progressing     Problem: MUSCULOSKELETAL - ADULT  Goal: Maintain or return mobility to safest level of function  Description: INTERVENTIONS:  - Assess patient's ability to carry out ADLs; assess patient's baseline for ADL function and identify physical deficits which impact ability to perform ADLs (bathing, care of mouth/teeth, toileting, grooming, dressing, etc )  - Assess/evaluate cause of self-care deficits   - Assess range of motion  - Assess patient's mobility  - Assess patient's need for assistive devices and provide as appropriate  - Encourage maximum independence but intervene and supervise when necessary  - Involve family in performance of ADLs  - Assess for home care needs following discharge   - Consider OT consult to assist with ADL evaluation and planning for discharge  - Provide patient education as appropriate  Outcome: Progressing  Goal: Maintain proper alignment of affected body part  Description: INTERVENTIONS:  - Support, maintain and protect limb and body alignment  - Provide patient/ family with appropriate education  Outcome: Progressing

## 2021-08-09 NOTE — ASSESSMENT & PLAN NOTE
Lab Results   Component Value Date    EGFR 15 08/08/2021    EGFR 18 (L) 07/29/2021    EGFR 15 (L) 07/15/2021    CREATININE 3 05 (H) 08/08/2021    CREATININE 2 62 (H) 07/29/2021    CREATININE 3 13 (H) 07/15/2021   · Patient is followed closely by Nephrology, last evaluated on 07/19/2021  · Appears that the patient's baseline is around 2 5-3  · Nephrology consult, appreciate recommendations  · Of note, the patient recently increased her home torsemide dose to 20 mg b i d , will decrease back to 20 mg q d  · Avoid hypotension  · BMP in a m

## 2021-08-09 NOTE — PROGRESS NOTES
[address] [place] [address]  Progress Note - Aidee Tanner 1955, 73 y o  female MRN: [id number]  Unit/Bed#: [facility] 87 218-02 Encounter: [id number]  Primary Care Provider: José Antonio Mathis MD   Date and time admitted to hospital: 2021  5:36 PM    * Asymptomatic hypertensive urgency  Assessment & Plan  Patient stopped outpatient blood pressure medications due to side effects    Appreciate Nephrology help  Blood pressure improving with a new antihypertensive regimen    Hopefully will be able to go home tomorrow  I will change it to inpatient status as her length of stay will be greater than 2 midnights as she is still requiring IV antihypertensive medications and her length of stay will be greater than 2 midnights  Stage 4 chronic kidney disease ([address] )  Assessment & Plan  Appreciate Nephrology help    Limits the choice of antihypertensives          Subjective:   Has no complaints  No headache  No vision changes      Objective:     Vitals:   Temp (24hrs), Av 2 °F (36 8 °C), Min:97 8 °F (36 6 °C), Max:98 6 °F (37 °C)    Temp:  [97 8 °F (36 6 °C)-98 6 °F (37 °C)] 98 6 °F (37 °C)  HR:  [69-88] 88  Resp:  [14-22] 16  BP: (151-235)/() 151/100  SpO2:  [93 %-100 %] 96 %  Body mass index is 29 83 kg/m²  Input and Output Summary (last 24 hours):     No intake or output data in the 24 hours ending 21 0996    Physical Exam:     Physical Exam  Vitals and nursing note reviewed  HENT:      Head: Normocephalic and atraumatic  Eyes:      Pupils: Pupils are equal, round, and reactive to light  Cardiovascular:      Rate and Rhythm: Normal rate and regular rhythm  Heart sounds: No murmur heard  No friction rub  No gallop  Pulmonary:      Effort: Pulmonary effort is normal       Breath sounds: Normal breath sounds  No wheezing or rales  Abdominal:      General: Bowel sounds are normal       Palpations: Abdomen is soft  Tenderness: There is no abdominal tenderness  Musculoskeletal:      Right lower leg: No edema  Left lower leg: No edema                 Additional Data:     Labs:    Results from last 7 days   Lab Units 08/09/21  0515   WBC Thousand/uL 6 02   HEMOGLOBIN g/dL 8 2*   HEMATOCRIT % 25 9*   PLATELETS Thousands/uL 306   NEUTROS PCT % 53   LYMPHS PCT % 33   MONOS PCT % 10   EOS PCT % 3     Results from last 7 days   Lab Units 08/09/21  0515 08/08/21  1807   POTASSIUM mmol/L 4 0 4 9   CHLORIDE mmol/L 104 101   CO2 mmol/L 30 31   BUN mg/dL 49* 50*   CREATININE mg/dL 2 76* 3 05*   CALCIUM mg/dL 7 7* 7 4*   ALK PHOS U/L  --  86   ALT U/L  --  21   AST U/L  --  19         Results from last 7 days   Lab Units 08/09/21  1642 08/09/21  1102 08/09/21  0707 08/08/21  2139 08/08/21  1805   POC GLUCOSE mg/dl 283* 152* 175* 210* 251*               * I Have Reviewed All Lab Data     Recent Cultures (last 7 days):             Last 24 Hours Medication List:   Current Facility-Administered Medications   Medication Dose Route Frequency Provider Last Rate    acetaminophen  650 mg Oral Q6H PRN Lisy Davis PA-C      calcium carbonate  1,000 mg Oral Daily PRN Lisy Davis PA-C      carvedilol  25 mg Oral BID With Meals [place], [place]      cloNIDine  0 2 mg Oral TID Lisy Davis PA-C      docusate sodium  100 mg Oral BID Lisy Davis PA-C      gabapentin  300 mg Oral BID Lisy Davis PA-C      heparin (porcine)  5,000 Units Subcutaneous [facility] [place], [place]      hydrALAZINE  10 mg Oral [facility]della [facility]as, [address]      insulin glargine  18 Units Subcutaneous QAM Lisy Davis PA-C      ondansetron  4 mg Intravenous Q6H PRN Lisy Davis PA-C      terazosin  5 mg Oral HS Lisy Davis PA-C      torsemide  20 mg Oral Daily Lisy Davis PA-C           VTE Pharmacologic Prophylaxis:   Pharmacologic: Heparin      Current Length of Stay: 0 day(s)    Current Patient Status: Inpatient       Discharge Plan: home when blood pressure controlled    Code Status: Level 1 - Full Code           Today, Patient Was Seen By: Ely Martinez DO    ** Please Note: Dictation voice to text software may have been used in the creation of this document   **

## 2021-08-09 NOTE — ASSESSMENT & PLAN NOTE
· Hemoglobin 8 5 today, no signs of acute bleeding  · Appears that baseline is 8-9  · Secondary to CKD

## 2021-08-09 NOTE — ASSESSMENT & PLAN NOTE
Lab Results   Component Value Date    HGBA1C 9 5 (H) 07/23/2021       Recent Labs     08/08/21  1805   POCGLU 251*       Blood Sugar Average: Last 72 hrs:  (P) 251   · Patient maintained a Tradjenta, Amaryl as an outpatient, will while inpatient  · Additionally, maintained on Lantus 26 units with breakfast-will decreased 18 units with breakfast while inpatient and titrate appropriately if needed  · Sliding scale insulin a c   Hs  · Hypoglycemia protocol

## 2021-08-09 NOTE — ASSESSMENT & PLAN NOTE
Wt Readings from Last 3 Encounters:   08/08/21 71 9 kg (158 lb 8 2 oz)   08/05/21 74 4 kg (164 lb)   07/19/21 71 7 kg (158 lb)     · Initial diagnosis made in October 2019 at [facility] and  · Patient maintained on torsemide 20 mg q d , continue  · Patient appears to be around baseline weight, euvolemic on exam  · Daily weights, monitor I's and O's

## 2021-08-09 NOTE — H&P
Aaron 57 1955, 73 y o  female MRN: [id number]  Unit/Bed#: ED 08 Encounter: [id number]  Primary Care Provider: Bernabe Katz MD   Date and time admitted to hospital: 8/8/2021  5:36 PM    * Asymptomatic hypertensive urgency  Assessment & Plan  Patient presents to the ED with high blood pressure-->235/97  · Was evaluated by PCP on 8/5 and noted to have a BP of 240/110, PCP recommended evaluation in the ED at that time however patient refused  Medication adjustments were made  · amlodipine and chlorthalidone d/c at that time secondary to adverse effects  · Patient was to increase torsemide to 20mg BIDx3-5 days, then return to 20mg thereafter-patient reports that she did start 20 mg b i d  And has been taking this as prescribed  · She reports that she is taking her blood pressure today and noted that it was over 200  Additionally states that she felt off all day  · Status post IV labetalol in the emergency department  · Will add labetalol on a p r n   Basis  · Maintained on terazosin 5 mg HS, clonidine 0 2 t i d , carvedilol 25 b i d , will continue  · Will add hydralazine 10 daily  · Monitor blood pressure per unit routine    Elevated lipase  Assessment & Plan  · With elevated lipase at 7:07 a m   · Of note, was admitted to [facility] approximately 2 weeks ago for acute pancreatitis  · Currently not complaining of any abdominal pain, suspect likely inflammation following acute pancreatitis episode    Anemia in stage 4 chronic kidney disease ([facility] [address] )  Assessment & Plan  · Hemoglobin 8 5 today, no signs of acute bleeding  · Appears that baseline is 8-9  · Secondary to CKD    Stage 4 chronic kidney disease [facility])  Assessment & Plan  Lab Results   Component Value Date    EGFR 15 08/08/2021    EGFR 18 (L) 07/29/2021    EGFR 15 (L) 07/15/2021    CREATININE 3 05 (H) 08/08/2021    CREATININE 2 62 (H) 07/29/2021    CREATININE 3 13 (H) 07/15/2021   · Patient is followed closely by Nephrology, last evaluated on 07/19/2021  · Appears that the patient's baseline is around 2 5-3  · Nephrology consult, appreciate recommendations  · Of note, the patient recently increased her home torsemide dose to 20 mg b i d , will decrease back to 20 mg q d  · Avoid hypotension  · BMP in a m  Chronic diastolic heart failure (HCC)  Assessment & Plan  Wt Readings from Last 3 Encounters:   08/08/21 71 9 kg (158 lb 8 2 oz)   08/05/21 74 4 kg (164 lb)   07/19/21 71 7 kg (158 lb)     · Initial diagnosis made in October 2019 at [facility] and  · Patient maintained on torsemide 20 mg q d , continue  · Patient appears to be around baseline weight, euvolemic on exam  · Daily weights, monitor I's and O's          Type 2 diabetes mellitus, with long-term current use of insulin ([facility])  Assessment & Plan  Lab Results   Component Value Date    HGBA1C 9 5 (H) 07/23/2021       Recent Labs     08/08/21  1805   POCGLU 251*       Blood Sugar Average: Last 72 hrs:  (P) 251   · Patient maintained a Tradjenta, Amaryl as an outpatient, will while inpatient  · Additionally, maintained on Lantus 26 units with breakfast-will decreased 18 units with breakfast while inpatient and titrate appropriately if needed  · Sliding scale insulin a c  Hs  · Hypoglycemia protocol    VTE Prophylaxis: Heparin  / sequential compression device   Code Status:  Full code  POLST: There is no POLST form on file for this patient (pre-hospital)  Discussion with family:  Discussed plan of care with patient, answered any questions  Anticipated Length of Stay:  Patient will be admitted on an Observation basis with an anticipated length of stay of  less than 2 midnights  Justification for Hospital Stay:  Hypertension    Total Time for Visit, including Counseling / Coordination of Care: 70 minutes  Greater than 50% of this total time spent on direct patient counseling and coordination of care      Chief Complaint:   Hypertension    History of Present Illness:    Lorraine Napier is a 73 y o  female with past medical history of essential hypertension, stage IV CKD, insulin-dependent diabetes who presents with hypertension  History is obtained using   According to the patient, she states that she was evaluated by her primary care provider approximately 4 days ago and he noted that her blood pressure was significantly elevated  At that time, he noted that she should go to the emergency department for further evaluation however she declined at that time and he increased her torsemide from once a day to twice a day  The patient states that she has been doing this as prescribed  She states that she took her blood pressure this morning and noted to be elevated  Additionally, she states that she felt generally unwell over the last 24 hours  Denies any other symptoms including headache, vision changes, chest pain, shortness of breath  She came to the emergency department for further evaluation  Review of Systems:    Review of Systems   Constitutional: Negative for chills, diaphoresis, fatigue and fever  HENT: Negative for ear pain and sore throat  Eyes: Negative for photophobia, pain and visual disturbance  Respiratory: Negative for cough, shortness of breath and wheezing  Cardiovascular: Negative for chest pain, palpitations and leg swelling  Gastrointestinal: Negative for abdominal pain, constipation, diarrhea, nausea and vomiting  Genitourinary: Negative for dysuria, frequency, hematuria and urgency  Musculoskeletal: Negative for arthralgias and back pain  Skin: Negative for color change and rash  Neurological: Negative for dizziness, seizures, syncope, weakness, light-headedness, numbness and headaches         Past Medical and Surgical History:     Past Medical History:   Diagnosis Date    Chronic kidney disease     Diabetes mellitus ([facility] [address] )     Hyperlipidemia     Hypertension     Pneumonia        Past Surgical History:   Procedure Laterality Date    EYE SURGERY         Meds/Allergies:    Prior to Admission medications    Medication Sig Start Date End Date Taking?  Authorizing Provider   Accu-Chek FastClix Lancets MISC Inject under the skin 2 (two) times a day Test 1/6/21  Yes Shirley Weiss MD   Accu-Chek Guide test strip Use 1 each 2 (two) times a day Test 1/6/21  Yes Shirley Weiss MD   Alcohol Swabs (EASY TOUCH ALCOHOL PREP MEDIUM) 70 % PADS USE 2 TO 3 X PER DAY 7/17/19  Yes Historical Provider, MD   Blood Glucose Monitoring Suppl (OneTouch Verio) w/Device KIT Use 2 (two) times a day 3/5/21  Yes Shirley Weiss MD   carvedilol (COREG) 25 mg tablet Take 1 tablet (25 mg total) by mouth 2 (two) times a day with meals 1/6/21  Yes Shirley Weiss MD   cloNIDine (CATAPRES) 0 2 mg tablet Take 1 tablet (0 2 mg total) by mouth 3 (three) times a day 8/5/21 8/5/22 Yes Shirley Weiss MD   gabapentin (NEURONTIN) 300 mg capsule Take 1 capsule (300 mg total) by mouth 2 (two) times a day 1/6/21  Yes Shirley Weiss MD   glimepiride (AMARYL) 2 mg tablet Take 2 mg by mouth daily 5/4/21 5/4/22 Yes Historical Provider, MD   glucose blood (OneTouch Verio) test strip Use as instructed 3/5/21  Yes Shirley Weiss MD   insulin glargine (Lantus SoloStar) 100 units/mL injection pen Inject 26 Units under the skin daily with breakfast 6/23/21  Yes Shirley Weiss MD   Insulin Pen Needle (Unifine Pentips) 32G X 4 MM MISC Use daily 4/7/21  Yes Shirley Weiss MD   Lancets (onetouch ultrasoft) lancets Use as instructed 3/5/21  Yes Shirley Weiss MD   linaGLIPtin (Tradjenta) 5 MG TABS Take 5 mg by mouth daily 5/4/21 5/4/22 Yes Historical Provider, MD   terazosin (HYTRIN) 5 mg capsule Take 5 mg by mouth 5/4/21 5/4/22 Yes Historical Provider, MD   torsemide (DEMADEX) 20 mg tablet Take 1 tablet (20 mg total) by mouth 2 (two) times a day for 4 days 8/5/21 8/9/21 Yes Shirley MD Abhishek   VITAMIN D PO Take by mouth  8/8/21 Yes Historical Provider, MD   atorvastatin (LIPITOR) 40 mg tablet Take 1 tablet (40 mg total) by mouth daily  Patient not taking: Reported on 8/8/2021 1/6/21   Jessie Kovacs MD   ergocalciferol (ERGOCALCIFEROL) 1 25 MG (12027 UT) capsule Take 50,000 Units by mouth every 7 days 10/22/19   Historical Provider, MD     I have reviewed home medications with patient personally  Allergies: Allergies   Allergen Reactions    Amlodipine Edema and Eye Swelling    Lisinopril Angioedema     Bilateral periorbital edema that was significant       Social History:     Marital Status: /Civil Union   Occupation:  Unknown  Patient Pre-hospital Living Situation:  With spouse  Patient Pre-hospital Level of Mobility:  Ambulatory  Patient Pre-hospital Diet Restrictions:  None  Substance Use History:   Social History     Substance and Sexual Activity   Alcohol Use None     Social History     Tobacco Use   Smoking Status Never Smoker   Smokeless Tobacco Never Used   Tobacco Comment    NO TOBACCO USE     Social History     Substance and Sexual Activity   Drug Use Not on file       Family History:    Family History   Problem Relation Age of Onset    Hypertension Mother     Diabetes Father     Hypertension Sister     Diabetes Sister     Hypertension Brother        Physical Exam:     Vitals:   Blood Pressure: (!) 221/94 (08/08/21 2030)  Pulse: 78 (08/08/21 2030)  Temperature: 98 1 °F (36 7 °C) (08/08/21 1739)  Temp Source: Oral (08/08/21 1739)  Respirations: 18 (08/08/21 2030)  Weight - Scale: 71 9 kg (158 lb 8 2 oz) (08/08/21 1739)  SpO2: 100 % (08/08/21 2030)    Physical Exam  Vitals and nursing note reviewed  Constitutional:       General: She is not in acute distress  Appearance: Normal appearance  She is well-developed and normal weight  She is not ill-appearing, toxic-appearing or diaphoretic  HENT:      Head: Normocephalic and atraumatic  Eyes:      General: No scleral icterus       Conjunctiva/sclera: Conjunctivae normal    Cardiovascular:      Rate and Rhythm: Normal rate and regular rhythm  Heart sounds: Murmur heard  No friction rub  No gallop  Pulmonary:      Effort: Pulmonary effort is normal  No respiratory distress  Breath sounds: Normal breath sounds  No stridor  No wheezing, rhonchi or rales  Chest:      Chest wall: No tenderness  Abdominal:      General: Abdomen is flat  Bowel sounds are normal       Palpations: Abdomen is soft  Tenderness: There is no abdominal tenderness  Musculoskeletal:      Cervical back: Neck supple  Right lower leg: Edema present  Left lower leg: Edema present  Comments: 1+ pitting edema   Skin:     General: Skin is warm and dry  Capillary Refill: Capillary refill takes less than 2 seconds  Coloration: Skin is not jaundiced or pale  Findings: No erythema  Neurological:      General: No focal deficit present  Mental Status: She is alert and oriented to person, place, and time  Mental status is at baseline  Cranial Nerves: No cranial nerve deficit  Motor: No weakness  Additional Data:     Lab Results: I have personally reviewed pertinent reports  Results from last 7 days   Lab Units 08/08/21  1807   WBC Thousand/uL 8 02   HEMOGLOBIN g/dL 8 5*   HEMATOCRIT % 27 2*   PLATELETS Thousands/uL 328   NEUTROS PCT % 70   LYMPHS PCT % 17   MONOS PCT % 9   EOS PCT % 3     Results from last 7 days   Lab Units 08/08/21  1807   SODIUM mmol/L 139   POTASSIUM mmol/L 4 9   CHLORIDE mmol/L 101   CO2 mmol/L 31   BUN mg/dL 50*   CREATININE mg/dL 3 05*   ANION GAP mmol/L 7   CALCIUM mg/dL 7 4*   ALBUMIN g/dL 2 7*   TOTAL BILIRUBIN mg/dL 0 14*   ALK PHOS U/L 86   ALT U/L 21   AST U/L 19   GLUCOSE RANDOM mg/dL 260*         Results from last 7 days   Lab Units 08/08/21  1805   POC GLUCOSE mg/dl 251*               Imaging: I have personally reviewed pertinent reports        XR chest 2 views    (Results Pending)         [employer] Records Reviewed: Yes     ** Please Note: This note has been constructed using a voice recognition system   **

## 2021-08-09 NOTE — ASSESSMENT & PLAN NOTE
Patient stopped outpatient blood pressure medications due to side effects    Appreciate Nephrology help  Blood pressure improving with a new antihypertensive regimen    Hopefully will be able to go home tomorrow  I will change it to inpatient status as her length of stay will be greater than 2 midnights as she is still requiring IV antihypertensive medications and her length of stay will be greater than 2 midnights

## 2021-08-10 VITALS
RESPIRATION RATE: 20 BRPM | WEIGHT: 153.44 LBS | TEMPERATURE: 97.9 F | HEART RATE: 68 BPM | BODY MASS INDEX: 28.99 KG/M2 | SYSTOLIC BLOOD PRESSURE: 116 MMHG | DIASTOLIC BLOOD PRESSURE: 54 MMHG | OXYGEN SATURATION: 95 %

## 2021-08-10 PROBLEM — D47.2 MONOCLONAL GAMMOPATHY: Status: ACTIVE | Noted: 2021-08-10

## 2021-08-10 LAB
GLUCOSE SERPL-MCNC: 222 MG/DL (ref 65–140)
GLUCOSE SERPL-MCNC: 421 MG/DL (ref 65–140)
GLUCOSE SERPL-MCNC: 63 MG/DL (ref 65–140)

## 2021-08-10 PROCEDURE — 99239 HOSP IP/OBS DSCHRG MGMT >30: CPT | Performed by: HOSPITALIST

## 2021-08-10 PROCEDURE — 82088 ASSAY OF ALDOSTERONE: CPT | Performed by: INTERNAL MEDICINE

## 2021-08-10 PROCEDURE — 84244 ASSAY OF RENIN: CPT | Performed by: INTERNAL MEDICINE

## 2021-08-10 PROCEDURE — 82948 REAGENT STRIP/BLOOD GLUCOSE: CPT

## 2021-08-10 PROCEDURE — 99232 SBSQ HOSP IP/OBS MODERATE 35: CPT | Performed by: INTERNAL MEDICINE

## 2021-08-10 RX ORDER — INSULIN GLARGINE 100 [IU]/ML
18 INJECTION, SOLUTION SUBCUTANEOUS EVERY MORNING
Qty: 10 ML | Refills: 0 | Status: SHIPPED | OUTPATIENT
Start: 2021-08-11 | End: 2021-12-17 | Stop reason: HOSPADM

## 2021-08-10 RX ORDER — HYDRALAZINE HYDROCHLORIDE 10 MG/1
10 TABLET, FILM COATED ORAL EVERY 8 HOURS SCHEDULED
Qty: 90 TABLET | Refills: 0 | Status: SHIPPED | OUTPATIENT
Start: 2021-08-10 | End: 2021-08-23

## 2021-08-10 RX ADMIN — HEPARIN SODIUM 5000 UNITS: 5000 INJECTION INTRAVENOUS; SUBCUTANEOUS at 06:16

## 2021-08-10 RX ADMIN — CLONIDINE HYDROCHLORIDE 0.2 MG: 0.1 TABLET ORAL at 08:39

## 2021-08-10 RX ADMIN — INSULIN GLARGINE 18 UNITS: 100 INJECTION, SOLUTION SUBCUTANEOUS at 08:38

## 2021-08-10 RX ADMIN — CARVEDILOL 25 MG: 25 TABLET, FILM COATED ORAL at 08:39

## 2021-08-10 RX ADMIN — HYDRALAZINE HYDROCHLORIDE 10 MG: 10 TABLET, FILM COATED ORAL at 06:16

## 2021-08-10 RX ADMIN — GABAPENTIN 300 MG: 300 CAPSULE ORAL at 08:38

## 2021-08-10 RX ADMIN — TORSEMIDE 20 MG: 20 TABLET ORAL at 08:38

## 2021-08-10 RX ADMIN — DOCUSATE SODIUM 100 MG: 100 CAPSULE ORAL at 08:38

## 2021-08-10 NOTE — ASSESSMENT & PLAN NOTE
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

## 2021-08-10 NOTE — UTILIZATION REVIEW
Initial Clinical Review    Admission: Date/Time/Statement:   Admission Orders: Observation 8/8 @ 2002 and changed to Inpatient on 8/9 @ 1725  Pt requiring continued stay for Hypertension and Med adjustments     Ordered        08/09/21 1725  Inpatient Admission  Once         08/08/21 2002  Place in Observation  Once                   Orders Placed This Encounter   Procedures    Inpatient Admission     Standing Status:   Standing     Number of Occurrences:   1     Order Specific Question:   Level of Care     Answer:   Med Surg [16]     Order Specific Question:   Estimated length of stay     Answer:   More than 2 Midnights     Order Specific Question:   Certification     Answer:   I certify that inpatient services are medically necessary for this patient for a duration of greater than two midnights  See H&P and MD Progress Notes for additional information about the patient's course of treatment  ED Arrival Information     Expected Arrival Acuity    - 8/8/2021 17:30 Urgent         Means of arrival Escorted by Service Admission type    [facility] Urgent         Arrival complaint    high blood pressure        Chief Complaint   Patient presents with    Hypertension     states with pressure of 190/96,  c/o feeling warm in her abd       Initial Presentation:   73year old female presents to ed from home for evaluation and treatment of hypertension  SBP> 200 DBP >100   Clinical assessment significant for Hb 8 5, Bun 50, Cr  34 05,GFR 15, glucose 260  Imaging shows increased diffuse ground glass density  Eklg for non specific ST abnormality, prolonged QT  Treated in ef with iv labetalol, po hydralazine  Admit to observation for asymptomatic hypertension  Plan includes uptitrate hydralazine tid  Consult renal      8/9 Consult Nephrology; history of resistant hypertension and stage 4 chronic kidney disease with type 2 diabetes presents with hypertensive urgency  Admitted with BP of 220/100  About 4 days ago had discontinued chlorthalidone and amlodipine  Also as an outpatient is on clonidine 0 2 mg 3 times daily and carvedilol 25 mg 2 times daily  Torsemide is on hold his creatinine is elevated  Restarted oral blood pressure medications avoid hypotension  May have better luck with clonidine patch as a rebound from clonidine may have contributed  Secondary workup has been done in the past  CKD itself could induce hypertension has well  Continue to monitor on oral medications and the addition of hydralazine    8/9 Progress notes; Pt stopped outpatient BP meds due to side effects  BP improving with a new antihypertensive regimen  Nephrology following  Date: 8/10 Day 2:  Per Nephrology; Started on hydralazine tid  Consider decreasing torsemide to 20 mg daily  F/u renin aldosterone ratio  REI; creat ranged from 2 02 to 3 13, peak to 3 13         ED Triage Vitals [08/08/21 1739]   98 1 °F (36 7 °C) 69 18 (!) 235/97 100 %      Oral Monitor         No Pain          08/09/21 71 6 kg (157 lb 13 6 oz)     Additional Vital Signs:   08/10/21 07:10:11  97 9 °F (36 6 °C)  68  20  161/83  109  95 %  None (Room air)  Lying   08/10/21 06:09:25  98 °F (36 7 °C)  71  22  156/82  107  99 %  --  --   08/09/21 20:47:55  98 6 °F (37 °C)  86  18  175/112  Abnormal   133  97 %  None (Room air)       Date/Time  Temp  Pulse  Resp  BP  MAP   SpO2  O2 Device   08/09/21 12:42:02  --  85  --  151/100  117  93 %  --   08/09/21 0800  --  --  --  --  --  --  None (Room air)   08/09/21 07:02:47  97 8 °F (36 6 °C)  77  16  169/83  112  96 %  --   08/09/21 03:41:44  --  84  --  176/90Abnormal   119  96 %  --   08/08/21 22:30:17  --  80  18  186/90Abnormal   122  93 %  --   08/08/21 21:25:10  98 1 °F (36 7 °C)  78  18  186/88Abnormal   121  98 %  None (Room air)   08/08/21 2115  --  76  16  219/95Abnormal   --  100 %  None (Room air)   08/08/21 2057  --  78  14  231/98Abnormal   --  98 %  None (Room air)   08/08/21 2030  --  78  18 221/94Abnormal   --  100 %  None (Room air)   08/08/21 1953  --  79  15  207/90Abnormal   --  100 %  None (Room air)   08/08/21 1931  --  80  20  219/98Abnormal   --  100 %  None (Room air)   08/08/21 1917  --  77  22  211/98Abnormal   --  100 %  None (Room air)   08/08/21 1830  --  78  20  217/102Abnormal   147  100 %  --   08/08/21 1759  --  --  --  227/105Abnormal   --  --  --   08/08/21 1744  --  --  --  223/102Abnormal   --  --  --       Pertinent Labs/Diagnostic Test Results:     Collection Time Result Time Vent R Atrial R TX Int  QRSD Int  QT Int  QTC Int  P Axis QRS Axis T Wave Ax    08/08/21 17:48:22 08/09/21 08:32:07 78 78 168 80 438 499 47 67 106                     Normal sinus rhythm   Nonspecific ST abnormality   Prolonged QT   Abnormal ECG   No previous ECGs available                 XR chest 2 views   Final  (08/09 1118)      Mild diffuse interstitial prominence with increased groundglass density  Question related to interstitial edema or infiltrate              Results from last 7 days   Lab Units 08/09/21  0515 08/08/21  1807   WBC Thousand/uL 6 02 8 02   HEMOGLOBIN g/dL 8 2* 8 5*   HEMATOCRIT % 25 9* 27 2*   PLATELETS Thousands/uL 306 328   NEUTROS ABS Thousands/µL 3 21 5 68         Results from last 7 days   Lab Units 08/09/21  0515 08/08/21  1807   SODIUM mmol/L 141 139   POTASSIUM mmol/L 4 0 4 9   CHLORIDE mmol/L 104 101   CO2 mmol/L 30 31   ANION GAP mmol/L 7 7   BUN mg/dL 49* 50*   CREATININE mg/dL 2 76* 3 05*   EGFR ml/min/1 73sq m 17 15   CALCIUM mg/dL 7 7* 7 4*     Results from last 7 days   Lab Units 08/08/21  1807   AST U/L 19   ALT U/L 21   ALK PHOS U/L 86   TOTAL PROTEIN g/dL 6 7   ALBUMIN g/dL 2 7*   TOTAL BILIRUBIN mg/dL 0 14*   BILIRUBIN DIRECT mg/dL 0 07     Results from last 7 days   Lab Units 08/10/21  1137 08/10/21  0844 08/10/21  0713 08/09/21  2048 08/09/21  1642 08/09/21  1102 08/09/21  0707 08/08/21  2139 08/08/21  1805   POC GLUCOSE mg/dl 421* 222* 63* 180* 283* 152* 175* 210* 251*     Results from last 7 days   Lab Units 08/09/21  0515 08/08/21  1807   GLUCOSE RANDOM mg/dL 253* 260*       Results from last 7 days   Lab Units 08/08/21  1807   TROPONIN I ng/mL <0 02       Results from last 7 days   Lab Units 08/08/21  1807   LIPASE u/L 707*       Results from last 7 days   Lab Units 08/08/21  1826 08/08/21  1820   CLARITY UA  Clear Clear   COLOR UA  Yellow Yellow   SPEC GRAV UA  1 015 1 020   PH UA  7 0 7 0   GLUCOSE UA mg/dl 100 (1/10%)* 100 (1/10%)*   KETONES UA mg/dl Negative Negative   BLOOD UA  Trace* Trace*   PROTEIN UA mg/dl 100 (2+)* 100 (2+)*   NITRITE UA  Negative Negative   BILIRUBIN UA  Negative Negative   UROBILINOGEN UA E U /dl 0 2 0 2   LEUKOCYTES UA  Negative Negative   WBC UA /hpf 0-1*  --    RBC UA /hpf 1-2*  --    BACTERIA UA /hpf Occasional  --    EPITHELIAL CELLS WET PREP /hpf Occasional  --          ED Treatment:   Medication Administration from 08/08/2021 1730 to 08/08/2021 2128       Date/Time Order Dose Route Action     08/08/2021 1918 Labetalol HCl (NORMODYNE) injection 10 mg 10 mg Intravenous Given     08/08/2021 2004 Labetalol HCl (NORMODYNE) injection 10 mg 10 mg Intravenous Given     08/08/2021 2058 hydrALAZINE (APRESOLINE) tablet 10 mg 10 mg Oral Given        Past Medical History:   Diagnosis    Chronic kidney disease    Diabetes mellitus ([facility] )    Hyperlipidemia    Hypertension    Pneumonia     Present on Admission:   [facility]   Asymptomatic hypertensive urgency   Chronic diastolic heart failure (HCC)   Anemia in stage 4 chronic kidney disease (HCC)      Admitting Diagnosis:     Anemia [D64 9]  BP (high blood pressure) [I10]  Hyperglycemia [R73 9]  Hypertensive urgency [I16 0]  Elevated lipase [R74 8]  Stage 4 chronic kidney disease (HCC) [N18 4]    Age/Sex: 73 y o  female    Scheduled Medications:  carvedilol, 25 mg, Oral, BID With Meals  cloNIDine, 0 2 mg, Oral, TID  docusate sodium, 100 mg, Oral, BID  gabapentin, 300 mg, Oral, BID  heparin (porcine), 5,000 Units, Subcutaneous, Q8H DEBBIE  hydrALAZINE, 10 mg, Oral, Q8H DEBBIE  insulin glargine, 18 Units, Subcutaneous, QAM  terazosin, 5 mg, Oral, HS  torsemide, 20 mg, Oral, Daily      Continuous IV Infusions: None     PRN Meds:  acetaminophen, 650 mg, Oral, Q6H PRN  calcium carbonate, 1,000 mg, Oral, Daily PRN  ondansetron, 4 mg, Intravenous, Q6H PRN        IP CONSULT TO NEPHROLOGY    Network Utilization Review Department  ATTENTION: Please call with any questions or concerns to [phone] and carefully listen to the prompts so that you are directed to the right person  All voicemails are confidential   Coleen Rodriguez all requests for admission clinical reviews, approved or denied determinations and any other requests to dedicated fax number below belonging to the campus where the patient is receiving treatment   List of dedicated fax numbers for the Facilities:  [address] DENIALS (Administrative/Medical Necessity) [phone]   [address] (Maternity/NICU/Pediatrics) [phone]   [address] [place] [address] [address] [place] [address] Dr Minerva Penael Christofer [phone] [place] [address] [address] [facility] [place] [address] [phone]

## 2021-08-10 NOTE — NURSING NOTE
Patient discharged to home  Discharge instructions were reviewed with patient and patients granddaughter  IV was removed and patient was instructed to follow up with PCP in 1-2 weeks

## 2021-08-10 NOTE — PLAN OF CARE
Problem: PAIN - ADULT  Goal: Verbalizes/displays adequate comfort level or baseline comfort level  Description: Interventions:  - Encourage patient to monitor pain and request assistance  - Assess pain using appropriate pain scale  - Administer analgesics based on type and severity of pain and evaluate response  - Implement non-pharmacological measures as appropriate and evaluate response  - Consider cultural and social influences on pain and pain management  - Notify physician/advanced practitioner if interventions unsuccessful or patient reports new pain  Outcome: Adequate for Discharge     Problem: INFECTION - ADULT  Goal: Absence or prevention of progression during hospitalization  Description: INTERVENTIONS:  - Assess and monitor for signs and symptoms of infection  - Monitor lab/diagnostic results  - Monitor all insertion sites, i e  indwelling lines, tubes, and drains  - Monitor endotracheal if appropriate and nasal secretions for changes in amount and color  - Marvell appropriate cooling/warming therapies per order  - Administer medications as ordered  - Instruct and encourage patient and family to use good hand hygiene technique  - Identify and instruct in appropriate isolation precautions for identified infection/condition  Outcome: Adequate for Discharge  Goal: Absence of fever/infection during neutropenic period  Description: INTERVENTIONS:  - Monitor WBC    Outcome: Adequate for Discharge     Problem: Knowledge Deficit  Goal: Patient/family/caregiver demonstrates understanding of disease process, treatment plan, medications, and discharge instructions  Description: Complete learning assessment and assess knowledge base    Interventions:  - Provide teaching at level of understanding  - Provide teaching via preferred learning methods  Outcome: Adequate for Discharge     Problem: NEUROSENSORY - ADULT  Goal: Achieves stable or improved neurological status  Description: INTERVENTIONS  - Monitor and report changes in neurological status  - Monitor vital signs such as temperature, blood pressure, glucose, and any other labs ordered   - Initiate measures to prevent increased intracranial pressure  - Monitor for seizure activity and implement precautions if appropriate      Outcome: Adequate for Discharge  Goal: Remains free of injury related to seizures activity  Description: INTERVENTIONS  - Maintain airway, patient safety  and administer oxygen as ordered  - Monitor patient for seizure activity, document and report duration and description of seizure to physician/advanced practitioner  - If seizure occurs,  ensure patient safety during seizure  - Reorient patient post seizure  - Seizure pads on all 4 side rails  - Instruct patient/family to notify RN of any seizure activity including if an aura is experienced  - Instruct patient/family to call for assistance with activity based on nursing assessment  - Administer anti-seizure medications if ordered    Outcome: Adequate for Discharge  Goal: Achieves maximal functionality and self care  Description: INTERVENTIONS  - Monitor swallowing and airway patency with patient fatigue and changes in neurological status  - Encourage and assist patient to increase activity and self care     - Encourage visually impaired, hearing impaired and aphasic patients to use assistive/communication devices  Outcome: Adequate for Discharge     Problem: GASTROINTESTINAL - ADULT  Goal: Minimal or absence of nausea and/or vomiting  Description: INTERVENTIONS:  - Administer IV fluids if ordered to ensure adequate hydration  - Maintain NPO status until nausea and vomiting are resolved  - Nasogastric tube if ordered  - Administer ordered antiemetic medications as needed  - Provide nonpharmacologic comfort measures as appropriate  - Advance diet as tolerated, if ordered  - Consider nutrition services referral to assist patient with adequate nutrition and appropriate food choices  Outcome: Adequate for Discharge  Goal: Maintains or returns to baseline bowel function  Description: INTERVENTIONS:  - Assess bowel function  - Encourage oral fluids to ensure adequate hydration  - Administer IV fluids if ordered to ensure adequate hydration  - Administer ordered medications as needed  - Encourage mobilization and activity  - Consider nutritional services referral to assist patient with adequate nutrition and appropriate food choices  Outcome: Adequate for Discharge  Goal: Maintains adequate nutritional intake  Description: INTERVENTIONS:  - Monitor percentage of each meal consumed  - Identify factors contributing to decreased intake, treat as appropriate  - Assist with meals as needed  - Monitor I&O, weight, and lab values if indicated  - Obtain nutrition services referral as needed  Outcome: Adequate for Discharge  Goal: Oral mucous membranes remain intact  Description: INTERVENTIONS  - Assess oral mucosa and hygiene practices  - Implement preventative oral hygiene regimen  - Implement oral medicated treatments as ordered  - Initiate Nutrition services referral as needed  Outcome: Adequate for Discharge     Problem: RESPIRATORY - ADULT  Goal: Achieves optimal ventilation and oxygenation  Description: INTERVENTIONS:  - Assess for changes in respiratory status  - Assess for changes in mentation and behavior  - Position to facilitate oxygenation and minimize respiratory effort  - Oxygen administered by appropriate delivery if ordered  - Initiate smoking cessation education as indicated  - Encourage broncho-pulmonary hygiene including cough, deep breathe, Incentive Spirometry  - Assess the need for suctioning and aspirate as needed  - Assess and instruct to report SOB or any respiratory difficulty  - Respiratory Therapy support as indicated  Outcome: Adequate for Discharge     Problem: MUSCULOSKELETAL - ADULT  Goal: Maintain or return mobility to safest level of function  Description: INTERVENTIONS:  - Assess patient's ability to carry out ADLs; assess patient's baseline for ADL function and identify physical deficits which impact ability to perform ADLs (bathing, care of mouth/teeth, toileting, grooming, dressing, etc )  - Assess/evaluate cause of self-care deficits   - Assess range of motion  - Assess patient's mobility  - Assess patient's need for assistive devices and provide as appropriate  - Encourage maximum independence but intervene and supervise when necessary  - Involve family in performance of ADLs  - Assess for home care needs following discharge   - Consider OT consult to assist with ADL evaluation and planning for discharge  - Provide patient education as appropriate  Outcome: Adequate for Discharge  Goal: Maintain proper alignment of affected body part  Description: INTERVENTIONS:  - Support, maintain and protect limb and body alignment  - Provide patient/ family with appropriate education  Outcome: Adequate for Discharge

## 2021-08-10 NOTE — PROGRESS NOTES
Progress Note - Nephrology   [address] [facility] 73 y o  female MRN: [id number]  Unit/Bed#: [facility] 2 -02 Encounter: [id number]      Assessment     Hypertensive urgency with resistant hypertension     ·  patient home medications-terazosin 5 mg at HS, clonidine 0 2 mg t i d , carvedilol 25 mg b i d , torsemide 20 mg b i d ( recently increased to b i d )  Patient discontinue chlorthalidone, amlodipine  ·  patient has been started on hydralazine 10 mg t i d   ·  consider decreasing torsemide to 20 mg once a day  ·   VAS renal did not show any significant renal artery stenosis  ·  x-ray chest from this morning showed mild diffuse interstitial prominence with increased ground-glass density  ·  stop Bang score-  Unable to ask history part,  Will call patient's family tomorrow  ·   Renal ultrasound in July shows trace right perinephric free fluid without evidence of right or left hydronephrosis  Right kidney- 9 3 cm in length, normal thickness, echogenicity, left kidney 10 7 cm in length normal parenchyma, echogenicity      plan  ·   will follow-up renin aldosterone ratio  ·  recommend sleep apnea test   ·  monitor blood pressure  ·  continue home medications and hydralazine 10 mg t i d   · Consider increasing hydralazine to 15 mg t i d   · Will do Orthostatic vitals- as patient was complaining of dizziness  ·  antihypertensive p r n  for SBP greater than 180      REI on CKD stage 4     ·  unable to establish baseline creatinine  ·  creatinine ranged from 2 02  To 3 13  ·  peak creatinine 3 13 on 07/15, on presentation had a creatinine of 3 05  ·  EGFR ranging from 15-18 since June    ·  C3, C4, WARREN normal- SPEP-off of on, alpha 2 elevated  · Urine protein elevated  ·   Likely associated with hypertensive urgency,  Possible component of diabetic nephropathy, hypertensive nephrosclerosis  ·  proteinuria  ·  blood pressure control-  See plan above  ·  avoid nephrotoxins  ·  avoid hypotension  ·  renally dose medications      2 diabetes mellitus with long-term use of insulin with complications- retinopathy and nephropathy     ·  most recent hemoglobin A1c 9 5  ·  microalbumin to creatinine ratio- 3172 in June 2021,  Significant elevation from October 2020  ·  blood glucose management-  Management per primary  ·  see plan as above for CKD      Anemia in CKD     ·  baseline hemoglobin 9  ·  on presentation -8 5  ·  iron panel done on 07/25,  Ferritin - 71, iron- 69, transferrin- 155,  TIBC-192  ·  Associated with iron deficiency  ·  consider oral iron supplementation  ·  repeat iron panel in 4 weeks     will discuss with attending  Final recommendations will be made by attending     Subjective:     Patient states no complaints- dizziness when she gets up off the bed,    Objective:     Vitals: Blood pressure 161/83, pulse 68, temperature 97 9 °F (36 6 °C), temperature source Oral, resp  rate 20, weight 69 6 kg (153 lb 7 oz), SpO2 95 %, not currently breastfeeding  ,Body mass index is 28 99 kg/m²  Weight (last 2 days)     Date/Time   Weight    08/10/21 0538   69 6 (153 44)    08/09/21 0600   71 6 (157 85)    08/08/21 1739   71 9 (158 51)              No intake or output data in the 24 hours ending 08/10/21 1057         Physical Exam: Physical Exam  Vitals and nursing note reviewed  Constitutional:       Appearance: Normal appearance  HENT:      Head: Normocephalic and atraumatic  Nose: Nose normal       Mouth/Throat:      Mouth: Mucous membranes are dry  Pharynx: Oropharynx is clear  Eyes:      Extraocular Movements: Extraocular movements intact  Conjunctiva/sclera: Conjunctivae normal    Cardiovascular:      Rate and Rhythm: Normal rate and regular rhythm  Heart sounds: Murmur heard  Pulmonary:      Effort: Pulmonary effort is normal       Breath sounds: Normal breath sounds  Abdominal:      General: Bowel sounds are normal       Palpations: Abdomen is soft     Skin:     General: Skin is warm and dry  Capillary Refill: Capillary refill takes less than 2 seconds  Neurological:      General: No focal deficit present  Mental Status: She is alert and oriented to person, place, and time  Psychiatric:         Mood and Affect: Mood normal          Behavior: Behavior normal          Thought Content: Thought content normal          Judgment: Judgment normal           Lab, Imaging and other studies: I have personally reviewed pertinent labs      Results from last 7 days   Lab Units 08/09/21  0515 08/08/21  1807   SODIUM mmol/L 141 139   POTASSIUM mmol/L 4 0 4 9   CHLORIDE mmol/L 104 101   CO2 mmol/L 30 31   ANION GAP mmol/L 7 7   BUN mg/dL 49* 50*   CREATININE mg/dL 2 76* 3 05*   CALCIUM mg/dL 7 7* 7 4*   ALK PHOS U/L  --  86   ALT U/L  --  21   AST U/L  --  19

## 2021-08-10 NOTE — ASSESSMENT & PLAN NOTE
Patient stopped her outpatient Norvasc and chlorthalidone due to side effects    Patient was seen by Nephrology in the hospital    They started Coreg, clonidine, torsemide and hydralazine    Her blood pressure is under much better control  I will send her home on these medications

## 2021-08-10 NOTE — DISCHARGE SUMMARY
[address] Montero  Discharge- [address] 1955, 73 y o  female MRN: [id number]  Unit/Bed#: [facility] 218-02 Encounter: [id number]  Primary Care Provider: Hayder Kiran MD   Date and time admitted to hospital: 8/8/2021  5:36 PM    * Asymptomatic hypertensive urgency  Assessment & Plan  Patient stopped her outpatient Norvasc and chlorthalidone due to side effects    Patient was seen by Nephrology in the hospital    They started Coreg, clonidine, torsemide and hydralazine    Her blood pressure is under much better control  I will send her home on these medications  Monoclonal gammopathy  Assessment & Plan  She is noted to have a mono clonal gammopathy of unclear significance on recent outpatient lab work  I see that nephrology is following up on this  I also made a referral for Heme-Onc as an outpatient    Stage 4 chronic kidney disease [facility])  [address] Nephrology help    She follows with Nephrology as an outpatient  Discharging Physician / Practitioner: Bre Epps DO  PCP: Hayder Kiran MD  Admission Date:   Admission Orders (From admission, onward)     Ordered        08/09/21 1725  Inpatient Admission  Once         08/08/21 2002  Place in Observation  Once                   Discharge Date: 08/10/21    Medical Problems     Resolved Problems  Date Reviewed: 8/8/2021    None                  Consultations During Hospital Stay:  · nephrology      ·       Reason for Admission:  Asymptomatic accelerated hypertension      Hospital Course:     [address]aval[address] is a 73 y o  female patient who originally presented to the hospital on 8/8/2021 due to accelerated hypertension  Patient stopped her Norvasc and chlorthalidone as an outpatient due to side effects  She is having no symptoms  She was seen by Nephrology in the hospital   The have her on Coreg, torsemide, clonidine, and hydralazine  Blood pressure is under much better control    She is okay to go home  Of note she has monoclonal gammopathy on recent outpatient blood work  I see that nephrology is following this as well  I also made an ambulatory referral to Hematology    Please see above list of diagnoses and related plan for additional information  Condition at Discharge: stable       Discharge Day Visit / Exam:     Subjective:  Feels well  No complaints  No headache  Vitals: Blood Pressure: 161/83 (08/10/21 0710)  Pulse: 68 (08/10/21 0710)  Temperature: 97 9 °F (36 6 °C) (08/10/21 0710)  Temp Source: Oral (08/10/21 0710)  Respirations: 20 (08/10/21 0710)  Weight - Scale: 69 6 kg (153 lb 7 oz) (08/10/21 0538)  SpO2: 95 % (08/10/21 0710)    Exam:     Physical Exam  Vitals and nursing note reviewed  HENT:      Head: Normocephalic and atraumatic  Eyes:      Pupils: Pupils are equal, round, and reactive to light  Cardiovascular:      Rate and Rhythm: Normal rate and regular rhythm  Heart sounds: No murmur heard  No friction rub  No gallop  Pulmonary:      Effort: Pulmonary effort is normal       Breath sounds: Normal breath sounds  No wheezing or rales  Abdominal:      General: Bowel sounds are normal       Palpations: Abdomen is soft  Tenderness: There is no abdominal tenderness  Musculoskeletal:      Right lower leg: No edema  Left lower leg: No edema            Discharge instructions/Information to patient and family:   See after visit summary for information provided to patient and family  Provisions for Follow-Up Care:  See after visit summary for information related to follow-up care and any pertinent home health orders  Disposition:     Home       Discharge Statement:  I spent 36 minutes discharging the patient  This time was spent on the day of discharge  I had direct contact with the patient on the day of discharge   Greater than 50% of the total time was spent examining patient, answering all patient questions, arranging and

## 2021-08-11 ENCOUNTER — TRANSITIONAL CARE MANAGEMENT (OUTPATIENT)
Dept: FAMILY MEDICINE CLINIC | Facility: CLINIC | Age: 66
End: 2021-08-11

## 2021-08-11 ENCOUNTER — TELEPHONE (OUTPATIENT)
Dept: NEPHROLOGY | Facility: CLINIC | Age: 66
End: 2021-08-11

## 2021-08-11 DIAGNOSIS — N18.4 STAGE 4 CHRONIC KIDNEY DISEASE (HCC): Primary | ICD-10-CM

## 2021-08-11 DIAGNOSIS — I10 ESSENTIAL HYPERTENSION: ICD-10-CM

## 2021-08-11 NOTE — TELEPHONE ENCOUNTER
----- Message from Theresa Briones DO sent at 8/10/2021  2:58 PM EDT -----  Regarding: FOllow up  Please schedule the following patient for follow-up for titration of blood pressure medication with Dr Casper Lawton or AP  Patient needs a repeat BMP in 1 week in follow-up in the next 1-2 weeks for blood pressure management    Dr Casper Lawton I sent a copy of latest note and BP medications changes

## 2021-08-11 NOTE — TELEPHONE ENCOUNTER
Spoke with the patient using  [id number]  I scheduled her in 2 weeks with Dr Wanda Samson for her BP check  I also made her aware she needs to get a BMP done in a week   She stated her understanding and also said she will be going to a [facility]

## 2021-08-13 LAB
ALDOST SERPL-MCNC: 2.9 NG/DL (ref 0–30)
ALDOST/RENIN PLAS-RTO: >17.4 {RATIO} (ref 0–30)
RENIN PLAS-CCNC: <0.167 NG/ML/HR (ref 0.17–5.38)

## 2021-08-22 NOTE — PROGRESS NOTES
Hematology Outpatient Office Note    Date of Service: 2021    [facility] HEMATOLOGY SPECIALISTS [facility]  [phone]    Reason for Consultation:   Chief Complaint   Patient presents with    Consult       Referral Physician: Alpa Leyva DO    Primary Care Physician:  Monica Restrepo MD     Nickname: Albertina Yeboah lives with her at home: Davida Dutton      Original ECO    Today's ECO      ASSESSMENT & PLAN      Diagnosis ICD-10-CM Associated Orders   1  Monoclonal gammopathy  D47 2 Immunoglobulin free LT chains blood     IgG, IgA, IgM     Protein,Total and Protein Electrophoresis,24 Hour Urine and Immunofixation     LD,Blood     XR bone survey complete >12 mos     CBC and differential     Comprehensive metabolic panel     IR biopsy bone marrow     Peripheral Smear     Direct antiglobulin test     Retic Count with Reticulocyte HGB     Copper Level     Erythropoietin     Zinc   2  Normocytic anemia  D64 9 Immunoglobulin free LT chains blood     IgG, IgA, IgM     Protein,Total and Protein Electrophoresis,24 Hour Urine and Immunofixation     LD,Blood     XR bone survey complete >12 mos     CBC and differential     Comprehensive metabolic panel     IR biopsy bone marrow     Peripheral Smear     Direct antiglobulin test     Retic Count with Reticulocyte HGB     Copper Level     Erythropoietin     Zinc         This is a 73 y o  c PMHx notable for DM, being seen in consultation for monoclonal gammopathy and plan to r/o MM, etc    Lambda biclonal monoclonal process: 21 SPEP and ELISSA noted two regions of 0 06 and 0 12 mgdL, respectively  New end-organ damage of anemia since 21 (last normal 2018)  Renal dysfunction is new since 2020 (please see below chart)  The patient is noted to have uncontrolled HTN which may be contributing to the worsening kidney function, however, that cannot be assumed  MM needs to be ruled out    As of 8/22/2021 16:05   Ref  Range 12/11/2018 09:42 4/27/2021 09:31 6/30/2021 08:18 8/8/2021 18:07 8/9/2021 05:15   WBC Latest Ref Range: 4 31 - 10 16 Thousand/uL 5 40 6 40 6 40 8 02 6 02   Red Blood Cell Count Latest Ref Range: 3 81 - 5 12 Million/uL 4 80 3 57 (L) 3 61 (L) 3 25 (L) 3 13 (L)   Hemoglobin Latest Ref Range: 11 5 - 15 4 g/dL 13 0 9 3 (L) 9 6 (L) 8 5 (L) 8 2 (L)   HCT Latest Ref Range: 34 8 - 46 1 % 40 4 28 5 (L) 29 7 (L) 27 2 (L) 25 9 (L)   MCV Latest Ref Range: 82 - 98 fL 84 80 82 84 83   MCH Latest Ref Range: 26 8 - 34 3 pg 27 1 26 2 26 6 26 2 (L) 26 2 (L)   MCHC Latest Ref Range: 31 4 - 37 4 g/dL 32 2 32 8 32 4 31 3 (L) 31 7   RDW Latest Ref Range: 11 6 - 15 1 % 15 6 (H) 18 7 (H) 15 7 (H) 15 2 (H) 15 2 (H)   Platelet Count Latest Ref Range: 149 - 390 Thousands/uL 294 250 275 328 306   MPV Latest Ref Range: 8 9 - 12 7 fL 9 0 8 7 (L) 8 4 (L) 10 5 10 3   nRBC Latest Units: /100 WBCs    0 0       It will be important to determine if this is MGUS, Smoldering Myeloma, or Multiple Myeloma  To definitively rule out a paraproteinemia, we will need to order a more comprehensive paraproteinemia work-up  This will include lab tests below as well as a bone marrow biopsy  In regards to CHRISTAL use, the literature suggests decreased overall survival in multiple myeloma when these agents are used  https://pubmed ncbi nlm nih gov/83328358/    https://pubmed ncbi nlm nih gov/47779044/    Normocytic Anemia: new as per above  No nutritional profile on board  · Plan/Labs  · IgG, IgM, IgA, serum free light chains, skeletal survey, SPEP + ELISSA (repeated and it is noted that this was done a month or two ago), 24 hour urine (UPEP), B-2 microglobulin  · IR order to get a bone marrow biopsy to evaluate for MM    Pt is agreeable and has given permission for me to pursue this   · Anemia w/u as per above orders  · F/u in 2 weeks to discuss above results accompanied by CBC with diff, CMP      MGUS discussion of risk progression    In the [facility] risk stratification model, risk of progression is increased  with serum monoclonal protein greater than 1 5 grams/deciliter, non-IgG disease, and abnormal serum free light chain ratio defined as less than 0 26 or greater than 1 65  At time of presentation, MGUS patients should be stratified based on number of risk factors:  High risk patients possess all 3 risk factors and have a 20 year progression risk of 58%; high intermediate risk have 2 risk factors and 37% risk of progression; low intermediate risk have 1 risk factor and a 21% chance of progression; low risk have no risk factors and a 5% risk of progression to MM in 20 years  · Discussion of decision making    I personally reviewed the following lab results, the image studies, pathology, other specialty/physicians consult notes and recommendations, and outside medical records  I had a lengthy discussion with the patient and shared the work-up findings  We discussed the diagnosis and management plan as below  I spent 47 minutes reviewing the records (labs, clinician notes, outside records, medical history, ordering medicine/tests/procedures, interpreting the imaging/labs previously done) and coordination of care as well as direct time with the patient today, of which greater than 50% of the time was spent in counseling and coordination of care with the patient/family  Follow Up: 2 weeks for expected timeline of above results    All questions were answered to the patient's satisfaction during this encounter  The patient knows the contact information for our office and knows to reach out for any relevant concerns related to this encounter  They are to call for any temperature 100 4 or higher, new symptoms including but not restricted to shaking chills, decreased appetite, nausea, vomiting, diarrhea, increased fatigue, shortness of breath or chest pain, confusion, and not feeling the strength to come to the clinic  For all other listed problems and medical diagnosis in their chart - they are managed by PCP and/or other specialists, which the patient acknowledges  Thank you very much for your consultation and making us a part of this patient's care  We are continuing to follow closely with you  Please do not hesitate to reach out to me with any additional questions or concerns  Abbi Hooks MD  Hematology & Medical Oncology Staff Physician             Disclaimer: This document was prepared using [employer] Fluency Direct technology  If a word or phrase is confusing, or does not make sense, this is likely due to recognition error which was not discovered during this clinician's review  If you believe an error has occurred, please contact me through [address] [place] line for louise? 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   New medications in the last month: Hydralazine  Pain: none      SUBJECTIVE  (INTERVAL HISTORY)      She was in the hospital 8/8/21 for HTN urgency  Her BP has been better controlled at home on Hydralazine  There were abnormalities in her blood work noted that needed to be followed up on and that is why she thinks she needs to see the blood doctor  I have reviewed the relevant past medical, surgical, social and family history  I have also reviewed allergies and medications for this patient  Review of Systems     She denies F/C, N/V, SOB, CP, abdominal pain, RODRÍGUEZ, rash, itching, hematuria, melena, hematochezia, or unintentional weight loss  A 10-point review of system was performed, pertinent positive and negative were detailed as above   Otherwise, the 10-point review of system was negative  Past Medical History:   Diagnosis Date    Chronic kidney disease     Diabetes mellitus ([address] )     Hyperlipidemia     Hypertension     Pneumonia        Past Surgical History:   Procedure Laterality Date    EYE SURGERY         Family History   Problem Relation Age of Onset    Hypertension Mother     Diabetes Father     Hypertension Sister     Diabetes Sister     Hypertension Brother        Social History     Socioeconomic History    Marital status: /Civil Union     Spouse name: Not on file    Number of children: Not on file    Years of education: Not on file    Highest education level: Not on file   Occupational History    Not on file   Tobacco Use    Smoking status: Never Smoker    Smokeless tobacco: Never Used    Tobacco comment: NO TOBACCO USE   Substance and Sexual Activity    Alcohol use: Not on file    Drug use: Not on file    Sexual activity: Not on file   Other Topics Concern    Not on file   Social History Narrative    Not on file     Social Determinants of Health     Financial Resource Strain:     Difficulty of Paying Living Expenses:    Food Insecurity:     Worried About Running Out of Food in the Last Year:     Ran Out of Food in the Last Year:    Transportation Needs:     Lack of Transportation (Medical):  Lack of Transportation (Non-Medical):    Physical Activity:     Days of Exercise per Week:     Minutes of Exercise per Session:    Stress:     Feeling of Stress :    Social Connections:     Frequency of Communication with Friends and Family:     Frequency of Social Gatherings with Friends and Family:     Attends Church Services:     Active Member of Clubs or Organizations:     Attends Club or Organization Meetings:     Marital Status:    Intimate Partner Violence:     Fear of Current or Ex-Partner:     Emotionally Abused:     Physically Abused:     Sexually Abused:         Allergies   Allergen Reactions    Amlodipine Edema and Eye Swelling    Lisinopril Angioedema     Bilateral periorbital edema that was significant       Current Outpatient Medications   Medication Sig Dispense Refill    Accu-Chek FastClix Lancets MISC Inject under the skin 2 (two) times a day Test 102 each 5    Accu-Chek Guide test strip Use 1 each 2 (two) times a day Test 100 each 5    Alcohol Swabs (EASY TOUCH ALCOHOL PREP MEDIUM) 70 % PADS USE 2 TO 3 X PER DAY  3    Blood Glucose Monitoring Suppl (OneTouch Verio) w/Device KIT Use 2 (two) times a day 1 kit 0    carvedilol (COREG) 25 mg tablet Take 1 tablet (25 mg total) by mouth 2 (two) times a day with meals 60 tablet 3    cloNIDine (CATAPRES) 0 2 mg tablet Take 1 tablet (0 2 mg total) by mouth 3 (three) times a day      ergocalciferol (ERGOCALCIFEROL) 1 25 MG (56407 UT) capsule Take 50,000 Units by mouth every 7 days      gabapentin (NEURONTIN) 300 mg capsule Take 1 capsule (300 mg total) by mouth 2 (two) times a day 90 capsule 2    glimepiride (AMARYL) 2 mg tablet Take 2 mg by mouth daily      glucose blood (OneTouch Verio) test strip Use as instructed 100 each 3    hydrALAZINE (APRESOLINE) 25 mg tablet Take 1 tablet (25 mg total) by mouth 2 (two) times a day 180 each 0    insulin glargine (LANTUS) 100 units/mL subcutaneous injection Inject 18 Units under the skin every morning 10 mL 0    Insulin Pen Needle (Unifine Pentips) 32G X 4 MM MISC Use daily 100 each 2    Lancets (onetouch ultrasoft) lancets Use as instructed 100 each 3    linaGLIPtin (Tradjenta) 5 MG TABS Take 5 mg by mouth daily      terazosin (HYTRIN) 5 mg capsule Take 5 mg by mouth      torsemide (DEMADEX) 20 mg tablet Take 1 tablet (20 mg total) by mouth daily 90 each 1    atorvastatin (LIPITOR) 40 mg tablet Take 1 tablet (40 mg total) by mouth daily (Patient not taking: Reported on 8/8/2021) 90 tablet 3     No current facility-administered medications for this visit         (Not in a hospital admission)        Objective:     24 Hour Vitals Assessment:     Vitals:    08/26/21 1432   BP: 132/76   Pulse: 75   Resp: 16   Temp: 98 5 °F (36 9 °C)   SpO2: 98%       PHYSICIAN EXAM:    General: Appearance: alert, cooperative, no distress  HEENT: Normocephalic, atraumatic  No scleral icterus  conjunctivae clear  EOMI  Chest: No tenderness to palpation  No open wound noted  Lungs: Clear to auscultation bilaterally, Respirations unlabored  Cardiac: Regular rate and rhythm, +S1and S2, -r/m/g  Abdomen: Soft, non-tender, non-distended  Bowel sounds are normal    Extremities:  No cyanosis, clubbing  Non pitting edema in lower extream +1 at ankles b/l  Skin: Skin color, turgor are normal  No rashes  Neurologic: Awake, Alert, and oriented, no gross focal deficits noted b/l  DATA REVIEW:    Pathology Result:    No results found for: FINALDX       Image Results:   Image result are reviewed and documented in Hematology/Oncology history  I personally reviewed these images  XR chest 2 views  Narrative: CHEST     INDICATION:   HTN     COMPARISON:  None    EXAM PERFORMED/VIEWS:  XR CHEST PA & LATERAL    FINDINGS:    Cardiomediastinal silhouette appears unremarkable  Mild diffuse interstitial prominence with increased groundglass density  No pneumothorax or pleural effusion  Osseous structures appear within normal limits for patient age  Impression: Mild diffuse interstitial prominence with increased groundglass density  Question related to interstitial edema or infiltrate  The study was marked in [place] [id number] for immediate notification  Workstation performed: [id number]      LABS:  Lab data are reviewed and documented in HemOnc history         Lab Results   Component Value Date    HGB 8 2 (L) 08/09/2021    HCT 25 9 (L) 08/09/2021    MCV 83 08/09/2021     08/09/2021    WBC 6 02 08/09/2021    NRBC 0 08/09/2021     Lab Results   Component Value Date     06/08/2018    K 4 0 08/09/2021    CL 104 08/09/2021    CO2 30 08/09/2021    ANIONGAP 10 06/08/2018    BUN 49 (H) 08/09/2021    CREATININE 2 76 (H) 08/09/2021    GLUF 253 (H) 08/09/2021    CALCIUM 7 7 (L) 08/09/2021    CORRECTEDCA 8 8 07/29/2021    AST 19 08/08/2021    ALT 21 08/08/2021    ALKPHOS 86 08/08/2021    PROT 7 5 06/08/2018    BILITOT 0 3 06/08/2018    EGFR 17 08/09/2021       No results found for: IRON, TIBC, FERRITIN    No results found for: [id number]    No results for input(s): WBC, CREAT in the last 72 hours      Invalid input(s):  PLT     By:  Eliceo Fuchs MD, 8/26/2021, 2:49 PM

## 2021-08-23 ENCOUNTER — OFFICE VISIT (OUTPATIENT)
Dept: FAMILY MEDICINE CLINIC | Facility: CLINIC | Age: 66
End: 2021-08-23

## 2021-08-23 VITALS
RESPIRATION RATE: 18 BRPM | OXYGEN SATURATION: 97 % | WEIGHT: 154 LBS | HEART RATE: 77 BPM | SYSTOLIC BLOOD PRESSURE: 180 MMHG | TEMPERATURE: 97.8 F | DIASTOLIC BLOOD PRESSURE: 96 MMHG | BODY MASS INDEX: 29.1 KG/M2

## 2021-08-23 DIAGNOSIS — I16.0 ASYMPTOMATIC HYPERTENSIVE URGENCY: Primary | ICD-10-CM

## 2021-08-23 DIAGNOSIS — R60.0 BILATERAL LOWER EXTREMITY EDEMA: ICD-10-CM

## 2021-08-23 PROCEDURE — 99495 TRANSJ CARE MGMT MOD F2F 14D: CPT | Performed by: FAMILY MEDICINE

## 2021-08-23 RX ORDER — TORSEMIDE 20 MG/1
20 TABLET ORAL DAILY
Qty: 90 EACH | Refills: 1 | Status: SHIPPED | OUTPATIENT
Start: 2021-08-23 | End: 2021-08-27 | Stop reason: SDUPTHER

## 2021-08-23 RX ORDER — HYDRALAZINE HYDROCHLORIDE 25 MG/1
25 TABLET, FILM COATED ORAL 2 TIMES DAILY
Qty: 180 EACH | Refills: 0 | Status: SHIPPED | OUTPATIENT
Start: 2021-08-23 | End: 2021-08-27 | Stop reason: SDUPTHER

## 2021-08-23 NOTE — ASSESSMENT & PLAN NOTE
-Recently admitted for hypertensive urgency  -Current medication incuding hydralazine 10 mg q8, coreg 25mg BID, torsemide 20mg daily and clonidine 0 2mg TID  -Will increase hydralazine to 25mg BID  -Recommend low Sodium diet  -Follow up with Nephrology

## 2021-08-23 NOTE — ASSESSMENT & PLAN NOTE
Wt Readings from Last 3 Encounters:   08/23/21 69 9 kg (154 lb)   08/10/21 69 6 kg (153 lb 7 oz)   08/05/21 74 4 kg (164 lb)   -Currently well compensated  -Continue torsemide 20 mg daily  -Continue monitoring daily weight  -Recommend <2g low sodium diet  -Continue follow up with nephrology as outpatient

## 2021-08-23 NOTE — ASSESSMENT & PLAN NOTE
Lab Results   Component Value Date    EGFR 17 08/09/2021    EGFR 15 08/08/2021    EGFR 18 (L) 07/29/2021    CREATININE 2 76 (H) 08/09/2021    CREATININE 3 05 (H) 08/08/2021    CREATININE 2 62 (H) 07/29/2021    -Most likely etiology for CKD can be attributed to uncontrolled HTN and type II DM   -Advised increased hydration  -Encouraged to obtain EMILIANO study  -Avoid nephrotoxic meds   -Continue follow up with nephrology

## 2021-08-23 NOTE — PROGRESS NOTES
Transition of Care  Follow-up After Hospitalization    [address] 73 y o  female   Date:  8/23/2021    TCM Call (since 7/23/2021)     Date and time call was made  8/11/2021  9:48 AM    Hospital care reviewed  Discussed with Inpatient Physician; Records reviewed    [place]  [facility] [place]        Date of Admission  08/08/21    Date of discharge  08/10/21    Diagnosis  ASYMPTOMATIC  HYPERTENSIVE    Disposition  Home    Were the patients medications reviewed and updated  Yes    Current Symptoms  None      TCM Call (since 7/23/2021)     Post hospital issues  None    I have advised the patient to call PCP with any new or worsening symptoms  [facility] [place] records were reviewed  Medications upon discharge reviewed/updated  Discharge Disposition:  good  Follow up visits with other specialists: Hematology      HPI:  Chelsea Zavaleta is a 73 y o  female patient who originally presented to the hospital on 8/8/2021 due to accelerated hypertension here for hospital follow up  Patient stopped her Norvasc and chlorthalidone as an outpatient due to side effects  She had no symptoms  She was seen by Nephrology in the hospital   They placed her on Coreg 25mg BID, torsemide, clonidine, and hydralazine 10m Q8g  Blood pressure was under much better control in the hospital prior to discharge  She is seeeing nephrologist next Tuesday  She has no symptoms today  She states that she has been taking her medications daily        ROS: Review of Systems   Constitutional: Negative for chills, diaphoresis, fatigue and fever  HENT: Negative for congestion  Eyes: Negative for visual disturbance  Respiratory: Negative for cough and shortness of breath  Cardiovascular: Negative for chest pain, palpitations and leg swelling  Gastrointestinal: Negative for abdominal pain, blood in stool, constipation, nausea and vomiting  Musculoskeletal: Negative for back pain     Skin: Negative for rash    Neurological: Negative for dizziness, tremors, seizures, facial asymmetry, weakness, light-headedness, numbness and headaches  Hematological: Negative for adenopathy  Psychiatric/Behavioral: Negative for agitation  Past Medical History:   Diagnosis Date    Chronic kidney disease     Diabetes mellitus ([facility] )     Hyperlipidemia     Hypertension     Pneumonia        Past Surgical History:   Procedure Laterality Date    EYE SURGERY         Social History     Socioeconomic History    Marital status: /Civil Union     Spouse name: None    Number of children: None    Years of education: None    Highest education level: None   Occupational History    None   Tobacco Use    Smoking status: Never Smoker    Smokeless tobacco: Never Used    Tobacco comment: NO TOBACCO USE   Substance and Sexual Activity    Alcohol use: None    Drug use: None    Sexual activity: None   Other Topics Concern    None   Social History Narrative    None     Social Determinants of Health     Financial Resource Strain:     Difficulty of Paying Living Expenses:    Food Insecurity:     Worried About Running Out of Food in the Last Year:     Ran Out of Food in the Last Year:    Transportation Needs:     Lack of Transportation (Medical):      Lack of Transportation (Non-Medical):    Physical Activity:     Days of Exercise per Week:     Minutes of Exercise per Session:    Stress:     Feeling of Stress :    Social Connections:     Frequency of Communication with Friends and Family:     Frequency of Social Gatherings with Friends and Family:     Attends [employer] Services:     Active Member of Clubs or Organizations:     Attends Club or Organization Meetings:     Marital Status:    Intimate Partner Violence:     Fear of Current or Ex-Partner:     Emotionally Abused:     Physically Abused:     Sexually Abused:        Family History   Problem Relation Age of Onset    Hypertension Mother     Diabetes Father     Hypertension Sister     Diabetes Sister     Hypertension Brother        Allergies   Allergen Reactions    Amlodipine Edema and Eye Swelling    Lisinopril Angioedema     Bilateral periorbital edema that was significant         Current Outpatient Medications:     Accu-Chek FastClix Lancets MISC, Inject under the skin 2 (two) times a day Test, Disp: 102 each, Rfl: 5    Accu-Chek Guide test strip, Use 1 each 2 (two) times a day Test, Disp: 100 each, Rfl: 5    Alcohol Swabs (EASY TOUCH ALCOHOL PREP MEDIUM) 70 % PADS, USE 2 TO 3 X PER DAY, Disp: , Rfl: 3    Blood Glucose Monitoring Suppl (OneTouch Verio) w/Device KIT, Use 2 (two) times a day, Disp: 1 kit, Rfl: 0    carvedilol (COREG) 25 mg tablet, Take 1 tablet (25 mg total) by mouth 2 (two) times a day with meals, Disp: 60 tablet, Rfl: 3    cloNIDine (CATAPRES) 0 2 mg tablet, Take 1 tablet (0 2 mg total) by mouth 3 (three) times a day, Disp: , Rfl:     ergocalciferol (ERGOCALCIFEROL) 1 25 MG (43230 UT) capsule, Take 50,000 Units by mouth every 7 days, Disp: , Rfl:     gabapentin (NEURONTIN) 300 mg capsule, Take 1 capsule (300 mg total) by mouth 2 (two) times a day, Disp: 90 capsule, Rfl: 2    glucose blood (OneTouch Verio) test strip, Use as instructed, Disp: 100 each, Rfl: 3    insulin glargine (LANTUS) 100 units/mL subcutaneous injection, Inject 18 Units under the skin every morning, Disp: 10 mL, Rfl: 0    Insulin Pen Needle (Unifine Pentips) 32G X 4 MM MISC, Use daily, Disp: 100 each, Rfl: 2    Lancets (onetouch ultrasoft) lancets, Use as instructed, Disp: 100 each, Rfl: 3    linaGLIPtin (Tradjenta) 5 MG TABS, Take 5 mg by mouth daily, Disp: , Rfl:     terazosin (HYTRIN) 5 mg capsule, Take 5 mg by mouth, Disp: , Rfl:     atorvastatin (LIPITOR) 40 mg tablet, Take 1 tablet (40 mg total) by mouth daily (Patient not taking: Reported on 8/8/2021), Disp: 90 tablet, Rfl: 3    glimepiride (AMARYL) 2 mg tablet, Take 2 mg by mouth daily, Disp: , Rfl:     hydrALAZINE (APRESOLINE) 25 mg tablet, Take 1 tablet (25 mg total) by mouth 2 (two) times a day, Disp: 180 each, Rfl: 0    torsemide (DEMADEX) 20 mg tablet, Take 1 tablet (20 mg total) by mouth daily, Disp: 90 each, Rfl: 1      Physical Exam:  BP (!) 180/96 (Patient Position: Sitting, Cuff Size: Adult)   Pulse 77   Temp 97 8 °F (36 6 °C)   Resp 18   Wt 69 9 kg (154 lb)   SpO2 97%   BMI 29 10 kg/m²     Physical Exam  Constitutional:       Appearance: Normal appearance  HENT:      Head: Normocephalic and atraumatic  Nose: Nose normal       Mouth/Throat:      Pharynx: No oropharyngeal exudate or posterior oropharyngeal erythema  Eyes:      General: No scleral icterus  Right eye: No discharge  Left eye: No discharge  Extraocular Movements: Extraocular movements intact  Pupils: Pupils are equal, round, and reactive to light  Cardiovascular:      Rate and Rhythm: Normal rate and regular rhythm  Pulses: Normal pulses  Heart sounds: Murmur heard  No friction rub  No gallop  Comments: Systolic murmur  Pulmonary:      Effort: No respiratory distress  Breath sounds: Normal breath sounds  No stridor  No wheezing or rhonchi  Abdominal:      General: There is no distension  Palpations: Abdomen is soft  There is no mass  Tenderness: There is no abdominal tenderness  Hernia: No hernia is present  Musculoskeletal:         General: Normal range of motion  Skin:     General: Skin is warm  Capillary Refill: Capillary refill takes less than 2 seconds  Neurological:      General: No focal deficit present  Mental Status: She is alert and oriented to person, place, and time               Labs:  Lab Results   Component Value Date    WBC 6 02 08/09/2021    HGB 8 2 (L) 08/09/2021    HCT 25 9 (L) 08/09/2021    MCV 83 08/09/2021     08/09/2021     Lab Results   Component Value Date     06/08/2018    K 4 0 08/09/2021     08/09/2021    CO2 30 08/09/2021    ANIONGAP 10 06/08/2018    BUN 49 (H) 08/09/2021    CREATININE 2 76 (H) 08/09/2021    GLUF 253 (H) 08/09/2021    CALCIUM 7 7 (L) 08/09/2021    CORRECTEDCA 8 8 07/29/2021    AST 19 08/08/2021    ALT 21 08/08/2021    ALKPHOS 86 08/08/2021    PROT 7 5 06/08/2018    BILITOT 0 3 06/08/2018    EGFR 17 08/09/2021       Assessment and Plan:  Asymptomatic hypertensive urgency  -Recently admitted for hypertensive urgency  -Current medication incuding hydralazine 10 mg q8, coreg 25mg BID, torsemide 20mg daily and clonidine 0 2mg TID  -Will increase hydralazine to 25mg BID  -Recommend low Sodium diet  -Follow up with Nephrology    Chronic diastolic heart failure (HCC)  Wt Readings from Last 3 Encounters:   08/23/21 69 9 kg (154 lb)   08/10/21 69 6 kg (153 lb 7 oz)   08/05/21 74 4 kg (164 lb)   -Currently well compensated  -Continue torsemide 20 mg daily  -Continue monitoring daily weight  -Recommend <2g low sodium diet  -Continue follow up with nephrology as outpatient          Stage 4 chronic kidney disease ([facility] )  Lab Results   Component Value Date    EGFR 17 08/09/2021    EGFR 15 08/08/2021    EGFR 18 (L) 07/29/2021    CREATININE 2 76 (H) 08/09/2021    CREATININE 3 05 (H) 08/08/2021    CREATININE 2 62 (H) 07/29/2021    -Most likely etiology for CKD can be attributed to uncontrolled HTN and type II DM   -Advised increased hydration  -Encouraged to obtain EMILIANO study  -Avoid nephrotoxic meds   -Continue follow up with nephrology      Dianna Peters was seen today for hypertension  Diagnoses and all orders for this visit:    Asymptomatic hypertensive urgency  -     hydrALAZINE (APRESOLINE) 25 mg tablet; Take 1 tablet (25 mg total) by mouth 2 (two) times a day    Bilateral lower extremity edema  -     torsemide (DEMADEX) 20 mg tablet;  Take 1 tablet (20 mg total) by mouth daily

## 2021-08-26 ENCOUNTER — TELEPHONE (OUTPATIENT)
Dept: HEMATOLOGY ONCOLOGY | Facility: CLINIC | Age: 66
End: 2021-08-26

## 2021-08-26 ENCOUNTER — CONSULT (OUTPATIENT)
Dept: HEMATOLOGY ONCOLOGY | Facility: CLINIC | Age: 66
End: 2021-08-26
Payer: MEDICARE

## 2021-08-26 ENCOUNTER — APPOINTMENT (OUTPATIENT)
Dept: LAB | Facility: HOSPITAL | Age: 66
End: 2021-08-26
Payer: MEDICARE

## 2021-08-26 ENCOUNTER — TELEPHONE (OUTPATIENT)
Dept: NEPHROLOGY | Facility: CLINIC | Age: 66
End: 2021-08-26

## 2021-08-26 VITALS
WEIGHT: 155.2 LBS | BODY MASS INDEX: 29.32 KG/M2 | RESPIRATION RATE: 16 BRPM | DIASTOLIC BLOOD PRESSURE: 76 MMHG | OXYGEN SATURATION: 98 % | TEMPERATURE: 98.5 F | SYSTOLIC BLOOD PRESSURE: 132 MMHG | HEART RATE: 75 BPM

## 2021-08-26 DIAGNOSIS — D64.9 NORMOCYTIC ANEMIA: ICD-10-CM

## 2021-08-26 DIAGNOSIS — N18.4 STAGE 4 CHRONIC KIDNEY DISEASE (HCC): ICD-10-CM

## 2021-08-26 DIAGNOSIS — D47.2 MONOCLONAL GAMMOPATHY: ICD-10-CM

## 2021-08-26 DIAGNOSIS — I10 ESSENTIAL HYPERTENSION: ICD-10-CM

## 2021-08-26 DIAGNOSIS — D47.2 MONOCLONAL GAMMOPATHY: Primary | ICD-10-CM

## 2021-08-26 LAB
ALBUMIN SERPL BCP-MCNC: 3.8 G/DL (ref 3–5.2)
ALP SERPL-CCNC: 64 U/L (ref 43–122)
ALT SERPL W P-5'-P-CCNC: 12 U/L
ANION GAP SERPL CALCULATED.3IONS-SCNC: 9 MMOL/L (ref 5–14)
AST SERPL W P-5'-P-CCNC: 23 U/L (ref 14–36)
BILIRUB SERPL-MCNC: 0.36 MG/DL
BUN SERPL-MCNC: 61 MG/DL (ref 5–25)
CALCIUM SERPL-MCNC: 8.3 MG/DL (ref 8.4–10.2)
CHLORIDE SERPL-SCNC: 102 MMOL/L (ref 97–108)
CO2 SERPL-SCNC: 26 MMOL/L (ref 22–30)
CREAT SERPL-MCNC: 3.98 MG/DL (ref 0.6–1.2)
DAT POLY-SP REAG RBC QL: NEGATIVE
ERYTHROCYTE [DISTWIDTH] IN BLOOD BY AUTOMATED COUNT: 15.6 %
GFR SERPL CREATININE-BSD FRML MDRD: 11 ML/MIN/1.73SQ M
GLUCOSE SERPL-MCNC: 290 MG/DL (ref 70–99)
HCT VFR BLD AUTO: 27.3 % (ref 36–46)
HGB BLD-MCNC: 9 G/DL (ref 12–16)
HGB RETIC QN AUTO: 29.9 PG (ref 30–38.3)
HYPERCHROMIA BLD QL SMEAR: PRESENT
IMM EOSINOPHIL NFR BLD MANUAL: 1 % (ref 0–6)
IMM RETICS NFR: 7.3 % (ref 0–14)
LDH SERPL-CCNC: 573 U/L (ref 313–618)
LYMPHOCYTES NFR BLD: 24 % (ref 14–44)
MCH RBC QN AUTO: 27.2 PG (ref 26–34)
MCHC RBC AUTO-ENTMCNC: 33.1 G/DL (ref 31–36)
MCV RBC AUTO: 82 FL (ref 80–100)
MONOCYTES NFR BLD AUTO: 6 % (ref 4–12)
NEUTS SEG NFR BLD AUTO: 69 % (ref 45–77)
PLATELET # BLD AUTO: 301 THOUSANDS/UL (ref 150–450)
PLATELET BLD QL SMEAR: ADEQUATE
PMV BLD AUTO: 8.7 FL (ref 8.9–12.7)
POIKILOCYTOSIS BLD QL SMEAR: PRESENT
POTASSIUM SERPL-SCNC: 4.9 MMOL/L (ref 3.6–5)
PROT SERPL-MCNC: 7 G/DL (ref 5.9–8.4)
RBC # BLD AUTO: 3.33 MILLION/UL (ref 4–5.2)
RBC MORPH BLD: NORMAL
RETICS # AUTO: ABNORMAL 10*3/UL (ref 14097–95744)
RETICS # CALC: 1.01 % (ref 0.37–1.87)
SODIUM SERPL-SCNC: 137 MMOL/L (ref 137–147)
TOTAL CELLS COUNTED SPEC: 100
WBC # BLD AUTO: 5.9 THOUSAND/UL (ref 4.5–11)

## 2021-08-26 PROCEDURE — 86880 COOMBS TEST DIRECT: CPT

## 2021-08-26 PROCEDURE — 80053 COMPREHEN METABOLIC PANEL: CPT

## 2021-08-26 PROCEDURE — 83615 LACTATE (LD) (LDH) ENZYME: CPT

## 2021-08-26 PROCEDURE — 1124F ACP DISCUSS-NO DSCNMKR DOCD: CPT | Performed by: INTERNAL MEDICINE

## 2021-08-26 PROCEDURE — 82784 ASSAY IGA/IGD/IGG/IGM EACH: CPT

## 2021-08-26 PROCEDURE — 85007 BL SMEAR W/DIFF WBC COUNT: CPT

## 2021-08-26 PROCEDURE — 36415 COLL VENOUS BLD VENIPUNCTURE: CPT

## 2021-08-26 PROCEDURE — 84630 ASSAY OF ZINC: CPT

## 2021-08-26 PROCEDURE — 85027 COMPLETE CBC AUTOMATED: CPT

## 2021-08-26 PROCEDURE — 85046 RETICYTE/HGB CONCENTRATE: CPT

## 2021-08-26 PROCEDURE — 99204 OFFICE O/P NEW MOD 45 MIN: CPT | Performed by: INTERNAL MEDICINE

## 2021-08-26 PROCEDURE — 83883 ASSAY NEPHELOMETRY NOT SPEC: CPT

## 2021-08-26 PROCEDURE — 82525 ASSAY OF COPPER: CPT

## 2021-08-26 PROCEDURE — 82668 ASSAY OF ERYTHROPOIETIN: CPT

## 2021-08-26 NOTE — TELEPHONE ENCOUNTER
IR scheduling provided 9/15 @8:30 am for BMBX in [facility] on [address]  Gave patient instructions and provided date, time, and location

## 2021-08-26 NOTE — TELEPHONE ENCOUNTER
IR called regarding the referral for this patient's bone marrow biopsy  The incorrect order was placed  The referral should be an to IR referral for consult #41  They are going to divert that referral and asked that the correct one be placed  Also they wanted to confirm regarding the Gen Path form  They blocked the schedule for 9/15/21 at 8:30am as was discussed with the patient

## 2021-08-26 NOTE — TELEPHONE ENCOUNTER
A message has been left reminding pt of f/u appointment with Ubaldo Inman as well as lab work that is needed for this appointment

## 2021-08-27 DIAGNOSIS — I16.0 ASYMPTOMATIC HYPERTENSIVE URGENCY: ICD-10-CM

## 2021-08-27 DIAGNOSIS — R60.0 BILATERAL LOWER EXTREMITY EDEMA: ICD-10-CM

## 2021-08-27 LAB
IGA SERPL-MCNC: 337 MG/DL (ref 70–400)
IGG SERPL-MCNC: 1020 MG/DL (ref 700–1600)
IGM SERPL-MCNC: 94 MG/DL (ref 40–230)

## 2021-08-27 RX ORDER — TORSEMIDE 20 MG/1
20 TABLET ORAL DAILY
Qty: 30 TABLET | Refills: 1 | Status: ON HOLD | OUTPATIENT
Start: 2021-08-27 | End: 2021-09-17 | Stop reason: SDUPTHER

## 2021-08-27 RX ORDER — HYDRALAZINE HYDROCHLORIDE 25 MG/1
25 TABLET, FILM COATED ORAL 2 TIMES DAILY
Qty: 60 TABLET | Refills: 1 | Status: SHIPPED | OUTPATIENT
Start: 2021-08-27 | End: 2021-09-17 | Stop reason: HOSPADM

## 2021-08-27 NOTE — TELEPHONE ENCOUNTER
Patient was taken care of by [facility] yesterday  Ct Lechuga called this morning, to call the patient since she only speaks [place]  I told Ct Lechuga that [facility] is in [facility] today and she stated she was going to call her to have her call the patient and remind her about the biopsy for 9/15 @ 8:30 am with an arrival time of 7 am  I don't have the [address] [place] [address] form

## 2021-08-27 NOTE — TELEPHONE ENCOUNTER
Patient is calling regarding her medication maureen 08/23/2021  Medications were sent to a pharmacy that is not her preferred pharmacy   Patient would like the prescriptions sent to [address] [place] [address] pharmacy [address]      Please advise

## 2021-08-27 NOTE — PROGRESS NOTES
Referral placed for IR to do BMBx  Nahid Wolf already filled out earlier in the day      Sherry Wiseman MD

## 2021-08-28 LAB
EPO SERPL-ACNC: 10.1 MIU/ML (ref 2.6–18.5)
KAPPA LC FREE SER-MCNC: 109.4 MG/L (ref 3.3–19.4)
KAPPA LC FREE/LAMBDA FREE SER: 0.35 {RATIO} (ref 0.26–1.65)
LAMBDA LC FREE SERPL-MCNC: 314.5 MG/L (ref 5.7–26.3)

## 2021-08-31 ENCOUNTER — TELEPHONE (OUTPATIENT)
Dept: NEPHROLOGY | Facility: CLINIC | Age: 66
End: 2021-08-31

## 2021-08-31 ENCOUNTER — OFFICE VISIT (OUTPATIENT)
Dept: NEPHROLOGY | Facility: CLINIC | Age: 66
End: 2021-08-31
Payer: MEDICARE

## 2021-08-31 ENCOUNTER — APPOINTMENT (OUTPATIENT)
Dept: LAB | Facility: HOSPITAL | Age: 66
End: 2021-08-31
Payer: MEDICARE

## 2021-08-31 VITALS
HEART RATE: 70 BPM | DIASTOLIC BLOOD PRESSURE: 82 MMHG | SYSTOLIC BLOOD PRESSURE: 149 MMHG | WEIGHT: 157 LBS | HEIGHT: 61 IN | BODY MASS INDEX: 29.64 KG/M2

## 2021-08-31 DIAGNOSIS — N18.32 TYPE 2 DIABETES MELLITUS WITH STAGE 3B CHRONIC KIDNEY DISEASE, WITH LONG-TERM CURRENT USE OF INSULIN (HCC): ICD-10-CM

## 2021-08-31 DIAGNOSIS — I10 ESSENTIAL HYPERTENSION: ICD-10-CM

## 2021-08-31 DIAGNOSIS — I50.32 CHRONIC DIASTOLIC HEART FAILURE (HCC): ICD-10-CM

## 2021-08-31 DIAGNOSIS — E78.1 PURE HYPERTRIGLYCERIDEMIA: ICD-10-CM

## 2021-08-31 DIAGNOSIS — N18.9 ACUTE KIDNEY INJURY SUPERIMPOSED ON CKD (HCC): Primary | ICD-10-CM

## 2021-08-31 DIAGNOSIS — D63.1 ANEMIA IN STAGE 4 CHRONIC KIDNEY DISEASE (HCC): ICD-10-CM

## 2021-08-31 DIAGNOSIS — R80.9 NEPHROTIC RANGE PROTEINURIA: ICD-10-CM

## 2021-08-31 DIAGNOSIS — N17.9 ACUTE KIDNEY INJURY SUPERIMPOSED ON CKD (HCC): Primary | ICD-10-CM

## 2021-08-31 DIAGNOSIS — D64.9 NORMOCYTIC ANEMIA: ICD-10-CM

## 2021-08-31 DIAGNOSIS — D47.2 MONOCLONAL GAMMOPATHY: Primary | ICD-10-CM

## 2021-08-31 DIAGNOSIS — E11.22 TYPE 2 DIABETES MELLITUS WITH STAGE 3B CHRONIC KIDNEY DISEASE, WITH LONG-TERM CURRENT USE OF INSULIN (HCC): ICD-10-CM

## 2021-08-31 DIAGNOSIS — N18.4 ANEMIA IN STAGE 4 CHRONIC KIDNEY DISEASE (HCC): ICD-10-CM

## 2021-08-31 DIAGNOSIS — D47.2 MONOCLONAL GAMMOPATHY: ICD-10-CM

## 2021-08-31 DIAGNOSIS — Z79.4 TYPE 2 DIABETES MELLITUS WITH STAGE 3B CHRONIC KIDNEY DISEASE, WITH LONG-TERM CURRENT USE OF INSULIN (HCC): ICD-10-CM

## 2021-08-31 DIAGNOSIS — N18.4 STAGE 4 CHRONIC KIDNEY DISEASE (HCC): ICD-10-CM

## 2021-08-31 LAB
PROT 24H UR-MCNC: 3650 MG/24 HRS (ref 40–150)
SPECIMEN VOL UR: 2500 ML
ZINC SERPL-MCNC: 66 UG/DL (ref 44–115)

## 2021-08-31 PROCEDURE — 84166 PROTEIN E-PHORESIS/URINE/CSF: CPT

## 2021-08-31 PROCEDURE — 84156 ASSAY OF PROTEIN URINE: CPT

## 2021-08-31 PROCEDURE — 84166 PROTEIN E-PHORESIS/URINE/CSF: CPT | Performed by: PATHOLOGY

## 2021-08-31 PROCEDURE — 99215 OFFICE O/P EST HI 40 MIN: CPT | Performed by: INTERNAL MEDICINE

## 2021-08-31 NOTE — TELEPHONE ENCOUNTER
A message has been left with both pt and her emergency contact to see if she was on her way to her appointment that was moved up yesterday to 10:00 am today  Neither pt or granddaughter answer

## 2021-08-31 NOTE — PROGRESS NOTES
OFFICE FOLLOW UP - Nephrology   [facility] Juan Santoro 73 y o  female MRN: [id number]       ASSESSMENT and PLAN:  Albert Gao was seen today for follow-up and chronic kidney disease  Diagnoses and all orders for this visit:    Acute kidney injury superimposed on CKD ([facility] [address] )  -     Glomerular basement membrane antibodies; Future  -     Anti-neutrophilic cytoplasmic antibody; Future  -     Renal function panel; Future    Stage 4 chronic kidney disease (HCC)    Type 2 diabetes mellitus with stage 3b chronic kidney disease, with long-term current use of insulin (HCC)    Essential hypertension    Chronic diastolic heart failure (HCC)    Anemia in stage 4 chronic kidney disease (HCC)    Monoclonal gammopathy    Nephrotic range proteinuria    Pure hypertriglyceridemia        This is a 41-year-old lady known history of chronic kidney disease with previous creatinine in the low 1s back in 2019, 1st time seen in November 2020 for evaluation of chronic kidney disease, last time seen was in 07/2021  Patient was hospitalized at [facility] twice on March 2021 with acute kidney injury and hyperkalemia  Patient also with history of uncontrolled diabetes more than 20 years and hypertension  Noted her renal function worsening and since early 2021 her creatinine is being in the 2s range  Patient recently hospitalized in [facility] due to uncontrolled hypertension on 08/08 to 8/102021, medications were adjusted  patient returns to the office today for hospital follow-up  1  Acute kidney injury on top of CKD  again had a long discussion with patient and with her granddaughter, suspected CKD possible component of advanced diabetes nephropathy as well as hypertensive nephrosclerosis given long-term history of uncontrolled diabetes and hypertension for over 20 years  Noted patient has significant proteinuria        Previous serologies remarkable for negative WARREN, normal complement,  nonreactive hepatitis panel, 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, patient was referred to Hematology Oncology and is scheduled for bone marrow biopsy  I discussed with patient about kidney biopsy in the past but she did NOT want to have it done before  Most recent blood test ordered by Hematology last week a worsening renal function with creatinine up to 3 98  Patient reports some hypotensive episode of with systolic blood pressure in the 90s to 100  Today on exam she does not have any edema, recommend to change torsemide to every other day, recommend to hold hydralazine for systolic blood pressure less than 120  Keep checking her blood pressure every day  I would like to follow repeat renal function panel with ANCA and anti-GBM studies for completeness  We discussed again that if her kidney function does not improve I recommend to do a kidney biopsy  Patient was referred to Kidney Smart class   follow low-salt diet  Avoid NSAIDs  Advised to increase fluid intake to 4-6 glasses of water in a day   follow-up in 2-3 months based on repeat blood test    2  Long-term history of uncontrolled diabetes with retinopathy and suspected nephropathy  Last hemoglobin A1c 9 5%  Goal to have a hemoglobin A1c between 7-8%    3  Long-term history of hypertension, today in the office blood pressure elevated, patient brought home blood pressure log in some blood pressure is usually in the 658-750 systolic  Noted she have couple of hypotensive episode with systolic blood pressure in the 90s to 100  Continue same antihypertensive medications except decrease torsemide to every other day and hold hydralazine for systolic blood pressure less than 120  Continue with low-salt diet  Continue following blood pressure at home  4  SPEP and UPEP showing biclonal gammopathy, patient was referred to Hematology Oncology, plan for bone marrow biopsy for workup of paraproteinemia    5   Anemia, suspected secondary to chronic kidney disease, currently seen Hematology-Oncology        Patient Instructions   Isabel discutimos en la oficina, onelia ultimos examenes de luda mustran que onelia rinones ontiveros empeorado  Quiero que baje el diuretico (Torsemide) a jacey tableta cada otro patricia (interdiario) y quiero que se repita examenes de luda en un par de sheehan  Siga jacey dieta baja de sal   Siga chequeando rondon presion en la casa, y si rondon presion es menos de 120 NO tome Hydralazine  La quiero flavia de regreso en umos 2-3 [employer]  No tome NSAIDs (NO ibuprofen, Motrin, Advil, Aleve, naproxen  Puede roldan Tylenol or paracetamol or acetaminophen para el dolor si es necesario  Siga viendo a rondon doctor de cabecera  Isabel discutimos otra vez, si onelia rinones no mejoran tendremos que hacer jacey biopsia la rinon            HPI: Ludy Jackson is a 73 y o  female who is here for Follow-up (HFU ) and Chronic Kidney Disease    This is a patient with history of controlled diabetes and hypertension for over 20 years, chronic kidney disease, last time seen in our office was in 07/2021  Today patient presents to the office for hospital follow-up with her granddaughter Odell Flores  since last office visit she was recently hospitalized in [place] [address] early August 2021 due to uncontrolled hypertension, antihypertensive medications were adjusted, patient was discharged with a creatinine of 2 76  Part of workup for acute kidney injury on top of CKD she was found to have biclonal gammopathy on SPEP and UPEP, she was referred to Hematology Oncology and is planned to have a bone marrow biopsy     today patient in general is feeling well  Patient denies any chest pain, shortness of breath or leg swelling  Denies any abdominal pain, no nausea, vomiting, no diarrhea or constipation  Denies any urinary problems, no burning sensation or gross hematuria      The last blood work was done on 08/26/2021, which we have reviewed together  Most recent creatinine 3 98, potassium 4 9, estimated GFR 11      ROS: All the systems were reviewed and were negative except as documented on the H&P          Allergies: Amlodipine and Lisinopril    Medications:   Current Outpatient Medications:     Accu-Chek FastClix Lancets MISC, Inject under the skin 2 (two) times a day Test, Disp: 102 each, Rfl: 5    Accu-Chek Guide test strip, Use 1 each 2 (two) times a day Test, Disp: 100 each, Rfl: 5    Alcohol Swabs (EASY TOUCH ALCOHOL PREP MEDIUM) 70 % PADS, USE 2 TO 3 X PER DAY, Disp: , Rfl: 3    atorvastatin (LIPITOR) 40 mg tablet, Take 1 tablet (40 mg total) by mouth daily, Disp: 90 tablet, Rfl: 3    Blood Glucose Monitoring Suppl (OneTouch Verio) w/Device KIT, Use 2 (two) times a day, Disp: 1 kit, Rfl: 0    carvedilol (COREG) 25 mg tablet, Take 1 tablet (25 mg total) by mouth 2 (two) times a day with meals, Disp: 60 tablet, Rfl: 3    cloNIDine (CATAPRES) 0 2 mg tablet, Take 1 tablet (0 2 mg total) by mouth 3 (three) times a day, Disp: , Rfl:     ergocalciferol (ERGOCALCIFEROL) 1 25 MG (40790 UT) capsule, Take 50,000 Units by mouth every 7 days, Disp: , Rfl:     gabapentin (NEURONTIN) 300 mg capsule, Take 1 capsule (300 mg total) by mouth 2 (two) times a day, Disp: 90 capsule, Rfl: 2    glimepiride (AMARYL) 2 mg tablet, Take 2 mg by mouth daily, Disp: , Rfl:     glucose blood (OneTouch Verio) test strip, Use as instructed, Disp: 100 each, Rfl: 3    hydrALAZINE (APRESOLINE) 25 mg tablet, Take 1 tablet (25 mg total) by mouth 2 (two) times a day (Patient taking differently: Take 10 mg by mouth every 8 (eight) hours ), Disp: 60 tablet, Rfl: 1    insulin glargine (LANTUS) 100 units/mL subcutaneous injection, Inject 18 Units under the skin every morning, Disp: 10 mL, Rfl: 0    Insulin Pen Needle (Unifine Pentips) 32G X 4 MM MISC, Use daily, Disp: 100 each, Rfl: 2    Lancets (onetouch ultrasoft) lancets, Use as instructed, Disp: 100 each, Rfl: 3    linaGLIPtin (Tradjenta) 5 MG TABS, Take 5 mg by mouth daily, Disp: , Rfl:     terazosin (HYTRIN) 5 mg capsule, Take 5 mg by mouth, Disp: , Rfl:     torsemide (DEMADEX) 20 mg tablet, Take 1 tablet (20 mg total) by mouth daily, Disp: 30 tablet, Rfl: 1    Past Medical History:   Diagnosis Date    Chronic kidney disease     Diabetes mellitus ([address] )     Hyperlipidemia     Hypertension     Pneumonia      Past Surgical History:   Procedure Laterality Date    EYE SURGERY       Family History   Problem Relation Age of Onset    Hypertension Mother     Diabetes Father     Hypertension Sister     Diabetes Sister     Hypertension Brother       reports that she has never smoked  She has never used smokeless tobacco       Physical Exam:   Vitals:    08/31/21 1345   BP: 149/82   BP Location: Left arm   Patient Position: Sitting   Cuff Size: Standard   Pulse: 70   Weight: 71 2 kg (157 lb)   Height: 5' 1" (1 549 m)     Body mass index is 29 66 kg/m²      General: conscious, cooperative, in not acute distress  Eyes: conjunctivae pink, anicteric sclerae  ENT: lips and mucous membranes moist  Neck: supple, no JVD  Chest: clear breath sounds bilateral, no crackles, ronchus or wheezings  CVS: distinct S1 & S2, normal rate, regular rhythm  Abdomen: soft, non-tender, non-distended, normoactive bowel sounds  Back: no CVA tenderness  Extremities: no edema of both legs  Skin: no rash  Neuro: awake, alert, oriented          Lab Results:  Results for orders placed or performed in visit on 08/26/21   Immunoglobulin free LT chains blood   Result Value Ref Range    Ig Kappa Free Light Chain 109 4 (H) 3 3 - 19 4 mg/L    Ig Lambda Free Light Chain 314 5 (H) 5 7 - 26 3 mg/L    Kappa/Lambda FluidC Ratio 0 35 0 26 - 1 65   IgG, IgA, IgM   Result Value Ref Range     0 70 0 - 400 0 mg/dL    IGG 1,020 0 700 0-1,600 0 mg/dL    IGM 94 0 40 0 - 230 0 mg/dL   LD,Blood   Result Value Ref Range     313 - 618 U/L   CBC and differential   Result Value Ref Range    WBC 5 90 4 50 - 11 00 Thousand/uL    RBC 3 33 (L) 4 00 - 5 20 Million/uL    Hemoglobin 9 0 (L) 12 0 - 16 0 g/dL    Hematocrit 27 3 (L) 36 0 - 46 0 %    MCV 82 80 - 100 fL    MCH 27 2 26 0 - 34 0 pg    MCHC 33 1 31 0 - 36 0 g/dL    RDW 15 6 (H) <15 3 %    MPV 8 7 (L) 8 9 - 12 7 fL    Platelets 661 806 - 783 Thousands/uL   Comprehensive metabolic panel   Result Value Ref Range    Sodium 137 137 - 147 mmol/L    Potassium 4 9 3 6 - 5 0 mmol/L    Chloride 102 97 - 108 mmol/L    CO2 26 22 - 30 mmol/L    ANION GAP 9 5 - 14 mmol/L    BUN 61 (H) 5 - 25 mg/dL    Creatinine 3 98 (H) 0 60 - 1 20 mg/dL    Glucose 290 (H) 70 - 99 mg/dL    Calcium 8 3 (L) 8 4 - 10 2 mg/dL    AST 23 14 - 36 U/L    ALT 12 <35 U/L    Alkaline Phosphatase 64 43 - 122 U/L    Total Protein 7 0 5 9 - 8 4 g/dL    Albumin 3 8 3 0 - 5 2 g/dL    Total Bilirubin 0 36 <1 30 mg/dL    eGFR 11 (L) >60 ml/min/1 73sq m   Peripheral Smear   Result Value Ref Range    Segmented Neutrophils Manual 69 45 - 77 %    Lymphocytes Manual 24 14 - 44 %    Monocytes Manual 6 4 - 12 %    Eosinophils Manual 1 0 - 6 %    Total Counted 100     RBC Morphology abnormal     Hypochromia Present     Poikilocytes Present     Platelet Estimate Adequate Adequate   Retic Count with Reticulocyte HGB   Result Value Ref Range    Immature Retic Fract 7 3 0 0 - 14 0 %    Retic Ct Pct 1 01 0 37 - 1 87 %    Retic Ct Abs 33,600 14,097-95,744    RETIC HGB 29 9 (L) 30 0 - 38 3 pg   Erythropoietin   Result Value Ref Range    Erythropoietin 10 1 2 6 - 18 5 mIU/mL   Direct antiglobulin test   Result Value Ref Range    DIRECT NAILA Negative        Results from last 7 days   Lab Units 08/26/21  1652   WBC Thousand/uL 5 90   HEMOGLOBIN g/dL 9 0*   HEMATOCRIT % 27 3*   PLATELETS Thousands/uL 301   SODIUM mmol/L 137   POTASSIUM mmol/L 4 9   CHLORIDE mmol/L 102   CO2 mmol/L 26   BUN mg/dL 61*   CREATININE mg/dL 3 98*   CALCIUM mg/dL 8 3*           Portions of the record may have been created with voice recognition software  Occasional wrong word or "sound a like" substitutions may have occurred due to the inherent limitations of voice recognition software  Read the chart carefully and recognize, using context, where substitutions have occurred  If you have any questions, please contact the dictating provider

## 2021-09-01 LAB
ALBUMIN UR ELPH-MCNC: 73.2 %
ALPHA1 GLOB MFR UR ELPH: 7.4 %
ALPHA2 GLOB MFR UR ELPH: 2.4 %
B-GLOBULIN MFR UR ELPH: 7.1 %
COPPER SERPL-MCNC: 128 UG/DL (ref 80–158)
GAMMA GLOB MFR UR ELPH: 9.9 %
M PROTEIN MFR UR ELPH: 4.72 MG/DL
M PROTEIN UR-MCNC: 3.3 %
PROT PATTERN UR ELPH-IMP: ABNORMAL
PROT UR-MCNC: 143 MG/DL

## 2021-09-02 ENCOUNTER — APPOINTMENT (OUTPATIENT)
Dept: LAB | Facility: HOSPITAL | Age: 66
End: 2021-09-02
Attending: INTERNAL MEDICINE
Payer: MEDICARE

## 2021-09-02 ENCOUNTER — TELEPHONE (OUTPATIENT)
Dept: NEPHROLOGY | Facility: CLINIC | Age: 66
End: 2021-09-02

## 2021-09-02 DIAGNOSIS — D63.1 ANEMIA IN STAGE 4 CHRONIC KIDNEY DISEASE (HCC): ICD-10-CM

## 2021-09-02 DIAGNOSIS — R80.9 NEPHROTIC RANGE PROTEINURIA: ICD-10-CM

## 2021-09-02 DIAGNOSIS — N18.9 ACUTE KIDNEY INJURY SUPERIMPOSED ON CKD (HCC): ICD-10-CM

## 2021-09-02 DIAGNOSIS — N18.4 STAGE 4 CHRONIC KIDNEY DISEASE (HCC): Primary | ICD-10-CM

## 2021-09-02 DIAGNOSIS — N17.9 ACUTE KIDNEY INJURY SUPERIMPOSED ON CKD (HCC): ICD-10-CM

## 2021-09-02 DIAGNOSIS — N18.4 ANEMIA IN STAGE 4 CHRONIC KIDNEY DISEASE (HCC): ICD-10-CM

## 2021-09-02 LAB
ALBUMIN SERPL BCP-MCNC: 3.7 G/DL (ref 3–5.2)
ANION GAP SERPL CALCULATED.3IONS-SCNC: 9 MMOL/L (ref 5–14)
BUN SERPL-MCNC: 66 MG/DL (ref 5–25)
CALCIUM SERPL-MCNC: 8.4 MG/DL (ref 8.4–10.2)
CHLORIDE SERPL-SCNC: 106 MMOL/L (ref 97–108)
CO2 SERPL-SCNC: 23 MMOL/L (ref 22–30)
CREAT SERPL-MCNC: 2.88 MG/DL (ref 0.6–1.2)
GFR SERPL CREATININE-BSD FRML MDRD: 16 ML/MIN/1.73SQ M
GLUCOSE P FAST SERPL-MCNC: 195 MG/DL (ref 70–99)
PHOSPHATE SERPL-MCNC: 5 MG/DL (ref 2.5–4.8)
POTASSIUM SERPL-SCNC: 5.2 MMOL/L (ref 3.6–5)
SODIUM SERPL-SCNC: 138 MMOL/L (ref 137–147)

## 2021-09-02 PROCEDURE — 86255 FLUORESCENT ANTIBODY SCREEN: CPT

## 2021-09-02 PROCEDURE — 36415 COLL VENOUS BLD VENIPUNCTURE: CPT

## 2021-09-02 PROCEDURE — 80069 RENAL FUNCTION PANEL: CPT

## 2021-09-02 PROCEDURE — 83520 IMMUNOASSAY QUANT NOS NONAB: CPT

## 2021-09-02 NOTE — TELEPHONE ENCOUNTER
----- Message from Kumar Treviño MD sent at 9/2/2021 12:45 PM EDT -----  Repeat blood test after decreasing dose of diuretics reviewed, renal function improved down to 2 88, previously 3 98  Noted potassium is slightly elevated at 5 2  ANCA panel and anti GBM in process    I have called and spoke with patient regarding recent blood test showing improvement on her kidney function, advised to continue taking diuretics every other day, continue follow-up with Hematology-Oncology, advised to follow low-salt as well as low-potassium diet  She has scheduled a f/u appointment with me on 12/07, will follow repeat blood test before next appointment  Will contact her if the ANCA and anti GBM come back abnormal      Lisa,  please order repeat CBC, renal function panel, PTH and UPC before appointment on December      Thanks,      I cc patient's PCP to keep her updated

## 2021-09-06 ENCOUNTER — HOSPITAL ENCOUNTER (INPATIENT)
Facility: HOSPITAL | Age: 66
LOS: 11 days | Discharge: HOME | DRG: 292 | End: 2021-09-17
Attending: EMERGENCY MEDICINE | Admitting: INTERNAL MEDICINE
Payer: MEDICARE

## 2021-09-06 DIAGNOSIS — K85.90 PANCREATITIS: ICD-10-CM

## 2021-09-06 DIAGNOSIS — I16.0 HYPERTENSIVE URGENCY: Primary | ICD-10-CM

## 2021-09-06 DIAGNOSIS — R60.0 BILATERAL LOWER EXTREMITY EDEMA: ICD-10-CM

## 2021-09-06 LAB
ALBUMIN SERPL BCP-MCNC: 3.2 G/DL (ref 3.5–5)
ALP SERPL-CCNC: 83 U/L (ref 46–116)
ALT SERPL W P-5'-P-CCNC: 16 U/L (ref 12–78)
ANION GAP SERPL CALCULATED.3IONS-SCNC: 9 MMOL/L (ref 4–13)
AST SERPL W P-5'-P-CCNC: 16 U/L (ref 5–45)
BASOPHILS # BLD AUTO: 0.03 THOUSANDS/ΜL (ref 0–0.1)
BASOPHILS NFR BLD AUTO: 0 % (ref 0–1)
BILIRUB SERPL-MCNC: 0.24 MG/DL (ref 0.2–1)
BUN SERPL-MCNC: 48 MG/DL (ref 5–25)
CALCIUM ALBUM COR SERPL-MCNC: 8.8 MG/DL (ref 8.3–10.1)
CALCIUM SERPL-MCNC: 8.2 MG/DL (ref 8.3–10.1)
CHLORIDE SERPL-SCNC: 99 MMOL/L (ref 100–108)
CO2 SERPL-SCNC: 28 MMOL/L (ref 21–32)
CREAT SERPL-MCNC: 3.01 MG/DL (ref 0.6–1.3)
EOSINOPHIL # BLD AUTO: 0.01 THOUSAND/ΜL (ref 0–0.61)
EOSINOPHIL NFR BLD AUTO: 0 % (ref 0–6)
ERYTHROCYTE [DISTWIDTH] IN BLOOD BY AUTOMATED COUNT: 14.6 % (ref 11.6–15.1)
GFR SERPL CREATININE-BSD FRML MDRD: 16 ML/MIN/1.73SQ M
GLUCOSE SERPL-MCNC: 172 MG/DL (ref 65–140)
GLUCOSE SERPL-MCNC: 208 MG/DL (ref 65–140)
HCT VFR BLD AUTO: 30.4 % (ref 34.8–46.1)
HGB BLD-MCNC: 9.6 G/DL (ref 11.5–15.4)
IMM GRANULOCYTES # BLD AUTO: 0.03 THOUSAND/UL (ref 0–0.2)
IMM GRANULOCYTES NFR BLD AUTO: 0 % (ref 0–2)
LIPASE SERPL-CCNC: 1190 U/L (ref 73–393)
LYMPHOCYTES # BLD AUTO: 1.14 THOUSANDS/ΜL (ref 0.6–4.47)
LYMPHOCYTES NFR BLD AUTO: 15 % (ref 14–44)
MCH RBC QN AUTO: 26.2 PG (ref 26.8–34.3)
MCHC RBC AUTO-ENTMCNC: 31.6 G/DL (ref 31.4–37.4)
MCV RBC AUTO: 83 FL (ref 82–98)
MONOCYTES # BLD AUTO: 0.36 THOUSAND/ΜL (ref 0.17–1.22)
MONOCYTES NFR BLD AUTO: 5 % (ref 4–12)
NEUTROPHILS # BLD AUTO: 5.82 THOUSANDS/ΜL (ref 1.85–7.62)
NEUTS SEG NFR BLD AUTO: 80 % (ref 43–75)
NRBC BLD AUTO-RTO: 0 /100 WBCS
PLATELET # BLD AUTO: 290 THOUSANDS/UL (ref 149–390)
PMV BLD AUTO: 10.7 FL (ref 8.9–12.7)
POTASSIUM SERPL-SCNC: 4.3 MMOL/L (ref 3.5–5.3)
PROT SERPL-MCNC: 7.6 G/DL (ref 6.4–8.2)
RBC # BLD AUTO: 3.66 MILLION/UL (ref 3.81–5.12)
SODIUM SERPL-SCNC: 136 MMOL/L (ref 136–145)
TROPONIN I SERPL-MCNC: <0.02 NG/ML
WBC # BLD AUTO: 7.39 THOUSAND/UL (ref 4.31–10.16)

## 2021-09-06 PROCEDURE — 84484 ASSAY OF TROPONIN QUANT: CPT | Performed by: EMERGENCY MEDICINE

## 2021-09-06 PROCEDURE — 83690 ASSAY OF LIPASE: CPT | Performed by: EMERGENCY MEDICINE

## 2021-09-06 PROCEDURE — 96374 THER/PROPH/DIAG INJ IV PUSH: CPT

## 2021-09-06 PROCEDURE — 80053 COMPREHEN METABOLIC PANEL: CPT | Performed by: EMERGENCY MEDICINE

## 2021-09-06 PROCEDURE — 99284 EMERGENCY DEPT VISIT MOD MDM: CPT

## 2021-09-06 PROCEDURE — 36415 COLL VENOUS BLD VENIPUNCTURE: CPT | Performed by: EMERGENCY MEDICINE

## 2021-09-06 PROCEDURE — 85025 COMPLETE CBC W/AUTO DIFF WBC: CPT | Performed by: EMERGENCY MEDICINE

## 2021-09-06 PROCEDURE — 82948 REAGENT STRIP/BLOOD GLUCOSE: CPT

## 2021-09-06 PROCEDURE — 99223 1ST HOSP IP/OBS HIGH 75: CPT | Performed by: INTERNAL MEDICINE

## 2021-09-06 PROCEDURE — 99285 EMERGENCY DEPT VISIT HI MDM: CPT | Performed by: EMERGENCY MEDICINE

## 2021-09-06 PROCEDURE — 93005 ELECTROCARDIOGRAM TRACING: CPT

## 2021-09-06 RX ORDER — SODIUM CHLORIDE 9 MG/ML
75 INJECTION, SOLUTION INTRAVENOUS CONTINUOUS
Status: DISCONTINUED | OUTPATIENT
Start: 2021-09-06 | End: 2021-09-07

## 2021-09-06 RX ORDER — CLONIDINE HYDROCHLORIDE 0.1 MG/1
0.2 TABLET ORAL 3 TIMES DAILY
Status: DISCONTINUED | OUTPATIENT
Start: 2021-09-06 | End: 2021-09-07

## 2021-09-06 RX ORDER — ATORVASTATIN CALCIUM 40 MG/1
40 TABLET, FILM COATED ORAL
Status: DISCONTINUED | OUTPATIENT
Start: 2021-09-07 | End: 2021-09-17 | Stop reason: HOSPADM

## 2021-09-06 RX ORDER — INSULIN GLARGINE 100 [IU]/ML
10 INJECTION, SOLUTION SUBCUTANEOUS EVERY MORNING
Status: DISCONTINUED | OUTPATIENT
Start: 2021-09-07 | End: 2021-09-08

## 2021-09-06 RX ORDER — HEPARIN SODIUM 5000 [USP'U]/ML
5000 INJECTION, SOLUTION INTRAVENOUS; SUBCUTANEOUS EVERY 8 HOURS SCHEDULED
Status: COMPLETED | OUTPATIENT
Start: 2021-09-06 | End: 2021-09-08

## 2021-09-06 RX ORDER — LABETALOL 20 MG/4 ML (5 MG/ML) INTRAVENOUS SYRINGE
10 ONCE
Status: COMPLETED | OUTPATIENT
Start: 2021-09-06 | End: 2021-09-06

## 2021-09-06 RX ORDER — CARVEDILOL 12.5 MG/1
25 TABLET ORAL 2 TIMES DAILY WITH MEALS
Status: DISCONTINUED | OUTPATIENT
Start: 2021-09-07 | End: 2021-09-07

## 2021-09-06 RX ORDER — ACETAMINOPHEN 325 MG/1
650 TABLET ORAL EVERY 6 HOURS PRN
Status: DISCONTINUED | OUTPATIENT
Start: 2021-09-06 | End: 2021-09-17 | Stop reason: HOSPADM

## 2021-09-06 RX ORDER — TORSEMIDE 20 MG/1
20 TABLET ORAL EVERY OTHER DAY
Status: DISCONTINUED | OUTPATIENT
Start: 2021-09-07 | End: 2021-09-07

## 2021-09-06 RX ORDER — GABAPENTIN 300 MG/1
300 CAPSULE ORAL 2 TIMES DAILY
Status: DISCONTINUED | OUTPATIENT
Start: 2021-09-07 | End: 2021-09-09

## 2021-09-06 RX ORDER — ONDANSETRON 2 MG/ML
4 INJECTION INTRAMUSCULAR; INTRAVENOUS EVERY 6 HOURS PRN
Status: DISCONTINUED | OUTPATIENT
Start: 2021-09-06 | End: 2021-09-17 | Stop reason: HOSPADM

## 2021-09-06 RX ORDER — HYDRALAZINE HYDROCHLORIDE 20 MG/ML
5 INJECTION INTRAMUSCULAR; INTRAVENOUS EVERY 6 HOURS PRN
Status: DISCONTINUED | OUTPATIENT
Start: 2021-09-06 | End: 2021-09-09

## 2021-09-06 RX ORDER — TERAZOSIN 5 MG/1
5 CAPSULE ORAL
Status: DISCONTINUED | OUTPATIENT
Start: 2021-09-06 | End: 2021-09-17 | Stop reason: HOSPADM

## 2021-09-06 RX ORDER — HYDRALAZINE HYDROCHLORIDE 25 MG/1
25 TABLET, FILM COATED ORAL 2 TIMES DAILY
Status: DISCONTINUED | OUTPATIENT
Start: 2021-09-07 | End: 2021-09-07

## 2021-09-06 RX ORDER — LABETALOL 20 MG/4 ML (5 MG/ML) INTRAVENOUS SYRINGE
10 EVERY 6 HOURS PRN
Status: DISCONTINUED | OUTPATIENT
Start: 2021-09-06 | End: 2021-09-09

## 2021-09-06 RX ORDER — HYDRALAZINE HYDROCHLORIDE 20 MG/ML
5 INJECTION INTRAMUSCULAR; INTRAVENOUS ONCE
Status: COMPLETED | OUTPATIENT
Start: 2021-09-06 | End: 2021-09-06

## 2021-09-06 RX ADMIN — LABETALOL 20 MG/4 ML (5 MG/ML) INTRAVENOUS SYRINGE 10 MG: at 22:39

## 2021-09-06 RX ADMIN — SODIUM CHLORIDE 75 ML/HR: 0.9 INJECTION, SOLUTION INTRAVENOUS at 23:25

## 2021-09-06 RX ADMIN — HYDRALAZINE HYDROCHLORIDE 5 MG: 20 INJECTION, SOLUTION INTRAMUSCULAR; INTRAVENOUS at 20:27

## 2021-09-07 ENCOUNTER — APPOINTMENT (INPATIENT)
Dept: CT IMAGING | Facility: HOSPITAL | Age: 66
DRG: 292 | End: 2021-09-07
Payer: MEDICARE

## 2021-09-07 LAB
ALBUMIN SERPL BCP-MCNC: 2.8 G/DL (ref 3.5–5)
ALP SERPL-CCNC: 74 U/L (ref 46–116)
ALT SERPL W P-5'-P-CCNC: 14 U/L (ref 12–78)
AMYLASE SERPL-CCNC: 128 IU/L (ref 25–115)
ANION GAP SERPL CALCULATED.3IONS-SCNC: 11 MMOL/L (ref 4–13)
AST SERPL W P-5'-P-CCNC: 16 U/L (ref 5–45)
BACTERIA UR QL AUTO: ABNORMAL /HPF
BASOPHILS # BLD AUTO: 0.02 THOUSANDS/ΜL (ref 0–0.1)
BASOPHILS NFR BLD AUTO: 0 % (ref 0–1)
BILIRUB SERPL-MCNC: 0.26 MG/DL (ref 0.2–1)
BILIRUB UR QL STRIP: NEGATIVE
BUN SERPL-MCNC: 50 MG/DL (ref 5–25)
CALCIUM ALBUM COR SERPL-MCNC: 9.1 MG/DL (ref 8.3–10.1)
CALCIUM SERPL-MCNC: 8.1 MG/DL (ref 8.3–10.1)
CHLORIDE SERPL-SCNC: 102 MMOL/L (ref 100–108)
CLARITY UR: CLEAR
CO2 SERPL-SCNC: 24 MMOL/L (ref 21–32)
COLOR UR: YELLOW
CREAT SERPL-MCNC: 2.8 MG/DL (ref 0.6–1.3)
CREAT UR-MCNC: 69.6 MG/DL
EOSINOPHIL # BLD AUTO: 0 THOUSAND/ΜL (ref 0–0.61)
EOSINOPHIL NFR BLD AUTO: 0 % (ref 0–6)
ERYTHROCYTE [DISTWIDTH] IN BLOOD BY AUTOMATED COUNT: 14.6 % (ref 11.6–15.1)
GBM AB SER IA-ACNC: 3 UNITS (ref 0–20)
GFR SERPL CREATININE-BSD FRML MDRD: 17 ML/MIN/1.73SQ M
GLUCOSE SERPL-MCNC: 112 MG/DL (ref 65–140)
GLUCOSE SERPL-MCNC: 146 MG/DL (ref 65–140)
GLUCOSE SERPL-MCNC: 156 MG/DL (ref 65–140)
GLUCOSE SERPL-MCNC: 204 MG/DL (ref 65–140)
GLUCOSE SERPL-MCNC: 211 MG/DL (ref 65–140)
GLUCOSE UR STRIP-MCNC: ABNORMAL MG/DL
HCT VFR BLD AUTO: 29 % (ref 34.8–46.1)
HGB BLD-MCNC: 9.2 G/DL (ref 11.5–15.4)
HGB UR QL STRIP.AUTO: ABNORMAL
IMM GRANULOCYTES # BLD AUTO: 0.03 THOUSAND/UL (ref 0–0.2)
IMM GRANULOCYTES NFR BLD AUTO: 0 % (ref 0–2)
KETONES UR STRIP-MCNC: NEGATIVE MG/DL
LEUKOCYTE ESTERASE UR QL STRIP: NEGATIVE
LIPASE SERPL-CCNC: 621 U/L (ref 73–393)
LYMPHOCYTES # BLD AUTO: 1.36 THOUSANDS/ΜL (ref 0.6–4.47)
LYMPHOCYTES NFR BLD AUTO: 18 % (ref 14–44)
MCH RBC QN AUTO: 26.3 PG (ref 26.8–34.3)
MCHC RBC AUTO-ENTMCNC: 31.7 G/DL (ref 31.4–37.4)
MCV RBC AUTO: 83 FL (ref 82–98)
MONOCYTES # BLD AUTO: 0.42 THOUSAND/ΜL (ref 0.17–1.22)
MONOCYTES NFR BLD AUTO: 6 % (ref 4–12)
NEUTROPHILS # BLD AUTO: 5.71 THOUSANDS/ΜL (ref 1.85–7.62)
NEUTS SEG NFR BLD AUTO: 76 % (ref 43–75)
NITRITE UR QL STRIP: NEGATIVE
NON-SQ EPI CELLS URNS QL MICRO: ABNORMAL /HPF
NRBC BLD AUTO-RTO: 0 /100 WBCS
PH UR STRIP.AUTO: 6.5 [PH]
PLATELET # BLD AUTO: 270 THOUSANDS/UL (ref 149–390)
PMV BLD AUTO: 10.5 FL (ref 8.9–12.7)
POTASSIUM SERPL-SCNC: 4.2 MMOL/L (ref 3.5–5.3)
PROT SERPL-MCNC: 6.8 G/DL (ref 6.4–8.2)
PROT UR STRIP-MCNC: >=300 MG/DL
PROT UR-MCNC: 438 MG/DL
PROT/CREAT UR: 6.29 MG/G{CREAT} (ref 0–0.1)
RBC # BLD AUTO: 3.5 MILLION/UL (ref 3.81–5.12)
RBC #/AREA URNS AUTO: ABNORMAL /HPF
SODIUM SERPL-SCNC: 137 MMOL/L (ref 136–145)
SP GR UR STRIP.AUTO: 1.02 (ref 1–1.03)
UROBILINOGEN UR QL STRIP.AUTO: 0.2 E.U./DL
WBC # BLD AUTO: 7.54 THOUSAND/UL (ref 4.31–10.16)
WBC #/AREA URNS AUTO: ABNORMAL /HPF

## 2021-09-07 PROCEDURE — 82150 ASSAY OF AMYLASE: CPT | Performed by: INTERNAL MEDICINE

## 2021-09-07 PROCEDURE — 80053 COMPREHEN METABOLIC PANEL: CPT | Performed by: INTERNAL MEDICINE

## 2021-09-07 PROCEDURE — 82948 REAGENT STRIP/BLOOD GLUCOSE: CPT

## 2021-09-07 PROCEDURE — 83690 ASSAY OF LIPASE: CPT | Performed by: INTERNAL MEDICINE

## 2021-09-07 PROCEDURE — 99232 SBSQ HOSP IP/OBS MODERATE 35: CPT | Performed by: INTERNAL MEDICINE

## 2021-09-07 PROCEDURE — 85025 COMPLETE CBC W/AUTO DIFF WBC: CPT | Performed by: INTERNAL MEDICINE

## 2021-09-07 PROCEDURE — 81001 URINALYSIS AUTO W/SCOPE: CPT | Performed by: NURSE PRACTITIONER

## 2021-09-07 PROCEDURE — 74176 CT ABD & PELVIS W/O CONTRAST: CPT

## 2021-09-07 PROCEDURE — 84156 ASSAY OF PROTEIN URINE: CPT | Performed by: NURSE PRACTITIONER

## 2021-09-07 PROCEDURE — 99223 1ST HOSP IP/OBS HIGH 75: CPT | Performed by: INTERNAL MEDICINE

## 2021-09-07 PROCEDURE — G1004 CDSM NDSC: HCPCS

## 2021-09-07 PROCEDURE — 82570 ASSAY OF URINE CREATININE: CPT | Performed by: NURSE PRACTITIONER

## 2021-09-07 RX ORDER — HYDRALAZINE HYDROCHLORIDE 25 MG/1
25 TABLET, FILM COATED ORAL 2 TIMES DAILY
Status: DISCONTINUED | OUTPATIENT
Start: 2021-09-07 | End: 2021-09-07

## 2021-09-07 RX ORDER — CARVEDILOL 25 MG/1
25 TABLET ORAL 2 TIMES DAILY WITH MEALS
Status: DISCONTINUED | OUTPATIENT
Start: 2021-09-07 | End: 2021-09-17 | Stop reason: HOSPADM

## 2021-09-07 RX ORDER — HYDRALAZINE HYDROCHLORIDE 25 MG/1
50 TABLET, FILM COATED ORAL EVERY 8 HOURS SCHEDULED
Status: DISCONTINUED | OUTPATIENT
Start: 2021-09-07 | End: 2021-09-09

## 2021-09-07 RX ORDER — SODIUM CHLORIDE, SODIUM GLUCONATE, SODIUM ACETATE, POTASSIUM CHLORIDE, MAGNESIUM CHLORIDE, SODIUM PHOSPHATE, DIBASIC, AND POTASSIUM PHOSPHATE .53; .5; .37; .037; .03; .012; .00082 G/100ML; G/100ML; G/100ML; G/100ML; G/100ML; G/100ML; G/100ML
75 INJECTION, SOLUTION INTRAVENOUS CONTINUOUS
Status: DISPENSED | OUTPATIENT
Start: 2021-09-07 | End: 2021-09-08

## 2021-09-07 RX ORDER — CLONIDINE HYDROCHLORIDE 0.1 MG/1
0.2 TABLET ORAL 3 TIMES DAILY
Status: DISCONTINUED | OUTPATIENT
Start: 2021-09-07 | End: 2021-09-10

## 2021-09-07 RX ADMIN — SODIUM CHLORIDE, SODIUM GLUCONATE, SODIUM ACETATE, POTASSIUM CHLORIDE, MAGNESIUM CHLORIDE, SODIUM PHOSPHATE, DIBASIC, AND POTASSIUM PHOSPHATE 75 ML/HR: .53; .5; .37; .037; .03; .012; .00082 INJECTION, SOLUTION INTRAVENOUS at 15:51

## 2021-09-07 RX ADMIN — CARVEDILOL 25 MG: 12.5 TABLET, FILM COATED ORAL at 07:14

## 2021-09-07 RX ADMIN — CLONIDINE HYDROCHLORIDE 0.2 MG: 0.1 TABLET ORAL at 08:27

## 2021-09-07 RX ADMIN — HYDRALAZINE HYDROCHLORIDE 50 MG: 25 TABLET, FILM COATED ORAL at 20:52

## 2021-09-07 RX ADMIN — TERAZOSIN HYDROCHLORIDE 5 MG: 5 CAPSULE ORAL at 00:01

## 2021-09-07 RX ADMIN — HEPARIN SODIUM 5000 UNITS: 5000 INJECTION INTRAVENOUS; SUBCUTANEOUS at 20:52

## 2021-09-07 RX ADMIN — GABAPENTIN 300 MG: 300 CAPSULE ORAL at 17:03

## 2021-09-07 RX ADMIN — CLONIDINE HYDROCHLORIDE 0.2 MG: 0.1 TABLET ORAL at 20:52

## 2021-09-07 RX ADMIN — HYDRALAZINE HYDROCHLORIDE 5 MG: 20 INJECTION, SOLUTION INTRAMUSCULAR; INTRAVENOUS at 05:43

## 2021-09-07 RX ADMIN — HYDRALAZINE HYDROCHLORIDE 5 MG: 20 INJECTION, SOLUTION INTRAMUSCULAR; INTRAVENOUS at 23:46

## 2021-09-07 RX ADMIN — HYDRALAZINE HYDROCHLORIDE 50 MG: 25 TABLET, FILM COATED ORAL at 15:15

## 2021-09-07 RX ADMIN — LABETALOL 20 MG/4 ML (5 MG/ML) INTRAVENOUS SYRINGE 10 MG: at 09:53

## 2021-09-07 RX ADMIN — TERAZOSIN HYDROCHLORIDE 5 MG: 5 CAPSULE ORAL at 20:52

## 2021-09-07 RX ADMIN — ONDANSETRON 4 MG: 2 INJECTION INTRAMUSCULAR; INTRAVENOUS at 08:23

## 2021-09-07 RX ADMIN — ACETAMINOPHEN 650 MG: 325 TABLET, FILM COATED ORAL at 01:18

## 2021-09-07 RX ADMIN — ACETAMINOPHEN 650 MG: 325 TABLET, FILM COATED ORAL at 08:27

## 2021-09-07 RX ADMIN — IOHEXOL 50 ML: 240 INJECTION, SOLUTION INTRATHECAL; INTRAVASCULAR; INTRAVENOUS; ORAL at 15:45

## 2021-09-07 RX ADMIN — HYDRALAZINE HYDROCHLORIDE 25 MG: 25 TABLET, FILM COATED ORAL at 08:27

## 2021-09-07 RX ADMIN — HEPARIN SODIUM 5000 UNITS: 5000 INJECTION INTRAVENOUS; SUBCUTANEOUS at 13:46

## 2021-09-07 RX ADMIN — CARVEDILOL 25 MG: 25 TABLET, FILM COATED ORAL at 17:03

## 2021-09-07 RX ADMIN — ONDANSETRON 4 MG: 2 INJECTION INTRAMUSCULAR; INTRAVENOUS at 00:02

## 2021-09-07 RX ADMIN — GABAPENTIN 300 MG: 300 CAPSULE ORAL at 08:27

## 2021-09-07 RX ADMIN — CLONIDINE HYDROCHLORIDE 0.2 MG: 0.1 TABLET ORAL at 15:15

## 2021-09-07 RX ADMIN — HEPARIN SODIUM 5000 UNITS: 5000 INJECTION INTRAVENOUS; SUBCUTANEOUS at 00:03

## 2021-09-07 RX ADMIN — TORSEMIDE 20 MG: 20 TABLET ORAL at 08:27

## 2021-09-07 RX ADMIN — INSULIN LISPRO 1 UNITS: 100 INJECTION, SOLUTION INTRAVENOUS; SUBCUTANEOUS at 12:01

## 2021-09-07 RX ADMIN — CLONIDINE HYDROCHLORIDE 0.2 MG: 0.1 TABLET ORAL at 00:01

## 2021-09-07 RX ADMIN — HEPARIN SODIUM 5000 UNITS: 5000 INJECTION INTRAVENOUS; SUBCUTANEOUS at 05:18

## 2021-09-07 RX ADMIN — ATORVASTATIN CALCIUM 40 MG: 40 TABLET, FILM COATED ORAL at 17:03

## 2021-09-07 NOTE — UTILIZATION REVIEW
Inpatient Admission Authorization Request   NOTIFICATION OF INPATIENT ADMISSION/INPATIENT AUTHORIZATION REQUEST   SERVICING FACILITY:   [address] [place] [address], [facility], [facility]  Tax ID: [id number]  NPI: [id number]  Place of Service: Inpatient [address] [facility]  [address]  Place of Service Code: 24     ATTENDING PROVIDER:  Attending Name and NPI#: Ismael Umana [[id number]]  Address: [address] [place] [address], [facility], [facility]  Phone: [phone]     UTILIZATION REVIEW CONTACT:  Constantin Ramirez Utilization   Network Utilization Review Department  Phone: [phone]  Fax: [phone]  Email: [email or web address]     PHYSICIAN ADVISORY SERVICES:  FOR IFNM-ZH-DOBM REVIEW - MEDICAL NECESSITY DENIAL  Phone: [phone]  Fax: [phone]  Email: [email or web address]     TYPE OF REQUEST:  Inpatient Status     ADMISSION INFORMATION:  ADMISSION DATE/TIME: 9/6/21 10:20 PM  PATIENT DIAGNOSIS CODE/DESCRIPTION:  Pancreatitis [K85 90]  High blood pressure [I10]  Hypertensive urgency [I16 0]  DISCHARGE DATE/TIME: No discharge date for patient encounter  DISCHARGE DISPOSITION (IF DISCHARGED): Home/Self Care     IMPORTANT INFORMATION:  Please contact the Merle Thomason directly with any questions or concerns regarding this request  Department voicemails are confidential     Send requests for admission clinical reviews, concurrent reviews, approvals, and administrative denials due to lack of clinical to fax [phone]

## 2021-09-07 NOTE — CONSULTS
Consultation - Nephrology   [facility] John Sewell 73 y o  female MRN: [id number]  Unit/Bed#: E2 -01 Encounter: [id number]    ASSESSMENT and PLAN:  1  Chronic kidney disease, stage IV:  - etiology suspect multifactorial secondary to hypertensive nephrosclerosis, diabetic nephropathy, prior acute kidney injury, underlying paraproteinemia with biclonal gammopathy with progression of CKD  - outpatient nephrologist, Dr Anthony Leal  - upon review medical records, prior baseline creatinine in 2020 approximately 1 4-1 8 mg/dL  More recently, creatinine around 2 6- 3 0 mg/dL  - creatinine 3  0 1 mg/dL upon admission on 9/6/21   - most recent creatinine 2 0 mg/dL today  - discontinue IV fluid hydration, see antihypertensive regimen for below  - UA, will check  - imaging, CT scan ordered, pending completion  - proteinuria, 3650 mg/24 hours; check urine protein creatinine ratio  - ANCA, GBM pending; prior immunofixation revealed biclonal gammopathy  - plan for bone marrow biopsy, consider renal biopsy as well (refused in the past)  - avoid NSAIDs, nephrotoxic agents, IV contrast   - adjust medications to appropriate GFR  - monitor volume status with strict intake/output, daily weight  - repeat BMP in a m     2  Electrolytes, acid/base:  - electrolytes overall stable and acceptable  - most recent bicarbonate 24 with anion gap of 11   - will continue to monitor with repeat lab studies  3  Hypertension:  - with resistant hypertension, history of same   - outpatient regimen includes: Carvedilol 25 mg BID, clonidine 0 2 mg TID, hydralazine 10 mg TID, torsemide 20 mg daily, Hytrin 5 mg HS ( prescribed torsemide every other day, hydralazine 25 mg BID)     - currently on: Carvedilol 25 mg BID, clonidine 0 2 mg TID, hydralazine 25 mg BID, torsemide 20 mg every other day, Hytrin 5 mg HS, + hydralazine 5 mg IV p r n  + labetalol 10 mg IV p r n   - recommendations: Initiate nicardipine infusion, adjust hold parameters on oral medications  - secondary workup: Check renin, aldosterone, metanephrines/catecholamines; prior renal artery duplex unremarkable  - optimize hemodynamics, avoid hypotension and fluctuations of blood pressure   - maintain MAP > 65     4  Anemia of chronic disease:  - most recent hemoglobin 9 2 grams/deciliter   - goal hemoglobin greater than 8 grams/deciliter  - recommend PRBC transfusion for hemoglobin less than 7, per primary team   - Hematology/Oncology follow-up  - iron plan completed on 7/25: Ferritin 71, iron saturation 36%  5  Mineral bone disease of chronic kidney disease:  - check phosphorus in a m   - will continue to monitor PTH and phosphorus as an outpatient  6  Diabetes:  - with retinopathy bilaterally  - most recent hemoglobin A1c 9 5, per primary team     7  Chronic diastolic heart failure:  - on torsemide as above  - monitor volume status closely with strict intake/output, daily weights  8  Biclonal gammopathy, following with Hematology/Oncology as an outpatient  - plan for bone marrow biopsy, 9/15/21     9  Elevated lipase, awaiting repeat CT scan, per primary team     HISTORY OF PRESENT ILLNESS:  Requesting Physician: Stephanie Malik DO  Reason for Consult:  CKD stage 4, hypertension    Pepito Ortega is a 73 y o  female with history of CKD stage 4, diabetes , hyperlipidemia, hypertension who was admitted to [facility] on 9/6/21 after presenting with elevated blood pressure at home  Per patient, she stated that her blood pressure at home was greater than 854 systolic despite taking her antihypertensive medications  Upon assessment and evaluation, patient is resting comfortably in bed  Patient is without acute shortness of breath or chest pain  Patient denies headache  Per patient, she has not been eating or drinking well  She states that she has been taking her antihypertensive medications as an outpatient, denies NSAID use   States she has been voiding well without issue  To note, patient was recently admitted at [facility] due to elevated blood pressure and nausea/vomiting  Patient was found to have acute pancreatitis  During that admission, patient's blood pressure responded well to re-initiation of home blood pressure medication regimen  A renal consultation is requested today for assistance in the management of CKD stage 4, hypertension      PAST MEDICAL HISTORY:  Past Medical History:   Diagnosis Date    Chronic kidney disease     Diabetes mellitus ([address] )     Hyperlipidemia     Hypertension     Pneumonia        PAST SURGICAL HISTORY:  Past Surgical History:   Procedure Laterality Date    EYE SURGERY         ALLERGIES:  Allergies   Allergen Reactions    Amlodipine Edema and Eye Swelling    Lisinopril Angioedema     Bilateral periorbital edema that was significant       SOCIAL HISTORY:  Social History     Substance and Sexual Activity   Alcohol Use Not Currently     Social History     Substance and Sexual Activity   Drug Use Not Currently     Social History     Tobacco Use   Smoking Status Never Smoker   Smokeless Tobacco Never Used   Tobacco Comment    NO TOBACCO USE       FAMILY HISTORY:  Family History   Problem Relation Age of Onset    Hypertension Mother     Diabetes Father     Hypertension Sister     Diabetes Sister     Hypertension Brother        MEDICATIONS:    Current Facility-Administered Medications:     acetaminophen (TYLENOL) tablet 650 mg, 650 mg, Oral, Q6H PRN, Sunny Barrera MD, 650 mg at 09/07/21 0827    atorvastatin (LIPITOR) tablet 40 mg, 40 mg, Oral, After Dinner, Sunny Barrera MD    carvedilol (COREG) tablet 25 mg, 25 mg, Oral, BID With Meals, Sunny Barrera MD, 25 mg at 09/07/21 0714    cloNIDine (CATAPRES) tablet 0 2 mg, 0 2 mg, Oral, TID, Sunny Barrera MD, 0 2 mg at 09/07/21 0827    gabapentin (NEURONTIN) capsule 300 mg, 300 mg, Oral, BID, Sunny Barrera MD, 300 mg at 09/07/21 0827    heparin (porcine) subcutaneous injection 5,000 Units, 5,000 Units, Subcutaneous, Q8H [address], 5,000 Units at 09/07/21 0518 **AND** [CANCELED] Platelet count, , , Once, Jos Gunn MD    hydrALAZINE (APRESOLINE) injection 5 mg, 5 mg, Intravenous, Q6H PRN, Jos Gunn MD, 5 mg at 09/07/21 0543    hydrALAZINE (APRESOLINE) tablet 25 mg, 25 mg, Oral, BID, Jos Gunn MD, 25 mg at 09/07/21 0827    insulin glargine (LANTUS) subcutaneous injection 10 Units 0 1 mL, 10 Units, Subcutaneous, QAM, Jos Gunn MD    insulin lispro (HumaLOG) 100 units/mL subcutaneous injection 1-5 Units, 1-5 Units, Subcutaneous, TID AC **AND** Fingerstick Glucose (POCT), , , TID AC, Jos Gunn MD    Labetalol HCl (NORMODYNE) injection 10 mg, 10 mg, Intravenous, Q6H PRN, Jos Gunn MD, 10 mg at 09/07/21 0953    ondansetron (ZOFRAN) injection 4 mg, 4 mg, Intravenous, Q6H PRN, Jos Gunn MD, 4 mg at 09/07/21 6244    sodium chloride 0 9 % infusion, 75 mL/hr, Intravenous, Continuous, Jos Gunn MD, Stopped at 09/07/21 0913    terazosin (HYTRIN) capsule 5 mg, 5 mg, Oral, HS, Jos Gunn MD, 5 mg at 09/07/21 0001    torsemide (DEMADEX) tablet 20 mg, 20 mg, Oral, Every Other Day, Jos Gunn MD, 20 mg at 09/07/21 0827    REVIEW OF SYSTEMS:  All the systems were reviewed and were negative except as documented on the HPI      PHYSICAL EXAM:  Current Weight: Weight - Scale: 70 8 kg (156 lb 1 4 oz)  First Weight: Weight - Scale: 70 8 kg (156 lb 1 4 oz)  Vitals:    09/07/21 0543 09/07/21 0713 09/07/21 0825 09/07/21 0953   BP: (!) 201/84 (!) 196/90 (!) 230/100 (!) 236/100   BP Location: Right arm Right arm Right arm    Pulse: 90  94 90   Resp:   20    Temp:   98 4 °F (36 9 °C)    TempSrc:   Tympanic    SpO2:   94%    Weight:       Height:           Intake/Output Summary (Last 24 hours) at 9/7/2021 1009  Last data filed at 9/7/2021 0913  Gross per 24 hour   Intake 735 ml   Output --   Net 735 ml     General: conscious, coherent, cooperative, not in acute distress  Skin: no rash, warm, dry  Eyes: pale conjunctivae, anicteric sclerae  ENT: moist lips and mucous membranes  Neck: supple, with trachea midline  Chest: clear breath sounds bilaterally  CVS: normal rate, regular rhythm  Abdomen: soft, non-tender, non-distended, normoactive bowel sounds  Extremities: no edema of both legs  Neuro: awake, alert  Psych: appropriate affect       Invasive Devices:        Lab Results:   Results from last 7 days   Lab Units 09/07/21  0520 09/06/21 2027 09/02/21  0857   WBC Thousand/uL 7 54 7 39  --    HEMOGLOBIN g/dL 9 2* 9 6*  --    HEMATOCRIT % 29 0* 30 4*  --    PLATELETS Thousands/uL 270 290  --    POTASSIUM mmol/L 4 2 4 3 5 2*   CHLORIDE mmol/L 102 99* 106   CO2 mmol/L 24 28 23   BUN mg/dL 50* 48* 66*   CREATININE mg/dL 2 80* 3 01* 2 88*   CALCIUM mg/dL 8 1* 8 2* 8 4   PHOSPHORUS mg/dL  --   --  5 0*   ALK PHOS U/L 74 83  --    ALT U/L 14 16  --    AST U/L 16 16  --

## 2021-09-07 NOTE — ASSESSMENT & PLAN NOTE
· Elevated lipase of unclear significance  No abdominal pain  Will place on liquid diet  CT scan of abdomen ordered      Results from last 7 days   Lab Units 09/06/21 2027   LIPASE u/L 1,190*

## 2021-09-07 NOTE — ASSESSMENT & PLAN NOTE
Wt Readings from Last 3 Encounters:   08/31/21 71 2 kg (157 lb)   08/26/21 70 4 kg (155 lb 3 2 oz)   08/23/21 69 9 kg (154 lb)     · Maintained on torsemide 20 mg every other day  · Euvolemic  · Daily weight, monitor I&Os

## 2021-09-07 NOTE — PROGRESS NOTES
[address] [facility]  Progress Note - Angelita Farias 1955, 73 y o  female MRN: [id number]  Unit/Bed#: E2 -92 Encounter: [id number]  Primary Care Provider: Choco Schroeder MD   Date and time admitted to hospital: 9/6/2021  7:36 PM    * Hypertensive urgency  Assessment & Plan  · Hypertensive urgency on multiple agents  Nephrology consulted  · Currently on carvedilol clonidine hydralazine and terazosin  · Hydralazine increased by nephrology to 25 mg t i d   · Currently blood pressures improved    Biclonal gammopathy  Assessment & Plan  · Biclonal gammopathy follows with hematology as outpatient with plan for eventual bone marrow biopsy    Elevated lipase  Assessment & Plan  · Elevated lipase of unclear significance  No abdominal pain  Will place on liquid diet  CT scan of abdomen ordered      Results from last 7 days   Lab Units 09/06/21 2027   LIPASE u/L 1,190*       Stage 4 chronic kidney disease ([facility] [address] )  Assessment & Plan  · CKD 4 secondary to hypertensive nephrosclerosis and diabetes  · Nephrology consulted given elevated blood pressures    Results from last 7 days   Lab Units 09/07/21  0520 09/06/21 2027 09/02/21  0857   BUN mg/dL 50* 48* 66*   CREATININE mg/dL 2 80* 3 01* 2 88*   EGFR ml/min/1 73sq m 17 16 16*       Chronic diastolic heart failure (HCC)  Assessment & Plan  Wt Readings from Last 3 Encounters:   09/06/21 70 8 kg (156 lb 1 4 oz)   08/31/21 71 2 kg (157 lb)   08/26/21 70 4 kg (155 lb 3 2 oz)     · Compensated on torsemide 20 mg every other day     Type 2 diabetes mellitus, with long-term current use of insulin (HCC)  Assessment & Plan    Lab Results   Component Value Date    HGBA1C 9 5 (H) 07/23/2021     Results from last 7 days   Lab Units 09/07/21  1122 09/07/21  0625 09/06/21  2354   POC GLUCOSE mg/dl 204* 146* 172*     · Continue glargine at lower dosing until appetite better      VTE Pharmacologic Prophylaxis: VTE Score: 3 Moderate Risk (Score 3-4) - Pharmacological DVT Prophylaxis Ordered: Heparin  Patient Centered Rounds: I have performed bedside rounds with nursing staff today  Discussions with Specialists or Other Care Team Provider:  Nephrology    Education and Discussions with Family / Patient:  Janine lynch    Time Spent for Care: 25 mins  More than 50% of total time spent on counseling and coordination of care as described above  Current Length of Stay: 1 day(s)  Current Patient Status: Inpatient   Certification Statement: The patient will continue to require additional inpatient hospital stay due to hypertension  Discharge Plan / Estimated Discharge Date: Anticipate discharge in 48-72 hrs to home with home services  Code Status: Level 1 - Full Code      Subjective:   Patient seen and examined  Having some nausea this morning  Blood pressures were markedly elevated until administered a m  Medications  Objective:   Vitals: Blood pressure 164/72, pulse 80, temperature 98 5 °F (36 9 °C), temperature source Tympanic, resp  rate 18, height 5' 2" (1 575 m), weight 70 8 kg (156 lb 1 4 oz), SpO2 97 %, not currently breastfeeding  Physical Exam  Vitals reviewed  Constitutional:       General: She is not in acute distress  Appearance: Normal appearance  Eyes:      General: No scleral icterus  Cardiovascular:      Rate and Rhythm: Regular rhythm  Heart sounds: Normal heart sounds  Pulmonary:      Breath sounds: Decreased breath sounds present  No wheezing  Abdominal:      General: Bowel sounds are normal       Palpations: Abdomen is soft  Tenderness: There is no guarding or rebound  Musculoskeletal:         General: No swelling  Skin:     General: Skin is warm  Neurological:      Mental Status: She is alert and oriented to person, place, and time     Psychiatric:         Mood and Affect: Mood normal        Additional Data:   Labs:  Results from last 7 days   Lab Units 09/07/21  0520 09/06/21 2027   WBC Thousand/uL 7 54 7 39   HEMOGLOBIN g/dL 9 2* 9 6*   HEMATOCRIT % 29 0* 30 4*   MCV fL 83 83   PLATELETS Thousands/uL 270 290     Results from last 7 days   Lab Units 09/07/21  0520 09/06/21 2027 09/02/21  0857   SODIUM mmol/L 137 136 138   POTASSIUM mmol/L 4 2 4 3 5 2*   CHLORIDE mmol/L 102 99* 106   CO2 mmol/L 24 28 23   ANION GAP mmol/L 11 9 9   BUN mg/dL 50* 48* 66*   CREATININE mg/dL 2 80* 3 01* 2 88*   CALCIUM mg/dL 8 1* 8 2* 8 4   ALBUMIN g/dL 2 8* 3 2* 3 7   TOTAL BILIRUBIN mg/dL 0 26 0 24  --    ALK PHOS U/L 74 83  --    ALT U/L 14 16  --    AST U/L 16 16  --    EGFR ml/min/1 73sq m 17 16 16*   GLUCOSE RANDOM mg/dL 156* 208*  --      Results from last 7 days   Lab Units 09/02/21  0857   PHOSPHORUS mg/dL 5 0*     Results from last 7 days   Lab Units 09/06/21 2027   TROPONIN I ng/mL <0 02              Results from last 7 days   Lab Units 09/07/21  1122 09/07/21  0625 09/06/21  2354   POC GLUCOSE mg/dl 204* 146* 172*             * I Have Reviewed All Lab Data Listed Above  Cultures:                   Lines/Drains:  Invasive Devices     Peripheral Intravenous Line            Peripheral IV 09/06/21 Left;Proximal;Ventral (anterior) Forearm <1 day              Telemetry:      Imaging:  Imaging Reports Reviewed Today Include:   No results found    Scheduled Meds:  Current Facility-Administered Medications   Medication Dose Route Frequency Provider Last Rate    acetaminophen  650 mg Oral Q6H PRN Maury Sanabria MD      atorvastatin  40 mg Oral After Izabel Frost MD      carvedilol  25 mg Oral BID With Meals MARLENA Hansen      cloNIDine  0 2 mg Oral TID MARLENA Hansen      gabapentin  300 mg Oral BID Maury Sanabria MD      heparin (porcine)  5,000 Units Subcutaneous [facility] Maury Sanabria MD      hydrALAZINE  5 mg Intravenous Q6H PRN Maury Sanabria MD      hydrALAZINE  25 mg Oral BID MARLENA Hansen      insulin glargine  10 Units Subcutaneous QAM Maury Sanabria MD      insulin lispro 1-5 Units Subcutaneous TID AC Blair Peguero MD      Labetalol HCl  10 mg Intravenous Q6H PRN Blair Peguero MD      ondansetron  4 mg Intravenous Q6H PRN Blair Peguero MD      terazosin  5 mg Oral HS Blair Peguero MD      torsemide  20 mg Oral Every Other Day MD Alexi Pinto Do, DO  [facility]  Hospitalist    ** Please Note: This note has been constructed using a voice recognition system   **

## 2021-09-07 NOTE — UTILIZATION REVIEW
Initial Clinical Review    Admission: Date/Time/Statement:   Admission Orders (From admission, onward)     Ordered        09/06/21 2220  Inpatient Admission  Once                   Orders Placed This Encounter   Procedures    Inpatient Admission     Standing Status:   Standing     Number of Occurrences:   1     Order Specific Question:   Level of Care     Answer:   Med Surg [16]     Order Specific Question:   Estimated length of stay     Answer:   More than 2 Midnights     Order Specific Question:   Certification     Answer:   I certify that inpatient services are medically necessary for this patient for a duration of greater than two midnights  See H&P and MD Progress Notes for additional information about the patient's course of treatment  ED Arrival Information     Expected Arrival Acuity    - 9/6/2021 19:15 Emergent         Means of arrival Escorted by Service Admission type    Walk-In Family Member Critical Care/ICU Emergency         Arrival complaint    high blood pressure, vomiting        Chief Complaint   Patient presents with    High Blood Pressure     Patient rpeorts taking her blood pressure and getting 200/100  Patient reports taking her BP medication as prescribed and that they must not be working  Patient has been vomiting but denies other symptoms  Initial Presentation: 73  Y O female  Presents to ED from home with elevated  BP  States  SBP has been greater than 230 despite med compliance  Does have abdominal discomfort, nausea and nonbilious vomiting  PMH  Is  CKD  Stage  4,  Resistant hypertension,  CHF  and  Dm  Labs  Reveal  Lipase  1190  Admit  Ip with  Hypertensive urgency,  Elevated lipase  And plan is  Monitor labs, Ct  Abdomen, IVF, NPO, monitor  Renal function, I & O, daily weight      Date:    9/7       Day 2:   Nephrology consult  Baseline creatinine  2 - 2 5, was   3 01 on admission  Slight improved to  2 80 with IVF  Plasmalyte  Continue close  Monitoring      Progress note  Remains  On multiple antihypertensives  CHELA improved  Complains of nausea  Continue  Current  Meds  ED Triage Vitals   Temperature Pulse Respirations Blood Pressure SpO2   09/06/21 1920 09/06/21 1922 09/06/21 1920 09/06/21 1926 09/06/21 1920   98 4 °F (36 9 °C) 81 20 (!) 235/103 96 %      Temp Source Heart Rate Source Patient Position - Orthostatic VS BP Location FiO2 (%)   09/06/21 1920 09/06/21 1920 09/06/21 1920 09/06/21 1920 --   Oral Monitor Sitting Right arm       Pain Score       09/06/21 1920       No Pain          Wt Readings from Last 1 Encounters:   09/06/21 70 8 kg (156 lb 1 4 oz)     Additional Vital Signs:   09/07/21 1029  --  --  --  232/102Abnormal    --  --  --  --   BP: MD notified   at 09/07/21 1029   09/07/21 0953  --  90  --  236/100Abnormal    --  --  --  --   BP: IV Labetalol 10mg given at 09/07/21 0953   09/07/21 0825  98 4 °F (36 9 °C)  94  20  230/100Abnormal    --  94 %  None (Room air)  Lying   BP: Clonidine 0 2mg, Torsemide 20mg, and Hydralazine 25mg given at 09/07/21 0825   09/07/21 0713  --  --  --  196/90Abnormal    --  --  --  Lying   BP: Coreg 25mg given at 09/07/21 0713   09/07/21 0543  --  90  --  201/84Abnormal   --  --  --  Lying   09/07/21 0251  97 7 °F (36 5 °C)  83  20  150/85  --  97 %  None (Room air)  --   09/06/21 2300  98 1 °F (36 7 °C)  78  20  155/85  --  98 %  None (Room air)  Lying   09/06/21 2245  --  80  --  193/84Abnormal   121  97 %  --  --   09/06/21 2230  --  82  --  191/85Abnormal   122  99 %  --  --   09/06/21 2145  --  76  --  211/88Abnormal   127  97 %  --  --   09/06/21 2130  --  78  --  206/88Abnormal   127  98 %  --  --   09/06/21 2115  --  80  --  224/92Abnormal   132  100 %  --  --   09/06/21 2100  --  82  --  211/96Abnormal   138  100 %  --  --   09/06/21 2045  --  78  --  215/93Abnormal   133  100 %  --  --   09/06/21 2020  --  78  --  220/96Abnormal   --  --  --  --   09/06/21 2015  --  78  --  255/119Abnormal   170  100 %  --  -- 09/06/21 2000  --  78  --  251/113Abnormal   162  99 %  --  --   09/06/21 1950  --  81  20  251/113Abnormal   --  --  --  --   09/06/21 1926  --  --  --  235/103Abnormal   --  --  --         Pertinent Labs/Diagnostic Test Results:       Results from last 7 days   Lab Units 09/07/21 0520 09/06/21 2027   WBC Thousand/uL 7 54 7 39   HEMOGLOBIN g/dL 9 2* 9 6*   HEMATOCRIT % 29 0* 30 4*   PLATELETS Thousands/uL 270 290   NEUTROS ABS Thousands/µL 5 71 5 82         Results from last 7 days   Lab Units 09/07/21  0520 09/06/21 2027 09/02/21  0857   SODIUM mmol/L 137 136 138   POTASSIUM mmol/L 4 2 4 3 5 2*   CHLORIDE mmol/L 102 99* 106   CO2 mmol/L 24 28 23   ANION GAP mmol/L 11 9 9   BUN mg/dL 50* 48* 66*   CREATININE mg/dL 2 80* 3 01* 2 88*   EGFR ml/min/1 73sq m 17 16 16*   CALCIUM mg/dL 8 1* 8 2* 8 4   PHOSPHORUS mg/dL  --   --  5 0*     Results from last 7 days   Lab Units 09/07/21  0520 09/06/21 2027 09/02/21  0857   AST U/L 16 16  --    ALT U/L 14 16  --    ALK PHOS U/L 74 83  --    TOTAL PROTEIN g/dL 6 8 7 6  --    ALBUMIN g/dL 2 8* 3 2* 3 7   TOTAL BILIRUBIN mg/dL 0 26 0 24  --      Results from last 7 days   Lab Units 09/07/21 0625 09/06/21  2354   POC GLUCOSE mg/dl 146* 172*     Results from last 7 days   Lab Units 09/07/21 0520 09/06/21 2027   GLUCOSE RANDOM mg/dL 156* 208*           Results from last 7 days   Lab Units 09/06/21 2027   TROPONIN I ng/mL <0 02                   Results from last 7 days   Lab Units 09/06/21 2027   LIPASE u/L 1,190*                 ED Treatment:   Medication Administration from 09/06/2021 1915 to 09/06/2021 2311       Date/Time Order Dose Route Action Comments     09/06/2021 2027 hydrALAZINE (APRESOLINE) injection 5 mg 5 mg Intravenous Given      09/06/2021 2239 Labetalol HCl (NORMODYNE) injection 10 mg 10 mg Intravenous Given         Present on Admission:   Stage 4 chronic kidney disease ([facility] [address] )   Hypertensive urgency   Hyperlipemia   Elevated lipase   Chronic diastolic heart failure (HCC)   Monoclonal gammopathy      Admitting Diagnosis: Pancreatitis [K85 90]  High blood pressure [I10]  Hypertensive urgency [I16 0]  Age/Sex: 73 y o  female  Admission Orders:  Scheduled Medications:  atorvastatin, 40 mg, Oral, After Dinner  carvedilol, 25 mg, Oral, BID With Meals  cloNIDine, 0 2 mg, Oral, TID  gabapentin, 300 mg, Oral, BID  heparin (porcine), 5,000 Units, Subcutaneous, Q8H DEBBIE  hydrALAZINE, 25 mg, Oral, BID  insulin glargine, 10 Units, Subcutaneous, QAM  insulin lispro, 1-5 Units, Subcutaneous, TID AC  terazosin, 5 mg, Oral, HS  torsemide, 20 mg, Oral, Every Other Day      Continuous IV Infusions:     PRN Meds:  acetaminophen, 650 mg, Oral, Q6H PRN  hydrALAZINE, 5 mg, Intravenous, Q6H PRN   ( x1   9/7  Thus far)   Labetalol HCl, 10 mg, Intravenous, Q6H PRN  ( x1  9/7  Thus far)  ondansetron, 4 mg, Intravenous, Q6H PRN  ( x2  9/7  Thus far)        IP CONSULT TO NEPHROLOGY     24  Hr  tele    Network Utilization Review Department  ATTENTION: Please call with any questions or concerns to [phone] and carefully listen to the prompts so that you are directed to the right person  All voicemails are confidential   Enriqueta Avalos all requests for admission clinical reviews, approved or denied determinations and any other requests to dedicated fax number below belonging to the campus where the patient is receiving treatment   List of dedicated fax numbers for the Facilities:  [address] DENIALS (Administrative/Medical Necessity) [phone]   [address] (Maternity/NICU/Pediatrics) [phone]   [address] [phone]   [address] Minerva Beaulieu [id number] [facility] [phone] [address] [facility] Earnestine Kang [address] [place] [address] [phone]

## 2021-09-07 NOTE — ASSESSMENT & PLAN NOTE
Lab Results   Component Value Date    EGFR 16 09/06/2021    EGFR 16 (L) 09/02/2021    EGFR 11 (L) 08/26/2021    CREATININE 3 01 (H) 09/06/2021    CREATININE 2 88 (H) 09/02/2021    CREATININE 3 98 (H) 08/26/2021   · CKD stage 4  · Baseline creatinine ranges from 2 5-3  · Follows with Nephrology outpatient, believe CKD is secondary to advanced diabetes nephropathy and hypertensive nephrosclerosis, has refused renal biopsy in the past  · Creatinine currently at baseline  · Will monitor renal function, avoid nephrotoxin

## 2021-09-07 NOTE — ASSESSMENT & PLAN NOTE
Wt Readings from Last 3 Encounters:   09/06/21 70 8 kg (156 lb 1 4 oz)   08/31/21 71 2 kg (157 lb)   08/26/21 70 4 kg (155 lb 3 2 oz)     · Compensated on torsemide 20 mg every other day

## 2021-09-07 NOTE — ED NOTES
RN attempted IV insertion x2 unsuccessful   Deepa Triplett RN at bedside for assistance      Lakisha Andrews RN  09/06/21 2010

## 2021-09-07 NOTE — ASSESSMENT & PLAN NOTE
· Hypertensive urgency on multiple agents  Nephrology consulted  · Currently on carvedilol clonidine hydralazine and terazosin    · Hydralazine increased by nephrology to 25 mg t i d   · Currently blood pressures improved

## 2021-09-07 NOTE — ASSESSMENT & PLAN NOTE
· Biclonal gammopathy follows with hematology as outpatient with plan for eventual bone marrow biopsy

## 2021-09-07 NOTE — H&P
Aaron 57 1955, 73 y o  female MRN: [id number]  Unit/Bed#: E2 -09 Encounter: [id number]  Primary Care Provider: Nitesh Umaña MD   Date and time admitted to hospital: 9/6/2021  7:36 PM    * Hypertensive urgency  Assessment & Plan  This is a 60-year-old female with history of resistant hypertension, CKD stage 4, diabetes mellitus presenting with uncontrolled blood pressure  States blood pressure at home systolic greater than 241 despite being compliant with her medications  · Was admitted previously for similar symptoms at which point patient was started on terazosin 5 mg HS, carvedilol 25 mg b i d , clonidine 0 2 mg t i d , hydralazine 10 mg t i d  And torsemide 20 mg daily  · Follows with Nephrology recently had blood work done and was told to take torsemide every other day, PCP also change hydralazine to 25 mg b i d   · Given hydralazine 5 mg IV and labetalol 10 mg IV in the ED  · Will resume home medications, p r n   Hydralazine and labetalol  · Closely monitor blood pressure    Monoclonal gammopathy  Assessment & Plan  · [facility] and is scheduled for a bone marrow biopsy    Elevated lipase  Assessment & Plan  · Lipase increased at 1190  · Patient denies any abdominal pain  · Was recently at [facility] abdomen 07/23/2021 at [facility] and revealed dilated thick-walled appendix, pancreatitis  · Will repeat CT abdomen pelvis for further evaluation  · NPO, IV fluids    Stage 4 chronic kidney disease [facility])  Assessment & Plan  Lab Results   Component Value Date    EGFR 16 09/06/2021    EGFR 16 (L) 09/02/2021    EGFR 11 (L) 08/26/2021    CREATININE 3 01 (H) 09/06/2021    CREATININE 2 88 (H) 09/02/2021    CREATININE 3 98 (H) 08/26/2021   · CKD stage 4  · Baseline creatinine ranges from 2 5-3  · Follows with Nephrology outpatient, believe CKD is secondary to advanced diabetes nephropathy and hypertensive nephrosclerosis, has refused renal biopsy in the past  · Creatinine currently at baseline  · Will monitor renal function, avoid nephrotoxin    Hyperlipemia  Assessment & Plan  · Continue atorvastatin    Chronic diastolic heart failure (HCC)  Assessment & Plan  Wt Readings from Last 3 Encounters:   08/31/21 71 2 kg (157 lb)   08/26/21 70 4 kg (155 lb 3 2 oz)   08/23/21 69 9 kg (154 lb)     · Maintained on torsemide 20 mg every other day  · Euvolemic  · Daily weight, monitor I&Os    Type 2 diabetes mellitus, with long-term current use of insulin ([facility])  Assessment & Plan    Lab Results   Component Value Date    HGBA1C 9 5 (H) 07/23/2021   · Patient is maintained on Tradjenta, Amaryl and Lantus 18 units daily  · Will hold Tradjenta and Amaryl and resume Lantus at lower dose of 10 units daily given patient will be NPO  · Monitor Accu-Cheks, sliding scale for coverage        VTE Prophylaxis: Heparin  / sequential compression device   Code Status: FULL  POLST: There is no POLST form on file for this patient (pre-hospital)    Anticipated Length of Stay:  Patient will be admitted on an Inpatient basis with an anticipated length of stay of  Greater than 2 midnights  Justification for Hospital Stay: hypertensive urgency    Total Time for Visit, including Counseling / Coordination of Care: 45 minutes  Greater than 50% of this total time spent on direct patient counseling and coordination of care  Chief Complaint:   Elevated blood pressure    History of Present Illness:    Ramiro Machado is a 73 y o  female who presents with elevated blood pressure at home  Patient hashistory of resistant hypertension, CKD stage 4, diabetes mellitus presenting with uncontrolled blood pressure  States blood pressure at home systolic greater than 580 despite being compliant with her medications  She denies any chest pain, palpitations, dyspnea  Denies any fever, chills  Does have abdominal discomfort, nausea and nonbloody nonbilious vomiting    States compliance to her medications  Review of Systems:    Review of Systems   Constitutional: Negative  HENT: Negative  Eyes: Negative  Respiratory: Negative  Cardiovascular: Negative  Gastrointestinal: Positive for abdominal pain and nausea  Negative for vomiting  Endocrine: Negative  Genitourinary: Negative  Musculoskeletal: Negative  Skin: Negative  Allergic/Immunologic: Negative  Neurological: Negative  Hematological: Negative  Psychiatric/Behavioral: Negative  Past Medical and Surgical History:     Past Medical History:   Diagnosis Date    Chronic kidney disease     Diabetes mellitus ([address] )     Hyperlipidemia     Hypertension     Pneumonia        Past Surgical History:   Procedure Laterality Date    EYE SURGERY         Meds/Allergies:    Prior to Admission medications    Medication Sig Start Date End Date Taking?  Authorizing Provider   Accu-Chek FastClix Lancets MISC Inject under the skin 2 (two) times a day Test 1/6/21   Mark Jarrett MD   Accu-Chek Guide test strip Use 1 each 2 (two) times a day Test 1/6/21   Mark Jarrett MD   Alcohol Swabs (EASY TOUCH ALCOHOL PREP MEDIUM) 70 % PADS USE 2 TO 3 X PER DAY 7/17/19   Historical Provider, MD   atorvastatin (LIPITOR) 40 mg tablet Take 1 tablet (40 mg total) by mouth daily 1/6/21   Mark Jarrett MD   Blood Glucose Monitoring Suppl (OneTouch Verio) w/Device KIT Use 2 (two) times a day 3/5/21   Mark Jarrett MD   carvedilol (COREG) 25 mg tablet Take 1 tablet (25 mg total) by mouth 2 (two) times a day with meals 1/6/21   Mark Jarrett MD   cloNIDine (CATAPRES) 0 2 mg tablet Take 1 tablet (0 2 mg total) by mouth 3 (three) times a day 8/5/21 8/5/22  Mark Jarrett MD   ergocalciferol (ERGOCALCIFEROL) 1 25 MG (24374 UT) capsule Take 50,000 Units by mouth every 7 days 10/22/19   Historical Provider, MD   gabapentin (NEURONTIN) 300 mg capsule Take 1 capsule (300 mg total) by mouth 2 (two) times a day 1/6/21   Mark Jarrett MD glimepiride (AMARYL) 2 mg tablet Take 2 mg by mouth daily 5/4/21 5/4/22  Historical Provider, MD   glucose blood (OneTouch Verio) test strip Use as instructed 3/5/21   Cassie Ríos MD   hydrALAZINE (APRESOLINE) 25 mg tablet Take 1 tablet (25 mg total) by mouth 2 (two) times a day  Patient taking differently: Take 10 mg by mouth every 8 (eight) hours  8/27/21 11/25/21  Orlando Schumacher MD   insulin glargine (LANTUS) 100 units/mL subcutaneous injection Inject 18 Units under the skin every morning 8/11/21   Odell Pires Prechtel,    Insulin Pen Needle (Unifine Pentips) 32G X 4 MM MISC Use daily 4/7/21   Cassie Ríos MD   Lancets (onetouch ultrasoft) lancets Use as instructed 3/5/21   Cassie Ríos MD   linaGLIPtin (Tradjenta) 5 MG TABS Take 5 mg by mouth daily 5/4/21 5/4/22  Historical Provider, MD   terazosin (HYTRIN) 5 mg capsule Take 5 mg by mouth 5/4/21 5/4/22  Historical Provider, MD   torsemide (DEMADEX) 20 mg tablet Take 1 tablet (20 mg total) by mouth daily 8/27/21 2/23/22  Orlando Schumacher MD     I have reviewed home medications with patient personally  Allergies:    Allergies   Allergen Reactions    Amlodipine Edema and Eye Swelling    Lisinopril Angioedema     Bilateral periorbital edema that was significant       Social History:     Social History     Substance and Sexual Activity   Alcohol Use Not Currently     Social History     Tobacco Use   Smoking Status Never Smoker   Smokeless Tobacco Never Used   Tobacco Comment    NO TOBACCO USE     Social History     Substance and Sexual Activity   Drug Use Not Currently       Family History:    Family History   Problem Relation Age of Onset    Hypertension Mother     Diabetes Father     Hypertension Sister     Diabetes Sister     Hypertension Brother        Physical Exam:     Vitals:   Blood Pressure: (!) 193/84 (09/06/21 2245)  Pulse: 80 (09/06/21 2245)  Temperature: 98 4 °F (36 9 °C) (09/06/21 1920)  Temp Source: Oral (09/06/21 1920)  Respirations: 20 (09/06/21 1950)  SpO2: 97 % (09/06/21 2245)    Constitutional: Patient is oriented to person, place and time, no acute distress  HEENT:  Normocephalic, atraumatic  Cardiovascular: Normal S1S2, RRR, No murmurs/rubs/gallops appreciated  Pulmonary:  Bilateral air entry, No rhonchi/rales/wheezing appreciated  Abdominal: Soft, Bowel sounds present, Non-tender, Non-distended  Extremities:  No cyanosis, clubbing or edema  Neurological: Cranial nerves II-XII grossly intact, sensation intact, otherwise no focal neurological symptoms  Additional Data:     Lab Results: I have personally reviewed pertinent reports  Results from last 7 days   Lab Units 09/06/21 2027   WBC Thousand/uL 7 39   HEMOGLOBIN g/dL 9 6*   HEMATOCRIT % 30 4*   PLATELETS Thousands/uL 290   NEUTROS PCT % 80*   LYMPHS PCT % 15   MONOS PCT % 5   EOS PCT % 0     Results from last 7 days   Lab Units 09/06/21 2027   POTASSIUM mmol/L 4 3   CHLORIDE mmol/L 99*   CO2 mmol/L 28   BUN mg/dL 48*   CREATININE mg/dL 3 01*   CALCIUM mg/dL 8 2*   ALK PHOS U/L 83   ALT U/L 16   AST U/L 16           Imaging: I have personally reviewed pertinent reports  XR chest 2 views    Result Date: 8/9/2021  Narrative: CHEST INDICATION:   HTN  COMPARISON:  None EXAM PERFORMED/VIEWS:  XR CHEST PA & LATERAL FINDINGS: Cardiomediastinal silhouette appears unremarkable  Mild diffuse interstitial prominence with increased groundglass density  No pneumothorax or pleural effusion  Osseous structures appear within normal limits for patient age  Impression: Mild diffuse interstitial prominence with increased groundglass density  Question related to interstitial edema or infiltrate  The study was marked in [place] [address] for immediate notification   Workstation performed: [id number]       EKG, Pathology, and Other Studies Reviewed on Admission:   · EKG:  Sinus rhythm, LVH    Allscripts / Epic Records Reviewed: Yes     ** Please Note: This note has been constructed using a voice recognition system   **

## 2021-09-07 NOTE — ASSESSMENT & PLAN NOTE
Lab Results   Component Value Date    HGBA1C 9 5 (H) 07/23/2021     Results from last 7 days   Lab Units 09/07/21  1122 09/07/21  0625 09/06/21  2354   POC GLUCOSE mg/dl 204* 146* 172*     · Continue glargine at lower dosing until appetite better

## 2021-09-07 NOTE — ASSESSMENT & PLAN NOTE
· Lipase increased at 1190  · Patient denies any abdominal pain  · Was recently at [facility] [address] and revealed dilated thick-walled appendix, pancreatitis  · Will repeat CT abdomen pelvis for further evaluation  · NPO, IV fluids

## 2021-09-07 NOTE — ASSESSMENT & PLAN NOTE
· CKD 4 secondary to hypertensive nephrosclerosis and diabetes  · Nephrology consulted given elevated blood pressures    Results from last 7 days   Lab Units 09/07/21  0520 09/06/21 2027 09/02/21  0857   BUN mg/dL 50* 48* 66*   CREATININE mg/dL 2 80* 3 01* 2 88*   EGFR ml/min/1 73sq m 17 16 16*

## 2021-09-07 NOTE — ASSESSMENT & PLAN NOTE
Lab Results   Component Value Date    HGBA1C 9 5 (H) 07/23/2021   · Patient is maintained on Tradjenta, Amaryl and Lantus 18 units daily  · Will hold Tradjenta and Amaryl and resume Lantus at lower dose of 10 units daily given patient will be NPO  · Monitor Accu-Cheks, sliding scale for coverage

## 2021-09-07 NOTE — ASSESSMENT & PLAN NOTE
This is a 71-year-old female with history of resistant hypertension, CKD stage 4, diabetes mellitus presenting with uncontrolled blood pressure  States blood pressure at home systolic greater than 893 despite being compliant with her medications  · Was admitted previously for similar symptoms at which point patient was started on terazosin 5 mg HS, carvedilol 25 mg b i d , clonidine 0 2 mg t i d , hydralazine 10 mg t i d  And torsemide 20 mg daily  · Follows with Nephrology recently had blood work done and was told to take torsemide every other day, PCP also change hydralazine to 25 mg b i d   · Given hydralazine 5 mg IV and labetalol 10 mg IV in the ED  · Will resume home medications, p r n   Hydralazine and labetalol  · Closely monitor blood pressure

## 2021-09-07 NOTE — ED ATTENDING ATTESTATION
9/6/2021  Aranza HARDWICK, saw and evaluated the patient  I have discussed the patient with the resident/non-physician practitioner and agree with the resident's/non-physician practitioner's findings, Plan of Care, and MDM as documented in the resident's/non-physician practitioner's note, except where noted  All available labs and Radiology studies were reviewed  I was present for key portions of any procedure(s) performed by the resident/non-physician practitioner and I was immediately available to provide assistance  At this point I agree with the current assessment done in the Emergency Department  I have conducted an independent evaluation of this patient a history and physical is as follows: A 72 yo female with pmhx of HTN, DM, HLD and CKD; presents with vomiting and elevated blood pressures at home  Patient reports she had several episodes throughout the course of today, emesis was nonbloody nonbilious  Patient states her last episode was around 4 pm, and her nausea has since resolved  Patient denies associated abdominal pain  Patient does report a history of pancreatitis, which presented with similar symptoms  Patient states just prior to arrival she felt "warm" which prompted her to take her blood pressure and found it to be >200/110 which prompted ED evaluation  Patient states she has a history of hypertension, which has been difficult to control more recently  Patient has been compliant with all of her medications  Patient otherwise denies fever, chills, headache, dizziness, lightheadedness, visual disturbance, chest pain, shortness of breath, abdominal pain, diarrhea, peripheral edema, rashes, paresthesias and focal weakness        Physical Exam  General Appearance: alert and oriented, nad, non toxic appearing  Skin:  Warm, dry, intact  HEENT: atraumatic, normocephalic  Neck: Supple, trachea midline  Cardiac: RRR; no murmurs, rub, gallops  Pulmonary: lungs CTAB; no wheezes, rales, rhonchi  Gastrointestinal: abdomen soft, nontender, nondistended; no guarding or rebound tenderness; good bowel sounds, no mass or bruits  Extremities:  no pedal edema, 2+ pulses; no calf tenderness, no clubbing, no cyanosis  Neuro:  no focal motor or sensory deficits, CN 2-12 grossly intact  Psych:  Normal mood and affect, normal judgement and insight    Assessment and Plan:  1 ) Vomiting, now resolved  Pt denies abdominal pain, with a benign abdominal exam   Will check lab work for pancreatitis, electrolyte abnormality, renal impairment, hepatitis and biliary etiology  2 ) Hypertensive urgency, patient currently asymptomatic  Will check lab work for end-organ damage  Will treat with IV antihypertensives  ED Course         Critical Care Time  CriticalCare Time  Performed by: Crystal Joshua DO  Authorized by: Crystal Joshua DO     Critical care provider statement:     Critical care time (minutes):  30    Critical care time was exclusive of:  Separately billable procedures and treating other patients and teaching time    Critical care was necessary to treat or prevent imminent or life-threatening deterioration of the following conditions: hypertensive urgency      Critical care was time spent personally by me on the following activities:  Obtaining history from patient or surrogate, development of treatment plan with patient or surrogate, examination of patient, evaluation of patient's response to treatment, re-evaluation of patient's condition, ordering and review of radiographic studies, ordering and performing treatments and interventions, review of old charts and ordering and review of laboratory studies    I assumed direction of critical care for this patient from another provider in my specialty: no

## 2021-09-08 PROBLEM — R01.1 HEART MURMUR: Status: ACTIVE | Noted: 2021-09-08

## 2021-09-08 LAB
ALBUMIN SERPL BCP-MCNC: 2.4 G/DL (ref 3.5–5)
ALP SERPL-CCNC: 59 U/L (ref 46–116)
ALT SERPL W P-5'-P-CCNC: 12 U/L (ref 12–78)
ANION GAP SERPL CALCULATED.3IONS-SCNC: 11 MMOL/L (ref 4–13)
AST SERPL W P-5'-P-CCNC: 12 U/L (ref 5–45)
ATRIAL RATE: 82 BPM
BILIRUB SERPL-MCNC: 0.31 MG/DL (ref 0.2–1)
BUN SERPL-MCNC: 47 MG/DL (ref 5–25)
C-ANCA TITR SER IF: NORMAL TITER
CALCIUM ALBUM COR SERPL-MCNC: 8.7 MG/DL (ref 8.3–10.1)
CALCIUM SERPL-MCNC: 7.4 MG/DL (ref 8.3–10.1)
CHLORIDE SERPL-SCNC: 101 MMOL/L (ref 100–108)
CO2 SERPL-SCNC: 28 MMOL/L (ref 21–32)
CREAT SERPL-MCNC: 3.41 MG/DL (ref 0.6–1.3)
GFR SERPL CREATININE-BSD FRML MDRD: 13 ML/MIN/1.73SQ M
GLUCOSE SERPL-MCNC: 150 MG/DL (ref 65–140)
GLUCOSE SERPL-MCNC: 169 MG/DL (ref 65–140)
GLUCOSE SERPL-MCNC: 181 MG/DL (ref 65–140)
GLUCOSE SERPL-MCNC: 194 MG/DL (ref 65–140)
GLUCOSE SERPL-MCNC: 210 MG/DL (ref 65–140)
INR PPP: 1.09 (ref 0.84–1.19)
LIPASE SERPL-CCNC: 211 U/L (ref 73–393)
MAGNESIUM SERPL-MCNC: 2.2 MG/DL (ref 1.6–2.6)
MYELOPEROXIDASE AB SER IA-ACNC: <9 U/ML (ref 0–9)
P AXIS: 52 DEGREES
P-ANCA ATYPICAL TITR SER IF: NORMAL TITER
P-ANCA TITR SER IF: NORMAL TITER
PHOSPHATE SERPL-MCNC: 4.4 MG/DL (ref 2.3–4.1)
POTASSIUM SERPL-SCNC: 4.1 MMOL/L (ref 3.5–5.3)
PR INTERVAL: 162 MS
PROT SERPL-MCNC: 5.8 G/DL (ref 6.4–8.2)
PROTEINASE3 AB SER IA-ACNC: <3.5 U/ML (ref 0–3.5)
PROTHROMBIN TIME: 13.9 SECONDS (ref 11.6–14.5)
QRS AXIS: 82 DEGREES
QRSD INTERVAL: 86 MS
QT INTERVAL: 464 MS
QTC INTERVAL: 542 MS
SODIUM SERPL-SCNC: 140 MMOL/L (ref 136–145)
T WAVE AXIS: 57 DEGREES
VENTRICULAR RATE: 82 BPM

## 2021-09-08 PROCEDURE — 85610 PROTHROMBIN TIME: CPT | Performed by: RADIOLOGY

## 2021-09-08 PROCEDURE — 93010 ELECTROCARDIOGRAM REPORT: CPT | Performed by: INTERNAL MEDICINE

## 2021-09-08 PROCEDURE — 82948 REAGENT STRIP/BLOOD GLUCOSE: CPT

## 2021-09-08 PROCEDURE — 83735 ASSAY OF MAGNESIUM: CPT | Performed by: NURSE PRACTITIONER

## 2021-09-08 PROCEDURE — 82384 ASSAY THREE CATECHOLAMINES: CPT | Performed by: NURSE PRACTITIONER

## 2021-09-08 PROCEDURE — 84100 ASSAY OF PHOSPHORUS: CPT | Performed by: NURSE PRACTITIONER

## 2021-09-08 PROCEDURE — 99232 SBSQ HOSP IP/OBS MODERATE 35: CPT | Performed by: NURSE PRACTITIONER

## 2021-09-08 PROCEDURE — 83835 ASSAY OF METANEPHRINES: CPT | Performed by: NURSE PRACTITIONER

## 2021-09-08 PROCEDURE — 82088 ASSAY OF ALDOSTERONE: CPT | Performed by: NURSE PRACTITIONER

## 2021-09-08 PROCEDURE — 99232 SBSQ HOSP IP/OBS MODERATE 35: CPT | Performed by: INTERNAL MEDICINE

## 2021-09-08 PROCEDURE — 80053 COMPREHEN METABOLIC PANEL: CPT | Performed by: NURSE PRACTITIONER

## 2021-09-08 PROCEDURE — NC001 PR NO CHARGE: Performed by: RADIOLOGY

## 2021-09-08 PROCEDURE — 84244 ASSAY OF RENIN: CPT | Performed by: NURSE PRACTITIONER

## 2021-09-08 PROCEDURE — 83690 ASSAY OF LIPASE: CPT | Performed by: INTERNAL MEDICINE

## 2021-09-08 RX ORDER — INSULIN GLARGINE 100 [IU]/ML
12 INJECTION, SOLUTION SUBCUTANEOUS EVERY MORNING
Status: DISCONTINUED | OUTPATIENT
Start: 2021-09-09 | End: 2021-09-17 | Stop reason: HOSPADM

## 2021-09-08 RX ADMIN — INSULIN LISPRO 1 UNITS: 100 INJECTION, SOLUTION INTRAVENOUS; SUBCUTANEOUS at 07:20

## 2021-09-08 RX ADMIN — GABAPENTIN 300 MG: 300 CAPSULE ORAL at 10:30

## 2021-09-08 RX ADMIN — CLONIDINE HYDROCHLORIDE 0.2 MG: 0.1 TABLET ORAL at 22:04

## 2021-09-08 RX ADMIN — HYDRALAZINE HYDROCHLORIDE 50 MG: 25 TABLET, FILM COATED ORAL at 05:07

## 2021-09-08 RX ADMIN — HEPARIN SODIUM 5000 UNITS: 5000 INJECTION INTRAVENOUS; SUBCUTANEOUS at 05:08

## 2021-09-08 RX ADMIN — SODIUM CHLORIDE, SODIUM GLUCONATE, SODIUM ACETATE, POTASSIUM CHLORIDE, MAGNESIUM CHLORIDE, SODIUM PHOSPHATE, DIBASIC, AND POTASSIUM PHOSPHATE 75 ML/HR: .53; .5; .37; .037; .03; .012; .00082 INJECTION, SOLUTION INTRAVENOUS at 14:33

## 2021-09-08 RX ADMIN — INSULIN LISPRO 2 UNITS: 100 INJECTION, SOLUTION INTRAVENOUS; SUBCUTANEOUS at 17:25

## 2021-09-08 RX ADMIN — HYDRALAZINE HYDROCHLORIDE 50 MG: 25 TABLET, FILM COATED ORAL at 22:04

## 2021-09-08 RX ADMIN — GABAPENTIN 300 MG: 300 CAPSULE ORAL at 17:24

## 2021-09-08 RX ADMIN — HYDRALAZINE HYDROCHLORIDE 50 MG: 25 TABLET, FILM COATED ORAL at 13:14

## 2021-09-08 RX ADMIN — SODIUM CHLORIDE, SODIUM GLUCONATE, SODIUM ACETATE, POTASSIUM CHLORIDE, MAGNESIUM CHLORIDE, SODIUM PHOSPHATE, DIBASIC, AND POTASSIUM PHOSPHATE 75 ML/HR: .53; .5; .37; .037; .03; .012; .00082 INJECTION, SOLUTION INTRAVENOUS at 01:00

## 2021-09-08 RX ADMIN — INSULIN LISPRO 1 UNITS: 100 INJECTION, SOLUTION INTRAVENOUS; SUBCUTANEOUS at 22:05

## 2021-09-08 RX ADMIN — CARVEDILOL 25 MG: 25 TABLET, FILM COATED ORAL at 17:24

## 2021-09-08 RX ADMIN — CARVEDILOL 25 MG: 25 TABLET, FILM COATED ORAL at 07:20

## 2021-09-08 RX ADMIN — HEPARIN SODIUM 5000 UNITS: 5000 INJECTION INTRAVENOUS; SUBCUTANEOUS at 13:09

## 2021-09-08 RX ADMIN — HEPARIN SODIUM 5000 UNITS: 5000 INJECTION INTRAVENOUS; SUBCUTANEOUS at 22:05

## 2021-09-08 RX ADMIN — CLONIDINE HYDROCHLORIDE 0.2 MG: 0.1 TABLET ORAL at 17:24

## 2021-09-08 RX ADMIN — INSULIN LISPRO 1 UNITS: 100 INJECTION, SOLUTION INTRAVENOUS; SUBCUTANEOUS at 11:43

## 2021-09-08 RX ADMIN — ATORVASTATIN CALCIUM 40 MG: 40 TABLET, FILM COATED ORAL at 17:25

## 2021-09-08 RX ADMIN — INSULIN GLARGINE 10 UNITS: 100 INJECTION, SOLUTION SUBCUTANEOUS at 10:32

## 2021-09-08 RX ADMIN — CLONIDINE HYDROCHLORIDE 0.2 MG: 0.1 TABLET ORAL at 10:29

## 2021-09-08 RX ADMIN — TERAZOSIN HYDROCHLORIDE 5 MG: 5 CAPSULE ORAL at 22:05

## 2021-09-08 NOTE — ASSESSMENT & PLAN NOTE
Wt Readings from Last 3 Encounters:   09/08/21 70 8 kg (156 lb 1 4 oz)   08/31/21 71 2 kg (157 lb)   08/26/21 70 4 kg (155 lb 3 2 oz)     · Compensated on torsemide 20 mg every other day   · Currently on hold due to REI on CKD  · Daily weight and I&Os

## 2021-09-08 NOTE — PROGRESS NOTES
NEPHROLOGY PROGRESS NOTE   Lakeshia Melton 73 y o  female MRN: [id number]  Unit/Bed#: E2 -01 Encounter: [id number]      ASSESSMENT/PLAN:  1  Acute kidney injury (POA) on chronic kidney disease, stage IIIB/IV:  - etiology of acute kidney injury suspect secondary to hemodynamic perturbations, prior episodes of acute kidney injury  - suspect underlying chronic kidney disease secondary to hypertensive nephrosclerosis, diabetic kidney disease, multiple prior acute kidney injury episodes, underlying paraproteinemia  - outpatient nephrologist, Dr See Rhoades  - upon review medical records, prior baseline creatinine around 2 0- 2 5 mg/dL  More recently, creatinine around 2 6- 3 0 mg/dL since most recent episode of acute kidney injury  - creatinine 3  01 mg/dL upon admission on 9/6/21   - most recent creatinine 3 41 mg/dL today  - suspect elevation of creatinine again today secondary to hemodynamic perturbations  - currently on Isolyte at 75 mL/hour                - UA, glucosuria, trace-lysed blood, > 300 protein, 0-1 WBC  - imaging, CT scan revealed unremarkable kidneys  Normal size and position  - nephrotic range proteinuria, urine protein creatinine ratio 6 29  Will need to further check as an outpatient once in steady state  - ANCA pending, GBM negative; prior immunofixation revealed monoclonal/biclonal gammopathy  - plan for bone marrow biopsy as below, consider renal biopsy as well (refused in the past)  - avoid NSAIDs, nephrotoxic agents, IV contrast   - adjust medications to appropriate GFR  - monitor volume status with strict intake/output, daily weight  - repeat BMP in a m      2  Electrolytes, acid/base:  - electrolytes overall stable and acceptable  - most recent bicarbonate 28   - will continue to monitor with repeat lab studies      3   Hypertension:  - outpatient regimen includes: Carvedilol 25 mg BID, clonidine 0 2 mg TID, hydralazine 10 mg TID, torsemide 20 mg daily, Hytrin 5 mg HS ( prescribed torsemide every other day, hydralazine 25 mg BID)  - currently on: Carvedilol 25 mg BID, clonidine 0 2 mg TID, hydralazine 50 mg TID, Hytrin 5 mg HS, + hydralazine 5 mg IV p r n  + labetalol 10 mg IV p r n   - recommendations: maintain hold parameters on oral medications  - secondary workup: renin, aldosterone, metanephrines/catecholamines pending; prior renal artery duplex unremarkable  - optimize hemodynamics, avoid hypotension and fluctuations of blood pressure   - maintain MAP > 65      4  Anemia of chronic disease:  - most recent hemoglobin 9 2 grams/deciliter   - goal hemoglobin greater than 8 grams/deciliter  - recommend PRBC transfusion for hemoglobin less than 7, per primary team   - iron plan completed on 7/25: Ferritin 71, iron saturation 36%  - Hematology/Oncology follow-up      5  Mineral bone disease of chronic kidney disease:  - hyperphosphatemia, most recent phosphorus 4 4   - will continue to monitor PTH and phosphorus as an outpatient      6  Diabetes:  - with retinopathy bilaterally  - most recent hemoglobin A1c 9 5, per primary team      7  Chronic diastolic heart failure:  - outpatient diuretics on hold for now  - monitor volume status closely with strict intake/output, daily weights      8   monoclonal/Biclonal gammopathy, following with Hematology/Oncology as an outpatient  - plan for bone marrow biopsy, 9/15/21      9  Elevated lipase, repeat CT scan revealed possible nonspecific colitis otherwise no acute findings in the abdomen or pelvis including no evidence of pancreatitis  SUBJECTIVE:  Patient resting bed comfortably  Patient is without acute shortness of breath or chest pain  Patient denies nausea, vomiting, diarrhea      OBJECTIVE:  Current Weight: Weight - Scale: 70 8 kg (156 lb 1 4 oz)  Vitals:    09/08/21 0743   BP: 132/55   Pulse: 72   Resp: 16   Temp: 97 5 °F (36 4 °C)   SpO2: 98% Intake/Output Summary (Last 24 hours) at 9/8/2021 0949  Last data filed at 9/8/2021 0900  Gross per 24 hour   Intake 2187 5 ml   Output 1350 ml   Net 837 5 ml       General:  No acute distress, resting in bed  Skin:  Warm, no rash  Eyes:  Sclerae anicteric  ENT:  Moist lips and mucous membranes  Neck:  Supple trachea midline  Chest:  Diminished breath sounds bilateral bases   CVS:  Regular rate regular rhythm  Abdomen:  Soft, nontender, normoactive bowel sounds  Extremities:  No overt bilateral lower extremity edema   Neuro:  Awake and interactive  Psych:  Appropriate affect      Medications:  Scheduled Meds:  Current Facility-Administered Medications   Medication Dose Route Frequency Provider Last Rate    acetaminophen  650 mg Oral Q6H PRN Cathi Davenport MD      atorvastatin  40 mg Oral After Kade Mills MD      carvedilol  25 mg Oral BID With Meals MARLENA Hansen      cloNIDine  0 2 mg Oral TID MARLENA Hansen      gabapentin  300 mg Oral BID Cathi Davenport MD      heparin (porcine)  5,000 Units Subcutaneous [facility] Cathi Davenport MD      hydrALAZINE  5 mg Intravenous Q6H PRN Cathi Davenport MD      hydrALAZINE  50 mg Oral [facility] Meena Stevens MD      insulin glargine  10 Units Subcutaneous QAM Cathi Davenport MD      insulin lispro  1-5 Units Sub[facility] TID [facility] [place] Cathi Davenport MD      Labetalol HCl  10 mg Intravenous Q6H PRN Cathi Davenport MD      multi-electrolyte  75 mL/hr Intravenous Continuous Meena Stevens MD 75 mL/hr (09/08/21 0100)    ondansetron  4 mg Intravenous Q6H PRN Cathi Davenport MD      terazosin  5 mg Oral HS Cathi Davenport MD         PRN Meds:   acetaminophen    hydrALAZINE    Labetalol HCl    ondansetron    Continuous Infusions:multi-electrolyte, 75 mL/hr, Last Rate: 75 mL/hr (09/08/21 0100)        Laboratory Results:  Results from last 7 days   Lab Units 09/08/21  0514 09/07/21  0520 09/06/21 2027 09/02/21  0857   WBC Thousand/uL  --  7 54 7 39  --    HEMOGLOBIN g/dL  --  9 2* 9 6*  --    HEMATOCRIT %  --  29 0* 30 4*  --    PLATELETS Thousands/uL  --  270 290  --    SODIUM mmol/L 140 137 136 138   POTASSIUM mmol/L 4 1 4 2 4 3 5 2*   CHLORIDE mmol/L 101 102 99* 106   CO2 mmol/L 28 24 28 23   BUN mg/dL 47* 50* 48* 66*   CREATININE mg/dL 3 41* 2 80* 3 01* 2 88*   CALCIUM mg/dL 7 4* 8 1* 8 2* 8 4   MAGNESIUM mg/dL 2 2  --   --   --    PHOSPHORUS mg/dL 4 4*  --   --  5 0*

## 2021-09-08 NOTE — ASSESSMENT & PLAN NOTE
Lab Results   Component Value Date    EGFR 13 09/08/2021    EGFR 17 09/07/2021    EGFR 16 09/06/2021    CREATININE 3 41 (H) 09/08/2021    CREATININE 2 80 (H) 09/07/2021    CREATININE 3 01 (H) 09/06/2021     Baseline hemoglobin appears to be around 8-9's    Stable  Iron study showed iron 42, ferritin 24  IV Venofer 200mg daily x 5 doses per discussion with renal  Monitor CBC

## 2021-09-08 NOTE — ASSESSMENT & PLAN NOTE
Lab Results   Component Value Date    EGFR 13 09/08/2021    EGFR 17 09/07/2021    EGFR 16 09/06/2021    CREATININE 3 41 (H) 09/08/2021    CREATININE 2 80 (H) 09/07/2021    CREATININE 3 01 (H) 09/06/2021     Baseline hemoglobin appears to be around 8-9's    Stable  Monitor

## 2021-09-08 NOTE — ASSESSMENT & PLAN NOTE
Lab Results   Component Value Date    EGFR 13 09/08/2021    EGFR 17 09/07/2021    EGFR 16 09/06/2021    CREATININE 3 41 (H) 09/08/2021    CREATININE 2 80 (H) 09/07/2021    CREATININE 3 01 (H) 09/06/2021     · Evolving since admission  Baseline creatinine appears to be around 2 5-3  · Creatinine 3 01 on admission  Creatinine today 3 41   · CKD 4 secondary to hypertensive nephrosclerosis and diabetes  · Acute kidney injury etiology likely secondary to hemodynamic perturbations per Renal   · Nephrology consulted, appreciate input  · Holding Demadex  · Timed IV fluids  · Recommend renal biopsy  Tentatively for tomorrow     · Urinary retention protocol  · Daily weight and I&Os

## 2021-09-08 NOTE — ASSESSMENT & PLAN NOTE
· Elevated lipase of unclear significance  No abdominal pain  · CT scan of abdomen no evidence of pancreatitis  · Lipase normalized with IV hydration    · Advance diet to regular diet, tolerating    Results from last 7 days   Lab Units 09/08/21  0514 09/07/21  1449 09/06/21  2027   LIPASE u/L 211 621* 1,190*

## 2021-09-08 NOTE — PROGRESS NOTES
[facility]  Progress Note - Angelita Farias 1955, 73 y o  female MRN: [id number]  Unit/Bed#: E2 -98 Encounter: [id number]  Primary Care Provider: Choco Schroeder MD   Date and time admitted to hospital: 9/6/2021  7:36 PM    * Hypertensive urgency  Assessment & Plan  · Hypertensive urgency on multiple agents  Nephrology consulted  · Currently on carvedilol clonidine hydralazine and terazosin  · Hydralazine increased by nephrology to 50 mg p o  T i d  , clonidine increased to 0 2 mg p o  T i d  · Currently blood pressures improved, still labile  · Spoke to IR Dr Jens Palacios, recommend SBP < 140 in order to perform renal biopsy  · Will monitor BP closely  Acute kidney injury superimposed on CKD [facility])  Assessment & Plan  Lab Results   Component Value Date    EGFR 13 09/08/2021    EGFR 17 09/07/2021    EGFR 16 09/06/2021    CREATININE 3 41 (H) 09/08/2021    CREATININE 2 80 (H) 09/07/2021    CREATININE 3 01 (H) 09/06/2021     · Evolving since admission  Baseline creatinine appears to be around 2 5-3  · Creatinine 3 01 on admission  Creatinine today 3 41   · CKD 4 secondary to hypertensive nephrosclerosis and diabetes  · Acute kidney injury etiology likely secondary to hemodynamic perturbations per Renal   · Nephrology consulted, appreciate input  · Holding Demadex  · Timed IV fluids  · Recommend renal biopsy if creatinine continues to worsen, tentatively on 9/10  · Urinary retention protocol  · Daily weight and I&Os    Elevated lipase  Assessment & Plan  · Elevated lipase of unclear significance  No abdominal pain  · Patient was placed on liquid diet  · CT scan of abdomen no evidence of pancreatitis  · Lipase normalized with IV hydration    · Advance diet to regular    Results from last 7 days   Lab Units 09/08/21  0514 09/07/21  1449 09/06/21 2027   LIPASE u/L 211 621* 1,190*       Chronic diastolic heart failure (HCC)  Assessment & Plan  Wt Readings from Last 3 Encounters:   09/08/21 70 8 kg (156 lb 1 4 oz)   08/31/21 71 2 kg (157 lb)   08/26/21 70 4 kg (155 lb 3 2 oz)     · Compensated on torsemide 20 mg every other day   · Currently on hold due to REI on CKD  · Daily weight and I&Os    Heart murmur  Assessment & Plan  2D echo in 3/2021 from [facility] showed normal EF, grade 1 diastolic dysfunction, mild AS, mild MR and TR  Biclonal gammopathy  Assessment & Plan  · Biclonal gammopathy follows with hematology as outpatient with plan for eventual bone marrow biopsy  · Patient appears to have bone marrow biopsy scheduled on 9/15/2021 with IR    Anemia in stage 4 chronic kidney disease [facility])  Assessment & Plan  Lab Results   Component Value Date    EGFR 13 09/08/2021    EGFR 17 09/07/2021    EGFR 16 09/06/2021    CREATININE 3 41 (H) 09/08/2021    CREATININE 2 80 (H) 09/07/2021    CREATININE 3 01 (H) 09/06/2021     Baseline hemoglobin appears to be around 8-9's  Stable  Monitor    Type 2 diabetes mellitus, with long-term current use of insulin [facility])  Assessment & Plan    Lab Results   Component Value Date    HGBA1C 9 5 (H) 07/23/2021     Results from last 7 days   Lab Units 09/08/21  1059 09/08/21  0630 09/07/21  2107 09/07/21  1606 09/07/21  1122 09/07/21  0625 09/06/21  2354   POC GLUCOSE mg/dl 210* 169* 211* 112 204* 146* 172*     · Continue glargine at lower dosing until appetite better  · Continue SSI          VTE Pharmacologic Prophylaxis:   Pharmacologic: Heparin  Mechanical VTE Prophylaxis in Place: No    Patient Centered Rounds: I have performed bedside rounds with nursing staff today  Discussions with Specialists or Other Care Team Provider:  Yes    Education and Discussions with Family / Patient:  Yes    Time Spent for Care: 20 minutes  More than 50% of total time spent on counseling and coordination of care as described above      Current Length of Stay: 2 day(s)    Current Patient Status: Inpatient   Certification Statement: The patient will continue to require additional inpatient hospital stay due to [place] on CKD, hypertensive urgency    Discharge Plan:  Likely home once medically cleared    Code Status: Level 1 - Full Code      Subjective:   Patient offered no complaints  Objective:     Vitals:   Temp (24hrs), Av 5 °F (36 4 °C), Min:96 8 °F (36 °C), Max:98 4 °F (36 9 °C)    Temp:  [96 8 °F (36 °C)-98 4 °F (36 9 °C)] 97 6 °F (36 4 °C)  HR:  [64-78] 73  Resp:  [16-18] 18  BP: (128-196)/(55-88) 132/60  SpO2:  [96 %-99 %] 98 %  Body mass index is 28 55 kg/m²  Input and Output Summary (last 24 hours): Intake/Output Summary (Last 24 hours) at 2021 1323  Last data filed at 2021 1318  Gross per 24 hour   Intake 2187 5 ml   Output 1750 ml   Net 437 5 ml       Physical Exam:     Physical Exam  Vitals and nursing note reviewed  Constitutional:       Appearance: She is well-developed  HENT:      Head: Normocephalic and atraumatic  Neck:      Thyroid: No thyromegaly  Vascular: No JVD  Trachea: No tracheal deviation  Cardiovascular:      Rate and Rhythm: Normal rate and regular rhythm  Heart sounds: Murmur heard  Pulmonary:      Effort: Pulmonary effort is normal  No respiratory distress  Breath sounds: Normal breath sounds  No wheezing or rales  Abdominal:      General: Bowel sounds are normal  There is no distension  Palpations: Abdomen is soft  Tenderness: There is no abdominal tenderness  There is no guarding  Musculoskeletal:         General: No swelling or deformity  Cervical back: Neck supple  Right lower leg: No edema  Left lower leg: No edema  Skin:     General: Skin is warm and dry  Neurological:      General: No focal deficit present  Mental Status: She is alert and oriented to person, place, and time     Psychiatric:         Mood and Affect: Mood normal          Judgment: Judgment normal          Additional Data:     Labs:    Results from last 7 days   Lab Units 09/07/21  0520   WBC Thousand/uL 7 54   HEMOGLOBIN g/dL 9 2*   HEMATOCRIT % 29 0*   PLATELETS Thousands/uL 270   NEUTROS PCT % 76*   LYMPHS PCT % 18   MONOS PCT % 6   EOS PCT % 0     Results from last 7 days   Lab Units 09/08/21  0514   POTASSIUM mmol/L 4 1   CHLORIDE mmol/L 101   CO2 mmol/L 28   BUN mg/dL 47*   CREATININE mg/dL 3 41*   CALCIUM mg/dL 7 4*   ALK PHOS U/L 59   ALT U/L 12   AST U/L 12           * I Have Reviewed All Lab Data Listed Above  * Additional Pertinent Lab Tests Reviewed:  Dano 66 Admission Reviewed    Imaging:    Imaging Reports Reviewed Today Include:  CT abdomen pelvis  Imaging Personally Reviewed by Myself Includes:  None    Recent Cultures (last 7 days):           Last 24 Hours Medication List:   Current Facility-Administered Medications   Medication Dose Route Frequency Provider Last Rate    acetaminophen  650 mg Oral Q6H PRN Nestor Odell MD      atorvastatin  40 mg Oral After Trevor Diehl MD      carvedilol  25 mg Oral BID With Meals MARLENA Hansen      cloNIDine  0 2 mg Oral TID MARLENA Hansen      gabapentin  300 mg Oral BID Nestor Odell MD      heparin (porcine)  5,000 Units Subcutaneous [facility] Melony Altamirano MD      hydrALAZINE  5 mg Intravenous Q6H PRN Nestor Odell MD      hydrALAZINE  50 mg Oral [facility] Griselda Garza MD      insulin glargine  10 Units Subcutaneous QAM Nestor Odell MD      insulin lispro  1-5 Units Subcutaneous HS MARLENA Parekh      insulin lispro  1-6 Units Subcutaneous TID AC MARLENA Parekh      Labetalol HCl  10 mg Intravenous Q6H PRN Nestor Odell MD      multi-electrolyte  75 mL/hr Intravenous Continuous Melony Altamirano MD 75 mL/hr (09/08/21 0100)    ondansetron  4 mg Intravenous Q6H PRN Nestor Odell MD      terazosin  5 mg Oral HS Nestor Odell MD          Today, Patient Was Seen By: MARLENA Levi    ** Please Note: Dragon 360 Dictation voice to text software may have been used in the creation of this document   **

## 2021-09-08 NOTE — ASSESSMENT & PLAN NOTE
Wt Readings from Last 3 Encounters:   09/08/21 70 8 kg (156 lb 1 4 oz)   08/31/21 71 2 kg (157 lb)   08/26/21 70 4 kg (155 lb 3 2 oz)     · Compensated on torsemide 20 mg every other day   · Currently on hold due to REI on CKD  · Daily weight and I&Os    Weight stable

## 2021-09-08 NOTE — ASSESSMENT & PLAN NOTE
· Biclonal gammopathy follows with hematology as outpatient with plan for eventual bone marrow biopsy  · Patient appears to have bone marrow biopsy scheduled on 9/15/2021 with IR

## 2021-09-08 NOTE — ASSESSMENT & PLAN NOTE
· Elevated lipase of unclear significance  No abdominal pain  · Patient was placed on liquid diet  · CT scan of abdomen no evidence of pancreatitis  · Lipase normalized with IV hydration    · Advance diet to regular    Results from last 7 days   Lab Units 09/08/21  0514 09/07/21  1449 09/06/21  2027   LIPASE u/L 211 621* 1,190*

## 2021-09-08 NOTE — ASSESSMENT & PLAN NOTE
Results from last 7 days   Lab Units 09/08/21  0514 09/07/21  0520 09/06/21 2027 09/02/21  0857   BUN mg/dL 47* 50* 48* 66*   CREATININE mg/dL 3 41* 2 80* 3 01* 2 88*   EGFR ml/min/1 73sq m 13 17 16 16*

## 2021-09-08 NOTE — ASSESSMENT & PLAN NOTE
2D echo in 3/2021 from [facility] showed normal EF, grade 1 diastolic dysfunction, mild AS, mild MR and TR

## 2021-09-08 NOTE — CONSULTS
e-Consult (IPC)  - Interventional Radiology  Rande [place] Jenene Goldberg 73 y o  female MRN: [id number]  Unit/Bed#: E2 -01 Encounter: [id number]    IR has been consulted to evaluate the patient, determine the appropriate procedure, and whether or not a procedure can and should be performed regarding the care of [facility]  We were consulted by Dr Kamala Hernandez concerning increased creatinine and monoclonal gammopathy, and to possibly perform a random kidney biopsy for [address] [place] [address] if medically appropriate for the patient  IP Consult to IR  Consult performed by: Jody Galindo MD  Consult ordered by: MARLENA Turk        09/08/21      Assessment/Recommendation:   IR to perform a random kidney biopsy for [address] [place] [address] tomorrow due to increased creatinine and monoclonal gammapathy  NPO past midnight and heparin sq held after midnight  Discussed case with Dr Talib Mohan from Nephrology  Initially we were going to wait until Friday to see if the Creatinine improves but Dr Talib Mohan believes that the patient will need a biopsy either way and will check the Creatinine tomorrow morning just in case it improves and therefore, we have scheduled the patient for 1 pm tomorrow  The patient has HTN but Dr Talib Mohan increased the medications and will stop the iv therapy this evening so the blood pressure should be controlled tomorrow and Friday  Total time spent in review of data, discussion with requesting provider and rendering advice was 15 minutes  Patient or appropriate family member was verbally informed by Dr Talib Mohan of this consultative service on their behalf to provide more timely access to specialty care in lieu of an in person consultation  Verbal consent was obtained  Thank you for allowing Interventional Radiology to participate in the care of [facility]  Please don't hesitate to call or TigerText us with any questions       Jody Galindo MD

## 2021-09-08 NOTE — ASSESSMENT & PLAN NOTE
Lab Results   Component Value Date    HGBA1C 9 5 (H) 07/23/2021     Results from last 7 days   Lab Units 09/08/21  1059 09/08/21  0630 09/07/21  2107 09/07/21  1606 09/07/21  1122 09/07/21  0625 09/06/21  2354   POC GLUCOSE mg/dl 210* 169* 211* 112 204* 146* 172*     · Continue glargine at lower dosing until appetite better  · Continue SSI

## 2021-09-08 NOTE — ASSESSMENT & PLAN NOTE
Lab Results   Component Value Date    EGFR 13 09/08/2021    EGFR 17 09/07/2021    EGFR 16 09/06/2021    CREATININE 3 41 (H) 09/08/2021    CREATININE 2 80 (H) 09/07/2021    CREATININE 3 01 (H) 09/06/2021     · Evolving since admission  Baseline creatinine appears to be around 2 5-3  · Creatinine 3 01 on admission  Creatinine up to 3 41 9/8  · Creatinine today 3 06  · CKD 4 secondary to hypertensive nephrosclerosis and diabetes  · Acute kidney injury etiology likely secondary to hemodynamic perturbations per Renal   · Nephrology consulted, appreciate input  · Holding Demadex  · Received timed IV fluids  · Recommend renal biopsy if creatinine continues to worsen  Attempted today,canceled due to hypertension  · Urinary retention protocol  PVR 7 mL     · Daily weight and I&Os

## 2021-09-08 NOTE — ASSESSMENT & PLAN NOTE
· Hypertensive urgency on multiple agents  Nephrology consulted  · Currently on carvedilol clonidine hydralazine and terazosin  · Hydralazine increased by nephrology to 50 mg p o  T i d  , clonidine increased to 0 2 mg p o  T i d    · Currently blood pressures improved

## 2021-09-09 LAB
ALBUMIN SERPL BCP-MCNC: 2.4 G/DL (ref 3.5–5)
ALP SERPL-CCNC: 60 U/L (ref 46–116)
ALT SERPL W P-5'-P-CCNC: 13 U/L (ref 12–78)
ANION GAP SERPL CALCULATED.3IONS-SCNC: 8 MMOL/L (ref 4–13)
AST SERPL W P-5'-P-CCNC: 12 U/L (ref 5–45)
BILIRUB SERPL-MCNC: 0.27 MG/DL (ref 0.2–1)
BUN SERPL-MCNC: 47 MG/DL (ref 5–25)
CALCIUM ALBUM COR SERPL-MCNC: 8.6 MG/DL (ref 8.3–10.1)
CALCIUM SERPL-MCNC: 7.3 MG/DL (ref 8.3–10.1)
CHLORIDE SERPL-SCNC: 103 MMOL/L (ref 100–108)
CO2 SERPL-SCNC: 27 MMOL/L (ref 21–32)
CREAT SERPL-MCNC: 3.06 MG/DL (ref 0.6–1.3)
ERYTHROCYTE [DISTWIDTH] IN BLOOD BY AUTOMATED COUNT: 14.7 % (ref 11.6–15.1)
FERRITIN SERPL-MCNC: 24 NG/ML (ref 8–388)
GFR SERPL CREATININE-BSD FRML MDRD: 15 ML/MIN/1.73SQ M
GLUCOSE SERPL-MCNC: 109 MG/DL (ref 65–140)
GLUCOSE SERPL-MCNC: 116 MG/DL (ref 65–140)
GLUCOSE SERPL-MCNC: 142 MG/DL (ref 65–140)
GLUCOSE SERPL-MCNC: 197 MG/DL (ref 65–140)
GLUCOSE SERPL-MCNC: 207 MG/DL (ref 65–140)
GLUCOSE SERPL-MCNC: 226 MG/DL (ref 65–140)
HCT VFR BLD AUTO: 26.1 % (ref 34.8–46.1)
HGB BLD-MCNC: 8.1 G/DL (ref 11.5–15.4)
IRON SATN MFR SERPL: 19 % (ref 15–50)
IRON SERPL-MCNC: 42 UG/DL (ref 50–170)
MCH RBC QN AUTO: 26 PG (ref 26.8–34.3)
MCHC RBC AUTO-ENTMCNC: 31 G/DL (ref 31.4–37.4)
MCV RBC AUTO: 84 FL (ref 82–98)
PLATELET # BLD AUTO: 234 THOUSANDS/UL (ref 149–390)
PMV BLD AUTO: 10.7 FL (ref 8.9–12.7)
POTASSIUM SERPL-SCNC: 4.1 MMOL/L (ref 3.5–5.3)
PROT SERPL-MCNC: 5.8 G/DL (ref 6.4–8.2)
RBC # BLD AUTO: 3.12 MILLION/UL (ref 3.81–5.12)
SODIUM SERPL-SCNC: 138 MMOL/L (ref 136–145)
TIBC SERPL-MCNC: 225 UG/DL (ref 250–450)
WBC # BLD AUTO: 5.95 THOUSAND/UL (ref 4.31–10.16)

## 2021-09-09 PROCEDURE — 82948 REAGENT STRIP/BLOOD GLUCOSE: CPT

## 2021-09-09 PROCEDURE — 85027 COMPLETE CBC AUTOMATED: CPT | Performed by: NURSE PRACTITIONER

## 2021-09-09 PROCEDURE — NC001 PR NO CHARGE: Performed by: INTERNAL MEDICINE

## 2021-09-09 PROCEDURE — 99232 SBSQ HOSP IP/OBS MODERATE 35: CPT | Performed by: INTERNAL MEDICINE

## 2021-09-09 PROCEDURE — 82728 ASSAY OF FERRITIN: CPT | Performed by: INTERNAL MEDICINE

## 2021-09-09 PROCEDURE — 83550 IRON BINDING TEST: CPT | Performed by: INTERNAL MEDICINE

## 2021-09-09 PROCEDURE — 80053 COMPREHEN METABOLIC PANEL: CPT | Performed by: NURSE PRACTITIONER

## 2021-09-09 PROCEDURE — 83540 ASSAY OF IRON: CPT | Performed by: INTERNAL MEDICINE

## 2021-09-09 PROCEDURE — 99232 SBSQ HOSP IP/OBS MODERATE 35: CPT | Performed by: NURSE PRACTITIONER

## 2021-09-09 RX ORDER — GABAPENTIN 300 MG/1
300 CAPSULE ORAL DAILY
Status: DISCONTINUED | OUTPATIENT
Start: 2021-09-10 | End: 2021-09-17 | Stop reason: HOSPADM

## 2021-09-09 RX ORDER — LABETALOL 20 MG/4 ML (5 MG/ML) INTRAVENOUS SYRINGE
20 EVERY 4 HOURS PRN
Status: DISCONTINUED | OUTPATIENT
Start: 2021-09-09 | End: 2021-09-17 | Stop reason: HOSPADM

## 2021-09-09 RX ORDER — DOXAZOSIN 2 MG/1
2 TABLET ORAL 2 TIMES DAILY
Status: DISCONTINUED | OUTPATIENT
Start: 2021-09-09 | End: 2021-09-11

## 2021-09-09 RX ORDER — HYDRALAZINE HYDROCHLORIDE 25 MG/1
75 TABLET, FILM COATED ORAL EVERY 8 HOURS SCHEDULED
Status: DISCONTINUED | OUTPATIENT
Start: 2021-09-09 | End: 2021-09-10

## 2021-09-09 RX ADMIN — HYDRALAZINE HYDROCHLORIDE 75 MG: 25 TABLET, FILM COATED ORAL at 20:13

## 2021-09-09 RX ADMIN — CLONIDINE HYDROCHLORIDE 0.2 MG: 0.1 TABLET ORAL at 15:41

## 2021-09-09 RX ADMIN — HYDRALAZINE HYDROCHLORIDE 50 MG: 25 TABLET, FILM COATED ORAL at 06:06

## 2021-09-09 RX ADMIN — HYDRALAZINE HYDROCHLORIDE 5 MG: 20 INJECTION, SOLUTION INTRAMUSCULAR; INTRAVENOUS at 00:26

## 2021-09-09 RX ADMIN — LABETALOL 20 MG/4 ML (5 MG/ML) INTRAVENOUS SYRINGE 10 MG: at 00:26

## 2021-09-09 RX ADMIN — CARVEDILOL 25 MG: 25 TABLET, FILM COATED ORAL at 15:41

## 2021-09-09 RX ADMIN — GABAPENTIN 300 MG: 300 CAPSULE ORAL at 08:56

## 2021-09-09 RX ADMIN — CARVEDILOL 25 MG: 25 TABLET, FILM COATED ORAL at 07:23

## 2021-09-09 RX ADMIN — DOXAZOSIN 2 MG: 2 TABLET ORAL at 08:56

## 2021-09-09 RX ADMIN — TERAZOSIN HYDROCHLORIDE 5 MG: 5 CAPSULE ORAL at 21:12

## 2021-09-09 RX ADMIN — ATORVASTATIN CALCIUM 40 MG: 40 TABLET, FILM COATED ORAL at 18:15

## 2021-09-09 RX ADMIN — CLONIDINE HYDROCHLORIDE 0.2 MG: 0.1 TABLET ORAL at 08:56

## 2021-09-09 RX ADMIN — DOXAZOSIN 2 MG: 2 TABLET ORAL at 20:13

## 2021-09-09 RX ADMIN — INSULIN GLARGINE 12 UNITS: 100 INJECTION, SOLUTION SUBCUTANEOUS at 08:56

## 2021-09-09 RX ADMIN — INSULIN LISPRO 2 UNITS: 100 INJECTION, SOLUTION INTRAVENOUS; SUBCUTANEOUS at 07:23

## 2021-09-09 RX ADMIN — HYDRALAZINE HYDROCHLORIDE 75 MG: 25 TABLET, FILM COATED ORAL at 15:41

## 2021-09-09 RX ADMIN — CLONIDINE HYDROCHLORIDE 0.2 MG: 0.1 TABLET ORAL at 20:13

## 2021-09-09 RX ADMIN — LABETALOL 20 MG/4 ML (5 MG/ML) INTRAVENOUS SYRINGE 10 MG: at 06:06

## 2021-09-09 RX ADMIN — HYDRALAZINE HYDROCHLORIDE 5 MG: 20 INJECTION, SOLUTION INTRAMUSCULAR; INTRAVENOUS at 06:06

## 2021-09-09 RX ADMIN — LABETALOL 20 MG/4 ML (5 MG/ML) INTRAVENOUS SYRINGE 20 MG: at 21:13

## 2021-09-09 RX ADMIN — INSULIN LISPRO 1 UNITS: 100 INJECTION, SOLUTION INTRAVENOUS; SUBCUTANEOUS at 21:13

## 2021-09-09 RX ADMIN — IRON SUCROSE 200 MG: 20 INJECTION, SOLUTION INTRAVENOUS at 18:19

## 2021-09-09 NOTE — PROGRESS NOTES
TwitJump  [id number] used to provide education regarding being NPO after midnight for kidney biopsy  Educated on PRN BP meds for HTN, and discontinuation of IV fluids

## 2021-09-09 NOTE — PROGRESS NOTES
Follow up Consultation    Nephrology   [facility] Edson Levels 73 y o  female MRN: [id number]  Unit/Bed#: E2 -01 Encounter: [id number]      Physician Requesting Consult: Izabella Zamudio DO        ASSESSMENT/PLAN:     43-year-old female with multiple comorbidities including diabetes , hypertension, hyperlipidemia CKD stage IIIB/4 with multiple repeat sent episodes of acute kidney injury versus rapid CKD progression presented with elevated blood pressures   Patient seen and examined in her room earlier this a m           · Acute kidney injury (POA) CKD stage 3B/4:  - not fully recovered from episode of acute kidney injury from 08/26/2021 and currently at risk for further acute kidney injury secondary to hemodynamic perturbations in light of underlying comorbidities plus increased susceptibility for acute kidney injury due to multiple recent episodes of acute kidney injury non dialysis requiring  However truly unsure if patient has underlying GN that may be contributing to her multiple episodes of acute kidney injury and blood pressure fluctuations  - After review of records [facility] as well as Care everywhere it appears that the patient has a baseline Creatinine of 2-2 5 mg/dL up until July 2021 and subsequent to that new baseline has been around 2 5-3 mg/dL since episode of acute kidney injury on 08/26/2021 with creatinine of 3 98 mg/dL  - patient was admitted with a creatinine of 3 01 mg/dL  - patient's creatinine today is at 3 06 mg/dL, stable and improved  - plan for renal biopsy on 09/09/2021 if blood pressures remain stable with IR   - CT abdomen pelvis from 09/07/2021 showing possible mild wall thickening versus under distention of the colon  Nonspecific colitis  No hydronephrosis  - IR consult placed will discuss with IR with regards to biopsy plan for 09/10/2021 and complete [place] forms   - there was planned for bone marrow biopsy on 09/15/2021 with IR for evaluation of monoclonal gammopathy    - check BMP in a m   - renal vascular Dopplers from 07/31/2021 showing no renal artery stenosis right kidney 9 3 cm left kidney 10 2 cm  - Await renal recovery  - Optimize hemodynamic status to avoid delay in renal recovery  - Place on a renal diet when allowed diet order    - Avoid nephrotoxins, adjust meds to appropriate GFR   - Strict I/O   - Daily weights  - Urinary retention protocol if patient does not have a Khan  - Most likely has underlying CKD secondary to hypertensive nephrosclerosis plus diabetic kidney disease plus multiple prior acute kidney injury episodes plus underlying paraproteinemia plus renal vascular disease   - will need to set up patient for follow up with Nephrology as an outpatient post hospitalization   - as an outpatient patient for nephrology patient follows up with Dr medina     · Hypertensive urgency:  - home medications:  Coreg 25 mg p o  B i d , clonidine 0 2 mg p o  T i d , hydralazine 25 mg p o  B i d  torsemide 20 mg p o  Q 48 hours, Hytrin 5 mg p o  Q h s   - current medications:  Coreg 25 mg p o  B i d , clonidine 0 2 mg p o  b i d , hydralazine 50 mg p o  B i d  Hytrin 5 mg p o  Q day,  - recommendations:  Hold torsemide for now  Jaodn Sueliman avoid use of Ace inhibitor ARB at this time in light of acute kidney injury  increase hydralazine to 75 mg p o  T i d  and start Cardura 2 mg p o  B i d   - Optimize hemodynamics   - Maintain MAP > 65mmHg  - Avoid BP fluctuations      · H/H/anemia:  - most recent hemoglobin at 8 1 grams/deciliter  - maintain hemoglobin greater than 8 grams/deciliter  - check iron studies  - will avoid CHRISTAL in light of significant elevated blood pressures     Acid-base electrolytes:  Electrolytes:    § Stable         Acid-base:    § Most recent bicarb at 27, stable     Volume status:     Clinically appears to be euvolemic   Hold torsemide and diuretics for now   Proteinuria:   ?  Most recent UA with 2+ protein as of 08/08/2021, urine protein creatinine ratio 6 2 g as of 2021  Likely is diabetes related however would like to rule out underlying GN     Other medical problems:  ? Diabetes:  Management per primary team   On insulin   Does have history of uncontrolled diabetes   Last eye exam from 2019 with evidence of diabetic retinopathy  Decrease gabapentin to 300 mg p o  Q day  Most recent A1c of 9 5% as of 2021  ? Lambda light chain monoclonal gammopathy:  Management per primary team   Follow-up with Heme-Onc plan for bone marrow biopsy 4 9/15 with IR  ? Concern for pancreatitis/colitis:  Most recent lipase at 1190  DC IV fluids at midnight   Hold diuretics    ? Nausea/vomiting:  CT abdomen suggestive of possible colitis  Management primary team   ? Thanks for the consult  Will continue to follow  Please call with questions/ concerns  Above-mentioned orders and Plan in terms of acute kidney injury was discussed with the team in Tegan Marin MD, FASN, 2021, 11:15 AM              Objective :   Patient seen in her room blood pressure still slightly elevated urine output close to 2 3 L in last 24 hours plan for renal biopsy later today if blood pressures remain stable and improved  PHYSICAL EXAM  /74 (BP Location: Right arm)   Pulse 74   Temp 97 6 °F (36 4 °C) (Temporal)   Resp 18   Ht 5' 2" (1 575 m)   Wt 72 kg (158 lb 11 7 oz)   SpO2 97%   BMI 29 03 kg/m²   Temp (24hrs), Av °F (36 7 °C), Min:97 6 °F (36 4 °C), Max:98 2 °F (36 8 °C)        Intake/Output Summary (Last 24 hours) at 2021 1115  Last data filed at 2021 0915  Gross per 24 hour   Intake 2237 5 ml   Output 2425 ml   Net -187 5 ml       I/O last 24 hours: In: 2657 5 [P O :1220; I V :1437 5]  Out: 2775 [Urine:2775]      Current Weight: Weight - Scale: 72 kg (158 lb 11 7 oz)  First Weight: Weight - Scale: 70 8 kg (156 lb 1 4 oz)  Physical Exam  Vitals and nursing note reviewed  Constitutional:       General: She is not in acute distress       Appearance: Normal appearance  She is normal weight  She is not ill-appearing, toxic-appearing or diaphoretic  HENT:      Head: Normocephalic and atraumatic  Mouth/Throat:      Mouth: Mucous membranes are moist       Pharynx: Oropharynx is clear  No oropharyngeal exudate  Eyes:      General: No scleral icterus  Conjunctiva/sclera: Conjunctivae normal    Cardiovascular:      Rate and Rhythm: Normal rate  Heart sounds: Normal heart sounds  No friction rub  Pulmonary:      Effort: Pulmonary effort is normal  No respiratory distress  Breath sounds: Normal breath sounds  No wheezing  Abdominal:      General: There is no distension  Palpations: Abdomen is soft  There is no mass  Tenderness: There is no abdominal tenderness  Musculoskeletal:         General: No swelling  Cervical back: Normal range of motion and neck supple  Skin:     General: Skin is warm  Coloration: Skin is not jaundiced  Neurological:      General: No focal deficit present  Mental Status: She is alert and oriented to person, place, and time  Psychiatric:         Mood and Affect: Mood normal          Behavior: Behavior normal              Review of Systems   Constitutional: Negative for appetite change, chills, fatigue and fever  HENT: Negative for congestion  Respiratory: Negative for cough, shortness of breath and wheezing  Cardiovascular: Negative for leg swelling  Gastrointestinal: Negative for abdominal pain, constipation, diarrhea, nausea and vomiting  Genitourinary: Negative for dysuria  Musculoskeletal: Negative for back pain  Skin: Negative for rash  Neurological: Negative for dizziness and headaches  Psychiatric/Behavioral: Negative for agitation and confusion  All other systems reviewed and are negative        Scheduled Meds:  Current Facility-Administered Medications   Medication Dose Route Frequency Provider Last Rate    acetaminophen  650 mg Oral Q6H PRN Rico De La Torre MD  atorvastatin  40 mg Oral After [place] Maravilla MD      carvedilol  25 mg Oral BID With Meals MARLENA Hansen      cloNIDine  0 2 mg Oral TID MARLENA Hansen      doxazosin  2 mg Oral BID Joyceann Najjar, MD      gabapentin  300 mg Oral BID Sonya Price MD      hydrALAZINE  5 mg Intravenous Q6H PRN Sonya Price MD      hydrALAZINE  75 mg Oral [facility] Joyceann Najjar, MD      insulin glargine  12 Units Subcutaneous QAM MARLENA Parekh      insulin lispro  1-5 Units Subcutaneous HS CuiMARLENA Ruiz      insulin lispro  1-6 Units Subcutaneous TID AC MARLENA Parekh      Labetalol HCl  10 mg Intravenous Q6H PRN Sonya Price MD      ondansetron  4 mg Intravenous Q6H PRN Sonya Price MD      terazosin  5 mg Oral HS Sonya Price MD         PRN Meds:   acetaminophen    hydrALAZINE    Labetalol HCl    ondansetron    Continuous Infusions:       Invasive Devices: Invasive Devices     Peripheral Intravenous Line            Peripheral IV 09/07/21 Right Antecubital 1 day                  LABORATORY:    Results from last 7 days   Lab Units 09/09/21  0457 09/08/21  0514 09/07/21  0520 09/06/21  2027   WBC Thousand/uL 5 95  --  7 54 7 39   HEMOGLOBIN g/dL 8 1*  --  9 2* 9 6*   HEMATOCRIT % 26 1*  --  29 0* 30 4*   PLATELETS Thousands/uL 234  --  270 290   POTASSIUM mmol/L 4 1 4 1 4 2 4 3   CHLORIDE mmol/L 103 101 102 99*   CO2 mmol/L 27 28 24 28   BUN mg/dL 47* 47* 50* 48*   CREATININE mg/dL 3 06* 3 41* 2 80* 3 01*   CALCIUM mg/dL 7 3* 7 4* 8 1* 8 2*   MAGNESIUM mg/dL  --  2 2  --   --    PHOSPHORUS mg/dL  --  4 4*  --   --       rest all reviewed    RADIOLOGY:  CT abdomen pelvis wo contrast   Final Result by Martin Fernandez MD (09/07 1617)   Possible mild wall thickening versus underdistention of the colon at the splenic flexure with trace fluid in the paracolic gutter  Findings could represent a nonspecific colitis in the appropriate clinical scenario    Otherwise, no acute findings in the    abdomen or pelvis, including no evidence of pancreatitis  Workstation performed: [id number]         IR biopsy kidney columb[place] kit no laterality    (Results Pending)     Rest all reviewed    Portions of the record may have been created with voice recognition software  Occasional wrong word or "sound a like" substitutions may have occurred due to the inherent limitations of voice recognition software  Read the chart carefully and recognize, using context, where substitutions have occurred  If you have any questions, please contact the dictating provider

## 2021-09-09 NOTE — PROGRESS NOTES
[address] [place] [address]  Progress Note - Lalito Murdockrry 1955, 73 y o  female MRN: [id number]  Unit/Bed#: E2 -43 Encounter: [id number]  Primary Care Provider: Erick Tejada MD   Date and time admitted to hospital: 9/6/2021  7:36 PM    * Hypertensive urgency  Assessment & Plan  · Hypertensive urgency on multiple agents  Nephrology consulted  · Currently on carvedilol clonidine hydralazine and terazosin  · Hydralazine increased by nephrology to 75 mg p o  T i d  , started Cardura today  · Avoid ACE inhibitor or Arb  · Currently blood pressure still elevated  Renal biopsy canceled today due to hypertension  · Increase labetalol to 20 mg q  4 hours p r n  · Spoke to IR Dr Ambrosio Kuo, recommend SBP < 140 in order to perform renal biopsy  Acute kidney injury superimposed on CKD [facility])  Assessment & Plan  Lab Results   Component Value Date    EGFR 13 09/08/2021    EGFR 17 09/07/2021    EGFR 16 09/06/2021    CREATININE 3 41 (H) 09/08/2021    CREATININE 2 80 (H) 09/07/2021    CREATININE 3 01 (H) 09/06/2021     · Evolving since admission  Baseline creatinine appears to be around 2 5-3  · Creatinine 3 01 on admission  Creatinine up to 3 41 9/8  · Creatinine today 3 06  · CKD 4 secondary to hypertensive nephrosclerosis and diabetes  · Acute kidney injury etiology likely secondary to hemodynamic perturbations per Renal   · Nephrology consulted, appreciate input  · Holding Demadex  · Received timed IV fluids  · Recommend renal biopsy if creatinine continues to worsen  Attempted today,canceled due to hypertension  · Urinary retention protocol  PVR 7 mL  · Daily weight and I&Os    Elevated lipase  Assessment & Plan  · Elevated lipase of unclear significance  No abdominal pain  · CT scan of abdomen no evidence of pancreatitis  · Lipase normalized with IV hydration    · Advance diet to regular diet, tolerating    Results from last 7 days   Lab Units 09/08/21  0514 09/07/21  1281 09/06/21 2027   LIPASE u/L 211 621* 1,190*       Chronic diastolic heart failure (HCC)  Assessment & Plan  Wt Readings from Last 3 Encounters:   09/08/21 70 8 kg (156 lb 1 4 oz)   08/31/21 71 2 kg (157 lb)   08/26/21 70 4 kg (155 lb 3 2 oz)     · Compensated on torsemide 20 mg every other day   · Currently on hold due to REI on CKD  · Daily weight and I&Os  Weight stable    Heart murmur  Assessment & Plan  2D echo in 3/2021 from [facility] showed normal EF, grade 1 diastolic dysfunction, mild AS, mild MR and TR  Biclonal gammopathy  Assessment & Plan  · Biclonal gammopathy follows with hematology as outpatient with plan for eventual bone marrow biopsy  · Patient appears to have bone marrow biopsy scheduled on 9/15/2021 with IR    Anemia in stage 4 chronic kidney disease [facility])  Assessment & Plan  Lab Results   Component Value Date    EGFR 13 09/08/2021    EGFR 17 09/07/2021    EGFR 16 09/06/2021    CREATININE 3 41 (H) 09/08/2021    CREATININE 2 80 (H) 09/07/2021    CREATININE 3 01 (H) 09/06/2021     Baseline hemoglobin appears to be around 8-9's  Stable  Iron study showed iron 42, ferritin 24  IV Venofer 200mg daily x 5 doses per discussion with renal  Monitor CBC    Type 2 diabetes mellitus, with long-term current use of insulin [facility])  Assessment & Plan    Lab Results   Component Value Date    HGBA1C 9 5 (H) 07/23/2021     Results from last 7 days   Lab Units 09/08/21  1059 09/08/21  0630 09/07/21  2107 09/07/21  1606 09/07/21  1122 09/07/21  0625 09/06/21  2354   POC GLUCOSE mg/dl 210* 169* 211* 112 204* 146* 172*     · Continue glargine at lower dosing until appetite better  · Continue SSI      VTE Pharmacologic Prophylaxis:   Pharmacologic: holding for renal biopsy  Mechanical VTE Prophylaxis in Place: Yes  Patient Centered Rounds: I have performed bedside rounds with nursing staff today      Discussions with Specialists or Other Care Team Provider:  Nephrology    Education and Discussions with Family / Patient:  Yes    Time Spent for Care: 20 minutes  More than 50% of total time spent on counseling and coordination of care as described above  Current Length of Stay: 3 day(s)    Current Patient Status: Inpatient   Certification Statement: The patient will continue to require additional inpatient hospital stay due to Hypertensive urgency    Discharge Plan:  Likely home once medically cleared    Code Status: Level 1 - Full Code      Subjective:   Patient denies chest pain, headache dizziness, SOB, nausea vomiting diarrhea, constipation, fever or chills  Objective:     Vitals:   Temp (24hrs), Av 9 °F (36 6 °C), Min:97 6 °F (36 4 °C), Max:98 1 °F (36 7 °C)    Temp:  [97 6 °F (36 4 °C)-98 1 °F (36 7 °C)] 98 °F (36 7 °C)  HR:  [68-81] 68  Resp:  [18-20] 18  BP: (146-211)/(68-95) 205/84  SpO2:  [96 %-97 %] 96 %  Body mass index is 29 03 kg/m²  Input and Output Summary (last 24 hours): Intake/Output Summary (Last 24 hours) at 2021 1645  Last data filed at 2021 0915  Gross per 24 hour   Intake 1222 5 ml   Output 2025 ml   Net -802 5 ml       Physical Exam:     Physical Exam  Vitals and nursing note reviewed  Constitutional:       Appearance: She is well-developed  HENT:      Head: Normocephalic and atraumatic  Neck:      Thyroid: No thyromegaly  Vascular: No JVD  Trachea: No tracheal deviation  Cardiovascular:      Rate and Rhythm: Normal rate and regular rhythm  Heart sounds: Normal heart sounds  Pulmonary:      Effort: Pulmonary effort is normal  No respiratory distress  Breath sounds: Normal breath sounds  No wheezing or rales  Abdominal:      General: Bowel sounds are normal  There is no distension  Palpations: Abdomen is soft  Tenderness: There is no abdominal tenderness  There is no guarding  Musculoskeletal:         General: No swelling or deformity  Normal range of motion  Cervical back: Neck supple  Right lower leg: No edema  Left lower leg: No edema  Skin:     General: Skin is warm and dry  Neurological:      General: No focal deficit present  Mental Status: She is alert and oriented to person, place, and time  Psychiatric:         Mood and Affect: Mood normal          Judgment: Judgment normal          Additional Data:     Labs:    Results from last 7 days   Lab Units 09/09/21  0457 09/07/21  0520   WBC Thousand/uL 5 95 7 54   HEMOGLOBIN g/dL 8 1* 9 2*   HEMATOCRIT % 26 1* 29 0*   PLATELETS Thousands/uL 234 270   NEUTROS PCT %  --  76*   LYMPHS PCT %  --  18   MONOS PCT %  --  6   EOS PCT %  --  0     Results from last 7 days   Lab Units 09/09/21  0457   POTASSIUM mmol/L 4 1   CHLORIDE mmol/L 103   CO2 mmol/L 27   BUN mg/dL 47*   CREATININE mg/dL 3 06*   CALCIUM mg/dL 7 3*   ALK PHOS U/L 60   ALT U/L 13   AST U/L 12     Results from last 7 days   Lab Units 09/08/21  1446   INR  1 09       * I Have Reviewed All Lab Data Listed Above  * Additional Pertinent Lab Tests Reviewed:  Dano 66 Admission Reviewed    Imaging:    Imaging Reports Reviewed Today Include:  None  Imaging Personally Reviewed by Myself Includes:  None    Recent Cultures (last 7 days):           Last 24 Hours Medication List:   Current Facility-Administered Medications   Medication Dose Route Frequency Provider Last Rate    acetaminophen  650 mg Oral Q6H PRN Flaca Turner MD      atorvastatin  40 mg Oral After Stephanie Viramontes MD      carvedilol  25 mg Oral BID With Meals MARLENA Hansen      cloNIDine  0 2 mg Oral TID MARLENA Hansen      doxazosin  2 mg Oral BID Mikki Moore MD     [facility] gabapentin  300 mg Oral Daily Mikki Moore MD      hydrALAZINE  75 mg Oral [facility] Mikki Moore MD      insulin glargine  12 Units Subcutaneous QAM MARLENA Parekh      insulin lispro  1-5 Units Subcutaneous HS MARLENA Parekh      insulin lispro  1-6 Units Subcutaneous TID AC MARLENA Parekh      iron sucrose  200 mg Intravenous Daily MARLENA Parekh      Labetalol HCl  20 mg Intravenous Q4H PRN MARLENA Parekh      ondansetron  4 mg Intravenous Q6H PRN Jos Gunn MD      terazosin  5 mg Oral HS Jos Gunn MD          Today, Patient Was Seen By: MARLENA Saez    ** Please Note: Dragon 360 Dictation voice to text software may have been used in the creation of this document   **

## 2021-09-09 NOTE — PROGRESS NOTES
Interventional Radiology:    Plan for [facility] non targeted renal biopsy for elevated creatinine and monoclonal gammopathy  Patients blood pressure upon arrival measured twice, with average systolic pressure of 522CFDW  Ideally blood pressure should be below 063KSNI systolic, as elevated blood pressure is associated with increased hemorrhage risk during renal biopsy  Sedation and short acting medications can decrease blood pressure, however these interventions are unlikely to reach our cut-off of 546 systolic considering the current bp measurements  As this procedure is not emergent, plan for better BP control per the primary team, IR can proceed with biopsy at that time  This was discussed with Dori Moses MD from nephrology

## 2021-09-10 PROBLEM — R74.8 ELEVATED LIPASE: Status: RESOLVED | Noted: 2021-08-08 | Resolved: 2021-09-10

## 2021-09-10 LAB
ANION GAP SERPL CALCULATED.3IONS-SCNC: 8 MMOL/L (ref 4–13)
BUN SERPL-MCNC: 43 MG/DL (ref 5–25)
CALCIUM SERPL-MCNC: 7.9 MG/DL (ref 8.3–10.1)
CHLORIDE SERPL-SCNC: 104 MMOL/L (ref 100–108)
CO2 SERPL-SCNC: 27 MMOL/L (ref 21–32)
CREAT SERPL-MCNC: 2.81 MG/DL (ref 0.6–1.3)
ERYTHROCYTE [DISTWIDTH] IN BLOOD BY AUTOMATED COUNT: 14.6 % (ref 11.6–15.1)
GFR SERPL CREATININE-BSD FRML MDRD: 17 ML/MIN/1.73SQ M
GLUCOSE SERPL-MCNC: 120 MG/DL (ref 65–140)
GLUCOSE SERPL-MCNC: 129 MG/DL (ref 65–140)
GLUCOSE SERPL-MCNC: 169 MG/DL (ref 65–140)
GLUCOSE SERPL-MCNC: 201 MG/DL (ref 65–140)
GLUCOSE SERPL-MCNC: 248 MG/DL (ref 65–140)
HCT VFR BLD AUTO: 26.3 % (ref 34.8–46.1)
HGB BLD-MCNC: 8.3 G/DL (ref 11.5–15.4)
MAGNESIUM SERPL-MCNC: 2.3 MG/DL (ref 1.6–2.6)
MCH RBC QN AUTO: 26.5 PG (ref 26.8–34.3)
MCHC RBC AUTO-ENTMCNC: 31.6 G/DL (ref 31.4–37.4)
MCV RBC AUTO: 84 FL (ref 82–98)
PLATELET # BLD AUTO: 216 THOUSANDS/UL (ref 149–390)
PMV BLD AUTO: 10.6 FL (ref 8.9–12.7)
POTASSIUM SERPL-SCNC: 4.3 MMOL/L (ref 3.5–5.3)
RBC # BLD AUTO: 3.13 MILLION/UL (ref 3.81–5.12)
SODIUM SERPL-SCNC: 139 MMOL/L (ref 136–145)
WBC # BLD AUTO: 5.46 THOUSAND/UL (ref 4.31–10.16)

## 2021-09-10 PROCEDURE — 83735 ASSAY OF MAGNESIUM: CPT | Performed by: NURSE PRACTITIONER

## 2021-09-10 PROCEDURE — 99232 SBSQ HOSP IP/OBS MODERATE 35: CPT | Performed by: INTERNAL MEDICINE

## 2021-09-10 PROCEDURE — 82948 REAGENT STRIP/BLOOD GLUCOSE: CPT

## 2021-09-10 PROCEDURE — 99232 SBSQ HOSP IP/OBS MODERATE 35: CPT | Performed by: PHYSICIAN ASSISTANT

## 2021-09-10 PROCEDURE — 80048 BASIC METABOLIC PNL TOTAL CA: CPT | Performed by: INTERNAL MEDICINE

## 2021-09-10 PROCEDURE — 85027 COMPLETE CBC AUTOMATED: CPT | Performed by: NURSE PRACTITIONER

## 2021-09-10 RX ORDER — LABETALOL 20 MG/4 ML (5 MG/ML) INTRAVENOUS SYRINGE
20 ONCE
Status: COMPLETED | OUTPATIENT
Start: 2021-09-10 | End: 2021-09-10

## 2021-09-10 RX ORDER — CLONIDINE HYDROCHLORIDE 0.1 MG/1
0.3 TABLET ORAL 3 TIMES DAILY
Status: DISCONTINUED | OUTPATIENT
Start: 2021-09-10 | End: 2021-09-17 | Stop reason: HOSPADM

## 2021-09-10 RX ORDER — HYDRALAZINE HYDROCHLORIDE 20 MG/ML
10 INJECTION INTRAMUSCULAR; INTRAVENOUS ONCE
Status: COMPLETED | OUTPATIENT
Start: 2021-09-10 | End: 2021-09-10

## 2021-09-10 RX ORDER — HYDRALAZINE HYDROCHLORIDE 25 MG/1
100 TABLET, FILM COATED ORAL EVERY 8 HOURS SCHEDULED
Status: DISCONTINUED | OUTPATIENT
Start: 2021-09-10 | End: 2021-09-17 | Stop reason: HOSPADM

## 2021-09-10 RX ADMIN — INSULIN LISPRO 1 UNITS: 100 INJECTION, SOLUTION INTRAVENOUS; SUBCUTANEOUS at 21:53

## 2021-09-10 RX ADMIN — INSULIN LISPRO 2 UNITS: 100 INJECTION, SOLUTION INTRAVENOUS; SUBCUTANEOUS at 16:35

## 2021-09-10 RX ADMIN — CLONIDINE HYDROCHLORIDE 0.3 MG: 0.1 TABLET ORAL at 08:39

## 2021-09-10 RX ADMIN — CLONIDINE HYDROCHLORIDE 0.3 MG: 0.1 TABLET ORAL at 21:01

## 2021-09-10 RX ADMIN — TERAZOSIN HYDROCHLORIDE 5 MG: 5 CAPSULE ORAL at 21:12

## 2021-09-10 RX ADMIN — DOXAZOSIN 2 MG: 2 TABLET ORAL at 08:39

## 2021-09-10 RX ADMIN — CARVEDILOL 25 MG: 25 TABLET, FILM COATED ORAL at 21:01

## 2021-09-10 RX ADMIN — GABAPENTIN 300 MG: 300 CAPSULE ORAL at 08:46

## 2021-09-10 RX ADMIN — HYDRALAZINE HYDROCHLORIDE 10 MG: 20 INJECTION INTRAMUSCULAR; INTRAVENOUS at 00:12

## 2021-09-10 RX ADMIN — HYDRALAZINE HYDROCHLORIDE 100 MG: 25 TABLET, FILM COATED ORAL at 21:06

## 2021-09-10 RX ADMIN — DOXAZOSIN 2 MG: 2 TABLET ORAL at 21:06

## 2021-09-10 RX ADMIN — INSULIN GLARGINE 12 UNITS: 100 INJECTION, SOLUTION SUBCUTANEOUS at 08:47

## 2021-09-10 RX ADMIN — LABETALOL 20 MG/4 ML (5 MG/ML) INTRAVENOUS SYRINGE 20 MG: at 16:54

## 2021-09-10 RX ADMIN — CARVEDILOL 25 MG: 25 TABLET, FILM COATED ORAL at 07:20

## 2021-09-10 RX ADMIN — HYDRALAZINE HYDROCHLORIDE 75 MG: 25 TABLET, FILM COATED ORAL at 14:46

## 2021-09-10 RX ADMIN — CLONIDINE HYDROCHLORIDE 0.3 MG: 0.1 TABLET ORAL at 15:55

## 2021-09-10 RX ADMIN — CARVEDILOL 25 MG: 25 TABLET, FILM COATED ORAL at 15:54

## 2021-09-10 RX ADMIN — LABETALOL 20 MG/4 ML (5 MG/ML) INTRAVENOUS SYRINGE 20 MG: at 12:01

## 2021-09-10 RX ADMIN — INSULIN LISPRO 3 UNITS: 100 INJECTION, SOLUTION INTRAVENOUS; SUBCUTANEOUS at 12:07

## 2021-09-10 RX ADMIN — INSULIN GLARGINE 12 UNITS: 100 INJECTION, SOLUTION SUBCUTANEOUS at 08:39

## 2021-09-10 RX ADMIN — LABETALOL 20 MG/4 ML (5 MG/ML) INTRAVENOUS SYRINGE 20 MG: at 20:57

## 2021-09-10 RX ADMIN — LABETALOL 20 MG/4 ML (5 MG/ML) INTRAVENOUS SYRINGE 20 MG: at 18:30

## 2021-09-10 RX ADMIN — HYDRALAZINE HYDROCHLORIDE 75 MG: 25 TABLET, FILM COATED ORAL at 05:41

## 2021-09-10 RX ADMIN — ATORVASTATIN CALCIUM 40 MG: 40 TABLET, FILM COATED ORAL at 18:32

## 2021-09-10 NOTE — ASSESSMENT & PLAN NOTE
· Pt presented with home SBP >230 despite being compliant with her medications  · Renal vascular Dopplers from 07/31/2021 showing no renal artery stenosis right kidney 9 3 cm left kidney 10 2 cm  · Hypertensive urgency on multiple agents  Nephrology following  · Home medications include: Coreg 25 mg p o  B i d , clonidine 0 2 mg p o  T i d , hydralazine 25 mg p o  B i d  torsemide 20 mg p o  Q 48 hours, Hytrin 5 mg p o  Q h s   · Current medications include: Coreg 25 mg p o  B i d , clonidine 0 3 mg p o  t i d , hydralazine 75 mg p o  t i d , Hytrin 5 mg p o  Q day, Cardura 2 mg b i d  · Torsemide on hold, avoid Ace/Arb  · Continue IV labetalol 20 mg q 4 hours p r n  · Currently blood pressure still elevated  Renal biopsy canceled today due to hypertension  Per IR Dr India Sloan, recommend SBP < 140 in order to perform renal biopsy

## 2021-09-10 NOTE — ASSESSMENT & PLAN NOTE
Lab Results   Component Value Date    HGBA1C 9 5 (H) 07/23/2021     Results from last 7 days   Lab Units 09/10/21  0642 09/09/21  2107 09/09/21  1600 09/09/21  1322 09/09/21  1106 09/09/21  0621 09/08/21  2201 09/08/21  1611   POC GLUCOSE mg/dl 120 207* 109 116 142* 226* 181* 194*     · Continue glargine at lower dosing until appetite better  · Continue SSI

## 2021-09-10 NOTE — PLAN OF CARE
Problem: Potential for Falls  Goal: Patient will remain free of falls  Description: INTERVENTIONS:  - Educate patient/family on patient safety including physical limitations  - Instruct patient to call for assistance with activity   - Consult OT/PT to assist with strengthening/mobility   - Keep Call bell within reach  - Keep bed low and locked with side rails adjusted as appropriate  - Keep care items and personal belongings within reach  - Initiate and maintain comfort rounds  - Make Fall Risk Sign visible to staff  - Offer Toileting every 2 Hours, in advance of need  - Apply yellow socks and bracelet for high fall risk patients  - Consider moving patient to room near nurses station    Problem: CARDIOVASCULAR - ADULT  Goal: Maintains optimal cardiac output and hemodynamic stability  Description: INTERVENTIONS:  - Monitor I/O, vital signs and rhythm  - Monitor for S/S and trends of decreased cardiac output  - Assess quality of pulses, skin color and temperature  - Assess for signs of decreased coronary artery perfusion  - Instruct patient to report change in severity of symptoms  Outcome: Progressing  Goal: Absence of cardiac dysrhythmias or at baseline rhythm  Description: INTERVENTIONS:  - Administer antiarrhythmic and heart rate control medications as ordered  - Monitor electrolytes and administer replacement therapy as ordered  Outcome: Progressing    Goal: Maintain or return to baseline ADL function  Description: INTERVENTIONS:  -  Assess patient's ability to carry out ADLs; assess patient's baseline for ADL function and identify physical deficits which impact ability to perform ADLs (bathing, care of mouth/teeth, toileting, grooming, dressing, etc )  - Assess/evaluate cause of self-care deficits   - Assess range of motion  - Assess patient's mobility; develop plan if impaired  - Assess patient's need for assistive devices and provide as appropriate  - Encourage maximum independence but intervene and supervise when necessary  - Involve family in performance of ADLs  - Assess for home care needs following discharge   - Consider OT consult to assist with ADL evaluation and planning for discharge  - Provide patient education as appropriate  Outcome: Progressing     Problem: DISCHARGE PLANNING  Goal: Discharge to home or other facility with appropriate resources  Description: INTERVENTIONS:  - Identify barriers to discharge w/patient and caregiver  - Arrange for needed discharge resources and transportation as appropriate  - Identify discharge learning needs (meds, wound care, etc )  - Arrange for interpretive services to assist at discharge as needed  - Refer to Case Management Department for coordinating discharge planning if the patient needs post-hospital services based on physician/advanced practitioner order or complex needs related to functional status, cognitive ability, or social support system  Outcome: Progressing

## 2021-09-10 NOTE — ASSESSMENT & PLAN NOTE
Lab Results   Component Value Date    EGFR 17 09/10/2021    EGFR 15 09/09/2021    EGFR 13 09/08/2021    CREATININE 2 81 (H) 09/10/2021    CREATININE 3 06 (H) 09/09/2021    CREATININE 3 41 (H) 09/08/2021     · Baseline hemoglobin appears to be around 8-9's    · Iron study showed iron 42, ferritin 24  · IV Venofer 200mg daily x 5 doses per renal  · Monitor CBC

## 2021-09-10 NOTE — PROGRESS NOTES
Used  number [id number] to talk to patient  She is upset her blood pressure is still high and states "all they are doing is giving her more of the same medication"  She states she was told they would give her a different medication in the ED  Explained that they start with trying to increase the dose of current medications, especially because she has allergies to some BP meds  If that doesn't work, they may consider changing to a different medication  Provided emotional support to her frustration that her BP wasn't improving as quickly as she was expecting  Also provided education as to the purpose of the iron infusion she was receiving  Pt lyudmila and stated understanding at end of phone call  Informed we would check her BP in 30 minutes and if still elevated we can give her additional medication via her IV

## 2021-09-10 NOTE — PROGRESS NOTES
[facility]  Progress Note - Kin Hebert 1955, 73 y o  female MRN: [id number]  Unit/Bed#: E2 -97 Encounter: [id number]  Primary Care Provider: Esme Ramos MD   Date and time admitted to hospital: 9/6/2021  7:36 PM    * Hypertensive urgency  Assessment & Plan  · Pt presented with home SBP >230 despite being compliant with her medications  · Renal vascular Dopplers from 07/31/2021 showing no renal artery stenosis right kidney 9 3 cm left kidney 10 2 cm  · Hypertensive urgency on multiple agents  Nephrology following  · Home medications include: Coreg 25 mg p o  B i d , clonidine 0 2 mg p o  T i d , hydralazine 25 mg p o  B i d  torsemide 20 mg p o  Q 48 hours, Hytrin 5 mg p o  Q h s   · Current medications include: Coreg 25 mg p o  B i d , clonidine 0 3 mg p o  t i d , hydralazine 75 mg p o  t i d , Hytrin 5 mg p o  Q day, Cardura 2 mg b i d  · Torsemide on hold, avoid Ace/Arb  · Continue IV labetalol 20 mg q 4 hours p r n  · Currently blood pressure still elevated  Renal biopsy canceled today due to hypertension  Per IR Dr Roger Fields, recommend SBP < 140 in order to perform renal biopsy  Acute kidney injury superimposed on CKD [facility])  Assessment & Plan  Lab Results   Component Value Date    EGFR 17 09/10/2021    EGFR 15 09/09/2021    EGFR 13 09/08/2021    CREATININE 2 81 (H) 09/10/2021    CREATININE 3 06 (H) 09/09/2021    CREATININE 3 41 (H) 09/08/2021     · Evolving since admission  Baseline creatinine appears to be around 2 5-3 0  · Creatinine 3 01 on admission  Creatinine up to 3 41 on 9/8  · Creatinine today 2 8  · CKD 4 secondary to hypertensive nephrosclerosis and diabetes  Plus underlying for paraproteinemia  · Acute kidney injury etiology likely secondary to hemodynamic perturbations per Renal   · Nephrology consulted, appreciate input  · Holding Demadex  · IV fluids now discontinued  · Recommend renal biopsy, this was canceled due to BP    If blood pressure improves with the weekend and she is otherwise stable for discharge, can likely do as an outpatient  · Daily weight and I&Os  · Follow BMP  · Will need close outpatient Nephrology follow-up, follows Dr Rayray Vaughn  · 2D echo in 3/2021 from [facility] showed normal EF, grade 1 diastolic dysfunction, mild AS, mild MR and TR  Biclonal gammopathy  Assessment & Plan  · Biclonal gammopathy follows with hematology as outpatient with plan for eventual bone marrow biopsy  · Patient appears to have bone marrow biopsy scheduled on 9/15/2021 with IR    Anemia in stage 4 chronic kidney disease [facility])  Assessment & Plan  Lab Results   Component Value Date    EGFR 17 09/10/2021    EGFR 15 09/09/2021    EGFR 13 09/08/2021    CREATININE 2 81 (H) 09/10/2021    CREATININE 3 06 (H) 09/09/2021    CREATININE 3 41 (H) 09/08/2021     · Baseline hemoglobin appears to be around 8-9's  · Iron study showed iron 42, ferritin 24  · IV Venofer 200mg daily x 5 doses per renal  · Monitor CBC    Chronic diastolic heart failure (HCC)  Assessment & Plan  Wt Readings from Last 3 Encounters:   09/10/21 72 1 kg (158 lb 15 2 oz)   08/31/21 71 2 kg (157 lb)   08/26/21 70 4 kg (155 lb 3 2 oz)     · Compensated on torsemide 20 mg every other day   · Currently on hold due to REI on CKD  · Daily weight and I&Os  Weight stable    Type 2 diabetes mellitus, with long-term current use of insulin [facility])  Assessment & Plan    Lab Results   Component Value Date    HGBA1C 9 5 (H) 07/23/2021     Results from last 7 days   Lab Units 09/10/21  0642 09/09/21  2107 09/09/21  1600 09/09/21  1322 09/09/21  1106 09/09/21  0621 09/08/21  2201 09/08/21  1611   POC GLUCOSE mg/dl 120 207* 109 116 142* 226* 181* 194*     · Continue glargine at lower dosing until appetite better  · Continue SSI    Elevated lipase-resolved as of 9/10/2021  Assessment & Plan  · Elevated lipase of unclear significance  No abdominal pain       · CT scan of abdomen no evidence of pancreatitis  · Lipase normalized with IV hydration  · Advance diet to regular diet, tolerating    Results from last 7 days   Lab Units 21  0514 21  1449 21   LIPASE u/L 211 621* 1,190*       VTE Pharmacologic Prophylaxis: VTE Score: 3 hold for renal biopsy    Patient Centered Rounds: I performed bedside rounds with nursing staff today  Discussions with Specialists or Other Care Team Provider:  Nephrology, case management    Education and Discussions with Family / Patient: Called patient's son but went to voicemail, did not leave message  Time Spent for Care: 30 minutes  More than 50% of total time spent on counseling and coordination of care as described above  Current Length of Stay: 4 day(s)  Current Patient Status: Inpatient   Certification Statement: The patient will continue to require additional inpatient hospital stay due to Pending improvement in blood pressure  Discharge Plan: Anticipate discharge in 24-48 hrs to home with home services  Code Status: Level 1 - Full Code    Subjective:   Patient seen examined at bedside  Has no new complaints  Just ate breakfast   No headache or dizziness associated with elevated blood pressures  Agreeable with plan for discharge home over the weekend if blood pressure improved with outpatient follow-up for biopsy  Objective:     Vitals:   Temp (24hrs), Av 1 °F (36 7 °C), Min:98 °F (36 7 °C), Max:98 2 °F (36 8 °C)    Temp:  [98 °F (36 7 °C)-98 2 °F (36 8 °C)] 98 2 °F (36 8 °C)  HR:  [68-76] 72  Resp:  [18] 18  BP: (117-218)/() 174/81  SpO2:  [94 %-96 %] 95 %  Body mass index is 29 07 kg/m²  Input and Output Summary (last 24 hours): Intake/Output Summary (Last 24 hours) at 9/10/2021 0936  Last data filed at 9/10/2021 0543  Gross per 24 hour   Intake 450 ml   Output 1150 ml   Net -700 ml       Physical Exam:   Physical Exam  Constitutional:       Appearance: Normal appearance     HENT: Head: Normocephalic and atraumatic  Mouth/Throat:      Mouth: Mucous membranes are moist    Eyes:      Extraocular Movements: Extraocular movements intact  Cardiovascular:      Rate and Rhythm: Normal rate and regular rhythm  Heart sounds: Murmur heard  Pulmonary:      Effort: Pulmonary effort is normal       Breath sounds: Normal breath sounds  Abdominal:      General: Abdomen is flat  Palpations: Abdomen is soft  Musculoskeletal:      Cervical back: Normal range of motion and neck supple  Skin:     General: Skin is warm  Neurological:      General: No focal deficit present  Mental Status: She is alert     Psychiatric:         Mood and Affect: Mood normal         Additional Data:     Labs:  Results from last 7 days   Lab Units 09/10/21  0548 09/07/21  0520   WBC Thousand/uL 5 46 7 54   HEMOGLOBIN g/dL 8 3* 9 2*   HEMATOCRIT % 26 3* 29 0*   PLATELETS Thousands/uL 216 270   NEUTROS PCT %  --  76*   LYMPHS PCT %  --  18   MONOS PCT %  --  6   EOS PCT %  --  0     Results from last 7 days   Lab Units 09/10/21  0548 09/09/21  0457   SODIUM mmol/L 139 138   POTASSIUM mmol/L 4 3 4 1   CHLORIDE mmol/L 104 103   CO2 mmol/L 27 27   BUN mg/dL 43* 47*   CREATININE mg/dL 2 81* 3 06*   ANION GAP mmol/L 8 8   CALCIUM mg/dL 7 9* 7 3*   ALBUMIN g/dL  --  2 4*   TOTAL BILIRUBIN mg/dL  --  0 27   ALK PHOS U/L  --  60   ALT U/L  --  13   AST U/L  --  12   GLUCOSE RANDOM mg/dL 129 197*     Results from last 7 days   Lab Units 09/08/21  1446   INR  1 09     Results from last 7 days   Lab Units 09/10/21  0642 09/09/21  2107 09/09/21  1600 09/09/21  1322 09/09/21  1106 09/09/21  0621 09/08/21  2201 09/08/21  1611 09/08/21  1059 09/08/21  0630 09/07/21  2107 09/07/21  1606   POC GLUCOSE mg/dl 120 207* 109 116 142* 226* 181* 194* 210* 169* 211* 112               Lines/Drains:  Invasive Devices     Peripheral Intravenous Line            Peripheral IV 09/07/21 Right Antecubital 2 days Imaging: Reviewed radiology reports from this admission including: abdominal/pelvic CT    Recent Cultures (last 7 days):         Last 24 Hours Medication List:   Current Facility-Administered Medications   Medication Dose Route Frequency Provider Last Rate    acetaminophen  650 mg Oral Q6H PRN Vasu Bundy MD      atorvastatin  40 mg Oral After Jasmina Joyce MD      carvedilol  25 mg Oral BID With Meals MARLENA Hansen      cloNIDine  0 3 mg Oral TID Anna Landers MD      doxazosin  2 mg Oral BID Anna Landers MD      gabapentin  300 mg Oral Daily Anna Landers MD      hydrALAZINE  75 mg Oral [facility] Anna Landers MD      insulin glargine  12 Units Subcutaneous QAM Veronicaiarun Begumk, MARLENA      insulin lispro  1-5 Units Subcutaneous HS Cuiarun Kumarrik, CRYINA      insulin lispro  1-6 Units Subcutaneous TID AC Cuiarun Begumk, MARLENA      iron sucrose  200 mg Intravenous Daily MARLENA Parekh      Labetalol HCl  20 mg Intravenous Q4H PRN MARLENA Parekh      ondansetron  4 mg Intravenous Q6H PRN Vasu Bundy MD      terazosin  5 mg Oral HS Vasu Bundy MD          Today, Patient Was Seen By: Benja Mascorro PA-C    **Please Note: This note may have been constructed using a voice recognition system  **

## 2021-09-10 NOTE — ASSESSMENT & PLAN NOTE
Lab Results   Component Value Date    EGFR 17 09/10/2021    EGFR 15 09/09/2021    EGFR 13 09/08/2021    CREATININE 2 81 (H) 09/10/2021    CREATININE 3 06 (H) 09/09/2021    CREATININE 3 41 (H) 09/08/2021     · Evolving since admission  Baseline creatinine appears to be around 2 5-3 0  · Creatinine 3 01 on admission  Creatinine up to 3 41 on 9/8  · Creatinine today 2 8  · CKD 4 secondary to hypertensive nephrosclerosis and diabetes  Plus underlying for paraproteinemia  · Acute kidney injury etiology likely secondary to hemodynamic perturbations per Renal   · Nephrology consulted, appreciate input  · Holding Demadex  · IV fluids now discontinued  · Recommend renal biopsy, this was canceled due to BP  If blood pressure improves with the weekend and she is otherwise stable for discharge, can likely do as an outpatient    · Daily weight and I&Os  · Follow BMP  · Will need close outpatient Nephrology follow-up, follows Dr Ary Hernandez

## 2021-09-10 NOTE — ASSESSMENT & PLAN NOTE
· 2D echo in 3/2021 from [facility] showed normal EF, grade 1 diastolic dysfunction, mild AS, mild MR and TR

## 2021-09-10 NOTE — ASSESSMENT & PLAN NOTE
Wt Readings from Last 3 Encounters:   09/10/21 72 1 kg (158 lb 15 2 oz)   08/31/21 71 2 kg (157 lb)   08/26/21 70 4 kg (155 lb 3 2 oz)     · Compensated on torsemide 20 mg every other day   · Currently on hold due to REI on CKD  · Daily weight and I&Os    Weight stable

## 2021-09-10 NOTE — PROGRESS NOTES
Follow up Consultation    Nephrology   [facility] David Bhatt 73 y o  female MRN: [id number]  Unit/Bed#: E2 -01 Encounter: [id number]      Physician Requesting Consult: Lisandra Brand, DO        ASSESSMENT/PLAN:     79-year-old female with multiple comorbidities including diabetes , hypertension, hyperlipidemia CKD stage IIIB/4 with multiple repeat sent episodes of acute kidney injury versus rapid CKD progression presented with elevated blood pressures   Patient seen and examined in her room earlier this a m           · Acute kidney injury (POA) CKD stage 3B/4:  - not fully recovered from episode of acute kidney injury from 08/26/2021 and currently at risk for further acute kidney injury secondary to hemodynamic perturbations in light of underlying comorbidities plus increased susceptibility for acute kidney injury due to multiple recent episodes of acute kidney injury non dialysis requiring  However truly unsure if patient has underlying GN that may be contributing to her multiple episodes of acute kidney injury and blood pressure fluctuations  - After [facility] as well as Care everywhere it appears that the patient has a baseline Creatinine of 2-2 5 mg/dL up until July 2021 and subsequent to that new baseline has been around 2 5-3 mg/dL since episode of acute kidney injury on 08/26/2021 with creatinine of 3 98 mg/dL  - patient was admitted with a creatinine of 3 01 mg/dL    - patient's creatinine today is at 2 81 mg/dL, stable and improving  - plan was for renal biopsy on 09/09/2021 if blood pressures were stable however due to elevated blood pressures biopsy has been put on hold, I discussed with the patient that we can pursue that as an outpatient if it is still deemed necessary with her primary nephrologist   I have a feeling her read current episodes of acute kidney injury likely secondary to hemodynamic perturbations secondary to significant renal vascular disease and failure to auto regulate in the presence of underlying uncontrolled diabetic nephropathy   - CT abdomen pelvis from 09/07/2021 showing possible mild wall thickening versus under distention of the colon  Nonspecific colitis  No hydronephrosis  -[place] forms were completed and sent over to IR and are scanned in the chart  - there was planned for bone marrow biopsy on 09/15/2021 with IR for evaluation of monoclonal gammopathy  Read advised patient to follow up outpatient for that with IR   - check BMP in a m   - renal vascular Dopplers from 07/31/2021 showing no renal artery stenosis right kidney 9 3 cm left kidney 10 2 cm  - Await renal recovery  - Optimize hemodynamic status to avoid delay in renal recovery  - Place on a renal diet when allowed diet order    - Avoid nephrotoxins, adjust meds to appropriate GFR   - Strict I/O   - Daily weights  - Urinary retention protocol if patient does not have a Khan  - Most likely has underlying CKD secondary to hypertensive nephrosclerosis plus diabetic kidney disease plus multiple prior acute kidney injury episodes plus underlying paraproteinemia plus renal vascular disease   - will need to set up patient for follow up with Nephrology as an outpatient post hospitalization   - as an outpatient patient for nephrology patient follows up with Dr medina  - advised patient once her blood pressure is stable and appropriate controlled then she can be discharged from a renal standpoint with close outpatient follow-up with her primary nephrologist      · Hypertensive urgency:  - home medications:  Coreg 25 mg p o  B i d , clonidine 0 2 mg p o  T i d , hydralazine 25 mg p o  B i d  torsemide 20 mg p o  Q 48 hours, Hytrin 5 mg p o  Q h s   - current medications:  Coreg 25 mg p o  B i d , clonidine 0 2 mg p o  t i d , hydralazine 75 mg p o  B i d  Hytrin 5 mg p o   Q day, Cardura 2 mg p o  B i d   - recommendations:  Hold torsemide for now, have a feeling that it ends of contributing to low-grade pancreatitis every time it is initiated for her    avoid use of Ace inhibitor ARB at this time in light of acute kidney injury  increase clonidine to 0 3 mg p o  T i d   - Optimize hemodynamics   - Maintain MAP > 65mmHg  - Avoid BP fluctuations      · H/H/anemia:  - most recent hemoglobin at 8 3 grams/deciliter  - maintain hemoglobin greater than 8 grams/deciliter  - most recent iron studies from 09/09/2021 showing iron 42 ferritin 24 T sat 19% will place on Venofer 200 mg IV q day for 5 doses while in the hospital   - will avoid CHRISTAL in light of significant elevated blood pressures     Acid-base electrolytes:  Electrolytes:    § Stable         Acid-base:    § Most recent bicarb at 27 stable     Volume status:     Clinically appears to be euvolemic   Hold torsemide and diuretics for now   Proteinuria:   ? Most recent UA with 2+ protein as of 08/08/2021, urine protein creatinine ratio 6 2 g as of 09/07/2021  Likely is diabetes related however would like to rule out underlying GN     Other medical problems:  ? Diabetes:  Management per primary team   On insulin   Does have history of uncontrolled diabetes   Last eye exam from 2019 with evidence of diabetic retinopathy  Decreased gabapentin to 300 mg p o  Q day  Most recent A1c of 9 5% as of 07/23/2021  ? Lambda light chain monoclonal gammopathy:  Management per primary team   Follow-up with Heme-Onc plan for bone marrow biopsy 4 9/15 with IR  ? Concern for pancreatitis/colitis:  Most recent lipase at 1190  Hold further IV fluids Hold diuretics    ? Nausea/vomiting:  CT abdomen suggestive of possible colitis  Management primary team   ? Thanks for the consult  Will continue to follow  Please call with questions/ concerns    Above-mentioned orders and Plan in terms of acute kidney injury was discussed with the team in Tegan Marin MD, FASN, 9/10/2021, 11:11 AM              Objective :   Patient seen and examined in her room no overnight events blood pressure still remains elevated renal biopsy was canceled yesterday due to elevated blood pressure this morning patient 8 breakfast along with the fact that her blood pressure still remain elevated would hold off on renal biopsy  Urine output 1 6 L  PHYSICAL EXAM  BP (!) 174/81 (BP Location: Left arm)   Pulse 72   Temp 98 2 °F (36 8 °C) (Temporal)   Resp 18   Ht 5' 2" (1 575 m)   Wt 72 1 kg (158 lb 15 2 oz)   SpO2 95%   BMI 29 07 kg/m²   Temp (24hrs), Av 1 °F (36 7 °C), Min:98 °F (36 7 °C), Max:98 2 °F (36 8 °C)        Intake/Output Summary (Last 24 hours) at 9/10/2021 1111  Last data filed at 9/10/2021 0543  Gross per 24 hour   Intake 450 ml   Output 1150 ml   Net -700 ml       I/O last 24 hours: In: 450 [P O :450]  Out: 1625 [Urine:1625]      Current Weight: Weight - Scale: 72 1 kg (158 lb 15 2 oz)  First Weight: Weight - Scale: 70 8 kg (156 lb 1 4 oz)  Physical Exam  Vitals and nursing note reviewed  Constitutional:       General: She is not in acute distress  Appearance: Normal appearance  She is normal weight  She is not ill-appearing, toxic-appearing or diaphoretic  HENT:      Head: Normocephalic and atraumatic  Mouth/Throat:      Mouth: Mucous membranes are moist       Pharynx: Oropharynx is clear  No oropharyngeal exudate  Eyes:      General: No scleral icterus  Conjunctiva/sclera: Conjunctivae normal    Cardiovascular:      Rate and Rhythm: Normal rate  Heart sounds: Normal heart sounds  No friction rub  Pulmonary:      Effort: Pulmonary effort is normal  No respiratory distress  Breath sounds: Normal breath sounds  No wheezing  Abdominal:      General: There is no distension  Palpations: Abdomen is soft  There is no mass  Musculoskeletal:         General: No swelling  Cervical back: Normal range of motion and neck supple  Skin:     Coloration: Skin is not jaundiced  Neurological:      General: No focal deficit present  Mental Status: She is alert and oriented to person, place, and time  Psychiatric:         Mood and Affect: Mood normal          Behavior: Behavior normal              Review of Systems   Constitutional: Negative for appetite change, chills and fever  HENT: Negative for congestion  Respiratory: Negative for cough, shortness of breath and wheezing  Cardiovascular: Negative for leg swelling  Gastrointestinal: Negative for abdominal pain, constipation, diarrhea, nausea and vomiting  Genitourinary: Negative for dysuria  Musculoskeletal: Negative for back pain  Skin: Negative for rash and wound  Neurological: Negative for dizziness and headaches  Psychiatric/Behavioral: Negative for agitation and confusion  All other systems reviewed and are negative  Scheduled Meds:  Current Facility-Administered Medications   Medication Dose Route Frequency Provider Last Rate    acetaminophen  650 mg Oral Q6H PRN Jose Francisco Razo MD      atorvastatin  40 mg Oral After Valentin aRvi MD      carvedilol  25 mg Oral BID With Meals MARLENA Hansen      cloNIDine  0 3 mg Oral TID Anselmo Ormond, MD      doxazosin  2 mg Oral BID Anselmo Ormond, MD      gabapentin  300 mg Oral Daily Anselmo Ormond, MD      hydrALAZINE  75 mg Oral [facility] Anselmo Ormond, MD      insulin glargine  12 Units Subcutaneous QAM Deloris Butler, MARLENA      insulin lispro  1-5 Units Subcutaneous HS CuiMARLENA Ruiz      insulin lispro  1-6 Units Subcutaneous TID AC Cuiyin Stacyrik, SHIVNP      iron sucrose  200 mg Intravenous Daily Cuiarun Butler, MARLENA      Labetalol HCl  20 mg Intravenous Q4H PRN MARLENA Parekh      ondansetron  4 mg Intravenous Q6H PRN Jose Francisco Razo MD      terazosin  5 mg Oral HS Jose Francisco Razo MD         PRN Meds:   acetaminophen    Labetalol HCl    ondansetron    Continuous Infusions:       Invasive Devices:      Invasive Devices     Peripheral Intravenous Line            Peripheral IV 09/07/21 Right Antecubital 2 days                  LABORATORY:    Results from last 7 days   Lab Units 09/10/21  0548 09/09/21  0457 09/08/21  0514 09/07/21  0520 09/06/21 2027   WBC Thousand/uL 5 46 5 95  --  7 54 7 39   HEMOGLOBIN g/dL 8 3* 8 1*  --  9 2* 9 6*   HEMATOCRIT % 26 3* 26 1*  --  29 0* 30 4*   PLATELETS Thousands/uL 216 234  --  270 290   POTASSIUM mmol/L 4 3 4 1 4 1 4 2 4 3   CHLORIDE mmol/L 104 103 101 102 99*   CO2 mmol/L 27 27 28 24 28   BUN mg/dL 43* 47* 47* 50* 48*   CREATININE mg/dL 2 81* 3 06* 3 41* 2 80* 3 01*   CALCIUM mg/dL 7 9* 7 3* 7 4* 8 1* 8 2*   MAGNESIUM mg/dL 2 3  --  2 2  --   --    PHOSPHORUS mg/dL  --   --  4 4*  --   --       rest all reviewed    RADIOLOGY:  CT abdomen pelvis wo contrast   Final Result by Tony aBuman MD (09/07 1617)   Possible mild wall thickening versus underdistention of the colon at the splenic flexure with trace fluid in the paracolic gutter  Findings could represent a nonspecific colitis in the appropriate clinical scenario  Otherwise, no acute findings in the    abdomen or pelvis, including no evidence of pancreatitis  Workstation performed: [id number]         IR biopsy kidney columbia kit no laterality    (Results Pending)     Rest all reviewed    Portions of the record may have been created with voice recognition software  Occasional wrong word or "sound a like" substitutions may have occurred due to the inherent limitations of voice recognition software  Read the chart carefully and recognize, using context, where substitutions have occurred  If you have any questions, please contact the dictating provider

## 2021-09-11 LAB
ANION GAP SERPL CALCULATED.3IONS-SCNC: 6 MMOL/L (ref 4–13)
BASOPHILS # BLD AUTO: 0.02 THOUSANDS/ΜL (ref 0–0.1)
BASOPHILS NFR BLD AUTO: 0 % (ref 0–1)
BUN SERPL-MCNC: 44 MG/DL (ref 5–25)
CALCIUM SERPL-MCNC: 7.8 MG/DL (ref 8.3–10.1)
CHLORIDE SERPL-SCNC: 102 MMOL/L (ref 100–108)
CO2 SERPL-SCNC: 30 MMOL/L (ref 21–32)
CREAT SERPL-MCNC: 2.85 MG/DL (ref 0.6–1.3)
DOPAMINE 24H UR-MRATE: <30 PG/ML (ref 0–48)
EOSINOPHIL # BLD AUTO: 0.24 THOUSAND/ΜL (ref 0–0.61)
EOSINOPHIL NFR BLD AUTO: 4 % (ref 0–6)
EPINEPH PLAS-MCNC: <15 PG/ML (ref 0–62)
ERYTHROCYTE [DISTWIDTH] IN BLOOD BY AUTOMATED COUNT: 14.6 % (ref 11.6–15.1)
GFR SERPL CREATININE-BSD FRML MDRD: 17 ML/MIN/1.73SQ M
GLUCOSE SERPL-MCNC: 116 MG/DL (ref 65–140)
GLUCOSE SERPL-MCNC: 138 MG/DL (ref 65–140)
GLUCOSE SERPL-MCNC: 281 MG/DL (ref 65–140)
GLUCOSE SERPL-MCNC: 314 MG/DL (ref 65–140)
GLUCOSE SERPL-MCNC: 88 MG/DL (ref 65–140)
GLUCOSE SERPL-MCNC: 91 MG/DL (ref 65–140)
HCT VFR BLD AUTO: 25.9 % (ref 34.8–46.1)
HGB BLD-MCNC: 8.2 G/DL (ref 11.5–15.4)
IMM GRANULOCYTES # BLD AUTO: 0.03 THOUSAND/UL (ref 0–0.2)
IMM GRANULOCYTES NFR BLD AUTO: 1 % (ref 0–2)
LYMPHOCYTES # BLD AUTO: 1.42 THOUSANDS/ΜL (ref 0.6–4.47)
LYMPHOCYTES NFR BLD AUTO: 23 % (ref 14–44)
MCH RBC QN AUTO: 26.7 PG (ref 26.8–34.3)
MCHC RBC AUTO-ENTMCNC: 31.7 G/DL (ref 31.4–37.4)
MCV RBC AUTO: 84 FL (ref 82–98)
MONOCYTES # BLD AUTO: 0.67 THOUSAND/ΜL (ref 0.17–1.22)
MONOCYTES NFR BLD AUTO: 11 % (ref 4–12)
NEUTROPHILS # BLD AUTO: 3.86 THOUSANDS/ΜL (ref 1.85–7.62)
NEUTS SEG NFR BLD AUTO: 61 % (ref 43–75)
NOREPINEPH PLAS-MCNC: 144 PG/ML (ref 0–874)
NRBC BLD AUTO-RTO: 0 /100 WBCS
PLATELET # BLD AUTO: 216 THOUSANDS/UL (ref 149–390)
PMV BLD AUTO: 10.7 FL (ref 8.9–12.7)
POTASSIUM SERPL-SCNC: 4.4 MMOL/L (ref 3.5–5.3)
RBC # BLD AUTO: 3.07 MILLION/UL (ref 3.81–5.12)
SODIUM SERPL-SCNC: 138 MMOL/L (ref 136–145)
WBC # BLD AUTO: 6.24 THOUSAND/UL (ref 4.31–10.16)

## 2021-09-11 PROCEDURE — 82948 REAGENT STRIP/BLOOD GLUCOSE: CPT

## 2021-09-11 PROCEDURE — 85025 COMPLETE CBC W/AUTO DIFF WBC: CPT | Performed by: NURSE PRACTITIONER

## 2021-09-11 PROCEDURE — 93005 ELECTROCARDIOGRAM TRACING: CPT

## 2021-09-11 PROCEDURE — 99233 SBSQ HOSP IP/OBS HIGH 50: CPT | Performed by: INTERNAL MEDICINE

## 2021-09-11 PROCEDURE — 99232 SBSQ HOSP IP/OBS MODERATE 35: CPT | Performed by: NURSE PRACTITIONER

## 2021-09-11 PROCEDURE — 80048 BASIC METABOLIC PNL TOTAL CA: CPT | Performed by: NURSE PRACTITIONER

## 2021-09-11 RX ORDER — NIFEDIPINE 30 MG/1
30 TABLET, EXTENDED RELEASE ORAL DAILY
Status: DISCONTINUED | OUTPATIENT
Start: 2021-09-11 | End: 2021-09-12

## 2021-09-11 RX ADMIN — CARVEDILOL 25 MG: 25 TABLET, FILM COATED ORAL at 16:04

## 2021-09-11 RX ADMIN — TERAZOSIN HYDROCHLORIDE 5 MG: 5 CAPSULE ORAL at 21:07

## 2021-09-11 RX ADMIN — CARVEDILOL 25 MG: 25 TABLET, FILM COATED ORAL at 09:17

## 2021-09-11 RX ADMIN — Medication 2.5 MG/HR: at 01:52

## 2021-09-11 RX ADMIN — ATORVASTATIN CALCIUM 40 MG: 40 TABLET, FILM COATED ORAL at 18:21

## 2021-09-11 RX ADMIN — LABETALOL 20 MG/4 ML (5 MG/ML) INTRAVENOUS SYRINGE 20 MG: at 13:52

## 2021-09-11 RX ADMIN — DOXAZOSIN 2 MG: 2 TABLET ORAL at 09:18

## 2021-09-11 RX ADMIN — HYDRALAZINE HYDROCHLORIDE 100 MG: 25 TABLET, FILM COATED ORAL at 13:52

## 2021-09-11 RX ADMIN — INSULIN GLARGINE 12 UNITS: 100 INJECTION, SOLUTION SUBCUTANEOUS at 09:18

## 2021-09-11 RX ADMIN — HYDRALAZINE HYDROCHLORIDE 100 MG: 25 TABLET, FILM COATED ORAL at 05:58

## 2021-09-11 RX ADMIN — CLONIDINE HYDROCHLORIDE 0.3 MG: 0.1 TABLET ORAL at 21:03

## 2021-09-11 RX ADMIN — HYDRALAZINE HYDROCHLORIDE 100 MG: 25 TABLET, FILM COATED ORAL at 21:03

## 2021-09-11 RX ADMIN — IRON SUCROSE 200 MG: 20 INJECTION, SOLUTION INTRAVENOUS at 18:22

## 2021-09-11 RX ADMIN — NIFEDIPINE 30 MG: 30 TABLET, FILM COATED, EXTENDED RELEASE ORAL at 16:04

## 2021-09-11 RX ADMIN — CLONIDINE HYDROCHLORIDE 0.3 MG: 0.1 TABLET ORAL at 16:04

## 2021-09-11 RX ADMIN — GABAPENTIN 300 MG: 300 CAPSULE ORAL at 09:17

## 2021-09-11 RX ADMIN — INSULIN LISPRO 4 UNITS: 100 INJECTION, SOLUTION INTRAVENOUS; SUBCUTANEOUS at 14:15

## 2021-09-11 RX ADMIN — LABETALOL 20 MG/4 ML (5 MG/ML) INTRAVENOUS SYRINGE 20 MG: at 19:52

## 2021-09-11 RX ADMIN — CLONIDINE HYDROCHLORIDE 0.3 MG: 0.1 TABLET ORAL at 09:17

## 2021-09-11 NOTE — PLAN OF CARE
Problem: Potential for Falls  Goal: Patient will remain free of falls  Description: INTERVENTIONS:  - Educate patient/family on patient safety including physical limitations  - Instruct patient to call for assistance with activity   - Consult OT/PT to assist with strengthening/mobility   - Keep Call bell within reach  - Keep bed low and locked with side rails adjusted as appropriate  - Keep care items and personal belongings within reach  - Initiate and maintain comfort rounds  - Make Fall Risk Sign visible to staff  - Offer Toileting every 2 Hours, in advance of need  - Initiate/Maintain bed alarm  - Obtain necessary fall risk management equipment: yekllow socks   Problem: DISCHARGE PLANNING  Goal: Discharge to home or other facility with appropriate resources  Description: INTERVENTIONS:  - Identify barriers to discharge w/patient and caregiver  - Arrange for needed discharge resources and transportation as appropriate  - Identify discharge learning needs (meds, wound care, etc )  - Arrange for interpretive services to assist at discharge as needed  - Refer to Case Management Department for coordinating discharge planning if the patient needs post-hospital services based on physician/advanced practitioner order or complex needs related to functional status, cognitive ability, or social support system  Outcome: Progressing     - Apply yellow socks and bracelet for high fall risk patients  - Consider moving patient to room near nurses station  Outcome: Progressing

## 2021-09-11 NOTE — ASSESSMENT & PLAN NOTE
Lab Results   Component Value Date    EGFR 17 09/11/2021    EGFR 17 09/10/2021    EGFR 15 09/09/2021    CREATININE 2 85 (H) 09/11/2021    CREATININE 2 81 (H) 09/10/2021    CREATININE 3 06 (H) 09/09/2021   Normocytic  Baseline hemoglobin 8-9s  Continue IV venofer for 5 doses  Monitor CBC

## 2021-09-11 NOTE — NURSING NOTE
RN used [employer]  [id number] to interpret in [address] with patient  Discussed with patient that her blood pressure remains high with little improvement after the oral and IV push medications  Patient will be transferred to Intensive Care Unit in order to be placed on a Cardene infusion and cardiac monitoring  Patient continues to deny headache, blurred vision and other symptoms at this time  She verbalized understanding and did not have any additional questions at this time  All belongings were transported along with patient to ICU bed 3  Patient transported via wheelchair by PCA and RN

## 2021-09-11 NOTE — PROGRESS NOTES
[facility]  Progress Note - Panda Ward 1955, 73 y o  female MRN: [id number]  Unit/Bed#: E2 -01 Encounter: [id number]  Primary Care Provider: Jose A Huff MD   Date and time admitted to hospital: 9/6/2021  7:36 PM    * Hypertensive urgency  Assessment & Plan  Patient admitted on 9/6 with SBP>230 despite compliance with home regimen  Renal vascular dopplers from 7/31/21 revealed no renal artery stenosis  Her home meds were Coreg 25 mg po BID, clonidine 0 2 mg TID, hydralazine 25 mg BID, torsemide 20 mg Q48 hours, Hytrin 5mgQHS  Ongoing changes made with meds with little to no improvement in BP  Currently she is on coreg 25 mg BID, clonidine 0 3 mg TID, hydralazine 75 mg Q8 hours, hytrin 5 mg QHS, Cardura 2mg BID     She was also given labetalol 20 mg IV prn doses  Torsemide on hold for REI  Transferred to ICU on 9/11 to start cardene infusion  Plan to continue current regimen above as well as cardene infusion  Goal to maintain BP>180 mmhg  She will need a renal biopsy with IR when stable  Nephrology consulted      Acute kidney injury superimposed on CKD [facility])  Assessment & Plan  Lab Results   Component Value Date    EGFR 17 09/10/2021    EGFR 15 09/09/2021    EGFR 13 09/08/2021    CREATININE 2 81 (H) 09/10/2021    CREATININE 3 06 (H) 09/09/2021    CREATININE 3 41 (H) 09/08/2021   Baseline creatinine until July 2021 was 2-2 5, since July new baseline appears to be 2 5-3  Admission creatinine was 3 01  Still needs renal biopsy when BP more stable  Monitor renal indices  Torsemide on hold for REI    Heart murmur  Assessment & Plan  3/2021 Echo-normal EF, G1DD, mild AS, mild MR and TR    Biclonal gammopathy  Assessment & Plan  Outpatient follow up with hematology  She has a bone marrow bx scheduled on 9/15 with IR     Anemia in stage 4 chronic kidney disease [facility])  Assessment & Plan  Lab Results   Component Value Date    EGFR 17 09/10/2021    EGFR 15 09/09/2021    EGFR 13 09/08/2021    CREATININE 2 81 (H) 09/10/2021    CREATININE 3 06 (H) 09/09/2021    CREATININE 3 41 (H) 09/08/2021   Normocytic  Baseline hemoglobin 8-9s  Continue IV venofer for 5 doses  Monitor CBC      Chronic diastolic heart failure (HCC)  Assessment & Plan  Wt Readings from Last 3 Encounters:   09/10/21 72 1 kg (158 lb 15 2 oz)   08/31/21 71 2 kg (157 lb)   08/26/21 70 4 kg (155 lb 3 2 oz)     Monitor fluid status  Torsemide on hold           Type 2 diabetes mellitus, with long-term current use of insulin [facility])  Assessment & Plan  Lab Results   Component Value Date    HGBA1C 9 5 (H) 07/23/2021       Recent Labs     09/10/21  0642 09/10/21  1136 09/10/21  1622 09/10/21  2140   POCGLU 120 248* 201* 169*       Blood Sugar Average: Last 72 hrs:  (P) 601 [id number]   Poor appetite  Glargine ordered at lower dose than she takes as OP  Linagliptin and glimeperide on hold    Accuchecks QAC and HS with SSI coverage      ----------------------------------------------------------------------------------------  HPI/24hr events: Patient with a history of DM, HTN, HLD, CKD IIIB/4 with multiple episodes of REI and hypertensive urgency, was admitted on 9/6 for hypertensive urgency with SBP>230 mmhg  Her med regimen was adjusted multiple times of the past few days with only temporary improvement with BP  Last evening, her BP remained greater than 200 despite med regimen and multiple doses of labetalol  She is being transferred to ICU for cardene infusion  Patient appropriate for transfer out of the ICU today?: No  Disposition: Admit to Critical Care   Code Status: Level 1 - Full Code  ---------------------------------------------------------------------------------------  SUBJECTIVE  Denies pain, headache, neurologic symptoms      Review of Systems  Review of systems was reviewed and negative unless stated above in HPI/24-hour events ---------------------------------------------------------------------------------------  OBJECTIVE    Vitals   Vitals:    09/10/21 2148 09/10/21 2224 09/10/21 2354 21 0002   BP: (!) 224/94 (!) 220/84 (!) 210/98 (!) 215/91   BP Location: Right arm Right arm Right arm Right arm   Pulse: 70 80  73   Resp:    17   Temp:    (!) 96 4 °F (35 8 °C)   TempSrc:    Tympanic   SpO2:    97%   Weight:       Height:         Temp (24hrs), Av 7 °F (35 9 °C), Min:95 8 °F (35 4 °C), Max:98 2 °F (36 8 °C)  Current: Temperature: (!) 96 4 °F (35 8 °C)          Respiratory:  SpO2: SpO2: 97 %       Invasive/non-invasive ventilation settings   Respiratory    Lab Data (Last 4 hours)    None         O2/Vent Data (Last 4 hours)    None                Physical Exam  Vitals and nursing note reviewed  Constitutional:       General: She is not in acute distress  Appearance: She is obese  She is ill-appearing  HENT:      Head: Normocephalic and atraumatic  Right Ear: External ear normal       Left Ear: External ear normal       Nose: Nose normal       Mouth/Throat:      Mouth: Mucous membranes are moist       Pharynx: Oropharynx is clear  Eyes:      Extraocular Movements: Extraocular movements intact  Conjunctiva/sclera: Conjunctivae normal       Pupils: Pupils are equal, round, and reactive to light  Cardiovascular:      Rate and Rhythm: Normal rate and regular rhythm  Pulses: Normal pulses  Heart sounds: Normal heart sounds  Pulmonary:      Effort: Pulmonary effort is normal       Breath sounds: Normal breath sounds  Abdominal:      General: Bowel sounds are normal       Palpations: Abdomen is soft  Musculoskeletal:         General: Normal range of motion  Cervical back: Normal range of motion and neck supple  Skin:     General: Skin is warm and dry  Capillary Refill: Capillary refill takes less than 2 seconds  Neurological:      General: No focal deficit present        Mental Status: She is alert and oriented to person, place, and time  Psychiatric:         Mood and Affect: Mood normal          Behavior: Behavior normal          Thought Content: Thought content normal          Judgment: Judgment normal          Laboratory and Diagnostics:  Results from last 7 days   Lab Units 09/10/21  0548 09/09/21 0457 09/07/21 0520 09/06/21 2027   WBC Thousand/uL 5 46 5 95 7 54 7 39   HEMOGLOBIN g/dL 8 3* 8 1* 9 2* 9 6*   HEMATOCRIT % 26 3* 26 1* 29 0* 30 4*   PLATELETS Thousands/uL 216 234 270 290   NEUTROS PCT %  --   --  76* 80*   MONOS PCT %  --   --  6 5     Results from last 7 days   Lab Units 09/10/21  0548 09/09/21 0457 09/08/21 0514 09/07/21 0520 09/06/21 2027   SODIUM mmol/L 139 138 140 137 136   POTASSIUM mmol/L 4 3 4 1 4 1 4 2 4 3   CHLORIDE mmol/L 104 103 101 102 99*   CO2 mmol/L 27 27 28 24 28   ANION GAP mmol/L 8 8 11 11 9   BUN mg/dL 43* 47* 47* 50* 48*   CREATININE mg/dL 2 81* 3 06* 3 41* 2 80* 3 01*   CALCIUM mg/dL 7 9* 7 3* 7 4* 8 1* 8 2*   GLUCOSE RANDOM mg/dL 129 197* 150* 156* 208*   ALT U/L  --  13 12 14 16   AST U/L  --  12 12 16 16   ALK PHOS U/L  --  60 59 74 83   ALBUMIN g/dL  --  2 4* 2 4* 2 8* 3 2*   TOTAL BILIRUBIN mg/dL  --  0 27 0 31 0 26 0 24     Results from last 7 days   Lab Units 09/10/21  0548 09/08/21  0514   MAGNESIUM mg/dL 2 3 2 2   PHOSPHORUS mg/dL  --  4 4*      Results from last 7 days   Lab Units 09/08/21  1446   INR  1 09      Results from last 7 days   Lab Units 09/06/21 2027   TROPONIN I ng/mL <0 02         ABG:    VBG:          Micro        EKG: NSR  Imaging: I have personally reviewed pertinent films in PACS    Intake and Output  I/O       09/09 0701 - 09/10 0700 09/10 0701 - 09/11 0700    P  O  450 480    Total Intake(mL/kg) 450 (6 2) 480 (6 7)    Urine (mL/kg/hr) 1625 (0 9)     Total Output 1625     Net -1175 +480                Height and Weights   Height: 5' 2" (157 5 cm)  IBW (Ideal Body Weight): 50 1 kg  Body mass index is 29 07 kg/m²    Weight (last 2 days)     Date/Time   Weight    09/10/21 0600   72 1 (158 95)    09/09/21 0537   72 (158 73)    09/09/21 0536   72 (158 73)                Nutrition       Diet Orders   (From admission, onward)             Start     Ordered    09/09/21 1525  Diet Jose Cruz/CHO Controlled; Consistent Carbohydrate Diet Level 2 (5 carb servings/75 grams CHO/meal); Sodium 2 GM  Diet effective now     Question Answer Comment   Diet Type Jose Cruz/CHO Controlled    Jose Cruz/CHO Controlled Consistent Carbohydrate Diet Level 2 (5 carb servings/75 grams CHO/meal)    Other Restriction(s): Sodium 2 GM    RD to adjust diet per protocol?  Yes        09/09/21 1524                  Active Medications  Scheduled Meds:  Current Facility-Administered Medications   Medication Dose Route Frequency Provider Last Rate    acetaminophen  650 mg Oral Q6H PRN Elia Pollack MD      atorvastatin  40 mg Oral After Verena Johnson MD      carvedilol  25 mg Oral BID With Meals MARLENA Hansen      cloNIDine  0 3 mg Oral TID Augustin Krishnan MD      doxazosin  2 mg Oral BID Augustin Krishnan MD      gabapentin  300 mg Oral Daily Augustin Krishnan MD      hydrALAZINE  100 mg Oral Q8H [address] Cuiy[address]ney Butler, MARLENA      insulin glargine  12 Units Subcutaneous QAM MARLENA Parekh      insulin lispro  1-5 Units Subcutaneous HS Deloris Butler CRYINA      insulin lispro  1-6 Units Subcutaneous TID AC VeronicaiMARLENA Ruiz      iron sucrose  200 mg Intravenous Daily MARLENA Parekh      Labetalol HCl  20 mg Intravenous Q4H PRN VeronicaiMARLENA Ruiz      niCARdipine  1-15 mg/hr Intravenous Titrated MARLENA Raman      ondansetron  4 mg Intravenous Q6H PRN Elia Pollack MD      terazosin  5 mg Oral HS Elia Pollack MD       Continuous Infusions:  niCARdipine, 1-15 mg/hr      PRN Meds:   acetaminophen, 650 mg, Q6H PRN  Labetalol HCl, 20 mg, Q4H PRN  ondansetron, 4 mg, Q6H PRN        Invasive Devices Review  Invasive Devices     Peripheral Intravenous Line            Peripheral IV 09/10/21 Right Hand <1 day                Rationale for remaining devices: medical necessity  ---------------------------------------------------------------------------------------  Advance Directive and Living Will:      Power of :    POLST:    ---------------------------------------------------------------------------------------  Care Time Delivered:   No Critical Care time spent       MARLENA Almanza      Portions of the record may have been created with voice recognition software  Occasional wrong word or "sound a like" substitutions may have occurred due to the inherent limitations of voice recognition software    Read the chart carefully and recognize, using context, where substitutions have occurred

## 2021-09-11 NOTE — ASSESSMENT & PLAN NOTE
Lab Results   Component Value Date    EGFR 17 09/10/2021    EGFR 15 09/09/2021    EGFR 13 09/08/2021    CREATININE 2 81 (H) 09/10/2021    CREATININE 3 06 (H) 09/09/2021    CREATININE 3 41 (H) 09/08/2021   Normocytic  Baseline hemoglobin 8-9s  Continue IV venofer for 5 doses  Monitor CBC

## 2021-09-11 NOTE — ASSESSMENT & PLAN NOTE
Wt Readings from Last 3 Encounters:   09/10/21 72 1 kg (158 lb 15 2 oz)   08/31/21 71 2 kg (157 lb)   08/26/21 70 4 kg (155 lb 3 2 oz)     Monitor fluid status  Torsemide on hold

## 2021-09-11 NOTE — ASSESSMENT & PLAN NOTE
Lab Results   Component Value Date    EGFR 17 09/10/2021    EGFR 15 09/09/2021    EGFR 13 09/08/2021    CREATININE 2 81 (H) 09/10/2021    CREATININE 3 06 (H) 09/09/2021    CREATININE 3 41 (H) 09/08/2021   Baseline creatinine until July 2021 was 2-2 5, since July new baseline appears to be 2 5-3  Admission creatinine was 3 01  Still needs renal biopsy when BP more stable  Monitor renal indices  Torsemide on hold for REI

## 2021-09-11 NOTE — ASSESSMENT & PLAN NOTE
Wt Readings from Last 3 Encounters:   09/11/21 72 2 kg (159 lb 2 8 oz)   08/31/21 71 2 kg (157 lb)   08/26/21 70 4 kg (155 lb 3 2 oz)     Monitor fluid status  Torsemide on hold

## 2021-09-11 NOTE — ASSESSMENT & PLAN NOTE
Lab Results   Component Value Date    EGFR 17 09/11/2021    EGFR 17 09/10/2021    EGFR 15 09/09/2021    CREATININE 2 85 (H) 09/11/2021    CREATININE 2 81 (H) 09/10/2021    CREATININE 3 06 (H) 09/09/2021   Baseline creatinine until July 2021 was 2-2 5, since July new baseline appears to be 2 5-3  Admission creatinine was 3 01  Still needs renal biopsy when BP more stable  Monitor renal indices  Torsemide on hold for REI

## 2021-09-11 NOTE — ASSESSMENT & PLAN NOTE
Patient admitted on 9/6 with SBP>230 despite compliance with home regimen  Renal vascular dopplers from 7/31/21 revealed no renal artery stenosis  Her home meds were Coreg 25 mg po BID, clonidine 0 2 mg TID, hydralazine 25 mg BID, torsemide 20 mg Q48 hours, Hytrin 5mgQHS  Ongoing changes made with meds with little to no improvement in BP  Currently she is on coreg 25 mg BID, clonidine 0 3 mg TID, hydralazine 75 mg Q8 hours, hytrin 5 mg QHS, Cardura 2mg BID     She was also given labetalol 20 mg IV prn doses  Torsemide on hold for REI  Transferred to ICU on 9/11 to start cardene infusion  Plan to continue current regimen above as well as cardene infusion  Goal to maintain BP>180 mmhg  She will need a renal biopsy with IR when stable  Nephrology consulted

## 2021-09-11 NOTE — PROGRESS NOTES
NEPHROLOGY PROGRESS NOTE   Mary Hook 73 y o  female MRN: [id number]  Unit/Bed#: ICU 03 Encounter: [id number]      ASSESSMENT/PLAN:  1  Acute kidney injury (POA) on chronic kidney disease, stage IIIB/IV:  - etiology of acute kidney injury suspect secondary to hemodynamic perturbations, prior episodes of acute kidney injury  - suspect underlying chronic kidney disease secondary to hypertensive nephrosclerosis, diabetic kidney disease, multiple prior acute kidney injury episodes, underlying paraproteinemia   - outpatient nephrologist, Dr Inez Otero  - upon review medical records, prior baseline creatinine around 2 0- 2 5 mg/dL   More recently, creatinine around 2 6- 3 0 mg/dL since most recent episode of acute kidney injury  - creatinine 3  01 mg/dL upon admission on 9/6/21   - peak creatinine thus far 3 41 mg/dL on 9/8/21; suspect secondary to hemodynamic perturbations    - most recent creatinine 2 85 mg/dL today               - remains off IV fluid hydration; outpatient diuretics on hold  - UA, glucosuria, trace-lysed blood, > 300 protein, 0-1 WBC              - imaging, CT scan revealed unremarkable kidneys  Normal size and position               - nephrotic range proteinuria, urine protein creatinine ratio 6 29  Will need to further check as an outpatient once in steady state               - plan for bone marrow biopsy as below; renal biopsy to be completed as an outpatient  [place] forms have been scanned into the chart    - avoid NSAIDs, nephrotoxic agents, IV contrast   - adjust medications to appropriate GFR  - monitor volume status with strict intake/output, daily weight  Augustin Varela continue to monitor renal indices with repeat lab studies      2  Electrolytes, acid/base:  - electrolytes overall stable and acceptable    - most recent bicarbonate 30   - will continue to monitor with repeat lab studies      3  Hypertension:  - outpatient regimen includes: Carvedilol 25 mg BID, clonidine 0 2 mg TID, hydralazine 10 mg TID, torsemide 20 mg daily, Hytrin 5 mg HS ( prescribed torsemide every other day, hydralazine 25 mg BID)     - currently on: Carvedilol 25 mg BID, clonidine 0 3 mg TID, doxazosin 2 mg BID, hydralazine 100 mg TID, Hytrin 5 mg HS  - nicardipine infusion on hold given improvement in blood pressure   - recommendations: maintain hold parameters on oral medications given recent adjustments  - secondary workup: renin, aldosterone, metanephrines/catecholamines pending; prior renal artery duplex unremarkable  - optimize hemodynamics, avoid hypotension and fluctuations of blood pressure   - maintain MAP > 65      4  Anemia of chronic disease:  - most recent hemoglobin 8 2 grams/deciliter   - goal hemoglobin greater than 8 grams/deciliter   - recommend PRBC transfusion for hemoglobin less than 7, per primary team   - iron panel: Ferritin 24, iron saturation 19%; on IV Venofer 200 mg IV x 5 doses  - Hematology/Oncology follow-up      5  Mineral bone disease of chronic kidney disease:  - hyperphosphatemia, most recent phosphorus 4 4   - will continue to monitor PTH and phosphorus as an outpatient      6  Diabetes:  - with retinopathy bilaterally  - most recent hemoglobin A1c 9 5, per primary team      7  Chronic diastolic heart failure:  - outpatient diuretics on hold for now  - monitor volume status closely with strict intake/output, daily weights      8  Lambda light chain monoclonal gammopathy, following with Hematology/Oncology as an outpatient  -Rolanda Epstein for bone marrow biopsy, 9/15/21      9  Elevated lipase, repeat CT scan revealed possible nonspecific colitis otherwise no acute findings in the abdomen or pelvis including no evidence of pancreatitis  SUBJECTIVE:  Patient resting in chair comfortably  Utilized  to [employer] with patient  Patient is without acute shortness of breath or chest pain  Patient denies current nausea, vomiting, diarrhea      OBJECTIVE:  Current Weight: Weight - Scale: 72 2 kg (159 lb 2 8 oz)  Vitals:    09/11/21 1100   BP:    Pulse:    Resp:    Temp: (!) 97 °F (36 1 °C)   SpO2:        Intake/Output Summary (Last 24 hours) at 9/11/2021 1209  Last data filed at 9/11/2021 0800  Gross per 24 hour   Intake 893 74 ml   Output 800 ml   Net 93 74 ml       General:  No acute distress  Skin:  Warm, no rash  Eyes:  Sclerae anicteric  ENT:  Moist lips and mucous membranes  Neck:  Supple with trachea midline  Chest:  Clear breath sounds bilaterally   CVS:  Regular rate regular rhythm  Abdomen:  Obese, rounded, normoactive bowel sounds  Extremities:  No overt edema   Neuro:  Awake and interactive  Psych:  Appropriate affect      Medications:  Scheduled Meds:  Current Facility-Administered Medications   Medication Dose Route Frequency Provider Last Rate    acetaminophen  650 mg Oral Q6H PRN MARLENA Parekh      atorvastatin  40 mg Oral After Miguel A & Sarah, SHIVNP      carvedilol  25 mg Oral BID With Meals MARLENA Tomas      cloNIDine  0 3 mg Oral TID MARLENA Parekh      doxazosin  2 mg Oral BID SHIV ParekhNP      gabapentin  300 mg Oral Daily MARLENA Parekh      hydrALAZINE  100 mg Oral Q8H [facility] MARLENA Parekh      insulin glargine  12 Units Subcutaneous QAM Deloris Butler, MARLENA      insulin lispro  1-5 Units Subcutaneous HS Deloris Butler, MARLENA      insulin lispro  1-6 Units Subcutaneous TID AC MARLENA Parekh      iron sucrose  200 mg Intravenous Daily MARLENA Parekh      Labetalol HCl  20 mg Intravenous Q4H PRN MARLENA Parekh      niCARdipine  1-15 mg/hr Intravenous Titrated MARLENA Monroy Stopped (09/11/21 0900)    ondansetron  4 mg Intravenous Q6H PRN MARLENA Parekh      terazosin  5 mg Oral HS SHIV ParekhNP         PRN Meds:   acetaminophen    Labetalol HCl    ondansetron    Continuous Infusions:niCARdipine, 1-15 mg/hr, Last Rate: Stopped (09/11/21 0900)        Laboratory Results:  Results from last 7 days Lab Units 09/11/21  0333 09/11/21  0324 09/10/21  0548 09/09/21  0457 09/08/21  0514 09/07/21  0520 09/06/21 2027   WBC Thousand/uL  --  6 24 5 46 5 95  --  7 54 7 39   HEMOGLOBIN g/dL  --  8 2* 8 3* 8 1*  --  9 2* 9 6*   HEMATOCRIT %  --  25 9* 26 3* 26 1*  --  29 0* 30 4*   PLATELETS Thousands/uL  --  216 216 234  --  270 290   SODIUM mmol/L 138  --  139 138 140 137 136   POTASSIUM mmol/L 4 4  --  4 3 4 1 4 1 4 2 4 3   CHLORIDE mmol/L 102  --  104 103 101 102 99*   CO2 mmol/L 30  --  27 27 28 24 28   BUN mg/dL 44*  --  43* 47* 47* 50* 48*   CREATININE mg/dL 2 85*  --  2 81* 3 06* 3 41* 2 80* 3 01*   CALCIUM mg/dL 7 8*  --  7 9* 7 3* 7 4* 8 1* 8 2*   MAGNESIUM mg/dL  --   --  2 3  --  2 2  --   --    PHOSPHORUS mg/dL  --   --   --   --  4 4*  --   --

## 2021-09-11 NOTE — ASSESSMENT & PLAN NOTE
Lab Results   Component Value Date    HGBA1C 9 5 (H) 07/23/2021       Recent Labs     09/10/21  0642 09/10/21  1136 09/10/21  1622 09/10/21  2140   POCGLU 120 248* 201* 169*       Blood Sugar Average: Last 72 hrs:  (P) 209 [id number]   Poor appetite  Glargine ordered at lower dose than she takes as OP  Linagliptin and glimeperide on hold    Accuchecks QAC and HS with SSI coverage

## 2021-09-11 NOTE — QUICK NOTE
Pt with persistent hypertension not responding to oral medications and iv Labetalol  Discussed case with CC team and Nephrology on call  Will transfer patient to ICU for drip

## 2021-09-11 NOTE — PLAN OF CARE
Problem: CARDIOVASCULAR - ADULT  Goal: Maintains optimal cardiac output and hemodynamic stability  Description: INTERVENTIONS:  - Monitor I/O, vital signs and rhythm  - Monitor for S/S and trends of decreased cardiac output  - Administer and titrate ordered vasoactive medications to optimize hemodynamic stability  - Assess quality of pulses, skin color and temperature  - Assess for signs of decreased coronary artery perfusion  - Instruct patient to report change in severity of symptoms  Outcome: Progressing  Goal: Absence of cardiac dysrhythmias or at baseline rhythm  Description: INTERVENTIONS:  - Continuous cardiac monitoring, vital signs, obtain 12 lead EKG if ordered  - Administer antiarrhythmic and heart rate control medications as ordered  - Monitor electrolytes and administer replacement therapy as ordered  Outcome: Progressing     Problem: SAFETY ADULT  Goal: Patient will remain free of falls  Description: INTERVENTIONS:  - Educate patient/family on patient safety including physical limitations  - Instruct patient to call for assistance with activity   - Consult OT/PT to assist with strengthening/mobility   - Keep Call bell within reach  - Keep bed low and locked with side rails adjusted as appropriate  - Keep care items and personal belongings within reach  - Initiate and maintain comfort rounds  - Make Fall Risk Sign visible to staff    - Apply yellow socks and bracelet for high fall risk patients  - Consider moving patient to room near nurses station  Outcome: Progressing  Goal: Maintain or return to baseline ADL function  Description: INTERVENTIONS:  -  Assess patient's ability to carry out ADLs; assess patient's baseline for ADL function and identify physical deficits which impact ability to perform ADLs (bathing, care of mouth/teeth, toileting, grooming, dressing, etc )  - Assess/evaluate cause of self-care deficits   - Assess range of motion  - Assess patient's mobility; develop plan if impaired  - Assess patient's need for assistive devices and provide as appropriate  - Encourage maximum independence but intervene and supervise when necessary  - Involve family in performance of ADLs  - Assess for home care needs following discharge   - Consider OT consult to assist with ADL evaluation and planning for discharge  - Provide patient education as appropriate  Outcome: Progressing     Problem: DISCHARGE PLANNING  Goal: Discharge to home or other facility with appropriate resources  Description: INTERVENTIONS:  - Identify barriers to discharge w/patient and caregiver  - Arrange for needed discharge resources and transportation as appropriate  - Identify discharge learning needs (meds, wound care, etc )  - Arrange for interpretive services to assist at discharge as needed  - Refer to Case Management Department for coordinating discharge planning if the patient needs post-hospital services based on physician/advanced practitioner order or complex needs related to functional status, cognitive ability, or social support system  Outcome: Progressing     Problem: Potential for Falls  Goal: Patient will remain free of falls  Description: INTERVENTIONS:  - Educate patient/family on patient safety including physical limitations  - Instruct patient to call for assistance with activity   - Consult OT/PT to assist with strengthening/mobility   - Keep Call bell within reach  - Keep bed low and locked with side rails adjusted as appropriate  - Keep care items and personal belongings within reach  - Initiate and maintain comfort rounds  - Make Fall Risk Sign visible to staff    - Apply yellow socks and bracelet for high fall risk patients  - Consider moving patient to room near nurses station  Outcome: Progressing

## 2021-09-11 NOTE — PLAN OF CARE
Problem: CARDIOVASCULAR - ADULT  Goal: Maintains optimal cardiac output and hemodynamic stability  Description: INTERVENTIONS:  - Monitor I/O, vital signs and rhythm  - Monitor for S/S and trends of decreased cardiac output  - Administer and titrate ordered vasoactive medications to optimize hemodynamic stability  - Assess quality of pulses, skin color and temperature  - Assess for signs of decreased coronary artery perfusion  - Instruct patient to report change in severity of symptoms  Outcome: Progressing  Goal: Absence of cardiac dysrhythmias or at baseline rhythm  Description: INTERVENTIONS:  - Continuous cardiac monitoring, vital signs, obtain 12 lead EKG if ordered  - Administer antiarrhythmic and heart rate control medications as ordered  - Monitor electrolytes and administer replacement therapy as ordered  Outcome: Progressing     Problem: DISCHARGE PLANNING  Goal: Discharge to home or other facility with appropriate resources  Description: INTERVENTIONS:  - Identify barriers to discharge w/patient and caregiver  - Arrange for needed discharge resources and transportation as appropriate  - Identify discharge learning needs (meds, wound care, etc )  - Arrange for interpretive services to assist at discharge as needed  - Refer to Case Management Department for coordinating discharge planning if the patient needs post-hospital services based on physician/advanced practitioner order or complex needs related to functional status, cognitive ability, or social support system  Outcome: Progressing     Problem: SAFETY ADULT  Goal: Maintain or return to baseline ADL function  Description: INTERVENTIONS:  -  Assess patient's ability to carry out ADLs; assess patient's baseline for ADL function and identify physical deficits which impact ability to perform ADLs (bathing, care of mouth/teeth, toileting, grooming, dressing, etc )  - Assess/evaluate cause of self-care deficits   - Assess range of motion  - Assess patient's mobility; develop plan if impaired  - Assess patient's need for assistive devices and provide as appropriate  - Encourage maximum independence but intervene and supervise when necessary  - Involve family in performance of ADLs  - Assess for home care needs following discharge   - Consider OT consult to assist with ADL evaluation and planning for discharge  - Provide patient education as appropriate  Outcome: Progressing     Problem: SAFETY ADULT  Goal: Patient will remain free of falls  Description: INTERVENTIONS:  - Educate patient/family on patient safety including physical limitations  - Instruct patient to call for assistance with activity   - Consult OT/PT to assist with strengthening/mobility   - Keep Call bell within reach  - Keep bed low and locked with side rails adjusted as appropriate  - Keep care items and personal belongings within reach  - Initiate and maintain comfort rounds  - Make Fall Risk Sign visible to staff  - Apply yellow socks and bracelet for high fall risk patients  - Consider moving patient to room near nurses station  Outcome: Progressing

## 2021-09-11 NOTE — ASSESSMENT & PLAN NOTE
Lab Results   Component Value Date    HGBA1C 9 5 (H) 07/23/2021       Recent Labs     09/10/21  0642 09/10/21  1136 09/10/21  1622 09/10/21  2140   POCGLU 120 248* 201* 169*       Blood Sugar Average: Last 72 hrs:  (P) 266 [id number]   Poor appetite  Glargine ordered at lower dose than she takes as OP  Linagliptin and glimeperide on hold    Accuchecks QAC and HS with SSI coverage

## 2021-09-12 LAB
ALBUMIN SERPL BCP-MCNC: 2.5 G/DL (ref 3.5–5)
ALP SERPL-CCNC: 69 U/L (ref 46–116)
ALT SERPL W P-5'-P-CCNC: 17 U/L (ref 12–78)
ANION GAP SERPL CALCULATED.3IONS-SCNC: 10 MMOL/L (ref 4–13)
AST SERPL W P-5'-P-CCNC: 18 U/L (ref 5–45)
BILIRUB SERPL-MCNC: 0.2 MG/DL (ref 0.2–1)
BUN SERPL-MCNC: 55 MG/DL (ref 5–25)
CALCIUM ALBUM COR SERPL-MCNC: 9.2 MG/DL (ref 8.3–10.1)
CALCIUM SERPL-MCNC: 8 MG/DL (ref 8.3–10.1)
CHLORIDE SERPL-SCNC: 102 MMOL/L (ref 100–108)
CO2 SERPL-SCNC: 25 MMOL/L (ref 21–32)
CREAT SERPL-MCNC: 2.93 MG/DL (ref 0.6–1.3)
ERYTHROCYTE [DISTWIDTH] IN BLOOD BY AUTOMATED COUNT: 14.9 % (ref 11.6–15.1)
GFR SERPL CREATININE-BSD FRML MDRD: 16 ML/MIN/1.73SQ M
GLUCOSE SERPL-MCNC: 164 MG/DL (ref 65–140)
GLUCOSE SERPL-MCNC: 170 MG/DL (ref 65–140)
GLUCOSE SERPL-MCNC: 186 MG/DL (ref 65–140)
GLUCOSE SERPL-MCNC: 205 MG/DL (ref 65–140)
GLUCOSE SERPL-MCNC: 229 MG/DL (ref 65–140)
HCT VFR BLD AUTO: 26 % (ref 34.8–46.1)
HGB BLD-MCNC: 8.1 G/DL (ref 11.5–15.4)
MAGNESIUM SERPL-MCNC: 2 MG/DL (ref 1.6–2.6)
MCH RBC QN AUTO: 26.3 PG (ref 26.8–34.3)
MCHC RBC AUTO-ENTMCNC: 31.2 G/DL (ref 31.4–37.4)
MCV RBC AUTO: 84 FL (ref 82–98)
PHOSPHATE SERPL-MCNC: 4.6 MG/DL (ref 2.3–4.1)
PLATELET # BLD AUTO: 215 THOUSANDS/UL (ref 149–390)
PMV BLD AUTO: 10.7 FL (ref 8.9–12.7)
POTASSIUM SERPL-SCNC: 5.1 MMOL/L (ref 3.5–5.3)
PROT SERPL-MCNC: 6.1 G/DL (ref 6.4–8.2)
RBC # BLD AUTO: 3.08 MILLION/UL (ref 3.81–5.12)
SODIUM SERPL-SCNC: 137 MMOL/L (ref 136–145)
WBC # BLD AUTO: 6.37 THOUSAND/UL (ref 4.31–10.16)

## 2021-09-12 PROCEDURE — 80053 COMPREHEN METABOLIC PANEL: CPT | Performed by: NURSE PRACTITIONER

## 2021-09-12 PROCEDURE — 84100 ASSAY OF PHOSPHORUS: CPT | Performed by: NURSE PRACTITIONER

## 2021-09-12 PROCEDURE — 85027 COMPLETE CBC AUTOMATED: CPT | Performed by: NURSE PRACTITIONER

## 2021-09-12 PROCEDURE — 99232 SBSQ HOSP IP/OBS MODERATE 35: CPT | Performed by: HOSPITALIST

## 2021-09-12 PROCEDURE — 83735 ASSAY OF MAGNESIUM: CPT | Performed by: NURSE PRACTITIONER

## 2021-09-12 PROCEDURE — 82948 REAGENT STRIP/BLOOD GLUCOSE: CPT

## 2021-09-12 PROCEDURE — 99232 SBSQ HOSP IP/OBS MODERATE 35: CPT | Performed by: NURSE PRACTITIONER

## 2021-09-12 RX ORDER — NIFEDIPINE 30 MG/1
30 TABLET, EXTENDED RELEASE ORAL DAILY
Status: DISCONTINUED | OUTPATIENT
Start: 2021-09-13 | End: 2021-09-13

## 2021-09-12 RX ADMIN — CARVEDILOL 25 MG: 25 TABLET, FILM COATED ORAL at 17:48

## 2021-09-12 RX ADMIN — INSULIN LISPRO 1 UNITS: 100 INJECTION, SOLUTION INTRAVENOUS; SUBCUTANEOUS at 10:00

## 2021-09-12 RX ADMIN — CARVEDILOL 25 MG: 25 TABLET, FILM COATED ORAL at 10:00

## 2021-09-12 RX ADMIN — ATORVASTATIN CALCIUM 40 MG: 40 TABLET, FILM COATED ORAL at 17:49

## 2021-09-12 RX ADMIN — INSULIN LISPRO 2 UNITS: 100 INJECTION, SOLUTION INTRAVENOUS; SUBCUTANEOUS at 21:20

## 2021-09-12 RX ADMIN — IRON SUCROSE 200 MG: 20 INJECTION, SOLUTION INTRAVENOUS at 17:52

## 2021-09-12 RX ADMIN — HYDRALAZINE HYDROCHLORIDE 100 MG: 25 TABLET, FILM COATED ORAL at 21:21

## 2021-09-12 RX ADMIN — INSULIN GLARGINE 12 UNITS: 100 INJECTION, SOLUTION SUBCUTANEOUS at 10:08

## 2021-09-12 RX ADMIN — TERAZOSIN HYDROCHLORIDE 5 MG: 5 CAPSULE ORAL at 21:21

## 2021-09-12 RX ADMIN — INSULIN LISPRO 1 UNITS: 100 INJECTION, SOLUTION INTRAVENOUS; SUBCUTANEOUS at 17:49

## 2021-09-12 RX ADMIN — CLONIDINE HYDROCHLORIDE 0.3 MG: 0.1 TABLET ORAL at 17:48

## 2021-09-12 RX ADMIN — HYDRALAZINE HYDROCHLORIDE 100 MG: 25 TABLET, FILM COATED ORAL at 06:19

## 2021-09-12 RX ADMIN — GABAPENTIN 300 MG: 300 CAPSULE ORAL at 10:00

## 2021-09-12 RX ADMIN — CLONIDINE HYDROCHLORIDE 0.3 MG: 0.1 TABLET ORAL at 21:21

## 2021-09-12 RX ADMIN — HYDRALAZINE HYDROCHLORIDE 100 MG: 25 TABLET, FILM COATED ORAL at 13:38

## 2021-09-12 RX ADMIN — INSULIN LISPRO 2 UNITS: 100 INJECTION, SOLUTION INTRAVENOUS; SUBCUTANEOUS at 13:36

## 2021-09-12 NOTE — PROGRESS NOTES
[facility]  Progress Note - Angelique Townsend 1955, 73 y o  female MRN: [id number]  Unit/Bed#: E4 -01 Encounter: [id number]  Primary Care Provider: Andres Burr MD   Date and time admitted to hospital: 9/6/2021  7:36 PM    * Hypertensive urgency  Assessment & Plan  · Pt presented with home SBP >230 despite being compliant with her medications  · Renal vascular Dopplers from 07/31/2021 showing no renal artery stenosis right kidney 9 3 cm left kidney 10 2 cm  · Hypertensive urgency on multiple agents  Nephrology following  · Home medications include: Coreg 25 mg p o  B i d , clonidine 0 2 mg p o  T i d , hydralazine 25 mg p o  B i d  torsemide 20 mg p o  Q 48 hours, Hytrin 5 mg p o  Q h s   · Current medications include: Coreg 25 mg p o  B i d , clonidine 0 3 mg p o  t i d , hydralazine 75 mg p o  t i d , Hytrin 5 mg p o  Q day, Cardura 2 mg b i d  Renal following  Plan is for IR kidney biopsy once the SBP is consistently less than 140  Hopefully we can do that early this week    Acute kidney injury superimposed on CKD [facility])  Assessment & Plan  Lab Results   Component Value Date    EGFR 16 09/12/2021    EGFR 17 09/11/2021    EGFR 17 09/10/2021    CREATININE 2 93 (H) 09/12/2021    CREATININE 2 85 (H) 09/11/2021    CREATININE 2 81 (H) 09/10/2021     · Is stable  · Avoiding nephrotoxins, so Ace inhibitors on hold  · Possible renal biopsy this week if blood pressure is stable    Biclonal gammopathy  Assessment & Plan  · Biclonal gammopathy follows with hematology as outpatient with plan for eventual bone marrow biopsy  · Patient appears to have bone marrow biopsy scheduled on 9/15/2021 with IR    Chronic diastolic heart failure [facility])  Assessment & Plan  Wt Readings from Last 3 Encounters:   09/12/21 73 2 kg (161 lb 6 oz)   08/31/21 71 2 kg (157 lb)   08/26/21 70 4 kg (155 lb 3 2 oz)     · She is euvolemic      Type 2 diabetes mellitus, with long-term current use of insulin [facility])  Assessment & Plan    Lab Results   Component Value Date    HGBA1C 9 5 (H) 2021     Results from last 7 days   Lab Units 21  1709 21  1219 21  0816 21  2150 21  1639 21  1104 21  1102 21  0716   POC GLUCOSE mg/dl 164* 205* 170* 88 91 281* 314* 138     · Relatively good control here in the hospital          Subjective:   Feeling well  No specific complaints  She started to eat better  No headache  Objective:     Vitals:   Temp (24hrs), Av 1 °F (36 7 °C), Min:97 3 °F (36 3 °C), Max:99 4 °F (37 4 °C)    Temp:  [97 3 °F (36 3 °C)-99 4 °F (37 4 °C)] 97 3 °F (36 3 °C)  HR:  [62-78] 64  Resp:  [14-27] 18  BP: (121-205)/(54-90) 146/68  SpO2:  [95 %-99 %] 99 %  Body mass index is 29 52 kg/m²  Input and Output Summary (last 24 hours): Intake/Output Summary (Last 24 hours) at 2021 1841  Last data filed at 2021 0600  Gross per 24 hour   Intake 360 ml   Output --   Net 360 ml       Physical Exam:     Physical Exam  Vitals and nursing note reviewed  HENT:      Head: Normocephalic and atraumatic  Eyes:      Pupils: Pupils are equal, round, and reactive to light  Cardiovascular:      Rate and Rhythm: Normal rate and regular rhythm  Heart sounds: No murmur heard  No friction rub  No gallop  Pulmonary:      Effort: Pulmonary effort is normal       Breath sounds: Normal breath sounds  No wheezing or rales  Abdominal:      General: Bowel sounds are normal       Palpations: Abdomen is soft  Tenderness: There is no abdominal tenderness  Musculoskeletal:      Right lower leg: No edema  Left lower leg: No edema                 Additional Data:     Labs:    Results from last 7 days   Lab Units 21  0649 21  0324   WBC Thousand/uL 6 37 6 24   HEMOGLOBIN g/dL 8 1* 8 2*   HEMATOCRIT % 26 0* 25 9*   PLATELETS Thousands/uL 215 216   NEUTROS PCT %  --  61   LYMPHS PCT %  --  23   MONOS PCT %  --  11   EOS PCT %  --  4     Results from last 7 days   Lab Units 09/12/21  0649   POTASSIUM mmol/L 5 1   CHLORIDE mmol/L 102   CO2 mmol/L 25   BUN mg/dL 55*   CREATININE mg/dL 2 93*   CALCIUM mg/dL 8 0*   ALK PHOS U/L 69   ALT U/L 17   AST U/L 18     Results from last 7 days   Lab Units 09/08/21  1446   INR  1 09     Results from last 7 days   Lab Units 09/12/21  1709 09/12/21  1219 09/12/21  0816 09/11/21  2150 09/11/21  1639 09/11/21  1104 09/11/21  1102 09/11/21  0716 09/10/21  2140 09/10/21  1622 09/10/21  1136 09/10/21  0642   POC GLUCOSE mg/dl 164* 205* 170* 88 91 281* 314* 138 169* 201* 248* 120               * I Have Reviewed All Lab Data     Recent Cultures (last 7 days):             Last 24 Hours Medication List:   Current Facility-Administered Medications   Medication Dose Route Frequency Provider Last Rate    acetaminophen  650 mg Oral Q6H PRN MARLENA Parekh      atorvastatin  40 mg Oral After Grady & MARLENA Smith      carvedilol  25 mg Oral BID With Meals MARLENA Tomas      cloNIDine  0 3 mg Oral TID MARLENA Parekh      gabapentin  300 mg Oral Daily MARLENA Parekh      hydrALAZINE  100 mg Oral Q8H [address] MARLENA Parekh      insulin glargine  12 Units Subcutaneous QAM MARLENA Parekh      insulin lispro  1-5 Units Subcutaneous HS MARLENA Parekh      insulin lispro  1-6 Units Subcutaneous TID AC MARLENA Parekh      iron sucrose  200 mg Intravenous Daily MARLENA Parekh 0 mg (09/11/21 1953)    Labetalol HCl  20 mg Intravenous Q4H PRN MARLENA Parekh      [START ON 9/13/2021] NIFEdipine  30 mg Oral Daily MARLENA Hansen      ondansetron  4 mg Intravenous Q6H PRN MARLENA Parekh      terazosin  5 mg Oral HS MARLENA Parekh           VTE Pharmacologic Prophylaxis:   Pharmacologic:         Current Length of Stay: 6 day(s)    Current Patient Status: Inpatient       Discharge Plan:  Possible IR biopsy this week    Code Status: Level 1 - Full Code           Today, Patient Was Seen By: Bev Neal DO    ** Please Note: Dictation voice to text software may have been used in the creation of this document   ** [Nl] : Integumentary

## 2021-09-12 NOTE — ASSESSMENT & PLAN NOTE
Wt Readings from Last 3 Encounters:   09/12/21 73 2 kg (161 lb 6 oz)   08/31/21 71 2 kg (157 lb)   08/26/21 70 4 kg (155 lb 3 2 oz)     · She is euvolemic

## 2021-09-12 NOTE — ASSESSMENT & PLAN NOTE
Lab Results   Component Value Date    HGBA1C 9 5 (H) 07/23/2021     Results from last 7 days   Lab Units 09/12/21  1709 09/12/21  1219 09/12/21  0816 09/11/21  2150 09/11/21  1639 09/11/21  1104 09/11/21  1102 09/11/21  0716   POC GLUCOSE mg/dl 164* 205* 170* 88 91 281* 314* 138     · Relatively good control here in the hospital

## 2021-09-12 NOTE — PROGRESS NOTES
NEPHROLOGY PROGRESS NOTE   Lilian Blunt 73 y o  female MRN: [id number]  Unit/Bed#: E4 -01 Encounter: [id number]      ASSESSMENT/PLAN:  1  Acute kidney injury (POA) on chronic kidney disease, stage IIIB/IV:  - etiology of acute kidney injury suspect secondary to hemodynamic perturbations, prior episodes of acute kidney injury  - suspect underlying chronic kidney disease secondary to hypertensive nephrosclerosis, diabetic kidney disease, multiple prior acute kidney injury episodes, underlying paraproteinemia   - outpatient nephrologist, Dr Milagro Gonzalez  - upon review medical records, prior baseline creatinine around 2 0- 2 5 mg/dL   More recently, creatinine around 2 6- 3 0 mg/dL since most recent episode of acute kidney injury     - creatinine 3  01 mg/dL upon admission on 9/6/21   - peak creatinine thus far 3 41 mg/dL on 9/8/21; suspect secondary to hemodynamic perturbations    - most recent creatinine 2 93 mg/dL today               - remains off IV fluid hydration; outpatient diuretics on hold  - UA, glucosuria, trace-lysed blood, > 300 protein, 0-1 WBC              - QCZFWTP, NM scan revealed unremarkable kidneys  Normal size and position               - nephrotic range proteinuria, urine protein creatinine ratio 6 29  Will need to further check as an outpatient once in steady state               - plan for bone marrow biopsy as below; recommend renal biopsy while inpatient; Shruthi forms scanned in chart    - avoid NSAIDs, nephrotoxic agents, IV contrast   - adjust medications to appropriate GFR  - monitor volume status with strict intake/output, daily weight  Prasanna Poser continue to monitor renal indices with repeat lab studies      2  Electrolytes, acid/base:  - electrolytes overall stable and acceptable    Prasanna Poser continue to monitor with repeat lab studies      3  Hypertension:  - outpatient regimen includes: Carvedilol 25 mg BID, clonidine 0 2 mg TID, hydralazine 10 mg TID, torsemide 20 mg daily, Hytrin 5 mg HS ( prescribed torsemide every other day, hydralazine 25 mg BID)     - currently on: Carvedilol 25 mg BID, clonidine 0 3 mg TID, hydralazine 100 mg TID, Procardia XL 30 mg daily, Hytrin 5 mg HS  - to note, Procardia XL not given this a m   - secondary workup: renin, aldosterone, metanephrines pending; prior renal artery duplex unremarkable  - optimize hemodynamics, avoid hypotension and fluctuations of blood pressure   - maintain MAP > 65      4  Anemia of chronic disease:  - most recent hemoglobin 8 1 grams/deciliter   - goal hemoglobin greater than 8 grams/deciliter   - recommend PRBC transfusion for hemoglobin less than 7, per primary team   - iron panel: Ferritin 24, iron saturation 19%; on IV Venofer 200 mg IV x 5 doses  - Hematology/Oncology follow-up      5  Mineral bone disease of chronic kidney disease:  - hyperphosphatemia, most recent phosphorus 4 6   - will continue to monitor PTH and phosphorus as an outpatient      6  Diabetes:  - with retinopathy bilaterally  - most recent hemoglobin A1c 9 5, per primary team      7  Chronic diastolic heart failure:  - outpatient diuretics on hold for now  - monitor volume status closely with strict intake/output, daily weights      8  Lambda light chain monoclonal gammopathy, following with Hematology/Oncology as an outpatient  -Casie Jones for bone marrow biopsy, 9/15/21      SUBJECTIVE:  Patient resting in bed comfortably  Patient is without acute shortness of breath or chest pain  Patient states she feels well today      OBJECTIVE:  Current Weight: Weight - Scale: 73 2 kg (161 lb 6 oz)  Vitals:    09/12/21 1100   BP: 141/69   Pulse: 78   Resp: 18   Temp: 98 3 °F (36 8 °C)   SpO2: 98%       Intake/Output Summary (Last 24 hours) at 9/12/2021 1522  Last data filed at 9/12/2021 0600  Gross per 24 hour   Intake 360 ml   Output --   Net 360 ml       General:  No acute distress  Skin:  Warm, no rash  Eyes:  Sclerae anicteric  ENT:  Moist lips and mucous membranes  Neck:  Supple with trachea midline  Chest:  Clear breath sounds bilaterally   CVS:  Regular rate regular rhythm  Abdomen:  Soft, nontender, normoactive bowel sounds  Extremities:  No overt edema bilaterally   Neuro:  Awake and interactive,  utilized  Psych:  Appropriate affect      Medications:  Scheduled Meds:  Current Facility-Administered Medications   Medication Dose Route Frequency Provider Last Rate    acetaminophen  650 mg Oral Q6H PRN MARLENA Parekh      atorvastatin  40 mg Oral After Miguel A & MARLENA Smith      carvedilol  25 mg Oral BID With Meals MARLENA Tomas      cloNIDine  0 3 mg Oral TID CuMARLENA Liu      gabapentin  300 mg Oral Daily Cuiarun Butler, MARLENA      hydrALAZINE  100 mg Oral Q8H [address] Cuiyisidney Butler, MARLENA      insulin glargine  12 Units Subcutaneous QAM MARLENA Parekh      insulin lispro  1-5 Units Subcutaneous HS Cugricelda Butler, MARLENA      insulin lispro  1-6 Units Subcutaneous TID AC MARLENA Parekh      iron sucrose  200 mg Intravenous Daily MARLENA Parekh Stopped (09/11/21 1953)    Labetalol HCl  20 mg Intravenous Q4H PRN MARLENA Parekh      niCARdipine  1-15 mg/hr Intravenous Titrated MARLENA Teran Stopped (09/11/21 0900)   Ines Malave [ILM Hold] NIFEdipine  30 mg Oral Daily Humberto Ortega MD      ondansetron  4 mg Intravenous Q6H PRN MARLENA Parekh      terazosin  5 mg Oral HS MARLENA Parekh         PRN Meds:   acetaminophen    Labetalol HCl    ondansetron    Continuous Infusions:niCARdipine, 1-15 mg/hr, Last Rate: Stopped (09/11/21 0900)        Laboratory Results:  Results from last 7 days   Lab Units 09/12/21  0649 09/11/21  0333 09/11/21  0324 09/10/21  0548 09/09/21  0457 09/08/21  0514 09/07/21  0520 09/06/21 2027   WBC Thousand/uL 6 37  --  6 24 5 46 5 95  --  7 54 7 39   HEMOGLOBIN g/dL 8 1*  --  8 2* 8 3* 8 1*  --  9 2* 9 6*   HEMATOCRIT % 26 0*  --  25 9* 26 3* 26 1*  --  29 0* 30 4*   PLATELETS Thousands/uL 215  --  216 216 234  --  270 290   SODIUM mmol/L 137 138  --  139 138 140 137 136   POTASSIUM mmol/L 5 1 4 4  --  4 3 4 1 4 1 4 2 4 3   CHLORIDE mmol/L 102 102  --  104 103 101 102 99*   CO2 mmol/L 25 30  --  27 27 28 24 28   BUN mg/dL 55* 44*  --  43* 47* 47* 50* 48*   CREATININE mg/dL 2 93* 2 85*  --  2 81* 3 06* 3 41* 2 80* 3 01*   CALCIUM mg/dL 8 0* 7 8*  --  7 9* 7 3* 7 4* 8 1* 8 2*   MAGNESIUM mg/dL 2 0  --   --  2 3  --  2 2  --   --    PHOSPHORUS mg/dL 4 6*  --   --   --   --  4 4*  --   --

## 2021-09-12 NOTE — ASSESSMENT & PLAN NOTE
· Pt presented with home SBP >230 despite being compliant with her medications  · Renal vascular Dopplers from 07/31/2021 showing no renal artery stenosis right kidney 9 3 cm left kidney 10 2 cm  · Hypertensive urgency on multiple agents  Nephrology following  · Home medications include: Coreg 25 mg p o  B i d , clonidine 0 2 mg p o  T i d , hydralazine 25 mg p o  B i d  torsemide 20 mg p o  Q 48 hours, Hytrin 5 mg p o  Q h s   · Current medications include: Coreg 25 mg p o  B i d , clonidine 0 3 mg p o  t i d , hydralazine 75 mg p o  t i d , Hytrin 5 mg p o  Q day, Cardura 2 mg b i d  Renal following  Plan is for IR kidney biopsy once the SBP is consistently less than 140    Hopefully we can do that early this week

## 2021-09-12 NOTE — ASSESSMENT & PLAN NOTE
Lab Results   Component Value Date    EGFR 16 09/12/2021    EGFR 17 09/11/2021    EGFR 17 09/10/2021    CREATININE 2 93 (H) 09/12/2021    CREATININE 2 85 (H) 09/11/2021    CREATININE 2 81 (H) 09/10/2021     · Is stable    · Avoiding nephrotoxins, so Ace inhibitors on hold  · Possible renal biopsy this week if blood pressure is stable

## 2021-09-13 LAB
ALDOST SERPL-MCNC: 4.5 NG/DL (ref 0–30)
ANION GAP SERPL CALCULATED.3IONS-SCNC: 13 MMOL/L (ref 4–13)
BASOPHILS # BLD AUTO: 0.03 THOUSANDS/ΜL (ref 0–0.1)
BASOPHILS NFR BLD AUTO: 1 % (ref 0–1)
BUN SERPL-MCNC: 61 MG/DL (ref 5–25)
CALCIUM SERPL-MCNC: 8 MG/DL (ref 8.3–10.1)
CHLORIDE SERPL-SCNC: 102 MMOL/L (ref 100–108)
CO2 SERPL-SCNC: 23 MMOL/L (ref 21–32)
CREAT SERPL-MCNC: 3.09 MG/DL (ref 0.6–1.3)
EOSINOPHIL # BLD AUTO: 0.28 THOUSAND/ΜL (ref 0–0.61)
EOSINOPHIL NFR BLD AUTO: 5 % (ref 0–6)
ERYTHROCYTE [DISTWIDTH] IN BLOOD BY AUTOMATED COUNT: 15.1 % (ref 11.6–15.1)
GFR SERPL CREATININE-BSD FRML MDRD: 15 ML/MIN/1.73SQ M
GLUCOSE SERPL-MCNC: 142 MG/DL (ref 65–140)
GLUCOSE SERPL-MCNC: 169 MG/DL (ref 65–140)
GLUCOSE SERPL-MCNC: 214 MG/DL (ref 65–140)
GLUCOSE SERPL-MCNC: 214 MG/DL (ref 65–140)
GLUCOSE SERPL-MCNC: 251 MG/DL (ref 65–140)
HCT VFR BLD AUTO: 26.4 % (ref 34.8–46.1)
HGB BLD-MCNC: 8.3 G/DL (ref 11.5–15.4)
IMM GRANULOCYTES # BLD AUTO: 0.04 THOUSAND/UL (ref 0–0.2)
IMM GRANULOCYTES NFR BLD AUTO: 1 % (ref 0–2)
LYMPHOCYTES # BLD AUTO: 1.22 THOUSANDS/ΜL (ref 0.6–4.47)
LYMPHOCYTES NFR BLD AUTO: 21 % (ref 14–44)
MAGNESIUM SERPL-MCNC: 2.2 MG/DL (ref 1.6–2.6)
MCH RBC QN AUTO: 26.3 PG (ref 26.8–34.3)
MCHC RBC AUTO-ENTMCNC: 31.4 G/DL (ref 31.4–37.4)
MCV RBC AUTO: 84 FL (ref 82–98)
MONOCYTES # BLD AUTO: 0.6 THOUSAND/ΜL (ref 0.17–1.22)
MONOCYTES NFR BLD AUTO: 10 % (ref 4–12)
NEUTROPHILS # BLD AUTO: 3.76 THOUSANDS/ΜL (ref 1.85–7.62)
NEUTS SEG NFR BLD AUTO: 62 % (ref 43–75)
NRBC BLD AUTO-RTO: 0 /100 WBCS
PHOSPHATE SERPL-MCNC: 4.4 MG/DL (ref 2.3–4.1)
PLATELET # BLD AUTO: 230 THOUSANDS/UL (ref 149–390)
PMV BLD AUTO: 11.1 FL (ref 8.9–12.7)
POTASSIUM SERPL-SCNC: 5.2 MMOL/L (ref 3.5–5.3)
RBC # BLD AUTO: 3.16 MILLION/UL (ref 3.81–5.12)
RENIN PLAS-CCNC: 0.25 NG/ML/HR (ref 0.17–5.38)
SODIUM SERPL-SCNC: 138 MMOL/L (ref 136–145)
WBC # BLD AUTO: 5.93 THOUSAND/UL (ref 4.31–10.16)

## 2021-09-13 PROCEDURE — 82948 REAGENT STRIP/BLOOD GLUCOSE: CPT

## 2021-09-13 PROCEDURE — 84100 ASSAY OF PHOSPHORUS: CPT | Performed by: NURSE PRACTITIONER

## 2021-09-13 PROCEDURE — 85025 COMPLETE CBC W/AUTO DIFF WBC: CPT | Performed by: HOSPITALIST

## 2021-09-13 PROCEDURE — 80048 BASIC METABOLIC PNL TOTAL CA: CPT | Performed by: NURSE PRACTITIONER

## 2021-09-13 PROCEDURE — 99232 SBSQ HOSP IP/OBS MODERATE 35: CPT | Performed by: INTERNAL MEDICINE

## 2021-09-13 PROCEDURE — 83735 ASSAY OF MAGNESIUM: CPT | Performed by: NURSE PRACTITIONER

## 2021-09-13 RX ORDER — NIFEDIPINE 30 MG/1
60 TABLET, EXTENDED RELEASE ORAL DAILY
Status: DISCONTINUED | OUTPATIENT
Start: 2021-09-14 | End: 2021-09-15

## 2021-09-13 RX ORDER — NIFEDIPINE 30 MG/1
60 TABLET, EXTENDED RELEASE ORAL 2 TIMES DAILY
Status: DISCONTINUED | OUTPATIENT
Start: 2021-09-13 | End: 2021-09-13

## 2021-09-13 RX ORDER — NIFEDIPINE 30 MG/1
30 TABLET, EXTENDED RELEASE ORAL
Status: DISCONTINUED | OUTPATIENT
Start: 2021-09-13 | End: 2021-09-15

## 2021-09-13 RX ORDER — NIFEDIPINE 30 MG/1
60 TABLET, EXTENDED RELEASE ORAL DAILY
Status: DISCONTINUED | OUTPATIENT
Start: 2021-09-14 | End: 2021-09-13

## 2021-09-13 RX ADMIN — CLONIDINE HYDROCHLORIDE 0.3 MG: 0.1 TABLET ORAL at 09:56

## 2021-09-13 RX ADMIN — NIFEDIPINE 30 MG: 30 TABLET, FILM COATED, EXTENDED RELEASE ORAL at 17:25

## 2021-09-13 RX ADMIN — TERAZOSIN HYDROCHLORIDE 5 MG: 5 CAPSULE ORAL at 21:36

## 2021-09-13 RX ADMIN — NIFEDIPINE 30 MG: 30 TABLET, FILM COATED, EXTENDED RELEASE ORAL at 09:56

## 2021-09-13 RX ADMIN — CLONIDINE HYDROCHLORIDE 0.3 MG: 0.1 TABLET ORAL at 15:58

## 2021-09-13 RX ADMIN — HYDRALAZINE HYDROCHLORIDE 100 MG: 25 TABLET, FILM COATED ORAL at 15:00

## 2021-09-13 RX ADMIN — INSULIN LISPRO 2 UNITS: 100 INJECTION, SOLUTION INTRAVENOUS; SUBCUTANEOUS at 09:53

## 2021-09-13 RX ADMIN — INSULIN LISPRO 2 UNITS: 100 INJECTION, SOLUTION INTRAVENOUS; SUBCUTANEOUS at 11:29

## 2021-09-13 RX ADMIN — INSULIN LISPRO 2 UNITS: 100 INJECTION, SOLUTION INTRAVENOUS; SUBCUTANEOUS at 21:41

## 2021-09-13 RX ADMIN — HYDRALAZINE HYDROCHLORIDE 100 MG: 25 TABLET, FILM COATED ORAL at 05:42

## 2021-09-13 RX ADMIN — GABAPENTIN 300 MG: 300 CAPSULE ORAL at 09:57

## 2021-09-13 RX ADMIN — LABETALOL 20 MG/4 ML (5 MG/ML) INTRAVENOUS SYRINGE 20 MG: at 11:28

## 2021-09-13 RX ADMIN — CARVEDILOL 25 MG: 25 TABLET, FILM COATED ORAL at 09:56

## 2021-09-13 RX ADMIN — ATORVASTATIN CALCIUM 40 MG: 40 TABLET, FILM COATED ORAL at 17:25

## 2021-09-13 RX ADMIN — CARVEDILOL 25 MG: 25 TABLET, FILM COATED ORAL at 17:25

## 2021-09-13 RX ADMIN — INSULIN GLARGINE 12 UNITS: 100 INJECTION, SOLUTION SUBCUTANEOUS at 09:54

## 2021-09-13 RX ADMIN — IRON SUCROSE 200 MG: 20 INJECTION, SOLUTION INTRAVENOUS at 17:26

## 2021-09-13 NOTE — PROGRESS NOTES
[facility]  Progress Note - Ludy Jackson 1955, 73 y o  female MRN: [id number]  Unit/Bed#: E4 -01 Encounter: [id number]  Primary Care Provider: Jessie Kovacs MD   Date and time admitted to hospital: 9/6/2021  7:36 PM    Acute kidney injury superimposed on CKD [facility])  Assessment & Plan  Lab Results   Component Value Date    EGFR 15 09/13/2021    EGFR 16 09/12/2021    EGFR 17 09/11/2021    CREATININE 3 09 (H) 09/13/2021    CREATININE 2 93 (H) 09/12/2021    CREATININE 2 85 (H) 09/11/2021     · Appreciate nephrology recommendations  · Slight increase in creatinine  · Hoping for renal biopsy this week  Biclonal gammopathy  Assessment & Plan  · Biclonal gammopathy follows with hematology as outpatient with plan for eventual bone marrow biopsy  · Patient appears to have bone marrow biopsy scheduled on 9/15/2021 with IR    Chronic diastolic heart failure [facility])  Assessment & Plan  Wt Readings from Last 3 Encounters:   09/13/21 73 4 kg (161 lb 13 1 oz)   08/31/21 71 2 kg (157 lb)   08/26/21 70 4 kg (155 lb 3 2 oz)     · She is euvolemic  Type 2 diabetes mellitus, with long-term current use of insulin [facility])  Assessment & Plan    Lab Results   Component Value Date    HGBA1C 9 5 (H) 07/23/2021     Results from last 7 days   Lab Units 09/13/21  1545 09/13/21  1123 09/13/21  0833 09/12/21  2120 09/12/21  1709 09/12/21  1219 09/12/21  0816 09/11/21  2150   POC GLUCOSE mg/dl 142* 214* 214* 229* 164* 205* 170* 88     · Continue insulin sliding scale and lantus  Will adjust as needed  * Hypertensive urgency  Assessment & Plan  · Pt presented with home SBP >230 despite being compliant with her medications  · Renal vascular Dopplers from 07/31/2021 showing no renal artery stenosis right kidney 9 3 cm left kidney 10 2 cm  · Hypertensive urgency on multiple agents  Nephrology following    · Home medications include: Coreg 25 mg p o  B i d , clonidine 0 2 mg p o  T i d , hydralazine 25 mg p o  B i d  torsemide 20 mg p o  Q 48 hours, Hytrin 5 mg p o  Q h s   · Current medications include: Coreg 25 mg p o  B i d , clonidine 0 3 mg p o  t i d , hydralazine 100 mg p o  t i d , Hytrin 5 mg p o  Q day,procardia 60mg in am and 30 mg in pm    Renal following  Plan is for IR kidney biopsy once the SBP is consistently less than 140  Hopefully we can do that early this week      VTE Pharmacologic Prophylaxis: scd    Mechanical VTE Prophylaxis in Place: Yes    Time Spent for Care: 20 minutes  More than 50% of total time spent on counseling and coordination of care as described above  Current Length of Stay: 7 day(s)  Current Patient Status: Inpatient     Discharge Plan / Estimated Discharge Date: greater than 72 hours    Code Status: Level 1 - Full Code      Subjective:   Pt seen and examined  Pt doing well  No f/c no cp no sob no n/vd no abd pain  Objective:     Vitals:   Temp (24hrs), Av 8 °F (36 6 °C), Min:97 °F (36 1 °C), Max:98 4 °F (36 9 °C)    Temp:  [97 °F (36 1 °C)-98 4 °F (36 9 °C)] 98 °F (36 7 °C)  HR:  [65-78] 66  Resp:  [18] 18  BP: (140-189)/(64-89) 170/89  SpO2:  [96 %-98 %] 98 %  Body mass index is 29 6 kg/m²  Input and Output Summary (last 24 hours): Intake/Output Summary (Last 24 hours) at 2021 1814  Last data filed at 2021 1236  Gross per 24 hour   Intake 850 ml   Output 1550 ml   Net -700 ml       Physical Exam:   Physical Exam  Constitutional:       Appearance: Normal appearance  HENT:      Head: Normocephalic and atraumatic  Eyes:      Extraocular Movements: Extraocular movements intact  Pupils: Pupils are equal, round, and reactive to light  Cardiovascular:      Rate and Rhythm: Normal rate and regular rhythm  Heart sounds: No murmur heard  No friction rub  No gallop  Pulmonary:      Effort: Pulmonary effort is normal  No respiratory distress  Breath sounds: Normal breath sounds  No wheezing or rales  Abdominal:      General: Bowel sounds are normal  There is no distension  Palpations: Abdomen is soft  Tenderness: There is no abdominal tenderness  There is no guarding  Neurological:      Mental Status: She is alert and oriented to person, place, and time            Additional Data:     Labs:  Results from last 7 days   Lab Units 09/13/21  0537   WBC Thousand/uL 5 93   HEMOGLOBIN g/dL 8 3*   HEMATOCRIT % 26 4*   PLATELETS Thousands/uL 230   NEUTROS PCT % 62   LYMPHS PCT % 21   MONOS PCT % 10   EOS PCT % 5     Results from last 7 days   Lab Units 09/13/21  0537 09/12/21  0649   SODIUM mmol/L 138 137   POTASSIUM mmol/L 5 2 5 1   CHLORIDE mmol/L 102 102   CO2 mmol/L 23 25   BUN mg/dL 61* 55*   CREATININE mg/dL 3 09* 2 93*   ANION GAP mmol/L 13 10   CALCIUM mg/dL 8 0* 8 0*   ALBUMIN g/dL  --  2 5*   TOTAL BILIRUBIN mg/dL  --  0 20   ALK PHOS U/L  --  69   ALT U/L  --  17   AST U/L  --  18   GLUCOSE RANDOM mg/dL 169* 186*     Results from last 7 days   Lab Units 09/08/21  1446   INR  1 09     Results from last 7 days   Lab Units 09/13/21  1545 09/13/21  1123 09/13/21  0833 09/12/21  2120 09/12/21  1709 09/12/21  1219 09/12/21  0816 09/11/21  2150 09/11/21  1639 09/11/21  1104 09/11/21  1102 09/11/21  0716   POC GLUCOSE mg/dl 142* 214* 214* 229* 164* 205* 170* 88 91 281* 314* 138                   Recent Cultures (last 7 days):           Lines/Drains:  Invasive Devices     Peripheral Intravenous Line            Peripheral IV 09/11/21 Right Hand 1 day              Last 24 Hours Medication List:   Current Facility-Administered Medications   Medication Dose Route Frequency Provider Last Rate    acetaminophen  650 mg Oral Q6H PRN MARLENA Parekh      atorvastatin  40 mg Oral After Miguel A & MARLENA Smith      carvedilol  25 mg Oral BID With Meals MARLENA Tomas      cloNIDine  0 3 mg Oral TID MARLENA Parekh      gabapentin  300 mg Oral Daily MARLENA Parekh      hydrALAZINE  100 mg Oral Q8H [address] MARLENA Parekh      insulin glargine  12 Units Subcutaneous QAM MARLENA Parekh      insulin lispro  1-5 Units Subcutaneous HS MARLENA Parekh      insulin lispro  1-6 Units Subcutaneous TID AC MARLENA Parekh      iron sucrose  200 mg Intravenous Daily MARLENA Parekh 0 mg (09/12/21 1949)    Labetalol HCl  20 mg Intravenous Q4H PRN MARLENA Parekh      NIFEdipine  30 mg Oral After Samira Fink DO      [START ON 9/14/2021] NIFEdipine  60 mg Oral Daily Alex Rowley DO      ondansetron  4 mg Intravenous Q6H PRN MARLENA Parekh      terazosin  5 mg Oral HS MARLENA Parekh          Today, Patient Was Seen By: Sen Oneil DO

## 2021-09-13 NOTE — UTILIZATION REVIEW
Continued Stay Review    Date: 9/11/21                         Current Patient Class: IP  Current Level of Care: Level 2 SD     HPI:66 y o  female initially admitted on 9/6 with HTN with med compliance, abd discomfort, N/V, elevated lipase, in critical care for Cardene drip  Assessment/Plan:   Pt;s med regimen was adjusted multiple times of the past few days with only temporary improvement with BP  Last evening, her BP remained greater than 200 despite med regimen and multiple doses of labetalol  She is being transferred to [address] for cardene infusion  Will stop Doxazosin  Hydralazine 100 mg TID, Carvedilol 25 mg BID, Clonidine TID  Adding PO Nifedipine now, monitor for edema  Continue PRN Labetalol  When her BP is better controlled she will need renal biopsy        Vital Signs:   09/11/21 2344  --  68  24Abnormal   131/56  88  98 %  None (Room air)  Lying   09/11/21 2330  --  70  24Abnormal   154/70  100  97 %  None (Room air)  Lying   09/11/21 2300  --  66  17  169/75  117  99 %  None (Room air)  Lying   09/11/21 2230  --  68  21  177/79Abnormal   122  96 %  None (Room air)  Lying   09/11/21 2200  --  68  18  166/79  123  98 %  --  --   09/11/21 2130  --  64  23Abnormal   173/89Abnormal   134  --  --  --   09/11/21 2100  --  68  27Abnormal   205/86Abnormal   128  98 %  None (Room air)  Lying   09/11/21 2000  --  66  14  165/83  110  99 %  None (Room air)  Lying   09/11/21 1945  97 9 °F (36 6 °C)  64  16  194/90Abnormal   134  99 %  --  --   09/11/21 1900  --  62  14  198/86Abnormal   130  98 %  None (Room air)  Lying   09/11/21 1751  --  60  16  --  --  99 %  --  --   09/11/21 1726  --  --  --  133/69  86  --  --  --   09/11/21 1649  --  58  23Abnormal   --  --  98 %  --  --   09/11/21 1644  97 5 °F (36 4 °C)  --  --  --  --  --  --  --   09/11/21 1626  --  --  --  134/68  89  --  --  --   09/11/21 1526  --  58  14  156/73  115  96 %  --  --   09/11/21 1400  --  62  22  --  --  99 %  --  --   09/11/21 1326  --  --  --  196/87Abnormal   122  --  --  --   09/11/21 1300  --  60  17  --  --  97 %  --  --   09/11/21 1226  --  --  --  172/86Abnormal   120  --  --  --   09/11/21 1200  --  62  15  --  --  97 %  --  --   09/11/21 1126  --  --  --  183/80Abnormal   118  --  --  --   09/11/21 1100  97 °F (36 1 °C)Abnormal   64  15  --  --  94 %  --  --   09/11/21 1026  --  --  --  175/81Abnormal   122  --  --  --   09/11/21 1000  --  68  16  --  --  96 %  --  --   09/11/21 0927  --  --  --  145/68  110  --  --  --   09/11/21 0810  97 5 °F (36 4 °C)  --  --  --  --  --  --  --   09/11/21 0700  --  68  25Abnormal   161/73  103  97 %  --  --   09/11/21 0600  --  68  15  148/69  92  92 %  None (Room air)  Lying   09/11/21 0558  --  --  --  148/69  --  --  --  --   09/11/21 0500  --  68  15  152/68  99  91 %  None (Room air)  Lying   09/11/21 0400  97 9 °F (36 6 °C)  72  15  211/90Abnormal   125  97 %  None (Room air)  Lying   09/11/21 0300  --  72  16  170/76  108  92 %  None (Room air)  Lying   09/11/21 0236  --  70  16  137/64  103  92 %  None (Room air)  Lying   09/11/21 0200  --  72  16  180/73Abnormal   111  97 %  None (Room air)  Lying   09/11/21 0155  --  74  18  214/95Abnormal   148  96 %  None (Room air)  Lying   09/11/21 0140  97 5 °F (36 4 °C)  70  16  195/79Abnormal   127  97 %  None (Room air)  Lying   09/11/21 0131  --  72  18  151/79  117  96 %  None (Room air)  --   09/11/21 0002  96 4 °F (35 8 °C)Abnormal   73  17  215/91Abnormal              Pertinent Labs/Diagnostic Results:       Results from last 7 days   Lab Units 09/11/21  0324 09/10/21  0548 09/09/21  0457 09/07/21  0520   WBC Thousand/uL 6 24 5 46 5 95 7 54   HEMOGLOBIN g/dL 8 2* 8 3* 8 1* 9 2*   HEMATOCRIT % 25 9* 26 3* 26 1* 29 0*   PLATELETS Thousands/uL 216 216 234 270   NEUTROS ABS Thousands/µL 3 86  --   --  5 71         Results from last 7 days   Lab Units 09/11/21  0333 09/10/21  0548 09/09/21  0457 09/08/21  0514   SODIUM mmol/L 138 139 138 140 POTASSIUM mmol/L 4 4 4 3 4 1 4 1   CHLORIDE mmol/L 102 104 103 101   CO2 mmol/L 30 27 27 28   ANION GAP mmol/L 6 8 8 11   BUN mg/dL 44* 43* 47* 47*   CREATININE mg/dL 2 85* 2 81* 3 06* 3 41*   EGFR ml/min/1 73sq m 17 17 15 13   CALCIUM mg/dL 7 8* 7 9* 7 3* 7 4*   MAGNESIUM mg/dL  --  2 3  --  2 2   PHOSPHORUS mg/dL  --   --   --  4 4*     Results from last 7 days   Lab Units 09/09/21  0457 09/08/21  0514 09/07/21  0520 09/06/21 2027   AST U/L 12 12 16 16   ALT U/L 13 12 14 16   ALK PHOS U/L 60 59 74 83   TOTAL PROTEIN g/dL 5 8* 5 8* 6 8 7 6   ALBUMIN g/dL 2 4* 2 4* 2 8* 3 2*   TOTAL BILIRUBIN mg/dL 0 27 0 31 0 26 0 24     Results from last 7 days   Lab Units 09/11/21  2150 09/11/21  1639 09/11/21  1104 09/11/21  1102 09/11/21  0716   POC GLUCOSE mg/dl 88 91 281* 314* 138     Results from last 7 days   Lab Units 09/11/21  0333 09/10/21  0548 09/09/21  0457 09/08/21  0514 09/07/21  0520 09/06/21  2027   GLUCOSE RANDOM mg/dL 116 129 197* 150* 156* 208*     Results from last 7 days   Lab Units 09/06/21  2027   TROPONIN I ng/mL <0 02         Results from last 7 days   Lab Units 09/08/21  1446   PROTIME seconds 13 9   INR  1 09         Results from last 7 days   Lab Units 09/09/21  0457   FERRITIN ng/mL 24         Results from last 7 days   Lab Units 09/08/21  0514 09/07/21  1449 09/07/21  0520 09/06/21 2027   LIPASE u/L 211 621*  --  1,190*   AMYLASE IU/L  --   --  128*  --              Results from last 7 days   Lab Units 09/07/21  1517   CLARITY UA  Clear   COLOR UA  Yellow   SPEC GRAV UA  1 020   PH UA  6 5   GLUCOSE UA mg/dl 250 (1/4%)*   KETONES UA mg/dl Negative   BLOOD UA  Trace-lysed*   PROTEIN UA mg/dl >=300*   NITRITE UA  Negative   BILIRUBIN UA  Negative   UROBILINOGEN UA E U /dl 0 2   LEUKOCYTES UA  Negative   WBC UA /hpf 0-1*   RBC UA /hpf None Seen   BACTERIA UA /hpf None Seen   EPITHELIAL CELLS WET PREP /hpf Occasional   CREATININE UR mg/dL 69 6   PROTEIN UR mg/dL 438   PROT/CREAT RATIO UR  6 29* Medications:   Scheduled Medications:  atorvastatin, 40 mg, Oral, After Dinner  carvedilol, 25 mg, Oral, BID With Meals  cloNIDine, 0 3 mg, Oral, TID  gabapentin, 300 mg, Oral, Daily  hydrALAZINE, 100 mg, Oral, Q8H [address]  insulin glargine, 12 Units, Subcutaneous, QAM  insulin lispro, 1-5 Units, Subcutaneous, HS  insulin lispro, 1-6 Units, Subcutaneous, TID AC  iron sucrose, 200 mg, Intravenous, Daily  NIFEdipine, 30 mg, Oral, After Dinner  [START ON 9/14/2021] NIFEdipine, 60 mg, Oral, Daily  terazosin, 5 mg, Oral, HS      Continuous IV Infusions:      Cardene drip titration - start 9/11 @ 0152 and d/c 9/11 @ 0900    PRN Meds:  acetaminophen, 650 mg, Oral, Q6H PRN  Labetalol HCl, 20 mg, Intravenous, Q4H PRN - x 2 9/11, 9/10  ondansetron, 4 mg, Intravenous, Q6H PRN    Discharge Plan: [facility]     Network Utilization Review Department  ATTENTION: Please call with any questions or concerns to [phone] and carefully listen to the prompts so that you are directed to the right person  All voicemails are confidential   Slime Mcnally all requests for admission clinical reviews, approved or denied determinations and any other requests to dedicated fax number below belonging to the campus where the patient is receiving treatment   List of dedicated fax numbers for the Facilities:  [address] DENIALS (Administrative/Medical Necessity) [phone]   [address] (Maternity/NICU/Pediatrics) [phone]   [address] [phone]   [address] Minerva Beaulieu [phone] [place] [address] Earnestine Gregoyr [address] [phone]   [facility] [address] [place] [id number] [phone]

## 2021-09-13 NOTE — ASSESSMENT & PLAN NOTE
Lab Results   Component Value Date    EGFR 15 09/13/2021    EGFR 16 09/12/2021    EGFR 17 09/11/2021    CREATININE 3 09 (H) 09/13/2021    CREATININE 2 93 (H) 09/12/2021    CREATININE 2 85 (H) 09/11/2021     · Appreciate nephrology recommendations  · Slight increase in creatinine  · Hoping for renal biopsy this week

## 2021-09-13 NOTE — PROGRESS NOTES
NEPHROLOGY PROGRESS NOTE   Pippa Gonsales 73 y o  female MRN: [id number]  Unit/Bed#: E4 -01 Encounter: [id number]  Reason for Consult: REI    ASSESSMENT/PLAN:  REI on CKD IV:  Suspected prerenal with hemodynamics contributing as well as recurrent episodes of REI  -appears to have had progression in kidney disease over the past year   -baseline creatinine low to mid 2's  Creatinine more recently 2 6-3 0   -currently monitoring off of IV fluids and outpatient diuretics remain on hold  -UA:  Glucosuria, trace lysed blood, greater than 300 protein, 0-1 WBCs  -CT scan shows unremarkable kidneys, normal size and position  -follows with Dr Hari Arnold    -holding off on renal biopsy until blood pressure is improved, ideally less than 160/90 mmHg  -recommend avoiding nephrotoxins, hypotension, IV contrast     Hypertension:  Remains above goal   -outpatient regimen:  Carvedilol 25 mg b i d , clonidine 0 2 mg t i d , hydralazine 100 mg t i d , torsemide 20 mg daily, Hytrin 5 mg at night, torsemide every other day, hydralazine 25 mg b i d  -currently on carvedilol 25 mg b i d , clonidine 0 3 mg t i d , hydralazine 100 mg t i d , Procardia XL 30 mg daily, Hytrin 5 mg at night   -increase Procardia XL to 60 mg po daily  -secondary workup, renal artery duplex unremarkable   -plasma catecholamines negative, Raman normal,renin previously low,  plasma free metanephrines pending   -left kidney 9 3 cm, right kidney 10 2 cm    -avoid hypotension high fluctuations in blood pressure   -recommend holding parameters antihypertensive for systolic blood pressure less than 130  History of nephrotic range proteinuria:  Suspected secondary to hypertension as well as diabetes   -most recent urine protein to creatinine ratio 6 29 g  Chronic diastolic CHF:  Examining fairly euvolemic   -outpatient diuretics currently on hold  -recommend daily weights, fluid restriction, I/O      Biclonal gammopathy:   -continue outpatient follow-up with Hematology/Oncology  -plan for bone marrow biopsy 09/15/2021 if blood pressure is improved  Anemia:  -continue to monitor and transfuse as needed for hemoglobin less than 7 0   -iron panel shows low iron sat, receiving IV Venofer times total of 5 doses  Other:  Diabetes  Disposition:  Requiring additional stay due to medical needs  SUBJECTIVE:  The patient denies any chest pain or shortness of breath  She denies nausea, vomiting, diarrhea      OBJECTIVE:  Current Weight: Weight - Scale: 73 4 kg (161 lb 13 1 oz)  Vitals:    09/13/21 0600 09/13/21 0700 09/13/21 1127 09/13/21 1236   BP:  (!) 176/72 (!) 182/83 149/79   BP Location:  Left arm Right arm Left arm   Pulse:  78 72 68   Resp:  18     Temp:  98 4 °F (36 9 °C)     TempSrc:  Tympanic     SpO2:  98% 98%    Weight: 73 4 kg (161 lb 13 1 oz)      Height:           Intake/Output Summary (Last 24 hours) at 9/13/2021 1306  Last data filed at 9/13/2021 1236  Gross per 24 hour   Intake 850 ml   Output 1550 ml   Net -700 ml     General: NAD  Skin: warm, dry, intact, no rash  HEENT: Moist mucous membranes, sclera anicteric, normocephalic, atraumatic  Neck: No apparent JVD appreciated  Chest: lung sounds clear B/L, on RA   CVS:Regular rate and rhythm, no murmer   Abdomen: Soft, round, non-tender, +BS  Extremities: No B/L LE edema present  Neuro: alert and oriented  Psych: appropriate mood and affect     Medications:    Current Facility-Administered Medications:     acetaminophen (TYLENOL) tablet 650 mg, 650 mg, Oral, Q6H PRN, Deloris Kumarrik, CRNP, 650 mg at 09/07/21 0827    atorvastatin (LIPITOR) tablet 40 mg, 40 mg, Oral, After Dinner, Veronicaifelicen Stacyrik, CRNP, 40 mg at 09/12/21 1749    carvedilol (COREG) tablet 25 mg, 25 mg, Oral, BID With Meals, Veronicaiarun Kumarrik, CRNP, 25 mg at 09/13/21 0956    cloNIDine (CATAPRES) tablet 0 3 mg, 0 3 mg, Oral, TID, Deloris Butler, MARLENA, 0 3 mg at 09/13/21 0956    gabapentin (NEURONTIN) capsule 300 mg, 300 mg, Oral, Daily, SHIV ParekhNP, 300 mg at 09/13/21 0957    hydrALAZINE (APRESOLINE) tablet 100 mg, 100 mg, Oral, Q8H [address], Deloris Butler, CRNP, 100 mg at 09/13/21 0542    insulin glargine (LANTUS) subcutaneous injection 12 Units 0 12 mL, 12 Units, Subcutaneous, QAM, Deloris Butler, CRNP, 12 Units at 09/13/21 0954    insulin lispro (HumaLOG) 100 units/mL subcutaneous injection 1-5 Units, 1-5 Units, Subcutaneous, HS, Deloris Butler, CRNP, 2 Units at 09/12/21 2120    insulin lispro (HumaLOG) 100 units/mL subcutaneous injection 1-6 Units, 1-6 Units, Subcutaneous, TID AC, 2 Units at 09/13/21 1129 **AND** Fingerstick Glucose (POCT), , , TID AC, Deloris Butler, MARLENA    iron sucrose (VENOFER) 200 mg in sodium chloride 0 9 % 100 mL IVPB, 200 mg, Intravenous, Daily, MARLENA Parekh, Stopped at 09/12/21 1949    Labetalol HCl (NORMODYNE) injection 20 mg, 20 mg, Intravenous, Q4H PRN, Deloris Butler, CRNP, 20 mg at 09/13/21 1128    NIFEdipine (PROCARDIA XL) 24 hr tablet 30 mg, 30 mg, Oral, Daily, MARLENA Hansen, 30 mg at 09/13/21 0956    ondansetron (ZOFRAN) injection 4 mg, 4 mg, Intravenous, Q6H PRN, Deloris Butler, CRNP, 4 mg at 09/07/21 0823    terazosin (HYTRIN) capsule 5 mg, 5 mg, Oral, HS, Deloris Kumarrik, CRNP, 5 mg at 09/12/21 2121    Laboratory Results:  Results from last 7 days   Lab Units 09/13/21  0537 09/12/21  0649 09/11/21  0333 09/11/21  0324 09/10/21  0548 09/09/21  0457 09/08/21  0514 09/07/21  0520   WBC Thousand/uL 5 93 6 37  --  6 24 5 46 5 95  --   --    HEMOGLOBIN g/dL 8 3* 8 1*  --  8 2* 8 3* 8 1*  --   --    HEMATOCRIT % 26 4* 26 0*  --  25 9* 26 3* 26 1*  --   --    PLATELETS Thousands/uL 230 215  --  216 216 234  --   --    SODIUM mmol/L 138 137 138  --  139 138 140   < >   POTASSIUM mmol/L 5 2 5 1 4 4  --  4 3 4 1 4 1   < >   CHLORIDE mmol/L 102 102 102  --  104 103 101   < >   CO2 mmol/L 23 25 30  --  27 27 28   < >   BUN mg/dL 61* 55* 44*  --  43* 47* 47*   < >   CREATININE mg/dL 3 09* 2 93* 2 85*  --  2 81* 3 06* 3 41*   < >   CALCIUM mg/dL 8 0* 8 0* 7 8*  --  7 9* 7 3* 7 4*   < >   MAGNESIUM mg/dL 2 2 2 0  --   --  2 3  --  2 2   < >   PHOSPHORUS mg/dL 4 4* 4 6*  --   --   --   --  4 4*  --    ALK PHOS U/L  --  69  --   --   --  60 59  --    ALT U/L  --  17  --   --   --  13 12  --    AST U/L  --  18  --   --   --  12 12  --     < > = values in this interval not displayed

## 2021-09-13 NOTE — ASSESSMENT & PLAN NOTE
· Pt presented with home SBP >230 despite being compliant with her medications  · Renal vascular Dopplers from 07/31/2021 showing no renal artery stenosis right kidney 9 3 cm left kidney 10 2 cm  · Hypertensive urgency on multiple agents  Nephrology following  · Home medications include: Coreg 25 mg p o  B i d , clonidine 0 2 mg p o  T i d , hydralazine 25 mg p o  B i d  torsemide 20 mg p o  Q 48 hours, Hytrin 5 mg p o  Q h s   · Current medications include: Coreg 25 mg p o  B i d , clonidine 0 3 mg p o  t i d , hydralazine 100 mg p o  t i d , Hytrin 5 mg p o  Q day,procardia 60mg in am and 30 mg in pm    Renal following  Plan is for IR kidney biopsy once the SBP is consistently less than 140    Hopefully we can do that early this week

## 2021-09-13 NOTE — PLAN OF CARE
Problem: Potential for Falls  Goal: Patient will remain free of falls  Description: INTERVENTIONS:  - Educate patient/family on patient safety including physical limitations  - Instruct patient to call for assistance with activity   - Consult OT/PT to assist with strengthening/mobility   - Keep Call bell within reach  - Keep bed low and locked with side rails adjusted as appropriate  - Keep care items and personal belongings within reach  - Initiate and maintain comfort rounds  - Make Fall Risk Sign visible to staff  - Offer Toileting every 2 Hours, in advance of need  - Initiate/Maintain bed alarm  - Obtain necessary fall risk management equipment:   - Apply yellow socks and bracelet for high fall risk patients  - Consider moving patient to room near nurses station  Outcome: Progressing     Problem: CARDIOVASCULAR - ADULT  Goal: Maintains optimal cardiac output and hemodynamic stability  Description: INTERVENTIONS:  - Monitor I/O, vital signs and rhythm  - Monitor for S/S and trends of decreased cardiac output  - Administer and titrate ordered vasoactive medications to optimize hemodynamic stability  - Assess quality of pulses, skin color and temperature  - Assess for signs of decreased coronary artery perfusion  - Instruct patient to report change in severity of symptoms  Outcome: Progressing  Goal: Absence of cardiac dysrhythmias or at baseline rhythm  Description: INTERVENTIONS:  - Continuous cardiac monitoring, vital signs, obtain 12 lead EKG if ordered  - Administer antiarrhythmic and heart rate control medications as ordered  - Monitor electrolytes and administer replacement therapy as ordered  Outcome: Progressing     Problem: SAFETY ADULT  Goal: Patient will remain free of falls  Description: INTERVENTIONS:  - Educate patient/family on patient safety including physical limitations  - Instruct patient to call for assistance with activity   - Consult OT/PT to assist with strengthening/mobility   - Keep Call bell within reach  - Keep bed low and locked with side rails adjusted as appropriate  - Keep care items and personal belongings within reach  - Initiate and maintain comfort rounds  - Make Fall Risk Sign visible to staff  - Offer Toileting every 2 Hours, in advance of need  - Initiate/Maintain bed alarm  - Obtain necessary fall risk management equipment:   - Apply yellow socks and bracelet for high fall risk patients  - Consider moving patient to room near nurses station  Outcome: Progressing  Goal: Maintain or return to baseline ADL function  Description: INTERVENTIONS:  -  Assess patient's ability to carry out ADLs; assess patient's baseline for ADL function and identify physical deficits which impact ability to perform ADLs (bathing, care of mouth/teeth, toileting, grooming, dressing, etc )  - Assess/evaluate cause of self-care deficits   - Assess range of motion  - Assess patient's mobility; develop plan if impaired  - Assess patient's need for assistive devices and provide as appropriate  - Encourage maximum independence but intervene and supervise when necessary  - Involve family in performance of ADLs  - Assess for home care needs following discharge   - Consider OT consult to assist with ADL evaluation and planning for discharge  - Provide patient education as appropriate  Outcome: Progressing     Problem: DISCHARGE PLANNING  Goal: Discharge to home or other facility with appropriate resources  Description: INTERVENTIONS:  - Identify barriers to discharge w/patient and caregiver  - Arrange for needed discharge resources and transportation as appropriate  - Identify discharge learning needs (meds, wound care, etc )  - Arrange for interpretive services to assist at discharge as needed  - Refer to Case Management Department for coordinating discharge planning if the patient needs post-hospital services based on physician/advanced practitioner order or complex needs related to functional status, cognitive ability, or social support system  Outcome: Progressing     Problem: MOBILITY - ADULT  Goal: Maintain or return to baseline ADL function  Description: INTERVENTIONS:  -  Assess patient's ability to carry out ADLs; assess patient's baseline for ADL function and identify physical deficits which impact ability to perform ADLs (bathing, care of mouth/teeth, toileting, grooming, dressing, etc )  - Assess/evaluate cause of self-care deficits   - Assess range of motion  - Assess patient's mobility; develop plan if impaired  - Assess patient's need for assistive devices and provide as appropriate  - Encourage maximum independence but intervene and supervise when necessary  - Involve family in performance of ADLs  - Assess for home care needs following discharge   - Consider OT consult to assist with ADL evaluation and planning for discharge  - Provide patient education as appropriate  Outcome: Progressing  Goal: Maintains/Returns to pre admission functional level  Description: INTERVENTIONS:  - Perform BMAT or MOVE assessment daily    - Set and communicate daily mobility goal to care team and patient/family/caregiver  - Collaborate with rehabilitation services on mobility goals if consulted  - Perform Range of Motion 3 times a day  - Reposition patient every 2 hours    - Dangle patient 3 times a day  - Stand patient 3 times a day  - Ambulate patient 3 times a day  - Out of bed to chair 3 times a day   - Out of bed for meals 3 times a day  - Out of bed for toileting  - Record patient progress and toleration of activity level   Outcome: Progressing

## 2021-09-13 NOTE — ASSESSMENT & PLAN NOTE
Lab Results   Component Value Date    HGBA1C 9 5 (H) 07/23/2021     Results from last 7 days   Lab Units 09/13/21  1545 09/13/21  1123 09/13/21  0833 09/12/21  2120 09/12/21  1709 09/12/21  1219 09/12/21  0816 09/11/21  2150   POC GLUCOSE mg/dl 142* 214* 214* 229* 164* 205* 170* 88     · Continue insulin sliding scale and lantus  Will adjust as needed

## 2021-09-14 LAB
ANION GAP SERPL CALCULATED.3IONS-SCNC: 10 MMOL/L (ref 4–13)
ATRIAL RATE: 71 BPM
BUN SERPL-MCNC: 63 MG/DL (ref 5–25)
CALCIUM SERPL-MCNC: 7.8 MG/DL (ref 8.3–10.1)
CHLORIDE SERPL-SCNC: 102 MMOL/L (ref 100–108)
CO2 SERPL-SCNC: 25 MMOL/L (ref 21–32)
CREAT SERPL-MCNC: 3.2 MG/DL (ref 0.6–1.3)
ERYTHROCYTE [DISTWIDTH] IN BLOOD BY AUTOMATED COUNT: 15 % (ref 11.6–15.1)
GFR SERPL CREATININE-BSD FRML MDRD: 15 ML/MIN/1.73SQ M
GLUCOSE SERPL-MCNC: 134 MG/DL (ref 65–140)
GLUCOSE SERPL-MCNC: 142 MG/DL (ref 65–140)
GLUCOSE SERPL-MCNC: 166 MG/DL (ref 65–140)
GLUCOSE SERPL-MCNC: 175 MG/DL (ref 65–140)
GLUCOSE SERPL-MCNC: 234 MG/DL (ref 65–140)
HCT VFR BLD AUTO: 24.9 % (ref 34.8–46.1)
HGB BLD-MCNC: 7.9 G/DL (ref 11.5–15.4)
MCH RBC QN AUTO: 27 PG (ref 26.8–34.3)
MCHC RBC AUTO-ENTMCNC: 31.7 G/DL (ref 31.4–37.4)
MCV RBC AUTO: 85 FL (ref 82–98)
P AXIS: 53 DEGREES
PLATELET # BLD AUTO: 211 THOUSANDS/UL (ref 149–390)
PMV BLD AUTO: 10.5 FL (ref 8.9–12.7)
POTASSIUM SERPL-SCNC: 5.2 MMOL/L (ref 3.5–5.3)
PR INTERVAL: 163 MS
QRS AXIS: 57 DEGREES
QRSD INTERVAL: 83 MS
QT INTERVAL: 446 MS
QTC INTERVAL: 485 MS
RBC # BLD AUTO: 2.93 MILLION/UL (ref 3.81–5.12)
SODIUM SERPL-SCNC: 137 MMOL/L (ref 136–145)
T WAVE AXIS: 109 DEGREES
VENTRICULAR RATE: 71 BPM
WBC # BLD AUTO: 5.38 THOUSAND/UL (ref 4.31–10.16)

## 2021-09-14 PROCEDURE — 82948 REAGENT STRIP/BLOOD GLUCOSE: CPT

## 2021-09-14 PROCEDURE — 80048 BASIC METABOLIC PNL TOTAL CA: CPT | Performed by: INTERNAL MEDICINE

## 2021-09-14 PROCEDURE — 99232 SBSQ HOSP IP/OBS MODERATE 35: CPT | Performed by: INTERNAL MEDICINE

## 2021-09-14 PROCEDURE — 93010 ELECTROCARDIOGRAM REPORT: CPT | Performed by: INTERNAL MEDICINE

## 2021-09-14 PROCEDURE — 85027 COMPLETE CBC AUTOMATED: CPT | Performed by: INTERNAL MEDICINE

## 2021-09-14 RX ADMIN — INSULIN LISPRO 3 UNITS: 100 INJECTION, SOLUTION INTRAVENOUS; SUBCUTANEOUS at 13:54

## 2021-09-14 RX ADMIN — CARVEDILOL 25 MG: 25 TABLET, FILM COATED ORAL at 17:07

## 2021-09-14 RX ADMIN — TERAZOSIN HYDROCHLORIDE 5 MG: 5 CAPSULE ORAL at 21:55

## 2021-09-14 RX ADMIN — INSULIN LISPRO 1 UNITS: 100 INJECTION, SOLUTION INTRAVENOUS; SUBCUTANEOUS at 09:37

## 2021-09-14 RX ADMIN — INSULIN GLARGINE 12 UNITS: 100 INJECTION, SOLUTION SUBCUTANEOUS at 09:38

## 2021-09-14 RX ADMIN — INSULIN LISPRO 1 UNITS: 100 INJECTION, SOLUTION INTRAVENOUS; SUBCUTANEOUS at 17:03

## 2021-09-14 RX ADMIN — NIFEDIPINE 30 MG: 30 TABLET, FILM COATED, EXTENDED RELEASE ORAL at 18:18

## 2021-09-14 RX ADMIN — LABETALOL 20 MG/4 ML (5 MG/ML) INTRAVENOUS SYRINGE 20 MG: at 22:20

## 2021-09-14 RX ADMIN — CLONIDINE HYDROCHLORIDE 0.3 MG: 0.1 TABLET ORAL at 20:10

## 2021-09-14 RX ADMIN — ATORVASTATIN CALCIUM 40 MG: 40 TABLET, FILM COATED ORAL at 17:08

## 2021-09-14 RX ADMIN — CLONIDINE HYDROCHLORIDE 0.3 MG: 0.1 TABLET ORAL at 17:07

## 2021-09-14 RX ADMIN — HYDRALAZINE HYDROCHLORIDE 100 MG: 25 TABLET, FILM COATED ORAL at 13:55

## 2021-09-14 RX ADMIN — HYDRALAZINE HYDROCHLORIDE 100 MG: 25 TABLET, FILM COATED ORAL at 21:54

## 2021-09-14 RX ADMIN — GABAPENTIN 300 MG: 300 CAPSULE ORAL at 09:41

## 2021-09-14 NOTE — H&P (VIEW-ONLY)
[address] [place] [address]  Progress Note - Evelin Espino 1955, 73 y o  female MRN: [id number]  Unit/Bed#: E4 -01 Encounter: [id number]  Primary Care Provider: Josh Swanson MD   Date and time admitted to hospital: 9/6/2021  7:36 PM    Acute kidney injury superimposed on CKD [facility])  Assessment & Plan  Lab Results   Component Value Date    EGFR 15 09/14/2021    EGFR 15 09/13/2021    EGFR 16 09/12/2021    CREATININE 3 20 (H) 09/14/2021    CREATININE 3 09 (H) 09/13/2021    CREATININE 2 93 (H) 09/12/2021     · Appreciate nephrology recommendations  · Worsening creatinine  · Diuretics on hold  · Renal biopsy pending for tomorrow  Biclonal gammopathy  Assessment & Plan  · Biclonal gammopathy follows with hematology as outpatient   · S/p bone marrow biopsy    Chronic diastolic heart failure (HCC)  Assessment & Plan  Wt Readings from Last 3 Encounters:   09/14/21 74 7 kg (164 lb 10 9 oz)   08/31/21 71 2 kg (157 lb)   08/26/21 70 4 kg (155 lb 3 2 oz)     · She is euvolemic  Type 2 diabetes mellitus, with long-term current use of insulin [facility])  Assessment & Plan    Lab Results   Component Value Date    HGBA1C 9 5 (H) 07/23/2021     Results from last 7 days   Lab Units 09/14/21  1604 09/14/21  1133 09/14/21  0835 09/13/21  2141 09/13/21  1545 09/13/21  1123 09/13/21  0833 09/12/21  2120   POC GLUCOSE mg/dl 166* 234* 175* 251* 142* 214* 214* 229*     · Continue insulin sliding scale and lantus  Will adjust as needed  * Hypertensive urgency  Assessment & Plan  · Pt presented with home SBP >230 despite being compliant with her medications  · Renal vascular Dopplers from 07/31/2021 showing no renal artery stenosis right kidney 9 3 cm left kidney 10 2 cm  · Hypertensive urgency on multiple agents  Nephrology following  · Home medications include: Coreg 25 mg p o  B i d , clonidine 0 2 mg p o  T i d , hydralazine 25 mg p o  B i d  torsemide 20 mg p o  Q 48 hours, Hytrin 5 mg p o  Q h s  · Current medications include: Coreg 25 mg p o  B i d , clonidine 0 3 mg p o  t i d , hydralazine 100 mg p o  t i d , Hytrin 5 mg p o  Q day,procardia 60mg in am and 30 mg in pm    Renal following  Kidney biopsy tomorrow          VTE Pharmacologic Prophylaxis: scd    Mechanical VTE Prophylaxis in Place: Yes    Time Spent for Care: 20 minutes  More than 50% of total time spent on counseling and coordination of care as described above  Current Length of Stay: 8 day(s)  Current Patient Status: Inpatient       Discharge Plan / Estimated Discharge Date: greater than 72 hours    Code Status: Level 1 - Full Code      Subjective:   Pt seen and examined  Pt doing well after biopsy  No f/c no cp no sob no n/v/d no abd pain  Objective:     Vitals:   Temp (24hrs), Av 5 °F (36 4 °C), Min:96 7 °F (35 9 °C), Max:98 3 °F (36 8 °C)    Temp:  [96 7 °F (35 9 °C)-98 3 °F (36 8 °C)] 98 °F (36 7 °C)  HR:  [63-77] 77  Resp:  [16-18] 18  BP: (105-168)/(52-81) 168/81  SpO2:  [94 %-98 %] 98 %  Body mass index is 30 12 kg/m²  Input and Output Summary (last 24 hours): Intake/Output Summary (Last 24 hours) at 2021 1927  Last data filed at 2021 0754  Gross per 24 hour   Intake 480 ml   Output 600 ml   Net -120 ml       Physical Exam:   Physical Exam  Constitutional:       Appearance: Normal appearance  HENT:      Head: Normocephalic and atraumatic  Eyes:      Extraocular Movements: Extraocular movements intact  Pupils: Pupils are equal, round, and reactive to light  Cardiovascular:      Rate and Rhythm: Normal rate and regular rhythm  Heart sounds: No murmur heard  No friction rub  No gallop  Pulmonary:      Effort: Pulmonary effort is normal  No respiratory distress  Breath sounds: Normal breath sounds  No wheezing or rales  Abdominal:      General: Bowel sounds are normal  There is no distension  Palpations: Abdomen is soft  Tenderness:  There is no abdominal tenderness  There is no guarding  Neurological:      Mental Status: She is alert and oriented to person, place, and time            Additional Data:     Labs:  Results from last 7 days   Lab Units 09/14/21  0542 09/13/21  0537   WBC Thousand/uL 5 38 5 93   HEMOGLOBIN g/dL 7 9* 8 3*   HEMATOCRIT % 24 9* 26 4*   PLATELETS Thousands/uL 211 230   NEUTROS PCT %  --  62   LYMPHS PCT %  --  21   MONOS PCT %  --  10   EOS PCT %  --  5     Results from last 7 days   Lab Units 09/14/21  0542 09/12/21  0649   SODIUM mmol/L 137 137   POTASSIUM mmol/L 5 2 5 1   CHLORIDE mmol/L 102 102   CO2 mmol/L 25 25   BUN mg/dL 63* 55*   CREATININE mg/dL 3 20* 2 93*   ANION GAP mmol/L 10 10   CALCIUM mg/dL 7 8* 8 0*   ALBUMIN g/dL  --  2 5*   TOTAL BILIRUBIN mg/dL  --  0 20   ALK PHOS U/L  --  69   ALT U/L  --  17   AST U/L  --  18   GLUCOSE RANDOM mg/dL 134 186*     Results from last 7 days   Lab Units 09/08/21  1446   INR  1 09     Results from last 7 days   Lab Units 09/14/21  1604 09/14/21  1133 09/14/21  0835 09/13/21  2141 09/13/21  1545 09/13/21  1123 09/13/21  0833 09/12/21  2120 09/12/21  1709 09/12/21  1219 09/12/21  0816 09/11/21  2150   POC GLUCOSE mg/dl 166* 234* 175* 251* 142* 214* 214* 229* 164* 205* 170* 88                 Recent Cultures (last 7 days):           Lines/Drains:  Invasive Devices     Peripheral Intravenous Line            Peripheral IV 09/13/21 Left Forearm <1 day                Last 24 Hours Medication List:   Current Facility-Administered Medications   Medication Dose Route Frequency Provider Last Rate    acetaminophen  650 mg Oral Q6H PRN MARLENA Parekh      atorvastatin  40 mg Oral After Miguel A & MARLENA Smith      carvedilol  25 mg Oral BID With Meals MARLENA Tomas      cloNIDine  0 3 mg Oral TID MARLENA Parekh      gabapentin  300 mg Oral Daily MARLENA Parekh      hydrALAZINE  100 mg Oral Q8H [address] MARLENA Parekh      insulin glargine  12 Units Subcutaneous QAM Deloris Butler, MARLENA      insulin lispro  1-5 Units Subcutaneous HS MARLENA Parekh      insulin lispro  1-6 Units Subcutaneous TID AC MARLENA Parekh      Labetalol HCl  20 mg Intravenous Q4H PRN MARLENA Parekh      NIFEdipine  30 mg Oral After Anayeli Papa Rowley, DO      NIFEdipine  60 mg Oral Daily Franchesca Rowley,       ondansetron  4 mg Intravenous Q6H PRN MARLENA Parekh      terazosin  5 mg Oral HS MARLENA Parekh          Today, Patient Was Seen By: Jennifer Witt, DO

## 2021-09-14 NOTE — TELEMEDICINE
e-Consult (IPC)  - Interventional Radiology  Thea Drew 73 y o  female MRN: [id number]  Unit/Bed#: E4 -01 Encounter: [id number]    IR has been consulted to evaluate the patient, determine the appropriate procedure, and whether or not a procedure can and should be performed regarding the care of Hipolito Squires  IP Consult to IR  Consult performed by: Jadon Dhillon MD  Consult ordered by: Loan Espino [address] [place] [address]        09/14/21      Assessment/Recommendation:   Consult for renal biopsy  Patient was scheduled previously though procedure was canceled due to persistent hypertension which is now apparently under better control on  She was originally scheduled for outpatient bone marrow biopsy tomorrow  Will plan to keep plan for bone marrow biopsy tomorrow and also will perform renal biopsy as well  NPO after midnight  Total time spent in review of data, discussion with requesting provider and rendering advice was 15minutes  Thank you for allowing Interventional Radiology to participate in the care of Hipolito Squires  Please don't hesitate to call or TigerText us with any questions       Melisa Solis MD

## 2021-09-14 NOTE — PROGRESS NOTES
[address] [place] [address]  Progress Note - Wyman Cranker 1955, 73 y o  female MRN: [id number]  Unit/Bed#: E4 -01 Encounter: [id number]  Primary Care Provider: Amita Dudley MD   Date and time admitted to hospital: 9/6/2021  7:36 PM    Acute kidney injury superimposed on CKD [facility])  Assessment & Plan  Lab Results   Component Value Date    EGFR 15 09/14/2021    EGFR 15 09/13/2021    EGFR 16 09/12/2021    CREATININE 3 20 (H) 09/14/2021    CREATININE 3 09 (H) 09/13/2021    CREATININE 2 93 (H) 09/12/2021     · Appreciate nephrology recommendations  · Worsening creatinine  · Diuretics on hold  · Renal biopsy pending for tomorrow  Biclonal gammopathy  Assessment & Plan  · Biclonal gammopathy follows with hematology as outpatient   · S/p bone marrow biopsy    Chronic diastolic heart failure (HCC)  Assessment & Plan  Wt Readings from Last 3 Encounters:   09/14/21 74 7 kg (164 lb 10 9 oz)   08/31/21 71 2 kg (157 lb)   08/26/21 70 4 kg (155 lb 3 2 oz)     · She is euvolemic  Type 2 diabetes mellitus, with long-term current use of insulin [facility])  Assessment & Plan    Lab Results   Component Value Date    HGBA1C 9 5 (H) 07/23/2021     Results from last 7 days   Lab Units 09/14/21  1604 09/14/21  1133 09/14/21  0835 09/13/21  2141 09/13/21  1545 09/13/21  1123 09/13/21  0833 09/12/21  2120   POC GLUCOSE mg/dl 166* 234* 175* 251* 142* 214* 214* 229*     · Continue insulin sliding scale and lantus  Will adjust as needed  * Hypertensive urgency  Assessment & Plan  · Pt presented with home SBP >230 despite being compliant with her medications  · Renal vascular Dopplers from 07/31/2021 showing no renal artery stenosis right kidney 9 3 cm left kidney 10 2 cm  · Hypertensive urgency on multiple agents  Nephrology following  · Home medications include: Coreg 25 mg p o  B i d , clonidine 0 2 mg p o  T i d , hydralazine 25 mg p o  B i d  torsemide 20 mg p o  Q 48 hours, Hytrin 5 mg p o  Q h s  · Current medications include: Coreg 25 mg p o  B i d , clonidine 0 3 mg p o  t i d , hydralazine 100 mg p o  t i d , Hytrin 5 mg p o  Q day,procardia 60mg in am and 30 mg in pm    Renal following  Kidney biopsy tomorrow          VTE Pharmacologic Prophylaxis: scd    Mechanical VTE Prophylaxis in Place: Yes    Time Spent for Care: 20 minutes  More than 50% of total time spent on counseling and coordination of care as described above  Current Length of Stay: 8 day(s)  Current Patient Status: Inpatient       Discharge Plan / Estimated Discharge Date: greater than 72 hours    Code Status: Level 1 - Full Code      Subjective:   Pt seen and examined  Pt doing well after biopsy  No f/c no cp no sob no n/v/d no abd pain  Objective:     Vitals:   Temp (24hrs), Av 5 °F (36 4 °C), Min:96 7 °F (35 9 °C), Max:98 3 °F (36 8 °C)    Temp:  [96 7 °F (35 9 °C)-98 3 °F (36 8 °C)] 98 °F (36 7 °C)  HR:  [63-77] 77  Resp:  [16-18] 18  BP: (105-168)/(52-81) 168/81  SpO2:  [94 %-98 %] 98 %  Body mass index is 30 12 kg/m²  Input and Output Summary (last 24 hours): Intake/Output Summary (Last 24 hours) at 2021 1927  Last data filed at 2021 0754  Gross per 24 hour   Intake 480 ml   Output 600 ml   Net -120 ml       Physical Exam:   Physical Exam  Constitutional:       Appearance: Normal appearance  HENT:      Head: Normocephalic and atraumatic  Eyes:      Extraocular Movements: Extraocular movements intact  Pupils: Pupils are equal, round, and reactive to light  Cardiovascular:      Rate and Rhythm: Normal rate and regular rhythm  Heart sounds: No murmur heard  No friction rub  No gallop  Pulmonary:      Effort: Pulmonary effort is normal  No respiratory distress  Breath sounds: Normal breath sounds  No wheezing or rales  Abdominal:      General: Bowel sounds are normal  There is no distension  Palpations: Abdomen is soft  Tenderness:  There is no abdominal tenderness  There is no guarding  Neurological:      Mental Status: She is alert and oriented to person, place, and time            Additional Data:     Labs:  Results from last 7 days   Lab Units 09/14/21  0542 09/13/21  0537   WBC Thousand/uL 5 38 5 93   HEMOGLOBIN g/dL 7 9* 8 3*   HEMATOCRIT % 24 9* 26 4*   PLATELETS Thousands/uL 211 230   NEUTROS PCT %  --  62   LYMPHS PCT %  --  21   MONOS PCT %  --  10   EOS PCT %  --  5     Results from last 7 days   Lab Units 09/14/21  0542 09/12/21  0649   SODIUM mmol/L 137 137   POTASSIUM mmol/L 5 2 5 1   CHLORIDE mmol/L 102 102   CO2 mmol/L 25 25   BUN mg/dL 63* 55*   CREATININE mg/dL 3 20* 2 93*   ANION GAP mmol/L 10 10   CALCIUM mg/dL 7 8* 8 0*   ALBUMIN g/dL  --  2 5*   TOTAL BILIRUBIN mg/dL  --  0 20   ALK PHOS U/L  --  69   ALT U/L  --  17   AST U/L  --  18   GLUCOSE RANDOM mg/dL 134 186*     Results from last 7 days   Lab Units 09/08/21  1446   INR  1 09     Results from last 7 days   Lab Units 09/14/21  1604 09/14/21  1133 09/14/21  0835 09/13/21  2141 09/13/21  1545 09/13/21  1123 09/13/21  0833 09/12/21  2120 09/12/21  1709 09/12/21  1219 09/12/21  0816 09/11/21  2150   POC GLUCOSE mg/dl 166* 234* 175* 251* 142* 214* 214* 229* 164* 205* 170* 88                 Recent Cultures (last 7 days):           Lines/Drains:  Invasive Devices     Peripheral Intravenous Line            Peripheral IV 09/13/21 Left Forearm <1 day                Last 24 Hours Medication List:   Current Facility-Administered Medications   Medication Dose Route Frequency Provider Last Rate    acetaminophen  650 mg Oral Q6H PRN MARLENA Parekh      atorvastatin  40 mg Oral After Miguel A & MARLENA Smith      carvedilol  25 mg Oral BID With Meals MARLENA Tomas      cloNIDine  0 3 mg Oral TID MARLENA Parekh      gabapentin  300 mg Oral Daily MARLENA Parekh      hydrALAZINE  100 mg Oral Q8H [facility] MARLENA Parekh      insulin glargine  12 Units Subcutaneous QAM Deloris Butler, MARLENA      insulin lispro  1-5 Units Subcutaneous HS MARLENA Parekh      insulin lispro  1-6 Units Subcutaneous TID AC MARLENA Parekh      Labetalol HCl  20 mg Intravenous Q4H PRN MARLENA Parekh      NIFEdipine  30 mg Oral After Ferna Carbon Rowley, DO      NIFEdipine  60 mg Oral Daily Shawanda Cough Amrik, DO      ondansetron  4 mg Intravenous Q6H PRN MARLENA Parekh      terazosin  5 mg Oral HS MARLENA Parekh          Today, Patient Was Seen By: Juve Marie, DO

## 2021-09-14 NOTE — ASSESSMENT & PLAN NOTE
Lab Results   Component Value Date    HGBA1C 9 5 (H) 07/23/2021     Results from last 7 days   Lab Units 09/14/21  1604 09/14/21  1133 09/14/21  0835 09/13/21  2141 09/13/21  1545 09/13/21  1123 09/13/21  0833 09/12/21  2120   POC GLUCOSE mg/dl 166* 234* 175* 251* 142* 214* 214* 229*     · Continue insulin sliding scale and lantus  Will adjust as needed

## 2021-09-14 NOTE — PROGRESS NOTES
NEPHROLOGY PROGRESS NOTE   Reid Half 73 y o  female MRN: [id number]  Unit/Bed#: E4 -01 Encounter: [id number]  Reason for Consult: REI on CKD IV    PLAN:   -REI on CKD 4, creatinine unfortunately worsening  Diuretics remain on hold  Blood pressure with better control  IR consult placed for possible renal biopsy coordinating tomorrow with bone marrow biopsy  -hypertension, blood pressure significantly improved  Continue antihypertensives as below   -history of nephrotic range proteinuria, in the setting of uncontrolled hypertension  Will continue to follow as outpatient  Awaiting renal biopsy   -biclonal gammopathy, for bone marrow biopsy tomorrow   -anemia, receiving Venofer  ASSESSMENT/PLAN:  REI on CKD IV:  Suspected prerenal with hemodynamics contributing as well as recurrent episodes of REI  -appears to have had progression in kidney disease over the past year   -baseline creatinine low to mid 2's  Creatinine more recently 2 6-3 0   -currently monitoring off of IV fluids and outpatient diuretics remain on hold  -UA:  Glucosuria, trace lysed blood, greater than 300 protein, 0-1 WBCs  -CT scan shows unremarkable kidneys, normal size and position  -follows with Dr Ami Marrero    -holding off on renal biopsy until blood pressure is improved, ideally less than 160/90 mmHg  -recommend avoiding nephrotoxins, hypotension, IV contrast      Hypertension:  Blood pressure improved  -outpatient regimen:  Carvedilol 25 mg b i d , clonidine 0 2 mg t i d , hydralazine 100 mg t i d , torsemide 20 mg daily, Hytrin 5 mg at night, torsemide every other day, hydralazine 25 mg b i d    -currently on carvedilol 25 mg b i d , clonidine 0 3 mg t i d , hydralazine 100 mg t i d , Procardia XL 60 mg in the am and 30 mg in the evening, Hytrin 5 mg at night   -secondary workup, renal artery duplex unremarkable   -plasma catecholamines negative, Raman normal, renin normal,  plasma free metanephrines pending   -left kidney 9 3 cm, right kidney 10 2 cm    -avoid hypotension high fluctuations in blood pressure   -recommend holding parameters antihypertensive for systolic blood pressure less than 130       History of nephrotic range proteinuria:  Suspected secondary to hypertension as well as diabetes   -most recent urine protein to creatinine ratio 6 29 g       Chronic diastolic CHF:  Examining fairly euvolemic   -outpatient diuretics currently on hold  -recommend daily weights, fluid restriction, I/O      Biclonal gammopathy:   -continue outpatient follow-up with Hematology/Oncology  -plan for bone marrow biopsy 09/15/2021 if blood pressure is improved      Anemia:  -continue to monitor and transfuse as needed for hemoglobin less than 7 0   -iron panel shows low iron sat, receiving IV Venofer times total of 5 doses      Other:  Diabetes  Disposition:  Requiring additional stay due to medical needs  SUBJECTIVE:  Patient is primarily [address] speaking  She reports feeling well today  She denies chest discomfort or shortness of breath  She denies nausea, vomiting, diarrhea  We discussed that she is tentative for biopsy tomorrow  All questions were answered      OBJECTIVE:  Current Weight: Weight - Scale: 74 7 kg (164 lb 10 9 oz)  Vitals:    09/14/21 0300 09/14/21 0549 09/14/21 0600 09/14/21 0754   BP: 120/60  110/60 119/58   BP Location: Left arm   Left arm   Pulse: 65   63   Resp: 18   18   Temp: (!) 97 4 °F (36 3 °C)   98 °F (36 7 °C)   TempSrc: Tympanic   Tympanic   SpO2: 94%   95%   Weight:  74 7 kg (164 lb 10 9 oz)     Height:           Intake/Output Summary (Last 24 hours) at 9/14/2021 1012  Last data filed at 9/14/2021 0754  Gross per 24 hour   Intake 720 ml   Output 1050 ml   Net -330 ml     General: NAD  Skin: warm, dry, intact, no rash  HEENT: Moist mucous membranes, sclera anicteric, normocephalic, atraumatic  Neck: No apparent JVD appreciated  Chest: lung sounds clear B/L, on RA   CVS:Regular rate and rhythm, no murmer   Abdomen: Soft, round, non-tender, +BS  Extremities: No B/L LE edema present  Neuro: alert and oriented  Psych: appropriate mood and affect     Medications:    Current Facility-Administered Medications:     acetaminophen (TYLENOL) tablet 650 mg, 650 mg, Oral, Q6H PRN, Deloris Kumarrik, CRNP, 650 mg at 09/07/21 0827    atorvastatin (LIPITOR) tablet 40 mg, 40 mg, Oral, After Dinner, Deloris Kumarrik, CRNP, 40 mg at 09/13/21 1725    carvedilol (COREG) tablet 25 mg, 25 mg, Oral, BID With Meals, Cuiyin Stacyrik, CRNP, 25 mg at 09/13/21 1725    cloNIDine (CATAPRES) tablet 0 3 mg, 0 3 mg, Oral, TID, Deloris Kumarrik, CRNP, 0 3 mg at 09/13/21 1558    gabapentin (NEURONTIN) capsule 300 mg, 300 mg, Oral, Daily, Cuiarun Kumarrik, CRNP, 300 mg at 09/14/21 0941    hydrALAZINE (APRESOLINE) tablet 100 mg, 100 mg, Oral, Q8H [address], Cuiyin Stacyrik, CRNP, 100 mg at 09/13/21 1500    insulin glargine (LANTUS) subcutaneous injection 12 Units 0 12 mL, 12 Units, Subcutaneous, QAM, Deloris Kumarrik, CRNP, 12 Units at 09/14/21 0938    insulin lispro (HumaLOG) 100 units/mL subcutaneous injection 1-5 Units, 1-5 Units, Subcutaneous, HS, Veronicaiarun Kumarrik, CRNP, 2 Units at 09/13/21 2141    insulin lispro (HumaLOG) 100 units/mL subcutaneous injection 1-6 Units, 1-6 Units, Subcutaneous, TID AC, 1 Units at 09/14/21 0937 **AND** Fingerstick Glucose (POCT), , , TID AC, Deloris Butler, MARLENA    iron sucrose (VENOFER) 200 mg in sodium chloride 0 9 % 100 mL IVPB, 200 mg, Intravenous, Daily, MARLENA Parekh, Last Rate: 0 mL/hr at 09/12/21 1949, 200 mg at 09/13/21 1726    Labetalol HCl (NORMODYNE) injection 20 mg, 20 mg, Intravenous, Q4H PRN, MARLENA Parekh, 20 mg at 09/13/21 1128    NIFEdipine (PROCARDIA XL) 24 hr tablet 30 mg, 30 mg, Oral, After Almond Severance, DO, 30 mg at 09/13/21 1725    NIFEdipine (PROCARDIA XL) 24 hr tablet 60 mg, 60 mg, Oral, Daily, Ira Rowley DO    ondansetron [facility]) injection 4 mg, 4 mg, Intravenous, Q6H PRN, JudeMARLENA Teixeira, 4 mg at 09/07/21 0823    terazosin (HYTRIN) capsule 5 mg, 5 mg, Oral, HS, Deloris KumarMARLENA gresham, 5 mg at 09/13/21 2136    Laboratory Results:  Results from last 7 days   Lab Units 09/14/21  0542 09/13/21  0537 09/12/21  0649 09/11/21  0324 09/10/21  0548 09/09/21  0457 09/08/21  0514 09/08/21  0514   WBC Thousand/uL 5 38 5 93 6 37  --  5 46 5 95   < >  --    HEMOGLOBIN g/dL 7 9* 8 3* 8 1*  --  8 3* 8 1*   < >  --    HEMATOCRIT % 24 9* 26 4* 26 0*  --  26 3* 26 1*   < >  --    PLATELETS Thousands/uL 211 230 215  --  216 234   < >  --    SODIUM mmol/L 137 138 137   < > 139 138  --  140   POTASSIUM mmol/L 5 2 5 2 5 1   < > 4 3 4 1  --  4 1   CHLORIDE mmol/L 102 102 102   < > 104 103  --  101   CO2 mmol/L 25 23 25   < > 27 27  --  28   BUN mg/dL 63* 61* 55*   < > 43* 47*  --  47*   CREATININE mg/dL 3 20* 3 09* 2 93*   < > 2 81* 3 06*  --  3 41*   CALCIUM mg/dL 7 8* 8 0* 8 0*   < > 7 9* 7 3*  --  7 4*   MAGNESIUM mg/dL  --  2 2 2 0  --  2 3  --   --  2 2   PHOSPHORUS mg/dL  --  4 4* 4 6*  --   --   --   --  4 4*   ALK PHOS U/L  --   --  69  --   --  60  --  59   ALT U/L  --   --  17  --   --  13  --  12   AST U/L  --   --  18  --   --  12  --  12    < > = values in this interval not displayed

## 2021-09-14 NOTE — PLAN OF CARE
Problem: Potential for Falls  Goal: Patient will remain free of falls  Description: INTERVENTIONS:  - Educate patient/family on patient safety including physical limitations  - Instruct patient to call for assistance with activity   - Consult OT/PT to assist with strengthening/mobility   - Keep Call bell within reach  - Keep bed low and locked with side rails adjusted as appropriate  - Keep care items and personal belongings within reach  - Initiate and maintain comfort rounds  - Make Fall Risk Sign visible to staff  - Offer Toileting every Hours, in advance of need  - Initiate/Maintain alarm  - Obtain necessary fall risk management equipment:   - Apply yellow socks and bracelet for high fall risk patients  - Consider moving patient to room near nurses station  Outcome: Progressing     Problem: CARDIOVASCULAR - ADULT  Goal: Maintains optimal cardiac output and hemodynamic stability  Description: INTERVENTIONS:  - Monitor I/O, vital signs and rhythm  - Monitor for S/S and trends of decreased cardiac output  - Administer and titrate ordered vasoactive medications to optimize hemodynamic stability  - Assess quality of pulses, skin color and temperature  - Assess for signs of decreased coronary artery perfusion  - Instruct patient to report change in severity of symptoms  Outcome: Progressing  Goal: Absence of cardiac dysrhythmias or at baseline rhythm  Description: INTERVENTIONS:  - Continuous cardiac monitoring, vital signs, obtain 12 lead EKG if ordered  - Administer antiarrhythmic and heart rate control medications as ordered  - Monitor electrolytes and administer replacement therapy as ordered  Outcome: Progressing     Problem: SAFETY ADULT  Goal: Patient will remain free of falls  Description: INTERVENTIONS:  - Educate patient/family on patient safety including physical limitations  - Instruct patient to call for assistance with activity   - Consult OT/PT to assist with strengthening/mobility   - Keep Call bell within reach  - Keep bed low and locked with side rails adjusted as appropriate  - Keep care items and personal belongings within reach  - Initiate and maintain comfort rounds  - Make Fall Risk Sign visible to staff  - Offer Toileting every  Hours, in advance of need  - Initiate/Maintain alarm  - Obtain necessary fall risk management equipment  - Apply yellow socks and bracelet for high fall risk patients  - Consider moving patient to room near nurses station  Outcome: Progressing  Goal: Maintain or return to baseline ADL function  Description: INTERVENTIONS:  -  Assess patient's ability to carry out ADLs; assess patient's baseline for ADL function and identify physical deficits which impact ability to perform ADLs (bathing, care of mouth/teeth, toileting, grooming, dressing, etc )  - Assess/evaluate cause of self-care deficits   - Assess range of motion  - Assess patient's mobility; develop plan if impaired  - Assess patient's need for assistive devices and provide as appropriate  - Encourage maximum independence but intervene and supervise when necessary  - Involve family in performance of ADLs  - Assess for home care needs following discharge   - Consider OT consult to assist with ADL evaluation and planning for discharge  - Provide patient education as appropriate  Outcome: Progressing     Problem: DISCHARGE PLANNING  Goal: Discharge to home or other facility with appropriate resources  Description: INTERVENTIONS:  - Identify barriers to discharge w/patient and caregiver  - Arrange for needed discharge resources and transportation as appropriate  - Identify discharge learning needs (meds, wound care, etc )  - Arrange for interpretive services to assist at discharge as needed  - Refer to Case Management Department for coordinating discharge planning if the patient needs post-hospital services based on physician/advanced practitioner order or complex needs related to functional status, cognitive ability, or social support system  Outcome: Progressing     Problem: MOBILITY - ADULT  Goal: Maintain or return to baseline ADL function  Description: INTERVENTIONS:  -  Assess patient's ability to carry out ADLs; assess patient's baseline for ADL function and identify physical deficits which impact ability to perform ADLs (bathing, care of mouth/teeth, toileting, grooming, dressing, etc )  - Assess/evaluate cause of self-care deficits   - Assess range of motion  - Assess patient's mobility; develop plan if impaired  - Assess patient's need for assistive devices and provide as appropriate  - Encourage maximum independence but intervene and supervise when necessary  - Involve family in performance of ADLs  - Assess for home care needs following discharge   - Consider OT consult to assist with ADL evaluation and planning for discharge  - Provide patient education as appropriate  Outcome: Progressing  Goal: Maintains/Returns to pre admission functional level  Description: INTERVENTIONS:  - Perform BMAT or MOVE assessment daily    - Set and communicate daily mobility goal to care team and patient/family/caregiver  - Collaborate with rehabilitation services on mobility goals if consulted  - Perform Range of Motion  times a day  - Reposition patient every  hours    - Dangle patient  times a day  - Stand patient  times a day  - Ambulate patient  times a day  - Out of bed to chair  times a day   - Out of bed for meals  times a day  - Out of bed for toileting  - Record patient progress and toleration of activity level   Outcome: Progressing

## 2021-09-14 NOTE — ASSESSMENT & PLAN NOTE
Wt Readings from Last 3 Encounters:   09/14/21 74 7 kg (164 lb 10 9 oz)   08/31/21 71 2 kg (157 lb)   08/26/21 70 4 kg (155 lb 3 2 oz)     · She is euvolemic

## 2021-09-14 NOTE — ASSESSMENT & PLAN NOTE
· Pt presented with home SBP >230 despite being compliant with her medications  · Renal vascular Dopplers from 07/31/2021 showing no renal artery stenosis right kidney 9 3 cm left kidney 10 2 cm  · Hypertensive urgency on multiple agents  Nephrology following  · Home medications include: Coreg 25 mg p o  B i d , clonidine 0 2 mg p o  T i d , hydralazine 25 mg p o  B i d  torsemide 20 mg p o  Q 48 hours, Hytrin 5 mg p o  Q h s   · Current medications include: Coreg 25 mg p o  B i d , clonidine 0 3 mg p o  t i d , hydralazine 100 mg p o  t i d , Hytrin 5 mg p o  Q day,procardia 60mg in am and 30 mg in pm    Renal following    Kidney biopsy tomorrow

## 2021-09-14 NOTE — ASSESSMENT & PLAN NOTE
Lab Results   Component Value Date    EGFR 15 09/14/2021    EGFR 15 09/13/2021    EGFR 16 09/12/2021    CREATININE 3 20 (H) 09/14/2021    CREATININE 3 09 (H) 09/13/2021    CREATININE 2 93 (H) 09/12/2021     · Appreciate nephrology recommendations  · Worsening creatinine  · Diuretics on hold  · Renal biopsy pending for tomorrow

## 2021-09-15 ENCOUNTER — HOSPITAL ENCOUNTER (OUTPATIENT)
Dept: CT IMAGING | Facility: HOSPITAL | Age: 66
Discharge: HOME | DRG: 292 | End: 2021-09-15
Payer: MEDICARE

## 2021-09-15 ENCOUNTER — APPOINTMENT (INPATIENT)
Dept: CT IMAGING | Facility: HOSPITAL | Age: 66
DRG: 292 | End: 2021-09-15
Payer: MEDICARE

## 2021-09-15 VITALS
SYSTOLIC BLOOD PRESSURE: 148 MMHG | DIASTOLIC BLOOD PRESSURE: 68 MMHG | RESPIRATION RATE: 12 BRPM | HEART RATE: 72 BPM | OXYGEN SATURATION: 96 %

## 2021-09-15 DIAGNOSIS — D47.2 MONOCLONAL GAMMOPATHY: ICD-10-CM

## 2021-09-15 LAB
ANION GAP SERPL CALCULATED.3IONS-SCNC: 10 MMOL/L (ref 4–13)
BUN SERPL-MCNC: 61 MG/DL (ref 5–25)
CALCIUM SERPL-MCNC: 7.9 MG/DL (ref 8.3–10.1)
CHLORIDE SERPL-SCNC: 104 MMOL/L (ref 100–108)
CO2 SERPL-SCNC: 23 MMOL/L (ref 21–32)
CREAT SERPL-MCNC: 2.91 MG/DL (ref 0.6–1.3)
ERYTHROCYTE [DISTWIDTH] IN BLOOD BY AUTOMATED COUNT: 15.3 % (ref 11.6–15.1)
GFR SERPL CREATININE-BSD FRML MDRD: 16 ML/MIN/1.73SQ M
GLUCOSE SERPL-MCNC: 146 MG/DL (ref 65–140)
GLUCOSE SERPL-MCNC: 156 MG/DL (ref 65–140)
GLUCOSE SERPL-MCNC: 166 MG/DL (ref 65–140)
GLUCOSE SERPL-MCNC: 169 MG/DL (ref 65–140)
GLUCOSE SERPL-MCNC: 235 MG/DL (ref 65–140)
HCT VFR BLD AUTO: 24.7 % (ref 34.8–46.1)
HGB BLD-MCNC: 7.9 G/DL (ref 11.5–15.4)
HIV 1+2 AB+HIV1 P24 AG SERPL QL IA: NORMAL
HIV1 P24 AG SER QL: NORMAL
MCH RBC QN AUTO: 27.4 PG (ref 26.8–34.3)
MCHC RBC AUTO-ENTMCNC: 32 G/DL (ref 31.4–37.4)
MCV RBC AUTO: 86 FL (ref 82–98)
METANEPH FREE SERPL-MCNC: <10 PG/ML (ref 0–88)
NORMETANEPHRINE SERPL-MCNC: 23.4 PG/ML (ref 0–191.8)
PLATELET # BLD AUTO: 230 THOUSANDS/UL (ref 149–390)
PMV BLD AUTO: 11 FL (ref 8.9–12.7)
POTASSIUM SERPL-SCNC: 5.3 MMOL/L (ref 3.5–5.3)
RBC # BLD AUTO: 2.88 MILLION/UL (ref 3.81–5.12)
SODIUM SERPL-SCNC: 137 MMOL/L (ref 136–145)
WBC # BLD AUTO: 6.69 THOUSAND/UL (ref 4.31–10.16)

## 2021-09-15 PROCEDURE — 88237 TISSUE CULTURE BONE MARROW: CPT | Performed by: INTERNAL MEDICINE

## 2021-09-15 PROCEDURE — 88365 INSITU HYBRIDIZATION (FISH): CPT | Performed by: PATHOLOGY

## 2021-09-15 PROCEDURE — 88342 IMHCHEM/IMCYTCHM 1ST ANTB: CPT | Performed by: PATHOLOGY

## 2021-09-15 PROCEDURE — 88300 SURGICAL PATH GROSS: CPT | Performed by: PATHOLOGY

## 2021-09-15 PROCEDURE — 80048 BASIC METABOLIC PNL TOTAL CA: CPT | Performed by: INTERNAL MEDICINE

## 2021-09-15 PROCEDURE — 85027 COMPLETE CBC AUTOMATED: CPT | Performed by: INTERNAL MEDICINE

## 2021-09-15 PROCEDURE — 87806 HIV AG W/HIV1&2 ANTB W/OPTIC: CPT | Performed by: INTERNAL MEDICINE

## 2021-09-15 PROCEDURE — 88341 IMHCHEM/IMCYTCHM EA ADD ANTB: CPT | Performed by: PATHOLOGY

## 2021-09-15 PROCEDURE — 88313 SPECIAL STAINS GROUP 2: CPT | Performed by: INTERNAL MEDICINE

## 2021-09-15 PROCEDURE — 50200 RENAL BIOPSY PERQ: CPT

## 2021-09-15 PROCEDURE — 88346 IMFLUOR 1ST 1ANTB STAIN PX: CPT | Performed by: INTERNAL MEDICINE

## 2021-09-15 PROCEDURE — 88184 FLOWCYTOMETRY/ TC 1 MARKER: CPT | Performed by: INTERNAL MEDICINE

## 2021-09-15 PROCEDURE — 50200 RENAL BIOPSY PERQ: CPT | Performed by: INTERNAL MEDICINE

## 2021-09-15 PROCEDURE — 88185 FLOWCYTOMETRY/TC ADD-ON: CPT

## 2021-09-15 PROCEDURE — 88262 CHROMOSOME ANALYSIS 15-20: CPT | Performed by: INTERNAL MEDICINE

## 2021-09-15 PROCEDURE — NC001 PR NO CHARGE: Performed by: INTERNAL MEDICINE

## 2021-09-15 PROCEDURE — 99153 MOD SED SAME PHYS/QHP EA: CPT

## 2021-09-15 PROCEDURE — 88360 TUMOR IMMUNOHISTOCHEM/MANUAL: CPT | Performed by: PATHOLOGY

## 2021-09-15 PROCEDURE — 99232 SBSQ HOSP IP/OBS MODERATE 35: CPT | Performed by: INTERNAL MEDICINE

## 2021-09-15 PROCEDURE — 07DR3ZX EXTRACTION OF ILIAC BONE MARROW, PERCUTANEOUS APPROACH, DIAGNOSTIC: ICD-10-PCS | Performed by: RADIOLOGY

## 2021-09-15 PROCEDURE — 88350 IMFLUOR EA ADDL 1ANTB STN PX: CPT | Performed by: INTERNAL MEDICINE

## 2021-09-15 PROCEDURE — 38222 DX BONE MARROW BX & ASPIR: CPT

## 2021-09-15 PROCEDURE — 88305 TISSUE EXAM BY PATHOLOGIST: CPT | Performed by: INTERNAL MEDICINE

## 2021-09-15 PROCEDURE — 88313 SPECIAL STAINS GROUP 2: CPT | Performed by: PATHOLOGY

## 2021-09-15 PROCEDURE — 88374 M/PHMTRC ALYS ISHQUANT/SEMIQ: CPT

## 2021-09-15 PROCEDURE — 99152 MOD SED SAME PHYS/QHP 5/>YRS: CPT

## 2021-09-15 PROCEDURE — 88311 DECALCIFY TISSUE: CPT | Performed by: PATHOLOGY

## 2021-09-15 PROCEDURE — 88305 TISSUE EXAM BY PATHOLOGIST: CPT | Performed by: PATHOLOGY

## 2021-09-15 PROCEDURE — 86803 HEPATITIS C AB TEST: CPT | Performed by: INTERNAL MEDICINE

## 2021-09-15 PROCEDURE — 88329 PATH CONSLTJ DRG SURG: CPT | Performed by: PATHOLOGY

## 2021-09-15 PROCEDURE — 88348 ELECTRON MICROSCOPY DX: CPT | Performed by: INTERNAL MEDICINE

## 2021-09-15 PROCEDURE — 38222 DX BONE MARROW BX & ASPIR: CPT | Performed by: INTERNAL MEDICINE

## 2021-09-15 PROCEDURE — 81450 HL NEO GSAP 5-50DNA/DNA&RNA: CPT | Performed by: INTERNAL MEDICINE

## 2021-09-15 PROCEDURE — 82948 REAGENT STRIP/BLOOD GLUCOSE: CPT

## 2021-09-15 PROCEDURE — 88364 INSITU HYBRIDIZATION (FISH): CPT | Performed by: PATHOLOGY

## 2021-09-15 PROCEDURE — 77012 CT SCAN FOR NEEDLE BIOPSY: CPT | Performed by: INTERNAL MEDICINE

## 2021-09-15 PROCEDURE — 0TB03ZX EXCISION OF RIGHT KIDNEY, PERCUTANEOUS APPROACH, DIAGNOSTIC: ICD-10-PCS | Performed by: INTERNAL MEDICINE

## 2021-09-15 PROCEDURE — 88373 M/PHMTRC ALYS ISHQUANT/SEMIQ: CPT

## 2021-09-15 PROCEDURE — 87340 HEPATITIS B SURFACE AG IA: CPT | Performed by: INTERNAL MEDICINE

## 2021-09-15 PROCEDURE — 88367 INSITU HYBRIDIZATION AUTO: CPT | Performed by: INTERNAL MEDICINE

## 2021-09-15 PROCEDURE — 85097 BONE MARROW INTERPRETATION: CPT | Performed by: PATHOLOGY

## 2021-09-15 PROCEDURE — 99152 MOD SED SAME PHYS/QHP 5/>YRS: CPT | Performed by: INTERNAL MEDICINE

## 2021-09-15 RX ORDER — NIFEDIPINE 30 MG/1
60 TABLET, EXTENDED RELEASE ORAL 2 TIMES DAILY
Status: DISCONTINUED | OUTPATIENT
Start: 2021-09-15 | End: 2021-09-17 | Stop reason: HOSPADM

## 2021-09-15 RX ORDER — LIDOCAINE WITH 8.4% SOD BICARB 0.9%(10ML)
SYRINGE (ML) INJECTION CODE/TRAUMA/SEDATION MEDICATION
Status: COMPLETED | OUTPATIENT
Start: 2021-09-15 | End: 2021-09-15

## 2021-09-15 RX ORDER — FENTANYL CITRATE 50 UG/ML
INJECTION, SOLUTION INTRAMUSCULAR; INTRAVENOUS CODE/TRAUMA/SEDATION MEDICATION
Status: COMPLETED | OUTPATIENT
Start: 2021-09-15 | End: 2021-09-15

## 2021-09-15 RX ORDER — MIDAZOLAM HYDROCHLORIDE 2 MG/2ML
INJECTION, SOLUTION INTRAMUSCULAR; INTRAVENOUS CODE/TRAUMA/SEDATION MEDICATION
Status: COMPLETED | OUTPATIENT
Start: 2021-09-15 | End: 2021-09-15

## 2021-09-15 RX ADMIN — INSULIN LISPRO 2 UNITS: 100 INJECTION, SOLUTION INTRAVENOUS; SUBCUTANEOUS at 22:25

## 2021-09-15 RX ADMIN — CLONIDINE HYDROCHLORIDE 0.3 MG: 0.1 TABLET ORAL at 11:24

## 2021-09-15 RX ADMIN — INSULIN LISPRO 1 UNITS: 100 INJECTION, SOLUTION INTRAVENOUS; SUBCUTANEOUS at 11:27

## 2021-09-15 RX ADMIN — MIDAZOLAM 1 MG: 1 INJECTION INTRAMUSCULAR; INTRAVENOUS at 09:15

## 2021-09-15 RX ADMIN — GABAPENTIN 300 MG: 300 CAPSULE ORAL at 11:24

## 2021-09-15 RX ADMIN — FENTANYL CITRATE 50 MCG: 50 INJECTION INTRAMUSCULAR; INTRAVENOUS at 09:15

## 2021-09-15 RX ADMIN — Medication 10 ML: at 09:17

## 2021-09-15 RX ADMIN — CARVEDILOL 25 MG: 25 TABLET, FILM COATED ORAL at 07:09

## 2021-09-15 RX ADMIN — TERAZOSIN HYDROCHLORIDE 5 MG: 5 CAPSULE ORAL at 22:25

## 2021-09-15 RX ADMIN — HYDRALAZINE HYDROCHLORIDE 100 MG: 25 TABLET, FILM COATED ORAL at 15:59

## 2021-09-15 RX ADMIN — CLONIDINE HYDROCHLORIDE 0.3 MG: 0.1 TABLET ORAL at 15:59

## 2021-09-15 RX ADMIN — ATORVASTATIN CALCIUM 40 MG: 40 TABLET, FILM COATED ORAL at 22:25

## 2021-09-15 RX ADMIN — NIFEDIPINE 60 MG: 30 TABLET, FILM COATED, EXTENDED RELEASE ORAL at 11:24

## 2021-09-15 RX ADMIN — HYDRALAZINE HYDROCHLORIDE 100 MG: 25 TABLET, FILM COATED ORAL at 07:09

## 2021-09-15 RX ADMIN — INSULIN GLARGINE 12 UNITS: 100 INJECTION, SOLUTION SUBCUTANEOUS at 11:27

## 2021-09-15 RX ADMIN — FENTANYL CITRATE 50 MCG: 50 INJECTION INTRAMUSCULAR; INTRAVENOUS at 09:08

## 2021-09-15 RX ADMIN — MIDAZOLAM 1 MG: 1 INJECTION INTRAMUSCULAR; INTRAVENOUS at 09:08

## 2021-09-15 RX ADMIN — CARVEDILOL 25 MG: 25 TABLET, FILM COATED ORAL at 15:59

## 2021-09-15 NOTE — PROGRESS NOTES
[address] [place] [address]  Progress Note - Evelin Espino 1955, 73 y o  female MRN: [id number]  Unit/Bed#: E4 -01 Encounter: [id number]  Primary Care Provider: Josh Swanson MD   Date and time admitted to hospital: 9/6/2021  7:36 PM    Acute kidney injury superimposed on CKD [facility])  Assessment & Plan  Lab Results   Component Value Date    EGFR 16 09/15/2021    EGFR 15 09/14/2021    EGFR 15 09/13/2021    CREATININE 2 91 (H) 09/15/2021    CREATININE 3 20 (H) 09/14/2021    CREATININE 3 09 (H) 09/13/2021     · Appreciate nephrology recommendations  · Creatinine at 2 9  · Diuretics on hold  · S/p renal biopsy    Biclonal gammopathy  Assessment & Plan  · Biclonal gammopathy follows with hematology as outpatient   · S/p bone marrow biopsy which is currently pending    Chronic diastolic heart failure (HCC)  Assessment & Plan  Wt Readings from Last 3 Encounters:   09/15/21 73 2 kg (161 lb 4 8 oz)   08/31/21 71 2 kg (157 lb)   08/26/21 70 4 kg (155 lb 3 2 oz)     · She is euvolemic  Type 2 diabetes mellitus, with long-term current use of insulin [facility])  Assessment & Plan    Lab Results   Component Value Date    HGBA1C 9 5 (H) 07/23/2021     Results from last 7 days   Lab Units 09/15/21  1624 09/15/21  1121 09/15/21  0811 09/14/21  2057 09/14/21  1604 09/14/21  1133 09/14/21  0835 09/13/21  2141   POC GLUCOSE mg/dl 156* 169* 146* 142* 166* 234* 175* 251*     · Continue insulin sliding scale and lantus  Will adjust as needed  * Hypertensive urgency  Assessment & Plan  · Pt presented with home SBP >230 despite being compliant with her medications  · Renal vascular Dopplers from 07/31/2021 showing no renal artery stenosis right kidney 9 3 cm left kidney 10 2 cm  · Hypertensive urgency on multiple agents  Nephrology following  · Home medications include: Coreg 25 mg p o  B i d , clonidine 0 2 mg p o  T i d , hydralazine 25 mg p o  B i d  torsemide 20 mg p o  Q 48 hours, Hytrin 5 mg p o  Q h s  · Current medications include: Coreg 25 mg p o  B i d , clonidine 0 3 mg p o  t i d , hydralazine 100 mg p o  t i d , Hytrin 5 mg p o  Q day,procardia 60mg in am and 30 mg in pm    Nephrology is following  S/p renal biopsy      VTE Pharmacologic Prophylaxis: scd    Mechanical VTE Prophylaxis in Place: Yes    Time Spent for Care: 20 minutes  More than 50% of total time spent on counseling and coordination of care as described above  Current Length of Stay: 9 day(s)  Current Patient Status: Inpatient     Discharge Plan / Estimated Discharge Date: greater than 72 hours    Code Status: Level 1 - Full Code      Subjective:   Pt seen and examined  Pt doing well after biopsy  No pain  No f/c no cp no sob no n/v/d no abd pain    Objective:     Vitals:   Temp (24hrs), Av 7 °F (37 1 °C), Min:97 8 °F (36 6 °C), Max:99 5 °F (37 5 °C)    Temp:  [97 8 °F (36 6 °C)-99 5 °F (37 5 °C)] 99 1 °F (37 3 °C)  HR:  [67-90] 67  Resp:  [12-20] 16  BP: (138-208)/(58-88) 148/88  SpO2:  [93 %-100 %] 96 %  Body mass index is 29 5 kg/m²  Input and Output Summary (last 24 hours):     No intake or output data in the 24 hours ending 09/15/21 1831    Physical Exam:   Physical Exam  Constitutional:       Appearance: Normal appearance  HENT:      Head: Normocephalic and atraumatic  Eyes:      Extraocular Movements: Extraocular movements intact  Pupils: Pupils are equal, round, and reactive to light  Cardiovascular:      Rate and Rhythm: Normal rate and regular rhythm  Heart sounds: No murmur heard  No friction rub  No gallop  Pulmonary:      Effort: Pulmonary effort is normal  No respiratory distress  Breath sounds: Normal breath sounds  No wheezing or rales  Abdominal:      General: Bowel sounds are normal  There is no distension  Palpations: Abdomen is soft  Tenderness: There is no abdominal tenderness  There is no guarding     Neurological:      Mental Status: She is alert and oriented to person, place, and time          Additional Data:     Labs:  Results from last 7 days   Lab Units 09/15/21  0540 09/13/21  0537   WBC Thousand/uL 6 69 5 93   HEMOGLOBIN g/dL 7 9* 8 3*   HEMATOCRIT % 24 7* 26 4*   PLATELETS Thousands/uL 230 230   NEUTROS PCT %  --  62   LYMPHS PCT %  --  21   MONOS PCT %  --  10   EOS PCT %  --  5     Results from last 7 days   Lab Units 09/15/21  0540 09/12/21  0649   SODIUM mmol/L 137 137   POTASSIUM mmol/L 5 3 5 1   CHLORIDE mmol/L 104 102   CO2 mmol/L 23 25   BUN mg/dL 61* 55*   CREATININE mg/dL 2 91* 2 93*   ANION GAP mmol/L 10 10   CALCIUM mg/dL 7 9* 8 0*   ALBUMIN g/dL  --  2 5*   TOTAL BILIRUBIN mg/dL  --  0 20   ALK PHOS U/L  --  69   ALT U/L  --  17   AST U/L  --  18   GLUCOSE RANDOM mg/dL 166* 186*         Results from last 7 days   Lab Units 09/15/21  1624 09/15/21  1121 09/15/21  0811 09/14/21  2057 09/14/21  1604 09/14/21  1133 09/14/21  0835 09/13/21  2141 09/13/21  1545 09/13/21  1123 09/13/21  0833 09/12/21  2120   POC GLUCOSE mg/dl 156* 169* 146* 142* 166* 234* 175* 251* 142* 214* 214* 229*               Recent Cultures (last 7 days):           Lines/Drains:  Invasive Devices     Peripheral Intravenous Line            Peripheral IV 09/13/21 Left Forearm 1 day                Last 24 Hours Medication List:   Current Facility-Administered Medications   Medication Dose Route Frequency Provider Last Rate    acetaminophen  650 mg Oral Q6H PRN MARLENA Parekh      atorvastatin  40 mg Oral After Miguel A & MARLENA Smith      carvedilol  25 mg Oral BID With Meals MARLENA Tomas      cloNIDine  0 3 mg Oral TID MARLENA Parekh      gabapentin  300 mg Oral Daily MARLENA Parekh      hydrALAZINE  100 mg Oral Q8H [facility] MARLENA Parekh      insulin glargine  12 Units Subcutaneous QAM Cuiyin Yurik, CRNP      insulin lispro  1-5 Units Subcutaneous HS MARLENA Parekh      insulin lispro  1-6 Units Subcutaneous TID AC MARLENA Parekh      Labetalol HCl 20 mg Intravenous Q4H PRN MARLENA Parekh      NIFEdipine  60 mg Oral BID Ghassan Rowley DO      ondansetron  4 mg Intravenous Q6H PRN MARLENA Levi      terazosin  5 mg Oral HS MARLENA Parekh          Today, Patient Was Seen By: Bianca Curran DO

## 2021-09-15 NOTE — ASSESSMENT & PLAN NOTE
Wt Readings from Last 3 Encounters:   09/15/21 73 2 kg (161 lb 4 8 oz)   08/31/21 71 2 kg (157 lb)   08/26/21 70 4 kg (155 lb 3 2 oz)     · She is euvolemic

## 2021-09-15 NOTE — INTERVAL H&P NOTE
Update:    H&P reviewed  After examining the patient, I find no changed to the H&P since it had been written  Patient re-evaluated  Accept as history and physical     We will proceed with bone marrow biopsy and renal biopsy today      Eli Siddiqi MD/September 15, 2021/9:03 AM

## 2021-09-15 NOTE — ASSESSMENT & PLAN NOTE
Lab Results   Component Value Date    HGBA1C 9 5 (H) 07/23/2021     Results from last 7 days   Lab Units 09/15/21  1624 09/15/21  1121 09/15/21  0811 09/14/21  2057 09/14/21  1604 09/14/21  1133 09/14/21  0835 09/13/21  2141   POC GLUCOSE mg/dl 156* 169* 146* 142* 166* 234* 175* 251*     · Continue insulin sliding scale and lantus  Will adjust as needed

## 2021-09-15 NOTE — BRIEF OP NOTE (RAD/CATH)
INTERVENTIONAL RADIOLOGY PROCEDURE NOTE    Date: 9/15/2021    Procedure: IR BIOPSY BONE MARROW, IR Random renal biopsy    Preoperative diagnosis:   1  Nephrotic range proteinuria  2  Biclonal gammopathy    Postoperative diagnosis: Same  Surgeon: Ana Contreras MD     Assistant: None  No qualified resident was available  Blood loss: 3 mL    Specimens: Bone marrow aspirate and biopsy obtained, 3 core needle renal biopsy specimens obtained  Findings: CT-guided bone marrow biopsy performed at the right iliac bone  CT and ultrasound-guided right renal biopsy performed  Please see radiology report for details  Complications: None immediate      Anesthesia: conscious sedation

## 2021-09-15 NOTE — PLAN OF CARE
Problem: Potential for Falls  Goal: Patient will remain free of falls  Description: INTERVENTIONS:  - Educate patient/family on patient safety including physical limitations  - Instruct patient to call for assistance with activity   - Consult OT/PT to assist with strengthening/mobility   - Keep Call bell within reach  - Keep bed low and locked with side rails adjusted as appropriate  - Keep care items and personal belongings within reach  - Initiate and maintain comfort rounds  - Make Fall Risk Sign visible to staff  - Offer Toileting every 2 Hours, in advance of need  - Initiate/Maintain bed alarm  - Obtain necessary fall risk management equipment:   - Apply yellow socks and bracelet for high fall risk patients  - Consider moving patient to room near nurses station  Outcome: Progressing     Problem: CARDIOVASCULAR - ADULT  Goal: Maintains optimal cardiac output and hemodynamic stability  Description: INTERVENTIONS:  - Monitor I/O, vital signs and rhythm  - Monitor for S/S and trends of decreased cardiac output  - Administer and titrate ordered vasoactive medications to optimize hemodynamic stability  - Assess quality of pulses, skin color and temperature  - Assess for signs of decreased coronary artery perfusion  - Instruct patient to report change in severity of symptoms  Outcome: Progressing  Goal: Absence of cardiac dysrhythmias or at baseline rhythm  Description: INTERVENTIONS:  - Continuous cardiac monitoring, vital signs, obtain 12 lead EKG if ordered  - Administer antiarrhythmic and heart rate control medications as ordered  - Monitor electrolytes and administer replacement therapy as ordered  Outcome: Progressing     Problem: SAFETY ADULT  Goal: Patient will remain free of falls  Description: INTERVENTIONS:  - Educate patient/family on patient safety including physical limitations  - Instruct patient to call for assistance with activity   - Consult OT/PT to assist with strengthening/mobility   - Keep Call bell within reach  - Keep bed low and locked with side rails adjusted as appropriate  - Keep care items and personal belongings within reach  - Initiate and maintain comfort rounds  - Make Fall Risk Sign visible to staff  - Offer Toileting every 2 Hours, in advance of need  - Initiate/Maintain bed alarm  - Obtain necessary fall risk management equipment:   - Apply yellow socks and bracelet for high fall risk patients  - Consider moving patient to room near nurses station  Outcome: Progressing  Goal: Maintain or return to baseline ADL function  Description: INTERVENTIONS:  -  Assess patient's ability to carry out ADLs; assess patient's baseline for ADL function and identify physical deficits which impact ability to perform ADLs (bathing, care of mouth/teeth, toileting, grooming, dressing, etc )  - Assess/evaluate cause of self-care deficits   - Assess range of motion  - Assess patient's mobility; develop plan if impaired  - Assess patient's need for assistive devices and provide as appropriate  - Encourage maximum independence but intervene and supervise when necessary  - Involve family in performance of ADLs  - Assess for home care needs following discharge   - Consider OT consult to assist with ADL evaluation and planning for discharge  - Provide patient education as appropriate  Outcome: Progressing     Problem: DISCHARGE PLANNING  Goal: Discharge to home or other facility with appropriate resources  Description: INTERVENTIONS:  - Identify barriers to discharge w/patient and caregiver  - Arrange for needed discharge resources and transportation as appropriate  - Identify discharge learning needs (meds, wound care, etc )  - Arrange for interpretive services to assist at discharge as needed  - Refer to Case Management Department for coordinating discharge planning if the patient needs post-hospital services based on physician/advanced practitioner order or complex needs related to functional status, cognitive ability, or social support system  Outcome: Progressing     Problem: MOBILITY - ADULT  Goal: Maintain or return to baseline ADL function  Description: INTERVENTIONS:  -  Assess patient's ability to carry out ADLs; assess patient's baseline for ADL function and identify physical deficits which impact ability to perform ADLs (bathing, care of mouth/teeth, toileting, grooming, dressing, etc )  - Assess/evaluate cause of self-care deficits   - Assess range of motion  - Assess patient's mobility; develop plan if impaired  - Assess patient's need for assistive devices and provide as appropriate  - Encourage maximum independence but intervene and supervise when necessary  - Involve family in performance of ADLs  - Assess for home care needs following discharge   - Consider OT consult to assist with ADL evaluation and planning for discharge  - Provide patient education as appropriate  Outcome: Progressing  Goal: Maintains/Returns to pre admission functional level  Description: INTERVENTIONS:  - Perform BMAT or MOVE assessment daily    - Set and communicate daily mobility goal to care team and patient/family/caregiver  - Collaborate with rehabilitation services on mobility goals if consulted  - Perform Range of Motion 2 times a day

## 2021-09-15 NOTE — ASSESSMENT & PLAN NOTE
Lab Results   Component Value Date    EGFR 16 09/15/2021    EGFR 15 09/14/2021    EGFR 15 09/13/2021    CREATININE 2 91 (H) 09/15/2021    CREATININE 3 20 (H) 09/14/2021    CREATININE 3 09 (H) 09/13/2021     · Appreciate nephrology recommendations  · Creatinine at 2 9  · Diuretics on hold  · S/p renal biopsy

## 2021-09-15 NOTE — ASSESSMENT & PLAN NOTE
· Biclonal gammopathy follows with hematology as outpatient   · S/p bone marrow biopsy which is currently pending

## 2021-09-16 LAB
ANION GAP SERPL CALCULATED.3IONS-SCNC: 8 MMOL/L (ref 4–13)
BUN SERPL-MCNC: 60 MG/DL (ref 5–25)
CALCIUM SERPL-MCNC: 7.8 MG/DL (ref 8.3–10.1)
CHLORIDE SERPL-SCNC: 104 MMOL/L (ref 100–108)
CO2 SERPL-SCNC: 26 MMOL/L (ref 21–32)
CREAT SERPL-MCNC: 3.35 MG/DL (ref 0.6–1.3)
ERYTHROCYTE [DISTWIDTH] IN BLOOD BY AUTOMATED COUNT: 15.6 % (ref 11.6–15.1)
GFR SERPL CREATININE-BSD FRML MDRD: 14 ML/MIN/1.73SQ M
GLUCOSE SERPL-MCNC: 107 MG/DL (ref 65–140)
GLUCOSE SERPL-MCNC: 151 MG/DL (ref 65–140)
GLUCOSE SERPL-MCNC: 156 MG/DL (ref 65–140)
GLUCOSE SERPL-MCNC: 214 MG/DL (ref 65–140)
GLUCOSE SERPL-MCNC: 242 MG/DL (ref 65–140)
GLUCOSE SERPL-MCNC: 294 MG/DL (ref 65–140)
HBV SURFACE AG SER QL: NORMAL
HCT VFR BLD AUTO: 25.8 % (ref 34.8–46.1)
HCV AB SER QL: NORMAL
HGB BLD-MCNC: 8.1 G/DL (ref 11.5–15.4)
MCH RBC QN AUTO: 27 PG (ref 26.8–34.3)
MCHC RBC AUTO-ENTMCNC: 31.4 G/DL (ref 31.4–37.4)
MCV RBC AUTO: 86 FL (ref 82–98)
PLATELET # BLD AUTO: 236 THOUSANDS/UL (ref 149–390)
PMV BLD AUTO: 10.4 FL (ref 8.9–12.7)
POTASSIUM SERPL-SCNC: 5.3 MMOL/L (ref 3.5–5.3)
RBC # BLD AUTO: 3 MILLION/UL (ref 3.81–5.12)
SODIUM SERPL-SCNC: 138 MMOL/L (ref 136–145)
WBC # BLD AUTO: 6.45 THOUSAND/UL (ref 4.31–10.16)

## 2021-09-16 PROCEDURE — 99232 SBSQ HOSP IP/OBS MODERATE 35: CPT | Performed by: INTERNAL MEDICINE

## 2021-09-16 PROCEDURE — 82948 REAGENT STRIP/BLOOD GLUCOSE: CPT

## 2021-09-16 PROCEDURE — 80048 BASIC METABOLIC PNL TOTAL CA: CPT | Performed by: INTERNAL MEDICINE

## 2021-09-16 PROCEDURE — 85027 COMPLETE CBC AUTOMATED: CPT | Performed by: INTERNAL MEDICINE

## 2021-09-16 RX ADMIN — CARVEDILOL 25 MG: 25 TABLET, FILM COATED ORAL at 17:44

## 2021-09-16 RX ADMIN — INSULIN GLARGINE 12 UNITS: 100 INJECTION, SOLUTION SUBCUTANEOUS at 09:07

## 2021-09-16 RX ADMIN — HYDRALAZINE HYDROCHLORIDE 100 MG: 25 TABLET, FILM COATED ORAL at 13:08

## 2021-09-16 RX ADMIN — CARVEDILOL 25 MG: 25 TABLET, FILM COATED ORAL at 09:06

## 2021-09-16 RX ADMIN — INSULIN LISPRO 2 UNITS: 100 INJECTION, SOLUTION INTRAVENOUS; SUBCUTANEOUS at 17:44

## 2021-09-16 RX ADMIN — INSULIN LISPRO 4 UNITS: 100 INJECTION, SOLUTION INTRAVENOUS; SUBCUTANEOUS at 11:05

## 2021-09-16 RX ADMIN — NIFEDIPINE 60 MG: 30 TABLET, FILM COATED, EXTENDED RELEASE ORAL at 23:00

## 2021-09-16 RX ADMIN — ATORVASTATIN CALCIUM 40 MG: 40 TABLET, FILM COATED ORAL at 17:44

## 2021-09-16 RX ADMIN — HYDRALAZINE HYDROCHLORIDE 100 MG: 25 TABLET, FILM COATED ORAL at 22:59

## 2021-09-16 RX ADMIN — CLONIDINE HYDROCHLORIDE 0.3 MG: 0.1 TABLET ORAL at 17:44

## 2021-09-16 RX ADMIN — GABAPENTIN 300 MG: 300 CAPSULE ORAL at 09:05

## 2021-09-16 RX ADMIN — CLONIDINE HYDROCHLORIDE 0.3 MG: 0.1 TABLET ORAL at 23:00

## 2021-09-16 NOTE — ASSESSMENT & PLAN NOTE
Lab Results   Component Value Date    HGBA1C 9 5 (H) 07/23/2021     Results from last 7 days   Lab Units 09/16/21  1632 09/16/21  1053 09/16/21  0722 09/15/21  2144 09/15/21  1624 09/15/21  1121 09/15/21  0811 09/14/21  2057   POC GLUCOSE mg/dl 214* 294* 107 235* 156* 169* 146* 142*     · Continue insulin sliding scale and lantus  Will adjust as needed

## 2021-09-16 NOTE — PLAN OF CARE
Problem: Potential for Falls  Goal: Patient will remain free of falls  Description: INTERVENTIONS:  - Educate patient/family on patient safety including physical limitations  - Instruct patient to call for assistance with activity   - Consult OT/PT to assist with strengthening/mobility   - Keep Call bell within reach  - Keep bed low and locked with side rails adjusted as appropriate  - Keep care items and personal belongings within reach  - Initiate and maintain comfort rounds  - Make Fall Risk Sign visible to staff  - Offer Toileting every  Hours, in advance of need  - Initiate/Maintain alarm  - Obtain necessary fall risk management equipment:   - Apply yellow socks and bracelet for high fall risk patients  - Consider moving patient to room near nurses station  Outcome: Progressing     Problem: CARDIOVASCULAR - ADULT  Goal: Maintains optimal cardiac output and hemodynamic stability  Description: INTERVENTIONS:  - Monitor I/O, vital signs and rhythm  - Monitor for S/S and trends of decreased cardiac output  - Administer and titrate ordered vasoactive medications to optimize hemodynamic stability  - Assess quality of pulses, skin color and temperature  - Assess for signs of decreased coronary artery perfusion  - Instruct patient to report change in severity of symptoms  Outcome: Progressing  Goal: Absence of cardiac dysrhythmias or at baseline rhythm  Description: INTERVENTIONS:  - Continuous cardiac monitoring, vital signs, obtain 12 lead EKG if ordered  - Administer antiarrhythmic and heart rate control medications as ordered  - Monitor electrolytes and administer replacement therapy as ordered  Outcome: Progressing     Problem: SAFETY ADULT  Goal: Patient will remain free of falls  Description: INTERVENTIONS:  - Educate patient/family on patient safety including physical limitations  - Instruct patient to call for assistance with activity   - Consult OT/PT to assist with strengthening/mobility   - Keep Call bell within reach  - Keep bed low and locked with side rails adjusted as appropriate  - Keep care items and personal belongings within reach  - Initiate and maintain comfort rounds  - Make Fall Risk Sign visible to staff  - Offer Toileting every  Hours, in advance of need  - Initiate/Maintain alarm  - Obtain necessary fall risk management equipment:   - Apply yellow socks and bracelet for high fall risk patients  - Consider moving patient to room near nurses station  Outcome: Progressing  Goal: Maintain or return to baseline ADL function  Description: INTERVENTIONS:  -  Assess patient's ability to carry out ADLs; assess patient's baseline for ADL function and identify physical deficits which impact ability to perform ADLs (bathing, care of mouth/teeth, toileting, grooming, dressing, etc )  - Assess/evaluate cause of self-care deficits   - Assess range of motion  - Assess patient's mobility; develop plan if impaired  - Assess patient's need for assistive devices and provide as appropriate  - Encourage maximum independence but intervene and supervise when necessary  - Involve family in performance of ADLs  - Assess for home care needs following discharge   - Consider OT consult to assist with ADL evaluation and planning for discharge  - Provide patient education as appropriate  Outcome: Progressing     Problem: DISCHARGE PLANNING  Goal: Discharge to home or other facility with appropriate resources  Description: INTERVENTIONS:  - Identify barriers to discharge w/patient and caregiver  - Arrange for needed discharge resources and transportation as appropriate  - Identify discharge learning needs (meds, wound care, etc )  - Arrange for interpretive services to assist at discharge as needed  - Refer to Case Management Department for coordinating discharge planning if the patient needs post-hospital services based on physician/advanced practitioner order or complex needs related to functional status, cognitive ability, or social support system  Outcome: Progressing     Problem: MOBILITY - ADULT  Goal: Maintain or return to baseline ADL function  Description: INTERVENTIONS:  -  Assess patient's ability to carry out ADLs; assess patient's baseline for ADL function and identify physical deficits which impact ability to perform ADLs (bathing, care of mouth/teeth, toileting, grooming, dressing, etc )  - Assess/evaluate cause of self-care deficits   - Assess range of motion  - Assess patient's mobility; develop plan if impaired  - Assess patient's need for assistive devices and provide as appropriate  - Encourage maximum independence but intervene and supervise when necessary  - Involve family in performance of ADLs  - Assess for home care needs following discharge   - Consider OT consult to assist with ADL evaluation and planning for discharge  - Provide patient education as appropriate  Outcome: Progressing  Goal: Maintains/Returns to pre admission functional level  Description: INTERVENTIONS:  - Perform BMAT or MOVE assessment daily    - Set and communicate daily mobility goal to care team and patient/family/caregiver  - Collaborate with rehabilitation services on mobility goals if consulted  - Perform Range of Motion times a day  - Reposition patient every  hours    - Dangle patient  times a day  - Stand patient  times a day  - Ambulate patient  times a day  - Out of bed to chair  times a day   - Out of bed for meals  times a day  - Out of bed for toileting  - Record patient progress and toleration of activity level   Outcome: Progressing

## 2021-09-16 NOTE — ASSESSMENT & PLAN NOTE
Lab Results   Component Value Date    EGFR 14 09/16/2021    EGFR 16 09/15/2021    EGFR 15 09/14/2021    CREATININE 3 35 (H) 09/16/2021    CREATININE 2 91 (H) 09/15/2021    CREATININE 3 20 (H) 09/14/2021     · Appreciate nephrology recommendations  · Creatinine at 2 9  · Diuretics on hold  · S/p renal biopsy  · Anticipate d/c tomorrow with follow up with nephrology for biopsy results

## 2021-09-16 NOTE — ASSESSMENT & PLAN NOTE
· Pt presented with home SBP >230 despite being compliant with her medications  · Renal vascular Dopplers from 07/31/2021 showing no renal artery stenosis right kidney 9 3 cm left kidney 10 2 cm  · Hypertensive urgency on multiple agents  Nephrology following  · Home medications include: Coreg 25 mg p o  B i d , clonidine 0 2 mg p o  T i d , hydralazine 25 mg p o  B i d  torsemide 20 mg p o  Q 48 hours, Hytrin 5 mg p o  Q h s   · Current medications include: Coreg 25 mg p o  B i d , clonidine 0 3 mg p o  t i d , hydralazine 100 mg p o  t i d , Hytrin 5 mg p o   Q day,procardia 60mg in am and 30 mg in pm    Nephrology is following  S/p renal biopsy

## 2021-09-16 NOTE — ASSESSMENT & PLAN NOTE
Lab Results   Component Value Date    EGFR 14 09/16/2021    EGFR 16 09/15/2021    EGFR 15 09/14/2021    CREATININE 3 35 (H) 09/16/2021    CREATININE 2 91 (H) 09/15/2021    CREATININE 3 20 (H) 09/14/2021     · Baseline hemoglobin appears to be around 8-9's    · Iron study showed iron 42, ferritin 24  · IV Venofer 200mg daily x 5 doses per renal  · Monitor CBC

## 2021-09-16 NOTE — UTILIZATION REVIEW
Continued Stay Review    Date: 9/16/21                          Current Patient Class: IP  Current Level of Care: MS    HPI:66 y o  female initially admitted on 9/6 Acute on chronic kidney disease with rise in creatinine likely secondary to hemodynamic changes given accelerated hypertension now with improved blood pressure control  CKD stage 4 with baseline creatinine 2 6-3 0  Assessment/Plan:   Pt had bone marrow biopsy 9/15 from R iliac bone  CT and  UA R renal biopsy done too  Pt tolerated well  BP well controlled  Creat 3 35 today - likely fluctuant in lieu of improved blood pressure control  Diuretics on hold presently        Vital Signs:   09/16/21 13:06:55  --  72  --  155/75  102  91 %  --  Sitting   09/16/21 1300  --  73  --  --  --  94 %  --  --   09/16/21 1100  --  76  --  --  --  96 %  --  --   09/16/21 09:42:07  97 8 °F (36 6 °C)  74  20  145/73  97  96 %  None (Room air)  Lying   09/16/21 0800  97 5 °F (36 4 °C)  69  18  121/57  82  97 %  None (Room air)  Lying   09/16/21 0620  --  --  --  127/58  --  --  --  --   09/16/21 0300  98 °F (36 7 °C)  70  16  122/59  --  95 %  None (Room air)  Lying   09/15/21 2353  97 4 °F (36 3 °C)Abnormal   67  --  130/63  --  96 %  None (Room air)  Lying   09/15/21 2158  --  69  16  120/69  89  95 %  None (Room air)  Lying   09/15/21 1900  97 6 °F (36 4 °C)  66  16  103/54  --  94 %  None (Room air)  Lying   09/15/21 1559  99 1 °F (37 3 °C)  67  16  148/88  --  96 %  None (Room air)  --   09/15/21 1100  --  68  --  163/70  --  --  --  --   09/15/21 1047  99 4 °F (37 4 °C)  90  16  152/67  --  95 %  --  Lying   09/15/21 0800  98 1 °F (36 7 °C)  89  18  138/58  --  96 %  --  Sitting   09/15/21 0700  --  --  --  140/66  --  --  --  Lying   09/15/21 0314  99 5 °F (37 5 °C)  88  18  138/60  --  96 %  None (Room air)  Lying   09/14/21 2320  97 8 °F (36 6 °C)  86  18  150/80  --  96 %  None (Room air)  Lying   09/14/21 2154  --  --  --  208/70Abnormal   --  --  --  -- 09/14/21 1902  --  --  --  182/78Abnormal   --  --  --  Lying   09/14/21 1900  98 1 °F (36 7 °C)  74  20  188/86Abnormal   123  97 %  None (Room air)  Lying   09/14/21 1500  98 °F (36 7 °C)  77  18  168/81  --  98 %  None (Room air)  Lying   09/14/21 1136  98 3 °F (36 8 °C)  67  18  150/70  --  97 %  None (Room air)  Lying   09/14/21 0754  98 °F (36 7 °C)  63  18  119/58  --  95 %  None (Room air)  Lying   09/14/21 0600  --  --  --  110/60  --  --  --  --   09/14/21 0300  97 4 °F (36 3 °C)Abnormal   65  18  120/60  --  94 %  None (Room air)  Lying     Pertinent Labs/Diagnostic Results:       Results from last 7 days   Lab Units 09/16/21  0951 09/15/21  0540 09/14/21  0542 09/13/21  0537 09/12/21  0649 09/11/21  0324   WBC Thousand/uL 6 45 6 69 5 38 5 93 6 37 6 24   HEMOGLOBIN g/dL 8 1* 7 9* 7 9* 8 3* 8 1* 8 2*   HEMATOCRIT % 25 8* 24 7* 24 9* 26 4* 26 0* 25 9*   PLATELETS Thousands/uL 236 230 211 230 215 216   NEUTROS ABS Thousands/µL  --   --   --  3 76  --  3 86         Results from last 7 days   Lab Units 09/16/21  0951 09/15/21  0540 09/14/21  0542 09/13/21  0537 09/12/21  0649 09/10/21  0548   SODIUM mmol/L 138 137 137 138 137 139   POTASSIUM mmol/L 5 3 5 3 5 2 5 2 5 1 4 3   CHLORIDE mmol/L 104 104 102 102 102 104   CO2 mmol/L 26 23 25 23 25 27   ANION GAP mmol/L 8 10 10 13 10 8   BUN mg/dL 60* 61* 63* 61* 55* 43*   CREATININE mg/dL 3 35* 2 91* 3 20* 3 09* 2 93* 2 81*   EGFR ml/min/1 73sq m 14 16 15 15 16 17   CALCIUM mg/dL 7 8* 7 9* 7 8* 8 0* 8 0* 7 9*   MAGNESIUM mg/dL  --   --   --  2 2 2 0 2 3   PHOSPHORUS mg/dL  --   --   --  4 4* 4 6*  --      Results from last 7 days   Lab Units 09/12/21  0649   AST U/L 18   ALT U/L 17   ALK PHOS U/L 69   TOTAL PROTEIN g/dL 6 1*   ALBUMIN g/dL 2 5*   TOTAL BILIRUBIN mg/dL 0 20     Results from last 7 days   Lab Units 09/16/21  1053 09/16/21  0722 09/15/21  2144 09/15/21  1624 09/15/21  1121 09/15/21  0811 09/14/21  2057 09/14/21  1604 09/14/21  1133 09/14/21  4168 09/13/21  2141 09/13/21  1545   POC GLUCOSE mg/dl 294* 107 235* 156* 169* 146* 142* 166* 234* 175* 251* 142*     Results from last 7 days   Lab Units 09/16/21  0951 09/15/21  0540 09/14/21  0542 09/13/21  0537 09/12/21  0649 09/11/21  0333 09/10/21  0548   GLUCOSE RANDOM mg/dL 242* 166* 134 169* 186* 116 129         Results from last 7 days   Lab Units 09/15/21  1741   HEP B S AG  Non-reactive   HEP C AB  Non-reactive     Medications:   Scheduled Medications:  atorvastatin, 40 mg, Oral, After Dinner  carvedilol, 25 mg, Oral, BID With Meals  cloNIDine, 0 3 mg, Oral, TID  gabapentin, 300 mg, Oral, Daily  hydrALAZINE, 100 mg, Oral, Q8H [address]  insulin glargine, 12 Units, Subcutaneous, QAM  insulin lispro, 1-5 Units, Subcutaneous, HS  insulin lispro, 1-6 Units, Subcutaneous, TID AC  NIFEdipine, 60 mg, Oral, BID  terazosin, 5 mg, Oral, HS    Continuous IV Infusions:     PRN Meds:  acetaminophen, 650 mg, Oral, Q6H PRN  Labetalol HCl, 20 mg, Intravenous, Q4H PRN  ondansetron, 4 mg, Intravenous, Q6H PRN    Discharge Plan: D    Network Utilization Review Department  ATTENTION: Please call with any questions or concerns to [phone] and carefully listen to the prompts so that you are directed to the right person  All voicemails are confidential   Slime Mcnally all requests for admission clinical reviews, approved or denied determinations and any other requests to dedicated fax number below belonging to the campus where the patient is receiving treatment   List of dedicated fax numbers for the Facilities:  [address] DENIALS (Administrative/Medical Necessity) [phone]   [address] (Maternity/NICU/Pediatrics) [phone]   [address] [facility] [address] [place] [address]   [address] [place] [address] [address] [phone]

## 2021-09-16 NOTE — DISCHARGE INSTRUCTIONS
Acute Kidney Injury (REI)  You have been diagnosed with Acute Kidney Injury (REI)  The following information has been developed to provide you with information about REI and treatment  What is Acute Kidney Injury (REI)? REI occurs when kidney function decreases over a short period of time  This condition causes a buildup of waste products in the blood and can cause fluid to build up causing swelling in the legs and shortness of breath  Sometimes called Acute Kidney Failure or Acute Renal Failure, REI is often reversible if it is found and treated quickly  How do you know if you have REI? REI is diagnosed by assessing kidney function  This is done by obtaining a blood test to measure the blood level of creatinine  Decreased urine output can sometimes also indicate REI  Who is at risk for REI? REI can happen to anyone but usually happens to people who are already sick and may be in the hospital  People are at higher risk for REI if they have any of the following:   age 72 years or older   high or low blood pressure   underlying kidney disease (e g , Chronic Kidney Disease (CKD)   peripheral vascular disease (hardening of arteries)   chronic diseases such as liver disease, heart disease and diabetes   a single kidney    What are the symptoms of REI? You may or may not have the symptoms to suggest you have kidney injury until the REI has progressed  Some of the symptoms are listed below:   Not making enough urine   Increased swelling in legs    Feeling tired   Trouble breathing or shortness of breath   Nausea   New or worsening confusion    What causes REI?   The causes are divided into three categories:   Not enough blood flowing to the kidneys (e g , low blood pressure, bleeding, diarrhea, dehydration)   Injury directly to the kidneys (e g , blood clots, severe infections such as sepsis, medicine toxicity, IV contrast dye used for cardiac catheterization or CT scans)   Blockage to the tubes (ureters) that drain the urine from the kidneys (e g , enlarged prostate, kidney stones, blood clots)    What is the treatment for REI? The treatment for REI depends on correcting what caused it  Treatment usually involves removing the cause and measures to prevent further injury to the kidneys  This may require the use of intravenous fluids or medications and/or temporary dialysis  Dialysis is a process using a machine that does the job of the kidneys to remove waste and help correct the electrolyte and fluid balance while the kidneys are recovering  If dialysis is needed to treat REI, the doctor will assess daily to see if the kidneys are showing signs of recovery  The daily assessments determine how long dialysis needs to continue  Depending on the cause and the extent of damage, an episode of REI may resolve in a few days to several weeks to several months  What are the long term effects of having an episode of REI?  People who have one episode of REI are at an increased risk of having another episode of REI as well as other health problems such as kidney disease, stroke, and heart disease   In a small number of people who had unrecognized kidney disease, an REI episode may result in Chronic Kidney Disease (CKD) which requires lifelong monitoring and treatment  How do you prevent future episodes of REI?  Make sure to follow up with the kidney doctor after hospital discharge and obtain blood work to reassess and monitor kidney function     If you have diabetes, keep your blood sugar in goal range and keep appointments with your diabetes specialist    Rashmi Zaidi If you have high blood pressure, have your blood pressure checked regularly to make sure it is in target range  o If you take blood pressure medicine called ACEIs or ARBs (e g , Lisinopril, Enalapril, Diovan, Losartan), your doctor may tell you to skip a dose or two if you have severe dehydration and your blood pressure is running low    Avoid using medicines such as NSAIDs (Nonsteroidal Anti-inflammatory Drugs) and Burr-2 Inhibitors (a type of NSAID) that may be harmful to kidney function  These may include the medicines listed in the table that follows  Examples of NSAIDS and Burr-2 Inhibitors   Talk to your doctor or healthcare provider before stopping any medicine ordered for you  Celecoxib (CELEBREX) Ketoprofen (ORUDIS, ORUVAIL)   Diclofenac (VOLTAREN, CATAFLAM) Ketorolac (TORADOL)   Diflunisal (DOLOBID) Meloxicam (MOBIC)   Etodolac (LODINE) Nabumetone (RELAFEN)   Fenoprofen (NALFON) Naproxen (ALEVE, NAPROSYN,    NAPRELAN, ANAPROX)   Flurbiprofen (ANSAID) Oxaprozin (DAYPRO)   Ibuprofen (MOTRIN, ADVIL) Piroxicam (FELDENE)   Indomethacin (INDOCIN) Sulindac (CLINORIL)    Tolmetin ([place])     Is there a special diet for people with REI? People with REI and/or other kidney disease often have high potassium and phosphorus levels in their blood  To protect the kidneys from further injury and to avoid complications, most doctors recommend following a healthy diet choosing foods low potassium and low phosphorus   Limiting dietary potassium to 2 5 grams/day and phosphorus to 800 milligrams/day is recommended   A dietitian can help you with learning more about this type of diet   The tables on the following page may help you to choose lower potassium and phosphorus foods  The following websites are also good sources of information:  [place] at  org/nutrition/Kidney-Disease-Stages-1-4   [facility]  https://www niddk nih gov/health-information/kidney-disease/chronic-kidney-disease-ckd/eating-nutrition  https://[facility] org/health/articles/15641-renal-diet-basics  TVPage com cy    If you have any questions or concerns about your condition, please contact your doctor or healthcare provider    These tables may help you to choose lower potassium and phosphorus foods    AVOID these higher phosphorus* foods: CHOOSE these lower phosphorus* foods:   Milk, pudding , yogurt made from animals and from many soy varieties Rice milk (unfortified), nondairy creamer   Hard cheeses, ricotta, cottage cheese, fat-free cream cheese Regular and low-fat cream cheese   Ice cream, frozen yogurt Sherbet, frozen fruit pops, sorbet   Soups made with milk, dried peas, beans, lentils or other high phosphorus ingredients Soups made with broth, are water-based, or other lower phosphorus ingredients   Whole grains, including whole-grain breads, crackers, cereal, rice and pasta, corn tortillas Refined grains, including white bread, crackers, cereals, rice and pasta   Quick breads, biscuits, cornbread, muffins, pancakes, waffles, granola, wheat germ Homemade refined (white) dinner rolls, bagels, English muffins, sugar cookies, shortbread cookies, arcelia food cake   Dried peas (split, black-eyed), beans (black, garbanzo, lima, kidney, navy, neri), lentils Green peas (canned, frozen), green beans, wax beans   Organ meats, walleye, [place], sardines Lean beef, pork, lamb, poultry, other fish   Nuts and seeds Popcorn   Peanut butter, other nut butters; tofu, veggie or soy burgers Jam, jelly, honey   Chocolate, including chocolate drinks Carob (chocolate-flavored) candy, hard candy,  gumdrops   [place], pepper-type sodas, flavored mccormick, bottled teas, beer Lemon-lime soda, ginger ale or root beer, plain water, cream soda, grape soda   *Always read labels and avoid foods with ingredients containing "phos"  AVOID these higher potassium foods: CHOOSE these lower potassium foods:      Milk (fat free, low fat, whole, buttermilk, Soy), yogurt    Regular and low-fat cream cheese      Beans (white, [place]), [place] sprouts, spinach Swiss       chard, broccoli, avocado, artichoke, potatoes, sweet      potatoes, tomatoes/tomato sauce, beet greens      Green beans, alfalfa sprouts, bamboo shoots (canned),       cabbage, carrots, cauliflower, corn, cucumber, eggplant,      endive, lettuce, mushrooms, onions, radishes,  watercress,       water chestnuts (canned), rice, peas      Halibut, tuna, cod, snapper, tuna fish, turkey    Egg, lean beef, pork, lamb, shellfish, chicken       Banana, papaya, orange, cantaloupe, dates, raisins and      other dried fruit, pomegranate, avocado      Apple, applesauce, blackberries, raspberries, pears,     watermelon, cucumbers, blueberries, cranberries,       peaches      Almonds, peanuts, hazelnuts, [place], cashew, mixed,      seeds (sunflower, pumpkin)     Homemade refined (white) dinner rolls, bagels,      English muffins, flour tortilla, crackers, adan      crackers, popcorn, pretzels, spaghetti or macaroni,       hummus      Tomato or vegetable juice, prune juice    Papaya, bárbara, or pear nectar, cranberry juice cocktail      Molasses

## 2021-09-16 NOTE — PROGRESS NOTES
NEPHROLOGY PROGRESS NOTE   Amber Aparicio 73 y o  female MRN: [id number]  Unit/Bed#: E5 -09 Encounter: [id number]  Reason for Consult: REI on CKD IV    PLAN:   -REI on CKD 4, creatinine with rise today suspect secondary to hemodynamics  Will continue tight blood pressure control  Diuretics remain on hold  -hypertension, continue antihypertensives as below  Secondary workup so far negative   -history of nephrotic range proteinuria, renal biopsy results pending   -chronic diastolic CHF, stable volume status  Continue to hold diuretics  -biclonal gammopathy, status post bone marrow biopsy   -okay to discharge from Renal   Will arrange outpatient follow-up with repeat BMP  ASSESSMENT/PLAN:  REI on CKD IV:  Suspected prerenal with hemodynamics contributing as well as recurrent episodes of REI  -appears to have had progression in kidney disease over the past year   -baseline creatinine low to mid 2's   Creatinine more recently 2 6-3 0   -creatinine slightly increased to 3 3, suspect blood pressures playing a role  -currently monitoring off of IV fluids and outpatient diuretics remain on hold  -UA:  Glucosuria, trace lysed blood, greater than 300 protein, 0-1 WBCs  -CT scan shows unremarkable kidneys, normal size and position  -follows with Dr Casper Lawton  -S/P renal biopsy, awaiting cytology  -recommend avoiding nephrotoxins, hypotension, IV contrast   -okay to discharge from Renal, will arrange outpatient follow-up      Hypertension:  Blood pressure much improved and is currently acceptable  -outpatient regimen:  Carvedilol 25 mg b i d , clonidine 0 2 mg t i d , hydralazine 100 mg t i d , torsemide 20 mg daily, Hytrin 5 mg at night, torsemide every other day, hydralazine 25 mg b i d    -currently on carvedilol 25 mg b i d , clonidine 0 3 mg t i d , hydralazine 100 mg t i d , Procardia XL 60 mg BID, Hytrin 5 mg at night   -secondary workup, renal artery duplex unremarkable   -plasma catecholamines negative, Raman normal, renin normal,  plasma free metanephrines normal   -left kidney 9 3 cm, right kidney 10 2 cm    -avoid hypotension high fluctuations in blood pressure   -recommend holding parameters antihypertensive for systolic blood pressure less than 130 mmHg       History of nephrotic range proteinuria:  Suspected secondary to hypertension as well as diabetes   -most recent urine protein to creatinine ratio 6 29 g       Chronic diastolic CHF:  Examining fairly euvolemic   -outpatient diuretics currently on hold  -recommend daily weights, fluid restriction, I/O      Biclonal gammopathy:   -continue outpatient follow-up with Hematology/Oncology  -S/P bone marrow biopsy, awaiting cytology       Anemia:  -continue to monitor and transfuse as needed for hemoglobin less than 7 0   -iron panel shows low iron sat, receiving IV Venofer times total of 5 doses      Other:  Diabetes  Disposition:  Okay to discharge from Renal   Will arrange follow-up with her outpatient nephrologist     SUBJECTIVE:  The patient is primarily [place]ian speaking  She denies chest discomfort or shortness of breath  She is feeling okay  Her blood pressures are much improved  She is eating and drinking      OBJECTIVE:  Current Weight: Weight - Scale: 73 2 kg (161 lb 4 8 oz)  Vitals:    09/16/21 0300 09/16/21 0620 09/16/21 0800 09/16/21 0942   BP: 122/59 127/58 121/57 145/73   BP Location: Left arm  Right arm Right arm   Pulse: 70  69 74   Resp: 16  18 20   Temp: 98 °F (36 7 °C)  97 5 °F (36 4 °C) 97 8 °F (36 6 °C)   TempSrc: Temporal  Temporal Oral   SpO2: 95%  97% 96%   Weight:       Height:           Intake/Output Summary (Last 24 hours) at 9/16/2021 1236  Last data filed at 9/16/2021 0800  Gross per 24 hour   Intake --   Output 750 ml   Net -750 ml     General: NAD  Skin: warm, dry, intact, no rash  HEENT: Moist mucous membranes, sclera anicteric, normocephalic, atraumatic  Neck: No apparent JVD appreciated  Chest: lung sounds clear B/L, on RA   CVS:Regular rate and rhythm, no murmer   Abdomen: Soft, round, non-tender, +BS  Extremities: No B/L LE edema present  Neuro: alert and oriented  Psych: appropriate mood and affect     Medications:    Current Facility-Administered Medications:     acetaminophen (TYLENOL) tablet 650 mg, 650 mg, Oral, Q6H PRN, Cuiyin Yurik, CRNP, 650 mg at 09/07/21 0827    atorvastatin (LIPITOR) tablet 40 mg, 40 mg, Oral, After Dinner, Cuiyin Yurik, CRNP, 40 mg at 09/15/21 2225    carvedilol (COREG) tablet 25 mg, 25 mg, Oral, BID With Meals, Cuiyin Yurik, CRNP, 25 mg at 09/16/21 0906    cloNIDine (CATAPRES) tablet 0 3 mg, 0 3 mg, Oral, TID, Cuiyin Stacyrik, CRNP, 0 3 mg at 09/15/21 1559    gabapentin (NEURONTIN) capsule 300 mg, 300 mg, Oral, Daily, Cuiyin Yurik, CRNP, 300 mg at 09/16/21 0905    hydrALAZINE (APRESOLINE) tablet 100 mg, 100 mg, Oral, Q8H [address], Cuiyin Yurik, CRNP, 100 mg at 09/15/21 1559    insulin glargine (LANTUS) subcutaneous injection 12 Units 0 12 mL, 12 Units, Subcutaneous, QAM, Cuiyin Yurik, CRNP, 12 Units at 09/16/21 0907    insulin lispro (HumaLOG) 100 units/mL subcutaneous injection 1-5 Units, 1-5 Units, Subcutaneous, HS, Cuiyin Yurik, CRNP, 2 Units at 09/15/21 2225    insulin lispro (HumaLOG) 100 units/mL subcutaneous injection 1-6 Units, 1-6 Units, Subcutaneous, TID AC, 4 Units at 09/16/21 1105 **AND** Fingerstick Glucose (POCT), , , TID AC, Cuiyin Yurik, CRNP    Labetalol HCl (NORMODYNE) injection 20 mg, 20 mg, Intravenous, Q4H PRN, Cuiyin Yurik, CRNP, 20 mg at 09/14/21 2220    NIFEdipine (PROCARDIA XL) 24 hr tablet 60 mg, 60 mg, Oral, BID, Oracio Rowley DO    ondansetron [facility]) injection 4 mg, 4 mg, Intravenous, Q6H PRN, MARLENA Parekh, 4 mg at 09/07/21 0823    terazosin (HYTRIN) capsule 5 mg, 5 mg, Oral, HS, MARLENA Parekh, 5 mg at 09/15/21 2225    Laboratory Results:  Results from last 7 days   Lab Units 09/16/21  0951 09/15/21  0540 09/14/21  0532 09/13/21  0537 09/12/21  0649 09/11/21  0324 09/10/21  0548   WBC Thousand/uL 6 45 6 69 5 38 5 93 6 37  --  5 46   HEMOGLOBIN g/dL 8 1* 7 9* 7 9* 8 3* 8 1*  --  8 3*   HEMATOCRIT % 25 8* 24 7* 24 9* 26 4* 26 0*  --  26 3*   PLATELETS Thousands/uL 236 230 211 230 215  --  216   SODIUM mmol/L 138 137 137 138 137   < > 139   POTASSIUM mmol/L 5 3 5 3 5 2 5 2 5 1   < > 4 3   CHLORIDE mmol/L 104 104 102 102 102   < > 104   CO2 mmol/L 26 23 25 23 25   < > 27   BUN mg/dL 60* 61* 63* 61* 55*   < > 43*   CREATININE mg/dL 3 35* 2 91* 3 20* 3 09* 2 93*   < > 2 81*   CALCIUM mg/dL 7 8* 7 9* 7 8* 8 0* 8 0*   < > 7 9*   MAGNESIUM mg/dL  --   --   --  2 2 2 0  --  2 3   PHOSPHORUS mg/dL  --   --   --  4 4* 4 6*  --   --    ALK PHOS U/L  --   --   --   --  69  --   --    ALT U/L  --   --   --   --  17  --   --    AST U/L  --   --   --   --  18  --   --     < > = values in this interval not displayed

## 2021-09-16 NOTE — PROGRESS NOTES
[address] [place] [address]  Progress Note - Loyd Stephens 1955, 73 y o  female MRN: [id number]  Unit/Bed#: E5 -01 Encounter: [id number]  Primary Care Provider: Hayder Kiran MD   Date and time admitted to hospital: 9/6/2021  7:36 PM    Acute kidney injury superimposed on CKD [facility])  Assessment & Plan  Lab Results   Component Value Date    EGFR 14 09/16/2021    EGFR 16 09/15/2021    EGFR 15 09/14/2021    CREATININE 3 35 (H) 09/16/2021    CREATININE 2 91 (H) 09/15/2021    CREATININE 3 20 (H) 09/14/2021     · Appreciate nephrology recommendations  · Creatinine at 2 9  · Diuretics on hold  · S/p renal biopsy  · Anticipate d/c tomorrow with follow up with nephrology for biopsy results    Biclonal gammopathy  Assessment & Plan  · Biclonal gammopathy follows with hematology as outpatient   · S/p bone marrow biopsy which is currently pending    Anemia in stage 4 chronic kidney disease [facility])  Assessment & Plan  Lab Results   Component Value Date    EGFR 14 09/16/2021    EGFR 16 09/15/2021    EGFR 15 09/14/2021    CREATININE 3 35 (H) 09/16/2021    CREATININE 2 91 (H) 09/15/2021    CREATININE 3 20 (H) 09/14/2021     · Baseline hemoglobin appears to be around 8-9's  · Iron study showed iron 42, ferritin 24  · IV Venofer 200mg daily x 5 doses per renal  · Monitor CBC    Chronic diastolic heart failure (HCC)  Assessment & Plan  Wt Readings from Last 3 Encounters:   09/15/21 73 2 kg (161 lb 4 8 oz)   08/31/21 71 2 kg (157 lb)   08/26/21 70 4 kg (155 lb 3 2 oz)     · She is euvolemic      Type 2 diabetes mellitus, with long-term current use of insulin [facility])  Assessment & Plan    Lab Results   Component Value Date    HGBA1C 9 5 (H) 07/23/2021     Results from last 7 days   Lab Units 09/16/21  1632 09/16/21  1053 09/16/21  0722 09/15/21  2144 09/15/21  1624 09/15/21  1121 09/15/21  0811 09/14/21 2057   POC GLUCOSE mg/dl 214* 294* 107 235* 156* 169* 146* 142*     · Continue insulin sliding scale and lantus  Will adjust as needed  * Hypertensive urgency  Assessment & Plan  · Pt presented with home SBP >230 despite being compliant with her medications  · Renal vascular Dopplers from 2021 showing no renal artery stenosis right kidney 9 3 cm left kidney 10 2 cm  · Hypertensive urgency on multiple agents  Nephrology following  · Home medications include: Coreg 25 mg p o  B i d , clonidine 0 2 mg p o  T i d , hydralazine 25 mg p o  B i d  torsemide 20 mg p o  Q 48 hours, Hytrin 5 mg p o  Q h s   · Current medications include: Coreg 25 mg p o  B i d , clonidine 0 3 mg p o  t i d , hydralazine 100 mg p o  t i d , Hytrin 5 mg p o  Q day,procardia 60mg in am and 30 mg in pm    Nephrology is following  S/p renal biopsy        VTE Pharmacologic Prophylaxis: scd    Mechanical VTE Prophylaxis in Place: Yes    Time Spent for Care: 20 minutes  More than 50% of total time spent on counseling and coordination of care as described above  Current Length of Stay: 10 day(s)  Current Patient Status: Inpatient     Discharge Plan / Estimated Discharge Date: anticipate d/c tomorrow    Code Status: Level 1 - Full Code      Subjective:   Pt seen and examined  Pt doing well  No f/c no cp no sob no n/vd no abd pain  Objective:     Vitals:   Temp (24hrs), Av 6 °F (36 4 °C), Min:97 4 °F (36 3 °C), Max:98 °F (36 7 °C)    Temp:  [97 4 °F (36 3 °C)-98 °F (36 7 °C)] 97 5 °F (36 4 °C)  HR:  [66-77] 77  Resp:  [16-20] 20  BP: (103-158)/(54-75) 158/74  SpO2:  [91 %-97 %] 96 %  Body mass index is 29 5 kg/m²  Input and Output Summary (last 24 hours): Intake/Output Summary (Last 24 hours) at 2021 1828  Last data filed at 2021 0800  Gross per 24 hour   Intake --   Output 750 ml   Net -750 ml       Physical Exam:   Physical Exam  Constitutional:       Appearance: Normal appearance  HENT:      Head: Normocephalic and atraumatic  Eyes:      Extraocular Movements: Extraocular movements intact        Pupils: Pupils are equal, round, and reactive to light  Cardiovascular:      Rate and Rhythm: Normal rate and regular rhythm  Heart sounds: No murmur heard  No friction rub  No gallop  Pulmonary:      Effort: Pulmonary effort is normal  No respiratory distress  Breath sounds: Normal breath sounds  No wheezing or rales  Abdominal:      General: Bowel sounds are normal  There is no distension  Palpations: Abdomen is soft  Tenderness: There is no abdominal tenderness  There is no guarding  Neurological:      Mental Status: She is alert and oriented to person, place, and time          Additional Data:     Labs:  Results from last 7 days   Lab Units 09/16/21  0951 09/13/21  0537   WBC Thousand/uL 6 45 5 93   HEMOGLOBIN g/dL 8 1* 8 3*   HEMATOCRIT % 25 8* 26 4*   PLATELETS Thousands/uL 236 230   NEUTROS PCT %  --  62   LYMPHS PCT %  --  21   MONOS PCT %  --  10   EOS PCT %  --  5     Results from last 7 days   Lab Units 09/16/21  0951 09/12/21  0649   SODIUM mmol/L 138 137   POTASSIUM mmol/L 5 3 5 1   CHLORIDE mmol/L 104 102   CO2 mmol/L 26 25   BUN mg/dL 60* 55*   CREATININE mg/dL 3 35* 2 93*   ANION GAP mmol/L 8 10   CALCIUM mg/dL 7 8* 8 0*   ALBUMIN g/dL  --  2 5*   TOTAL BILIRUBIN mg/dL  --  0 20   ALK PHOS U/L  --  69   ALT U/L  --  17   AST U/L  --  18   GLUCOSE RANDOM mg/dL 242* 186*         Results from last 7 days   Lab Units 09/16/21  1632 09/16/21  1053 09/16/21  0722 09/15/21  2144 09/15/21  1624 09/15/21  1121 09/15/21  0811 09/14/21  2057 09/14/21  1604 09/14/21  1133 09/14/21  0835 09/13/21  2141   POC GLUCOSE mg/dl 214* 294* 107 235* 156* 169* 146* 142* 166* 234* 175* 251*               Recent Cultures (last 7 days):           Lines/Drains:  Invasive Devices     Peripheral Intravenous Line            Peripheral IV 09/13/21 Left Forearm 2 days              Last 24 Hours Medication List:   Current Facility-Administered Medications   Medication Dose Route Frequency Provider Last Rate    acetaminophen  650 mg Oral Q6H PRN Deloris Butler, MARLENA      atorvastatin  40 mg Oral After Miguel A & Sarah, MARLENA      carvedilol  25 mg Oral BID With Meals MARLENA Tomas      cloNIDine  0 3 mg Oral TID Deloris Butler, MARLENA      gabapentin  300 mg Oral Daily MARLENA Parekh      hydrALAZINE  100 mg Oral Q8H [address] Cuiarun Butler, MARLENA      insulin glargine  12 Units Subcutaneous QAM Deloris Butler, MARLENA      insulin lispro  1-5 Units Subcutaneous HS Deloris Butler, MARLENA      insulin lispro  1-6 Units Subcutaneous TID AC MARLENA Parekh      Labetalol HCl  20 mg Intravenous Q4H PRN MARLENA Parekh      NIFEdipine  60 mg Oral BID Radha Rowley DO      ondansetron  4 mg Intravenous Q6H PRN Deloris Butler, MARLENA      terazosin  5 mg Oral HS MARLENA Parekh          Today, Patient Was Seen By: Virginia Raman DO

## 2021-09-17 ENCOUNTER — TELEPHONE (OUTPATIENT)
Dept: NEPHROLOGY | Facility: HOSPITAL | Age: 66
End: 2021-09-17

## 2021-09-17 VITALS
DIASTOLIC BLOOD PRESSURE: 61 MMHG | HEART RATE: 67 BPM | BODY MASS INDEX: 29.94 KG/M2 | TEMPERATURE: 97.3 F | SYSTOLIC BLOOD PRESSURE: 125 MMHG | HEIGHT: 62 IN | RESPIRATION RATE: 18 BRPM | WEIGHT: 162.7 LBS | OXYGEN SATURATION: 94 %

## 2021-09-17 DIAGNOSIS — N17.9 ACUTE KIDNEY INJURY SUPERIMPOSED ON CKD (HCC): Primary | ICD-10-CM

## 2021-09-17 DIAGNOSIS — N18.9 ACUTE KIDNEY INJURY SUPERIMPOSED ON CKD (HCC): Primary | ICD-10-CM

## 2021-09-17 LAB
ANION GAP SERPL CALCULATED.3IONS-SCNC: 7 MMOL/L (ref 4–13)
BUN SERPL-MCNC: 59 MG/DL (ref 5–25)
CALCIUM SERPL-MCNC: 8 MG/DL (ref 8.3–10.1)
CHLORIDE SERPL-SCNC: 106 MMOL/L (ref 100–108)
CO2 SERPL-SCNC: 25 MMOL/L (ref 21–32)
CREAT SERPL-MCNC: 3.14 MG/DL (ref 0.6–1.3)
ERYTHROCYTE [DISTWIDTH] IN BLOOD BY AUTOMATED COUNT: 15.6 % (ref 11.6–15.1)
GFR SERPL CREATININE-BSD FRML MDRD: 15 ML/MIN/1.73SQ M
GLUCOSE SERPL-MCNC: 161 MG/DL (ref 65–140)
GLUCOSE SERPL-MCNC: 165 MG/DL (ref 65–140)
GLUCOSE SERPL-MCNC: 270 MG/DL (ref 65–140)
HCT VFR BLD AUTO: 26.3 % (ref 34.8–46.1)
HGB BLD-MCNC: 8.3 G/DL (ref 11.5–15.4)
MCH RBC QN AUTO: 27 PG (ref 26.8–34.3)
MCHC RBC AUTO-ENTMCNC: 31.6 G/DL (ref 31.4–37.4)
MCV RBC AUTO: 86 FL (ref 82–98)
PLATELET # BLD AUTO: 233 THOUSANDS/UL (ref 149–390)
PMV BLD AUTO: 9.8 FL (ref 8.9–12.7)
POTASSIUM SERPL-SCNC: 5.2 MMOL/L (ref 3.5–5.3)
RBC # BLD AUTO: 3.07 MILLION/UL (ref 3.81–5.12)
SODIUM SERPL-SCNC: 138 MMOL/L (ref 136–145)
WBC # BLD AUTO: 6.02 THOUSAND/UL (ref 4.31–10.16)

## 2021-09-17 PROCEDURE — 80048 BASIC METABOLIC PNL TOTAL CA: CPT | Performed by: INTERNAL MEDICINE

## 2021-09-17 PROCEDURE — 99232 SBSQ HOSP IP/OBS MODERATE 35: CPT | Performed by: INTERNAL MEDICINE

## 2021-09-17 PROCEDURE — 99239 HOSP IP/OBS DSCHRG MGMT >30: CPT | Performed by: INTERNAL MEDICINE

## 2021-09-17 PROCEDURE — 85027 COMPLETE CBC AUTOMATED: CPT | Performed by: INTERNAL MEDICINE

## 2021-09-17 PROCEDURE — 82948 REAGENT STRIP/BLOOD GLUCOSE: CPT

## 2021-09-17 RX ORDER — NIFEDIPINE 60 MG/1
60 TABLET, FILM COATED, EXTENDED RELEASE ORAL 2 TIMES DAILY
Qty: 60 TABLET | Refills: 2 | Status: SHIPPED | OUTPATIENT
Start: 2021-09-17 | End: 2022-06-15

## 2021-09-17 RX ORDER — TORSEMIDE 20 MG/1
20 TABLET ORAL EVERY OTHER DAY
Qty: 30 TABLET | Refills: 0
Start: 2021-09-17 | End: 2021-12-01

## 2021-09-17 RX ORDER — HYDRALAZINE HYDROCHLORIDE 100 MG/1
100 TABLET, FILM COATED ORAL EVERY 8 HOURS SCHEDULED
Qty: 90 TABLET | Refills: 2 | Status: SHIPPED | OUTPATIENT
Start: 2021-09-17 | End: 2021-12-14 | Stop reason: HOSPADM

## 2021-09-17 RX ORDER — CLONIDINE HYDROCHLORIDE 0.3 MG/1
0.3 TABLET ORAL 3 TIMES DAILY
Qty: 90 TABLET | Refills: 1 | Status: SHIPPED | OUTPATIENT
Start: 2021-09-17 | End: 2021-12-14 | Stop reason: HOSPADM

## 2021-09-17 RX ADMIN — CARVEDILOL 25 MG: 25 TABLET, FILM COATED ORAL at 09:47

## 2021-09-17 RX ADMIN — LABETALOL 20 MG/4 ML (5 MG/ML) INTRAVENOUS SYRINGE 20 MG: at 03:06

## 2021-09-17 RX ADMIN — INSULIN LISPRO 1 UNITS: 100 INJECTION, SOLUTION INTRAVENOUS; SUBCUTANEOUS at 09:47

## 2021-09-17 RX ADMIN — TERAZOSIN HYDROCHLORIDE 5 MG: 5 CAPSULE ORAL at 00:00

## 2021-09-17 RX ADMIN — INSULIN LISPRO 4 UNITS: 100 INJECTION, SOLUTION INTRAVENOUS; SUBCUTANEOUS at 11:58

## 2021-09-17 RX ADMIN — NIFEDIPINE 60 MG: 30 TABLET, FILM COATED, EXTENDED RELEASE ORAL at 09:46

## 2021-09-17 RX ADMIN — INSULIN GLARGINE 12 UNITS: 100 INJECTION, SOLUTION SUBCUTANEOUS at 09:47

## 2021-09-17 RX ADMIN — GABAPENTIN 300 MG: 300 CAPSULE ORAL at 09:47

## 2021-09-17 NOTE — ASSESSMENT & PLAN NOTE
Wt Readings from Last 3 Encounters:   09/17/21 73 8 kg (162 lb 11 2 oz)   08/31/21 71 2 kg (157 lb)   08/26/21 70 4 kg (155 lb 3 2 oz)     · She is euvolemic   Restarting torsemide qod

## 2021-09-17 NOTE — ED PROVIDER NOTES
History  Chief Complaint   Patient presents with    High Blood Pressure     Patient rpeorts taking her blood pressure and getting 200/100  Patient reports taking her BP medication as prescribed and that they must not be working  Patient has been vomiting but denies other symptoms  HPI    74 yo F, pmhx of HTN, DM, HLD, CKD, presenting for evaluation of nausea, vomiting, and elevated blood pressures at home  Patient states that she felt like "her blood was warm", prompting her to measure her blood pressure, noted it to be >200/110 at home, prompting her to come to the ED for evaluation  Patient reports compliance with her anti-htn regimen  Patient has also had several episodes of nonbloody, nonbilious vomiting at home  Last episode was around 4 pm and has since resolved at time of evaluation  No associated abdominal pain, can not recall worsening or relieving factors  She has had similar symptoms in the past, was diagnosed with pancreatitis at that time  Denies CP, HA, dizziness, lightheadedness, vision changes, fever, chills, SOB, peripheral edema  Prior to Admission Medications   Prescriptions Last Dose Informant Patient Reported? Taking?    Accu-Chek FastClix Lancets MISC  Self No Yes   Sig: Inject under the skin 2 (two) times a day Test   Accu-Chek Guide test strip  Self No Yes   Sig: Use 1 each 2 (two) times a day Test   Alcohol Swabs (EASY TOUCH ALCOHOL PREP MEDIUM) 70 % PADS  Self Yes Yes   Sig: USE 2 TO 3 X PER DAY   Blood Glucose Monitoring Suppl (OneTouch Verio) w/Device KIT  Self No Yes   Sig: Use 2 (two) times a day   Insulin Pen Needle (Unifine Pentips) 32G X 4 MM MISC  Self No Yes   Sig: Use daily   Lancets (onetouch ultrasoft) lancets  Self No Yes   Sig: Use as instructed   atorvastatin (LIPITOR) 40 mg tablet  Self No Yes   Sig: Take 1 tablet (40 mg total) by mouth daily   carvedilol (COREG) 25 mg tablet  Self No Yes   Sig: Take 1 tablet (25 mg total) by mouth 2 (two) times a day with meals cloNIDine (CATAPRES) 0 2 mg tablet  Self No Yes   Sig: Take 1 tablet (0 2 mg total) by mouth 3 (three) times a day   ergocalciferol (ERGOCALCIFEROL) 1 25 MG (90462 UT) capsule  Self Yes Yes   Sig: Take 50,000 Units by mouth every 7 days   gabapentin (NEURONTIN) 300 mg capsule  Self No Yes   Sig: Take 1 capsule (300 mg total) by mouth 2 (two) times a day   glimepiride (AMARYL) 2 mg tablet  Self Yes Yes   Sig: Take 2 mg by mouth daily   glucose blood (OneTouch Verio) test strip  Self No Yes   Sig: Use as instructed   hydrALAZINE (APRESOLINE) 25 mg tablet   No Yes   Sig: Take 1 tablet (25 mg total) by mouth 2 (two) times a day   Patient taking differently: Take 10 mg by mouth every 8 (eight) hours    insulin glargine (LANTUS) 100 units/mL subcutaneous injection  Self No Yes   Sig: Inject 18 Units under the skin every morning   linaGLIPtin (Tradjenta) 5 MG TABS  Self Yes Yes   Sig: Take 5 mg by mouth daily   terazosin (HYTRIN) 5 mg capsule  Self Yes Yes   Sig: Take 5 mg by mouth   torsemide (DEMADEX) 20 mg tablet   No Yes   Sig: Take 1 tablet (20 mg total) by mouth daily      Facility-Administered Medications: None       Past Medical History:   Diagnosis Date    Chronic kidney disease     Diabetes mellitus ([facility] [address] )     Hyperlipidemia     Hypertension     Pneumonia        Past Surgical History:   Procedure Laterality Date    EYE SURGERY      IR BIOPSY BONE MARROW  9/15/2021    IR BIOPSY KIDNEY [place] KIT NO LATERALITY  9/15/2021       Family History   Problem Relation Age of Onset    Hypertension Mother     Diabetes Father     Hypertension Sister     Diabetes Sister     Hypertension Brother      I have reviewed and agree with the history as documented      E-Cigarette/Vaping    E-Cigarette Use Never User      E-Cigarette/Vaping Substances     Social History     Tobacco Use    Smoking status: Never Smoker    Smokeless tobacco: Never Used    Tobacco comment: NO TOBACCO USE   Vaping Use    Vaping Use: Never used   Substance Use Topics    Alcohol use: Not Currently    Drug use: Not Currently        Review of Systems   Constitutional: Negative for chills and fever  HENT: Negative for sore throat  Respiratory: Negative for cough and shortness of breath  Cardiovascular: Negative for chest pain, palpitations and leg swelling  Gastrointestinal: Positive for nausea and vomiting  Negative for abdominal pain and diarrhea  Genitourinary: Negative for dysuria and frequency  Musculoskeletal: Negative for myalgias  Skin: Negative for rash and wound  Neurological: Negative for dizziness, weakness, light-headedness, numbness and headaches  All other systems reviewed and are negative  Physical Exam  ED Triage Vitals   Temperature Pulse Respirations Blood Pressure SpO2   09/06/21 1920 09/06/21 1922 09/06/21 1920 09/06/21 1926 09/06/21 1920   98 4 °F (36 9 °C) 81 20 (!) 235/103 96 %      Temp Source Heart Rate Source Patient Position - Orthostatic VS BP Location FiO2 (%)   09/06/21 1920 09/06/21 1920 09/06/21 1920 09/06/21 1920 --   Oral Monitor Sitting Right arm       Pain Score       09/06/21 1920       No Pain             Orthostatic Vital Signs  Vitals:    09/17/21 0600 09/17/21 0819 09/17/21 0946 09/17/21 1345   BP: 125/74 101/58 124/59 125/61   Pulse: 77 64  67   Patient Position - Orthostatic VS:    Sitting       Physical Exam  Vitals and nursing note reviewed  Constitutional:       General: She is not in acute distress  Appearance: Normal appearance  She is not ill-appearing or toxic-appearing  HENT:      Head: Normocephalic and atraumatic  Right Ear: External ear normal       Left Ear: External ear normal       Nose: Nose normal       Mouth/Throat:      Mouth: Mucous membranes are moist       Pharynx: Oropharynx is clear  Eyes:      General: No scleral icterus  Extraocular Movements: Extraocular movements intact  Pupils: Pupils are equal, round, and reactive to light  Cardiovascular:      Rate and Rhythm: Normal rate and regular rhythm  Pulses: Normal pulses  Pulmonary:      Effort: Pulmonary effort is normal  No respiratory distress  Breath sounds: Normal breath sounds  Abdominal:      Palpations: Abdomen is soft  Tenderness: There is no abdominal tenderness  Musculoskeletal:         General: Normal range of motion  Cervical back: Normal range of motion and neck supple  Skin:     General: Skin is warm  Capillary Refill: Capillary refill takes less than 2 seconds  Neurological:      General: No focal deficit present  Mental Status: She is alert and oriented to person, place, and time  Cranial Nerves: No cranial nerve deficit  Sensory: No sensory deficit  Motor: No weakness        Coordination: Coordination normal          ED Medications  Medications   atorvastatin (LIPITOR) tablet 40 mg (40 mg Oral Given 9/16/21 1744)   terazosin (HYTRIN) capsule 5 mg (5 mg Oral Given 9/17/21 0000)   acetaminophen (TYLENOL) tablet 650 mg ( Oral MAR Unhold 9/11/21 0156)   ondansetron (ZOFRAN) injection 4 mg ( Intravenous MAR Unhold 9/11/21 0156)   carvedilol (COREG) tablet 25 mg (25 mg Oral Given 9/17/21 0947)   multi-electrolyte (PLASMALYTE-A/ISOLYTE-S PH 7 4) IV solution (0 mL/hr Intravenous Stopped 9/9/21 0011)   insulin lispro (HumaLOG) 100 units/mL subcutaneous injection 1-5 Units (1 Units Subcutaneous Not Given 9/16/21 2300)   insulin lispro (HumaLOG) 100 units/mL subcutaneous injection 1-6 Units (4 Units Subcutaneous Given 9/17/21 1158)   insulin glargine (LANTUS) subcutaneous injection 12 Units 0 12 mL (12 Units Subcutaneous Given 9/17/21 0947)   gabapentin (NEURONTIN) capsule 300 mg (300 mg Oral Given 9/17/21 0947)   Labetalol HCl (NORMODYNE) injection 20 mg (20 mg Intravenous Given 9/17/21 0306)   iron sucrose (VENOFER) 200 mg in sodium chloride 0 9 % 100 mL IVPB (200 mg Intravenous New Bag 9/13/21 1726)   cloNIDine (CATAPRES) tablet 0 3 mg (0 3 mg Oral Not Given 9/17/21 0946)   hydrALAZINE (APRESOLINE) tablet 100 mg (100 mg Oral Not Given 9/17/21 1346)   NIFEdipine (PROCARDIA XL) 24 hr tablet 60 mg (60 mg Oral Given 9/17/21 0946)   hydrALAZINE (APRESOLINE) injection 5 mg (5 mg Intravenous Given 9/6/21 2027)   Labetalol HCl (NORMODYNE) injection 10 mg (10 mg Intravenous Given 9/6/21 2239)   heparin (porcine) subcutaneous injection 5,000 Units (5,000 Units Subcutaneous Given 9/8/21 2205)   iohexol (OMNIPAQUE) 240 MG/ML solution 50 mL (50 mL Oral Given 9/7/21 1545)   hydrALAZINE (APRESOLINE) injection 10 mg (10 mg Intravenous Given 9/10/21 0012)   Labetalol HCl (NORMODYNE) injection 20 mg (20 mg Intravenous Given 9/10/21 1830)   Labetalol HCl (NORMODYNE) injection 20 mg (20 mg Intravenous Given 9/10/21 2057)       Diagnostic Studies  Results Reviewed     Procedure Component Value Units Date/Time    Troponin I [[id number]]  (Normal) Collected: 09/06/21 2027    Lab Status: Final result Specimen: Blood from Arm, Left Updated: 09/06/21 2050     Troponin I <0 02 ng/mL     Comprehensive metabolic panel [[id number]]  (Abnormal) Collected: 09/06/21 2027    Lab Status: Final result Specimen: Blood from Arm, Left Updated: 09/06/21 2048     Sodium 136 mmol/L      Potassium 4 3 mmol/L      Chloride 99 mmol/L      CO2 28 mmol/L      ANION GAP 9 mmol/L      BUN 48 mg/dL      Creatinine 3 01 mg/dL      Glucose 208 mg/dL      Calcium 8 2 mg/dL      Corrected Calcium 8 8 mg/dL      AST 16 U/L      ALT 16 U/L      Alkaline Phosphatase 83 U/L      Total Protein 7 6 g/dL      Albumin 3 2 g/dL      Total Bilirubin 0 24 mg/dL      eGFR 16 ml/min/1 73sq m     Narrative:      [place] guidelines for Chronic Kidney Disease (CKD):     Stage 1 with normal or high GFR (GFR > 90 mL/min/1 73 square meters)    Stage 2 Mild CKD (GFR = 60-89 mL/min/1 73 square meters)    Stage 3A Moderate CKD (GFR = 45-59 mL/min/1 73 square meters)    Stage 3B Moderate CKD (GFR = 30-44 mL/min/1 73 square meters)    Stage 4 Severe CKD (GFR = 15-29 mL/min/1 73 square meters)    Stage 5 End Stage CKD (GFR <15 mL/min/1 73 square meters)  Note: GFR calculation is accurate only with a steady state creatinine    Lipase [[id number]]  (Abnormal) Collected: 09/06/21 2027    Lab Status: Final result Specimen: Blood from Arm, Left Updated: 09/06/21 2048     Lipase 1,190 u/L     CBC and differential [[id number]]  (Abnormal) Collected: 09/06/21 2027    Lab Status: Final result Specimen: Blood from Arm, Left Updated: 09/06/21 2032     WBC 7 39 Thousand/uL      RBC 3 66 Million/uL      Hemoglobin 9 6 g/dL      Hematocrit 30 4 %      MCV 83 fL      MCH 26 2 pg      MCHC 31 6 g/dL      RDW 14 6 %      MPV 10 7 fL      Platelets 486 Thousands/uL      nRBC 0 /100 WBCs      Neutrophils Relative 80 %      Immat GRANS % 0 %      Lymphocytes Relative 15 %      Monocytes Relative 5 %      Eosinophils Relative 0 %      Basophils Relative 0 %      Neutrophils Absolute 5 82 Thousands/µL      Immature Grans Absolute 0 03 Thousand/uL      Lymphocytes Absolute 1 14 Thousands/µL      Monocytes Absolute 0 36 Thousand/µL      Eosinophils Absolute 0 01 Thousand/µL      Basophils Absolute 0 03 Thousands/µL                  IR biopsy kidney [place] kit no laterality   Final Result by Rosangela Hylton MD (09/15 1536)   Impression:       1  Percutaneous diagnostic bone marrow aspiration and bone core biopsy of the right ileum  2  Percutaneous core renal biopsy performed  A full pathology report will follow  Workstation performed: [id number]         CT abdomen pelvis wo contrast   Final Result by Roma Turner MD (09/07 1617)   Possible mild wall thickening versus underdistention of the colon at the splenic flexure with trace fluid in the paracolic gutter  Findings could represent a nonspecific colitis in the appropriate clinical scenario    Otherwise, no acute findings in the    abdomen or pelvis, including no evidence of pancreatitis  Workstation performed: [id number]               Procedures  Procedures      ED Course                             SBIRT 22yo+      Most Recent Value   SBIRT (22 yo +)   In order to provide better care to our patients, we are screening all of our patients for alcohol and drug use  Would it be okay to ask you these screening questions? No Filed at: 09/06/2021 2014                [facility]  Number of Diagnoses or Management Options  Hypertensive urgency  Pancreatitis  Diagnosis management comments: 74 yo F presenting for evaluation of vomiting and elevated blood pressure  Her blood pressure is >220 SBP at time of presentation  Concern for hypertensive urgency vs emergency  Will give IV labetalol to slowly lower BP, check labs to evaluate for signs of end organ damage  Labs show elevated lipase, consistent with pancreatitis  Will admit the patient for pancreatitis and for management of her BP  Disposition  Final diagnoses:   Hypertensive urgency   Pancreatitis     Time reflects when diagnosis was documented in both MDM as applicable and the Disposition within this note     Time User Action Codes Description Comment    9/6/2021 10:18 PM Louisa Vega Add [I16 0] Hypertensive urgency     9/6/2021 10:18 PM Louisa Vega Add [K85 90] Pancreatitis     9/17/2021  1:51 PM Francisca Little Add [R60 0] Bilateral lower extremity edema       ED Disposition     ED Disposition Condition Date/Time Comment    Admit Stable Mon Sep 6, 2021 10:19 PM Case was discussed with Dr Tayler Sears and the patient's admission status was agreed to be Admission Status: inpatient status to the service of Dr Tayler Sears           Follow-up Information     Follow up With Specialties Details Why Marty Berkowitz MD Nephrology Follow up  [address] [place] [address]  [address]      Lexie Melendrez MD Hematology and Oncology Follow up  [phone]  [phone]            Discharge Medication List as of 9/17/2021  2:33 PM      START taking these medications    Details   NIFEdipine (ADALAT CC) 60 MG 24 hr tablet Take 1 tablet (60 mg total) by mouth 2 (two) times a day, Starting Fri 9/17/2021, Until Sun 10/17/2021, Normal         CONTINUE these medications which have CHANGED    Details   cloNIDine (CATAPRES) 0 3 mg tablet Take 1 tablet (0 3 mg total) by mouth 3 (three) times a day, Starting Fri 9/17/2021, Until Sun 10/17/2021, Normal      hydrALAZINE (APRESOLINE) 100 MG tablet Take 1 tablet (100 mg total) by mouth every 8 (eight) hours, Starting Fri 9/17/2021, Until Sun 10/17/2021, Normal      torsemide (DEMADEX) 20 mg tablet Take 1 tablet (20 mg total) by mouth every other day, Starting Fri 9/17/2021, Until Wed 3/16/2022, No Print         CONTINUE these medications which have NOT CHANGED    Details   ! ! Accu-Chek FastClix Lancets MISC Inject under the skin 2 (two) times a day Test, Starting Wed 1/6/2021, Normal      !!  Accu-Chek Guide test strip Use 1 each 2 (two) times a day Test, Starting Wed 1/6/2021, Normal      Alcohol Swabs (EASY TOUCH ALCOHOL PREP MEDIUM) 70 % PADS USE 2 TO 3 X PER DAY, Historical Med      atorvastatin (LIPITOR) 40 mg tablet Take 1 tablet (40 mg total) by mouth daily, Starting Wed 1/6/2021, Normal      Blood Glucose Monitoring Suppl (OneTouch Verio) w/Device KIT Use 2 (two) times a day, Starting Fri 3/5/2021, Normal      carvedilol (COREG) 25 mg tablet Take 1 tablet (25 mg total) by mouth 2 (two) times a day with meals, Starting Wed 1/6/2021, Normal      ergocalciferol (ERGOCALCIFEROL) 1 25 MG (05475 UT) capsule Take 50,000 Units by mouth every 7 days, Starting Tue 10/22/2019, Historical Med      gabapentin (NEURONTIN) 300 mg capsule Take 1 capsule (300 mg total) by mouth 2 (two) times a day, Starting Wed 1/6/2021, Normal      !! glucose blood (OneTouch Verio) test strip Use as instructed, Normal      insulin glargine (LANTUS) 100 units/mL subcutaneous injection Inject 18 Units under the skin every morning, Starting Wed 8/11/2021, Normal      Insulin Pen Needle (Unifine Pentips) 32G X 4 MM MISC Use daily, Starting Wed 4/7/2021, Normal      !! Lancets (onetouch ultrasoft) lancets Use as instructed, Normal      linaGLIPtin (Tradjenta) 5 MG TABS Take 5 mg by mouth daily, Starting Tue 5/4/2021, Until Wed 5/4/2022, Historical Med      terazosin (HYTRIN) 5 mg capsule Take 5 mg by mouth, Starting Tue 5/4/2021, Until Wed 5/4/2022 at 2359, Historical Med       !! - Potential duplicate medications found  Please discuss with provider  STOP taking these medications       glimepiride (AMARYL) 2 mg tablet Comments:   Reason for Stopping:             Outpatient Discharge Orders   Discharge Diet     Activity as tolerated       PDMP Review     None           ED Provider  Attending physically available and evaluated [facility]  I managed the patient along with the ED Attending      Electronically Signed by         Sara Vargas DO  09/17/21 2001

## 2021-09-17 NOTE — ASSESSMENT & PLAN NOTE
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

## 2021-09-17 NOTE — TELEPHONE ENCOUNTER
----- Message from Sherwin Jackson, [address] sent at 9/16/2021 12:41 PM EDT -----  Please arrange post hospitalization acute kidney injury follow-up with Dr Spence  Will need BMP in 1 week  Thank you

## 2021-09-17 NOTE — PROGRESS NOTES
NEPHROLOGY PROGRESS NOTE   Mone Thurman 73 y o  female MRN: [id number]  Unit/Bed#: E5 -49 Encounter: [id number]  Reason for Consult: REI on CKD IV    PLAN:   -REI, new baseline creatinine in the low threes  Diuretic remains on hold  Will need follow-up with Dr Patricio Marino  Message sent office to arrange  Status post renal biopsy, awaiting final cytology   -resistant hypertension, status post renal biopsy  Secondary workup unremarkable  No evidence of renal artery stenosis  Continue antihypertensives as below   -history of nephrotic range proteinuria, suspect secondary to hypertension   -chronic diastolic CHF, diuretics remain on hold  Will evaluate as outpatient for re-initiation  -biclonal gammopathy, status post bone marrow biopsy  Continue outpatient follow-up with Hematology/Oncology  ASSESSMENT/PLAN:  REI on CKD IV:  Suspected prerenal with hemodynamics contributing as well as recurrent episodes of REI  -appears to have had progression in kidney disease over the past year   -baseline creatinine low to mid 2's   Creatinine more recently 2 6-3 0   -currently monitoring off of IV fluids and outpatient diuretics remain on hold  -UA:  Glucosuria, trace lysed blood, greater than 300 protein, 0-1 WBCs  -CT scan shows unremarkable kidneys, normal size and position  -follows with Dr Patricio Marino  -S/P renal biopsy, awaiting cytology    -recommend avoiding nephrotoxins, hypotension, IV contrast   -okay to discharge from Renal, will arrange outpatient follow-up      Hypertension:  Blood pressure much improved and is currently acceptable  Did have some elevation late last night   -outpatient regimen:  Carvedilol 25 mg b i d , clonidine 0 2 mg t i d , hydralazine 100 mg t i d , torsemide 20 mg daily, Hytrin 5 mg at night, torsemide every other day, hydralazine 25 mg b i d    -currently on carvedilol 25 mg b i d , clonidine 0 3 mg t i d , hydralazine 100 mg t i d , Procardia XL 60 mg BID, Hytrin 5 mg at night   -secondary workup, renal artery duplex unremarkable   -plasma catecholamines negative, Raman normal, renin normal,  plasma free metanephrines normal   -left kidney 9 3 cm, right kidney 10 2 cm    -avoid hypotension high fluctuations in blood pressure   -recommend holding parameters antihypertensive for systolic blood pressure less than 130 mmHg  -recommend low-salt diet at discharge      History of nephrotic range proteinuria:  Suspected secondary to hypertension as well as diabetes   -most recent urine protein to creatinine ratio 6 29 g       Chronic diastolic CHF:  Examining fairly euvolemic   -outpatient diuretics currently on hold  -recommend daily weights, fluid restriction, I/O      Biclonal gammopathy:   -continue outpatient follow-up with Hematology/Oncology  -S/P bone marrow biopsy, awaiting cytology       Anemia:  -continue to monitor and transfuse as needed for hemoglobin less than 7 0   -iron panel shows low iron sat, receiving IV Venofer times total of 5 doses      Other:  Diabetes  Disposition:  Okay to discharge from Renal  Message sent to office to arrange  SUBJECTIVE:  The patient is out of bed in her chair  She denies chest discomfort or shortness of breath  We discussed that she will need follow-up with Dr Ami Marrero with repeat lab work      OBJECTIVE:  Current Weight: Weight - Scale: 73 8 kg (162 lb 11 2 oz)  Vitals:    09/17/21 0239 09/17/21 0600 09/17/21 0819 09/17/21 0946   BP: (!) 182/88 125/74 101/58 124/59   Pulse: 82 77 64    Resp:       Temp:   (!) 97 3 °F (36 3 °C)    TempSrc:       SpO2: 95%  94%    Weight:  73 8 kg (162 lb 11 2 oz)     Height:           Intake/Output Summary (Last 24 hours) at 9/17/2021 1222  Last data filed at 9/17/2021 0819  Gross per 24 hour   Intake --   Output 350 ml   Net -350 ml     General: NAD  Skin: warm, dry, intact, no rash  HEENT: Moist mucous membranes, sclera anicteric, normocephalic, atraumatic  Neck: No apparent JVD appreciated  Chest: lung sounds clear B/L, on RA   CVS:Regular rate and rhythm, no murmer   Abdomen: Soft, round, non-tender, +BS  Extremities: No B/L LE edema present  Neuro: alert and oriented  Psych: appropriate mood and affect     Medications:    Current Facility-Administered Medications:     acetaminophen (TYLENOL) tablet 650 mg, 650 mg, Oral, Q6H PRN, Cuiyin Yurik, CRNP, 650 mg at 09/07/21 0827    atorvastatin (LIPITOR) tablet 40 mg, 40 mg, Oral, After Dinner, Cuiyin Yurik, CRNP, 40 mg at 09/16/21 1744    carvedilol (COREG) tablet 25 mg, 25 mg, Oral, BID With Meals, Cuiyin Yurik, CRNP, 25 mg at 09/17/21 0947    cloNIDine (CATAPRES) tablet 0 3 mg, 0 3 mg, Oral, TID, Cuifelicen Stacyrik, CRNP, 0 3 mg at 09/16/21 2300    gabapentin (NEURONTIN) capsule 300 mg, 300 mg, Oral, Daily, Cuiyin Yurik, CRNP, 300 mg at 09/17/21 0947    hydrALAZINE (APRESOLINE) tablet 100 mg, 100 mg, Oral, Q8H [address], Cuiyin Yurik, CRNP, 100 mg at 09/16/21 2259    insulin glargine (LANTUS) subcutaneous injection 12 Units 0 12 mL, 12 Units, Subcutaneous, QAM, Veronicaifelicen Stacyrik, CRNP, 12 Units at 09/17/21 0947    insulin lispro (HumaLOG) 100 units/mL subcutaneous injection 1-5 Units, 1-5 Units, Subcutaneous, HS, Cuiyin Stacyrik, CRNP, 2 Units at 09/15/21 2225    insulin lispro (HumaLOG) 100 units/mL subcutaneous injection 1-6 Units, 1-6 Units, Subcutaneous, TID AC, 4 Units at 09/17/21 1158 **AND** Fingerstick Glucose (POCT), , , TID AC, Cuiyin Yurik, CRNP    Labetalol HCl (NORMODYNE) injection 20 mg, 20 mg, Intravenous, Q4H PRN, Cuiyin Yurik, CRNP, 20 mg at 09/17/21 0306    NIFEdipine (PROCARDIA XL) 24 hr tablet 60 mg, 60 mg, Oral, BID, Shawanda Cough Rowley, DO, 60 mg at 09/17/21 0946    ondansetron (ZOFRAN) injection 4 mg, 4 mg, Intravenous, Q6H PRN, MARLENA Parekh, 4 mg at 09/07/21 2078    terazosin (HYTRIN) capsule 5 mg, 5 mg, Oral, HS, MARLENA Parekh, 5 mg at 09/17/21 0000    Laboratory Results:  Results from last 7 days   Lab Units 09/17/21  0746 09/17/21  0745 09/16/21  0951 09/15/21  0540 09/13/21  0537 09/12/21  0649   WBC Thousand/uL 6 02  --  6 45 6 69 5 93 6 37   HEMOGLOBIN g/dL 8 3*  --  8 1* 7 9* 8 3* 8 1*   HEMATOCRIT % 26 3*  --  25 8* 24 7* 26 4* 26 0*   PLATELETS Thousands/uL 233  --  236 230 230 215   SODIUM mmol/L  --  138 138 137 138 137   POTASSIUM mmol/L  --  5 2 5 3 5 3 5 2 5 1   CHLORIDE mmol/L  --  106 104 104 102 102   CO2 mmol/L  --  25 26 23 23 25   BUN mg/dL  --  59* 60* 61* 61* 55*   CREATININE mg/dL  --  3 14* 3 35* 2 91* 3 09* 2 93*   CALCIUM mg/dL  --  8 0* 7 8* 7 9* 8 0* 8 0*   MAGNESIUM mg/dL  --   --   --   --  2 2 2 0   PHOSPHORUS mg/dL  --   --   --   --  4 4* 4 6*   ALK PHOS U/L  --   --   --   --   --  69   ALT U/L  --   --   --   --   --  17   AST U/L  --   --   --   --   --  18

## 2021-09-17 NOTE — ASSESSMENT & PLAN NOTE
Lab Results   Component Value Date    EGFR 15 09/17/2021    EGFR 14 09/16/2021    EGFR 16 09/15/2021    CREATININE 3 14 (H) 09/17/2021    CREATININE 3 35 (H) 09/16/2021    CREATININE 2 91 (H) 09/15/2021     · Baseline hemoglobin appears to be around 8-9's    · Iron study showed iron 42, ferritin 24  · IV Venofer 200mg daily x 5 doses per renal  · Monitor CBC

## 2021-09-17 NOTE — ASSESSMENT & PLAN NOTE
Lab Results   Component Value Date    HGBA1C 9 5 (H) 07/23/2021     Results from last 7 days   Lab Units 09/17/21  1141 09/17/21  0710 09/16/21  2258 09/16/21  2057 09/16/21  1632 09/16/21  1053 09/16/21  0722 09/15/21  2144   POC GLUCOSE mg/dl 270* 161* 151* 156* 214* 294* 107 235*     · Continue insulin sliding scale and lantus  Will adjust as needed

## 2021-09-17 NOTE — NURSING NOTE
Pt verbalized understanding to discharge instructions and medications with the aid of a   All questions answered  Pt left with all belongings accompany by family and pca to private vehicle by ambulation

## 2021-09-17 NOTE — ASSESSMENT & PLAN NOTE
· Pt presented with home SBP >230 despite being compliant with her medications  · Renal vascular Dopplers from 07/31/2021 showing no renal artery stenosis right kidney 9 3 cm left kidney 10 2 cm  · Hypertensive urgency on multiple agents  Nephrology following  · Home medications include: Coreg 25 mg p o  B i d , clonidine 0 2 mg p o  T i d , hydralazine 25 mg p o  B i d  torsemide 20 mg p o  Q 48 hours, Hytrin 5 mg p o  Q h s   · Current medications include: Coreg 25 mg p o  B i d , clonidine 0 3 mg p o  t i d , hydralazine 100 mg p o  t i d , Hytrin 5 mg p o   Q day,procardia 60mg in am and 30 mg in pm  · Okay to restart torsemide qod    Nephrology is following  S/p renal biopsy

## 2021-09-17 NOTE — PLAN OF CARE
Problem: Potential for Falls  Goal: Patient will remain free of falls  Description: INTERVENTIONS:  - Educate patient/family on patient safety including physical limitations  - Instruct patient to call for assistance with activity   - Consult OT/PT to assist with strengthening/mobility   - Keep Call bell within reach  - Keep bed low and locked with side rails adjusted as appropriate  - Keep care items and personal belongings within reach  - Initiate and maintain comfort rounds  - Make Fall Risk Sign visible to staff  -   - Apply yellow socks and bracelet for high fall risk patients  - Consider moving patient to room near nurses station  Outcome: Progressing     Problem: CARDIOVASCULAR - ADULT  Goal: Maintains optimal cardiac output and hemodynamic stability  Description: INTERVENTIONS:  - Monitor I/O, vital signs and rhythm  - Monitor for S/S and trends of decreased cardiac output  - Administer and titrate ordered vasoactive medications to optimize hemodynamic stability  - Assess quality of pulses, skin color and temperature  - Assess for signs of decreased coronary artery perfusion  - Instruct patient to report change in severity of symptoms  Outcome: Progressing  Goal: Absence of cardiac dysrhythmias or at baseline rhythm  Description: INTERVENTIONS:  - Continuous cardiac monitoring, vital signs, obtain 12 lead EKG if ordered  - Administer antiarrhythmic and heart rate control medications as ordered  - Monitor electrolytes and administer replacement therapy as ordered  Outcome: Progressing     Problem: SAFETY ADULT  Goal: Patient will remain free of falls  Description: INTERVENTIONS:  - Educate patient/family on patient safety including physical limitations  - Instruct patient to call for assistance with activity   - Consult OT/PT to assist with strengthening/mobility   - Keep Call bell within reach  - Keep bed low and locked with side rails adjusted as appropriate  - Keep care items and personal belongings within reach  - Initiate and maintain comfort rounds  - Make Fall Risk Sign visible to staff  -   - Apply yellow socks and bracelet for high fall risk patients  - Consider moving patient to room near nurses station  Outcome: Progressing  Goal: Maintain or return to baseline ADL function  Description: INTERVENTIONS:  -  Assess patient's ability to carry out ADLs; assess patient's baseline for ADL function and identify physical deficits which impact ability to perform ADLs (bathing, care of mouth/teeth, toileting, grooming, dressing, etc )  - Assess/evaluate cause of self-care deficits   - Assess range of motion  - Assess patient's mobility; develop plan if impaired  - Assess patient's need for assistive devices and provide as appropriate  - Encourage maximum independence but intervene and supervise when necessary  - Involve family in performance of ADLs  - Assess for home care needs following discharge   - Consider OT consult to assist with ADL evaluation and planning for discharge  - Provide patient education as appropriate  Outcome: Progressing     Problem: DISCHARGE PLANNING  Goal: Discharge to home or other facility with appropriate resources  Description: INTERVENTIONS:  - Identify barriers to discharge w/patient and caregiver  - Arrange for needed discharge resources and transportation as appropriate  - Identify discharge learning needs (meds, wound care, etc )  - Arrange for interpretive services to assist at discharge as needed  - Refer to Case Management Department for coordinating discharge planning if the patient needs post-hospital services based on physician/advanced practitioner order or complex needs related to functional status, cognitive ability, or social support system  Outcome: Progressing     Problem: MOBILITY - ADULT  Goal: Maintain or return to baseline ADL function  Description: INTERVENTIONS:  -  Assess patient's ability to carry out ADLs; assess patient's baseline for ADL function and identify physical deficits which impact ability to perform ADLs (bathing, care of mouth/teeth, toileting, grooming, dressing, etc )  - Assess/evaluate cause of self-care deficits   - Assess range of motion  - Assess patient's mobility; develop plan if impaired  - Assess patient's need for assistive devices and provide as appropriate  - Encourage maximum independence but intervene and supervise when necessary  - Involve family in performance of ADLs  - Assess for home care needs following discharge   - Consider OT consult to assist with ADL evaluation and planning for discharge  - Provide patient education as appropriate  Outcome: Progressing  Goal: Maintains/Returns to pre admission functional level  Description: INTERVENTIONS:  - Perform BMAT or MOVE assessment daily    - Set and communicate daily mobility goal to care team and patient/family/caregiver     - Collaborate with rehabilitation services on mobility goals if consulted    - Record patient progress and toleration of activity level   Outcome: Progressing

## 2021-09-17 NOTE — DISCHARGE SUMMARY
Discharge Summary - [facility]    Patient Information: Braulio Dodson 73 y o  female MRN: [id number]  Unit/Bed#: E5 -01 Encounter: [id number]    Discharging Physician / Practitioner: Rene Apple DO  PCP: Issac Pineda MD  Admission Date: 9/6/2021  Discharge Date: 09/17/21    Disposition:     Home     Reason for Admission: htn urgency     Discharge Diagnoses:     Principal Problem:    Hypertensive urgency  Active Problems:    Type 2 diabetes mellitus, with long-term current use of insulin (HCC)    Chronic diastolic heart failure (HCC)    Anemia in stage 4 chronic kidney disease ([address] )    Biclonal gammopathy    Acute kidney injury superimposed on CKD ([address] )    Heart murmur  Resolved Problems:    Elevated lipase      Consultations During Hospital Stay:  · Nephrology   · IR    Procedures Performed:     · Bone marrow biopsy  · Kidney biopsy     Significant Findings / Test Results:   CT abdomen pelvis wo contrast  Result Date: 9/7/2021  Impression: Possible mild wall thickening versus underdistention of the colon at the splenic flexure with trace fluid in the paracolic gutter  Findings could represent a nonspecific colitis in the appropriate clinical scenario  Otherwise, no acute findings in the abdomen or pelvis, including no evidence of pancreatitis  Incidental Findings:   · none    Test Results Pending at Discharge (will require follow up): · Bone marrow biopsy  · Kidney biopsy      Outpatient Tests Requested:  Bmp in 1 week     Hospital Course:     Braulio Dodson is a 73 y o  female patient who originally presented to the hospital on 9/6/2021 due to htn urgency with ckd 4  She was evaluated by nephrology who adjusted her bp medications  Her bp is stable on coreg 25mg bid, clonidine 0 3mg tid, hydralazine 100mg tid, procardia 60mg bid  She was restarted on torsemide qod  She had a kidney biopsy and bone marrow biopsy while in pt   She will follow up with nephrology and hematology for results  Pt was discharged to home  Discharge Day Visit / Exam:     * Please refer to separate progress note for these details *    Discharge instructions/Information to patient and family:   See after visit summary for information provided to patient and family  Provisions for Follow-Up Care:  See after visit summary for information related to follow-up care and any pertinent home health orders  Discharge Statement:  I spent 32 minutes discharging the patient  This time was spent on the day of discharge  I had direct contact with the patient on the day of discharge  Greater than 50% of the total time was spent examining patient, answering all patient questions, arranging and discussing plan of care with patient as well as directly providing post-discharge instructions  Additional time then spent on discharge activities  Discharge Medications:  See after visit summary for reconciled discharge medications provided to patient and family

## 2021-09-18 NOTE — PROGRESS NOTES
Hematology Outpatient Office Note    Date of Service: 2021    [facility] HEMATOLOGY SPECIALISTS ENEDINA Hannah  [facility]  [phone]    Reason for Consultation:   No chief complaint on file  Referral Physician: No ref  provider found    Primary Care Physician:  Luli Mccollum MD     Nickname: Bladimir Soto (from [facility])    [facility]: Susy Dodge     Son lives with her at home: [place]      Original ECO    Today's ECO      ASSESSMENT & PLAN      Diagnosis ICD-10-CM Associated Orders   1  Normocytic anemia  D64 9    2  Smoldering multiple myeloma ([facility] [address] )  C90 00          This is a 73 y o  c PMHx notable for DM, renal dysfunction in the setting of nephrotic range proteinuria, being seen for smoldering myeloma    Lambda restricted Smoldering (Multiple) Myeloma: 21 SPEP and ELISSA noted two regions of 0 06 and 0 12 mgdL, respectively  New end-organ damage of anemia since 21 (last normal 2018)  Renal dysfunction is new since 2020 (please see below chart)  The patient is noted to have uncontrolled HTN which may be contributing to the worsening kidney function, however, that cannot be assumed  9/15/21 BMBx  Normocellular weiss (35% cellularity) with trilineage hematopoiesis, scattered mild atypia and 12% plasma cells (Lambda dominant, Lambda to Kappa ratio 2:1)  Adequate iron storage  She has smoldering myeloma due to plasma cells constituting between 10-60% plasma cells in the bone marrow as defined by the 2020 international myeloma working group risk stratification model for smoldering myeloma  She is low risk due to <2 grams/dL M-protein (0 06 and 0 12 grams/dL in both peaks on ELISSA), involved light chain ratio <20, and BM plasma cell infiltration <20% indicating a 2 year progression to MM risk of only 6%  This is a precursor disease state that does not require treatment              In regards to CHRISTAL use, the literature suggests decreased overall survival in multiple myeloma when these agents are used  If we extrapolate this data to smoldering multiple myeloma, then it would suggest we should also try to avoid it in this context as well:     https://pubmed ncbi nlm nih gov/51838906/    https://pubmed ncbi nlm nih gov/82931797/    Normocytic Anemia: new as per above  No nutritional profile on board but Vit B12, folate is not covered to be checked by her insurance for normocytic anemia      · Plan/Labs  · IgG, IgM, IgA, serum free light chains, skeletal survey (ordered but not yet done), SPEP + ELISSA (repeated and it is noted that this was done a month or two ago), 24 hour urine (UPEP) to be repeated q6 months        · Discussion of decision making    I personally reviewed the following lab results, the image studies, pathology, other specialty/physicians consult notes and recommendations, and outside medical records  I had a lengthy discussion with the patient and shared the work-up findings  We discussed the diagnosis and management plan as below  I spent 33 minutes reviewing the records (labs, clinician notes, outside records, medical history, ordering medicine/tests/procedures, interpreting the imaging/labs previously done) and coordination of care as well as direct time with the patient today, of which greater than 50% of the time was spent in counseling and coordination of care with the patient/family  Follow Up: q 6 months with aforementioned labs    All questions were answered to the patient's satisfaction during this encounter  The patient knows the contact information for our office and knows to reach out for any relevant concerns related to this encounter   They are to call for any temperature 100 4 or higher, new symptoms including but not restricted to shaking chills, decreased appetite, nausea, vomiting, diarrhea, increased fatigue, shortness of breath or chest pain, confusion, and not feeling the strength to come to the clinic  For all other listed problems and medical diagnosis in their chart - they are managed by PCP and/or other specialists, which the patient acknowledges  Thank you very much for your consultation and making us a part of this patient's care  We are continuing to follow closely with you  Please do not hesitate to reach out to me with any additional questions or concerns  Abbi Harmon MD  Hematology & Medical Oncology Staff Physician             Disclaimer: This document was prepared using [employer] Fluency Direct technology  If a word or phrase is confusing, or does not make sense, this is likely due to recognition error which was not discovered during this clinician's review  If you believe an error has occurred, please contact me through [address] [place] line for louise? 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   New medications in the last month: Hydralazine  Pain: none      SUBJECTIVE  (INTERVAL HISTORY)      She was in the hospital 8/8/21 for HTN urgency  Her BP has been better controlled at home on Hydralazine  There were abnormalities in her blood work noted that needed to be followed up on and that is why she thinks she needs to see the blood doctor  I have reviewed the relevant past medical, surgical, social and family history  I have also reviewed allergies and medications for this patient  Review of Systems     She denies F/C, N/V, SOB, CP, abdominal pain, RODRÍGUEZ, rash, itching, hematuria, melena, hematochezia, or unintentional weight loss  A 10-point review of system was performed, pertinent positive and negative were detailed as above   Otherwise, the 10-point review of system was negative  Past Medical History:   Diagnosis Date    Chronic kidney disease     Diabetes mellitus ([address] )     Hyperlipidemia     Hypertension     Pneumonia        Past Surgical History:   Procedure Laterality Date    EYE SURGERY      IR BIOPSY BONE MARROW  9/15/2021    IR BIOPSY KIDNEY COLUMBIA KIT NO LATERALITY  9/15/2021       Family History   Problem Relation Age of Onset    Hypertension Mother    Wilder Diabetes Father     Hypertension Sister     Diabetes Sister     Hypertension Brother        Social History     Socioeconomic History    Marital status: /Civil Union     Spouse name: Not on file    Number of children: Not on file    Years of education: Not on file    Highest education level: Not on file   Occupational History    Not on file   Tobacco Use    Smoking status: Never Smoker    Smokeless tobacco: Never Used    Tobacco comment: NO TOBACCO USE   Vaping Use    Vaping Use: Never used   Substance and Sexual Activity    Alcohol use: Not Currently    Drug use: Not Currently    Sexual activity: Not on file   Other Topics Concern    Not on file   Social History Narrative    Not on file     Social Determinants of Health     Financial Resource Strain:     Difficulty of Paying Living Expenses:    Food Insecurity:     Worried About Running Out of Food in the Last Year:     Ran Out of Food in the Last Year:    Transportation Needs:     Lack of Transportation (Medical):      Lack of Transportation (Non-Medical):    Physical Activity:     Days of Exercise per Week:     Minutes of Exercise per Session:    Stress:     Feeling of Stress :    Social Connections:     Frequency of Communication with Friends and Family:     Frequency of Social Gatherings with Friends and Family:     Attends Worship Services:     Active Member of Clubs or Organizations:     Attends Club or Organization Meetings:     Marital Status:    Intimate Partner Violence:     Fear of Current or Ex-Partner:     Emotionally Abused:     Physically Abused:     Sexually Abused:         Allergies   Allergen Reactions    Amlodipine Edema and Eye Swelling    Lisinopril Angioedema     Bilateral periorbital edema that was significant       Current Outpatient Medications   Medication Sig Dispense Refill    Accu-Chek FastClix Lancets MISC Inject under the skin 2 (two) times a day Test 102 each 5    Accu-Chek Guide test strip Use 1 each 2 (two) times a day Test 100 each 5    Alcohol Swabs (EASY TOUCH ALCOHOL PREP MEDIUM) 70 % PADS USE 2 TO 3 X PER DAY  3    atorvastatin (LIPITOR) 40 mg tablet Take 1 tablet (40 mg total) by mouth daily 90 tablet 3    Blood Glucose Monitoring Suppl (OneTouch Verio) w/Device KIT Use 2 (two) times a day 1 kit 0    carvedilol (COREG) 25 mg tablet Take 1 tablet (25 mg total) by mouth 2 (two) times a day with meals 60 tablet 3    cloNIDine (CATAPRES) 0 3 mg tablet Take 1 tablet (0 3 mg total) by mouth 3 (three) times a day 90 tablet 1    ergocalciferol (ERGOCALCIFEROL) 1 25 MG (62499 UT) capsule Take 50,000 Units by mouth every 7 days      gabapentin (NEURONTIN) 300 mg capsule Take 1 capsule (300 mg total) by mouth 2 (two) times a day 90 capsule 2    glucose blood (OneTouch Verio) test strip Use as instructed 100 each 3    hydrALAZINE (APRESOLINE) 100 MG tablet Take 1 tablet (100 mg total) by mouth every 8 (eight) hours 90 tablet 2    insulin glargine (LANTUS) 100 units/mL subcutaneous injection Inject 18 Units under the skin every morning 10 mL 0    Insulin Pen Needle (Unifine Pentips) 32G X 4 MM MISC Use daily 100 each 2    Lancets (onetouch ultrasoft) lancets Use as instructed 100 each 3    linaGLIPtin (Tradjenta) 5 MG TABS Take 5 mg by mouth daily      NIFEdipine (ADALAT CC) 60 MG 24 hr tablet Take 1 tablet (60 mg total) by mouth 2 (two) times a day 60 tablet 2    terazosin (HYTRIN) 5 mg capsule Take 5 mg by mouth      torsemide (DEMADEX) 20 mg Right arm; tablet Take 1 tablet (20 mg total) by mouth every other day 30 tablet 0     No current facility-administered medications for this visit  (Not in a hospital admission)        Objective:     24 Hour Vitals Assessment:     There were no vitals filed for this visit  PHYSICIAN EXAM:    General: Appearance: alert, cooperative, no distress  HEENT: Normocephalic, atraumatic  No scleral icterus  conjunctivae clear  EOMI  Chest: No tenderness to palpation  No open wound noted  Lungs: Clear to auscultation bilaterally, Respirations unlabored  Cardiac: Regular rate and rhythm, +S1and S2, -r/m/g  Abdomen: Soft, non-tender, non-distended  Bowel sounds are normal    Extremities:  No cyanosis, clubbing  Non pitting edema in lower extream +1 at ankles b/l  Skin: Skin color, turgor are normal  No rashes  Neurologic: Awake, Alert, and oriented, no gross focal deficits noted b/l  DATA REVIEW:    Pathology Result:    Final Diagnosis   Date Value Ref Range Status   09/15/2021   Final    A  Kidney, right, core needle biopsies:   - Renal cortical parenchyma (gross evaluation only)  - The entire specimen, including tissue inappropriate fixatives for electron microscopy, light microscopy and immunofluorescence is sent to [address] [place] [address] for expert nephropathologist evaluation   - A full report from that institution will follow under separate cover  Interpretation performed at , [address] [place] [address]       09/15/2021   Incomplete    A-C  Bone Marrow, Right Iliac Crest, Core, Clot and Aspirate:  - Normocellular weiss (35% cellularity) with trilineage hematopoiesis, scattered mild atypia and 12% plasma cells (Lambda dominant, Lambda to Kappa ratio 2:1), cannot exclude monoclonal gammopathy of undetermined significance (MGUS) versus reactive changes  - Adequate iron stores  - Mild patchy reticulin fibrosis    - No apple green birefringence material is identified on Congo red stain   - FISH study for myeloma: negative  - Cytogenetic/Molecular study pending  Image Results:   Image result are reviewed and documented in Hematology/Oncology history  I personally reviewed these images  IR biopsy bone marrow  Narrative: CT-scan guided diagnostic bone marrow aspiration and core biopsy, CT and ultrasound guided core biopsy of the right kidney    History: 80-year-old female with history of biclonal gammopathy, with request for bone marrow biopsy  Patient also has hypertension and nephrotic range proteinuria, now with request for core renal biopsy  Concious sedation time: 30MIN    Technique: This examination, like all CT scans performed in the [facility], was performed utilizing techniques to minimize radiation dose exposure, including the use of iterative reconstruction and automated exposure control  The patient was brought to the CT scanner and placed prone on the table  After axial images were obtained through the abdomen and pelvis, an area of the skin was then marked, prepped, and draped in usual sterile fashion  Lidocaine was administered to the   skin, and subcutaneous tissues down to the periosteum of the right  ileum, and a small skin incision was made  An SERPs needle was advanced percutaneously through the cortex, and a bone marrow aspiration was performed  The specimen was given to the   cytotech to prepare  A core biopsy was then performed  The bone core was placed in formalin  Next, attention was made to core renal biopsy  Lidocaine was administered to the skin and a small skin incision was made  Under ultrasound and CT guidance, a 17-gauge cannula was advanced up to the cortex of the right kidney and an 18 gauge core biopsy   specimen was obtained  2 additional passes were made  The patient tolerated the procedure well and suffered no complications  Impression: Impression:     1   Percutaneous diagnostic bone marrow aspiration and bone core biopsy of the right ileum  2  Percutaneous core renal biopsy performed  A full pathology report will follow  Workstation performed: [id number]  IR biopsy kidney [place] kit no laterality  Narrative: CT-scan guided diagnostic bone marrow aspiration and core biopsy, CT and ultrasound guided core biopsy of the right kidney    History: 43-year-old female with history of biclonal gammopathy, with request for bone marrow biopsy  Patient also has hypertension and nephrotic range proteinuria, now with request for core renal biopsy  Concious sedation time: 30MIN    Technique: This examination, like all CT scans performed in the [facility], was performed utilizing techniques to minimize radiation dose exposure, including the use of iterative reconstruction and automated exposure control  The patient was brought to the CT scanner and placed prone on the table  After axial images were obtained through the abdomen and pelvis, an area of the skin was then marked, prepped, and draped in usual sterile fashion  Lidocaine was administered to the   skin, and subcutaneous tissues down to the periosteum of the right  ileum, and a small skin incision was made  An OnControl needle was advanced percutaneously through the cortex, and a bone marrow aspiration was performed  The specimen was given to the   cytotech to prepare  A core biopsy was then performed  The bone core was placed in formalin  Next, attention was made to core renal biopsy  Lidocaine was administered to the skin and a small skin incision was made  Under ultrasound and CT guidance, a 17-gauge cannula was advanced up to the cortex of the right kidney and an 18 gauge core biopsy   specimen was obtained  2 additional passes were made  The patient tolerated the procedure well and suffered no complications  Impression: Impression:     1   Percutaneous diagnostic bone marrow aspiration and bone core biopsy of the right ileum  2  Percutaneous core renal biopsy performed  A full pathology report will follow  Workstation performed: [id number]      LABS:  Lab data are reviewed and documented in HemOnc history  Lab Results   Component Value Date    HGB 8 3 (L) 09/17/2021    HCT 26 3 (L) 09/17/2021    MCV 86 09/17/2021     09/17/2021    WBC 6 02 09/17/2021    NRBC 0 09/13/2021     Lab Results   Component Value Date     06/08/2018    K 5 2 09/17/2021     09/17/2021    CO2 25 09/17/2021    ANIONGAP 10 06/08/2018    BUN 59 (H) 09/17/2021    CREATININE 3 14 (H) 09/17/2021    GLUF 195 (H) 09/02/2021    CALCIUM 8 0 (L) 09/17/2021    CORRECTEDCA 9 2 09/12/2021    AST 18 09/12/2021    ALT 17 09/12/2021    ALKPHOS 69 09/12/2021    PROT 7 5 06/08/2018    BILITOT 0 3 06/08/2018    EGFR 15 09/17/2021       Lab Results   Component Value Date    IRON 42 (L) 09/09/2021    TIBC 225 (L) 09/09/2021    FERRITIN 24 09/09/2021       No results found for: [id number]    No results for input(s): WBC, CREAT in the last 72 hours      Invalid input(s):  PLT     By:  Rosalia Page MD, 9/21/2021, 9:31 AM

## 2021-09-21 ENCOUNTER — TELEPHONE (OUTPATIENT)
Dept: NEPHROLOGY | Facility: CLINIC | Age: 66
End: 2021-09-21

## 2021-09-21 DIAGNOSIS — N18.9 ACUTE KIDNEY INJURY SUPERIMPOSED ON CKD (HCC): Primary | ICD-10-CM

## 2021-09-21 DIAGNOSIS — N17.9 ACUTE KIDNEY INJURY SUPERIMPOSED ON CKD (HCC): Primary | ICD-10-CM

## 2021-09-21 NOTE — TELEPHONE ENCOUNTER
Left patient a voicemail requesting to call the office back to schedule sooner apt than 10/12 follow up with Dr Curry Batter  Also have reminded patient to complete repeat blood work asap

## 2021-09-22 LAB
MISCELLANEOUS LAB TEST RESULT: NORMAL
SCAN RESULT: NORMAL

## 2021-09-23 ENCOUNTER — OFFICE VISIT (OUTPATIENT)
Dept: HEMATOLOGY ONCOLOGY | Facility: CLINIC | Age: 66
End: 2021-09-23
Payer: MEDICARE

## 2021-09-23 VITALS
DIASTOLIC BLOOD PRESSURE: 86 MMHG | HEIGHT: 62 IN | WEIGHT: 160 LBS | TEMPERATURE: 96.4 F | OXYGEN SATURATION: 98 % | SYSTOLIC BLOOD PRESSURE: 144 MMHG | BODY MASS INDEX: 29.44 KG/M2 | RESPIRATION RATE: 18 BRPM | HEART RATE: 75 BPM

## 2021-09-23 DIAGNOSIS — E63.9 NUTRITIONAL DEFICIENCY: ICD-10-CM

## 2021-09-23 DIAGNOSIS — D47.2 SMOLDERING MULTIPLE MYELOMA: ICD-10-CM

## 2021-09-23 DIAGNOSIS — D64.9 NORMOCYTIC ANEMIA: Primary | ICD-10-CM

## 2021-09-23 LAB — SCAN RESULT: NORMAL

## 2021-09-23 PROCEDURE — 99214 OFFICE O/P EST MOD 30 MIN: CPT | Performed by: INTERNAL MEDICINE

## 2021-09-23 RX ORDER — CARBOXYMETHYLCELLULOSE SODIUM 10 MG/ML
1 GEL OPHTHALMIC 3 TIMES DAILY
COMMUNITY
Start: 2021-09-02 | End: 2021-12-05 | Stop reason: CLARIF

## 2021-09-24 NOTE — TELEPHONE ENCOUNTER
Was able to reach patient   used  Reminded patient of repeat lab work that must be completed asap so we can monitor kidney function closely  Patient is asking for results of renal biopsy  Patient will complete labs asap

## 2021-09-27 NOTE — TELEPHONE ENCOUNTER
I have called and spoke with patient in [place] regarding results of her kidney biopsy  Unfortunately kidney biopsy shows advanced diabetes nephropathy with 70-80% fibrosis  Patient expressed understanding  Also remind patient to have repeat blood test, she will go for repeat blood test this week, and remind her about appointment with me on 10/12        I cc patient's PCP to keep her updated

## 2021-09-28 ENCOUNTER — TELEPHONE (OUTPATIENT)
Dept: NEPHROLOGY | Facility: CLINIC | Age: 66
End: 2021-09-28

## 2021-09-28 PROCEDURE — 99283 EMERGENCY DEPT VISIT LOW MDM: CPT

## 2021-09-28 NOTE — TELEPHONE ENCOUNTER
Spoke with the patient using  [phone]  We moved the patients appointment up from 10/12 to 10/4 per Dr Katherine Vasques  I also reminded her to get her blood work done before the appointment if she had not done it yet

## 2021-09-29 ENCOUNTER — HOSPITAL ENCOUNTER (EMERGENCY)
Facility: HOSPITAL | Age: 66
Discharge: HOME | End: 2021-09-29
Attending: EMERGENCY MEDICINE
Payer: MEDICARE

## 2021-09-29 VITALS
RESPIRATION RATE: 18 BRPM | TEMPERATURE: 98.1 F | BODY MASS INDEX: 28.63 KG/M2 | DIASTOLIC BLOOD PRESSURE: 76 MMHG | HEART RATE: 75 BPM | WEIGHT: 156.53 LBS | OXYGEN SATURATION: 100 % | SYSTOLIC BLOOD PRESSURE: 164 MMHG

## 2021-09-29 DIAGNOSIS — I10 ESSENTIAL HYPERTENSION: ICD-10-CM

## 2021-09-29 DIAGNOSIS — R11.2 NON-INTRACTABLE VOMITING WITH NAUSEA, UNSPECIFIED VOMITING TYPE: Primary | ICD-10-CM

## 2021-09-29 LAB
ALBUMIN SERPL BCP-MCNC: 3.2 G/DL (ref 3.5–5)
ALP SERPL-CCNC: 100 U/L (ref 46–116)
ALT SERPL W P-5'-P-CCNC: 21 U/L (ref 12–78)
ANION GAP SERPL CALCULATED.3IONS-SCNC: 9 MMOL/L (ref 4–13)
AST SERPL W P-5'-P-CCNC: 16 U/L (ref 5–45)
ATRIAL RATE: 80 BPM
BASOPHILS # BLD AUTO: 0.04 THOUSANDS/ΜL (ref 0–0.1)
BASOPHILS NFR BLD AUTO: 1 % (ref 0–1)
BILIRUB SERPL-MCNC: 0.22 MG/DL (ref 0.2–1)
BUN SERPL-MCNC: 51 MG/DL (ref 5–25)
CALCIUM ALBUM COR SERPL-MCNC: 9 MG/DL (ref 8.3–10.1)
CALCIUM SERPL-MCNC: 8.4 MG/DL (ref 8.3–10.1)
CHLORIDE SERPL-SCNC: 102 MMOL/L (ref 100–108)
CO2 SERPL-SCNC: 28 MMOL/L (ref 21–32)
CREAT SERPL-MCNC: 3.39 MG/DL (ref 0.6–1.3)
EOSINOPHIL # BLD AUTO: 0.14 THOUSAND/ΜL (ref 0–0.61)
EOSINOPHIL NFR BLD AUTO: 2 % (ref 0–6)
ERYTHROCYTE [DISTWIDTH] IN BLOOD BY AUTOMATED COUNT: 15.6 % (ref 11.6–15.1)
GFR SERPL CREATININE-BSD FRML MDRD: 14 ML/MIN/1.73SQ M
GLUCOSE SERPL-MCNC: 176 MG/DL (ref 65–140)
GLUCOSE SERPL-MCNC: 186 MG/DL (ref 65–140)
HCT VFR BLD AUTO: 27.8 % (ref 34.8–46.1)
HGB BLD-MCNC: 8.8 G/DL (ref 11.5–15.4)
IMM GRANULOCYTES # BLD AUTO: 0.03 THOUSAND/UL (ref 0–0.2)
IMM GRANULOCYTES NFR BLD AUTO: 0 % (ref 0–2)
LIPASE SERPL-CCNC: 670 U/L (ref 73–393)
LYMPHOCYTES # BLD AUTO: 0.96 THOUSANDS/ΜL (ref 0.6–4.47)
LYMPHOCYTES NFR BLD AUTO: 14 % (ref 14–44)
MCH RBC QN AUTO: 26.8 PG (ref 26.8–34.3)
MCHC RBC AUTO-ENTMCNC: 31.7 G/DL (ref 31.4–37.4)
MCV RBC AUTO: 85 FL (ref 82–98)
MONOCYTES # BLD AUTO: 0.5 THOUSAND/ΜL (ref 0.17–1.22)
MONOCYTES NFR BLD AUTO: 7 % (ref 4–12)
NEUTROPHILS # BLD AUTO: 5.42 THOUSANDS/ΜL (ref 1.85–7.62)
NEUTS SEG NFR BLD AUTO: 76 % (ref 43–75)
NRBC BLD AUTO-RTO: 0 /100 WBCS
P AXIS: 66 DEGREES
PLATELET # BLD AUTO: 315 THOUSANDS/UL (ref 149–390)
PMV BLD AUTO: 10.7 FL (ref 8.9–12.7)
POTASSIUM SERPL-SCNC: 4.6 MMOL/L (ref 3.5–5.3)
PR INTERVAL: 166 MS
PROT SERPL-MCNC: 7.5 G/DL (ref 6.4–8.2)
QRS AXIS: 72 DEGREES
QRSD INTERVAL: 80 MS
QT INTERVAL: 418 MS
QTC INTERVAL: 482 MS
RBC # BLD AUTO: 3.28 MILLION/UL (ref 3.81–5.12)
SODIUM SERPL-SCNC: 139 MMOL/L (ref 136–145)
T WAVE AXIS: 113 DEGREES
TROPONIN I SERPL-MCNC: <0.02 NG/ML
VENTRICULAR RATE: 80 BPM
WBC # BLD AUTO: 7.09 THOUSAND/UL (ref 4.31–10.16)

## 2021-09-29 PROCEDURE — 96361 HYDRATE IV INFUSION ADD-ON: CPT

## 2021-09-29 PROCEDURE — 83690 ASSAY OF LIPASE: CPT | Performed by: EMERGENCY MEDICINE

## 2021-09-29 PROCEDURE — 93010 ELECTROCARDIOGRAM REPORT: CPT | Performed by: INTERNAL MEDICINE

## 2021-09-29 PROCEDURE — 99285 EMERGENCY DEPT VISIT HI MDM: CPT | Performed by: EMERGENCY MEDICINE

## 2021-09-29 PROCEDURE — 96375 TX/PRO/DX INJ NEW DRUG ADDON: CPT

## 2021-09-29 PROCEDURE — 96374 THER/PROPH/DIAG INJ IV PUSH: CPT

## 2021-09-29 PROCEDURE — 36415 COLL VENOUS BLD VENIPUNCTURE: CPT

## 2021-09-29 PROCEDURE — 85025 COMPLETE CBC W/AUTO DIFF WBC: CPT | Performed by: EMERGENCY MEDICINE

## 2021-09-29 PROCEDURE — 84484 ASSAY OF TROPONIN QUANT: CPT | Performed by: EMERGENCY MEDICINE

## 2021-09-29 PROCEDURE — 82948 REAGENT STRIP/BLOOD GLUCOSE: CPT

## 2021-09-29 PROCEDURE — 93005 ELECTROCARDIOGRAM TRACING: CPT

## 2021-09-29 PROCEDURE — 80053 COMPREHEN METABOLIC PANEL: CPT | Performed by: EMERGENCY MEDICINE

## 2021-09-29 RX ORDER — LIDOCAINE HYDROCHLORIDE 20 MG/ML
15 SOLUTION OROPHARYNGEAL ONCE
Status: COMPLETED | OUTPATIENT
Start: 2021-09-29 | End: 2021-09-29

## 2021-09-29 RX ORDER — ONDANSETRON 2 MG/ML
4 INJECTION INTRAMUSCULAR; INTRAVENOUS ONCE
Status: COMPLETED | OUTPATIENT
Start: 2021-09-29 | End: 2021-09-29

## 2021-09-29 RX ORDER — MAGNESIUM HYDROXIDE/ALUMINUM HYDROXICE/SIMETHICONE 120; 1200; 1200 MG/30ML; MG/30ML; MG/30ML
30 SUSPENSION ORAL ONCE
Status: COMPLETED | OUTPATIENT
Start: 2021-09-29 | End: 2021-09-29

## 2021-09-29 RX ORDER — HYDRALAZINE HYDROCHLORIDE 20 MG/ML
10 INJECTION INTRAMUSCULAR; INTRAVENOUS ONCE
Status: COMPLETED | OUTPATIENT
Start: 2021-09-29 | End: 2021-09-29

## 2021-09-29 RX ADMIN — FAMOTIDINE 20 MG: 10 INJECTION INTRAVENOUS at 01:11

## 2021-09-29 RX ADMIN — ALUMINUM HYDROXIDE, MAGNESIUM HYDROXIDE, AND SIMETHICONE 30 ML: 200; 200; 20 SUSPENSION ORAL at 01:12

## 2021-09-29 RX ADMIN — SODIUM CHLORIDE 1000 ML: 0.9 INJECTION, SOLUTION INTRAVENOUS at 01:08

## 2021-09-29 RX ADMIN — LIDOCAINE HYDROCHLORIDE 15 ML: 20 SOLUTION ORAL; TOPICAL at 01:12

## 2021-09-29 RX ADMIN — HYDRALAZINE HYDROCHLORIDE 10 MG: 20 INJECTION INTRAMUSCULAR; INTRAVENOUS at 01:08

## 2021-09-29 RX ADMIN — ONDANSETRON 4 MG: 2 INJECTION INTRAMUSCULAR; INTRAVENOUS at 01:09

## 2021-09-30 LAB — MISCELLANEOUS LAB TEST RESULT: NORMAL

## 2021-10-01 ENCOUNTER — APPOINTMENT (OUTPATIENT)
Dept: LAB | Facility: HOSPITAL | Age: 66
End: 2021-10-01
Attending: INTERNAL MEDICINE
Payer: MEDICARE

## 2021-10-01 DIAGNOSIS — N18.9 ACUTE KIDNEY INJURY SUPERIMPOSED ON CKD (HCC): ICD-10-CM

## 2021-10-01 DIAGNOSIS — N17.9 ACUTE KIDNEY INJURY SUPERIMPOSED ON CKD (HCC): ICD-10-CM

## 2021-10-01 LAB
ANION GAP SERPL CALCULATED.3IONS-SCNC: 8 MMOL/L (ref 5–14)
BUN SERPL-MCNC: 49 MG/DL (ref 5–25)
CALCIUM SERPL-MCNC: 8.4 MG/DL (ref 8.4–10.2)
CHLORIDE SERPL-SCNC: 101 MMOL/L (ref 97–108)
CO2 SERPL-SCNC: 29 MMOL/L (ref 22–30)
CREAT SERPL-MCNC: 3.68 MG/DL (ref 0.6–1.2)
GFR SERPL CREATININE-BSD FRML MDRD: 12 ML/MIN/1.73SQ M
GLUCOSE P FAST SERPL-MCNC: 63 MG/DL (ref 70–99)
POTASSIUM SERPL-SCNC: 4.8 MMOL/L (ref 3.6–5)
SODIUM SERPL-SCNC: 138 MMOL/L (ref 137–147)

## 2021-10-01 PROCEDURE — 36415 COLL VENOUS BLD VENIPUNCTURE: CPT

## 2021-10-01 PROCEDURE — 80048 BASIC METABOLIC PNL TOTAL CA: CPT

## 2021-10-04 ENCOUNTER — OFFICE VISIT (OUTPATIENT)
Dept: NEPHROLOGY | Facility: CLINIC | Age: 66
End: 2021-10-04
Payer: MEDICARE

## 2021-10-04 ENCOUNTER — TELEPHONE (OUTPATIENT)
Dept: NEPHROLOGY | Facility: CLINIC | Age: 66
End: 2021-10-04

## 2021-10-04 ENCOUNTER — TRANSCRIBE ORDERS (OUTPATIENT)
Dept: ADMINISTRATIVE | Facility: HOSPITAL | Age: 66
End: 2021-10-04

## 2021-10-04 VITALS
HEIGHT: 62 IN | DIASTOLIC BLOOD PRESSURE: 78 MMHG | HEART RATE: 74 BPM | BODY MASS INDEX: 28.71 KG/M2 | SYSTOLIC BLOOD PRESSURE: 165 MMHG | WEIGHT: 156 LBS

## 2021-10-04 DIAGNOSIS — N18.9 ACUTE KIDNEY INJURY SUPERIMPOSED ON CKD (HCC): Primary | ICD-10-CM

## 2021-10-04 DIAGNOSIS — I50.32 CHRONIC DIASTOLIC HEART FAILURE (HCC): ICD-10-CM

## 2021-10-04 DIAGNOSIS — N18.9 ACUTE KIDNEY INJURY SUPERIMPOSED ON CKD (HCC): ICD-10-CM

## 2021-10-04 DIAGNOSIS — N18.4 ANEMIA IN STAGE 4 CHRONIC KIDNEY DISEASE (HCC): ICD-10-CM

## 2021-10-04 DIAGNOSIS — N17.9 ACUTE KIDNEY INJURY SUPERIMPOSED ON CKD (HCC): ICD-10-CM

## 2021-10-04 DIAGNOSIS — D47.2 BICLONAL GAMMOPATHY: ICD-10-CM

## 2021-10-04 DIAGNOSIS — I16.0 HYPERTENSIVE URGENCY: ICD-10-CM

## 2021-10-04 DIAGNOSIS — D63.1 ANEMIA IN STAGE 4 CHRONIC KIDNEY DISEASE (HCC): ICD-10-CM

## 2021-10-04 DIAGNOSIS — N17.9 ACUTE KIDNEY INJURY SUPERIMPOSED ON CKD (HCC): Primary | ICD-10-CM

## 2021-10-04 DIAGNOSIS — Z79.4 TYPE 2 DIABETES MELLITUS WITH STAGE 3B CHRONIC KIDNEY DISEASE, WITH LONG-TERM CURRENT USE OF INSULIN (HCC): Primary | ICD-10-CM

## 2021-10-04 DIAGNOSIS — E11.22 TYPE 2 DIABETES MELLITUS WITH STAGE 3B CHRONIC KIDNEY DISEASE, WITH LONG-TERM CURRENT USE OF INSULIN (HCC): Primary | ICD-10-CM

## 2021-10-04 DIAGNOSIS — N18.32 TYPE 2 DIABETES MELLITUS WITH STAGE 3B CHRONIC KIDNEY DISEASE, WITH LONG-TERM CURRENT USE OF INSULIN (HCC): Primary | ICD-10-CM

## 2021-10-04 DIAGNOSIS — E78.1 PURE HYPERTRIGLYCERIDEMIA: ICD-10-CM

## 2021-10-04 PROCEDURE — 99214 OFFICE O/P EST MOD 30 MIN: CPT | Performed by: INTERNAL MEDICINE

## 2021-10-05 NOTE — UTILIZATION REVIEW
Inpatient Admission Authorization Request   NOTIFICATION OF INPATIENT ADMISSION/INPATIENT AUTHORIZATION REQUEST   SERVICING FACILITY:   [address] [place] [address], [place] [address] [facility]  Tax ID: [id number]  NPI: [id number]  Place of Service: Inpatient [address]  [address]  Place of Service Code: 24     ATTENDING PROVIDER:  Attending Name and NPI#: Jasmin Koenig [[id number]]  Address: [address] [place] [address] [facility]  Phone: [phone]     UTILIZATION REVIEW CONTACT:  [facility] Utilization   Network Utilization Review Department  Phone: [phone]  Fax: [phone]  Email: [email or web address]     PHYSICIAN ADVISORY SERVICES:  FOR [id number] REVIEW - MEDICAL NECESSITY DENIAL  Phone: [phone]  Fax: [phone]  Email: [email or web address]     TYPE OF REQUEST:  Inpatient Status     ADMISSION INFORMATION:  ADMISSION DATE/TIME: 8/9/21  5:25 PM  PATIENT DIAGNOSIS CODE/DESCRIPTION:  Anemia [D64 9]  BP (high blood pressure) [I10]  Hyperglycemia [R73 9]  Hypertensive urgency [I16 0]  Elevated lipase [R74 8]  Stage 4 chronic kidney disease ([address] ) [N18 4]  DISCHARGE DATE/TIME: 8/10/2021  3:47 PM  DISCHARGE DISPOSITION (IF DISCHARGED): Home/Self Care     IMPORTANT INFORMATION:  Please contact the [facility] directly with any questions or concerns regarding this request  Department voicemails are confidential     Send requests for admission clinical reviews, concurrent reviews, approvals, and administrative denials due to lack of clinical to fax [phone]  Initial Clinical Review     Admission: Date/Time/Statement:       Admission Orders: Observation 8/8 @ 2002 and changed to Inpatient on 8/9 @ 1725   Pt requiring continued stay for Hypertension and Med adjustments              Ordered          08/09/21 1725   Inpatient Admission  Once           08/08/21 2002   Place in Observation  Once                             Orders Placed This Encounter   Procedures    Inpatient Admission       Standing Status:   Standing       Number of Occurrences:   1       Order Specific Question:   Level of Care       Answer:   Med Surg [16]       Order Specific Question:   Estimated length of stay       Answer:   More than 2 Midnights       Order Specific Question:   Certification       Answer:   I certify that inpatient services are medically necessary for this patient for a duration of greater than two midnights  See H&P and MD Progress Notes for additional information about the patient's course of treatment             ED Arrival Information      Expected Arrival Acuity     - 8/8/2021 17:30 Urgent           Means of arrival Escorted by Service Admission type     Walk-In Family Member Hospitalist Urgent           Arrival complaint     high blood pressure               Chief Complaint   Patient presents with    Hypertension       states with pressure of 190/96,  c/o feeling warm in her abd         Initial Presentation:   73year old female presents to ed from home for evaluation and treatment of hypertension  SBP> 200 DBP >100   Clinical assessment significant for Hb 8 5, Bun 50, Cr  34 05,GFR 15, glucose 260  Imaging shows increased diffuse ground glass density  Eklg for non specific ST abnormality, prolonged QT  Treated in ef with iv labetalol, po hydralazine  Admit to observation for asymptomatic hypertension  Plan includes uptitrate hydralazine tid  Consult renal       8/9 Consult Nephrology; history of resistant hypertension and stage 4 chronic kidney disease with type 2 diabetes presents with hypertensive urgency  Admitted with BP of 220/100   About 4 days ago had discontinued chlorthalidone and amlodipine  Also as an outpatient is on clonidine 0 2 mg 3 times daily and carvedilol 25 mg 2 times daily  Torsemide is on hold his creatinine is elevated  Restarted oral blood pressure medications avoid hypotension  May have better luck with clonidine patch as a rebound from clonidine may have contributed  Secondary workup has been done in the past  CKD itself could induce hypertension has well  Continue to monitor on oral medications and the addition of hydralazine     8/9 Progress notes; Pt stopped outpatient BP meds due to side effects  BP improving with a new antihypertensive regimen  Nephrology following       Date: 8/10 Day 2:  Per Nephrology; Started on hydralazine tid  Consider decreasing torsemide to 20 mg daily  F/u renin aldosterone ratio  REI; creat ranged from 2 02 to 3 13, peak to 3  13                 ED Triage Vitals [08/08/21 1739]   98 1 °F (36 7 °C) 69 18 (!) 235/97 100 %       Oral Monitor             No Pain              08/09/21 71 6 kg (157 lb 13 6 oz)      Additional Vital Signs:   08/10/21 07:10:11   97 9 °F (36 6 °C)   68   20   161/83   109   95 %   None (Room air)   Lying   08/10/21 06:09:25   98 °F (36 7 °C)   71   22   156/82   107   99 %   --   --   08/09/21 20:47:55   98 6 °F (37 °C)   86   18   175/112  Abnormal    133   97 %   None (Room air)          Date/Time   Temp   Pulse   Resp   BP   MAP    SpO2   O2 Device   08/09/21 12:42:02   --   85   --   151/100   117   93 %   --   08/09/21 0800   --   --   --   --   --   --   None (Room air)   08/09/21 07:02:47   97 8 °F (36 6 °C)   77   16   169/83   112   96 %   --   08/09/21 03:41:44   --   84   --   176/90Abnormal    119   96 %   --   08/08/21 22:30:17   --   80   18   186/90Abnormal    122   93 %   --   08/08/21 21:25:10   98 1 °F (36 7 °C)   78   18   186/88Abnormal    121   98 %   None (Room air)   08/08/21 2115   --   76   16   219/95Abnormal    --   100 %   None (Room air)   08/08/21 2057   --   78   14   231/98Abnormal    --   98 %   None (Room air)   08/08/21 2030   --   78   18   221/94Abnormal    --   100 %   None (Room air)   08/08/21 1953   --   79   15   207/90Abnormal    --   100 %   None (Room air)   08/08/21 1931   --   80   20   219/98Abnormal    --   100 %   None (Room air)   08/08/21 1917   --   77   22   211/98Abnormal    --   100 %   None (Room air)   08/08/21 1830   --   78   20   217/102Abnormal    147   100 %   --   08/08/21 1759   --   --   --   227/105Abnormal    --   --   --   08/08/21 1744   --   --   --   223/102Abnormal    --   --   --         Pertinent Labs/Diagnostic Test Results:      Collection Time Result Time Vent R Atrial R MT Int  QRSD Int  QT Int  QTC Int  P Axis QRS Axis T Wave Ax    08/08/21 17:48:22 08/09/21 08:32:07 78 78 168 80 438 499 47 67 106                        Normal sinus rhythm   Nonspecific ST abnormality   Prolonged QT   Abnormal ECG   No previous ECGs available                   XR chest 2 views   Final  (08/09 1118)       Mild diffuse interstitial prominence with increased groundglass density    Question related to interstitial edema or infiltrate                     Results from last 7 days   Lab Units 08/09/21  0515 08/08/21  1807   WBC Thousand/uL 6 02 8 02   HEMOGLOBIN g/dL 8 2* 8 5*   HEMATOCRIT % 25 9* 27 2*   PLATELETS Thousands/uL 306 328   NEUTROS ABS Thousands/µL 3 21 5 68                Results from last 7 days   Lab Units 08/09/21  0515 08/08/21  1807   SODIUM mmol/L 141 139   POTASSIUM mmol/L 4 0 4 9   CHLORIDE mmol/L 104 101   CO2 mmol/L 30 31   ANION GAP mmol/L 7 7   BUN mg/dL 49* 50*   CREATININE mg/dL 2 76* 3 05*   EGFR ml/min/1 73sq m 17 15   CALCIUM mg/dL 7 7* 7 4*           Results from last 7 days   Lab Units 08/08/21  1807   AST U/L 19   ALT U/L 21   ALK PHOS U/L 86   TOTAL PROTEIN g/dL 6 7   ALBUMIN g/dL 2 7*   TOTAL BILIRUBIN mg/dL 0 14*   BILIRUBIN DIRECT mg/dL 0 07                   Results from last 7 days   Lab Units 08/10/21  1137 08/10/21  0844 08/10/21  0713 08/09/21  2048 08/09/21  1642 08/09/21  1102 08/09/21  0707 08/08/21  2139 08/08/21  1805   POC GLUCOSE mg/dl 421* 222* 63* 180* 283* 152* 175* 210* 251*            Results from last 7 days   Lab Units 08/09/21  0515 08/08/21  1807   GLUCOSE RANDOM mg/dL 253* 260*              Results from last 7 days   Lab Units 08/08/21  1807   TROPONIN I ng/mL <0 02              Results from last 7 days   Lab Units 08/08/21  1807   LIPASE u/L 707*               Results from last 7 days   Lab Units 08/08/21  1826 08/08/21  1820   CLARITY UA   Clear Clear   COLOR UA   Yellow Yellow   SPEC GRAV UA   1 015 1 020   PH UA   7 0 7 0   GLUCOSE UA mg/dl 100 (1/10%)* 100 (1/10%)*   KETONES UA mg/dl Negative Negative   BLOOD UA   Trace* Trace*   PROTEIN UA mg/dl 100 (2+)* 100 (2+)*   NITRITE UA   Negative Negative   BILIRUBIN UA   Negative Negative   UROBILINOGEN UA E U /dl 0 2 0 2   LEUKOCYTES UA   Negative Negative   WBC UA /hpf 0-1*  --    RBC UA /hpf 1-2*  --    BACTERIA UA /hpf Occasional  --    EPITHELIAL CELLS WET PREP /hpf Occasional  --             ED Treatment:             Medication Administration from 08/08/2021 1730 to 08/08/2021 2128        Date/Time Order Dose Route Action       08/08/2021 1918 Labetalol HCl (NORMODYNE) injection 10 mg 10 mg Intravenous Given       08/08/2021 2004 Labetalol HCl (NORMODYNE) injection 10 mg 10 mg Intravenous Given       08/08/2021 2058 hydrALAZINE (APRESOLINE) tablet 10 mg 10 mg Oral Given               Past Medical History:    Diagnosis    Chronic kidney disease    Diabetes mellitus (HCC)    Hyperlipidemia    Hypertension    Pneumonia      Present on Admission:   Stage 4 chronic kidney disease ([facility] [address] )   Asymptomatic hypertensive urgency   Chronic diastolic heart failure (HCC)   Anemia in stage 4 chronic kidney disease (HCC)        Admitting Diagnosis:      Anemia [D64 9]  BP (high blood pressure) [I10]  Hyperglycemia [R73 9]  Hypertensive urgency [I16 0]  Elevated lipase [R74 8]  Stage 4 chronic kidney disease (HCC) [N18 4]     Age/Sex: 73 y o  female     Scheduled Medications:  carvedilol, 25 mg, Oral, BID With Meals  cloNIDine, 0 2 mg, Oral, TID  docusate sodium, 100 mg, Oral, BID  gabapentin, 300 mg, Oral, BID  heparin (porcine), 5,000 Units, Subcutaneous, Q8H DEBBIE  hydrALAZINE, 10 mg, Oral, Q8H DEBBIE  insulin glargine, 18 Units, Subcutaneous, QAM  terazosin, 5 mg, Oral, HS  torsemide, 20 mg, Oral, Daily        Continuous IV Infusions: None  PRN Meds:  acetaminophen, 650 mg, Oral, Q6H PRN  calcium carbonate, 1,000 mg, Oral, Daily PRN  ondansetron, 4 mg, Intravenous, Q6H PRN           IP CONSULT TO NEPHROLOGY     Network Utilization Review Department  ATTENTION: Please call with any questions or concerns to [phone] and carefully listen to the prompts so that you are directed to the right person  All voicemails are confidential   [facility] all requests for admission clinical reviews, approved or denied determinations and any other requests to dedicated fax number below belonging to the campus where the patient is receiving treatment   List of dedicated fax numbers for the Facilities:  [address] DENIALS (Administrative/Medical Necessity) [phone]   [address] (Maternity/NICU/Pediatrics) [phone]   [address] [place] [address] [address] [place] [address] [address] [facility] [place] [address] [phone]

## 2021-10-06 ENCOUNTER — HOSPITAL ENCOUNTER (OUTPATIENT)
Dept: NON INVASIVE DIAGNOSTICS | Facility: HOSPITAL | Age: 66
Discharge: HOME | End: 2021-10-06
Attending: SURGERY
Payer: MEDICARE

## 2021-10-06 DIAGNOSIS — N17.9 ACUTE KIDNEY INJURY SUPERIMPOSED ON CKD (HCC): ICD-10-CM

## 2021-10-06 DIAGNOSIS — N18.9 ACUTE KIDNEY INJURY SUPERIMPOSED ON CKD (HCC): ICD-10-CM

## 2021-10-06 PROCEDURE — 93985 DUP-SCAN HEMO COMPL BI STD: CPT

## 2021-10-06 PROCEDURE — 93985 DUP-SCAN HEMO COMPL BI STD: CPT | Performed by: SURGERY

## 2021-10-11 ENCOUNTER — APPOINTMENT (OUTPATIENT)
Dept: LAB | Facility: HOSPITAL | Age: 66
End: 2021-10-11
Payer: MEDICARE

## 2021-10-11 DIAGNOSIS — N18.9 ACUTE KIDNEY INJURY SUPERIMPOSED ON CKD (HCC): ICD-10-CM

## 2021-10-11 DIAGNOSIS — D47.2 SMOLDERING MULTIPLE MYELOMA: Primary | ICD-10-CM

## 2021-10-11 DIAGNOSIS — N17.9 ACUTE KIDNEY INJURY SUPERIMPOSED ON CKD (HCC): ICD-10-CM

## 2021-10-11 LAB
25(OH)D3 SERPL-MCNC: 56.6 NG/ML (ref 30–100)
ALBUMIN SERPL BCP-MCNC: 3.6 G/DL (ref 3–5.2)
ALBUMIN SERPL BCP-MCNC: 3.7 G/DL (ref 3–5.2)
ALP SERPL-CCNC: 73 U/L (ref 43–122)
ALP SERPL-CCNC: 77 U/L (ref 43–122)
ALT SERPL W P-5'-P-CCNC: 20 U/L
ALT SERPL W P-5'-P-CCNC: 21 U/L
ANION GAP SERPL CALCULATED.3IONS-SCNC: 5 MMOL/L (ref 5–14)
ANION GAP SERPL CALCULATED.3IONS-SCNC: 8 MMOL/L (ref 5–14)
AST SERPL W P-5'-P-CCNC: 23 U/L (ref 14–36)
AST SERPL W P-5'-P-CCNC: 24 U/L (ref 14–36)
BASOPHILS # BLD AUTO: 0.1 THOUSANDS/ΜL (ref 0–0.1)
BASOPHILS NFR BLD AUTO: 1 % (ref 0–1)
BILIRUB SERPL-MCNC: 0.23 MG/DL
BILIRUB SERPL-MCNC: 0.27 MG/DL
BUN SERPL-MCNC: 54 MG/DL (ref 5–25)
BUN SERPL-MCNC: 55 MG/DL (ref 5–25)
CALCIUM SERPL-MCNC: 8.4 MG/DL (ref 8.4–10.2)
CALCIUM SERPL-MCNC: 8.5 MG/DL (ref 8.4–10.2)
CHLORIDE SERPL-SCNC: 104 MMOL/L (ref 97–108)
CHLORIDE SERPL-SCNC: 105 MMOL/L (ref 97–108)
CHOLEST SERPL-MCNC: 176 MG/DL
CO2 SERPL-SCNC: 27 MMOL/L (ref 22–30)
CO2 SERPL-SCNC: 28 MMOL/L (ref 22–30)
CREAT SERPL-MCNC: 3.65 MG/DL (ref 0.6–1.2)
CREAT SERPL-MCNC: 3.71 MG/DL (ref 0.6–1.2)
EOSINOPHIL # BLD AUTO: 0.2 THOUSAND/ΜL (ref 0–0.4)
EOSINOPHIL NFR BLD AUTO: 3 % (ref 0–6)
ERYTHROCYTE [DISTWIDTH] IN BLOOD BY AUTOMATED COUNT: 16.1 %
EST. AVERAGE GLUCOSE BLD GHB EST-MCNC: 171 MG/DL
GFR SERPL CREATININE-BSD FRML MDRD: 12 ML/MIN/1.73SQ M
GFR SERPL CREATININE-BSD FRML MDRD: 12 ML/MIN/1.73SQ M
GLUCOSE P FAST SERPL-MCNC: 149 MG/DL (ref 70–99)
GLUCOSE P FAST SERPL-MCNC: 152 MG/DL (ref 70–99)
HBA1C MFR BLD: 7.6 %
HCT VFR BLD AUTO: 27.6 % (ref 36–46)
HDLC SERPL-MCNC: 37 MG/DL
HGB BLD-MCNC: 8.8 G/DL (ref 12–16)
IGA SERPL-MCNC: 346 MG/DL (ref 70–400)
IGG SERPL-MCNC: 922 MG/DL (ref 700–1600)
IGM SERPL-MCNC: 83 MG/DL (ref 40–230)
LDH SERPL-CCNC: 602 U/L (ref 313–618)
LDLC SERPL CALC-MCNC: 107 MG/DL
LYMPHOCYTES # BLD AUTO: 1.4 THOUSANDS/ΜL (ref 0.5–4)
LYMPHOCYTES NFR BLD AUTO: 22 % (ref 25–45)
MCH RBC QN AUTO: 26.9 PG (ref 26–34)
MCHC RBC AUTO-ENTMCNC: 32.1 G/DL (ref 31–36)
MCV RBC AUTO: 84 FL (ref 80–100)
MONOCYTES # BLD AUTO: 0.6 THOUSAND/ΜL (ref 0.2–0.9)
MONOCYTES NFR BLD AUTO: 9 % (ref 1–10)
NEUTROPHILS # BLD AUTO: 4.3 THOUSANDS/ΜL (ref 1.8–7.8)
NEUTS SEG NFR BLD AUTO: 66 % (ref 45–65)
NONHDLC SERPL-MCNC: 139 MG/DL
PHOSPHATE SERPL-MCNC: 4.8 MG/DL (ref 2.5–4.8)
PLATELET # BLD AUTO: 227 THOUSANDS/UL (ref 150–450)
PMV BLD AUTO: 9.2 FL (ref 8.9–12.7)
POTASSIUM SERPL-SCNC: 4.6 MMOL/L (ref 3.6–5)
POTASSIUM SERPL-SCNC: 4.8 MMOL/L (ref 3.6–5)
PROT SERPL-MCNC: 6.7 G/DL (ref 5.9–8.4)
PROT SERPL-MCNC: 7 G/DL (ref 5.9–8.4)
PTH-INTACT SERPL-MCNC: 129.5 PG/ML (ref 16.7–78.9)
RBC # BLD AUTO: 3.28 MILLION/UL (ref 4–5.2)
SODIUM SERPL-SCNC: 138 MMOL/L (ref 137–147)
SODIUM SERPL-SCNC: 139 MMOL/L (ref 137–147)
TRIGL SERPL-MCNC: 161 MG/DL
WBC # BLD AUTO: 6.5 THOUSAND/UL (ref 4.5–11)

## 2021-10-11 PROCEDURE — 84445 ASSAY OF TSI GLOBULIN: CPT

## 2021-10-11 PROCEDURE — 83615 LACTATE (LD) (LDH) ENZYME: CPT | Performed by: INTERNAL MEDICINE

## 2021-10-11 PROCEDURE — 80053 COMPREHEN METABOLIC PANEL: CPT

## 2021-10-11 PROCEDURE — 83970 ASSAY OF PARATHORMONE: CPT

## 2021-10-11 PROCEDURE — 80061 LIPID PANEL: CPT

## 2021-10-11 PROCEDURE — 84100 ASSAY OF PHOSPHORUS: CPT

## 2021-10-11 PROCEDURE — 85025 COMPLETE CBC W/AUTO DIFF WBC: CPT | Performed by: INTERNAL MEDICINE

## 2021-10-11 PROCEDURE — 84165 PROTEIN E-PHORESIS SERUM: CPT | Performed by: PATHOLOGY

## 2021-10-11 PROCEDURE — 83036 HEMOGLOBIN GLYCOSYLATED A1C: CPT

## 2021-10-11 PROCEDURE — 84165 PROTEIN E-PHORESIS SERUM: CPT | Performed by: INTERNAL MEDICINE

## 2021-10-11 PROCEDURE — 82306 VITAMIN D 25 HYDROXY: CPT

## 2021-10-11 PROCEDURE — 82784 ASSAY IGA/IGD/IGG/IGM EACH: CPT | Performed by: INTERNAL MEDICINE

## 2021-10-11 PROCEDURE — 83883 ASSAY NEPHELOMETRY NOT SPEC: CPT | Performed by: INTERNAL MEDICINE

## 2021-10-11 PROCEDURE — 36415 COLL VENOUS BLD VENIPUNCTURE: CPT | Performed by: INTERNAL MEDICINE

## 2021-10-12 ENCOUNTER — CONSULT (OUTPATIENT)
Dept: VASCULAR SURGERY | Facility: CLINIC | Age: 66
End: 2021-10-12
Payer: MEDICARE

## 2021-10-12 ENCOUNTER — TELEPHONE (OUTPATIENT)
Dept: VASCULAR SURGERY | Facility: CLINIC | Age: 66
End: 2021-10-12

## 2021-10-12 VITALS
SYSTOLIC BLOOD PRESSURE: 180 MMHG | BODY MASS INDEX: 28.16 KG/M2 | DIASTOLIC BLOOD PRESSURE: 90 MMHG | TEMPERATURE: 98 F | HEART RATE: 65 BPM | WEIGHT: 153 LBS | HEIGHT: 62 IN

## 2021-10-12 DIAGNOSIS — N17.9 ACUTE KIDNEY INJURY SUPERIMPOSED ON CKD (HCC): ICD-10-CM

## 2021-10-12 DIAGNOSIS — N18.9 ACUTE KIDNEY INJURY SUPERIMPOSED ON CKD (HCC): ICD-10-CM

## 2021-10-12 DIAGNOSIS — N18.4 STAGE 4 CHRONIC KIDNEY DISEASE (HCC): Primary | ICD-10-CM

## 2021-10-12 LAB
ALBUMIN SERPL ELPH-MCNC: 3.48 G/DL (ref 3.5–5)
ALBUMIN SERPL ELPH-MCNC: 52.8 % (ref 52–65)
ALPHA1 GLOB SERPL ELPH-MCNC: 0.32 G/DL (ref 0.1–0.4)
ALPHA1 GLOB SERPL ELPH-MCNC: 4.9 % (ref 2.5–5)
ALPHA2 GLOB SERPL ELPH-MCNC: 1.06 G/DL (ref 0.4–1.2)
ALPHA2 GLOB SERPL ELPH-MCNC: 16 % (ref 7–13)
BETA GLOB ABNORMAL SERPL ELPH-MCNC: 0.36 G/DL (ref 0.4–0.8)
BETA1 GLOB SERPL ELPH-MCNC: 5.5 % (ref 5–13)
BETA2 GLOB SERPL ELPH-MCNC: 6.4 % (ref 2–8)
BETA2+GAMMA GLOB SERPL ELPH-MCNC: 0.42 G/DL (ref 0.2–0.5)
GAMMA GLOB ABNORMAL SERPL ELPH-MCNC: 0.95 G/DL (ref 0.5–1.6)
GAMMA GLOB SERPL ELPH-MCNC: 14.4 % (ref 12–22)
IGG/ALB SER: 1.12 {RATIO} (ref 1.1–1.8)
KAPPA LC FREE SER-MCNC: 130.9 MG/L (ref 3.3–19.4)
KAPPA LC FREE/LAMBDA FREE SER: 0.38 {RATIO} (ref 0.26–1.65)
LAMBDA LC FREE SERPL-MCNC: 348.5 MG/L (ref 5.7–26.3)
M PEAK ID 2: 1.7 %
M PROTEIN 1 MFR SERPL ELPH: 1.3 %
M PROTEIN 1 SERPL ELPH-MCNC: 0.09 G/DL
M PROTEIN 2 SERPL ELPH-MCNC: 0.11 G/DL
PROT PATTERN SERPL ELPH-IMP: ABNORMAL
PROT SERPL-MCNC: 6.6 G/DL (ref 6.4–8.2)
TSI SER-ACNC: <0.1 IU/L (ref 0–0.55)

## 2021-10-12 PROCEDURE — 99213 OFFICE O/P EST LOW 20 MIN: CPT | Performed by: SURGERY

## 2021-10-12 RX ORDER — CHLORHEXIDINE GLUCONATE 0.12 MG/ML
15 RINSE ORAL EVERY 12 HOURS SCHEDULED
Status: CANCELLED | OUTPATIENT
Start: 2021-10-12

## 2021-10-12 RX ORDER — CEFAZOLIN SODIUM 1 G/50ML
1000 SOLUTION INTRAVENOUS ONCE
Status: CANCELLED | OUTPATIENT
Start: 2021-10-12 | End: 2021-10-12

## 2021-10-12 RX ORDER — CHLORHEXIDINE GLUCONATE 0.12 MG/ML
15 RINSE ORAL ONCE
Status: CANCELLED | OUTPATIENT
Start: 2021-10-12 | End: 2021-10-12

## 2021-10-13 ENCOUNTER — APPOINTMENT (OUTPATIENT)
Dept: LAB | Facility: HOSPITAL | Age: 66
End: 2021-10-13
Payer: MEDICARE

## 2021-10-13 DIAGNOSIS — D47.2 SMOLDERING MULTIPLE MYELOMA: ICD-10-CM

## 2021-10-13 LAB
PROT 24H UR-MCNC: 2436 MG/24 HRS (ref 40–150)
SPECIMEN VOL UR: 1450 ML

## 2021-10-13 PROCEDURE — 84156 ASSAY OF PROTEIN URINE: CPT

## 2021-10-13 PROCEDURE — 84166 PROTEIN E-PHORESIS/URINE/CSF: CPT | Performed by: PATHOLOGY

## 2021-10-13 PROCEDURE — 84166 PROTEIN E-PHORESIS/URINE/CSF: CPT

## 2021-10-14 LAB
ALBUMIN UR ELPH-MCNC: 75.6 %
ALPHA1 GLOB MFR UR ELPH: 7.2 %
ALPHA2 GLOB MFR UR ELPH: 2.6 %
B-GLOBULIN MFR UR ELPH: 5.8 %
GAMMA GLOB MFR UR ELPH: 8.8 %
M PROTEIN MFR UR ELPH: 4.19 MG/DL
M PROTEIN UR-MCNC: 2.6 %
PROT PATTERN UR ELPH-IMP: ABNORMAL
PROT UR-MCNC: 161 MG/DL

## 2021-10-16 ENCOUNTER — APPOINTMENT (EMERGENCY)
Dept: CT IMAGING | Facility: HOSPITAL | Age: 66
End: 2021-10-16
Payer: MEDICARE

## 2021-10-16 ENCOUNTER — HOSPITAL ENCOUNTER (OUTPATIENT)
Facility: HOSPITAL | Age: 66
Setting detail: OBSERVATION
Discharge: HOME | End: 2021-10-17
Attending: EMERGENCY MEDICINE | Admitting: SURGERY
Payer: MEDICARE

## 2021-10-16 DIAGNOSIS — R10.9 ABDOMINAL PAIN: Primary | ICD-10-CM

## 2021-10-16 DIAGNOSIS — R11.10 INTRACTABLE VOMITING: ICD-10-CM

## 2021-10-16 LAB
ALBUMIN SERPL BCP-MCNC: 3.7 G/DL (ref 3.5–5)
ALP SERPL-CCNC: 87 U/L (ref 46–116)
ALT SERPL W P-5'-P-CCNC: 22 U/L (ref 12–78)
ANION GAP SERPL CALCULATED.3IONS-SCNC: 15 MMOL/L (ref 4–13)
ANISOCYTOSIS BLD QL SMEAR: PRESENT
AST SERPL W P-5'-P-CCNC: 17 U/L (ref 5–45)
BACTERIA UR QL AUTO: ABNORMAL /HPF
BASOPHILS # BLD MANUAL: 0.14 THOUSAND/UL (ref 0–0.1)
BASOPHILS NFR MAR MANUAL: 1 % (ref 0–1)
BILIRUB SERPL-MCNC: 0.36 MG/DL (ref 0.2–1)
BILIRUB UR QL STRIP: NEGATIVE
BUN SERPL-MCNC: 58 MG/DL (ref 5–25)
CALCIUM SERPL-MCNC: 8.6 MG/DL (ref 8.3–10.1)
CHLORIDE SERPL-SCNC: 105 MMOL/L (ref 100–108)
CLARITY UR: CLEAR
CO2 SERPL-SCNC: 24 MMOL/L (ref 21–32)
COLOR UR: YELLOW
CREAT SERPL-MCNC: 3.99 MG/DL (ref 0.6–1.3)
EOSINOPHIL # BLD MANUAL: 0 THOUSAND/UL (ref 0–0.4)
EOSINOPHIL NFR BLD MANUAL: 0 % (ref 0–6)
ERYTHROCYTE [DISTWIDTH] IN BLOOD BY AUTOMATED COUNT: 15.5 % (ref 11.6–15.1)
GFR SERPL CREATININE-BSD FRML MDRD: 11 ML/MIN/1.73SQ M
GLUCOSE SERPL-MCNC: 356 MG/DL (ref 65–140)
GLUCOSE SERPL-MCNC: 363 MG/DL (ref 65–140)
GLUCOSE SERPL-MCNC: 384 MG/DL (ref 65–140)
GLUCOSE UR STRIP-MCNC: ABNORMAL MG/DL
HCT VFR BLD AUTO: 28.8 % (ref 34.8–46.1)
HGB BLD-MCNC: 9 G/DL (ref 11.5–15.4)
HGB UR QL STRIP.AUTO: ABNORMAL
KETONES UR STRIP-MCNC: ABNORMAL MG/DL
LEUKOCYTE ESTERASE UR QL STRIP: NEGATIVE
LIPASE SERPL-CCNC: 145 U/L (ref 73–393)
LYMPHOCYTES # BLD AUTO: 0.82 THOUSAND/UL (ref 0.6–4.47)
LYMPHOCYTES # BLD AUTO: 6 % (ref 14–44)
MAGNESIUM SERPL-MCNC: 2.4 MG/DL (ref 1.6–2.6)
MCH RBC QN AUTO: 26.9 PG (ref 26.8–34.3)
MCHC RBC AUTO-ENTMCNC: 31.3 G/DL (ref 31.4–37.4)
MCV RBC AUTO: 86 FL (ref 82–98)
METAMYELOCYTES NFR BLD MANUAL: 1 % (ref 0–1)
MONOCYTES # BLD AUTO: 0.27 THOUSAND/UL (ref 0–1.22)
MONOCYTES NFR BLD: 2 % (ref 4–12)
NEUTROPHILS # BLD MANUAL: 12.29 THOUSAND/UL (ref 1.85–7.62)
NEUTS BAND NFR BLD MANUAL: 2 % (ref 0–8)
NEUTS SEG NFR BLD AUTO: 88 % (ref 43–75)
NITRITE UR QL STRIP: NEGATIVE
NON-SQ EPI CELLS URNS QL MICRO: ABNORMAL /HPF
PH UR STRIP.AUTO: 5.5 [PH] (ref 4.5–8)
PLATELET # BLD AUTO: 256 THOUSANDS/UL (ref 149–390)
PLATELET BLD QL SMEAR: ADEQUATE
PMV BLD AUTO: 11 FL (ref 8.9–12.7)
POLYCHROMASIA BLD QL SMEAR: PRESENT
POTASSIUM SERPL-SCNC: 3.8 MMOL/L (ref 3.5–5.3)
PROT SERPL-MCNC: 8.1 G/DL (ref 6.4–8.2)
PROT UR STRIP-MCNC: >=300 MG/DL
RBC # BLD AUTO: 3.34 MILLION/UL (ref 3.81–5.12)
RBC #/AREA URNS AUTO: ABNORMAL /HPF
SODIUM SERPL-SCNC: 144 MMOL/L (ref 136–145)
SP GR UR STRIP.AUTO: 1.02 (ref 1–1.03)
UROBILINOGEN UR QL STRIP.AUTO: 0.2 E.U./DL
WBC # BLD AUTO: 13.66 THOUSAND/UL (ref 4.31–10.16)
WBC #/AREA URNS AUTO: ABNORMAL /HPF

## 2021-10-16 PROCEDURE — 96375 TX/PRO/DX INJ NEW DRUG ADDON: CPT

## 2021-10-16 PROCEDURE — 36415 COLL VENOUS BLD VENIPUNCTURE: CPT | Performed by: PHYSICIAN ASSISTANT

## 2021-10-16 PROCEDURE — 96361 HYDRATE IV INFUSION ADD-ON: CPT

## 2021-10-16 PROCEDURE — 85007 BL SMEAR W/DIFF WBC COUNT: CPT | Performed by: PHYSICIAN ASSISTANT

## 2021-10-16 PROCEDURE — 80053 COMPREHEN METABOLIC PANEL: CPT | Performed by: PHYSICIAN ASSISTANT

## 2021-10-16 PROCEDURE — 96376 TX/PRO/DX INJ SAME DRUG ADON: CPT

## 2021-10-16 PROCEDURE — 82948 REAGENT STRIP/BLOOD GLUCOSE: CPT

## 2021-10-16 PROCEDURE — 83690 ASSAY OF LIPASE: CPT | Performed by: PHYSICIAN ASSISTANT

## 2021-10-16 PROCEDURE — 81001 URINALYSIS AUTO W/SCOPE: CPT

## 2021-10-16 PROCEDURE — 99284 EMERGENCY DEPT VISIT MOD MDM: CPT | Performed by: PHYSICIAN ASSISTANT

## 2021-10-16 PROCEDURE — NC001 PR NO CHARGE: Performed by: SURGERY

## 2021-10-16 PROCEDURE — 83735 ASSAY OF MAGNESIUM: CPT | Performed by: PHYSICIAN ASSISTANT

## 2021-10-16 PROCEDURE — 74176 CT ABD & PELVIS W/O CONTRAST: CPT

## 2021-10-16 PROCEDURE — 99285 EMERGENCY DEPT VISIT HI MDM: CPT

## 2021-10-16 PROCEDURE — 85027 COMPLETE CBC AUTOMATED: CPT | Performed by: PHYSICIAN ASSISTANT

## 2021-10-16 PROCEDURE — 96374 THER/PROPH/DIAG INJ IV PUSH: CPT

## 2021-10-16 PROCEDURE — 85025 COMPLETE CBC W/AUTO DIFF WBC: CPT | Performed by: PHYSICIAN ASSISTANT

## 2021-10-16 RX ORDER — SODIUM CHLORIDE 9 MG/ML
125 INJECTION, SOLUTION INTRAVENOUS CONTINUOUS
Status: DISCONTINUED | OUTPATIENT
Start: 2021-10-16 | End: 2021-10-17

## 2021-10-16 RX ORDER — OXYCODONE HYDROCHLORIDE 5 MG/1
2.5 TABLET ORAL EVERY 4 HOURS PRN
Status: DISCONTINUED | OUTPATIENT
Start: 2021-10-16 | End: 2021-10-17 | Stop reason: HOSPADM

## 2021-10-16 RX ORDER — ONDANSETRON 2 MG/ML
4 INJECTION INTRAMUSCULAR; INTRAVENOUS EVERY 6 HOURS PRN
Status: DISCONTINUED | OUTPATIENT
Start: 2021-10-16 | End: 2021-10-17 | Stop reason: HOSPADM

## 2021-10-16 RX ORDER — CLONIDINE HYDROCHLORIDE 0.1 MG/1
0.3 TABLET ORAL 3 TIMES DAILY
Status: DISCONTINUED | OUTPATIENT
Start: 2021-10-16 | End: 2021-10-17 | Stop reason: HOSPADM

## 2021-10-16 RX ORDER — HYDRALAZINE HYDROCHLORIDE 25 MG/1
100 TABLET, FILM COATED ORAL EVERY 8 HOURS SCHEDULED
Status: DISCONTINUED | OUTPATIENT
Start: 2021-10-16 | End: 2021-10-17 | Stop reason: HOSPADM

## 2021-10-16 RX ORDER — HEPARIN SODIUM 5000 [USP'U]/ML
5000 INJECTION, SOLUTION INTRAVENOUS; SUBCUTANEOUS EVERY 8 HOURS SCHEDULED
Status: DISCONTINUED | OUTPATIENT
Start: 2021-10-16 | End: 2021-10-17 | Stop reason: HOSPADM

## 2021-10-16 RX ORDER — FENTANYL CITRATE 50 UG/ML
50 INJECTION, SOLUTION INTRAMUSCULAR; INTRAVENOUS ONCE
Status: COMPLETED | OUTPATIENT
Start: 2021-10-16 | End: 2021-10-16

## 2021-10-16 RX ORDER — HYDRALAZINE HYDROCHLORIDE 20 MG/ML
10 INJECTION INTRAMUSCULAR; INTRAVENOUS ONCE
Status: COMPLETED | OUTPATIENT
Start: 2021-10-16 | End: 2021-10-16

## 2021-10-16 RX ORDER — TERAZOSIN 5 MG/1
10 CAPSULE ORAL
Status: DISCONTINUED | OUTPATIENT
Start: 2021-10-16 | End: 2021-10-17 | Stop reason: HOSPADM

## 2021-10-16 RX ORDER — OXYCODONE HYDROCHLORIDE 5 MG/1
5 TABLET ORAL EVERY 4 HOURS PRN
Status: DISCONTINUED | OUTPATIENT
Start: 2021-10-16 | End: 2021-10-17 | Stop reason: HOSPADM

## 2021-10-16 RX ORDER — METOCLOPRAMIDE HYDROCHLORIDE 5 MG/ML
10 INJECTION INTRAMUSCULAR; INTRAVENOUS ONCE
Status: COMPLETED | OUTPATIENT
Start: 2021-10-16 | End: 2021-10-16

## 2021-10-16 RX ORDER — GABAPENTIN 300 MG/1
300 CAPSULE ORAL 2 TIMES DAILY
Status: DISCONTINUED | OUTPATIENT
Start: 2021-10-16 | End: 2021-10-17 | Stop reason: HOSPADM

## 2021-10-16 RX ORDER — ONDANSETRON 2 MG/ML
4 INJECTION INTRAMUSCULAR; INTRAVENOUS ONCE
Status: COMPLETED | OUTPATIENT
Start: 2021-10-16 | End: 2021-10-16

## 2021-10-16 RX ORDER — MAGNESIUM HYDROXIDE/ALUMINUM HYDROXICE/SIMETHICONE 120; 1200; 1200 MG/30ML; MG/30ML; MG/30ML
30 SUSPENSION ORAL EVERY 4 HOURS PRN
Status: DISCONTINUED | OUTPATIENT
Start: 2021-10-16 | End: 2021-10-17 | Stop reason: HOSPADM

## 2021-10-16 RX ORDER — ATORVASTATIN CALCIUM 40 MG/1
40 TABLET, FILM COATED ORAL EVERY EVENING
Status: DISCONTINUED | OUTPATIENT
Start: 2021-10-17 | End: 2021-10-17 | Stop reason: HOSPADM

## 2021-10-16 RX ORDER — METOCLOPRAMIDE HYDROCHLORIDE 5 MG/ML
5 INJECTION INTRAMUSCULAR; INTRAVENOUS EVERY 6 HOURS PRN
Status: DISCONTINUED | OUTPATIENT
Start: 2021-10-16 | End: 2021-10-17 | Stop reason: HOSPADM

## 2021-10-16 RX ORDER — CARVEDILOL 25 MG/1
25 TABLET ORAL 2 TIMES DAILY WITH MEALS
Status: DISCONTINUED | OUTPATIENT
Start: 2021-10-16 | End: 2021-10-17 | Stop reason: HOSPADM

## 2021-10-16 RX ORDER — LANOLIN ALCOHOL/MO/W.PET/CERES
3 CREAM (GRAM) TOPICAL
Status: DISCONTINUED | OUTPATIENT
Start: 2021-10-16 | End: 2021-10-17 | Stop reason: HOSPADM

## 2021-10-16 RX ORDER — ACETAMINOPHEN 325 MG/1
650 TABLET ORAL EVERY 6 HOURS PRN
Status: DISCONTINUED | OUTPATIENT
Start: 2021-10-16 | End: 2021-10-17 | Stop reason: HOSPADM

## 2021-10-16 RX ADMIN — ONDANSETRON 4 MG: 2 INJECTION INTRAMUSCULAR; INTRAVENOUS at 14:58

## 2021-10-16 RX ADMIN — HYDRALAZINE HYDROCHLORIDE 100 MG: 25 TABLET, FILM COATED ORAL at 23:55

## 2021-10-16 RX ADMIN — FENTANYL CITRATE 50 MCG: 50 INJECTION INTRAMUSCULAR; INTRAVENOUS at 17:14

## 2021-10-16 RX ADMIN — HEPARIN SODIUM 5000 UNITS: 5000 INJECTION INTRAVENOUS; SUBCUTANEOUS at 19:19

## 2021-10-16 RX ADMIN — HYDRALAZINE HYDROCHLORIDE 10 MG: 20 INJECTION INTRAMUSCULAR; INTRAVENOUS at 12:56

## 2021-10-16 RX ADMIN — CLONIDINE HYDROCHLORIDE 0.3 MG: 0.1 TABLET ORAL at 19:19

## 2021-10-16 RX ADMIN — FENTANYL CITRATE 50 MCG: 50 INJECTION INTRAMUSCULAR; INTRAVENOUS at 14:58

## 2021-10-16 RX ADMIN — GABAPENTIN 300 MG: 300 CAPSULE ORAL at 19:19

## 2021-10-16 RX ADMIN — SODIUM CHLORIDE 125 ML/HR: 0.9 INJECTION, SOLUTION INTRAVENOUS at 21:28

## 2021-10-16 RX ADMIN — METOCLOPRAMIDE 10 MG: 5 INJECTION, SOLUTION INTRAMUSCULAR; INTRAVENOUS at 16:54

## 2021-10-16 RX ADMIN — ONDANSETRON 4 MG: 2 INJECTION INTRAMUSCULAR; INTRAVENOUS at 12:54

## 2021-10-16 RX ADMIN — SODIUM CHLORIDE 1000 ML: 0.9 INJECTION, SOLUTION INTRAVENOUS at 12:54

## 2021-10-16 RX ADMIN — INSULIN LISPRO 4 UNITS: 100 INJECTION, SOLUTION INTRAVENOUS; SUBCUTANEOUS at 23:48

## 2021-10-17 VITALS
RESPIRATION RATE: 17 BRPM | SYSTOLIC BLOOD PRESSURE: 120 MMHG | DIASTOLIC BLOOD PRESSURE: 51 MMHG | BODY MASS INDEX: 26.61 KG/M2 | WEIGHT: 145.5 LBS | TEMPERATURE: 97.5 F | HEART RATE: 77 BPM | OXYGEN SATURATION: 96 %

## 2021-10-17 PROBLEM — R10.84 GENERALIZED ABDOMINAL PAIN: Status: ACTIVE | Noted: 2021-10-17

## 2021-10-17 LAB
ANION GAP SERPL CALCULATED.3IONS-SCNC: 12 MMOL/L (ref 4–13)
BASOPHILS # BLD AUTO: 0.01 THOUSANDS/ΜL (ref 0–0.1)
BASOPHILS NFR BLD AUTO: 0 % (ref 0–1)
BUN SERPL-MCNC: 60 MG/DL (ref 5–25)
CALCIUM SERPL-MCNC: 7.8 MG/DL (ref 8.3–10.1)
CHLORIDE SERPL-SCNC: 113 MMOL/L (ref 100–108)
CO2 SERPL-SCNC: 24 MMOL/L (ref 21–32)
CREAT SERPL-MCNC: 3.92 MG/DL (ref 0.6–1.3)
EOSINOPHIL # BLD AUTO: 0 THOUSAND/ΜL (ref 0–0.61)
EOSINOPHIL NFR BLD AUTO: 0 % (ref 0–6)
ERYTHROCYTE [DISTWIDTH] IN BLOOD BY AUTOMATED COUNT: 15.6 % (ref 11.6–15.1)
GFR SERPL CREATININE-BSD FRML MDRD: 11 ML/MIN/1.73SQ M
GLUCOSE P FAST SERPL-MCNC: 205 MG/DL (ref 65–99)
GLUCOSE SERPL-MCNC: 192 MG/DL (ref 65–140)
GLUCOSE SERPL-MCNC: 205 MG/DL (ref 65–140)
GLUCOSE SERPL-MCNC: 215 MG/DL (ref 65–140)
GLUCOSE SERPL-MCNC: 298 MG/DL (ref 65–140)
HCT VFR BLD AUTO: 24.2 % (ref 34.8–46.1)
HGB BLD-MCNC: 7.5 G/DL (ref 11.5–15.4)
IMM GRANULOCYTES # BLD AUTO: 0.03 THOUSAND/UL (ref 0–0.2)
IMM GRANULOCYTES NFR BLD AUTO: 0 % (ref 0–2)
LYMPHOCYTES # BLD AUTO: 1.11 THOUSANDS/ΜL (ref 0.6–4.47)
LYMPHOCYTES NFR BLD AUTO: 14 % (ref 14–44)
MCH RBC QN AUTO: 26.8 PG (ref 26.8–34.3)
MCHC RBC AUTO-ENTMCNC: 31 G/DL (ref 31.4–37.4)
MCV RBC AUTO: 86 FL (ref 82–98)
MONOCYTES # BLD AUTO: 0.81 THOUSAND/ΜL (ref 0.17–1.22)
MONOCYTES NFR BLD AUTO: 10 % (ref 4–12)
NEUTROPHILS # BLD AUTO: 6.11 THOUSANDS/ΜL (ref 1.85–7.62)
NEUTS SEG NFR BLD AUTO: 76 % (ref 43–75)
NRBC BLD AUTO-RTO: 0 /100 WBCS
PLATELET # BLD AUTO: 211 THOUSANDS/UL (ref 149–390)
PMV BLD AUTO: 11.7 FL (ref 8.9–12.7)
POTASSIUM SERPL-SCNC: 3.8 MMOL/L (ref 3.5–5.3)
RBC # BLD AUTO: 2.8 MILLION/UL (ref 3.81–5.12)
SODIUM SERPL-SCNC: 149 MMOL/L (ref 136–145)
WBC # BLD AUTO: 8.07 THOUSAND/UL (ref 4.31–10.16)

## 2021-10-17 PROCEDURE — 80048 BASIC METABOLIC PNL TOTAL CA: CPT | Performed by: SURGERY

## 2021-10-17 PROCEDURE — 82948 REAGENT STRIP/BLOOD GLUCOSE: CPT

## 2021-10-17 PROCEDURE — 85025 COMPLETE CBC W/AUTO DIFF WBC: CPT | Performed by: SURGERY

## 2021-10-17 PROCEDURE — NC001 PR NO CHARGE: Performed by: SURGERY

## 2021-10-17 RX ORDER — INSULIN GLARGINE 100 [IU]/ML
18 INJECTION, SOLUTION SUBCUTANEOUS EVERY MORNING
Status: DISCONTINUED | OUTPATIENT
Start: 2021-10-17 | End: 2021-10-17 | Stop reason: HOSPADM

## 2021-10-17 RX ADMIN — HYDRALAZINE HYDROCHLORIDE 100 MG: 25 TABLET, FILM COATED ORAL at 06:34

## 2021-10-17 RX ADMIN — CLONIDINE HYDROCHLORIDE 0.3 MG: 0.1 TABLET ORAL at 09:25

## 2021-10-17 RX ADMIN — HEPARIN SODIUM 5000 UNITS: 5000 INJECTION INTRAVENOUS; SUBCUTANEOUS at 06:34

## 2021-10-17 RX ADMIN — GABAPENTIN 300 MG: 300 CAPSULE ORAL at 09:25

## 2021-10-17 RX ADMIN — HYDRALAZINE HYDROCHLORIDE 100 MG: 25 TABLET, FILM COATED ORAL at 14:29

## 2021-10-17 RX ADMIN — INSULIN LISPRO 1 UNITS: 100 INJECTION, SOLUTION INTRAVENOUS; SUBCUTANEOUS at 06:34

## 2021-10-17 RX ADMIN — CARVEDILOL 25 MG: 25 TABLET, FILM COATED ORAL at 09:25

## 2021-10-17 RX ADMIN — SODIUM CHLORIDE 125 ML/HR: 0.9 INJECTION, SOLUTION INTRAVENOUS at 06:06

## 2021-10-17 RX ADMIN — HEPARIN SODIUM 5000 UNITS: 5000 INJECTION INTRAVENOUS; SUBCUTANEOUS at 14:24

## 2021-10-17 RX ADMIN — TERAZOSIN HYDROCHLORIDE 10 MG: 5 CAPSULE ORAL at 02:37

## 2021-10-17 RX ADMIN — INSULIN GLARGINE 18 UNITS: 100 INJECTION, SOLUTION SUBCUTANEOUS at 09:26

## 2021-10-21 PROBLEM — R93.5 ABNORMAL FINDINGS ON DIAGNOSTIC IMAGING OF ABDOMEN: Chronic | Status: ACTIVE | Noted: 2021-09-07

## 2021-10-21 PROBLEM — E66.09 CLASS 1 OBESITY DUE TO EXCESS CALORIES WITHOUT SERIOUS COMORBIDITY WITH BODY MASS INDEX (BMI) OF 30.0 TO 30.9 IN ADULT: Status: RESOLVED | Noted: 2020-05-12 | Resolved: 2021-10-21

## 2021-10-21 PROBLEM — E66.811 CLASS 1 OBESITY DUE TO EXCESS CALORIES WITHOUT SERIOUS COMORBIDITY WITH BODY MASS INDEX (BMI) OF 30.0 TO 30.9 IN ADULT: Status: RESOLVED | Noted: 2020-05-12 | Resolved: 2021-10-21

## 2021-10-21 PROBLEM — R11.2 NON-INTRACTABLE VOMITING WITH NAUSEA: Chronic | Status: RESOLVED | Noted: 2021-10-15 | Resolved: 2021-10-17

## 2021-11-29 DIAGNOSIS — R60.0 BILATERAL LOWER EXTREMITY EDEMA: ICD-10-CM

## 2021-12-01 RX ORDER — TORSEMIDE 20 MG/1
TABLET ORAL
Qty: 30 TABLET | Refills: 0 | Status: SHIPPED | OUTPATIENT
Start: 2021-12-01

## 2021-12-05 ENCOUNTER — APPOINTMENT (EMERGENCY)
Dept: CT IMAGING | Facility: HOSPITAL | Age: 66
DRG: 674 | End: 2021-12-05
Payer: MEDICARE

## 2021-12-05 ENCOUNTER — APPOINTMENT (INPATIENT)
Dept: RADIOLOGY | Facility: HOSPITAL | Age: 66
DRG: 674 | End: 2021-12-05
Payer: MEDICARE

## 2021-12-05 ENCOUNTER — HOSPITAL ENCOUNTER (INPATIENT)
Facility: HOSPITAL | Age: 66
LOS: 9 days | Discharge: HOME HEALTH | DRG: 674 | End: 2021-12-14
Attending: EMERGENCY MEDICINE | Admitting: INTERNAL MEDICINE
Payer: MEDICARE

## 2021-12-05 DIAGNOSIS — N17.9 AKI (ACUTE KIDNEY INJURY) (HCC): Primary | ICD-10-CM

## 2021-12-05 DIAGNOSIS — N18.32 TYPE 2 DIABETES MELLITUS WITH STAGE 3B CHRONIC KIDNEY DISEASE, WITH LONG-TERM CURRENT USE OF INSULIN (HCC): ICD-10-CM

## 2021-12-05 DIAGNOSIS — R31.0 GROSS HEMATURIA: ICD-10-CM

## 2021-12-05 DIAGNOSIS — Z86.73: ICD-10-CM

## 2021-12-05 DIAGNOSIS — Z79.4 TYPE 2 DIABETES MELLITUS WITH STAGE 3B CHRONIC KIDNEY DISEASE, WITH LONG-TERM CURRENT USE OF INSULIN (HCC): ICD-10-CM

## 2021-12-05 DIAGNOSIS — I63.81 LACUNAR INFARCTION (HCC): ICD-10-CM

## 2021-12-05 DIAGNOSIS — E87.5 HYPERKALEMIA: ICD-10-CM

## 2021-12-05 DIAGNOSIS — N18.9 ACUTE KIDNEY INJURY SUPERIMPOSED ON CKD (HCC): ICD-10-CM

## 2021-12-05 DIAGNOSIS — N39.0 UTI (URINARY TRACT INFECTION): ICD-10-CM

## 2021-12-05 DIAGNOSIS — N17.9 ACUTE KIDNEY INJURY SUPERIMPOSED ON CKD (HCC): ICD-10-CM

## 2021-12-05 DIAGNOSIS — N18.6 ESRD (END STAGE RENAL DISEASE) (HCC): ICD-10-CM

## 2021-12-05 DIAGNOSIS — E11.22 TYPE 2 DIABETES MELLITUS WITH STAGE 3B CHRONIC KIDNEY DISEASE, WITH LONG-TERM CURRENT USE OF INSULIN (HCC): ICD-10-CM

## 2021-12-05 DIAGNOSIS — I10 ESSENTIAL (PRIMARY) HYPERTENSION: ICD-10-CM

## 2021-12-05 PROBLEM — M25.552 BILATERAL HIP PAIN: Status: ACTIVE | Noted: 2021-12-05

## 2021-12-05 PROBLEM — N18.5 CKD (CHRONIC KIDNEY DISEASE) STAGE 5, GFR LESS THAN 15 ML/MIN (HCC): Status: ACTIVE | Noted: 2021-12-05

## 2021-12-05 PROBLEM — M25.551 BILATERAL HIP PAIN: Status: ACTIVE | Noted: 2021-12-05

## 2021-12-05 LAB
ANION GAP SERPL CALCULATED.3IONS-SCNC: 10 MMOL/L (ref 4–13)
BACTERIA UR QL AUTO: ABNORMAL /HPF
BASOPHILS # BLD AUTO: 0.04 THOUSANDS/ΜL (ref 0–0.1)
BASOPHILS NFR BLD AUTO: 1 % (ref 0–1)
BILIRUB UR QL STRIP: NEGATIVE
BUN SERPL-MCNC: 88 MG/DL (ref 5–25)
CALCIUM SERPL-MCNC: 7.6 MG/DL (ref 8.3–10.1)
CHLORIDE SERPL-SCNC: 101 MMOL/L (ref 100–108)
CLARITY UR: CLEAR
CO2 SERPL-SCNC: 25 MMOL/L (ref 21–32)
COLOR UR: ABNORMAL
CREAT SERPL-MCNC: 7.75 MG/DL (ref 0.6–1.3)
EOSINOPHIL # BLD AUTO: 0.12 THOUSAND/ΜL (ref 0–0.61)
EOSINOPHIL NFR BLD AUTO: 1 % (ref 0–6)
ERYTHROCYTE [DISTWIDTH] IN BLOOD BY AUTOMATED COUNT: 15.5 % (ref 11.6–15.1)
GFR SERPL CREATININE-BSD FRML MDRD: 5 ML/MIN/1.73SQ M
GLUCOSE SERPL-MCNC: 156 MG/DL (ref 65–140)
GLUCOSE SERPL-MCNC: 285 MG/DL (ref 65–140)
GLUCOSE UR STRIP-MCNC: ABNORMAL MG/DL
HCT VFR BLD AUTO: 30.2 % (ref 34.8–46.1)
HGB BLD-MCNC: 9.3 G/DL (ref 11.5–15.4)
HGB UR QL STRIP.AUTO: ABNORMAL
IMM GRANULOCYTES # BLD AUTO: 0.03 THOUSAND/UL (ref 0–0.2)
IMM GRANULOCYTES NFR BLD AUTO: 0 % (ref 0–2)
KETONES UR STRIP-MCNC: NEGATIVE MG/DL
LEUKOCYTE ESTERASE UR QL STRIP: ABNORMAL
LYMPHOCYTES # BLD AUTO: 0.97 THOUSANDS/ΜL (ref 0.6–4.47)
LYMPHOCYTES NFR BLD AUTO: 11 % (ref 14–44)
MCH RBC QN AUTO: 26.9 PG (ref 26.8–34.3)
MCHC RBC AUTO-ENTMCNC: 30.8 G/DL (ref 31.4–37.4)
MCV RBC AUTO: 87 FL (ref 82–98)
MONOCYTES # BLD AUTO: 0.88 THOUSAND/ΜL (ref 0.17–1.22)
MONOCYTES NFR BLD AUTO: 10 % (ref 4–12)
NEUTROPHILS # BLD AUTO: 6.71 THOUSANDS/ΜL (ref 1.85–7.62)
NEUTS SEG NFR BLD AUTO: 77 % (ref 43–75)
NITRITE UR QL STRIP: NEGATIVE
NON-SQ EPI CELLS URNS QL MICRO: ABNORMAL /HPF
NRBC BLD AUTO-RTO: 0 /100 WBCS
PH UR STRIP.AUTO: 6.5 [PH] (ref 4.5–8)
PLATELET # BLD AUTO: 207 THOUSANDS/UL (ref 149–390)
PMV BLD AUTO: 11.7 FL (ref 8.9–12.7)
POTASSIUM SERPL-SCNC: 6 MMOL/L (ref 3.5–5.3)
PROT UR STRIP-MCNC: >=300 MG/DL
RBC # BLD AUTO: 3.46 MILLION/UL (ref 3.81–5.12)
RBC #/AREA URNS AUTO: ABNORMAL /HPF
SODIUM SERPL-SCNC: 136 MMOL/L (ref 136–145)
SP GR UR STRIP.AUTO: 1.02 (ref 1–1.03)
UROBILINOGEN UR QL STRIP.AUTO: 0.2 E.U./DL
WBC # BLD AUTO: 8.75 THOUSAND/UL (ref 4.31–10.16)
WBC #/AREA URNS AUTO: ABNORMAL /HPF

## 2021-12-05 PROCEDURE — 93005 ELECTROCARDIOGRAM TRACING: CPT

## 2021-12-05 PROCEDURE — 74176 CT ABD & PELVIS W/O CONTRAST: CPT

## 2021-12-05 PROCEDURE — 73521 X-RAY EXAM HIPS BI 2 VIEWS: CPT

## 2021-12-05 PROCEDURE — 96361 HYDRATE IV INFUSION ADD-ON: CPT

## 2021-12-05 PROCEDURE — 80048 BASIC METABOLIC PNL TOTAL CA: CPT | Performed by: EMERGENCY MEDICINE

## 2021-12-05 PROCEDURE — 96374 THER/PROPH/DIAG INJ IV PUSH: CPT

## 2021-12-05 PROCEDURE — 85025 COMPLETE CBC W/AUTO DIFF WBC: CPT | Performed by: EMERGENCY MEDICINE

## 2021-12-05 PROCEDURE — 36415 COLL VENOUS BLD VENIPUNCTURE: CPT | Performed by: EMERGENCY MEDICINE

## 2021-12-05 PROCEDURE — 99285 EMERGENCY DEPT VISIT HI MDM: CPT

## 2021-12-05 PROCEDURE — 99285 EMERGENCY DEPT VISIT HI MDM: CPT | Performed by: EMERGENCY MEDICINE

## 2021-12-05 PROCEDURE — 81001 URINALYSIS AUTO W/SCOPE: CPT

## 2021-12-05 PROCEDURE — 99223 1ST HOSP IP/OBS HIGH 75: CPT | Performed by: INTERNAL MEDICINE

## 2021-12-05 PROCEDURE — 96375 TX/PRO/DX INJ NEW DRUG ADDON: CPT

## 2021-12-05 PROCEDURE — 82948 REAGENT STRIP/BLOOD GLUCOSE: CPT

## 2021-12-05 PROCEDURE — 87086 URINE CULTURE/COLONY COUNT: CPT | Performed by: EMERGENCY MEDICINE

## 2021-12-05 RX ORDER — GABAPENTIN 300 MG/1
300 CAPSULE ORAL
Status: DISCONTINUED | OUTPATIENT
Start: 2021-12-05 | End: 2021-12-14 | Stop reason: HOSPADM

## 2021-12-05 RX ORDER — DEXTROSE MONOHYDRATE 25 G/50ML
25 INJECTION, SOLUTION INTRAVENOUS ONCE
Status: COMPLETED | OUTPATIENT
Start: 2021-12-05 | End: 2021-12-05

## 2021-12-05 RX ORDER — ATORVASTATIN CALCIUM 40 MG/1
40 TABLET, FILM COATED ORAL
Status: DISCONTINUED | OUTPATIENT
Start: 2021-12-06 | End: 2021-12-10

## 2021-12-05 RX ORDER — ACETAMINOPHEN 325 MG/1
650 TABLET ORAL EVERY 6 HOURS PRN
Status: DISCONTINUED | OUTPATIENT
Start: 2021-12-05 | End: 2021-12-14 | Stop reason: HOSPADM

## 2021-12-05 RX ORDER — HEPARIN SODIUM 5000 [USP'U]/ML
5000 INJECTION, SOLUTION INTRAVENOUS; SUBCUTANEOUS EVERY 8 HOURS SCHEDULED
Status: DISCONTINUED | OUTPATIENT
Start: 2021-12-05 | End: 2021-12-06

## 2021-12-05 RX ORDER — TERAZOSIN 5 MG/1
10 CAPSULE ORAL
Status: DISCONTINUED | OUTPATIENT
Start: 2021-12-05 | End: 2021-12-14 | Stop reason: HOSPADM

## 2021-12-05 RX ORDER — ONDANSETRON 2 MG/ML
4 INJECTION INTRAMUSCULAR; INTRAVENOUS EVERY 4 HOURS PRN
Status: DISCONTINUED | OUTPATIENT
Start: 2021-12-05 | End: 2021-12-14 | Stop reason: HOSPADM

## 2021-12-05 RX ORDER — HYDRALAZINE HYDROCHLORIDE 25 MG/1
100 TABLET, FILM COATED ORAL EVERY 8 HOURS SCHEDULED
Status: DISCONTINUED | OUTPATIENT
Start: 2021-12-05 | End: 2021-12-06

## 2021-12-05 RX ORDER — OXYCODONE HYDROCHLORIDE 5 MG/1
5 TABLET ORAL EVERY 4 HOURS PRN
Status: DISCONTINUED | OUTPATIENT
Start: 2021-12-05 | End: 2021-12-14 | Stop reason: HOSPADM

## 2021-12-05 RX ORDER — ALBUTEROL SULFATE 2.5 MG/3ML
10 SOLUTION RESPIRATORY (INHALATION) ONCE
Status: COMPLETED | OUTPATIENT
Start: 2021-12-05 | End: 2021-12-05

## 2021-12-05 RX ORDER — MORPHINE SULFATE 4 MG/ML
6 INJECTION, SOLUTION INTRAMUSCULAR; INTRAVENOUS ONCE
Status: COMPLETED | OUTPATIENT
Start: 2021-12-05 | End: 2021-12-05

## 2021-12-05 RX ORDER — NIFEDIPINE 60 MG/1
60 TABLET, EXTENDED RELEASE ORAL 2 TIMES DAILY
Status: DISCONTINUED | OUTPATIENT
Start: 2021-12-05 | End: 2021-12-06

## 2021-12-05 RX ORDER — CLONIDINE HYDROCHLORIDE 0.1 MG/1
0.3 TABLET ORAL EVERY 8 HOURS SCHEDULED
Status: DISCONTINUED | OUTPATIENT
Start: 2021-12-05 | End: 2021-12-06

## 2021-12-05 RX ORDER — CARVEDILOL 6.25 MG/1
25 TABLET ORAL 2 TIMES DAILY WITH MEALS
Status: DISCONTINUED | OUTPATIENT
Start: 2021-12-05 | End: 2021-12-06

## 2021-12-05 RX ORDER — INSULIN GLARGINE 100 [IU]/ML
18 INJECTION, SOLUTION SUBCUTANEOUS EVERY MORNING
Status: DISCONTINUED | OUTPATIENT
Start: 2021-12-06 | End: 2021-12-06

## 2021-12-05 RX ADMIN — INSULIN HUMAN 10 UNITS: 100 INJECTION, SOLUTION PARENTERAL at 20:49

## 2021-12-05 RX ADMIN — MORPHINE SULFATE 6 MG: 4 INJECTION INTRAVENOUS at 19:55

## 2021-12-05 RX ADMIN — INSULIN LISPRO 1 UNITS: 100 INJECTION, SOLUTION INTRAVENOUS; SUBCUTANEOUS at 23:24

## 2021-12-05 RX ADMIN — DEXTROSE MONOHYDRATE 25 ML: 25 INJECTION, SOLUTION INTRAVENOUS at 20:51

## 2021-12-05 RX ADMIN — ALBUTEROL SULFATE 10 MG: 2.5 SOLUTION RESPIRATORY (INHALATION) at 20:53

## 2021-12-05 RX ADMIN — SODIUM CHLORIDE 1000 ML: 0.9 INJECTION, SOLUTION INTRAVENOUS at 19:55

## 2021-12-05 RX ADMIN — CEFTRIAXONE SODIUM 1000 MG: 10 INJECTION, POWDER, FOR SOLUTION INTRAVENOUS at 21:23

## 2021-12-06 ENCOUNTER — APPOINTMENT (INPATIENT)
Dept: NUCLEAR MEDICINE | Facility: HOSPITAL | Age: 66
DRG: 674 | End: 2021-12-06
Payer: MEDICARE

## 2021-12-06 ENCOUNTER — APPOINTMENT (INPATIENT)
Dept: NON INVASIVE DIAGNOSTICS | Facility: HOSPITAL | Age: 66
DRG: 674 | End: 2021-12-06
Payer: MEDICARE

## 2021-12-06 LAB
ALBUMIN SERPL BCP-MCNC: 2.5 G/DL (ref 3.5–5)
ALP SERPL-CCNC: 66 U/L (ref 46–116)
ALT SERPL W P-5'-P-CCNC: 391 U/L (ref 12–78)
ANION GAP SERPL CALCULATED.3IONS-SCNC: 12 MMOL/L (ref 4–13)
AORTIC ROOT: 2.9 CM
AORTIC VALVE MEAN VELOCITY: 17.1 M/S
APICAL FOUR CHAMBER EJECTION FRACTION: 71 %
APTT PPP: 32 SECONDS (ref 23–37)
ASCENDING AORTA: 2.9 CM
AST SERPL W P-5'-P-CCNC: 745 U/L (ref 5–45)
ATRIAL RATE: 71 BPM
AV AREA BY CONTINUOUS VTI: 1.6 CM2
AV AREA PEAK VELOCITY: 1.5 CM2
AV LVOT MEAN GRADIENT: 4 MMHG
AV LVOT PEAK GRADIENT: 6 MMHG
AV MEAN GRADIENT: 13 MMHG
AV PEAK GRADIENT: 25 MMHG
AV VALVE AREA: 1.61 CM2
BILIRUB SERPL-MCNC: 0.13 MG/DL (ref 0.2–1)
BUN SERPL-MCNC: 86 MG/DL (ref 5–25)
CALCIUM ALBUM COR SERPL-MCNC: 8.5 MG/DL (ref 8.3–10.1)
CALCIUM SERPL-MCNC: 7.3 MG/DL (ref 8.3–10.1)
CHLORIDE SERPL-SCNC: 104 MMOL/L (ref 100–108)
CO2 SERPL-SCNC: 23 MMOL/L (ref 21–32)
CREAT SERPL-MCNC: 7.79 MG/DL (ref 0.6–1.3)
DOP CALC AO VTI: 75.62 CM
DOP CALC LVOT AREA: 3.14 CM2
DOP CALC LVOT DIAMETER: 2 CM
DOP CALC LVOT PEAK VEL VTI: 38.83 CM
DOP CALC LVOT PEAK VEL: 1.18 M/S
DOP CALC LVOT STROKE INDEX: 67.2 ML/M2
DOP CALC LVOT STROKE VOLUME: 121.93 CM3
E WAVE DECELERATION TIME: 213 MS
ERYTHROCYTE [DISTWIDTH] IN BLOOD BY AUTOMATED COUNT: 15.7 % (ref 11.6–15.1)
FRACTIONAL SHORTENING: 47 % (ref 28–44)
GFR SERPL CREATININE-BSD FRML MDRD: 5 ML/MIN/1.73SQ M
GLUCOSE SERPL-MCNC: 150 MG/DL (ref 65–140)
GLUCOSE SERPL-MCNC: 191 MG/DL (ref 65–140)
GLUCOSE SERPL-MCNC: 246 MG/DL (ref 65–140)
GLUCOSE SERPL-MCNC: 264 MG/DL (ref 65–140)
GLUCOSE SERPL-MCNC: 275 MG/DL (ref 65–140)
GLUCOSE SERPL-MCNC: 66 MG/DL (ref 65–140)
GLUCOSE SERPL-MCNC: 70 MG/DL (ref 65–140)
GLUCOSE SERPL-MCNC: 73 MG/DL (ref 65–140)
HCT VFR BLD AUTO: 27.2 % (ref 34.8–46.1)
HGB BLD-MCNC: 8.3 G/DL (ref 11.5–15.4)
INR PPP: 1.35 (ref 0.84–1.19)
INTERVENTRICULAR SEPTUM IN DIASTOLE (PARASTERNAL SHORT AXIS VIEW): 1.2 CM
LAAS-AP4: 26.3 CM2
LDH SERPL-CCNC: 2091 U/L (ref 81–234)
LEFT INTERNAL DIMENSION IN SYSTOLE: 2.6 CM (ref 2.1–4)
LEFT VENTRICULAR INTERNAL DIMENSION IN DIASTOLE: 4.9 CM (ref 4.21–6.27)
LEFT VENTRICULAR POSTERIOR WALL IN END DIASTOLE: 0.9 CM
LEFT VENTRICULAR STROKE VOLUME: 90 ML
MAGNESIUM SERPL-MCNC: 3 MG/DL (ref 1.6–2.6)
MCH RBC QN AUTO: 26.9 PG (ref 26.8–34.3)
MCHC RBC AUTO-ENTMCNC: 30.5 G/DL (ref 31.4–37.4)
MCV RBC AUTO: 88 FL (ref 82–98)
MV E'TISSUE VEL-SEP: 7 CM/S
MV PEAK A VEL: 0.92 M/S
MV PEAK E VEL: 112 CM/S
MV STENOSIS PRESSURE HALF TIME: 0 MS
P AXIS: 52 DEGREES
PA SYSTOLIC PRESSURE: 42 MMHG
PLATELET # BLD AUTO: 167 THOUSANDS/UL (ref 149–390)
PMV BLD AUTO: 10.8 FL (ref 8.9–12.7)
POTASSIUM SERPL-SCNC: 5.6 MMOL/L (ref 3.5–5.3)
PR INTERVAL: 180 MS
PROT SERPL-MCNC: 6 G/DL (ref 6.4–8.2)
PROTHROMBIN TIME: 16.4 SECONDS (ref 11.6–14.5)
QRS AXIS: 94 DEGREES
QRSD INTERVAL: 94 MS
QT INTERVAL: 448 MS
QTC INTERVAL: 486 MS
RBC # BLD AUTO: 3.09 MILLION/UL (ref 3.81–5.12)
RIGHT VENTRICLE ID DIMENSION: 3.7 CM
SL CV LV EF: 63
SL CV PED ECHO LEFT VENTRICLE DIASTOLIC VOLUME (MOD BIPLANE) 2D: 115 ML
SL CV PED ECHO LEFT VENTRICLE SYSTOLIC VOLUME (MOD BIPLANE) 2D: 25 ML
SODIUM SERPL-SCNC: 139 MMOL/L (ref 136–145)
T WAVE AXIS: 15 DEGREES
TRICUSPID VALVE PEAK REGURGITATION VELOCITY: 2.63 M/S
TRICUSPID VALVE S': 65 CM/S
TV PEAK GRADIENT: 28 MMHG
VENTRICULAR RATE: 71 BPM
WBC # BLD AUTO: 5 THOUSAND/UL (ref 4.31–10.16)
Z-SCORE OF LEFT VENTRICULAR DIMENSION IN END SYSTOLE: -0.48

## 2021-12-06 PROCEDURE — G1004 CDSM NDSC: HCPCS

## 2021-12-06 PROCEDURE — 36415 COLL VENOUS BLD VENIPUNCTURE: CPT | Performed by: INTERNAL MEDICINE

## 2021-12-06 PROCEDURE — A9562 TC99M MERTIATIDE: HCPCS

## 2021-12-06 PROCEDURE — 83615 LACTATE (LD) (LDH) ENZYME: CPT | Performed by: INTERNAL MEDICINE

## 2021-12-06 PROCEDURE — 99222 1ST HOSP IP/OBS MODERATE 55: CPT | Performed by: PHYSICIAN ASSISTANT

## 2021-12-06 PROCEDURE — 85027 COMPLETE CBC AUTOMATED: CPT | Performed by: INTERNAL MEDICINE

## 2021-12-06 PROCEDURE — 99232 SBSQ HOSP IP/OBS MODERATE 35: CPT | Performed by: INTERNAL MEDICINE

## 2021-12-06 PROCEDURE — 93010 ELECTROCARDIOGRAM REPORT: CPT | Performed by: INTERNAL MEDICINE

## 2021-12-06 PROCEDURE — 85730 THROMBOPLASTIN TIME PARTIAL: CPT | Performed by: INTERNAL MEDICINE

## 2021-12-06 PROCEDURE — 82948 REAGENT STRIP/BLOOD GLUCOSE: CPT

## 2021-12-06 PROCEDURE — 85610 PROTHROMBIN TIME: CPT | Performed by: INTERNAL MEDICINE

## 2021-12-06 PROCEDURE — NC001 PR NO CHARGE: Performed by: NURSE PRACTITIONER

## 2021-12-06 PROCEDURE — 93306 TTE W/DOPPLER COMPLETE: CPT | Performed by: INTERNAL MEDICINE

## 2021-12-06 PROCEDURE — 99223 1ST HOSP IP/OBS HIGH 75: CPT | Performed by: INTERNAL MEDICINE

## 2021-12-06 PROCEDURE — 93306 TTE W/DOPPLER COMPLETE: CPT

## 2021-12-06 PROCEDURE — 80053 COMPREHEN METABOLIC PANEL: CPT | Performed by: INTERNAL MEDICINE

## 2021-12-06 PROCEDURE — 83735 ASSAY OF MAGNESIUM: CPT | Performed by: NURSE PRACTITIONER

## 2021-12-06 PROCEDURE — 78707 K FLOW/FUNCT IMAGE W/O DRUG: CPT

## 2021-12-06 RX ORDER — SODIUM CHLORIDE 9 MG/ML
125 INJECTION, SOLUTION INTRAVENOUS CONTINUOUS
Status: DISCONTINUED | OUTPATIENT
Start: 2021-12-06 | End: 2021-12-06

## 2021-12-06 RX ORDER — MORPHINE SULFATE 4 MG/ML
4 INJECTION, SOLUTION INTRAMUSCULAR; INTRAVENOUS
Status: COMPLETED | OUTPATIENT
Start: 2021-12-06 | End: 2021-12-08

## 2021-12-06 RX ORDER — HEPARIN SODIUM 1000 [USP'U]/ML
6000 INJECTION, SOLUTION INTRAVENOUS; SUBCUTANEOUS
Status: DISCONTINUED | OUTPATIENT
Start: 2021-12-06 | End: 2021-12-06

## 2021-12-06 RX ORDER — HEPARIN SODIUM 5000 [USP'U]/ML
5000 INJECTION, SOLUTION INTRAVENOUS; SUBCUTANEOUS EVERY 8 HOURS SCHEDULED
Status: DISCONTINUED | OUTPATIENT
Start: 2021-12-06 | End: 2021-12-10

## 2021-12-06 RX ORDER — HEPARIN SODIUM 1000 [USP'U]/ML
3000 INJECTION, SOLUTION INTRAVENOUS; SUBCUTANEOUS
Status: DISCONTINUED | OUTPATIENT
Start: 2021-12-06 | End: 2021-12-06

## 2021-12-06 RX ORDER — HEPARIN SODIUM 10000 [USP'U]/100ML
3-30 INJECTION, SOLUTION INTRAVENOUS
Status: DISCONTINUED | OUTPATIENT
Start: 2021-12-06 | End: 2021-12-06

## 2021-12-06 RX ORDER — INSULIN GLARGINE 100 [IU]/ML
10 INJECTION, SOLUTION SUBCUTANEOUS EVERY MORNING
Status: DISCONTINUED | OUTPATIENT
Start: 2021-12-07 | End: 2021-12-14 | Stop reason: HOSPADM

## 2021-12-06 RX ORDER — DEXTROSE MONOHYDRATE 25 G/50ML
25 INJECTION, SOLUTION INTRAVENOUS ONCE
Status: COMPLETED | OUTPATIENT
Start: 2021-12-06 | End: 2021-12-06

## 2021-12-06 RX ADMIN — GABAPENTIN 300 MG: 300 CAPSULE ORAL at 00:05

## 2021-12-06 RX ADMIN — HEPARIN SODIUM 5000 UNITS: 5000 INJECTION INTRAVENOUS; SUBCUTANEOUS at 00:11

## 2021-12-06 RX ADMIN — CEFTRIAXONE SODIUM 1000 MG: 10 INJECTION, POWDER, FOR SOLUTION INTRAVENOUS at 21:22

## 2021-12-06 RX ADMIN — GABAPENTIN 300 MG: 300 CAPSULE ORAL at 21:28

## 2021-12-06 RX ADMIN — NIFEDIPINE 60 MG: 60 TABLET, FILM COATED, EXTENDED RELEASE ORAL at 00:05

## 2021-12-06 RX ADMIN — HEPARIN SODIUM 5000 UNITS: 5000 INJECTION INTRAVENOUS; SUBCUTANEOUS at 09:10

## 2021-12-06 RX ADMIN — TERAZOSIN HYDROCHLORIDE 10 MG: 5 CAPSULE ORAL at 00:05

## 2021-12-06 RX ADMIN — INSULIN LISPRO 5 UNITS: 100 INJECTION, SOLUTION INTRAVENOUS; SUBCUTANEOUS at 18:53

## 2021-12-06 RX ADMIN — SODIUM CHLORIDE 500 ML: 0.9 INJECTION, SOLUTION INTRAVENOUS at 17:52

## 2021-12-06 RX ADMIN — HEPARIN SODIUM 5000 UNITS: 5000 INJECTION INTRAVENOUS; SUBCUTANEOUS at 23:00

## 2021-12-06 RX ADMIN — CLONIDINE HYDROCHLORIDE 0.3 MG: 0.1 TABLET ORAL at 00:05

## 2021-12-06 RX ADMIN — SODIUM CHLORIDE, SODIUM LACTATE, POTASSIUM CHLORIDE, AND CALCIUM CHLORIDE 500 ML: .6; .31; .03; .02 INJECTION, SOLUTION INTRAVENOUS at 08:28

## 2021-12-06 RX ADMIN — TERAZOSIN HYDROCHLORIDE 10 MG: 5 CAPSULE ORAL at 22:57

## 2021-12-06 RX ADMIN — DEXTROSE MONOHYDRATE 25 ML: 25 INJECTION, SOLUTION INTRAVENOUS at 08:13

## 2021-12-06 RX ADMIN — CARVEDILOL 25 MG: 6.25 TABLET, FILM COATED ORAL at 00:05

## 2021-12-06 RX ADMIN — HYDRALAZINE HYDROCHLORIDE 100 MG: 25 TABLET, FILM COATED ORAL at 00:05

## 2021-12-06 RX ADMIN — INSULIN LISPRO 2 UNITS: 100 INJECTION, SOLUTION INTRAVENOUS; SUBCUTANEOUS at 15:20

## 2021-12-06 RX ADMIN — HEPARIN SODIUM 18 UNITS/KG/HR: 10000 INJECTION, SOLUTION INTRAVENOUS at 13:41

## 2021-12-06 RX ADMIN — ATORVASTATIN CALCIUM 40 MG: 40 TABLET, FILM COATED ORAL at 18:25

## 2021-12-07 ENCOUNTER — APPOINTMENT (INPATIENT)
Dept: RADIOLOGY | Facility: HOSPITAL | Age: 66
DRG: 674 | End: 2021-12-07
Attending: RADIOLOGY
Payer: MEDICARE

## 2021-12-07 ENCOUNTER — APPOINTMENT (INPATIENT)
Dept: DIALYSIS | Facility: HOSPITAL | Age: 66
DRG: 674 | End: 2021-12-07
Payer: MEDICARE

## 2021-12-07 LAB
ANION GAP SERPL CALCULATED.3IONS-SCNC: 12 MMOL/L (ref 4–13)
BACTERIA UR CULT: NORMAL
BASOPHILS # BLD AUTO: 0.03 THOUSANDS/ΜL (ref 0–0.1)
BASOPHILS NFR BLD AUTO: 0 % (ref 0–1)
BUN SERPL-MCNC: 93 MG/DL (ref 5–25)
CALCIUM SERPL-MCNC: 7.3 MG/DL (ref 8.3–10.1)
CHLORIDE SERPL-SCNC: 102 MMOL/L (ref 100–108)
CO2 SERPL-SCNC: 20 MMOL/L (ref 21–32)
CREAT SERPL-MCNC: 8.42 MG/DL (ref 0.6–1.3)
EOSINOPHIL # BLD AUTO: 0.18 THOUSAND/ΜL (ref 0–0.61)
EOSINOPHIL NFR BLD AUTO: 3 % (ref 0–6)
ERYTHROCYTE [DISTWIDTH] IN BLOOD BY AUTOMATED COUNT: 15.6 % (ref 11.6–15.1)
GFR SERPL CREATININE-BSD FRML MDRD: 5 ML/MIN/1.73SQ M
GLUCOSE SERPL-MCNC: 117 MG/DL (ref 65–140)
GLUCOSE SERPL-MCNC: 149 MG/DL (ref 65–140)
GLUCOSE SERPL-MCNC: 169 MG/DL (ref 65–140)
GLUCOSE SERPL-MCNC: 184 MG/DL (ref 65–140)
GLUCOSE SERPL-MCNC: 208 MG/DL (ref 65–140)
HCT VFR BLD AUTO: 27.2 % (ref 34.8–46.1)
HGB BLD-MCNC: 8.4 G/DL (ref 11.5–15.4)
IMM GRANULOCYTES # BLD AUTO: 0.03 THOUSAND/UL (ref 0–0.2)
IMM GRANULOCYTES NFR BLD AUTO: 0 % (ref 0–2)
LDH SERPL-CCNC: 2056 U/L (ref 81–234)
LYMPHOCYTES # BLD AUTO: 0.85 THOUSANDS/ΜL (ref 0.6–4.47)
LYMPHOCYTES NFR BLD AUTO: 13 % (ref 14–44)
MCH RBC QN AUTO: 26.9 PG (ref 26.8–34.3)
MCHC RBC AUTO-ENTMCNC: 30.9 G/DL (ref 31.4–37.4)
MCV RBC AUTO: 87 FL (ref 82–98)
MONOCYTES # BLD AUTO: 0.57 THOUSAND/ΜL (ref 0.17–1.22)
MONOCYTES NFR BLD AUTO: 9 % (ref 4–12)
NEUTROPHILS # BLD AUTO: 5.01 THOUSANDS/ΜL (ref 1.85–7.62)
NEUTS SEG NFR BLD AUTO: 75 % (ref 43–75)
NRBC BLD AUTO-RTO: 0 /100 WBCS
PHOSPHATE SERPL-MCNC: 8.7 MG/DL (ref 2.3–4.1)
PLATELET # BLD AUTO: 169 THOUSANDS/UL (ref 149–390)
PMV BLD AUTO: 11.1 FL (ref 8.9–12.7)
POTASSIUM SERPL-SCNC: 6.2 MMOL/L (ref 3.5–5.3)
POTASSIUM SERPL-SCNC: 7.2 MMOL/L (ref 3.5–5.3)
PTH-INTACT SERPL-MCNC: 509.9 PG/ML (ref 18.4–80.1)
RBC # BLD AUTO: 3.12 MILLION/UL (ref 3.81–5.12)
SODIUM SERPL-SCNC: 134 MMOL/L (ref 136–145)
WBC # BLD AUTO: 6.67 THOUSAND/UL (ref 4.31–10.16)

## 2021-12-07 PROCEDURE — NC001 PR NO CHARGE: Performed by: INTERNAL MEDICINE

## 2021-12-07 PROCEDURE — 36556 INSERT NON-TUNNEL CV CATH: CPT | Performed by: RADIOLOGY

## 2021-12-07 PROCEDURE — 83615 LACTATE (LD) (LDH) ENZYME: CPT | Performed by: INTERNAL MEDICINE

## 2021-12-07 PROCEDURE — 82948 REAGENT STRIP/BLOOD GLUCOSE: CPT

## 2021-12-07 PROCEDURE — 85025 COMPLETE CBC W/AUTO DIFF WBC: CPT | Performed by: INTERNAL MEDICINE

## 2021-12-07 PROCEDURE — 36415 COLL VENOUS BLD VENIPUNCTURE: CPT | Performed by: PHYSICIAN ASSISTANT

## 2021-12-07 PROCEDURE — C1752 CATH,HEMODIALYSIS,SHORT-TERM: HCPCS

## 2021-12-07 PROCEDURE — 99232 SBSQ HOSP IP/OBS MODERATE 35: CPT | Performed by: NURSE PRACTITIONER

## 2021-12-07 PROCEDURE — 84132 ASSAY OF SERUM POTASSIUM: CPT | Performed by: NURSE PRACTITIONER

## 2021-12-07 PROCEDURE — 83970 ASSAY OF PARATHORMONE: CPT | Performed by: NURSE PRACTITIONER

## 2021-12-07 PROCEDURE — 02H633Z INSERTION OF INFUSION DEVICE INTO RIGHT ATRIUM, PERCUTANEOUS APPROACH: ICD-10-PCS | Performed by: RADIOLOGY

## 2021-12-07 PROCEDURE — C1769 GUIDE WIRE: HCPCS

## 2021-12-07 PROCEDURE — 90935 HEMODIALYSIS ONE EVALUATION: CPT | Performed by: INTERNAL MEDICINE

## 2021-12-07 PROCEDURE — 77001 FLUOROGUIDE FOR VEIN DEVICE: CPT | Performed by: RADIOLOGY

## 2021-12-07 PROCEDURE — NC001 PR NO CHARGE: Performed by: RADIOLOGY

## 2021-12-07 PROCEDURE — 84100 ASSAY OF PHOSPHORUS: CPT | Performed by: NURSE PRACTITIONER

## 2021-12-07 PROCEDURE — 36556 INSERT NON-TUNNEL CV CATH: CPT

## 2021-12-07 PROCEDURE — 76937 US GUIDE VASCULAR ACCESS: CPT | Performed by: RADIOLOGY

## 2021-12-07 PROCEDURE — 80048 BASIC METABOLIC PNL TOTAL CA: CPT | Performed by: PHYSICIAN ASSISTANT

## 2021-12-07 PROCEDURE — 99024 POSTOP FOLLOW-UP VISIT: CPT | Performed by: RADIOLOGY

## 2021-12-07 PROCEDURE — 99232 SBSQ HOSP IP/OBS MODERATE 35: CPT | Performed by: INTERNAL MEDICINE

## 2021-12-07 PROCEDURE — 5A1D70Z PERFORMANCE OF URINARY FILTRATION, INTERMITTENT, LESS THAN 6 HOURS PER DAY: ICD-10-PCS | Performed by: INTERNAL MEDICINE

## 2021-12-07 RX ORDER — DEXTROSE MONOHYDRATE 25 G/50ML
25 INJECTION, SOLUTION INTRAVENOUS ONCE
Status: COMPLETED | OUTPATIENT
Start: 2021-12-07 | End: 2021-12-07

## 2021-12-07 RX ORDER — CARVEDILOL 25 MG/1
25 TABLET ORAL 2 TIMES DAILY WITH MEALS
Status: DISCONTINUED | OUTPATIENT
Start: 2021-12-07 | End: 2021-12-14 | Stop reason: HOSPADM

## 2021-12-07 RX ORDER — ALBUTEROL SULFATE 2.5 MG/3ML
5 SOLUTION RESPIRATORY (INHALATION) ONCE
Status: COMPLETED | OUTPATIENT
Start: 2021-12-07 | End: 2021-12-07

## 2021-12-07 RX ORDER — HYDRALAZINE HYDROCHLORIDE 25 MG/1
50 TABLET, FILM COATED ORAL EVERY 8 HOURS SCHEDULED
Status: DISCONTINUED | OUTPATIENT
Start: 2021-12-07 | End: 2021-12-08

## 2021-12-07 RX ORDER — CALCIUM ACETATE 667 MG/1
667 CAPSULE ORAL
Status: DISCONTINUED | OUTPATIENT
Start: 2021-12-07 | End: 2021-12-14 | Stop reason: HOSPADM

## 2021-12-07 RX ORDER — LIDOCAINE WITH 8.4% SOD BICARB 0.9%(10ML)
SYRINGE (ML) INJECTION CODE/TRAUMA/SEDATION MEDICATION
Status: COMPLETED | OUTPATIENT
Start: 2021-12-07 | End: 2021-12-07

## 2021-12-07 RX ORDER — CALCIUM GLUCONATE 20 MG/ML
2 INJECTION, SOLUTION INTRAVENOUS ONCE
Status: COMPLETED | OUTPATIENT
Start: 2021-12-07 | End: 2021-12-07

## 2021-12-07 RX ADMIN — CEFTRIAXONE SODIUM 1000 MG: 10 INJECTION, POWDER, FOR SOLUTION INTRAVENOUS at 20:41

## 2021-12-07 RX ADMIN — HEPARIN SODIUM 5000 UNITS: 5000 INJECTION INTRAVENOUS; SUBCUTANEOUS at 05:56

## 2021-12-07 RX ADMIN — HYDRALAZINE HYDROCHLORIDE 50 MG: 25 TABLET, FILM COATED ORAL at 20:41

## 2021-12-07 RX ADMIN — CARVEDILOL 25 MG: 25 TABLET, FILM COATED ORAL at 20:41

## 2021-12-07 RX ADMIN — INSULIN GLARGINE 10 UNITS: 100 INJECTION, SOLUTION SUBCUTANEOUS at 11:50

## 2021-12-07 RX ADMIN — ACETAMINOPHEN 650 MG: 325 TABLET, FILM COATED ORAL at 20:41

## 2021-12-07 RX ADMIN — ALBUTEROL SULFATE 5 MG: 2.5 SOLUTION RESPIRATORY (INHALATION) at 10:55

## 2021-12-07 RX ADMIN — HEPARIN SODIUM 5000 UNITS: 5000 INJECTION INTRAVENOUS; SUBCUTANEOUS at 21:45

## 2021-12-07 RX ADMIN — SODIUM CHLORIDE: 9 INJECTION, SOLUTION INTRAMUSCULAR; INTRAVENOUS; SUBCUTANEOUS at 11:33

## 2021-12-07 RX ADMIN — FUROSEMIDE 120 MG: 10 INJECTION, SOLUTION INTRAVENOUS at 10:56

## 2021-12-07 RX ADMIN — Medication 10 ML: at 09:57

## 2021-12-07 RX ADMIN — CALCIUM GLUCONATE 2 G: 20 INJECTION, SOLUTION INTRAVENOUS at 11:38

## 2021-12-07 RX ADMIN — INSULIN LISPRO 1 UNITS: 100 INJECTION, SOLUTION INTRAVENOUS; SUBCUTANEOUS at 15:27

## 2021-12-07 RX ADMIN — SODIUM BICARBONATE 25 MEQ: 84 INJECTION INTRAVENOUS at 11:33

## 2021-12-07 RX ADMIN — OXYCODONE HYDROCHLORIDE 5 MG: 5 TABLET ORAL at 15:46

## 2021-12-07 RX ADMIN — CALCIUM ACETATE 667 MG: 667 CAPSULE ORAL at 11:41

## 2021-12-07 RX ADMIN — CALCIUM ACETATE 667 MG: 667 CAPSULE ORAL at 15:27

## 2021-12-07 RX ADMIN — ATORVASTATIN CALCIUM 40 MG: 40 TABLET, FILM COATED ORAL at 17:48

## 2021-12-07 RX ADMIN — HEPARIN SODIUM 5000 UNITS: 5000 INJECTION INTRAVENOUS; SUBCUTANEOUS at 14:09

## 2021-12-07 RX ADMIN — GABAPENTIN 300 MG: 300 CAPSULE ORAL at 21:46

## 2021-12-07 RX ADMIN — TERAZOSIN HYDROCHLORIDE 10 MG: 5 CAPSULE ORAL at 21:45

## 2021-12-07 RX ADMIN — DEXTROSE MONOHYDRATE 25 ML: 25 INJECTION, SOLUTION INTRAVENOUS at 11:31

## 2021-12-08 ENCOUNTER — APPOINTMENT (INPATIENT)
Dept: DIALYSIS | Facility: HOSPITAL | Age: 66
DRG: 674 | End: 2021-12-08
Payer: MEDICARE

## 2021-12-08 PROBLEM — N18.9 ACUTE KIDNEY INJURY SUPERIMPOSED ON CKD (HCC): Status: RESOLVED | Noted: 2021-08-31 | Resolved: 2021-12-08

## 2021-12-08 PROBLEM — N18.6 ESRD (END STAGE RENAL DISEASE) (HCC): Status: ACTIVE | Noted: 2021-12-08

## 2021-12-08 PROBLEM — N17.9 ACUTE KIDNEY INJURY SUPERIMPOSED ON CKD (HCC): Status: RESOLVED | Noted: 2021-08-31 | Resolved: 2021-12-08

## 2021-12-08 PROBLEM — E87.5 HYPERKALEMIA: Status: ACTIVE | Noted: 2021-12-08

## 2021-12-08 LAB
ALBUMIN SERPL BCP-MCNC: 2.3 G/DL (ref 3.5–5)
ALP SERPL-CCNC: 69 U/L (ref 46–116)
ALT SERPL W P-5'-P-CCNC: 464 U/L (ref 12–78)
ANION GAP SERPL CALCULATED.3IONS-SCNC: 6 MMOL/L (ref 4–13)
AST SERPL W P-5'-P-CCNC: 709 U/L (ref 5–45)
BILIRUB SERPL-MCNC: 0.24 MG/DL (ref 0.2–1)
BUN SERPL-MCNC: 60 MG/DL (ref 5–25)
CALCIUM ALBUM COR SERPL-MCNC: 8.6 MG/DL (ref 8.3–10.1)
CALCIUM SERPL-MCNC: 7.2 MG/DL (ref 8.3–10.1)
CHLORIDE SERPL-SCNC: 93 MMOL/L (ref 100–108)
CK MB SERPL-MCNC: 96.7 NG/ML (ref 0–5)
CK MB SERPL-MCNC: <1 % (ref 0–2.5)
CK SERPL-CCNC: ABNORMAL U/L (ref 26–192)
CO2 SERPL-SCNC: 30 MMOL/L (ref 21–32)
CREAT SERPL-MCNC: 6.5 MG/DL (ref 0.6–1.3)
GFR SERPL CREATININE-BSD FRML MDRD: 6 ML/MIN/1.73SQ M
GLUCOSE SERPL-MCNC: 138 MG/DL (ref 65–140)
GLUCOSE SERPL-MCNC: 177 MG/DL (ref 65–140)
GLUCOSE SERPL-MCNC: 183 MG/DL (ref 65–140)
GLUCOSE SERPL-MCNC: 228 MG/DL (ref 65–140)
GLUCOSE SERPL-MCNC: 98 MG/DL (ref 65–140)
LDH SERPL-CCNC: 2339 U/L (ref 81–234)
POTASSIUM SERPL-SCNC: 5.5 MMOL/L (ref 3.5–5.3)
PROT SERPL-MCNC: 6.2 G/DL (ref 6.4–8.2)
SODIUM SERPL-SCNC: 129 MMOL/L (ref 136–145)

## 2021-12-08 PROCEDURE — 80053 COMPREHEN METABOLIC PANEL: CPT | Performed by: INTERNAL MEDICINE

## 2021-12-08 PROCEDURE — 97163 PT EVAL HIGH COMPLEX 45 MIN: CPT | Performed by: PHYSICAL THERAPIST

## 2021-12-08 PROCEDURE — NC001 PR NO CHARGE: Performed by: RADIOLOGY

## 2021-12-08 PROCEDURE — 82550 ASSAY OF CK (CPK): CPT | Performed by: INTERNAL MEDICINE

## 2021-12-08 PROCEDURE — 97166 OT EVAL MOD COMPLEX 45 MIN: CPT

## 2021-12-08 PROCEDURE — 99232 SBSQ HOSP IP/OBS MODERATE 35: CPT | Performed by: INTERNAL MEDICINE

## 2021-12-08 PROCEDURE — 82553 CREATINE MB FRACTION: CPT | Performed by: INTERNAL MEDICINE

## 2021-12-08 PROCEDURE — 83615 LACTATE (LD) (LDH) ENZYME: CPT | Performed by: INTERNAL MEDICINE

## 2021-12-08 PROCEDURE — 82948 REAGENT STRIP/BLOOD GLUCOSE: CPT

## 2021-12-08 RX ORDER — NIFEDIPINE 30 MG/1
60 TABLET, EXTENDED RELEASE ORAL 2 TIMES DAILY
Status: DISCONTINUED | OUTPATIENT
Start: 2021-12-08 | End: 2021-12-14 | Stop reason: HOSPADM

## 2021-12-08 RX ORDER — DOXERCALCIFEROL 2 UG/ML
2 INJECTION, SOLUTION INTRAVENOUS DAILY PRN
Status: DISCONTINUED | OUTPATIENT
Start: 2021-12-08 | End: 2021-12-14 | Stop reason: HOSPADM

## 2021-12-08 RX ORDER — LABETALOL 20 MG/4 ML (5 MG/ML) INTRAVENOUS SYRINGE
10 ONCE
Status: COMPLETED | OUTPATIENT
Start: 2021-12-08 | End: 2021-12-08

## 2021-12-08 RX ORDER — HYDRALAZINE HYDROCHLORIDE 25 MG/1
50 TABLET, FILM COATED ORAL EVERY 8 HOURS SCHEDULED
Status: DISCONTINUED | OUTPATIENT
Start: 2021-12-08 | End: 2021-12-14 | Stop reason: HOSPADM

## 2021-12-08 RX ORDER — HYDRALAZINE HYDROCHLORIDE 25 MG/1
100 TABLET, FILM COATED ORAL EVERY 8 HOURS SCHEDULED
Status: DISCONTINUED | OUTPATIENT
Start: 2021-12-08 | End: 2021-12-08

## 2021-12-08 RX ORDER — LOSARTAN POTASSIUM 50 MG/1
50 TABLET ORAL DAILY
Status: DISCONTINUED | OUTPATIENT
Start: 2021-12-08 | End: 2021-12-08

## 2021-12-08 RX ORDER — CLONIDINE HYDROCHLORIDE 0.1 MG/1
0.1 TABLET ORAL ONCE
Status: COMPLETED | OUTPATIENT
Start: 2021-12-08 | End: 2021-12-08

## 2021-12-08 RX ORDER — TORSEMIDE 20 MG/1
20 TABLET ORAL DAILY
Status: DISCONTINUED | OUTPATIENT
Start: 2021-12-08 | End: 2021-12-14 | Stop reason: HOSPADM

## 2021-12-08 RX ADMIN — HYDRALAZINE HYDROCHLORIDE 50 MG: 25 TABLET, FILM COATED ORAL at 15:12

## 2021-12-08 RX ADMIN — HYDRALAZINE HYDROCHLORIDE 50 MG: 25 TABLET, FILM COATED ORAL at 23:19

## 2021-12-08 RX ADMIN — CLONIDINE HYDROCHLORIDE 0.3 MG: 0.1 TABLET ORAL at 21:12

## 2021-12-08 RX ADMIN — MORPHINE SULFATE 4 MG: 4 INJECTION INTRAVENOUS at 21:33

## 2021-12-08 RX ADMIN — NIFEDIPINE 60 MG: 30 TABLET, FILM COATED, EXTENDED RELEASE ORAL at 12:22

## 2021-12-08 RX ADMIN — LOSARTAN POTASSIUM 50 MG: 50 TABLET, FILM COATED ORAL at 15:12

## 2021-12-08 RX ADMIN — TORSEMIDE 20 MG: 20 TABLET ORAL at 12:21

## 2021-12-08 RX ADMIN — HEPARIN SODIUM 5000 UNITS: 5000 INJECTION INTRAVENOUS; SUBCUTANEOUS at 21:12

## 2021-12-08 RX ADMIN — INSULIN GLARGINE 10 UNITS: 100 INJECTION, SOLUTION SUBCUTANEOUS at 08:21

## 2021-12-08 RX ADMIN — ONDANSETRON 4 MG: 2 INJECTION INTRAMUSCULAR; INTRAVENOUS at 16:19

## 2021-12-08 RX ADMIN — CARVEDILOL 25 MG: 25 TABLET, FILM COATED ORAL at 08:21

## 2021-12-08 RX ADMIN — ATORVASTATIN CALCIUM 40 MG: 40 TABLET, FILM COATED ORAL at 16:34

## 2021-12-08 RX ADMIN — CARVEDILOL 25 MG: 25 TABLET, FILM COATED ORAL at 15:13

## 2021-12-08 RX ADMIN — INSULIN LISPRO 2 UNITS: 100 INJECTION, SOLUTION INTRAVENOUS; SUBCUTANEOUS at 11:23

## 2021-12-08 RX ADMIN — CALCIUM ACETATE 667 MG: 667 CAPSULE ORAL at 15:12

## 2021-12-08 RX ADMIN — LABETALOL HYDROCHLORIDE 10 MG: 5 INJECTION, SOLUTION INTRAVENOUS at 04:39

## 2021-12-08 RX ADMIN — CALCIUM ACETATE 667 MG: 667 CAPSULE ORAL at 08:21

## 2021-12-08 RX ADMIN — NIFEDIPINE 60 MG: 30 TABLET, FILM COATED, EXTENDED RELEASE ORAL at 21:13

## 2021-12-08 RX ADMIN — HYDRALAZINE HYDROCHLORIDE 50 MG: 25 TABLET, FILM COATED ORAL at 08:21

## 2021-12-08 RX ADMIN — HEPARIN SODIUM 5000 UNITS: 5000 INJECTION INTRAVENOUS; SUBCUTANEOUS at 15:12

## 2021-12-08 RX ADMIN — DOXERCALCIFEROL 2 MCG: 4 INJECTION, SOLUTION INTRAVENOUS at 15:35

## 2021-12-08 RX ADMIN — TERAZOSIN HYDROCHLORIDE 10 MG: 5 CAPSULE ORAL at 21:09

## 2021-12-08 RX ADMIN — CLONIDINE HYDROCHLORIDE 0.1 MG: 0.1 TABLET ORAL at 16:34

## 2021-12-08 RX ADMIN — CALCIUM ACETATE 667 MG: 667 CAPSULE ORAL at 11:23

## 2021-12-08 RX ADMIN — GABAPENTIN 300 MG: 300 CAPSULE ORAL at 21:12

## 2021-12-08 RX ADMIN — HEPARIN SODIUM 5000 UNITS: 5000 INJECTION INTRAVENOUS; SUBCUTANEOUS at 05:28

## 2021-12-09 ENCOUNTER — APPOINTMENT (INPATIENT)
Dept: DIALYSIS | Facility: HOSPITAL | Age: 66
DRG: 674 | End: 2021-12-09
Attending: INTERNAL MEDICINE
Payer: MEDICARE

## 2021-12-09 ENCOUNTER — APPOINTMENT (INPATIENT)
Dept: RADIOLOGY | Facility: HOSPITAL | Age: 66
DRG: 674 | End: 2021-12-09
Payer: MEDICARE

## 2021-12-09 ENCOUNTER — APPOINTMENT (INPATIENT)
Dept: RADIOLOGY | Facility: HOSPITAL | Age: 66
DRG: 674 | End: 2021-12-09
Attending: RADIOLOGY
Payer: MEDICARE

## 2021-12-09 LAB
ANION GAP SERPL CALCULATED.3IONS-SCNC: 7 MMOL/L (ref 4–13)
BUN SERPL-MCNC: 37 MG/DL (ref 5–25)
CALCIUM SERPL-MCNC: 7.6 MG/DL (ref 8.3–10.1)
CHLORIDE SERPL-SCNC: 94 MMOL/L (ref 100–108)
CO2 SERPL-SCNC: 29 MMOL/L (ref 21–32)
CREAT SERPL-MCNC: 4.96 MG/DL (ref 0.6–1.3)
ERYTHROCYTE [DISTWIDTH] IN BLOOD BY AUTOMATED COUNT: 15.1 % (ref 11.6–15.1)
FLUAV RNA RESP QL NAA+PROBE: NEGATIVE
FLUBV RNA RESP QL NAA+PROBE: NEGATIVE
GFR SERPL CREATININE-BSD FRML MDRD: 9 ML/MIN/1.73SQ M
GLUCOSE SERPL-MCNC: 106 MG/DL (ref 65–140)
GLUCOSE SERPL-MCNC: 133 MG/DL (ref 65–140)
GLUCOSE SERPL-MCNC: 162 MG/DL (ref 65–140)
GLUCOSE SERPL-MCNC: 171 MG/DL (ref 65–140)
GLUCOSE SERPL-MCNC: 307 MG/DL (ref 65–140)
HCT VFR BLD AUTO: 26.6 % (ref 34.8–46.1)
HGB BLD-MCNC: 8.4 G/DL (ref 11.5–15.4)
MCH RBC QN AUTO: 25.8 PG (ref 26.8–34.3)
MCHC RBC AUTO-ENTMCNC: 31.6 G/DL (ref 31.4–37.4)
MCV RBC AUTO: 82 FL (ref 82–98)
PLATELET # BLD AUTO: 195 THOUSANDS/UL (ref 149–390)
PMV BLD AUTO: 11.5 FL (ref 8.9–12.7)
POTASSIUM SERPL-SCNC: 4.9 MMOL/L (ref 3.5–5.3)
RBC # BLD AUTO: 3.25 MILLION/UL (ref 3.81–5.12)
RSV RNA RESP QL NAA+PROBE: NEGATIVE
SARS-COV-2 RNA RESP QL NAA+PROBE: NEGATIVE
SODIUM SERPL-SCNC: 130 MMOL/L (ref 136–145)
WBC # BLD AUTO: 8.12 THOUSAND/UL (ref 4.31–10.16)

## 2021-12-09 PROCEDURE — 86803 HEPATITIS C AB TEST: CPT | Performed by: INTERNAL MEDICINE

## 2021-12-09 PROCEDURE — 86706 HEP B SURFACE ANTIBODY: CPT | Performed by: INTERNAL MEDICINE

## 2021-12-09 PROCEDURE — 90935 HEMODIALYSIS ONE EVALUATION: CPT | Performed by: INTERNAL MEDICINE

## 2021-12-09 PROCEDURE — 99152 MOD SED SAME PHYS/QHP 5/>YRS: CPT

## 2021-12-09 PROCEDURE — 71046 X-RAY EXAM CHEST 2 VIEWS: CPT

## 2021-12-09 PROCEDURE — 80048 BASIC METABOLIC PNL TOTAL CA: CPT | Performed by: INTERNAL MEDICINE

## 2021-12-09 PROCEDURE — 02PA33Z REMOVAL OF INFUSION DEVICE FROM HEART, PERCUTANEOUS APPROACH: ICD-10-PCS | Performed by: RADIOLOGY

## 2021-12-09 PROCEDURE — 81240 F2 GENE: CPT | Performed by: INTERNAL MEDICINE

## 2021-12-09 PROCEDURE — 85306 CLOT INHIBIT PROT S FREE: CPT | Performed by: INTERNAL MEDICINE

## 2021-12-09 PROCEDURE — 86705 HEP B CORE ANTIBODY IGM: CPT | Performed by: INTERNAL MEDICINE

## 2021-12-09 PROCEDURE — C1769 GUIDE WIRE: HCPCS

## 2021-12-09 PROCEDURE — 5A1D70Z PERFORMANCE OF URINARY FILTRATION, INTERMITTENT, LESS THAN 6 HOURS PER DAY: ICD-10-PCS | Performed by: INTERNAL MEDICINE

## 2021-12-09 PROCEDURE — 82948 REAGENT STRIP/BLOOD GLUCOSE: CPT

## 2021-12-09 PROCEDURE — 85303 CLOT INHIBIT PROT C ACTIVITY: CPT | Performed by: INTERNAL MEDICINE

## 2021-12-09 PROCEDURE — 99232 SBSQ HOSP IP/OBS MODERATE 35: CPT | Performed by: INTERNAL MEDICINE

## 2021-12-09 PROCEDURE — 81241 F5 GENE: CPT | Performed by: INTERNAL MEDICINE

## 2021-12-09 PROCEDURE — 85027 COMPLETE CBC AUTOMATED: CPT | Performed by: INTERNAL MEDICINE

## 2021-12-09 PROCEDURE — 36558 INSERT TUNNELED CV CATH: CPT | Performed by: RADIOLOGY

## 2021-12-09 PROCEDURE — 86704 HEP B CORE ANTIBODY TOTAL: CPT | Performed by: INTERNAL MEDICINE

## 2021-12-09 PROCEDURE — 0241U HB NFCT DS VIR RESP RNA 4 TRGT: CPT | Performed by: INTERNAL MEDICINE

## 2021-12-09 PROCEDURE — 87340 HEPATITIS B SURFACE AG IA: CPT | Performed by: INTERNAL MEDICINE

## 2021-12-09 PROCEDURE — 0JH63XZ INSERTION OF TUNNELED VASCULAR ACCESS DEVICE INTO CHEST SUBCUTANEOUS TISSUE AND FASCIA, PERCUTANEOUS APPROACH: ICD-10-PCS | Performed by: RADIOLOGY

## 2021-12-09 PROCEDURE — 77001 FLUOROGUIDE FOR VEIN DEVICE: CPT | Performed by: RADIOLOGY

## 2021-12-09 PROCEDURE — 77001 FLUOROGUIDE FOR VEIN DEVICE: CPT

## 2021-12-09 PROCEDURE — C1750 CATH, HEMODIALYSIS,LONG-TERM: HCPCS

## 2021-12-09 PROCEDURE — 02H633Z INSERTION OF INFUSION DEVICE INTO RIGHT ATRIUM, PERCUTANEOUS APPROACH: ICD-10-PCS | Performed by: RADIOLOGY

## 2021-12-09 PROCEDURE — 99152 MOD SED SAME PHYS/QHP 5/>YRS: CPT | Performed by: RADIOLOGY

## 2021-12-09 RX ORDER — MIDAZOLAM HYDROCHLORIDE 2 MG/2ML
INJECTION, SOLUTION INTRAMUSCULAR; INTRAVENOUS CODE/TRAUMA/SEDATION MEDICATION
Status: DISCONTINUED | OUTPATIENT
Start: 2021-12-09 | End: 2021-12-14 | Stop reason: HOSPADM

## 2021-12-09 RX ORDER — FENTANYL CITRATE 50 UG/ML
INJECTION, SOLUTION INTRAMUSCULAR; INTRAVENOUS CODE/TRAUMA/SEDATION MEDICATION
Status: DISCONTINUED | OUTPATIENT
Start: 2021-12-09 | End: 2021-12-14 | Stop reason: HOSPADM

## 2021-12-09 RX ORDER — CEFAZOLIN SODIUM 1 G/3ML
INJECTION, POWDER, FOR SOLUTION INTRAMUSCULAR; INTRAVENOUS CODE/TRAUMA/SEDATION MEDICATION
Status: DISCONTINUED | OUTPATIENT
Start: 2021-12-09 | End: 2021-12-14 | Stop reason: HOSPADM

## 2021-12-09 RX ADMIN — FENTANYL CITRATE 50 MCG: 50 INJECTION INTRAMUSCULAR; INTRAVENOUS at 15:53

## 2021-12-09 RX ADMIN — HEPARIN SODIUM 5000 UNITS: 5000 INJECTION INTRAVENOUS; SUBCUTANEOUS at 21:07

## 2021-12-09 RX ADMIN — MIDAZOLAM 1 MG: 1 INJECTION INTRAMUSCULAR; INTRAVENOUS at 15:53

## 2021-12-09 RX ADMIN — CALCIUM ACETATE 667 MG: 667 CAPSULE ORAL at 16:36

## 2021-12-09 RX ADMIN — GABAPENTIN 300 MG: 300 CAPSULE ORAL at 21:10

## 2021-12-09 RX ADMIN — HEPARIN SODIUM 5000 UNITS: 5000 INJECTION INTRAVENOUS; SUBCUTANEOUS at 05:43

## 2021-12-09 RX ADMIN — TORSEMIDE 20 MG: 20 TABLET ORAL at 08:43

## 2021-12-09 RX ADMIN — CLONIDINE HYDROCHLORIDE 0.3 MG: 0.1 TABLET ORAL at 21:10

## 2021-12-09 RX ADMIN — HEPARIN SODIUM 5000 UNITS: 5000 INJECTION INTRAVENOUS; SUBCUTANEOUS at 15:18

## 2021-12-09 RX ADMIN — TERAZOSIN HYDROCHLORIDE 10 MG: 5 CAPSULE ORAL at 21:09

## 2021-12-09 RX ADMIN — NIFEDIPINE 60 MG: 30 TABLET, FILM COATED, EXTENDED RELEASE ORAL at 21:10

## 2021-12-09 RX ADMIN — HYDRALAZINE HYDROCHLORIDE 50 MG: 25 TABLET, FILM COATED ORAL at 12:37

## 2021-12-09 RX ADMIN — DOXERCALCIFEROL 2 MCG: 4 INJECTION, SOLUTION INTRAVENOUS at 11:38

## 2021-12-09 RX ADMIN — NIFEDIPINE 60 MG: 30 TABLET, FILM COATED, EXTENDED RELEASE ORAL at 12:38

## 2021-12-09 RX ADMIN — CLONIDINE HYDROCHLORIDE 0.3 MG: 0.1 TABLET ORAL at 05:44

## 2021-12-09 RX ADMIN — CEFAZOLIN SODIUM 1000 MG: 1 INJECTION, POWDER, FOR SOLUTION INTRAMUSCULAR; INTRAVENOUS at 15:43

## 2021-12-09 RX ADMIN — CLONIDINE HYDROCHLORIDE 0.3 MG: 0.1 TABLET ORAL at 16:36

## 2021-12-09 RX ADMIN — HYDRALAZINE HYDROCHLORIDE 50 MG: 25 TABLET, FILM COATED ORAL at 16:36

## 2021-12-09 RX ADMIN — CARVEDILOL 25 MG: 25 TABLET, FILM COATED ORAL at 16:36

## 2021-12-09 RX ADMIN — ATORVASTATIN CALCIUM 40 MG: 40 TABLET, FILM COATED ORAL at 17:11

## 2021-12-10 ENCOUNTER — APPOINTMENT (INPATIENT)
Dept: MRI IMAGING | Facility: HOSPITAL | Age: 66
DRG: 674 | End: 2021-12-10
Payer: MEDICARE

## 2021-12-10 ENCOUNTER — APPOINTMENT (INPATIENT)
Dept: CT IMAGING | Facility: HOSPITAL | Age: 66
DRG: 674 | End: 2021-12-10
Payer: MEDICARE

## 2021-12-10 PROBLEM — I63.81 LACUNAR INFARCTION (HCC): Status: ACTIVE | Noted: 2021-12-10

## 2021-12-10 LAB
ANION GAP SERPL CALCULATED.3IONS-SCNC: 7 MMOL/L (ref 4–13)
BUN SERPL-MCNC: 27 MG/DL (ref 5–25)
CALCIUM SERPL-MCNC: 7.9 MG/DL (ref 8.3–10.1)
CHLORIDE SERPL-SCNC: 101 MMOL/L (ref 100–108)
CK MB SERPL-MCNC: 20.3 NG/ML (ref 0–5)
CK MB SERPL-MCNC: <1 % (ref 0–2.5)
CK SERPL-CCNC: ABNORMAL U/L (ref 26–192)
CO2 SERPL-SCNC: 29 MMOL/L (ref 21–32)
CREAT SERPL-MCNC: 3.66 MG/DL (ref 0.6–1.3)
ERYTHROCYTE [DISTWIDTH] IN BLOOD BY AUTOMATED COUNT: 15.2 % (ref 11.6–15.1)
GFR SERPL CREATININE-BSD FRML MDRD: 12 ML/MIN/1.73SQ M
GLUCOSE SERPL-MCNC: 224 MG/DL (ref 65–140)
GLUCOSE SERPL-MCNC: 231 MG/DL (ref 65–140)
GLUCOSE SERPL-MCNC: 278 MG/DL (ref 65–140)
GLUCOSE SERPL-MCNC: 301 MG/DL (ref 65–140)
GLUCOSE SERPL-MCNC: 359 MG/DL (ref 65–140)
HBV CORE AB SER QL: REACTIVE
HBV CORE IGM SER QL: ABNORMAL
HBV SURFACE AB SER-ACNC: >1000 MIU/ML
HBV SURFACE AG SER QL: ABNORMAL
HCT VFR BLD AUTO: 28 % (ref 34.8–46.1)
HCV AB SER QL: ABNORMAL
HGB BLD-MCNC: 8.8 G/DL (ref 11.5–15.4)
LDH SERPL-CCNC: 2086 U/L (ref 81–234)
MCH RBC QN AUTO: 26.8 PG (ref 26.8–34.3)
MCHC RBC AUTO-ENTMCNC: 31.4 G/DL (ref 31.4–37.4)
MCV RBC AUTO: 85 FL (ref 82–98)
PLATELET # BLD AUTO: 180 THOUSANDS/UL (ref 149–390)
PLATELET # BLD AUTO: 190 THOUSANDS/UL (ref 149–390)
PMV BLD AUTO: 10.4 FL (ref 8.9–12.7)
PMV BLD AUTO: 11 FL (ref 8.9–12.7)
POTASSIUM SERPL-SCNC: 4.2 MMOL/L (ref 3.5–5.3)
RBC # BLD AUTO: 3.28 MILLION/UL (ref 3.81–5.12)
SODIUM SERPL-SCNC: 137 MMOL/L (ref 136–145)
WBC # BLD AUTO: 7.22 THOUSAND/UL (ref 4.31–10.16)

## 2021-12-10 PROCEDURE — 85049 AUTOMATED PLATELET COUNT: CPT | Performed by: INTERNAL MEDICINE

## 2021-12-10 PROCEDURE — 70551 MRI BRAIN STEM W/O DYE: CPT

## 2021-12-10 PROCEDURE — G1004 CDSM NDSC: HCPCS

## 2021-12-10 PROCEDURE — 83615 LACTATE (LD) (LDH) ENZYME: CPT | Performed by: INTERNAL MEDICINE

## 2021-12-10 PROCEDURE — 99232 SBSQ HOSP IP/OBS MODERATE 35: CPT | Performed by: INTERNAL MEDICINE

## 2021-12-10 PROCEDURE — 97535 SELF CARE MNGMENT TRAINING: CPT

## 2021-12-10 PROCEDURE — 80048 BASIC METABOLIC PNL TOTAL CA: CPT | Performed by: INTERNAL MEDICINE

## 2021-12-10 PROCEDURE — 85027 COMPLETE CBC AUTOMATED: CPT | Performed by: INTERNAL MEDICINE

## 2021-12-10 PROCEDURE — 70450 CT HEAD/BRAIN W/O DYE: CPT

## 2021-12-10 PROCEDURE — 97530 THERAPEUTIC ACTIVITIES: CPT

## 2021-12-10 PROCEDURE — 82553 CREATINE MB FRACTION: CPT | Performed by: INTERNAL MEDICINE

## 2021-12-10 PROCEDURE — 82948 REAGENT STRIP/BLOOD GLUCOSE: CPT

## 2021-12-10 PROCEDURE — 82550 ASSAY OF CK (CPK): CPT | Performed by: INTERNAL MEDICINE

## 2021-12-10 RX ORDER — ASPIRIN 81 MG/1
81 TABLET, CHEWABLE ORAL DAILY
Status: DISCONTINUED | OUTPATIENT
Start: 2021-12-11 | End: 2021-12-14 | Stop reason: HOSPADM

## 2021-12-10 RX ORDER — CLONIDINE HYDROCHLORIDE 0.2 MG/1
0.2 TABLET ORAL EVERY 8 HOURS SCHEDULED
Status: DISCONTINUED | OUTPATIENT
Start: 2021-12-10 | End: 2021-12-14 | Stop reason: HOSPADM

## 2021-12-10 RX ORDER — ASPIRIN 81 MG/1
81 TABLET ORAL DAILY
Status: DISCONTINUED | OUTPATIENT
Start: 2021-12-11 | End: 2021-12-10

## 2021-12-10 RX ORDER — HEPARIN SODIUM 5000 [USP'U]/ML
5000 INJECTION, SOLUTION INTRAVENOUS; SUBCUTANEOUS EVERY 8 HOURS SCHEDULED
Status: DISCONTINUED | OUTPATIENT
Start: 2021-12-10 | End: 2021-12-14 | Stop reason: HOSPADM

## 2021-12-10 RX ORDER — ATORVASTATIN CALCIUM 40 MG/1
40 TABLET, FILM COATED ORAL EVERY EVENING
Status: DISCONTINUED | OUTPATIENT
Start: 2021-12-10 | End: 2021-12-10

## 2021-12-10 RX ADMIN — GABAPENTIN 300 MG: 300 CAPSULE ORAL at 22:54

## 2021-12-10 RX ADMIN — HEPARIN SODIUM 5000 UNITS: 5000 INJECTION INTRAVENOUS; SUBCUTANEOUS at 17:31

## 2021-12-10 RX ADMIN — HYDRALAZINE HYDROCHLORIDE 50 MG: 25 TABLET, FILM COATED ORAL at 00:00

## 2021-12-10 RX ADMIN — TORSEMIDE 20 MG: 20 TABLET ORAL at 08:37

## 2021-12-10 RX ADMIN — CALCIUM ACETATE 667 MG: 667 CAPSULE ORAL at 08:36

## 2021-12-10 RX ADMIN — HYDRALAZINE HYDROCHLORIDE 50 MG: 25 TABLET, FILM COATED ORAL at 23:36

## 2021-12-10 RX ADMIN — CLONIDINE HYDROCHLORIDE 0.3 MG: 0.1 TABLET ORAL at 05:04

## 2021-12-10 RX ADMIN — HEPARIN SODIUM 5000 UNITS: 5000 INJECTION INTRAVENOUS; SUBCUTANEOUS at 22:58

## 2021-12-10 RX ADMIN — INSULIN LISPRO 3 UNITS: 100 INJECTION, SOLUTION INTRAVENOUS; SUBCUTANEOUS at 12:33

## 2021-12-10 RX ADMIN — INSULIN LISPRO 2 UNITS: 100 INJECTION, SOLUTION INTRAVENOUS; SUBCUTANEOUS at 17:34

## 2021-12-10 RX ADMIN — NIFEDIPINE 60 MG: 30 TABLET, FILM COATED, EXTENDED RELEASE ORAL at 08:37

## 2021-12-10 RX ADMIN — CLONIDINE HYDROCHLORIDE 0.2 MG: 0.2 TABLET ORAL at 22:54

## 2021-12-10 RX ADMIN — CARVEDILOL 25 MG: 25 TABLET, FILM COATED ORAL at 08:37

## 2021-12-10 RX ADMIN — HEPARIN SODIUM 5000 UNITS: 5000 INJECTION INTRAVENOUS; SUBCUTANEOUS at 05:04

## 2021-12-10 RX ADMIN — NIFEDIPINE 60 MG: 30 TABLET, FILM COATED, EXTENDED RELEASE ORAL at 22:58

## 2021-12-10 RX ADMIN — TERAZOSIN HYDROCHLORIDE 10 MG: 5 CAPSULE ORAL at 22:54

## 2021-12-10 RX ADMIN — CALCIUM ACETATE 667 MG: 667 CAPSULE ORAL at 17:33

## 2021-12-10 RX ADMIN — HYDRALAZINE HYDROCHLORIDE 50 MG: 25 TABLET, FILM COATED ORAL at 08:37

## 2021-12-10 RX ADMIN — INSULIN GLARGINE 10 UNITS: 100 INJECTION, SOLUTION SUBCUTANEOUS at 08:38

## 2021-12-10 RX ADMIN — CALCIUM ACETATE 667 MG: 667 CAPSULE ORAL at 12:33

## 2021-12-11 ENCOUNTER — APPOINTMENT (INPATIENT)
Dept: DIALYSIS | Facility: HOSPITAL | Age: 66
DRG: 674 | End: 2021-12-11
Attending: INTERNAL MEDICINE
Payer: MEDICARE

## 2021-12-11 PROBLEM — Z86.73: Status: ACTIVE | Noted: 2021-12-10

## 2021-12-11 LAB
ANION GAP SERPL CALCULATED.3IONS-SCNC: 10 MMOL/L (ref 4–13)
BUN SERPL-MCNC: 45 MG/DL (ref 5–25)
CALCIUM SERPL-MCNC: 7.8 MG/DL (ref 8.3–10.1)
CHLORIDE SERPL-SCNC: 97 MMOL/L (ref 100–108)
CO2 SERPL-SCNC: 28 MMOL/L (ref 21–32)
CREAT SERPL-MCNC: 4.96 MG/DL (ref 0.6–1.3)
ERYTHROCYTE [DISTWIDTH] IN BLOOD BY AUTOMATED COUNT: 15.1 % (ref 11.6–15.1)
EST. AVERAGE GLUCOSE BLD GHB EST-MCNC: 180 MG/DL
GFR SERPL CREATININE-BSD FRML MDRD: 9 ML/MIN/1.73SQ M
GLUCOSE SERPL-MCNC: 191 MG/DL (ref 65–140)
GLUCOSE SERPL-MCNC: 198 MG/DL (ref 65–140)
GLUCOSE SERPL-MCNC: 214 MG/DL (ref 65–140)
GLUCOSE SERPL-MCNC: 266 MG/DL (ref 65–140)
GLUCOSE SERPL-MCNC: 384 MG/DL (ref 65–140)
HBA1C MFR BLD: 7.9 %
HCT VFR BLD AUTO: 29.4 % (ref 34.8–46.1)
HGB BLD-MCNC: 9.4 G/DL (ref 11.5–15.4)
MCH RBC QN AUTO: 27.4 PG (ref 26.8–34.3)
MCHC RBC AUTO-ENTMCNC: 32 G/DL (ref 31.4–37.4)
MCV RBC AUTO: 86 FL (ref 82–98)
PLATELET # BLD AUTO: 203 THOUSANDS/UL (ref 149–390)
PMV BLD AUTO: 10.7 FL (ref 8.9–12.7)
POTASSIUM SERPL-SCNC: 4.4 MMOL/L (ref 3.5–5.3)
RBC # BLD AUTO: 3.43 MILLION/UL (ref 3.81–5.12)
SODIUM SERPL-SCNC: 135 MMOL/L (ref 136–145)
WBC # BLD AUTO: 7.4 THOUSAND/UL (ref 4.31–10.16)

## 2021-12-11 PROCEDURE — 99232 SBSQ HOSP IP/OBS MODERATE 35: CPT | Performed by: INTERNAL MEDICINE

## 2021-12-11 PROCEDURE — 80048 BASIC METABOLIC PNL TOTAL CA: CPT | Performed by: INTERNAL MEDICINE

## 2021-12-11 PROCEDURE — 83036 HEMOGLOBIN GLYCOSYLATED A1C: CPT | Performed by: INTERNAL MEDICINE

## 2021-12-11 PROCEDURE — 99223 1ST HOSP IP/OBS HIGH 75: CPT | Performed by: NURSE PRACTITIONER

## 2021-12-11 PROCEDURE — 85027 COMPLETE CBC AUTOMATED: CPT | Performed by: INTERNAL MEDICINE

## 2021-12-11 PROCEDURE — 82948 REAGENT STRIP/BLOOD GLUCOSE: CPT

## 2021-12-11 RX ORDER — ATORVASTATIN CALCIUM 40 MG/1
40 TABLET, FILM COATED ORAL
Status: DISCONTINUED | OUTPATIENT
Start: 2021-12-11 | End: 2021-12-11

## 2021-12-11 RX ADMIN — CARVEDILOL 25 MG: 25 TABLET, FILM COATED ORAL at 17:44

## 2021-12-11 RX ADMIN — CALCIUM ACETATE 667 MG: 667 CAPSULE ORAL at 09:54

## 2021-12-11 RX ADMIN — INSULIN LISPRO 4 UNITS: 100 INJECTION, SOLUTION INTRAVENOUS; SUBCUTANEOUS at 12:20

## 2021-12-11 RX ADMIN — NIFEDIPINE 60 MG: 30 TABLET, FILM COATED, EXTENDED RELEASE ORAL at 21:34

## 2021-12-11 RX ADMIN — HEPARIN SODIUM 5000 UNITS: 5000 INJECTION INTRAVENOUS; SUBCUTANEOUS at 21:34

## 2021-12-11 RX ADMIN — CARVEDILOL 25 MG: 25 TABLET, FILM COATED ORAL at 09:54

## 2021-12-11 RX ADMIN — TORSEMIDE 20 MG: 20 TABLET ORAL at 09:51

## 2021-12-11 RX ADMIN — TERAZOSIN HYDROCHLORIDE 10 MG: 5 CAPSULE ORAL at 21:34

## 2021-12-11 RX ADMIN — CLONIDINE HYDROCHLORIDE 0.2 MG: 0.2 TABLET ORAL at 21:34

## 2021-12-11 RX ADMIN — NIFEDIPINE 60 MG: 30 TABLET, FILM COATED, EXTENDED RELEASE ORAL at 09:50

## 2021-12-11 RX ADMIN — GABAPENTIN 300 MG: 300 CAPSULE ORAL at 21:34

## 2021-12-11 RX ADMIN — CLONIDINE HYDROCHLORIDE 0.2 MG: 0.2 TABLET ORAL at 06:15

## 2021-12-11 RX ADMIN — DOXERCALCIFEROL 2 MCG: 4 INJECTION, SOLUTION INTRAVENOUS at 15:40

## 2021-12-11 RX ADMIN — CALCIUM ACETATE 667 MG: 667 CAPSULE ORAL at 12:21

## 2021-12-11 RX ADMIN — HEPARIN SODIUM 5000 UNITS: 5000 INJECTION INTRAVENOUS; SUBCUTANEOUS at 06:15

## 2021-12-11 RX ADMIN — INSULIN GLARGINE 10 UNITS: 100 INJECTION, SOLUTION SUBCUTANEOUS at 09:52

## 2021-12-11 RX ADMIN — HYDRALAZINE HYDROCHLORIDE 50 MG: 25 TABLET, FILM COATED ORAL at 17:47

## 2021-12-11 RX ADMIN — CALCIUM ACETATE 667 MG: 667 CAPSULE ORAL at 17:44

## 2021-12-11 RX ADMIN — INSULIN LISPRO 1 UNITS: 100 INJECTION, SOLUTION INTRAVENOUS; SUBCUTANEOUS at 09:52

## 2021-12-11 RX ADMIN — HYDRALAZINE HYDROCHLORIDE 50 MG: 25 TABLET, FILM COATED ORAL at 09:54

## 2021-12-11 RX ADMIN — ASPIRIN 81 MG CHEWABLE TABLET 81 MG: 81 TABLET CHEWABLE at 09:50

## 2021-12-11 RX ADMIN — INSULIN LISPRO 1 UNITS: 100 INJECTION, SOLUTION INTRAVENOUS; SUBCUTANEOUS at 17:46

## 2021-12-12 LAB
CHOLEST SERPL-MCNC: 107 MG/DL
GLUCOSE SERPL-MCNC: 202 MG/DL (ref 65–140)
GLUCOSE SERPL-MCNC: 213 MG/DL (ref 65–140)
GLUCOSE SERPL-MCNC: 219 MG/DL (ref 65–140)
HDLC SERPL-MCNC: 41 MG/DL
LDLC SERPL CALC-MCNC: 39 MG/DL (ref 0–100)
PHOSPHATE SERPL-MCNC: 3 MG/DL (ref 2.3–4.1)
TRIGL SERPL-MCNC: 133 MG/DL

## 2021-12-12 PROCEDURE — 82948 REAGENT STRIP/BLOOD GLUCOSE: CPT

## 2021-12-12 PROCEDURE — 84100 ASSAY OF PHOSPHORUS: CPT | Performed by: NURSE PRACTITIONER

## 2021-12-12 PROCEDURE — 97530 THERAPEUTIC ACTIVITIES: CPT

## 2021-12-12 PROCEDURE — 99232 SBSQ HOSP IP/OBS MODERATE 35: CPT | Performed by: INTERNAL MEDICINE

## 2021-12-12 PROCEDURE — 97164 PT RE-EVAL EST PLAN CARE: CPT

## 2021-12-12 PROCEDURE — 80061 LIPID PANEL: CPT | Performed by: INTERNAL MEDICINE

## 2021-12-12 RX ADMIN — CALCIUM ACETATE 667 MG: 667 CAPSULE ORAL at 08:06

## 2021-12-12 RX ADMIN — CARVEDILOL 25 MG: 25 TABLET, FILM COATED ORAL at 08:06

## 2021-12-12 RX ADMIN — HYDRALAZINE HYDROCHLORIDE 50 MG: 25 TABLET, FILM COATED ORAL at 23:34

## 2021-12-12 RX ADMIN — TERAZOSIN HYDROCHLORIDE 10 MG: 5 CAPSULE ORAL at 21:00

## 2021-12-12 RX ADMIN — HYDRALAZINE HYDROCHLORIDE 50 MG: 25 TABLET, FILM COATED ORAL at 00:09

## 2021-12-12 RX ADMIN — CALCIUM ACETATE 667 MG: 667 CAPSULE ORAL at 17:33

## 2021-12-12 RX ADMIN — NIFEDIPINE 60 MG: 30 TABLET, FILM COATED, EXTENDED RELEASE ORAL at 08:06

## 2021-12-12 RX ADMIN — HYDRALAZINE HYDROCHLORIDE 50 MG: 25 TABLET, FILM COATED ORAL at 08:06

## 2021-12-12 RX ADMIN — HYDRALAZINE HYDROCHLORIDE 50 MG: 25 TABLET, FILM COATED ORAL at 17:33

## 2021-12-12 RX ADMIN — INSULIN GLARGINE 10 UNITS: 100 INJECTION, SOLUTION SUBCUTANEOUS at 08:07

## 2021-12-12 RX ADMIN — TORSEMIDE 20 MG: 20 TABLET ORAL at 08:06

## 2021-12-12 RX ADMIN — CLONIDINE HYDROCHLORIDE 0.2 MG: 0.2 TABLET ORAL at 06:24

## 2021-12-12 RX ADMIN — HEPARIN SODIUM 5000 UNITS: 5000 INJECTION INTRAVENOUS; SUBCUTANEOUS at 21:00

## 2021-12-12 RX ADMIN — HEPARIN SODIUM 5000 UNITS: 5000 INJECTION INTRAVENOUS; SUBCUTANEOUS at 12:53

## 2021-12-12 RX ADMIN — CARVEDILOL 25 MG: 25 TABLET, FILM COATED ORAL at 17:33

## 2021-12-12 RX ADMIN — INSULIN LISPRO 1 UNITS: 100 INJECTION, SOLUTION INTRAVENOUS; SUBCUTANEOUS at 08:09

## 2021-12-12 RX ADMIN — CALCIUM ACETATE 667 MG: 667 CAPSULE ORAL at 12:54

## 2021-12-12 RX ADMIN — CLONIDINE HYDROCHLORIDE 0.2 MG: 0.2 TABLET ORAL at 12:56

## 2021-12-12 RX ADMIN — INSULIN LISPRO 1 UNITS: 100 INJECTION, SOLUTION INTRAVENOUS; SUBCUTANEOUS at 12:51

## 2021-12-12 RX ADMIN — INSULIN LISPRO 1 UNITS: 100 INJECTION, SOLUTION INTRAVENOUS; SUBCUTANEOUS at 17:34

## 2021-12-12 RX ADMIN — CLONIDINE HYDROCHLORIDE 0.2 MG: 0.2 TABLET ORAL at 21:00

## 2021-12-12 RX ADMIN — GABAPENTIN 300 MG: 300 CAPSULE ORAL at 21:00

## 2021-12-12 RX ADMIN — ASPIRIN 81 MG CHEWABLE TABLET 81 MG: 81 TABLET CHEWABLE at 08:06

## 2021-12-12 RX ADMIN — NIFEDIPINE 60 MG: 30 TABLET, FILM COATED, EXTENDED RELEASE ORAL at 20:58

## 2021-12-12 RX ADMIN — HEPARIN SODIUM 5000 UNITS: 5000 INJECTION INTRAVENOUS; SUBCUTANEOUS at 06:24

## 2021-12-13 LAB
ALBUMIN SERPL BCP-MCNC: 2.2 G/DL (ref 3.5–5)
ANION GAP SERPL CALCULATED.3IONS-SCNC: 9 MMOL/L (ref 4–13)
BUN SERPL-MCNC: 45 MG/DL (ref 5–25)
CALCIUM ALBUM COR SERPL-MCNC: 9.2 MG/DL (ref 8.3–10.1)
CALCIUM SERPL-MCNC: 7.8 MG/DL (ref 8.3–10.1)
CHLORIDE SERPL-SCNC: 100 MMOL/L (ref 100–108)
CK MB SERPL-MCNC: 3.9 NG/ML (ref 0–5)
CK MB SERPL-MCNC: <1 % (ref 0–2.5)
CK SERPL-CCNC: 4703 U/L (ref 26–192)
CO2 SERPL-SCNC: 27 MMOL/L (ref 21–32)
CREAT SERPL-MCNC: 5.22 MG/DL (ref 0.6–1.3)
GFR SERPL CREATININE-BSD FRML MDRD: 8 ML/MIN/1.73SQ M
GLUCOSE SERPL-MCNC: 200 MG/DL (ref 65–140)
GLUCOSE SERPL-MCNC: 206 MG/DL (ref 65–140)
GLUCOSE SERPL-MCNC: 212 MG/DL (ref 65–140)
GLUCOSE SERPL-MCNC: 240 MG/DL (ref 65–140)
GLUCOSE SERPL-MCNC: 351 MG/DL (ref 65–140)
LDH SERPL-CCNC: 1304 U/L (ref 81–234)
PHOSPHATE SERPL-MCNC: 3.5 MG/DL (ref 2.3–4.1)
POTASSIUM SERPL-SCNC: 4.8 MMOL/L (ref 3.5–5.3)
PROT C AG ACT/NOR PPP IA: 77 % OF NORMAL (ref 60–150)
PROT S ACT/NOR PPP: 99 % (ref 68–108)
SODIUM SERPL-SCNC: 136 MMOL/L (ref 136–145)

## 2021-12-13 PROCEDURE — 99232 SBSQ HOSP IP/OBS MODERATE 35: CPT | Performed by: INTERNAL MEDICINE

## 2021-12-13 PROCEDURE — 83615 LACTATE (LD) (LDH) ENZYME: CPT | Performed by: INTERNAL MEDICINE

## 2021-12-13 PROCEDURE — 80069 RENAL FUNCTION PANEL: CPT | Performed by: INTERNAL MEDICINE

## 2021-12-13 PROCEDURE — 82948 REAGENT STRIP/BLOOD GLUCOSE: CPT

## 2021-12-13 PROCEDURE — 82550 ASSAY OF CK (CPK): CPT | Performed by: INTERNAL MEDICINE

## 2021-12-13 PROCEDURE — 82553 CREATINE MB FRACTION: CPT | Performed by: INTERNAL MEDICINE

## 2021-12-13 RX ADMIN — NIFEDIPINE 60 MG: 30 TABLET, FILM COATED, EXTENDED RELEASE ORAL at 21:21

## 2021-12-13 RX ADMIN — TORSEMIDE 20 MG: 20 TABLET ORAL at 08:10

## 2021-12-13 RX ADMIN — HYDRALAZINE HYDROCHLORIDE 50 MG: 25 TABLET, FILM COATED ORAL at 08:10

## 2021-12-13 RX ADMIN — CALCIUM ACETATE 667 MG: 667 CAPSULE ORAL at 14:10

## 2021-12-13 RX ADMIN — HEPARIN SODIUM 5000 UNITS: 5000 INJECTION INTRAVENOUS; SUBCUTANEOUS at 05:41

## 2021-12-13 RX ADMIN — NIFEDIPINE 60 MG: 30 TABLET, FILM COATED, EXTENDED RELEASE ORAL at 08:10

## 2021-12-13 RX ADMIN — INSULIN GLARGINE 10 UNITS: 100 INJECTION, SOLUTION SUBCUTANEOUS at 08:10

## 2021-12-13 RX ADMIN — CLONIDINE HYDROCHLORIDE 0.2 MG: 0.2 TABLET ORAL at 21:21

## 2021-12-13 RX ADMIN — HEPARIN SODIUM 5000 UNITS: 5000 INJECTION INTRAVENOUS; SUBCUTANEOUS at 14:11

## 2021-12-13 RX ADMIN — CARVEDILOL 25 MG: 25 TABLET, FILM COATED ORAL at 08:10

## 2021-12-13 RX ADMIN — ASPIRIN 81 MG CHEWABLE TABLET 81 MG: 81 TABLET CHEWABLE at 08:10

## 2021-12-13 RX ADMIN — CALCIUM ACETATE 667 MG: 667 CAPSULE ORAL at 17:06

## 2021-12-13 RX ADMIN — CLONIDINE HYDROCHLORIDE 0.2 MG: 0.2 TABLET ORAL at 14:10

## 2021-12-13 RX ADMIN — INSULIN LISPRO 2 UNITS: 100 INJECTION, SOLUTION INTRAVENOUS; SUBCUTANEOUS at 17:04

## 2021-12-13 RX ADMIN — INSULIN LISPRO 3 UNITS: 100 INJECTION, SOLUTION INTRAVENOUS; SUBCUTANEOUS at 14:12

## 2021-12-13 RX ADMIN — CLONIDINE HYDROCHLORIDE 0.2 MG: 0.2 TABLET ORAL at 05:41

## 2021-12-13 RX ADMIN — TERAZOSIN HYDROCHLORIDE 10 MG: 5 CAPSULE ORAL at 21:21

## 2021-12-13 RX ADMIN — GABAPENTIN 300 MG: 300 CAPSULE ORAL at 21:21

## 2021-12-13 RX ADMIN — HYDRALAZINE HYDROCHLORIDE 50 MG: 25 TABLET, FILM COATED ORAL at 14:12

## 2021-12-13 RX ADMIN — CALCIUM ACETATE 667 MG: 667 CAPSULE ORAL at 08:10

## 2021-12-13 RX ADMIN — CARVEDILOL 25 MG: 25 TABLET, FILM COATED ORAL at 17:06

## 2021-12-13 RX ADMIN — HEPARIN SODIUM 5000 UNITS: 5000 INJECTION INTRAVENOUS; SUBCUTANEOUS at 21:22

## 2021-12-13 RX ADMIN — INSULIN LISPRO 1 UNITS: 100 INJECTION, SOLUTION INTRAVENOUS; SUBCUTANEOUS at 08:09

## 2021-12-14 ENCOUNTER — APPOINTMENT (INPATIENT)
Dept: DIALYSIS | Facility: HOSPITAL | Age: 66
DRG: 674 | End: 2021-12-14
Attending: INTERNAL MEDICINE
Payer: MEDICARE

## 2021-12-14 VITALS
OXYGEN SATURATION: 96 % | HEIGHT: 62 IN | SYSTOLIC BLOOD PRESSURE: 127 MMHG | RESPIRATION RATE: 20 BRPM | WEIGHT: 177.91 LBS | DIASTOLIC BLOOD PRESSURE: 60 MMHG | BODY MASS INDEX: 32.74 KG/M2 | HEART RATE: 60 BPM | TEMPERATURE: 98 F

## 2021-12-14 PROBLEM — E87.5 HYPERKALEMIA: Status: RESOLVED | Noted: 2021-12-08 | Resolved: 2021-12-14

## 2021-12-14 PROBLEM — N18.5 CKD (CHRONIC KIDNEY DISEASE) STAGE 5, GFR LESS THAN 15 ML/MIN (HCC): Status: RESOLVED | Noted: 2021-12-05 | Resolved: 2021-12-14

## 2021-12-14 LAB
ANION GAP SERPL CALCULATED.3IONS-SCNC: 9 MMOL/L (ref 4–13)
BUN SERPL-MCNC: 60 MG/DL (ref 5–25)
CALCIUM SERPL-MCNC: 7.7 MG/DL (ref 8.3–10.1)
CHLORIDE SERPL-SCNC: 97 MMOL/L (ref 100–108)
CO2 SERPL-SCNC: 29 MMOL/L (ref 21–32)
CREAT SERPL-MCNC: 6.42 MG/DL (ref 0.6–1.3)
ERYTHROCYTE [DISTWIDTH] IN BLOOD BY AUTOMATED COUNT: 15.8 % (ref 11.6–15.1)
F5 GENE MUT ANL BLD/T: NORMAL
FLUAV RNA RESP QL NAA+PROBE: NEGATIVE
FLUBV RNA RESP QL NAA+PROBE: NEGATIVE
GFR SERPL CREATININE-BSD FRML MDRD: 6 ML/MIN/1.73SQ M
GLUCOSE SERPL-MCNC: 151 MG/DL (ref 65–140)
GLUCOSE SERPL-MCNC: 190 MG/DL (ref 65–140)
GLUCOSE SERPL-MCNC: 269 MG/DL (ref 65–140)
GLUCOSE SERPL-MCNC: 356 MG/DL (ref 65–140)
HCT VFR BLD AUTO: 25.3 % (ref 34.8–46.1)
HGB BLD-MCNC: 7.8 G/DL (ref 11.5–15.4)
MCH RBC QN AUTO: 26.8 PG (ref 26.8–34.3)
MCHC RBC AUTO-ENTMCNC: 30.8 G/DL (ref 31.4–37.4)
MCV RBC AUTO: 87 FL (ref 82–98)
PLATELET # BLD AUTO: 192 THOUSANDS/UL (ref 149–390)
PMV BLD AUTO: 10.5 FL (ref 8.9–12.7)
POTASSIUM SERPL-SCNC: 4.7 MMOL/L (ref 3.5–5.3)
RBC # BLD AUTO: 2.91 MILLION/UL (ref 3.81–5.12)
RSV RNA RESP QL NAA+PROBE: NEGATIVE
SARS-COV-2 RNA RESP QL NAA+PROBE: NEGATIVE
SODIUM SERPL-SCNC: 135 MMOL/L (ref 136–145)
WBC # BLD AUTO: 6.54 THOUSAND/UL (ref 4.31–10.16)

## 2021-12-14 PROCEDURE — 0241U HB NFCT DS VIR RESP RNA 4 TRGT: CPT | Performed by: INTERNAL MEDICINE

## 2021-12-14 PROCEDURE — 85027 COMPLETE CBC AUTOMATED: CPT | Performed by: NURSE PRACTITIONER

## 2021-12-14 PROCEDURE — 82948 REAGENT STRIP/BLOOD GLUCOSE: CPT

## 2021-12-14 PROCEDURE — 99232 SBSQ HOSP IP/OBS MODERATE 35: CPT | Performed by: INTERNAL MEDICINE

## 2021-12-14 PROCEDURE — 99239 HOSP IP/OBS DSCHRG MGMT >30: CPT | Performed by: INTERNAL MEDICINE

## 2021-12-14 PROCEDURE — 80048 BASIC METABOLIC PNL TOTAL CA: CPT | Performed by: NURSE PRACTITIONER

## 2021-12-14 RX ORDER — CALCIUM ACETATE 667 MG/1
667 CAPSULE ORAL
Qty: 90 CAPSULE | Refills: 0 | Status: SHIPPED | OUTPATIENT
Start: 2021-12-14

## 2021-12-14 RX ORDER — GABAPENTIN 300 MG/1
300 CAPSULE ORAL
Qty: 30 CAPSULE | Refills: 0 | Status: SHIPPED | OUTPATIENT
Start: 2021-12-14

## 2021-12-14 RX ORDER — ASPIRIN 81 MG/1
81 TABLET, CHEWABLE ORAL DAILY
Qty: 30 TABLET | Refills: 0 | Status: SHIPPED | OUTPATIENT
Start: 2021-12-15

## 2021-12-14 RX ORDER — CLONIDINE HYDROCHLORIDE 0.2 MG/1
0.2 TABLET ORAL EVERY 8 HOURS SCHEDULED
Qty: 90 TABLET | Refills: 0 | Status: SHIPPED | OUTPATIENT
Start: 2021-12-14 | End: 2022-04-27 | Stop reason: ALTCHOICE

## 2021-12-14 RX ORDER — HYDRALAZINE HYDROCHLORIDE 50 MG/1
50 TABLET, FILM COATED ORAL EVERY 8 HOURS SCHEDULED
Qty: 90 TABLET | Refills: 0 | Status: SHIPPED | OUTPATIENT
Start: 2021-12-14

## 2021-12-14 RX ADMIN — NIFEDIPINE 60 MG: 30 TABLET, FILM COATED, EXTENDED RELEASE ORAL at 08:26

## 2021-12-14 RX ADMIN — ASPIRIN 81 MG CHEWABLE TABLET 81 MG: 81 TABLET CHEWABLE at 08:26

## 2021-12-14 RX ADMIN — CLONIDINE HYDROCHLORIDE 0.2 MG: 0.2 TABLET ORAL at 05:34

## 2021-12-14 RX ADMIN — CALCIUM ACETATE 667 MG: 667 CAPSULE ORAL at 18:05

## 2021-12-14 RX ADMIN — INSULIN LISPRO 1 UNITS: 100 INJECTION, SOLUTION INTRAVENOUS; SUBCUTANEOUS at 08:26

## 2021-12-14 RX ADMIN — CARVEDILOL 25 MG: 25 TABLET, FILM COATED ORAL at 08:26

## 2021-12-14 RX ADMIN — DOXERCALCIFEROL 2 MCG: 4 INJECTION, SOLUTION INTRAVENOUS at 15:47

## 2021-12-14 RX ADMIN — TORSEMIDE 20 MG: 20 TABLET ORAL at 08:26

## 2021-12-14 RX ADMIN — CALCIUM ACETATE 667 MG: 667 CAPSULE ORAL at 08:26

## 2021-12-14 RX ADMIN — INSULIN LISPRO 1 UNITS: 100 INJECTION, SOLUTION INTRAVENOUS; SUBCUTANEOUS at 18:05

## 2021-12-14 RX ADMIN — HEPARIN SODIUM 5000 UNITS: 5000 INJECTION INTRAVENOUS; SUBCUTANEOUS at 05:34

## 2021-12-14 RX ADMIN — CALCIUM ACETATE 667 MG: 667 CAPSULE ORAL at 12:19

## 2021-12-14 RX ADMIN — HYDRALAZINE HYDROCHLORIDE 50 MG: 25 TABLET, FILM COATED ORAL at 00:09

## 2021-12-14 RX ADMIN — HYDRALAZINE HYDROCHLORIDE 50 MG: 25 TABLET, FILM COATED ORAL at 08:26

## 2021-12-14 RX ADMIN — CLONIDINE HYDROCHLORIDE 0.2 MG: 0.2 TABLET ORAL at 13:11

## 2021-12-14 RX ADMIN — INSULIN LISPRO 3 UNITS: 100 INJECTION, SOLUTION INTRAVENOUS; SUBCUTANEOUS at 11:17

## 2021-12-14 RX ADMIN — INSULIN GLARGINE 10 UNITS: 100 INJECTION, SOLUTION SUBCUTANEOUS at 08:27

## 2021-12-14 RX ADMIN — HEPARIN SODIUM 5000 UNITS: 5000 INJECTION INTRAVENOUS; SUBCUTANEOUS at 13:10

## 2021-12-15 LAB — F2 GENE MUT ANL BLD/T: NORMAL

## 2021-12-16 ENCOUNTER — APPOINTMENT (EMERGENCY)
Dept: RADIOLOGY | Facility: HOSPITAL | Age: 66
DRG: 637 | End: 2021-12-16
Payer: MEDICARE

## 2021-12-16 ENCOUNTER — HOSPITAL ENCOUNTER (INPATIENT)
Facility: HOSPITAL | Age: 66
LOS: 1 days | Discharge: HOME HEALTH | DRG: 637 | End: 2021-12-17
Attending: EMERGENCY MEDICINE | Admitting: INTERNAL MEDICINE
Payer: MEDICARE

## 2021-12-16 ENCOUNTER — APPOINTMENT (EMERGENCY)
Dept: CT IMAGING | Facility: HOSPITAL | Age: 66
DRG: 637 | End: 2021-12-16
Payer: MEDICARE

## 2021-12-16 DIAGNOSIS — E16.2 HYPOGLYCEMIA: ICD-10-CM

## 2021-12-16 DIAGNOSIS — T68.XXXA HYPOTHERMIA, INITIAL ENCOUNTER: ICD-10-CM

## 2021-12-16 DIAGNOSIS — N18.6 ESRD (END STAGE RENAL DISEASE) (HCC): ICD-10-CM

## 2021-12-16 DIAGNOSIS — J81.1 PULMONARY EDEMA: ICD-10-CM

## 2021-12-16 DIAGNOSIS — R41.82 ALTERED MENTAL STATUS: Primary | ICD-10-CM

## 2021-12-16 PROBLEM — G93.41 ACUTE METABOLIC ENCEPHALOPATHY DUE TO HYPOGLYCEMIA: Status: ACTIVE | Noted: 2021-12-16

## 2021-12-16 PROBLEM — R93.5 ABNORMAL FINDINGS ON DIAGNOSTIC IMAGING OF ABDOMEN: Status: ACTIVE | Noted: 2021-09-07

## 2021-12-16 LAB
2HR DELTA HS TROPONIN: -8 NG/L
4HR DELTA HS TROPONIN: -6 NG/L
ALBUMIN SERPL BCP-MCNC: 2.6 G/DL (ref 3.5–5)
ALP SERPL-CCNC: 74 U/L (ref 46–116)
ALT SERPL W P-5'-P-CCNC: 122 U/L (ref 12–78)
ANION GAP SERPL CALCULATED.3IONS-SCNC: 10 MMOL/L (ref 4–13)
APTT PPP: 31 SECONDS (ref 23–37)
AST SERPL W P-5'-P-CCNC: 106 U/L (ref 5–45)
ATRIAL RATE: 59 BPM
BACTERIA UR QL AUTO: ABNORMAL /HPF
BASOPHILS # BLD AUTO: 0.04 THOUSANDS/ΜL (ref 0–0.1)
BASOPHILS NFR BLD AUTO: 0 % (ref 0–1)
BILIRUB SERPL-MCNC: 0.27 MG/DL (ref 0.2–1)
BILIRUB UR QL STRIP: NEGATIVE
BUN SERPL-MCNC: 29 MG/DL (ref 5–25)
CALCIUM ALBUM COR SERPL-MCNC: 9.5 MG/DL (ref 8.3–10.1)
CALCIUM SERPL-MCNC: 8.4 MG/DL (ref 8.3–10.1)
CARDIAC TROPONIN I PNL SERPL HS: 16 NG/L
CARDIAC TROPONIN I PNL SERPL HS: 18 NG/L
CARDIAC TROPONIN I PNL SERPL HS: 24 NG/L
CHLORIDE SERPL-SCNC: 97 MMOL/L (ref 100–108)
CK MB SERPL-MCNC: 13.4 NG/ML (ref 0–5)
CK MB SERPL-MCNC: <1 % (ref 0–2.5)
CK SERPL-CCNC: 2017 U/L (ref 26–192)
CLARITY UR: CLEAR
CO2 SERPL-SCNC: 27 MMOL/L (ref 21–32)
COLOR UR: YELLOW
CREAT SERPL-MCNC: 3.97 MG/DL (ref 0.6–1.3)
DME PARACHUTE DELIVERY DATE ACTUAL: NORMAL
DME PARACHUTE DELIVERY DATE REQUESTED: NORMAL
DME PARACHUTE ITEM DESCRIPTION: NORMAL
DME PARACHUTE ORDER STATUS: NORMAL
DME PARACHUTE SUPPLIER NAME: NORMAL
DME PARACHUTE SUPPLIER PHONE: NORMAL
EOSINOPHIL # BLD AUTO: 0.11 THOUSAND/ΜL (ref 0–0.61)
EOSINOPHIL NFR BLD AUTO: 1 % (ref 0–6)
ERYTHROCYTE [DISTWIDTH] IN BLOOD BY AUTOMATED COUNT: 15.8 % (ref 11.6–15.1)
FLUAV RNA RESP QL NAA+PROBE: NEGATIVE
FLUBV RNA RESP QL NAA+PROBE: NEGATIVE
GFR SERPL CREATININE-BSD FRML MDRD: 11 ML/MIN/1.73SQ M
GLUCOSE SERPL-MCNC: 101 MG/DL (ref 65–140)
GLUCOSE SERPL-MCNC: 106 MG/DL (ref 65–140)
GLUCOSE SERPL-MCNC: 112 MG/DL (ref 65–140)
GLUCOSE SERPL-MCNC: 169 MG/DL (ref 65–140)
GLUCOSE SERPL-MCNC: 181 MG/DL (ref 65–140)
GLUCOSE SERPL-MCNC: 84 MG/DL (ref 65–140)
GLUCOSE SERPL-MCNC: 95 MG/DL (ref 65–140)
GLUCOSE UR STRIP-MCNC: ABNORMAL MG/DL
HCT VFR BLD AUTO: 29.9 % (ref 34.8–46.1)
HGB BLD-MCNC: 9.5 G/DL (ref 11.5–15.4)
HGB UR QL STRIP.AUTO: ABNORMAL
IMM GRANULOCYTES # BLD AUTO: 0.11 THOUSAND/UL (ref 0–0.2)
IMM GRANULOCYTES NFR BLD AUTO: 1 % (ref 0–2)
INR PPP: 1.09 (ref 0.84–1.19)
KETONES UR STRIP-MCNC: NEGATIVE MG/DL
LACTATE SERPL-SCNC: 0.4 MMOL/L (ref 0.5–2)
LDH SERPL-CCNC: 1230 U/L (ref 81–234)
LEUKOCYTE ESTERASE UR QL STRIP: ABNORMAL
LYMPHOCYTES # BLD AUTO: 1.33 THOUSANDS/ΜL (ref 0.6–4.47)
LYMPHOCYTES NFR BLD AUTO: 14 % (ref 14–44)
MCH RBC QN AUTO: 27.4 PG (ref 26.8–34.3)
MCHC RBC AUTO-ENTMCNC: 31.8 G/DL (ref 31.4–37.4)
MCV RBC AUTO: 86 FL (ref 82–98)
MONOCYTES # BLD AUTO: 0.56 THOUSAND/ΜL (ref 0.17–1.22)
MONOCYTES NFR BLD AUTO: 6 % (ref 4–12)
NEUTROPHILS # BLD AUTO: 7.54 THOUSANDS/ΜL (ref 1.85–7.62)
NEUTS SEG NFR BLD AUTO: 78 % (ref 43–75)
NITRITE UR QL STRIP: NEGATIVE
NON-SQ EPI CELLS URNS QL MICRO: ABNORMAL /HPF
NRBC BLD AUTO-RTO: 0 /100 WBCS
P AXIS: 101 DEGREES
PH UR STRIP.AUTO: 8 [PH] (ref 4.5–8)
PLATELET # BLD AUTO: 187 THOUSANDS/UL (ref 149–390)
PLATELET # BLD AUTO: 192 THOUSANDS/UL (ref 149–390)
PMV BLD AUTO: 10.1 FL (ref 8.9–12.7)
PMV BLD AUTO: 10.3 FL (ref 8.9–12.7)
POTASSIUM SERPL-SCNC: 4 MMOL/L (ref 3.5–5.3)
PR INTERVAL: 158 MS
PROT SERPL-MCNC: 6.7 G/DL (ref 6.4–8.2)
PROT UR STRIP-MCNC: >=300 MG/DL
PROTHROMBIN TIME: 13.8 SECONDS (ref 11.6–14.5)
QRS AXIS: 93 DEGREES
QRSD INTERVAL: 92 MS
QT INTERVAL: 542 MS
QTC INTERVAL: 527 MS
RBC # BLD AUTO: 3.47 MILLION/UL (ref 3.81–5.12)
RBC #/AREA URNS AUTO: ABNORMAL /HPF
RSV RNA RESP QL NAA+PROBE: NEGATIVE
SARS-COV-2 RNA RESP QL NAA+PROBE: NEGATIVE
SODIUM SERPL-SCNC: 134 MMOL/L (ref 136–145)
SP GR UR STRIP.AUTO: 1.02 (ref 1–1.03)
T WAVE AXIS: 8 DEGREES
TSH SERPL DL<=0.05 MIU/L-ACNC: 4.5 UIU/ML (ref 0.36–3.74)
UROBILINOGEN UR QL STRIP.AUTO: 0.2 E.U./DL
VENTRICULAR RATE: 57 BPM
WBC # BLD AUTO: 9.69 THOUSAND/UL (ref 4.31–10.16)
WBC #/AREA URNS AUTO: ABNORMAL /HPF

## 2021-12-16 PROCEDURE — 83605 ASSAY OF LACTIC ACID: CPT | Performed by: EMERGENCY MEDICINE

## 2021-12-16 PROCEDURE — 85610 PROTHROMBIN TIME: CPT | Performed by: EMERGENCY MEDICINE

## 2021-12-16 PROCEDURE — 82948 REAGENT STRIP/BLOOD GLUCOSE: CPT

## 2021-12-16 PROCEDURE — 84443 ASSAY THYROID STIM HORMONE: CPT | Performed by: EMERGENCY MEDICINE

## 2021-12-16 PROCEDURE — 36415 COLL VENOUS BLD VENIPUNCTURE: CPT | Performed by: EMERGENCY MEDICINE

## 2021-12-16 PROCEDURE — 99223 1ST HOSP IP/OBS HIGH 75: CPT | Performed by: INTERNAL MEDICINE

## 2021-12-16 PROCEDURE — 80053 COMPREHEN METABOLIC PANEL: CPT | Performed by: EMERGENCY MEDICINE

## 2021-12-16 PROCEDURE — 85025 COMPLETE CBC W/AUTO DIFF WBC: CPT | Performed by: EMERGENCY MEDICINE

## 2021-12-16 PROCEDURE — 0241U HB NFCT DS VIR RESP RNA 4 TRGT: CPT | Performed by: EMERGENCY MEDICINE

## 2021-12-16 PROCEDURE — 82550 ASSAY OF CK (CPK): CPT | Performed by: NURSE PRACTITIONER

## 2021-12-16 PROCEDURE — 84484 ASSAY OF TROPONIN QUANT: CPT | Performed by: EMERGENCY MEDICINE

## 2021-12-16 PROCEDURE — 83615 LACTATE (LD) (LDH) ENZYME: CPT | Performed by: NURSE PRACTITIONER

## 2021-12-16 PROCEDURE — 70450 CT HEAD/BRAIN W/O DYE: CPT

## 2021-12-16 PROCEDURE — 93005 ELECTROCARDIOGRAM TRACING: CPT

## 2021-12-16 PROCEDURE — 87040 BLOOD CULTURE FOR BACTERIA: CPT | Performed by: EMERGENCY MEDICINE

## 2021-12-16 PROCEDURE — 93010 ELECTROCARDIOGRAM REPORT: CPT | Performed by: INTERNAL MEDICINE

## 2021-12-16 PROCEDURE — 87086 URINE CULTURE/COLONY COUNT: CPT

## 2021-12-16 PROCEDURE — 99285 EMERGENCY DEPT VISIT HI MDM: CPT

## 2021-12-16 PROCEDURE — 85049 AUTOMATED PLATELET COUNT: CPT | Performed by: INTERNAL MEDICINE

## 2021-12-16 PROCEDURE — 99285 EMERGENCY DEPT VISIT HI MDM: CPT | Performed by: EMERGENCY MEDICINE

## 2021-12-16 PROCEDURE — 71045 X-RAY EXAM CHEST 1 VIEW: CPT

## 2021-12-16 PROCEDURE — 82553 CREATINE MB FRACTION: CPT | Performed by: NURSE PRACTITIONER

## 2021-12-16 PROCEDURE — 81001 URINALYSIS AUTO W/SCOPE: CPT

## 2021-12-16 PROCEDURE — 85730 THROMBOPLASTIN TIME PARTIAL: CPT | Performed by: EMERGENCY MEDICINE

## 2021-12-16 RX ORDER — HYDRALAZINE HYDROCHLORIDE 25 MG/1
50 TABLET, FILM COATED ORAL EVERY 8 HOURS SCHEDULED
Status: DISCONTINUED | OUTPATIENT
Start: 2021-12-16 | End: 2021-12-16 | Stop reason: SDUPTHER

## 2021-12-16 RX ORDER — CALCIUM ACETATE 667 MG/1
667 CAPSULE ORAL
Status: DISCONTINUED | OUTPATIENT
Start: 2021-12-16 | End: 2021-12-17 | Stop reason: HOSPADM

## 2021-12-16 RX ORDER — ATORVASTATIN CALCIUM 40 MG/1
40 TABLET, FILM COATED ORAL EVERY EVENING
Status: DISCONTINUED | OUTPATIENT
Start: 2021-12-16 | End: 2021-12-17 | Stop reason: HOSPADM

## 2021-12-16 RX ORDER — HYDRALAZINE HYDROCHLORIDE 25 MG/1
50 TABLET, FILM COATED ORAL EVERY 8 HOURS SCHEDULED
Status: DISCONTINUED | OUTPATIENT
Start: 2021-12-16 | End: 2021-12-17 | Stop reason: HOSPADM

## 2021-12-16 RX ORDER — DEXTROSE MONOHYDRATE 25 G/50ML
25 INJECTION, SOLUTION INTRAVENOUS ONCE AS NEEDED
Status: DISCONTINUED | OUTPATIENT
Start: 2021-12-16 | End: 2021-12-17 | Stop reason: HOSPADM

## 2021-12-16 RX ORDER — CLONIDINE HYDROCHLORIDE 0.2 MG/1
0.2 TABLET ORAL EVERY 8 HOURS SCHEDULED
Status: DISCONTINUED | OUTPATIENT
Start: 2021-12-16 | End: 2021-12-17 | Stop reason: HOSPADM

## 2021-12-16 RX ORDER — CARVEDILOL 25 MG/1
25 TABLET ORAL 2 TIMES DAILY WITH MEALS
Status: DISCONTINUED | OUTPATIENT
Start: 2021-12-16 | End: 2021-12-17 | Stop reason: HOSPADM

## 2021-12-16 RX ORDER — FUROSEMIDE 10 MG/ML
80 INJECTION INTRAMUSCULAR; INTRAVENOUS ONCE
Status: DISCONTINUED | OUTPATIENT
Start: 2021-12-16 | End: 2021-12-16

## 2021-12-16 RX ORDER — NIFEDIPINE 60 MG/1
60 TABLET, EXTENDED RELEASE ORAL 2 TIMES DAILY
Status: DISCONTINUED | OUTPATIENT
Start: 2021-12-16 | End: 2021-12-17 | Stop reason: HOSPADM

## 2021-12-16 RX ORDER — TORSEMIDE 20 MG/1
20 TABLET ORAL DAILY
Status: DISCONTINUED | OUTPATIENT
Start: 2021-12-16 | End: 2021-12-17 | Stop reason: HOSPADM

## 2021-12-16 RX ORDER — FUROSEMIDE 10 MG/ML
40 INJECTION INTRAMUSCULAR; INTRAVENOUS ONCE
Status: COMPLETED | OUTPATIENT
Start: 2021-12-16 | End: 2021-12-16

## 2021-12-16 RX ORDER — ASPIRIN 81 MG/1
81 TABLET, CHEWABLE ORAL DAILY
Status: DISCONTINUED | OUTPATIENT
Start: 2021-12-16 | End: 2021-12-17 | Stop reason: HOSPADM

## 2021-12-16 RX ORDER — HEPARIN SODIUM 5000 [USP'U]/ML
5000 INJECTION, SOLUTION INTRAVENOUS; SUBCUTANEOUS EVERY 8 HOURS SCHEDULED
Status: DISCONTINUED | OUTPATIENT
Start: 2021-12-16 | End: 2021-12-17 | Stop reason: HOSPADM

## 2021-12-16 RX ORDER — FUROSEMIDE 10 MG/ML
20 INJECTION INTRAMUSCULAR; INTRAVENOUS ONCE
Status: DISCONTINUED | OUTPATIENT
Start: 2021-12-16 | End: 2021-12-16

## 2021-12-16 RX ORDER — TERAZOSIN 5 MG/1
10 CAPSULE ORAL
Status: DISCONTINUED | OUTPATIENT
Start: 2021-12-16 | End: 2021-12-17 | Stop reason: HOSPADM

## 2021-12-16 RX ADMIN — HEPARIN SODIUM 5000 UNITS: 5000 INJECTION INTRAVENOUS; SUBCUTANEOUS at 21:51

## 2021-12-16 RX ADMIN — FUROSEMIDE 40 MG: 10 INJECTION, SOLUTION INTRAVENOUS at 14:58

## 2021-12-16 RX ADMIN — HEPARIN SODIUM 5000 UNITS: 5000 INJECTION INTRAVENOUS; SUBCUTANEOUS at 16:12

## 2021-12-16 RX ADMIN — ATORVASTATIN CALCIUM 40 MG: 40 TABLET, FILM COATED ORAL at 17:18

## 2021-12-16 RX ADMIN — HYDRALAZINE HYDROCHLORIDE 50 MG: 25 TABLET, FILM COATED ORAL at 16:12

## 2021-12-16 RX ADMIN — CARVEDILOL 25 MG: 25 TABLET, FILM COATED ORAL at 16:12

## 2021-12-16 RX ADMIN — CLONIDINE HYDROCHLORIDE 0.2 MG: 0.2 TABLET ORAL at 21:51

## 2021-12-16 RX ADMIN — NIFEDIPINE 60 MG: 60 TABLET, FILM COATED, EXTENDED RELEASE ORAL at 21:51

## 2021-12-16 RX ADMIN — TORSEMIDE 20 MG: 20 TABLET ORAL at 16:12

## 2021-12-16 RX ADMIN — CLONIDINE HYDROCHLORIDE 0.2 MG: 0.2 TABLET ORAL at 16:11

## 2021-12-16 RX ADMIN — CALCIUM ACETATE 667 MG: 667 CAPSULE ORAL at 16:11

## 2021-12-16 RX ADMIN — HYDRALAZINE HYDROCHLORIDE 50 MG: 25 TABLET, FILM COATED ORAL at 21:51

## 2021-12-16 RX ADMIN — HYDRALAZINE HYDROCHLORIDE 50 MG: 25 TABLET, FILM COATED ORAL at 14:59

## 2021-12-16 RX ADMIN — TERAZOSIN HYDROCHLORIDE 10 MG: 5 CAPSULE ORAL at 21:51

## 2021-12-16 RX ADMIN — ASPIRIN 81 MG CHEWABLE TABLET 81 MG: 81 TABLET CHEWABLE at 16:12

## 2021-12-17 ENCOUNTER — APPOINTMENT (INPATIENT)
Dept: DIALYSIS | Facility: HOSPITAL | Age: 66
DRG: 637 | End: 2021-12-17
Payer: MEDICARE

## 2021-12-17 VITALS
SYSTOLIC BLOOD PRESSURE: 136 MMHG | RESPIRATION RATE: 18 BRPM | HEART RATE: 76 BPM | WEIGHT: 178.57 LBS | OXYGEN SATURATION: 96 % | HEIGHT: 62 IN | BODY MASS INDEX: 32.86 KG/M2 | DIASTOLIC BLOOD PRESSURE: 59 MMHG | TEMPERATURE: 98.8 F

## 2021-12-17 LAB
ANION GAP SERPL CALCULATED.3IONS-SCNC: 8 MMOL/L (ref 4–13)
BACTERIA UR CULT: NORMAL
BASOPHILS # BLD AUTO: 0.03 THOUSANDS/ΜL (ref 0–0.1)
BASOPHILS NFR BLD AUTO: 0 % (ref 0–1)
BUN SERPL-MCNC: 37 MG/DL (ref 5–25)
CALCIUM SERPL-MCNC: 7.9 MG/DL (ref 8.3–10.1)
CHLORIDE SERPL-SCNC: 98 MMOL/L (ref 100–108)
CO2 SERPL-SCNC: 27 MMOL/L (ref 21–32)
CREAT SERPL-MCNC: 5.14 MG/DL (ref 0.6–1.3)
EOSINOPHIL # BLD AUTO: 0.1 THOUSAND/ΜL (ref 0–0.61)
EOSINOPHIL NFR BLD AUTO: 1 % (ref 0–6)
ERYTHROCYTE [DISTWIDTH] IN BLOOD BY AUTOMATED COUNT: 15.9 % (ref 11.6–15.1)
GFR SERPL CREATININE-BSD FRML MDRD: 8 ML/MIN/1.73SQ M
GLUCOSE SERPL-MCNC: 123 MG/DL (ref 65–140)
GLUCOSE SERPL-MCNC: 125 MG/DL (ref 65–140)
GLUCOSE SERPL-MCNC: 145 MG/DL (ref 65–140)
GLUCOSE SERPL-MCNC: 152 MG/DL (ref 65–140)
GLUCOSE SERPL-MCNC: 158 MG/DL (ref 65–140)
GLUCOSE SERPL-MCNC: 186 MG/DL (ref 65–140)
GLUCOSE SERPL-MCNC: 204 MG/DL (ref 65–140)
HCT VFR BLD AUTO: 24.5 % (ref 34.8–46.1)
HGB BLD-MCNC: 7.7 G/DL (ref 11.5–15.4)
IMM GRANULOCYTES # BLD AUTO: 0.05 THOUSAND/UL (ref 0–0.2)
IMM GRANULOCYTES NFR BLD AUTO: 1 % (ref 0–2)
LYMPHOCYTES # BLD AUTO: 1.2 THOUSANDS/ΜL (ref 0.6–4.47)
LYMPHOCYTES NFR BLD AUTO: 15 % (ref 14–44)
MCH RBC QN AUTO: 26.8 PG (ref 26.8–34.3)
MCHC RBC AUTO-ENTMCNC: 31.4 G/DL (ref 31.4–37.4)
MCV RBC AUTO: 85 FL (ref 82–98)
MONOCYTES # BLD AUTO: 0.83 THOUSAND/ΜL (ref 0.17–1.22)
MONOCYTES NFR BLD AUTO: 10 % (ref 4–12)
NEUTROPHILS # BLD AUTO: 5.97 THOUSANDS/ΜL (ref 1.85–7.62)
NEUTS SEG NFR BLD AUTO: 73 % (ref 43–75)
NRBC BLD AUTO-RTO: 0 /100 WBCS
PLATELET # BLD AUTO: 178 THOUSANDS/UL (ref 149–390)
PMV BLD AUTO: 10.2 FL (ref 8.9–12.7)
POTASSIUM SERPL-SCNC: 4.3 MMOL/L (ref 3.5–5.3)
RBC # BLD AUTO: 2.87 MILLION/UL (ref 3.81–5.12)
SODIUM SERPL-SCNC: 133 MMOL/L (ref 136–145)
WBC # BLD AUTO: 8.18 THOUSAND/UL (ref 4.31–10.16)

## 2021-12-17 PROCEDURE — 85025 COMPLETE CBC W/AUTO DIFF WBC: CPT | Performed by: INTERNAL MEDICINE

## 2021-12-17 PROCEDURE — 90935 HEMODIALYSIS ONE EVALUATION: CPT | Performed by: INTERNAL MEDICINE

## 2021-12-17 PROCEDURE — 82948 REAGENT STRIP/BLOOD GLUCOSE: CPT

## 2021-12-17 PROCEDURE — 97166 OT EVAL MOD COMPLEX 45 MIN: CPT

## 2021-12-17 PROCEDURE — 80048 BASIC METABOLIC PNL TOTAL CA: CPT | Performed by: INTERNAL MEDICINE

## 2021-12-17 PROCEDURE — 99239 HOSP IP/OBS DSCHRG MGMT >30: CPT | Performed by: INTERNAL MEDICINE

## 2021-12-17 PROCEDURE — 5A1D70Z PERFORMANCE OF URINARY FILTRATION, INTERMITTENT, LESS THAN 6 HOURS PER DAY: ICD-10-PCS | Performed by: INTERNAL MEDICINE

## 2021-12-17 RX ADMIN — CALCIUM ACETATE 667 MG: 667 CAPSULE ORAL at 08:30

## 2021-12-17 RX ADMIN — HEPARIN SODIUM 5000 UNITS: 5000 INJECTION INTRAVENOUS; SUBCUTANEOUS at 05:30

## 2021-12-17 RX ADMIN — ASPIRIN 81 MG CHEWABLE TABLET 81 MG: 81 TABLET CHEWABLE at 08:30

## 2021-12-18 LAB
ATRIAL RATE: 68 BPM
P AXIS: 66 DEGREES
PR INTERVAL: 168 MS
QRS AXIS: 105 DEGREES
QRSD INTERVAL: 92 MS
QT INTERVAL: 486 MS
QTC INTERVAL: 516 MS
T WAVE AXIS: 48 DEGREES
VENTRICULAR RATE: 68 BPM

## 2021-12-18 PROCEDURE — 93010 ELECTROCARDIOGRAM REPORT: CPT | Performed by: INTERNAL MEDICINE

## 2021-12-22 LAB
BACTERIA BLD CULT: NORMAL
BACTERIA BLD CULT: NORMAL

## 2021-12-29 ENCOUNTER — TELEPHONE (OUTPATIENT)
Dept: VASCULAR SURGERY | Facility: CLINIC | Age: 66
End: 2021-12-29

## 2022-01-17 ENCOUNTER — ANESTHESIA EVENT (OUTPATIENT)
Dept: PERIOP | Facility: HOSPITAL | Age: 67
End: 2022-01-17
Payer: MEDICARE

## 2022-01-17 ENCOUNTER — PREP FOR PROCEDURE (OUTPATIENT)
Dept: VASCULAR SURGERY | Facility: CLINIC | Age: 67
End: 2022-01-17

## 2022-02-04 RX ORDER — INSULIN GLARGINE 100 [IU]/ML
15 INJECTION, SOLUTION SUBCUTANEOUS DAILY
COMMUNITY
End: 2022-06-15

## 2022-02-04 NOTE — PRE-PROCEDURE INSTRUCTIONS
Pre-Surgery Instructions:   Medication Instructions    aspirin 81 mg chewable tablet Patient was instructed by Physician and understands  per vascular pt not to hold,,pt,  son and dtr aware    atorvastatin (LIPITOR) 40 mg tablet Instructed patient per Anesthesia Guidelines   calcium acetate (PHOSLO) capsule Instructed patient per Anesthesia Guidelines   carvedilol (COREG) 25 mg tablet Instructed patient per Anesthesia Guidelines   cloNIDine (CATAPRES) 0 2 mg tablet Instructed patient per Anesthesia Guidelines   ergocalciferol (ERGOCALCIFEROL) 1 25 MG (58871 UT) capsule Instructed patient per Anesthesia Guidelines   gabapentin (NEURONTIN) 300 mg capsule Instructed patient per Anesthesia Guidelines   hydrALAZINE (APRESOLINE) 50 mg tablet Instructed patient per Anesthesia Guidelines   insulin glargine (Lantus SoloStar) 100 units/mL injection pen Instructed patient per Anesthesia Guidelines   linaGLIPtin (Tradjenta) 5 MG TABS Instructed patient per Anesthesia Guidelines   terazosin (HYTRIN) 5 mg capsule Instructed patient per Anesthesia Guidelines   torsemide (DEMADEX) 20 mg tablet Instructed patient per Anesthesia Guidelines  Patient  And son instructed*pt to take*carvedilol,clonidine aspirin 81mg and hydralazine *with a sip of water the morning of surgery  and instructed on use of chlorhexidine soap(sometimes sponge bathes) per hospital protocol    Patient instructed to stop NSAIDS, vitamins and herbal supplements one week prior to surgery or per Dr Rosy Bishop

## 2022-02-08 ENCOUNTER — TELEPHONE (OUTPATIENT)
Dept: HEMATOLOGY ONCOLOGY | Facility: CLINIC | Age: 67
End: 2022-02-08

## 2022-02-08 NOTE — TELEPHONE ENCOUNTER
I LVM for patient that we changed her 03/24 appointment at 11:40am to 03/21 at 11:00 with Dr Stevan Cain

## 2022-02-16 ENCOUNTER — HOSPITAL ENCOUNTER (OUTPATIENT)
Facility: HOSPITAL | Age: 67
Setting detail: OUTPATIENT SURGERY
Discharge: HOME | End: 2022-02-16
Attending: SURGERY | Admitting: SURGERY
Payer: MEDICARE

## 2022-02-16 ENCOUNTER — ANESTHESIA (OUTPATIENT)
Dept: PERIOP | Facility: HOSPITAL | Age: 67
End: 2022-02-16
Payer: MEDICARE

## 2022-02-16 VITALS
TEMPERATURE: 98.5 F | HEART RATE: 75 BPM | RESPIRATION RATE: 16 BRPM | SYSTOLIC BLOOD PRESSURE: 146 MMHG | WEIGHT: 134.04 LBS | DIASTOLIC BLOOD PRESSURE: 67 MMHG | HEIGHT: 62 IN | OXYGEN SATURATION: 96 % | BODY MASS INDEX: 24.67 KG/M2

## 2022-02-16 DIAGNOSIS — N18.4 STAGE 4 CHRONIC KIDNEY DISEASE (HCC): ICD-10-CM

## 2022-02-16 DIAGNOSIS — N18.6 ESRD (END STAGE RENAL DISEASE) (HCC): Primary | ICD-10-CM

## 2022-02-16 PROBLEM — R19.7 DIARRHEA: Status: ACTIVE | Noted: 2022-02-16

## 2022-02-16 PROBLEM — Z01.818 PREOPERATIVE CLEARANCE: Status: ACTIVE | Noted: 2022-02-16

## 2022-02-16 LAB
ABO GROUP BLD: NORMAL
ABO GROUP BLD: NORMAL
ANION GAP SERPL CALCULATED.3IONS-SCNC: 5 MMOL/L (ref 4–13)
BLD GP AB SCN SERPL QL: POSITIVE
BLOOD GROUP ANTIBODIES SERPL: NORMAL
BUN SERPL-MCNC: 33 MG/DL (ref 5–25)
CALCIUM SERPL-MCNC: 8.9 MG/DL (ref 8.3–10.1)
CHLORIDE SERPL-SCNC: 101 MMOL/L (ref 100–108)
CO2 SERPL-SCNC: 31 MMOL/L (ref 21–32)
CREAT SERPL-MCNC: 3.68 MG/DL (ref 0.6–1.3)
E AG RBC QL: NEGATIVE
GFR SERPL CREATININE-BSD FRML MDRD: 12 ML/MIN/1.73SQ M
GLUCOSE P FAST SERPL-MCNC: 154 MG/DL (ref 65–99)
GLUCOSE SERPL-MCNC: 125 MG/DL (ref 65–140)
GLUCOSE SERPL-MCNC: 154 MG/DL (ref 65–140)
GLUCOSE SERPL-MCNC: 155 MG/DL (ref 65–140)
GLUCOSE SERPL-MCNC: 179 MG/DL (ref 65–140)
POTASSIUM SERPL-SCNC: 5.4 MMOL/L (ref 3.5–5.3)
RH BLD: POSITIVE
RH BLD: POSITIVE
SODIUM SERPL-SCNC: 137 MMOL/L (ref 136–145)
SPECIMEN EXPIRATION DATE: NORMAL

## 2022-02-16 PROCEDURE — 86870 RBC ANTIBODY IDENTIFICATION: CPT | Performed by: ANESTHESIOLOGY

## 2022-02-16 PROCEDURE — 86901 BLOOD TYPING SEROLOGIC RH(D): CPT | Performed by: ANESTHESIOLOGY

## 2022-02-16 PROCEDURE — 99024 POSTOP FOLLOW-UP VISIT: CPT | Performed by: SURGERY

## 2022-02-16 PROCEDURE — 82948 REAGENT STRIP/BLOOD GLUCOSE: CPT

## 2022-02-16 PROCEDURE — 99214 OFFICE O/P EST MOD 30 MIN: CPT | Performed by: INTERNAL MEDICINE

## 2022-02-16 PROCEDURE — 86905 BLOOD TYPING RBC ANTIGENS: CPT

## 2022-02-16 PROCEDURE — 86900 BLOOD TYPING SEROLOGIC ABO: CPT | Performed by: ANESTHESIOLOGY

## 2022-02-16 PROCEDURE — 86850 RBC ANTIBODY SCREEN: CPT | Performed by: ANESTHESIOLOGY

## 2022-02-16 PROCEDURE — 36821 AV FUSION DIRECT ANY SITE: CPT | Performed by: SURGERY

## 2022-02-16 PROCEDURE — 80048 BASIC METABOLIC PNL TOTAL CA: CPT | Performed by: ANESTHESIOLOGY

## 2022-02-16 PROCEDURE — 36821 AV FUSION DIRECT ANY SITE: CPT | Performed by: PHYSICIAN ASSISTANT

## 2022-02-16 RX ORDER — ONDANSETRON 2 MG/ML
4 INJECTION INTRAMUSCULAR; INTRAVENOUS ONCE AS NEEDED
Status: DISCONTINUED | OUTPATIENT
Start: 2022-02-16 | End: 2022-02-16 | Stop reason: HOSPADM

## 2022-02-16 RX ORDER — EPHEDRINE SULFATE 50 MG/ML
INJECTION INTRAVENOUS AS NEEDED
Status: DISCONTINUED | OUTPATIENT
Start: 2022-02-16 | End: 2022-02-16

## 2022-02-16 RX ORDER — CEFAZOLIN SODIUM 2 G/50ML
SOLUTION INTRAVENOUS AS NEEDED
Status: DISCONTINUED | OUTPATIENT
Start: 2022-02-16 | End: 2022-02-16

## 2022-02-16 RX ORDER — LIDOCAINE HYDROCHLORIDE 20 MG/ML
INJECTION, SOLUTION EPIDURAL; INFILTRATION; INTRACAUDAL; PERINEURAL AS NEEDED
Status: DISCONTINUED | OUTPATIENT
Start: 2022-02-16 | End: 2022-02-16

## 2022-02-16 RX ORDER — OXYCODONE HYDROCHLORIDE AND ACETAMINOPHEN 5; 325 MG/1; MG/1
1 TABLET ORAL EVERY 4 HOURS PRN
Qty: 20 TABLET | Refills: 0 | Status: SHIPPED | OUTPATIENT
Start: 2022-02-16 | End: 2022-02-26

## 2022-02-16 RX ORDER — OXYCODONE HYDROCHLORIDE AND ACETAMINOPHEN 5; 325 MG/1; MG/1
1 TABLET ORAL EVERY 4 HOURS PRN
Status: DISCONTINUED | OUTPATIENT
Start: 2022-02-16 | End: 2022-02-16 | Stop reason: HOSPADM

## 2022-02-16 RX ORDER — ONDANSETRON 2 MG/ML
INJECTION INTRAMUSCULAR; INTRAVENOUS AS NEEDED
Status: DISCONTINUED | OUTPATIENT
Start: 2022-02-16 | End: 2022-02-16

## 2022-02-16 RX ORDER — FENTANYL CITRATE 50 UG/ML
INJECTION, SOLUTION INTRAMUSCULAR; INTRAVENOUS AS NEEDED
Status: DISCONTINUED | OUTPATIENT
Start: 2022-02-16 | End: 2022-02-16

## 2022-02-16 RX ORDER — HEPARIN SODIUM 1000 [USP'U]/ML
INJECTION, SOLUTION INTRAVENOUS; SUBCUTANEOUS AS NEEDED
Status: DISCONTINUED | OUTPATIENT
Start: 2022-02-16 | End: 2022-02-16

## 2022-02-16 RX ORDER — DEXAMETHASONE SODIUM PHOSPHATE 4 MG/ML
INJECTION, SOLUTION INTRA-ARTICULAR; INTRALESIONAL; INTRAMUSCULAR; INTRAVENOUS; SOFT TISSUE AS NEEDED
Status: DISCONTINUED | OUTPATIENT
Start: 2022-02-16 | End: 2022-02-16

## 2022-02-16 RX ORDER — LIDOCAINE HYDROCHLORIDE 10 MG/ML
0.5 INJECTION, SOLUTION EPIDURAL; INFILTRATION; INTRACAUDAL; PERINEURAL ONCE AS NEEDED
Status: DISCONTINUED | OUTPATIENT
Start: 2022-02-16 | End: 2022-02-16 | Stop reason: HOSPADM

## 2022-02-16 RX ORDER — MIDAZOLAM HYDROCHLORIDE 2 MG/2ML
INJECTION, SOLUTION INTRAMUSCULAR; INTRAVENOUS AS NEEDED
Status: DISCONTINUED | OUTPATIENT
Start: 2022-02-16 | End: 2022-02-16

## 2022-02-16 RX ORDER — LABETALOL 20 MG/4 ML (5 MG/ML) INTRAVENOUS SYRINGE
20 ONCE
Status: COMPLETED | OUTPATIENT
Start: 2022-02-16 | End: 2022-02-16

## 2022-02-16 RX ORDER — HYDRALAZINE HYDROCHLORIDE 20 MG/ML
20 INJECTION INTRAMUSCULAR; INTRAVENOUS ONCE
Status: DISCONTINUED | OUTPATIENT
Start: 2022-02-16 | End: 2022-02-16 | Stop reason: HOSPADM

## 2022-02-16 RX ORDER — FENTANYL CITRATE/PF 50 MCG/ML
25 SYRINGE (ML) INJECTION
Status: DISCONTINUED | OUTPATIENT
Start: 2022-02-16 | End: 2022-02-16 | Stop reason: HOSPADM

## 2022-02-16 RX ORDER — HYDROMORPHONE HCL/PF 1 MG/ML
0.2 SYRINGE (ML) INJECTION
Status: DISCONTINUED | OUTPATIENT
Start: 2022-02-16 | End: 2022-02-16 | Stop reason: HOSPADM

## 2022-02-16 RX ORDER — PROPOFOL 10 MG/ML
INJECTION, EMULSION INTRAVENOUS AS NEEDED
Status: DISCONTINUED | OUTPATIENT
Start: 2022-02-16 | End: 2022-02-16

## 2022-02-16 RX ORDER — SODIUM CHLORIDE 9 MG/ML
50 INJECTION, SOLUTION INTRAVENOUS CONTINUOUS
Status: DISCONTINUED | OUTPATIENT
Start: 2022-02-16 | End: 2022-02-16 | Stop reason: HOSPADM

## 2022-02-16 RX ADMIN — MIDAZOLAM 2 MG: 1 INJECTION INTRAMUSCULAR; INTRAVENOUS at 14:53

## 2022-02-16 RX ADMIN — ONDANSETRON 4 MG: 2 INJECTION INTRAMUSCULAR; INTRAVENOUS at 15:05

## 2022-02-16 RX ADMIN — FENTANYL CITRATE 100 MCG: 50 INJECTION INTRAMUSCULAR; INTRAVENOUS at 14:59

## 2022-02-16 RX ADMIN — EPHEDRINE SULFATE 10 MG: 50 INJECTION, SOLUTION INTRAVENOUS at 16:01

## 2022-02-16 RX ADMIN — PROPOFOL 150 MG: 10 INJECTION, EMULSION INTRAVENOUS at 14:59

## 2022-02-16 RX ADMIN — SODIUM CHLORIDE 50 ML/HR: 0.9 INJECTION, SOLUTION INTRAVENOUS at 07:03

## 2022-02-16 RX ADMIN — LIDOCAINE HYDROCHLORIDE 60 MG: 20 INJECTION, SOLUTION EPIDURAL; INFILTRATION; INTRACAUDAL; PERINEURAL at 14:59

## 2022-02-16 RX ADMIN — HEPARIN SODIUM 5000 UNITS: 1000 INJECTION INTRAVENOUS; SUBCUTANEOUS at 15:53

## 2022-02-16 RX ADMIN — LABETALOL HYDROCHLORIDE 20 MG: 5 INJECTION, SOLUTION INTRAVENOUS at 06:59

## 2022-02-16 RX ADMIN — DEXAMETHASONE SODIUM PHOSPHATE 8 MG: 4 INJECTION INTRA-ARTICULAR; INTRALESIONAL; INTRAMUSCULAR; INTRAVENOUS; SOFT TISSUE at 15:05

## 2022-02-16 RX ADMIN — SODIUM CHLORIDE: 0.9 INJECTION, SOLUTION INTRAVENOUS at 16:46

## 2022-02-16 RX ADMIN — CEFAZOLIN SODIUM 2000 MG: 2 SOLUTION INTRAVENOUS at 14:56

## 2022-02-16 NOTE — ANESTHESIA PREPROCEDURE EVALUATION
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)      /RENAL   (+) ESRD (end stage renal disease) (HCC)   (+) Stage 4 chronic kidney disease (HCC)      HEMATOLOGY   (+) Anemia in stage 4 chronic kidney disease (HCC)      NEURO/PSYCH   (+) Cerebral infarction, chronic        Physical Exam    Airway    Mallampati score: II  TM Distance: >3 FB  Neck ROM: full     Dental   No notable dental hx     Cardiovascular  Rhythm: regular, Rate: normal, Cardiovascular exam normal    Pulmonary  Pulmonary exam normal Breath sounds clear to auscultation,     Other Findings        Anesthesia Plan  ASA Score- 4     Anesthesia Type- general with ASA Monitors  Additional Monitors:   Airway Plan:     Comment: Took po clonidine and hydralazine 0500  Still high on admission to [facility]  Surgeon aware  20 mg labetalol IV preop ordered  by me          Plan Factors-    Chart reviewed  Patient summary reviewed  Patient is not a current smoker  Patient instructed to abstain from smoking on day of procedure  Patient did not smoke on day of surgery  Induction- intravenous  Postoperative Plan-     Informed Consent- Anesthetic plan and risks discussed with patient and son

## 2022-02-16 NOTE — ASSESSMENT & PLAN NOTE
Lab Results   Component Value Date    HGBA1C 7 9 (H) 12/11/2021   Per outpatient documentation patient is on Lantus 12 units q a m , Tradjenta 5 mg q a m , lyumjev 4 units with breakfast, 6 units with lunch, 6 units with dinner

## 2022-02-16 NOTE — PROGRESS NOTES
Dr Benito Reed updated pt has order for SLIM pre op  Dr Misty Angel updated and spoke to and updated pt having diarrhea  [id number] [address] for OR and will see pt post op OR updated as well

## 2022-02-16 NOTE — H&P
H & P    Plan:  Creation left AVF, possible graft  Now on HV via right chest catheter  BP (!) 212/97   Pulse 73   Temp 98 °F (36 7 °C) (Temporal)   Resp 14   Ht 5' 2" (1 575 m)   Wt 60 8 kg (134 lb 0 6 oz)   SpO2 98%   BMI 24 52 kg/m²     Operative site marked    Assessment/Plan:     Stage 4 chronic kidney disease (HCC)        Lab Results   Component Value Date     EGFR 12 10/11/2021     EGFR 12 10/11/2021     EGFR 12 (L) 10/01/2021     CREATININE 3 65 (H) 10/11/2021     CREATININE 3 71 (H) 10/11/2021     CREATININE 3 68 (H) 10/01/2021    Patient is a 43-year-old Martiniquais-speaking only woman who presents to the office to discuss dialysis access creation  She is right-hand dominant  Chronic kidney disease secondary to diabetic nephropathy  Vein mapping reviewed and appears to have suitable vein for fistula creation  Procedure, risks, benefits, alternatives, and anticipated postoperative course discussed in detail  All questions answered to his/her satisfaction  Patient was agreeable to proceed  Written consent was obtained      Language line : #[id number]            Diagnoses and all orders for this visit:     Stage 4 chronic kidney disease ([address] )     Acute kidney injury superimposed on CKD [facility])  -     Ambulatory referral to Vascular Surgery            Subjective:       Patient ID: Everardo Walker is a 73 y o  female      New patient presents today for acute kidney injury superimposed on CKD and was referred by Glenroy Rosas MD  She has vein mapping done on 10/6/21  Patient is right handed  She denies any surgeries/injuries to her arms  She denies numbness, tingling, or coldness in the UE and has good ROM  She is not currently on HD   Patient is taking Atorvastatin and is not a smoker      Melissa Kerr   Is a pleasant 43-year-old right-hand-dominant woman with chronic kidney disease who was referred to the office to discuss dialysis access creation         The following portions of the patient's history were reviewed and updated as appropriate: allergies, current medications, past family history, past medical history, past social history, past surgical history and problem list      Review of Systems   Constitutional: Negative  HENT: Negative  Eyes: Negative  Respiratory: Negative  Cardiovascular: Negative  Gastrointestinal: Negative  Endocrine: Negative  Genitourinary: Negative  Musculoskeletal: Negative  Skin: Negative  Allergic/Immunologic: Negative  Neurological: Negative  Hematological: Negative  Psychiatric/Behavioral: Negative  I have personally reviewed the ROS entered by MA and agree as documented      Objective:        BP (!) 180/90 (BP Location: Right arm, Patient Position: Sitting, Cuff Size: Adult)   Pulse 65   Temp 98 °F (36 7 °C) (Tympanic)   Ht 5' 2" (1 575 m)   Wt 69 4 kg (153 lb)   BMI 27 98 kg/m²             Physical Exam  Constitutional:       General: She is not in acute distress  Appearance: She is well-developed  HENT:      Head: Normocephalic and atraumatic  Eyes:      General: No scleral icterus  Conjunctiva/sclera: Conjunctivae normal    Neck:      Trachea: No tracheal deviation  Cardiovascular:      Rate and Rhythm: Normal rate and regular rhythm  Heart sounds: Normal heart sounds  Pulmonary:      Effort: Pulmonary effort is normal       Breath sounds: Normal breath sounds  Abdominal:      General: There is no distension  Palpations: Abdomen is soft  There is no mass (no appreciable aortic pulsation/aneurysm)  Tenderness: There is no abdominal tenderness  There is no guarding or rebound  Musculoskeletal:         General: Normal range of motion  Cervical back: Normal range of motion and neck supple  Skin:     General: Skin is warm and dry  Neurological:      Mental Status: She is alert and oriented to person, place, and time     Psychiatric:         Mood and Affect: Mood normal  Behavior: Behavior normal          Thought Content: Thought content normal          Judgment: Judgment normal           Vein mapping:  CONCLUSION:     Impression  RIGHT ARM:  The cephalic vein is patent and remains >2mm in diameter throughout the upper  arm  The basilic vein is patent and remains >2mm in diameter throughout the upper  arm  The brachial artery measures 4 9 mm in its greatest diameter and bifurcates at  the ACF  The subclavian, axillary and brachial arteries are widely patent  The IJV, subclavian, axillary and brachial veins are patent      LEFT ARM:  The cephalic vein is patent and remains >2mm in diameter throughout the arm  The basilic vein is patent and remains >2mm in diameter throughout the upper  arm  The brachial artery measures 5 0 mm in its greatest diameter and bifurcates at  the ACF  The subclavian, axillary and brachial arteries are widely patent    The IJV, subclavian, axillary and brachial veins are patent

## 2022-02-16 NOTE — ASSESSMENT & PLAN NOTE
Patient's past medical history of hypertension, end-stage renal disease, prior CVA  Patient presents for elective AV fistula creation  Patient has good exercise capacity and can perform greater than 4 METs  Initially hypertensive which is now resolved with p r n   Labetalol  Patient denies any chest pain, shortness of breath and examined euvolemic  Patient is moderate risk for procedure given known comorbidities but no contraindication to undergoing procedure at this time  No further cardiac workup required prior to procedure

## 2022-02-16 NOTE — ASSESSMENT & PLAN NOTE
Initially hypertensive resolved with labetalol  Continue Coreg, hydralazine, clonidine, nifedipine, terazosin, torsemide as prescribed  Would start p r n   Labetalol SBP greater than 170

## 2022-02-16 NOTE — CONSULTS
[facility] 1955, 73 y o  female MRN: [id number]  Unit/Bed#: OR [place] Encounter: [id number]  Primary Care Provider: Ada Cordoba MD   Date and time admitted to hospital: 2/16/2022  5:50 AM    Inpatient consult to Internal Medicine  Consult performed by: Dulce Maria Lara DO  Consult ordered by: Bettie Mi MD          Preoperative clearance  Assessment & Plan  Patient's past medical history of hypertension, end-stage renal disease, prior CVA  Patient presents for elective AV fistula creation  Patient has good exercise capacity and can perform greater than 4 METs  Initially hypertensive which is now resolved with p r n   Labetalol  Patient denies any chest pain, shortness of breath and examined euvolemic  Patient is moderate risk for procedure given known comorbidities but no contraindication to undergoing procedure at this time  No further cardiac workup required prior to procedure        Diarrhea  Assessment & Plan  Patient reports loose stools  Does not appear infectious  Can trial fiber supplementation  Record stool journal and bring to PCP    ESRD (end stage renal disease) [facility])  Assessment & Plan  Lab Results   Component Value Date    EGFR 12 02/16/2022    EGFR 8 12/17/2021    EGFR 11 12/16/2021    CREATININE 3 68 (H) 02/16/2022    CREATININE 5 14 (H) 12/17/2021    CREATININE 3 97 (H) 12/16/2021   Presents the hospital for elective AV fistula creation  Management per vascular surgery    Chronic diastolic heart failure (HCC)  Assessment & Plan  Wt Readings from Last 3 Encounters:   02/16/22 60 8 kg (134 lb 0 6 oz)   12/16/21 81 kg (178 lb 9 2 oz)   12/14/21 80 7 kg (177 lb 14 6 oz)       Patient reports taking torsemide 20 mg every other day  Exams is euvolemic  Continue current regimen      Essential (primary) hypertension  Assessment & Plan  Initially hypertensive resolved with labetalol  Continue Coreg, hydralazine, clonidine, nifedipine, terazosin, torsemide as prescribed  Would start p r n  Labetalol SBP greater than 170    Type 2 diabetes mellitus with chronic kidney disease on chronic dialysis, with long-term current use of insulin ([facility])  Assessment & Plan    Lab Results   Component Value Date    HGBA1C 7 9 (H) 12/11/2021   Per outpatient documentation patient is on Lantus 12 units q a m , Tradjenta 5 mg q a m , lyumjev 4 units with breakfast, 6 units with lunch, 6 units with dinner      VTE Prophylaxis:  Per primary team    Recommendations for Discharge:  · Continue home medications as prescribed  · No contraindication for procedure from Internal Medicine perspective  · Please reach out Internal Medicine if need for postoperative admission for management of hypertension    Counseling / Coordination of Care Time: 60 minutes  Greater than 50% of total time spent on patient counseling and coordination of care  History of Present Illness:    Rosales Ambrocio is a 73 y o  female With past medical history of end-stage renal disease, history of CVA, chronic diastolic heart failure, insulin-dependent type 2 diabetes who presents the hospital for elective AV fistula creation  Internal medicine consulted for preoperative hypertensive urgency  Patient was found to be hypertensive initial blood pressure 238/108  Patient received labetalol in same-day surgery with significant improvement in blood pressure  Blood pressures remained stable with SBP between 140-160  Patient is on several antihypertensives at home  This is managed under Nephrology care  She takes them as prescribed  She took all her home medications this morning  She denies any headaches, dizziness, chest pain, shortness of breath, palpitations  Additionally, reports loose stools  Review of Systems:    Review of Systems   Constitutional: Negative for chills and fever  HENT: Negative for sore throat and trouble swallowing      Eyes: Negative for photophobia and visual disturbance  Respiratory: Negative for shortness of breath and wheezing  Cardiovascular: Negative for chest pain and palpitations  Gastrointestinal: Positive for diarrhea  Negative for constipation, nausea and vomiting  Genitourinary: Negative for difficulty urinating and dysuria  Musculoskeletal: Negative for arthralgias and myalgias  Skin: Negative for rash and wound  Neurological: Negative for dizziness, light-headedness and headaches  Past Medical and Surgical History:     Past Medical History:   Diagnosis Date    [facility]congestive heart failure) ([facility])     CKD (chronic kidney disease) stage 5, GFR less than 15 ml/min ([facility])     Diabetes mellitus ([facility] )     Diabetic nephropathy ([facility] )     Hemodialysis patient ([place] [address] )     via permacath right chest / Evalee Pears Tues/Thurs and Sat    Hyperlipidemia     Hypertension     Muscle weakness     Orthodontics     sleeps with retainer at night    Risk for falls     Uses walker     per dtr active in the house able to cook/light cleaning as able       Past Surgical History:   Procedure Laterality Date    CATARACT EXTRACTION Bilateral     EGD      IR BIOPSY BONE MARROW  9/15/2021    IR BIOPSY KIDNEY COLUMBIA KIT NO LATERALITY  9/15/2021    IR NON-TUNNELED CENTRAL LINE PLACEMENT  12/7/2021    IR TUNNELED DIALYSIS CATHETER PLACEMENT  12/9/2021       Meds/Allergies:    Pertinent medications reviewed    Allergies:    Allergies   Allergen Reactions    Amlodipine Edema and Eye Swelling    Lisinopril Angioedema     Bilateral periorbital edema that was significant       Social History:     Marital Status: /Civil Union    Substance Use History:   Social History     Substance and Sexual Activity   Alcohol Use Not Currently     Social History     Tobacco Use   Smoking Status Never Smoker   Smokeless Tobacco Never Used   Tobacco Comment    NO TOBACCO USE     Social History     Substance and Sexual Activity   Drug Use Not Currently       Family History:    Pertinent family history reviewed    Physical Exam:     Vitals:   Blood Pressure: 155/71 (02/16/22 1315)  Pulse: 72 (02/16/22 1315)  Temperature: 97 8 °F (36 6 °C) (02/16/22 1315)  Temp Source: Temporal (02/16/22 1315)  Respirations: 18 (02/16/22 1315)  Height: 5' 2" (157 5 cm) (02/16/22 0187)  Weight - Scale: 60 8 kg (134 lb 0 6 oz) (02/16/22 0619)  SpO2: 95 % (02/16/22 1315)    Physical Exam  Vitals reviewed  Constitutional:       General: She is not in acute distress  Appearance: She is well-developed  She is not ill-appearing, toxic-appearing or diaphoretic  HENT:      Head: Normocephalic and atraumatic  Mouth/Throat:      Mouth: Mucous membranes are moist       Pharynx: No oropharyngeal exudate  Eyes:      General: No scleral icterus  Extraocular Movements: Extraocular movements intact  Conjunctiva/sclera: Conjunctivae normal    Cardiovascular:      Rate and Rhythm: Normal rate and regular rhythm  Heart sounds: Normal heart sounds  Pulmonary:      Effort: Pulmonary effort is normal  No respiratory distress  Breath sounds: Normal breath sounds  No wheezing or rales  Abdominal:      General: There is no distension  Palpations: Abdomen is soft  Tenderness: There is no abdominal tenderness  There is no guarding or rebound  Musculoskeletal:         General: No swelling, tenderness or deformity  Skin:     General: Skin is warm and dry  Neurological:      General: No focal deficit present  Mental Status: She is alert  Mental status is at baseline  Psychiatric:         Mood and Affect: Mood normal          Behavior: Behavior normal          Thought Content:  Thought content normal          Judgment: Judgment normal            Additional Data:     Lab Results: I have reviewed pertinent results         Results from last 7 days   Lab Units 02/16/22  0652   SODIUM mmol/L 137   POTASSIUM mmol/L 5 4*   CHLORIDE mmol/L 101   CO2 mmol/L 31   BUN mg/dL 33* CREATININE mg/dL 3 68*   ANION GAP mmol/L 5   CALCIUM mg/dL 8 9   GLUCOSE RANDOM mg/dL 154*             Lab Results   Component Value Date/Time    HGBA1C 7 9 (H) 12/11/2021 06:45 AM    HGBA1C 7 6 (H) 10/11/2021 11:33 AM    HGBA1C 9 5 (H) 07/23/2021 08:32 PM    HGBA1C 10 8 (H) 04/27/2021 09:31 AM    HGBA1C 12 0 (H) 03/07/2021 02:55 AM    HGBA1C 6 7 (H) 06/08/2018 09:26 AM     Results from last 7 days   Lab Units 02/16/22  1123 02/16/22  0700   POC GLUCOSE mg/dl 179* 155*           Imaging: I have reviewed pertinent images     No orders to display       EKG, Pathology, and Other Studies Reviewed on Admission:   · EKG:  Reviewed    ** Please Note: This note has been constructed using a voice recognition system   **

## 2022-02-16 NOTE — ASSESSMENT & PLAN NOTE
Lab Results   Component Value Date    EGFR 12 02/16/2022    EGFR 8 12/17/2021    EGFR 11 12/16/2021    CREATININE 3 68 (H) 02/16/2022    CREATININE 5 14 (H) 12/17/2021    CREATININE 3 97 (H) 12/16/2021   Presents the hospital for elective AV fistula creation  Management per vascular surgery

## 2022-02-16 NOTE — NURSING NOTE
202480, Dr Nathanael Caro updated patient informing her that she was going to be sent back to the same day surgery department and would be consulted and admitted due to her being hypertensive

## 2022-02-16 NOTE — PROGRESS NOTES
Pt arrived with limb alert on R arm  Dr Keisha Swan updated chart reviewed by myself R chest wall permacath  May use R arm and remove limb alert  Updated on Dr Adelia Yeh on Vitals and meds taken DOS

## 2022-02-16 NOTE — PROGRESS NOTES
Pt with elevated BP when arrived to preop 238/108  Received 20mg labetolol by Dr Dominic Telles  Pt's follow up /97  Pt came to [facility] area  Pt's BP ranging 763-954 systolics  Pt took all AM blood pressure meds and reports taking meds all the time  Pt reports no chest pain, no shortness of breath, no headaches, no dizziness, and no double vision  Let surgeon know  Surgeon requested that 20mg hydralazine to be given  Update - have been closing watching over pt  Pt BP now in the 159T systolic  Discussed case with nephrology  Team's Daniel Menendez who has been following pt  She states given pt has temporary dialysis line and uncontrolled BP that responded quickly to BP meds it would not be unreasonable to admit pt for BP control  Discussed with Dr Adelia Yeh again  He states okay to admit and better control BPs and to follow up at end of day with procedure if pt's BP is better controlled      Chantal Gonsales MD   2/16/2022  8:17 AM

## 2022-02-16 NOTE — ANESTHESIA POSTPROCEDURE EVALUATION
Post-Op Assessment Note    CV Status:  Stable    Pain management: adequate     Mental Status:  Alert and awake   Hydration Status:  Euvolemic   PONV Controlled:  Controlled   Airway Patency:  Patent      Post Op Vitals Reviewed: Yes      Staff: Anesthesiologist         No complications documented      /71 (02/16/22 1733)    Temp 98 1 °F (36 7 °C) (02/16/22 1733)    Pulse 72 (02/16/22 1733)   Resp 14 (02/16/22 1733)    SpO2 94 % (02/16/22 1733)

## 2022-02-16 NOTE — ASSESSMENT & PLAN NOTE
Wt Readings from Last 3 Encounters:   02/16/22 60 8 kg (134 lb 0 6 oz)   12/16/21 81 kg (178 lb 9 2 oz)   12/14/21 80 7 kg (177 lb 14 6 oz)       Patient reports taking torsemide 20 mg every other day  Exams is euvolemic  Continue current regimen

## 2022-02-16 NOTE — DISCHARGE INSTRUCTIONS
DISCHARGE INSTRUCTIONS  DIALYSIS FISTULA SURGERY    ACTIVITY:  Limit use of the operated arm to what is necessary for the first day after surgery  On the second day after surgery, you may start to increase use of your arm as tolerated  Avoid heavy lifting (no more than 15 lbs) for the first one week  You should start to exercise your hand on the side of the fistula by squeezing a stress ball or a rolled-up sock  This increases blood flow in your fistula and arm so your fistula will function better  Feel for a thrill every day  The thrill is the vibration or pulse you feel over the fistula that means the blood is flowing through it  If you cannot feel a thrill, call our office ([phone])  DIET:   Resume your normal diet  Good nutrition is important for healing of your incision  DRESSING:   You may have surgical glue at your surgical site  There are stitches present under the skin which will absorb on their own  The glue is used to cover the incision, assist in closure, and prevent contamination  This adhesive will darken and peel away on its own within one to two weeks  Do not pick at it  If you have a dressing over your surgical site, remove this on the second day after surgery  INCISION:   If you do not have a dialysis catheter in place, you may shower and get your incision wet  Wash incision daily with soap and water, but do not rub or scrub the incision; rinse thoroughly and pat dry  You may have stitches or staples to close your incision and it is okay for these to get wet  Do not bathe in a tub or swim for the first 4 week following surgery or if you have any open wounds  It is normal to have mild swelling or discoloration around the incision  If increasing redness or pain develops, call our office immediately  Numbness in the region of the incision may occur following the surgery  This normally improves over six to twelve months    If you have numbness or pain in your hand, please call our office immediately  DO NOT put any powders, creams, ointments, or lotions on your incision  ARM SWELLING:    Most patients have some noticeable arm swelling after surgery  This usually disappears within a few weeks  If swelling is present, elevate the arm whenever possible  RESTRICTIONS:   Do NOT have blood draws, IV's, or blood pressures performed on the operated arm  FISTULA USE:    Your fistula will not be used until it has fully matured - approximately 6 to 12 weeks  If you are using a catheter for dialysis, this will not be removed until after your fistula has matured and is being used for dialysis without any issues  FOLLOW UP STUDIES:  A Doppler ultrasound will be performed about 5-6 weeks after surgery  Your surgeon will arrange this at your first postoperative visit  FOLLOW UP APPOINTMENTS:  Making and keeping follow up appointments and ultrasound tests are important to your recovery  If you have difficulty making it to or keeping your follow up appointments, call the office  If you have increased pain, fever >101 5, increased drainage, redness or a bad smell at your surgery site, new coldness/numbness of your arm or leg, please call us immediately and GO directly to the ER  PLEASE CALL THE OFFICE IF YOU HAVE ANY QUESTIONS  [phone] Dannielle [facility] [phone] [facility] FREE [phone]  [address] [place] [address] , [facility] 206, [facility] [place], [address] [place] [address]  600 [facility] 20, [address] [facility], 210 [facility]  [address]  [address] [place] [address], [facility], [facility], 5974 [facility]  [address], [address]st Floor, [address]  [address], [address] [facility], [address]  1307 [facility], [address] [place], [facility] [place], 960 [facility]  [facility], 532 [facility], One [facility],[address], [address]  201 [facility], Vi Mace, [address]  [address]

## 2022-02-16 NOTE — ASSESSMENT & PLAN NOTE
Patient reports loose stools  Does not appear infectious  Can trial fiber supplementation  Record stool journal and bring to PCP

## 2022-02-16 NOTE — OP NOTE
PERATIVE REPORT  PATIENT NAME: Ra Pink    :  1955  MRN: [id number]  Pt Location: [place] [address]    SURGERY DATE: 2022    Surgeon(s) and Role:     * Lis Phillips, DO - Primary     * Cortez Garcia PA-C - Assisting    Preop Diagnosis:  Stage 4 chronic kidney disease ([address] ) [N18 4]    Post-Op Diagnosis Codes:     * Stage 4 chronic kidney disease ([address] ) [N18 4]    Procedure(s) (LRB):  CREATION FISTULA ARTERIOVENOUS (AV) (Left)    Specimen(s):  * No specimens in log *    Estimated Blood Loss:   Minimal    Drains:  * No LDAs found *    Anesthesia Type:   General    Operative Indications:  Stage 4 chronic kidney disease ([facility] ) [N18 4]  Aaron Amaya is a pleasant 80-year-old woman with end-stage renal disease who presented to the office to discuss dialysis access creation  She underwent vein mapping which suggested suitable left cephalic vein for fistula creation  Left arm fistula creation recommended  The procedure, risks, benefits, alternatives, anticipated postop course were discussed in detail  Patient was agreeable proceed  Written consent was obtained  Patient brought to the operating room for the above-mentioned procedure  Operative Findings:  Brachial artery approximately 6 mm in diameter and relatively healthy  Left cephalic vein approximately 4 mm in diameter  Excellent thrill at conclusion of case  Complications:   None    Procedure and Technique:  The patient was properly identified in the preop holding early this morning  She was noted to be hypertensive with systolic blood pressures in the 220s  As such she was treated with labetalol and hydralazine and a medicine consultation requested  She was reassessed later today in her blood pressure was deemed acceptable to proceed with fistula creation  The patient's arm had been previously marked  The patient was brought to the operating room where she was positioned supine on the OR table with the left arm in 90° abduction  After adequate induction general anesthesia via LMA the left upper extremity was circumferentially prepped using chlorhexidine and draped in usual sterile fashion  A preoperative dose of antibiotics was given  A formal time-out was performed all were in agreement  A transverse skin incision was carried out approximately 2 fingerbreadths below the antecubital crease using a 15  Scalpel  This was deepened down through the subcutaneous tissue using the electrocautery  Self-retaining retractors were positioned to facilitate exposure  The cephalic vein was identified and mobilized proximally and distally  There was noted to be several branches at the level of the antecubital crease  The branches were divided between silk ties  Through the same incision the distal brachial artery was exposed  The bicipital aponeurosis was partially divided  Dissection was carried down onto the artery which was circumferentially dissected free from its surrounding tissue and encircled proximally and distally with vessel loops  Patient was given 5000 units of IV heparin  The vein was next hemoclipped distally and transected  The distal end of the vein was next dilated using heparin papaverine solution  The end of the vein was spatulated  Next the brachial artery was occluded using the previously placed vessel loops  A 11  Scalpel was used to create arteriotomy which was extended with Schafer scissors  A stay stitch was placed to facilitate exposure of the arteriotomy site  An end-to-side anastomosis was fashioned using a running 6-0 Prolene suture  Prior to completing the anastomosis the vessels were appropriately forward and back bled  The anastomosis was completed and suture line secured  Flow was initiated through the fistula  There was noted to be a good thrill  The anastomosis was noted to be hemostatic  Local anesthetic was infiltrated to skin and subcutaneous tissue    The incision was closed in a multilayer fashion  Exofin glue was applied to the skin  All needles, instruments, and sponge counts reported correct  Patient tolerated procedure well she was awakened, extubated and transferred to recovery room in stable condition  I was present for the entire procedure, A qualified resident physician was not available and A physician assistant was required during the procedure for retraction tissue handling,dissection and suturing    Patient Disposition:  PACU       SIGNATURE: Anupam Ortega   DATE: February 16, 2022  TIME: 4:53 PM      Vascular Quality Initiative - Hemodialysis Access Placement    Pre-admission Information   Functional status: Restricted in physically strenuous activity but ambulatory and able to carry out work of a light or sedentary nature  ESRD: ESRD: Hemodialysis dependent  Current Access Type : Tunneled Catheter    Historical Information      Previous Access: none    Access Type/Location:         Procedure Information      Status: Outpatient     Side:left      Anesthesia: General     Access Type: Access Type: Surgical AVF Inflow Artery is: Brachial, Antecubital Intraoperative Artery taget diameter is: 6mm  Outflow Vein is: Cephalic, Forearm  Intraoperative Vein target diameter is: 4mm  Anastomosis configuration is: End to Side    Cocomitant Procedure performed-: None    Completion Fistulogram: no     Preop ARTERIAL evaluation and/or treatment:     Preop VENOUS evaluation and/or treatment: ultrasound mapping    *Obtain Target Diameters from study          Post op Information     Discharge Status: Home    Post op Complications: None

## 2022-02-18 ENCOUNTER — TELEPHONE (OUTPATIENT)
Dept: VASCULAR SURGERY | Facility: CLINIC | Age: 67
End: 2022-02-18

## 2022-02-18 NOTE — TELEPHONE ENCOUNTER
Vascular Nurse Navigator Post Op Call    Procedure: CREATION FISTULA ARTERIOVENOUS (AV) (Left)    Date of Procedure: 2/16/22    Surgeon:    * Marcellus Dolan DO - Primary     * Brandi Santacruz PA-C - Assisting      Painful tingling or numbness in your fingers?: No    Paleness/Coolness in hands/fingers?: No    Redness, swelling or pus from your wound?: No    Bleeding?: No    Thrill present?: patient's son unsure on how to feel for a thrill  He stated he will have dialysis check her fistula tomorrow  Anticoagulation pt was discharged on post op?: Aspirin    Statin pt was discharged on post op?:  Lipitor (atorvastatin)    Fever/chills?: No    Uncontrolled Pain?: No      Reviewed discharge instructions and incision care with patient  Dialysis Days and Location:  [facility] [address] OFFICE VISIT:  3/2/22 at 1:30 pm with Dr Alyce Perry at The [address] [place] [address] Road    Transportation Confirmed?: Yes      Any Questions or Concerns? Spoke with patient's son in regards to above as he is listed as her primary contact number  He stated that patient is doing well since procedure and offers no complaints  He stated that patient still has bandage on incision  Advised that they can remove bandage and wash incision daily with soap and water  All questions answered  No concerns expressed at this time

## 2022-02-25 NOTE — PROGRESS NOTES
Assessment/Plan:    ESRD (end stage renal disease) ([facility] [address] )  Lab Results   Component Value Date    EGFR 12 02/16/2022    EGFR 8 12/17/2021    EGFR 11 12/16/2021    CREATININE 3 68 (H) 02/16/2022    CREATININE 5 14 (H) 12/17/2021    CREATININE 3 97 (H) 12/16/2021    Status post left brachiocephalic AV fistula creation on 02/16/2022  Incision appears to be healing adequately with minimal fullness  No pulsatile mass appreciated  Patient has an excellent thrill  She denies any symptoms suggestive any significant steal   She has a palpable left radial artery pulse  Will obtain a hemodialysis duplex to assess fistula maturation in approximately 4 weeks  Diagnoses and all orders for this visit:    ESRD (end stage renal disease) ([facility] )  -     VAS hemodialysis access duplex left upper limb avf; Future          Subjective:      Patient ID: Lion Garcia is a 73 y o  female  Patient presents s/p LUE AVF creation by Dr Carmen Gurrola on 2/16/2022  Pt denies fever/chills  Incision site in L arm is closed and healing well  Pt denies pain, redness, swelling in arm  She denies coldness, numbness, tingling  Her ROM is not limited  Pt is currently on Dialysis T-Th-Sat at [facility] in [facility]  Pt is taking ASA81 and Atorvastatin  She is not a smoker  Patient returns to the office for 1st routine postoperative visit status post left brachiocephalic AV fistula creation on 02/16/2022  The following portions of the patient's history were reviewed and updated as appropriate: allergies, current medications, past family history, past medical history, past social history, past surgical history and problem list     Review of Systems   Constitutional: Negative  HENT: Negative  Eyes: Negative  Respiratory: Negative  Cardiovascular: Negative  Gastrointestinal: Negative  Endocrine: Negative  Genitourinary: Negative  Musculoskeletal: Negative  Skin: Negative  Allergic/Immunologic: Negative  Neurological: Positive for dizziness  Hematological: Negative  Psychiatric/Behavioral: Negative  I have personally reviewed the ROS entered by MA and agree as documented  Objective:      /62 (BP Location: Right arm, Patient Position: Sitting, Cuff Size: Standard)   Temp 98 3 °F (36 8 °C) (Tympanic)   Ht 5' 2" (1 575 m)   Wt 61 6 kg (135 lb 12 8 oz)   BMI 24 84 kg/m²          Physical Exam  Constitutional:       General: She is not in acute distress  Appearance: Normal appearance  She is not ill-appearing, toxic-appearing or diaphoretic  HENT:      Head: Atraumatic  Cardiovascular:      Comments: Left brachiocephalic AV fistula with excellent thrill and bruit  Pulmonary:      Effort: Pulmonary effort is normal    Skin:     Comments: Left antecubital incision appears to be healing adequately with no signs of infection  Neurological:      Mental Status: She is alert  Psychiatric:         Mood and Affect: Mood normal          Behavior: Behavior normal          Thought Content:  Thought content normal          Judgment: Judgment normal

## 2022-03-02 ENCOUNTER — OFFICE VISIT (OUTPATIENT)
Dept: VASCULAR SURGERY | Facility: CLINIC | Age: 67
End: 2022-03-02

## 2022-03-02 VITALS
DIASTOLIC BLOOD PRESSURE: 62 MMHG | TEMPERATURE: 98.3 F | BODY MASS INDEX: 24.99 KG/M2 | SYSTOLIC BLOOD PRESSURE: 130 MMHG | WEIGHT: 135.8 LBS | HEIGHT: 62 IN

## 2022-03-02 DIAGNOSIS — N18.6 ESRD (END STAGE RENAL DISEASE) (HCC): Primary | ICD-10-CM

## 2022-03-02 PROCEDURE — 99024 POSTOP FOLLOW-UP VISIT: CPT | Performed by: SURGERY

## 2022-03-02 NOTE — ASSESSMENT & PLAN NOTE
Lab Results   Component Value Date    EGFR 12 02/16/2022    EGFR 8 12/17/2021    EGFR 11 12/16/2021    CREATININE 3 68 (H) 02/16/2022    CREATININE 5 14 (H) 12/17/2021    CREATININE 3 97 (H) 12/16/2021    Status post left brachiocephalic AV fistula creation on 02/16/2022  Incision appears to be healing adequately with minimal fullness  No pulsatile mass appreciated  Patient has an excellent thrill  She denies any symptoms suggestive any significant steal   She has a palpable left radial artery pulse  Will obtain a hemodialysis duplex to assess fistula maturation in approximately 4 weeks

## 2022-03-18 ENCOUNTER — TELEPHONE (OUTPATIENT)
Dept: HEMATOLOGY ONCOLOGY | Facility: CLINIC | Age: 67
End: 2022-03-18

## 2022-03-28 ENCOUNTER — APPOINTMENT (OUTPATIENT)
Dept: LAB | Facility: HOSPITAL | Age: 67
End: 2022-03-28
Payer: MEDICARE

## 2022-03-28 DIAGNOSIS — R94.6 NONSPECIFIC ABNORMAL RESULTS OF THYROID FUNCTION STUDY: ICD-10-CM

## 2022-03-28 DIAGNOSIS — E55.9 AVITAMINOSIS D: ICD-10-CM

## 2022-03-28 DIAGNOSIS — Z79.4 CONTROLLED TYPE 2 DIABETES MELLITUS WITH DIABETIC POLYNEUROPATHY, WITH LONG-TERM CURRENT USE OF INSULIN (HCC): ICD-10-CM

## 2022-03-28 DIAGNOSIS — E11.42 CONTROLLED TYPE 2 DIABETES MELLITUS WITH DIABETIC POLYNEUROPATHY, WITH LONG-TERM CURRENT USE OF INSULIN (HCC): ICD-10-CM

## 2022-03-28 DIAGNOSIS — N18.6 END STAGE RENAL DISEASE (HCC): ICD-10-CM

## 2022-03-28 LAB
25(OH)D3 SERPL-MCNC: 60.8 NG/ML (ref 30–100)
ALBUMIN SERPL BCP-MCNC: 3.9 G/DL (ref 3–5.2)
ALP SERPL-CCNC: 56 U/L (ref 43–122)
ALT SERPL W P-5'-P-CCNC: 11 U/L
ANION GAP SERPL CALCULATED.3IONS-SCNC: 8 MMOL/L (ref 5–14)
AST SERPL W P-5'-P-CCNC: 25 U/L (ref 14–36)
BILIRUB SERPL-MCNC: 0.43 MG/DL
BUN SERPL-MCNC: 63 MG/DL (ref 5–25)
CALCIUM SERPL-MCNC: 8.5 MG/DL (ref 8.4–10.2)
CHLORIDE SERPL-SCNC: 104 MMOL/L (ref 97–108)
CO2 SERPL-SCNC: 28 MMOL/L (ref 22–30)
CREAT SERPL-MCNC: 5.36 MG/DL (ref 0.6–1.2)
EST. AVERAGE GLUCOSE BLD GHB EST-MCNC: 169 MG/DL
GFR SERPL CREATININE-BSD FRML MDRD: 7 ML/MIN/1.73SQ M
GLUCOSE P FAST SERPL-MCNC: 86 MG/DL (ref 70–99)
HBA1C MFR BLD: 7.5 %
POTASSIUM SERPL-SCNC: 5.6 MMOL/L (ref 3.6–5)
PROT SERPL-MCNC: 7.5 G/DL (ref 5.9–8.4)
PTH-INTACT SERPL-MCNC: 330.9 PG/ML (ref 18.4–80.1)
SODIUM SERPL-SCNC: 140 MMOL/L (ref 137–147)
T4 FREE SERPL-MCNC: 0.99 NG/DL (ref 0.76–1.46)
TSH SERPL DL<=0.05 MIU/L-ACNC: 0.11 UIU/ML (ref 0.47–4.68)

## 2022-03-28 PROCEDURE — 84443 ASSAY THYROID STIM HORMONE: CPT

## 2022-03-28 PROCEDURE — 83036 HEMOGLOBIN GLYCOSYLATED A1C: CPT

## 2022-03-28 PROCEDURE — 86376 MICROSOMAL ANTIBODY EACH: CPT

## 2022-03-28 PROCEDURE — 82306 VITAMIN D 25 HYDROXY: CPT

## 2022-03-28 PROCEDURE — 83970 ASSAY OF PARATHORMONE: CPT

## 2022-03-28 PROCEDURE — 36415 COLL VENOUS BLD VENIPUNCTURE: CPT

## 2022-03-28 PROCEDURE — 80053 COMPREHEN METABOLIC PANEL: CPT

## 2022-03-28 PROCEDURE — 84439 ASSAY OF FREE THYROXINE: CPT

## 2022-03-29 ENCOUNTER — PREP FOR PROCEDURE (OUTPATIENT)
Dept: INTERVENTIONAL RADIOLOGY/VASCULAR | Facility: CLINIC | Age: 67
End: 2022-03-29

## 2022-03-29 DIAGNOSIS — N18.6 ESRD (END STAGE RENAL DISEASE) (HCC): Primary | ICD-10-CM

## 2022-03-29 LAB — THYROPEROXIDASE AB SERPL-ACNC: 18 IU/ML (ref 0–34)

## 2022-04-01 ENCOUNTER — HOSPITAL ENCOUNTER (OUTPATIENT)
Dept: INTERVENTIONAL RADIOLOGY/VASCULAR | Facility: HOSPITAL | Age: 67
Discharge: HOME | End: 2022-04-01
Attending: STUDENT IN AN ORGANIZED HEALTH CARE EDUCATION/TRAINING PROGRAM | Admitting: RADIOLOGY
Payer: MEDICARE

## 2022-04-01 DIAGNOSIS — N18.6 ESRD (END STAGE RENAL DISEASE) (HCC): ICD-10-CM

## 2022-04-01 PROCEDURE — 36598 INJ W/FLUOR EVAL CV DEVICE: CPT | Performed by: RADIOLOGY

## 2022-04-01 PROCEDURE — 36593 DECLOT VASCULAR DEVICE: CPT

## 2022-04-01 RX ADMIN — ALTEPLASE: 2.2 INJECTION, POWDER, LYOPHILIZED, FOR SOLUTION INTRAVENOUS at 10:58

## 2022-04-01 RX ADMIN — ALTEPLASE: 2.2 INJECTION, POWDER, LYOPHILIZED, FOR SOLUTION INTRAVENOUS at 10:59

## 2022-04-01 NOTE — SEDATION DOCUMENTATION
Following catheter imaging with contrast, Dr Cindi Ceballos would like to infuse TPA over 1hour in each port

## 2022-04-04 ENCOUNTER — HOSPITAL ENCOUNTER (OUTPATIENT)
Dept: NON INVASIVE DIAGNOSTICS | Facility: CLINIC | Age: 67
Discharge: HOME | End: 2022-04-04
Payer: MEDICARE

## 2022-04-04 DIAGNOSIS — N18.6 ESRD (END STAGE RENAL DISEASE) (HCC): ICD-10-CM

## 2022-04-04 PROCEDURE — 93990 DOPPLER FLOW TESTING: CPT

## 2022-04-05 PROCEDURE — 93990 DOPPLER FLOW TESTING: CPT | Performed by: SURGERY

## 2022-04-27 ENCOUNTER — OFFICE VISIT (OUTPATIENT)
Dept: VASCULAR SURGERY | Facility: CLINIC | Age: 67
End: 2022-04-27

## 2022-04-27 ENCOUNTER — TELEPHONE (OUTPATIENT)
Dept: VASCULAR SURGERY | Facility: CLINIC | Age: 67
End: 2022-04-27

## 2022-04-27 VITALS
BODY MASS INDEX: 26.52 KG/M2 | SYSTOLIC BLOOD PRESSURE: 140 MMHG | HEART RATE: 69 BPM | DIASTOLIC BLOOD PRESSURE: 60 MMHG | WEIGHT: 145 LBS

## 2022-04-27 DIAGNOSIS — N18.6 ESRD (END STAGE RENAL DISEASE) (HCC): Primary | ICD-10-CM

## 2022-04-27 PROCEDURE — 99024 POSTOP FOLLOW-UP VISIT: CPT | Performed by: SURGERY

## 2022-04-27 RX ORDER — GLUCAGON INJECTION, SOLUTION 1 MG/.2ML
1 INJECTION, SOLUTION SUBCUTANEOUS
COMMUNITY
Start: 2022-04-13

## 2022-04-27 RX ORDER — CLONIDINE 0.2 MG/24H
1 PATCH, EXTENDED RELEASE TRANSDERMAL WEEKLY
COMMUNITY
Start: 2022-04-14 | End: 2022-06-15

## 2022-04-27 NOTE — PROGRESS NOTES
Assessment/Plan:    ESRD (end stage renal disease) ([facility] )  Lab Results   Component Value Date    EGFR 7 03/28/2022    EGFR 12 02/16/2022    EGFR 8 12/17/2021    CREATININE 5 36 (H) 03/28/2022    CREATININE 3 68 (H) 02/16/2022    CREATININE 5 14 (H) 12/17/2021   s/p L BC AVF creation back in feb  Patient on dialysis via a right chest well catheter  HD duplex demonstrates adequate flow volume however vein appears marginal in caliber  Recommend LUE fistulagram with possible balloon assisted maturation if indicated  Will consult IR       Diagnoses and all orders for this visit:    ESRD (end stage renal disease) ([facility] )  -     IR AV fistulagram/graftogram; Future    Other orders  -     cloNIDine (CATAPRES-TTS-2) 0 2 mg/24 hr; Place 1 patch on the skin once a week  -     Glucagon (Gvoke HypoPen 2-Pack) 1 MG/0 2ML SOAJ; Inject 1 mg under the skin          Subjective:      Patient ID: Saadia Calvin is a 73 y o  female  Patient present to review HD done on 4/4/22  Patient denies any pain, soreness,swelling, discoloration or wound on the area  Patient reports she has full ROM and have had no injuries to the area  Patient is currently taking ASA and Atorvastatin  Patient goes to Dialysis on Tuesday, Thursday and Saturday  Thrill and Bruit present      Hector Wills Is a 62-year-old woman with end-stage renal disease on dialysis via right chest wall catheter status post left upper extremity brachiocephalic AV fistula back in February  She returns to the office review results of her hemodialysis duplex  She   Denies any left hand pain/paresthesias  The following portions of the patient's history were reviewed and updated as appropriate: allergies, current medications, past family history, past medical history, past social history, past surgical history and problem list     Review of Systems   Constitutional: Negative  HENT: Negative  Eyes: Negative  Respiratory: Negative  Cardiovascular: Negative  Gastrointestinal: Negative  Endocrine: Negative  Genitourinary: Negative  Musculoskeletal: Negative  Skin: Negative  Allergic/Immunologic: Negative  Neurological: Negative  Hematological: Negative  Psychiatric/Behavioral: Negative  I have personally reviewed the ROS entered by MA and agree as documented  Objective:      /60 (BP Location: Right arm, Patient Position: Sitting, Cuff Size: Adult)   Pulse 69   Wt 65 8 kg (145 lb)   BMI 26 52 kg/m²          Physical Exam  Constitutional:       General: She is not in acute distress  Appearance: She is well-developed  HENT:      Head: Normocephalic and atraumatic  Eyes:      General: No scleral icterus  Conjunctiva/sclera: Conjunctivae normal    Neck:      Trachea: No tracheal deviation  Cardiovascular:      Rate and Rhythm: Normal rate  Comments:   Left brachiocephalic AV fistula  with excellent thrill  Pulmonary:      Effort: Pulmonary effort is normal    Abdominal:      General: There is no distension  Palpations: Abdomen is soft  There is no mass (no appreciable aortic pulsation/aneurysm)  Tenderness: There is no abdominal tenderness  There is no guarding or rebound  Musculoskeletal:         General: Normal range of motion  Cervical back: Normal range of motion and neck supple  Skin:     General: Skin is warm and dry  Neurological:      Mental Status: She is alert and oriented to person, place, and time  Psychiatric:         Mood and Affect: Mood normal          Behavior: Behavior normal          Thought Content: Thought content normal         Hemodialysis duplex:   CONCLUSION:     Impression  The dialysis AVF appears to be matured with adequate diameter, flow volumes and  less than 6 mm in depth throughout the arm with findings suggesting a >50%  stenosis noted peripheral to the Arterio-Venous Fistula       Triphasic waveforms noted at the wrist with antegrade high resistive flow noted  in the radial and ulnar arteries  No evidence of inflow or outflow obstruction  No evidence of a pseudoaneurysm  No evidence of a large venous branch

## 2022-04-27 NOTE — ASSESSMENT & PLAN NOTE
Lab Results   Component Value Date    EGFR 7 03/28/2022    EGFR 12 02/16/2022    EGFR 8 12/17/2021    CREATININE 5 36 (H) 03/28/2022    CREATININE 3 68 (H) 02/16/2022    CREATININE 5 14 (H) 12/17/2021   s/p L BC AVF creation back in feb  Patient on dialysis via a right chest well catheter  HD duplex demonstrates adequate flow volume however vein appears marginal in caliber  Recommend LUE fistulagram with possible balloon assisted maturation if indicated  Will consult IR

## 2022-04-28 ENCOUNTER — PREP FOR PROCEDURE (OUTPATIENT)
Dept: INTERVENTIONAL RADIOLOGY/VASCULAR | Facility: CLINIC | Age: 67
End: 2022-04-28

## 2022-04-28 DIAGNOSIS — N18.6 ESRD (END STAGE RENAL DISEASE) (HCC): Primary | ICD-10-CM

## 2022-05-03 NOTE — TELEPHONE ENCOUNTER
Authorization requirements reviewed  Please refer to Referral number  [id number] for case updates      No Authorization Required
REMINDER: Under Reason For Call, comments MUST be formatted as:   (Surgeon's Initials) / (Procedure)      Special Instructions / FYI: IR AV fistulagram      Procedure: Fistulogram     Level: 2 - Route clearance(s) to [facility] Clearance Pool     Allergies: Amlodipine and Lisinopril    Instructions Given: Fistulagram / Venogram Instructions    Dialysis: Yes  Where: [id number] // Days: Tuesday, Thursday, and Saturday    Return Visit Required Prior to Procedure: No     Consent: NO CONSENT REQUIRED    For Surgical Clearances     Levels   1-3   ROUTE this encounter to [facility] Clearance Pool (AND)   [facility] Surgery Coordinator Pool     Level   4   ROUTE this encounter to [facility] Surgery Coordinator [address] [place] [address] TO  [facility] Clearance Pool     Patient does not require any pre operative clearance  Yes, I have ROUTED this encounter to [facility] Surgery Coordinator and/or [facility] Clearance Pool 
Verified patient's insurance   CONFIRMED - Patient's insurance is Medicare  Is patient requesting a call when authorization has been obtained? Patient did not request a call  Surgery Date: 5/9/22  Primary Surgeon: TO BE DONE BY IR   Assisting Surgeon: Not Applicable (N/A)  Facility: [facility] (Tax: [id number] / NPI: [id number])  Inpatient / Outpatient: Outpatient  Level: 2    Clearance Received: No clearance ordered  Consent Received: Consent will be signed day of procedure  Medication Hold / Last Dose: Not Applicable (N/A)  VQI Spreadsheet: Not Applicable (N/A)  IR Notified: 4/27/22  Rep   Notified: Not Applicable (N/A)  Equipment Needs: Not Applicable (N/A)  Vas Lab Requested: Not Applicable (N/A)  Patient Contacted: 5/3/22    Diagnosis: N18 6  Procedure/ CPT Code(s): Fistulogram // CPT: 04658    For varicose vein related procedures:   Last LEVDR: Not Applicable (N/A)  CEAP Classification: Not Applicable (N/A)  VCSS: Not Applicable (N/A)    Post Operative Date/ Time: To Be Determined (TBD)
normal

## 2022-05-04 ENCOUNTER — TELEPHONE (OUTPATIENT)
Dept: INTERVENTIONAL RADIOLOGY/VASCULAR | Facility: HOSPITAL | Age: 67
End: 2022-05-04

## 2022-05-05 ENCOUNTER — TELEPHONE (OUTPATIENT)
Dept: INTERVENTIONAL RADIOLOGY/VASCULAR | Facility: HOSPITAL | Age: 67
End: 2022-05-05

## 2022-05-09 ENCOUNTER — HOSPITAL ENCOUNTER (OUTPATIENT)
Dept: INTERVENTIONAL RADIOLOGY/VASCULAR | Facility: HOSPITAL | Age: 67
Discharge: HOME | End: 2022-05-09
Attending: STUDENT IN AN ORGANIZED HEALTH CARE EDUCATION/TRAINING PROGRAM | Admitting: STUDENT IN AN ORGANIZED HEALTH CARE EDUCATION/TRAINING PROGRAM
Payer: MEDICARE

## 2022-05-09 VITALS
DIASTOLIC BLOOD PRESSURE: 63 MMHG | HEART RATE: 64 BPM | SYSTOLIC BLOOD PRESSURE: 137 MMHG | OXYGEN SATURATION: 97 % | WEIGHT: 145 LBS | RESPIRATION RATE: 18 BRPM | BODY MASS INDEX: 26.68 KG/M2 | TEMPERATURE: 97.1 F | HEIGHT: 62 IN

## 2022-05-09 DIAGNOSIS — N18.6 ESRD (END STAGE RENAL DISEASE) (HCC): ICD-10-CM

## 2022-05-09 LAB — GLUCOSE SERPL-MCNC: 59 MG/DL (ref 65–140)

## 2022-05-09 PROCEDURE — C1769 GUIDE WIRE: HCPCS

## 2022-05-09 PROCEDURE — 36902 INTRO CATH DIALYSIS CIRCUIT: CPT | Performed by: STUDENT IN AN ORGANIZED HEALTH CARE EDUCATION/TRAINING PROGRAM

## 2022-05-09 PROCEDURE — C1887 CATHETER, GUIDING: HCPCS

## 2022-05-09 PROCEDURE — 76937 US GUIDE VASCULAR ACCESS: CPT

## 2022-05-09 PROCEDURE — 76937 US GUIDE VASCULAR ACCESS: CPT | Performed by: STUDENT IN AN ORGANIZED HEALTH CARE EDUCATION/TRAINING PROGRAM

## 2022-05-09 PROCEDURE — 99152 MOD SED SAME PHYS/QHP 5/>YRS: CPT | Performed by: STUDENT IN AN ORGANIZED HEALTH CARE EDUCATION/TRAINING PROGRAM

## 2022-05-09 PROCEDURE — C1894 INTRO/SHEATH, NON-LASER: HCPCS

## 2022-05-09 PROCEDURE — 36902 INTRO CATH DIALYSIS CIRCUIT: CPT

## 2022-05-09 PROCEDURE — 82948 REAGENT STRIP/BLOOD GLUCOSE: CPT

## 2022-05-09 PROCEDURE — C1725 CATH, TRANSLUMIN NON-LASER: HCPCS

## 2022-05-09 RX ORDER — HYDRALAZINE HYDROCHLORIDE 20 MG/ML
INJECTION INTRAMUSCULAR; INTRAVENOUS CODE/TRAUMA/SEDATION MEDICATION
Status: COMPLETED | OUTPATIENT
Start: 2022-05-09 | End: 2022-05-09

## 2022-05-09 RX ORDER — FENTANYL CITRATE 50 UG/ML
INJECTION, SOLUTION INTRAMUSCULAR; INTRAVENOUS CODE/TRAUMA/SEDATION MEDICATION
Status: COMPLETED | OUTPATIENT
Start: 2022-05-09 | End: 2022-05-09

## 2022-05-09 RX ADMIN — IOHEXOL 8 ML: 350 INJECTION, SOLUTION INTRAVENOUS at 14:48

## 2022-05-09 RX ADMIN — HYDRALAZINE HYDROCHLORIDE 10 MG: 20 INJECTION INTRAMUSCULAR; INTRAVENOUS at 14:39

## 2022-05-09 RX ADMIN — FENTANYL CITRATE 50 MCG: 50 INJECTION, SOLUTION INTRAMUSCULAR; INTRAVENOUS at 14:28

## 2022-05-09 RX ADMIN — FENTANYL CITRATE 50 MCG: 50 INJECTION, SOLUTION INTRAMUSCULAR; INTRAVENOUS at 14:34

## 2022-05-09 NOTE — NURSING NOTE
Pt returned post-procedure in stable condition, dressing noted on left upper extremity dry and clean  Woggle suture reported to be under dressing per IR report and to be removed by IR staff  Patient denying pain  Tolerating food and drink without difficulty  VSS

## 2022-05-09 NOTE — DISCHARGE INSTRUCTIONS
Fistulagram   WHAT YOU NEED TO KNOW:   Your arm or leg my  be sore, swollen, and bruised after the procedure  This is normal and should get better in a few days  DISCHARGE INSTRUCTIONS:     Contact Interventional Radiology at [phone] and follow prompts if you experience any:    · You have a fever or chills  · Your puncture site is red, swollen, or draining pus  · You have nausea or are vomiting  · Your skin is itchy, swollen, or you have a rash  · You cannot feel a thrill over your graft or fistula  · You have questions or concerns about your condition or care  Seek care immediately if:     · You have bleeding that does not stop after 10 minutes of holding firm, direct pressure over the puncture site  · Blood soaks through your bandage  · Your hand or foot closest to the graft or fistula feels cold, painful, or numb  · Your hand or foot closest to the graft or fistula is pale or blue  · You have trouble moving your arm or leg closest to the graft or fistula  · Your bruise suddenly gets bigger  Care for your wound as directed:  Remove the bandage in 4 to 6 hours or as         directed  Wash the area once a day with soap and water  Gently pat the area dry  Apply firm, steady pressure to the puncture site if it bleeds  Use a clean gauze or towel to hold pressure for 10 to 15 minutes  Call 911 if you cannot stop the bleeding or the bleeding gets heavier  Feel for a thrill once a day or as directed  Place your index and second finger over your fistula or graft as directed  You should feel a vibration  The vibration means that blood is flowing through your graft or fistula correctly  Rest your arm or leg as directed  Do not lift anything heavier than 5 pounds or do strenuous activity for 24 hours  Prevent damage to your graft or fistula  Do not wear tight-fitting clothing over your graft or fistula   Do not wear tight jewelry on the arm or leg with the graft or fistula  Tell healthcare providers not to do, IVs, blood draws, and blood pressure readings in the arm with your graft or fistula  Do not allow flu shots or vaccinations in your arm with your graft or fistula  Follow up with your healthcare provider as directed      Procedural Sedation   WHAT YOU NEED TO KNOW:   Procedural sedation is medicine used during procedures to help you feel relaxed and calm  You will remember little to none of the procedure  After sedation you may feel tired, weak, or unsteady on your feet  You may also have trouble concentrating or short-term memory loss  These symptoms should go away in 24 hours or less  DISCHARGE INSTRUCTIONS:     Call 911 or have someone else call for any of the following:   · You have sudden trouble breathing      · You cannot be woken  Contact Interventional Radiology at [phone]   Samir PATIENTS: Contact Interventional Radiology at [phone]) Tabatha Jasmine PATIENTS: Contact Interventional Radiology at [phone]) if any of the following occur:     · You have a severe headache or dizziness      · Your heart is beating faster than usual     · You have a fever or chills      · Your skin is itchy, swollen, or you have a rash      · You have nausea or are vomiting for more than 8 hours after the procedure       · You have questions or concerns about your condition or care  Self-care:   · Have someone stay with you for 24 hours  This person can drive you to errands and help you do things around the house  This person can also watch for problems       · Rest and do quiet activities for 24 hours  Do not exercise, ride a bike, or play sports  Stand up slowly to prevent dizziness and falls  Take short walks around the house with another person  Slowly return to your usual activities the next day       · Do not drive or use dangerous machines or tools for 24 hours  You may injure yourself or others  Examples include a lawnmower, saw, or drill   Do not return to work for 24 hours if you use dangerous machines or tools for work       · Do not make important decisions for 24 hours  For example, do not sign important papers or invest money       · Drink liquids as directed  Liquids help flush the sedation medicine out of your body  Ask how much liquid to drink each day and which liquids are best for you       · Eat small, frequent meals to prevent nausea and vomiting  Start with clear liquids such as juice or broth  If you do not vomit after clear liquids, you can eat your usual foods       · Do not drink alcohol or take medicines that make you drowsy  This includes medicines that help you sleep and anxiety medicines  Ask your healthcare provider if it is safe for you to take pain medicine  Follow up with your healthcare provider as directed: Write down your questions so you remember to ask them during your visits

## 2022-05-09 NOTE — BRIEF OP NOTE (RAD/CATH)
INTERVENTIONAL RADIOLOGY PROCEDURE NOTE    Date: 5/9/2022    Procedure: IR AV FISTULAGRAM/GRAFTOGRAM    Preoperative diagnosis:   1  ESRD (end stage renal disease) ([facility] )         Postoperative diagnosis: Same  Surgeon: Charles Miranda MD     Assistant: None  No qualified resident was available  Blood loss: 5 ml    Specimens: none  Findings:   fistulagram showed JA stenosis, treated with 6 mm HPB POBA  Complications: None immediate      Anesthesia: conscious sedation

## 2022-05-25 ENCOUNTER — OFFICE VISIT (OUTPATIENT)
Dept: VASCULAR SURGERY | Facility: CLINIC | Age: 67
End: 2022-05-25
Payer: MEDICARE

## 2022-05-25 VITALS
WEIGHT: 147 LBS | TEMPERATURE: 98.6 F | SYSTOLIC BLOOD PRESSURE: 143 MMHG | BODY MASS INDEX: 27.05 KG/M2 | HEART RATE: 65 BPM | HEIGHT: 62 IN | DIASTOLIC BLOOD PRESSURE: 60 MMHG

## 2022-05-25 DIAGNOSIS — N18.6 ESRD (END STAGE RENAL DISEASE) (HCC): Primary | ICD-10-CM

## 2022-05-25 PROCEDURE — 99213 OFFICE O/P EST LOW 20 MIN: CPT | Performed by: SURGERY

## 2022-05-25 RX ORDER — SODIUM ZIRCONIUM CYCLOSILICATE 5 G/5G
POWDER, FOR SUSPENSION ORAL
COMMUNITY
Start: 2022-04-29

## 2022-05-25 NOTE — ASSESSMENT & PLAN NOTE
Lab Results   Component Value Date    EGFR 7 03/28/2022    EGFR 12 02/16/2022    EGFR 8 12/17/2021    CREATININE 5 36 (H) 03/28/2022    CREATININE 3 68 (H) 02/16/2022    CREATININE 5 14 (H) 12/17/2021   Pk Kaiser is a pleasant 32-year-old woman with end-stage renal disease currently dialyzing through a right chest wall catheter who underwent a left brachiocephalic AV fistula creation on 02/16/22  She is now status post fistulogram with balloon angioplasty of a darcy anastomotic stenosis  She returns to the office following fistulogram   She has an excellent thrill and bruit  There is some residual ecchymosis from the access site  Her prior hemodialysis duplex suggested adequate maturation with good flow volumes and less than 6 mm from the skin  Recommend accessing fistula with 1 needle and progressing to to as tolerated  Please call the office with any questions and/or concerns

## 2022-05-25 NOTE — PROGRESS NOTES
Assessment/Plan:    ESRD (end stage renal disease) ([facility] [address] )  Lab Results   Component Value Date    EGFR 7 03/28/2022    EGFR 12 02/16/2022    EGFR 8 12/17/2021    CREATININE 5 36 (H) 03/28/2022    CREATININE 3 68 (H) 02/16/2022    CREATININE 5 14 (H) 12/17/2021   Rakesh Chapa is a pleasant 67-year-old woman with end-stage renal disease currently dialyzing through a right chest wall catheter who underwent a left brachiocephalic AV fistula creation on 02/16/22  She is now status post fistulogram with balloon angioplasty of a darcy anastomotic stenosis  She returns to the office following fistulogram   She has an excellent thrill and bruit  There is some residual ecchymosis from the access site  Her prior hemodialysis duplex suggested adequate maturation with good flow volumes and less than 6 mm from the skin  Recommend accessing fistula with 1 needle and progressing to to as tolerated  Please call the office with any questions and/or concerns  Diagnoses and all orders for this visit:    ESRD (end stage renal disease) ([email or web address] )    Other orders  -     Lokelma 5 g PACK; MIX 1 PACKET IN 3 TABLESPOONS OF WATER AND DRINK BY MOUTH ON MONDAYS WEDNESDAYS FRIDAYS AND SUNDAYS          Subjective:      Patient ID: Jorge Castro is a 78 y o  female  Pt is here to rev fistulagram done on 5/09  Rakesh Chapa returns to the office following recent left upper extremity fistulogram with balloon angioplasty of darcy anastomotic stenosis  She is currently dialyzing Tuesday, Thursdays, and Saturdays via a right chest wall catheter  The following portions of the patient's history were reviewed and updated as appropriate: allergies, current medications, past family history, past medical history, past social history, past surgical history and problem list     Review of Systems   Constitutional: Negative  HENT: Negative  Eyes: Negative  Respiratory: Negative  Cardiovascular: Negative  Gastrointestinal: Negative  Endocrine: Negative  Genitourinary: Negative  Musculoskeletal: Negative  Skin: Negative  Allergic/Immunologic: Negative  Neurological: Negative  Hematological: Negative  Psychiatric/Behavioral: Negative  I have personally reviewed the ROS entered by MA and agree as documented  Objective:      /60 (BP Location: Right arm, Patient Position: Sitting, Cuff Size: Standard)   Pulse 65   Temp 98 6 °F (37 °C) (Tympanic)   Ht 5' 2" (1 575 m)   Wt 66 7 kg (147 lb)   BMI 26 89 kg/m²          Physical Exam  Constitutional:       General: She is not in acute distress  Appearance: She is well-developed  She is not ill-appearing, toxic-appearing or diaphoretic  HENT:      Head: Normocephalic and atraumatic  Eyes:      General: No scleral icterus  Conjunctiva/sclera: Conjunctivae normal    Neck:      Trachea: No tracheal deviation  Cardiovascular:      Rate and Rhythm: Normal rate and regular rhythm  Heart sounds: Normal heart sounds  Comments: Excellent LUE fistula thrill/bruit  Pulmonary:      Effort: Pulmonary effort is normal       Breath sounds: Normal breath sounds  Abdominal:      General: There is no distension  Palpations: Abdomen is soft  There is no mass (no appreciable aortic pulsation/aneurysm)  Tenderness: There is no abdominal tenderness  There is no guarding or rebound  Musculoskeletal:         General: Normal range of motion  Cervical back: Normal range of motion and neck supple  Skin:     General: Skin is warm and dry  Neurological:      Mental Status: She is alert and oriented to person, place, and time     Psychiatric:         Behavior: Behavior normal

## 2022-06-10 ENCOUNTER — HOSPITAL ENCOUNTER (INPATIENT)
Facility: HOSPITAL | Age: 67
LOS: 4 days | Discharge: HOME HEALTH | DRG: 177 | End: 2022-06-15
Attending: EMERGENCY MEDICINE | Admitting: INTERNAL MEDICINE
Payer: MEDICARE

## 2022-06-10 ENCOUNTER — APPOINTMENT (EMERGENCY)
Dept: RADIOLOGY | Facility: HOSPITAL | Age: 67
DRG: 177 | End: 2022-06-10
Payer: MEDICARE

## 2022-06-10 ENCOUNTER — APPOINTMENT (EMERGENCY)
Dept: CT IMAGING | Facility: HOSPITAL | Age: 67
DRG: 177 | End: 2022-06-10
Payer: MEDICARE

## 2022-06-10 DIAGNOSIS — Z99.2 ESRD ON HEMODIALYSIS (HCC): ICD-10-CM

## 2022-06-10 DIAGNOSIS — U07.1 COVID-19: Primary | ICD-10-CM

## 2022-06-10 DIAGNOSIS — I10 ESSENTIAL (PRIMARY) HYPERTENSION: ICD-10-CM

## 2022-06-10 DIAGNOSIS — T68.XXXA HYPOTHERMIA, INITIAL ENCOUNTER: ICD-10-CM

## 2022-06-10 DIAGNOSIS — D64.9 CHRONIC ANEMIA: ICD-10-CM

## 2022-06-10 DIAGNOSIS — N18.6 ESRD ON HEMODIALYSIS (HCC): ICD-10-CM

## 2022-06-10 PROBLEM — N18.4 STAGE 4 CHRONIC KIDNEY DISEASE (HCC): Status: RESOLVED | Noted: 2021-10-12 | Resolved: 2022-06-10

## 2022-06-10 LAB
ALBUMIN SERPL BCP-MCNC: 3 G/DL (ref 3.5–5)
ALP SERPL-CCNC: 73 U/L (ref 46–116)
ALT SERPL W P-5'-P-CCNC: 16 U/L (ref 12–78)
ANION GAP SERPL CALCULATED.3IONS-SCNC: 6 MMOL/L (ref 4–13)
APTT PPP: 34 SECONDS (ref 23–37)
AST SERPL W P-5'-P-CCNC: 16 U/L (ref 5–45)
ATRIAL RATE: 52 BPM
BACTERIA UR QL AUTO: ABNORMAL /HPF
BASE EX.OXY STD BLDV CALC-SCNC: 92.6 % (ref 60–80)
BASE EXCESS BLDV CALC-SCNC: 5.5 MMOL/L
BASOPHILS # BLD AUTO: 0.03 THOUSANDS/ΜL (ref 0–0.1)
BASOPHILS NFR BLD AUTO: 1 % (ref 0–1)
BILIRUB SERPL-MCNC: 0.31 MG/DL (ref 0.2–1)
BILIRUB UR QL STRIP: NEGATIVE
BUN SERPL-MCNC: 27 MG/DL (ref 5–25)
CALCIUM ALBUM COR SERPL-MCNC: 9.5 MG/DL (ref 8.3–10.1)
CALCIUM SERPL-MCNC: 8.7 MG/DL (ref 8.3–10.1)
CHLORIDE SERPL-SCNC: 98 MMOL/L (ref 100–108)
CLARITY UR: CLEAR
CO2 SERPL-SCNC: 32 MMOL/L (ref 21–32)
COLOR UR: YELLOW
CREAT SERPL-MCNC: 4.59 MG/DL (ref 0.6–1.3)
EOSINOPHIL # BLD AUTO: 0.08 THOUSAND/ΜL (ref 0–0.61)
EOSINOPHIL NFR BLD AUTO: 2 % (ref 0–6)
ERYTHROCYTE [DISTWIDTH] IN BLOOD BY AUTOMATED COUNT: 14.9 % (ref 11.6–15.1)
FLUAV RNA RESP QL NAA+PROBE: NEGATIVE
FLUBV RNA RESP QL NAA+PROBE: NEGATIVE
GFR SERPL CREATININE-BSD FRML MDRD: 9 ML/MIN/1.73SQ M
GLUCOSE SERPL-MCNC: 79 MG/DL (ref 65–140)
GLUCOSE SERPL-MCNC: 89 MG/DL (ref 65–140)
GLUCOSE UR STRIP-MCNC: NEGATIVE MG/DL
HCO3 BLDV-SCNC: 29.2 MMOL/L (ref 24–30)
HCT VFR BLD AUTO: 23.9 % (ref 34.8–46.1)
HGB BLD-MCNC: 7.5 G/DL (ref 11.5–15.4)
HGB UR QL STRIP.AUTO: NEGATIVE
IMM GRANULOCYTES # BLD AUTO: 0.01 THOUSAND/UL (ref 0–0.2)
IMM GRANULOCYTES NFR BLD AUTO: 0 % (ref 0–2)
INR PPP: 1.11 (ref 0.84–1.19)
KETONES UR STRIP-MCNC: NEGATIVE MG/DL
LACTATE SERPL-SCNC: 1 MMOL/L (ref 0.5–2)
LEUKOCYTE ESTERASE UR QL STRIP: ABNORMAL
LIPASE SERPL-CCNC: 513 U/L (ref 73–393)
LYMPHOCYTES # BLD AUTO: 1.43 THOUSANDS/ΜL (ref 0.6–4.47)
LYMPHOCYTES NFR BLD AUTO: 29 % (ref 14–44)
MAGNESIUM SERPL-MCNC: 3 MG/DL (ref 1.6–2.6)
MCH RBC QN AUTO: 29.5 PG (ref 26.8–34.3)
MCHC RBC AUTO-ENTMCNC: 31.4 G/DL (ref 31.4–37.4)
MCV RBC AUTO: 94 FL (ref 82–98)
MONOCYTES # BLD AUTO: 0.49 THOUSAND/ΜL (ref 0.17–1.22)
MONOCYTES NFR BLD AUTO: 10 % (ref 4–12)
NEUTROPHILS # BLD AUTO: 2.83 THOUSANDS/ΜL (ref 1.85–7.62)
NEUTS SEG NFR BLD AUTO: 58 % (ref 43–75)
NITRITE UR QL STRIP: NEGATIVE
NON-SQ EPI CELLS URNS QL MICRO: ABNORMAL /HPF
NRBC BLD AUTO-RTO: 0 /100 WBCS
O2 CT BLDV-SCNC: 10.9 ML/DL
P AXIS: 65 DEGREES
PCO2 BLDV: 38.6 MM HG (ref 42–50)
PH BLDV: 7.5 [PH] (ref 7.3–7.4)
PH UR STRIP.AUTO: 8 [PH]
PLATELET # BLD AUTO: 261 THOUSANDS/UL (ref 149–390)
PMV BLD AUTO: 10.2 FL (ref 8.9–12.7)
PO2 BLDV: 81 MM HG (ref 35–45)
POTASSIUM SERPL-SCNC: 4.9 MMOL/L (ref 3.5–5.3)
PR INTERVAL: 184 MS
PROCALCITONIN SERPL-MCNC: 0.08 NG/ML
PROT SERPL-MCNC: 7.4 G/DL (ref 6.4–8.2)
PROT UR STRIP-MCNC: ABNORMAL MG/DL
PROTHROMBIN TIME: 14 SECONDS (ref 11.6–14.5)
QRS AXIS: 75 DEGREES
QRSD INTERVAL: 102 MS
QT INTERVAL: 574 MS
QTC INTERVAL: 533 MS
RBC # BLD AUTO: 2.54 MILLION/UL (ref 3.81–5.12)
RBC #/AREA URNS AUTO: ABNORMAL /HPF
RSV RNA RESP QL NAA+PROBE: NEGATIVE
SARS-COV-2 RNA RESP QL NAA+PROBE: POSITIVE
SODIUM SERPL-SCNC: 136 MMOL/L (ref 136–145)
SP GR UR STRIP.AUTO: 1.01 (ref 1–1.03)
T WAVE AXIS: 95 DEGREES
UROBILINOGEN UR QL STRIP.AUTO: 0.2 E.U./DL
VENTRICULAR RATE: 52 BPM
WBC # BLD AUTO: 4.87 THOUSAND/UL (ref 4.31–10.16)
WBC #/AREA URNS AUTO: ABNORMAL /HPF

## 2022-06-10 PROCEDURE — 82805 BLOOD GASES W/O2 SATURATION: CPT | Performed by: EMERGENCY MEDICINE

## 2022-06-10 PROCEDURE — 83605 ASSAY OF LACTIC ACID: CPT | Performed by: EMERGENCY MEDICINE

## 2022-06-10 PROCEDURE — 82948 REAGENT STRIP/BLOOD GLUCOSE: CPT

## 2022-06-10 PROCEDURE — 81001 URINALYSIS AUTO W/SCOPE: CPT | Performed by: EMERGENCY MEDICINE

## 2022-06-10 PROCEDURE — 96365 THER/PROPH/DIAG IV INF INIT: CPT

## 2022-06-10 PROCEDURE — G1004 CDSM NDSC: HCPCS

## 2022-06-10 PROCEDURE — 87040 BLOOD CULTURE FOR BACTERIA: CPT | Performed by: EMERGENCY MEDICINE

## 2022-06-10 PROCEDURE — 83690 ASSAY OF LIPASE: CPT | Performed by: EMERGENCY MEDICINE

## 2022-06-10 PROCEDURE — 36415 COLL VENOUS BLD VENIPUNCTURE: CPT | Performed by: EMERGENCY MEDICINE

## 2022-06-10 PROCEDURE — 93005 ELECTROCARDIOGRAM TRACING: CPT

## 2022-06-10 PROCEDURE — 99291 CRITICAL CARE FIRST HOUR: CPT

## 2022-06-10 PROCEDURE — 70450 CT HEAD/BRAIN W/O DYE: CPT

## 2022-06-10 PROCEDURE — 83735 ASSAY OF MAGNESIUM: CPT | Performed by: EMERGENCY MEDICINE

## 2022-06-10 PROCEDURE — 85610 PROTHROMBIN TIME: CPT | Performed by: EMERGENCY MEDICINE

## 2022-06-10 PROCEDURE — 93010 ELECTROCARDIOGRAM REPORT: CPT | Performed by: INTERNAL MEDICINE

## 2022-06-10 PROCEDURE — 80053 COMPREHEN METABOLIC PANEL: CPT | Performed by: EMERGENCY MEDICINE

## 2022-06-10 PROCEDURE — 71045 X-RAY EXAM CHEST 1 VIEW: CPT

## 2022-06-10 PROCEDURE — 0241U HB NFCT DS VIR RESP RNA 4 TRGT: CPT | Performed by: EMERGENCY MEDICINE

## 2022-06-10 PROCEDURE — 85730 THROMBOPLASTIN TIME PARTIAL: CPT | Performed by: EMERGENCY MEDICINE

## 2022-06-10 PROCEDURE — 85025 COMPLETE CBC W/AUTO DIFF WBC: CPT | Performed by: EMERGENCY MEDICINE

## 2022-06-10 PROCEDURE — 99291 CRITICAL CARE FIRST HOUR: CPT | Performed by: EMERGENCY MEDICINE

## 2022-06-10 PROCEDURE — 84145 PROCALCITONIN (PCT): CPT | Performed by: EMERGENCY MEDICINE

## 2022-06-10 RX ORDER — BIOTIN 5 MG
TABLET ORAL
COMMUNITY

## 2022-06-10 RX ADMIN — CEFTRIAXONE 2000 MG: 2 INJECTION, POWDER, FOR SOLUTION INTRAMUSCULAR; INTRAVENOUS at 23:17

## 2022-06-10 NOTE — Clinical Note
Case was discussed with Critical Care and the patient's admission status was agreed to be Admission Status: inpatient status to the service of Dr Carrington Aparicio

## 2022-06-11 ENCOUNTER — APPOINTMENT (INPATIENT)
Dept: DIALYSIS | Facility: HOSPITAL | Age: 67
DRG: 177 | End: 2022-06-11
Payer: MEDICARE

## 2022-06-11 ENCOUNTER — APPOINTMENT (INPATIENT)
Dept: NON INVASIVE DIAGNOSTICS | Facility: HOSPITAL | Age: 67
DRG: 177 | End: 2022-06-11
Payer: MEDICARE

## 2022-06-11 PROBLEM — R55 SYNCOPE: Status: ACTIVE | Noted: 2022-06-11

## 2022-06-11 PROBLEM — U07.1 COVID: Status: ACTIVE | Noted: 2022-06-11

## 2022-06-11 LAB
ABO GROUP BLD: NORMAL
ALBUMIN SERPL BCP-MCNC: 2.6 G/DL (ref 3.5–5)
ALP SERPL-CCNC: 61 U/L (ref 46–116)
ALT SERPL W P-5'-P-CCNC: 13 U/L (ref 12–78)
ANION GAP SERPL CALCULATED.3IONS-SCNC: 4 MMOL/L (ref 4–13)
ANION GAP SERPL CALCULATED.3IONS-SCNC: 5 MMOL/L (ref 4–13)
AST SERPL W P-5'-P-CCNC: 14 U/L (ref 5–45)
BASOPHILS # BLD AUTO: 0.03 THOUSANDS/ΜL (ref 0–0.1)
BASOPHILS NFR BLD AUTO: 1 % (ref 0–1)
BILIRUB SERPL-MCNC: 0.22 MG/DL (ref 0.2–1)
BLD GP AB SCN SERPL QL: POSITIVE
BLOOD GROUP ANTIBODIES SERPL: NORMAL
BUN SERPL-MCNC: 21 MG/DL (ref 5–25)
BUN SERPL-MCNC: 28 MG/DL (ref 5–25)
CALCIUM ALBUM COR SERPL-MCNC: 9.1 MG/DL (ref 8.3–10.1)
CALCIUM SERPL-MCNC: 8 MG/DL (ref 8.3–10.1)
CALCIUM SERPL-MCNC: 8.2 MG/DL (ref 8.3–10.1)
CARDIAC TROPONIN I PNL SERPL HS: 9 NG/L (ref 8–18)
CHLORIDE SERPL-SCNC: 101 MMOL/L (ref 100–108)
CHLORIDE SERPL-SCNC: 95 MMOL/L (ref 100–108)
CK SERPL-CCNC: 110 U/L (ref 26–192)
CO2 SERPL-SCNC: 31 MMOL/L (ref 21–32)
CO2 SERPL-SCNC: 32 MMOL/L (ref 21–32)
CORTIS SERPL-MCNC: 17.6 UG/DL
CREAT SERPL-MCNC: 3.1 MG/DL (ref 0.6–1.3)
CREAT SERPL-MCNC: 4.61 MG/DL (ref 0.6–1.3)
CRP SERPL QL: 14.4 MG/L
D DIMER PPP FEU-MCNC: 2.02 UG/ML FEU
EOSINOPHIL # BLD AUTO: 0.04 THOUSAND/ΜL (ref 0–0.61)
EOSINOPHIL NFR BLD AUTO: 1 % (ref 0–6)
ERYTHROCYTE [DISTWIDTH] IN BLOOD BY AUTOMATED COUNT: 14.6 % (ref 11.6–15.1)
GFR SERPL CREATININE-BSD FRML MDRD: 14 ML/MIN/1.73SQ M
GFR SERPL CREATININE-BSD FRML MDRD: 9 ML/MIN/1.73SQ M
GLUCOSE SERPL-MCNC: 105 MG/DL (ref 65–140)
GLUCOSE SERPL-MCNC: 110 MG/DL (ref 65–140)
GLUCOSE SERPL-MCNC: 114 MG/DL (ref 65–140)
GLUCOSE SERPL-MCNC: 179 MG/DL (ref 65–140)
GLUCOSE SERPL-MCNC: 205 MG/DL (ref 65–140)
GLUCOSE SERPL-MCNC: 208 MG/DL (ref 65–140)
GLUCOSE SERPL-MCNC: 236 MG/DL (ref 65–140)
GLUCOSE SERPL-MCNC: 282 MG/DL (ref 65–140)
GLUCOSE SERPL-MCNC: 40 MG/DL (ref 65–140)
GLUCOSE SERPL-MCNC: 94 MG/DL (ref 65–140)
HCT VFR BLD AUTO: 20.8 % (ref 34.8–46.1)
HCT VFR BLD AUTO: 21.2 % (ref 34.8–46.1)
HGB BLD-MCNC: 6.7 G/DL (ref 11.5–15.4)
HGB BLD-MCNC: 6.7 G/DL (ref 11.5–15.4)
IMM GRANULOCYTES # BLD AUTO: 0.03 THOUSAND/UL (ref 0–0.2)
IMM GRANULOCYTES NFR BLD AUTO: 1 % (ref 0–2)
LYMPHOCYTES # BLD AUTO: 1.26 THOUSANDS/ΜL (ref 0.6–4.47)
LYMPHOCYTES NFR BLD AUTO: 23 % (ref 14–44)
MAGNESIUM SERPL-MCNC: 2.7 MG/DL (ref 1.6–2.6)
MCH RBC QN AUTO: 30.2 PG (ref 26.8–34.3)
MCHC RBC AUTO-ENTMCNC: 32.2 G/DL (ref 31.4–37.4)
MCV RBC AUTO: 94 FL (ref 82–98)
MONOCYTES # BLD AUTO: 0.52 THOUSAND/ΜL (ref 0.17–1.22)
MONOCYTES NFR BLD AUTO: 10 % (ref 4–12)
NEUTROPHILS # BLD AUTO: 3.61 THOUSANDS/ΜL (ref 1.85–7.62)
NEUTS SEG NFR BLD AUTO: 64 % (ref 43–75)
NRBC BLD AUTO-RTO: 0 /100 WBCS
NT-PROBNP SERPL-MCNC: 5807 PG/ML
PLATELET # BLD AUTO: 248 THOUSANDS/UL (ref 149–390)
PMV BLD AUTO: 10.9 FL (ref 8.9–12.7)
POTASSIUM SERPL-SCNC: 3.9 MMOL/L (ref 3.5–5.3)
POTASSIUM SERPL-SCNC: 6.1 MMOL/L (ref 3.5–5.3)
PROT SERPL-MCNC: 6.5 G/DL (ref 6.4–8.2)
RBC # BLD AUTO: 2.22 MILLION/UL (ref 3.81–5.12)
RH BLD: POSITIVE
SODIUM SERPL-SCNC: 131 MMOL/L (ref 136–145)
SODIUM SERPL-SCNC: 137 MMOL/L (ref 136–145)
SPECIMEN EXPIRATION DATE: NORMAL
TSH SERPL DL<=0.05 MIU/L-ACNC: 3.39 UIU/ML (ref 0.45–4.5)
WBC # BLD AUTO: 5.49 THOUSAND/UL (ref 4.31–10.16)

## 2022-06-11 PROCEDURE — 85014 HEMATOCRIT: CPT | Performed by: INTERNAL MEDICINE

## 2022-06-11 PROCEDURE — 82550 ASSAY OF CK (CPK): CPT | Performed by: INTERNAL MEDICINE

## 2022-06-11 PROCEDURE — 86140 C-REACTIVE PROTEIN: CPT | Performed by: INTERNAL MEDICINE

## 2022-06-11 PROCEDURE — 82948 REAGENT STRIP/BLOOD GLUCOSE: CPT

## 2022-06-11 PROCEDURE — 86900 BLOOD TYPING SEROLOGIC ABO: CPT | Performed by: INTERNAL MEDICINE

## 2022-06-11 PROCEDURE — 86850 RBC ANTIBODY SCREEN: CPT | Performed by: INTERNAL MEDICINE

## 2022-06-11 PROCEDURE — 86921 COMPATIBILITY TEST INCUBATE: CPT

## 2022-06-11 PROCEDURE — 83735 ASSAY OF MAGNESIUM: CPT | Performed by: INTERNAL MEDICINE

## 2022-06-11 PROCEDURE — 30233N1 TRANSFUSION OF NONAUTOLOGOUS RED BLOOD CELLS INTO PERIPHERAL VEIN, PERCUTANEOUS APPROACH: ICD-10-PCS | Performed by: STUDENT IN AN ORGANIZED HEALTH CARE EDUCATION/TRAINING PROGRAM

## 2022-06-11 PROCEDURE — 85379 FIBRIN DEGRADATION QUANT: CPT | Performed by: INTERNAL MEDICINE

## 2022-06-11 PROCEDURE — 85018 HEMOGLOBIN: CPT | Performed by: INTERNAL MEDICINE

## 2022-06-11 PROCEDURE — 36415 COLL VENOUS BLD VENIPUNCTURE: CPT | Performed by: EMERGENCY MEDICINE

## 2022-06-11 PROCEDURE — 80053 COMPREHEN METABOLIC PANEL: CPT | Performed by: INTERNAL MEDICINE

## 2022-06-11 PROCEDURE — P9040 RBC LEUKOREDUCED IRRADIATED: HCPCS

## 2022-06-11 PROCEDURE — 83880 ASSAY OF NATRIURETIC PEPTIDE: CPT | Performed by: INTERNAL MEDICINE

## 2022-06-11 PROCEDURE — 84484 ASSAY OF TROPONIN QUANT: CPT | Performed by: INTERNAL MEDICINE

## 2022-06-11 PROCEDURE — 82533 TOTAL CORTISOL: CPT | Performed by: INTERNAL MEDICINE

## 2022-06-11 PROCEDURE — 99223 1ST HOSP IP/OBS HIGH 75: CPT | Performed by: INTERNAL MEDICINE

## 2022-06-11 PROCEDURE — 86870 RBC ANTIBODY IDENTIFICATION: CPT | Performed by: INTERNAL MEDICINE

## 2022-06-11 PROCEDURE — 5A1D70Z PERFORMANCE OF URINARY FILTRATION, INTERMITTENT, LESS THAN 6 HOURS PER DAY: ICD-10-PCS | Performed by: STUDENT IN AN ORGANIZED HEALTH CARE EDUCATION/TRAINING PROGRAM

## 2022-06-11 PROCEDURE — 86922 COMPATIBILITY TEST ANTIGLOB: CPT

## 2022-06-11 PROCEDURE — 86901 BLOOD TYPING SEROLOGIC RH(D): CPT | Performed by: INTERNAL MEDICINE

## 2022-06-11 PROCEDURE — 80048 BASIC METABOLIC PNL TOTAL CA: CPT | Performed by: INTERNAL MEDICINE

## 2022-06-11 PROCEDURE — 86902 BLOOD TYPE ANTIGEN DONOR EA: CPT

## 2022-06-11 PROCEDURE — 84443 ASSAY THYROID STIM HORMONE: CPT | Performed by: EMERGENCY MEDICINE

## 2022-06-11 PROCEDURE — 85025 COMPLETE CBC W/AUTO DIFF WBC: CPT | Performed by: INTERNAL MEDICINE

## 2022-06-11 RX ORDER — CARVEDILOL 25 MG/1
25 TABLET ORAL 2 TIMES DAILY WITH MEALS
Status: DISCONTINUED | OUTPATIENT
Start: 2022-06-11 | End: 2022-06-11

## 2022-06-11 RX ORDER — CALCIUM CARBONATE 200(500)MG
500 TABLET,CHEWABLE ORAL DAILY PRN
Status: DISCONTINUED | OUTPATIENT
Start: 2022-06-11 | End: 2022-06-15 | Stop reason: HOSPADM

## 2022-06-11 RX ORDER — ATORVASTATIN CALCIUM 40 MG/1
40 TABLET, FILM COATED ORAL DAILY
Status: DISCONTINUED | OUTPATIENT
Start: 2022-06-11 | End: 2022-06-15 | Stop reason: HOSPADM

## 2022-06-11 RX ORDER — CALCIUM GLUCONATE 20 MG/ML
1 INJECTION, SOLUTION INTRAVENOUS ONCE
Status: COMPLETED | OUTPATIENT
Start: 2022-06-11 | End: 2022-06-11

## 2022-06-11 RX ORDER — INSULIN LISPRO 100 [IU]/ML
1-5 INJECTION, SOLUTION INTRAVENOUS; SUBCUTANEOUS
Status: DISCONTINUED | OUTPATIENT
Start: 2022-06-11 | End: 2022-06-12

## 2022-06-11 RX ORDER — ACETAMINOPHEN 325 MG/1
975 TABLET ORAL EVERY 6 HOURS PRN
Status: DISCONTINUED | OUTPATIENT
Start: 2022-06-11 | End: 2022-06-15 | Stop reason: HOSPADM

## 2022-06-11 RX ORDER — INSULIN LISPRO 100 [IU]/ML
1-5 INJECTION, SOLUTION INTRAVENOUS; SUBCUTANEOUS EVERY 6 HOURS SCHEDULED
Status: DISCONTINUED | OUTPATIENT
Start: 2022-06-11 | End: 2022-06-12

## 2022-06-11 RX ORDER — CARVEDILOL 12.5 MG/1
12.5 TABLET ORAL 2 TIMES DAILY WITH MEALS
Status: DISCONTINUED | OUTPATIENT
Start: 2022-06-11 | End: 2022-06-11

## 2022-06-11 RX ORDER — HYDRALAZINE HYDROCHLORIDE 20 MG/ML
5 INJECTION INTRAMUSCULAR; INTRAVENOUS EVERY 6 HOURS PRN
Status: DISCONTINUED | OUTPATIENT
Start: 2022-06-11 | End: 2022-06-15 | Stop reason: HOSPADM

## 2022-06-11 RX ORDER — CALCIUM ACETATE 667 MG/1
667 CAPSULE ORAL
Status: DISCONTINUED | OUTPATIENT
Start: 2022-06-11 | End: 2022-06-15 | Stop reason: HOSPADM

## 2022-06-11 RX ORDER — DEXTROSE MONOHYDRATE 25 G/50ML
INJECTION, SOLUTION INTRAVENOUS
Status: COMPLETED
Start: 2022-06-11 | End: 2022-06-11

## 2022-06-11 RX ORDER — DOXERCALCIFEROL 2 UG/ML
1 INJECTION, SOLUTION INTRAVENOUS 3 TIMES WEEKLY
Status: DISCONTINUED | OUTPATIENT
Start: 2022-06-11 | End: 2022-06-15 | Stop reason: HOSPADM

## 2022-06-11 RX ORDER — CARVEDILOL 12.5 MG/1
12.5 TABLET ORAL 2 TIMES DAILY WITH MEALS
Status: DISCONTINUED | OUTPATIENT
Start: 2022-06-11 | End: 2022-06-15 | Stop reason: HOSPADM

## 2022-06-11 RX ORDER — METOCLOPRAMIDE HYDROCHLORIDE 5 MG/ML
10 INJECTION INTRAMUSCULAR; INTRAVENOUS EVERY 6 HOURS PRN
Status: DISCONTINUED | OUTPATIENT
Start: 2022-06-11 | End: 2022-06-15 | Stop reason: HOSPADM

## 2022-06-11 RX ORDER — HEPARIN SODIUM 5000 [USP'U]/ML
5000 INJECTION, SOLUTION INTRAVENOUS; SUBCUTANEOUS EVERY 8 HOURS SCHEDULED
Status: DISCONTINUED | OUTPATIENT
Start: 2022-06-11 | End: 2022-06-15 | Stop reason: HOSPADM

## 2022-06-11 RX ORDER — ASPIRIN 81 MG/1
81 TABLET, CHEWABLE ORAL DAILY
Status: DISCONTINUED | OUTPATIENT
Start: 2022-06-11 | End: 2022-06-15 | Stop reason: HOSPADM

## 2022-06-11 RX ADMIN — SODIUM CHLORIDE: 9 INJECTION, SOLUTION INTRAMUSCULAR; INTRAVENOUS; SUBCUTANEOUS at 08:28

## 2022-06-11 RX ADMIN — HEPARIN SODIUM 5000 UNITS: 5000 INJECTION INTRAVENOUS; SUBCUTANEOUS at 06:10

## 2022-06-11 RX ADMIN — CALCIUM ACETATE 667 MG: 667 CAPSULE ORAL at 08:27

## 2022-06-11 RX ADMIN — HYDRALAZINE HYDROCHLORIDE 5 MG: 20 INJECTION, SOLUTION INTRAMUSCULAR; INTRAVENOUS at 18:12

## 2022-06-11 RX ADMIN — CARVEDILOL 12.5 MG: 12.5 TABLET, FILM COATED ORAL at 08:27

## 2022-06-11 RX ADMIN — CALCIUM ACETATE 667 MG: 667 CAPSULE ORAL at 16:00

## 2022-06-11 RX ADMIN — DEXTROSE MONOHYDRATE: 25 INJECTION, SOLUTION INTRAVENOUS at 11:48

## 2022-06-11 RX ADMIN — INSULIN LISPRO 2 UNITS: 100 INJECTION, SOLUTION INTRAVENOUS; SUBCUTANEOUS at 06:11

## 2022-06-11 RX ADMIN — METOCLOPRAMIDE 10 MG: 5 INJECTION, SOLUTION INTRAMUSCULAR; INTRAVENOUS at 21:55

## 2022-06-11 RX ADMIN — HEPARIN SODIUM 5000 UNITS: 5000 INJECTION INTRAVENOUS; SUBCUTANEOUS at 21:55

## 2022-06-11 RX ADMIN — DOXERCALCIFEROL 1 MCG: 4 INJECTION, SOLUTION INTRAVENOUS at 13:39

## 2022-06-11 RX ADMIN — ASPIRIN 81 MG CHEWABLE TABLET 81 MG: 81 TABLET CHEWABLE at 08:27

## 2022-06-11 RX ADMIN — INSULIN LISPRO 2 UNITS: 100 INJECTION, SOLUTION INTRAVENOUS; SUBCUTANEOUS at 17:18

## 2022-06-11 RX ADMIN — CARVEDILOL 12.5 MG: 12.5 TABLET, FILM COATED ORAL at 16:00

## 2022-06-11 RX ADMIN — CALCIUM GLUCONATE 1 G: 20 INJECTION, SOLUTION INTRAVENOUS at 11:56

## 2022-06-11 RX ADMIN — ATORVASTATIN CALCIUM 40 MG: 40 TABLET, FILM COATED ORAL at 08:27

## 2022-06-11 RX ADMIN — HEPARIN SODIUM 5000 UNITS: 5000 INJECTION INTRAVENOUS; SUBCUTANEOUS at 16:00

## 2022-06-11 NOTE — ASSESSMENT & PLAN NOTE
-COVID positive in the ER  -Not on it any supplemental oxygen and denies any shortness of breath  Plan  > COVID labs and isolation  Hold off IV steroids or other therapeutic agents as patient without oxygen requirement     > heparin subQ 5000 units q8h for dvt ppx   > consider Infectious Disease consultation

## 2022-06-11 NOTE — H&P
[address] 1955, 78 y o  female MRN: [id number]  Unit/Bed#: ED 29 Encounter: [id number]  Primary Care Provider: Donna Caldwell MD   Date and time admitted to hospital: 6/10/2022 10:05 PM    * Hypothermia  Assessment & Plan  -Found to be hypothermic with temperature 91 4 Farenheit in the ER  -COVID positive  -syncopal episode at home earlier in the day  -previous admission in December 2021 for hypothermia then which was thought secondary to hypoglycemia then  -CXR with cardiomegaly, increased haziness in b/l lung fields concerning for increased fluid per my read, awaiting official read  -CT Head negative for acute intracranial abnormality  -TSH within normal limits, glucose within normal limits in ER  -evaluated by ICU team in the ER who reports patient okay for step-down level 2  -no focal neurologic deficits on exam  Plan  > Exact etiology of hypothermia unclear, but patient is COVID positive and from review of notes it appears patient has had hypothermia in the past attributed to hypoglycemia    > admit to step-down level to with [employer]  Frequent temperature checks to ensure improvement  Neuro checks q 4 hours  > treatment per COVID protocol  > syncope workup with monitoring on telemetry, echocardiogram and ultrasound carotids  > follow-up blood cultures done in the ER       Syncope  Assessment & Plan  -reports watching TV earlier in the day and then having an episode of syncope with loss of consciousness for unknown duration as it was unwitnessed   -EKG sinus bradycardia, rate 52, nonspecific ST changes  -CT head negative for acute intracranial abnormality  -no focal neurologic deficits on exam  -in setting of COVID infection and hypothermia  -previously underwent MRI brain in December with chronic lacunar infarcts  Plan  > Syncope workup with monitoring on telemetry, echocardiogram and ultrasound carotids    > bradycardia noted on EKG and will halve the dose of PTA Coreg and put on strict hold parameters  > consider Cardiology consultation in the morning  > consider Neurology consultation in the morning regarding further imaging including MRI brain  > although no tachycardia or shortness of breath, will order bilateral lower extremity duplex to rule out DVT    COVID  Assessment & Plan  -COVID positive in the ER  -Not on it any supplemental oxygen and denies any shortness of breath  Plan  > COVID labs and isolation  Hold off IV steroids or other therapeutic agents as patient without oxygen requirement     > heparin subQ 5000 units q8h for dvt ppx   > consider Infectious Disease consultation    ESRD (end stage renal disease) on dialysis [facility])  Assessment & Plan  Lab Results   Component Value Date    EGFR 9 06/10/2022    EGFR 7 03/28/2022    EGFR 12 02/16/2022    CREATININE 4 59 (H) 06/10/2022    CREATININE 5 36 (H) 03/28/2022    CREATININE 3 68 (H) 02/16/2022     On hemodialysis every Monday, Wednesday and Friday  Plan  > consult nephrology    Type 2 diabetes mellitus with chronic kidney disease on chronic dialysis, with long-term current use of insulin [facility])  Assessment & Plan  Lab Results   Component Value Date    HGBA1C 7 5 (H) 03/28/2022       Recent Labs     06/10/22  2242 06/11/22  0017   POCGLU 89 114       Blood Sugar Average: Last 72 hrs:  (P) 101 5     -history of type 2 diabetes and on PTA Tradjenta; review of previous admission from December 2021 shows that patient's long-acting glargine was stopped then due to hypoglycemic episodes that were thought to be causing encephalopathy and hypothermia then  -glucose 114 in the ER  Plan  > put on glucose checks q 6 hours and sliding scale insulin for any episodes of hyperglycemia          VTE Prophylaxis: Heparin  / sequential compression device   Code Status: Prior Full Code     Anticipated Length of Stay:  Patient will be admitted on an Inpatient basis with an anticipated length of stay of  > 2 midnights  Justification for Hospital Stay: Please see detailed plans noted above  Chief Complaint:     Syncope, COVID, hypothermia  History of Present Illness:  Grecia Maza is a 78 y o  female who has past medical history significant for end-stage renal disease on hemodialysis, type 2 diabetes, chronic lacunar infarct, hypoglycemia presented to [address] the evening of 6/10 after having an unwitnessed syncopal episode earlier in the day at her home and then being found to be hypothermic in the ER  Patient reportedly lives at home alone and had a syncopal episode on her couch for unknown amount of time  Patient reports that she then called her daughter at home who then brought her to the ER  In the ER, patient found to have a temperature of 91° F and found to be COVID positive  Upon my exam, patient has a Wanda Hugger in place and reports that she currently feels fine  Patient denies any current cough, shortness of breath or chest pain  Patient denies having had episode like this in the past   A complete review of systems was asked to patient and she denied all symptoms asked         Review of Systems:    Constitutional:  Denies fever or chills   Eyes:  Denies change in visual acuity   HENT:  Denies nasal congestion or sore throat   Respiratory:  Denies cough or shortness of breath   Cardiovascular:  Denies chest pain or edema   GI:  Denies abdominal pain or bloody stools  :  Denies dysuria   Musculoskeletal:  Denies back pain or joint pain   Integument:  Denies rash   Neurologic:  Denies headache or sensory changes   Endocrine:  Denies polyuria or polydipsia   Lymphatic:  Denies swollen glands   Psychiatric:  Denies depression or anxiety     Past Medical and Surgical History:   Past Medical History:   Diagnosis Date    CH[facility]congestive heart failure) ([facility])     CKD (chronic kidney disease) stage 5, GFR less than 15 ml/min ([facility])     [facility]     Diabetic nephropathy [facility])     Hemodialysis patient [facility])     via permacath right chest / Arletta Judi Tues/Thurs and Sat    Hyperlipidemia     Hypertension     Muscle weakness     Orthodontics     sleeps with retainer at night    Risk for falls     Uses walker     per dtr active in the house able to cook/light cleaning as able     Past Surgical History:   Procedure Laterality Date    CATARACT EXTRACTION Bilateral     EGD      IR AV FISTULAGRAM/GRAFTOGRAM  5/9/2022    IR BIOPSY BONE MARROW  9/15/2021    IR BIOPSY KIDNEY RANDOM  9/15/2021    IR NON-TUNNELED CENTRAL LINE PLACEMENT  12/7/2021    IR TUNNELED CENTRAL LINE CHECK/CHANGE/REPOSITION  4/1/2022    IR TUNNELED DIALYSIS CATHETER PLACEMENT  12/9/2021    LA ANASTOMOSIS,AV,ANY SITE Left 2/16/2022    Procedure: CREATION FISTULA ARTERIOVENOUS (AV); Surgeon: DO Jesus;  Location: [facility];  Service: Vascular       Meds/Allergies:  (Not in a hospital admission)      Allergies:    Allergies   Allergen Reactions    Amlodipine Edema and Eye Swelling    Lisinopril Angioedema     Bilateral periorbital edema that was significant     History:    Substance Use History:   Social History     Substance and Sexual Activity   Alcohol Use Not Currently     Social History     Tobacco Use   Smoking Status Never Smoker   Smokeless Tobacco Never Used   Tobacco Comment    NO TOBACCO USE     Social History     Substance and Sexual Activity   Drug Use Not Currently       Family History:  Family History   Problem Relation Age of Onset    Hypertension Mother     Diabetes Father     Hypertension Sister     Diabetes Sister     Hypertension Brother        Physical Exam:     Vitals:   Blood Pressure: 129/77 (06/10/22 2345)  Pulse: 64 (06/11/22 0100)  Temperature: (!) 94 3 °F (34 6 °C) (06/11/22 0100)  Temp Source: Probe (06/11/22 0100)  Respirations: 16 (06/11/22 0100)  SpO2: 97 % (06/10/22 2345)    Constitutional:  A&Ox 4  Eyes:  PERRL  HENT:  oropharynx moist  Neck- non-tender Respiratory:  No respiratory distress, no rales  Cardiovascular:  Bradycardia, no gallops, no rubs   GI:  Soft, nondistended, no guarding   :  No costovertebral angle tenderness   Musculoskeletal:   no tenderness, no deformities  Integument:  Well hydrated, no rash   Neurologic:  Alert &awake, communicative, CN 2-12 normal,  no focal deficits noted         Lab Results: I have personally reviewed pertinent reports  Results from last 7 days   Lab Units 06/10/22  2241   WBC Thousand/uL 4 87   HEMOGLOBIN g/dL 7 5*   HEMATOCRIT % 23 9*   PLATELETS Thousands/uL 261   NEUTROS PCT % 58   LYMPHS PCT % 29   MONOS PCT % 10   EOS PCT % 2     Results from last 7 days   Lab Units 06/10/22  2241   POTASSIUM mmol/L 4 9   CHLORIDE mmol/L 98*   CO2 mmol/L 32   BUN mg/dL 27*   CREATININE mg/dL 4 59*   CALCIUM mg/dL 8 7   ALK PHOS U/L 73   ALT U/L 16   AST U/L 16     Results from last 7 days   Lab Units 06/10/22  2241   INR  1 11         Imaging: I have personally reviewed pertinent reports  CT head without contrast    Result Date: 6/10/2022  Narrative: CT BRAIN - WITHOUT CONTRAST INDICATION:   Mental status change, unknown cause Mental status change, persistent or worsening altered mental status  COMPARISON:  12/16/2021  TECHNIQUE:  CT examination of the brain was performed  In addition to axial images, sagittal and coronal 2D reformatted images were created and submitted for interpretation  Radiation dose length product (DLP) for this visit:  820 mGy-cm   This examination, like all CT scans performed in the [facility], was performed utilizing techniques to minimize radiation dose exposure, including the use of iterative reconstruction and automated exposure control  IMAGE QUALITY:  Diagnostic   FINDINGS: PARENCHYMA: Decreased attenuation is noted in periventricular and subcortical white matter demonstrating an appearance that is statistically most likely to represent mild microangiopathic change; this appearance is similar when compared to most recent prior examination  Chronic lacunar infarcts in the left basal ganglia and left thalamus  No CT signs of acute infarction  No intracranial mass, mass effect or midline shift  No acute parenchymal hemorrhage  Calcifications in the basal ganglia  VENTRICLES AND EXTRA-AXIAL SPACES:  Normal for the patient's age  VISUALIZED ORBITS AND PARANASAL SINUSES:  Bilateral lens implants  Mild scattered sinus mucosal thickening is noted  No fluid levels are seen  CALVARIUM AND EXTRACRANIAL SOFT TISSUES:  Normal      Impression: No acute intracranial abnormality  Workstation performed: [id number]         ** Please Note: Dragon 360 Dictation voice to text software was used in the creation of this document   **

## 2022-06-11 NOTE — ASSESSMENT & PLAN NOTE
Lab Results   Component Value Date    HGBA1C 7 5 (H) 03/28/2022       Recent Labs     06/10/22  2242 06/11/22  0017   POCGLU 89 114       Blood Sugar Average: Last 72 hrs:  (P) 101 5     -history of type 2 diabetes and on PTA Tradjenta; review of previous admission from December 2021 shows that patient's long-acting glargine was stopped then due to hypoglycemic episodes that were thought to be causing encephalopathy and hypothermia then  -glucose 114 in the ER  Plan  > put on glucose checks q 6 hours and sliding scale insulin for any episodes of hyperglycemia

## 2022-06-11 NOTE — PLAN OF CARE
Problem: Potential for Falls  Goal: Patient will remain free of falls  Description: INTERVENTIONS:  - Educate patient/family on patient safety including physical limitations  - Instruct patient to call for assistance with activity   - Consult OT/PT to assist with strengthening/mobility   - Keep Call bell within reach  - Keep bed low and locked with side rails adjusted as appropriate  - Keep care items and personal belongings within reach  - Initiate and maintain comfort rounds  - Make Fall Risk Sign visible to staff  - Offer Toileting every 2 Hours, in advance of need  - Initiate/Maintain bed alarm  - Apply yellow socks and bracelet for high fall risk patients  - Consider moving patient to room near nurses station  Outcome: Progressing     Problem: MOBILITY - ADULT  Goal: Maintain or return to baseline ADL function  Description: INTERVENTIONS:  -  Assess patient's ability to carry out ADLs; assess patient's baseline for ADL function and identify physical deficits which impact ability to perform ADLs (bathing, care of mouth/teeth, toileting, grooming, dressing, etc )  - Assess/evaluate cause of self-care deficits   - Assess range of motion  - Assess patient's mobility; develop plan if impaired  - Assess patient's need for assistive devices and provide as appropriate  - Encourage maximum independence but intervene and supervise when necessary  - Involve family in performance of ADLs  - Assess for home care needs following discharge   - Consider OT consult to assist with ADL evaluation and planning for discharge  - Provide patient education as appropriate  Outcome: Progressing  Goal: Maintains/Returns to pre admission functional level  Description: INTERVENTIONS:  - Perform BMAT or MOVE assessment daily    - Set and communicate daily mobility goal to care team and patient/family/caregiver  - Collaborate with rehabilitation services on mobility goals if consulted  - Perform Range of Motion 3 times a day    - Reposition patient every 2 hours    - Dangle patient 3 times a day  - Stand patient 3 times a day  - Ambulate patient 3 times a day  - Out of bed to chair 3 times a day   - Out of bed for meals 3 times a day  - Out of bed for toileting  - Record patient progress and toleration of activity level   Outcome: Progressing     Problem: Prexisting or High Potential for Compromised Skin Integrity  Goal: Skin integrity is maintained or improved  Description: INTERVENTIONS:  - Identify patients at risk for skin breakdown  - Assess and monitor skin integrity  - Assess and monitor nutrition and hydration status  - Monitor labs   - Assess for incontinence   - Turn and reposition patient  - Assist with mobility/ambulation  - Relieve pressure over bony prominences  - Avoid friction and shearing  - Provide appropriate hygiene as needed including keeping skin clean and dry  - Evaluate need for skin moisturizer/barrier cream  - Collaborate with interdisciplinary team   - Patient/family teaching  - Consider wound care consult   Outcome: Progressing

## 2022-06-11 NOTE — ED PROVIDER NOTES
History  Chief Complaint   Patient presents with    Dizziness     Pt was at dialysis and had syncopal episode, reports dizziness, CP, pale and diaphoretic in triage     Patient presents for lightheadedness and dizziness that started earlier today  Family states that patient went to dialysis yesterday and was in her normal state health  Today they state that she did eat much tonight, went upstairs to lay down in bed and passed out  I do not know if she fell asleep or had a syncopal event  They do state that the patient had been complaining of dizziness for the last few days but nothing quite like tonight  Patient could not ambulate because of symptoms of near-syncope  Patient arrives here hypothermic, bradycardic and hypertensive  Patient is awake, alert and oriented  She has no focal neurologic deficits  She has no complaints other than feeling dizzy at this time  History provided by:  Patient and relative   used: No    Dizziness  Quality:  Lightheadedness  Severity:  Severe  Onset quality:  Gradual  Timing:  Intermittent  Progression:  Worsening  Chronicity:  New  Relieved by:  Nothing  Ineffective treatments:  Lying down  Associated symptoms: no chest pain, no diarrhea, no nausea, no palpitations, no shortness of breath and no vomiting        Prior to Admission Medications   Prescriptions Last Dose Informant Patient Reported? Taking?    Accu-Chek FastClix Lancets MISC  Self No Yes   Sig: Inject under the skin 2 (two) times a day Test   Alcohol Swabs (EASY TOUCH ALCOHOL PREP MEDIUM) 70 % PADS  Self Yes Yes   Sig: USE 2 TO 3 X PER DAY   Glucagon (Gvoke HypoPen 2-Pack) 1 MG/0 2ML SOAJ  Self Yes Yes   Sig: Inject 1 mg under the skin   Insulin Pen Needle (Unifine Pentips) 32G X 4 MM MISC  Self No Yes   Sig: Use daily   Krill Oil 1000 MG CAPS   Yes Yes   Sig: Take by mouth   Lokelma 5 g PACK  Self Yes No   Sig: MIX 1 PACKET IN 3 TABLESPOONS OF WATER AND DRINK BY MOUTH ON MONDAYS WEDNESDAYS FRIDAYS AND SUNDAYS   NIFEdipine (ADALAT CC) 60 MG 24 hr tablet  Self No No   Sig: Take 1 tablet (60 mg total) by mouth 2 (two) times a day   aspirin 81 mg chewable tablet  Self No Yes   Sig: Chew 1 tablet (81 mg total) daily   atorvastatin (LIPITOR) 40 mg tablet   No Yes   Sig: Take 1 tablet (40 mg total) by mouth daily   Patient taking differently: Take 80 mg by mouth daily at bedtime   calcium acetate (PHOSLO) capsule  Self No No   Sig: Take 1 capsule (667 mg total) by mouth 3 (three) times a day with meals   carvedilol (COREG) 25 mg tablet  Self No Yes   Sig: Take 1 tablet (25 mg total) by mouth 2 (two) times a day with meals   cloNIDine (CATAPRES-TTS-2) 0 2 mg/24 hr  Self Yes Yes   Sig: Place 1 patch on the skin once a week   ergocalciferol (ERGOCALCIFEROL) 1 25 MG (19702 UT) capsule  Self Yes No   Sig: Take 50,000 Units by mouth every 30 (thirty) days     gabapentin (NEURONTIN) 300 mg capsule  Self No Yes   Sig: Take 1 capsule (300 mg total) by mouth daily at bedtime   hydrALAZINE (APRESOLINE) 50 mg tablet  Self No Yes   Sig: Take 1 tablet (50 mg total) by mouth every 8 (eight) hours   Patient taking differently: Take 100 mg by mouth every 8 (eight) hours   insulin glargine (Lantus SoloStar) 100 units/mL injection pen  Self Yes Yes   Sig: Inject 15 Units under the skin daily   linaGLIPtin (Tradjenta) 5 MG TABS  Self Yes Yes   Sig: Take 5 mg by mouth daily   terazosin (HYTRIN) 5 mg capsule  Self Yes No   Sig: Take 10 mg by mouth daily at bedtime    Patient not taking: Reported on 5/25/2022   torsemide (DEMADEX) 20 mg tablet   No No   Sig: TAKE ONE TABLET BY MOUTH EVERY DAY   Patient taking differently: Taking every other day      Facility-Administered Medications: None       Past Medical History:   Diagnosis Date    CHF (congestive heart failure) (HCC)     CKD (chronic kidney disease) stage 5, GFR less than 15 ml/min ([facility])     Diabetes mellitus ([facility] [address] )     Diabetic nephropathy ([facility] )     Hemodialysis patient [facility])     via permacath right chest / Evalee Pears Tues/Thurs and Sat    Hyperlipidemia     Hypertension     Muscle weakness     Orthodontics     sleeps with retainer at night    Risk for falls     Uses walker     per dtr active in the house able to cook/light cleaning as able       Past Surgical History:   Procedure Laterality Date    CATARACT EXTRACTION Bilateral     EGD      IR AV FISTULAGRAM/GRAFTOGRAM  5/9/2022    IR BIOPSY BONE MARROW  9/15/2021    IR BIOPSY KIDNEY RANDOM  9/15/2021    IR NON-TUNNELED CENTRAL LINE PLACEMENT  12/7/2021    IR TUNNELED CENTRAL LINE CHECK/CHANGE/REPOSITION  4/1/2022    IR TUNNELED DIALYSIS CATHETER PLACEMENT  12/9/2021    FL ANASTOMOSIS,AV,ANY SITE Left 2/16/2022    Procedure: CREATION FISTULA ARTERIOVENOUS (AV); Surgeon: Taz Romero DO;  Location: [facility];  Service: Vascular       Family History   Problem Relation Age of Onset    Hypertension Mother     Diabetes Father     Hypertension Sister     Diabetes Sister     Hypertension Brother      I have reviewed and agree with the history as documented  E-Cigarette/Vaping    E-Cigarette Use Never User      E-Cigarette/Vaping Substances    Nicotine No     THC No     CBD No     Flavoring No     Other No     Unknown No      Social History     Tobacco Use    Smoking status: Never Smoker    Smokeless tobacco: Never Used    Tobacco comment: NO TOBACCO USE   Vaping Use    Vaping Use: Never used   Substance Use Topics    Alcohol use: Not Currently    Drug use: Not Currently       Review of Systems   Constitutional: Positive for activity change, appetite change, diaphoresis and fatigue  Negative for chills and fever  Eyes: Negative for pain and visual disturbance  Respiratory: Negative for cough and shortness of breath  Cardiovascular: Negative for chest pain and palpitations  Gastrointestinal: Negative for abdominal pain, diarrhea, nausea and vomiting     Genitourinary: Positive for decreased urine volume  Negative for dysuria and hematuria  Musculoskeletal: Negative for myalgias  Skin: Negative for color change, pallor, rash and wound  Neurological: Positive for dizziness, syncope and light-headedness  Negative for seizures  All other systems reviewed and are negative  Physical Exam  Physical Exam  Vitals and nursing note reviewed  Constitutional:       General: She is not in acute distress  Appearance: She is well-developed  She is ill-appearing and diaphoretic  HENT:      Head: Normocephalic and atraumatic  Right Ear: External ear normal       Left Ear: External ear normal       Nose: Nose normal  No congestion or rhinorrhea  Mouth/Throat:      Mouth: Mucous membranes are dry  Eyes:      General: No scleral icterus  Right eye: No discharge  Left eye: No discharge  Conjunctiva/sclera: Conjunctivae normal    Neck:      Trachea: No tracheal deviation  Cardiovascular:      Rate and Rhythm: Bradycardia present  Pulses: Normal pulses  Pulmonary:      Effort: Pulmonary effort is normal  No tachypnea, accessory muscle usage or respiratory distress  Breath sounds: No stridor  Abdominal:      General: There is no distension  Palpations: Abdomen is soft  Tenderness: There is no abdominal tenderness  There is no guarding  Skin:     General: Skin is warm  Coloration: Skin is pale  Neurological:      General: No focal deficit present  Mental Status: She is alert and oriented to person, place, and time  She is not disoriented        Gait: Gait normal    Psychiatric:         Speech: Speech normal          Behavior: Behavior normal          Vital Signs  ED Triage Vitals   Temperature Pulse Respirations Blood Pressure SpO2   06/10/22 2224 06/10/22 2204 06/10/22 2204 06/10/22 2204 06/10/22 2204   (!) 91 °F (32 8 °C) (!) 49 22 (!) 170/102 99 %      Temp Source Heart Rate Source Patient Position - Orthostatic VS BP Location FiO2 (%)   06/10/22 2224 06/10/22 2204 06/10/22 2204 06/10/22 2204 --   Rectal Monitor Sitting Right arm       Pain Score       06/10/22 2204       No Pain           Vitals:    06/10/22 2227 06/10/22 2315 06/10/22 2345 06/11/22 0100   BP: (!) 194/89 (!) 188/85 129/77    Pulse:  55 (!) 52 64   Patient Position - Orthostatic VS: Lying Lying Lying          Visual Acuity  Visual Acuity    Flowsheet Row Most Recent Value   L Pupil Size (mm) 2   R Pupil Size (mm) 2          ED Medications  Medications   aspirin chewable tablet 81 mg (has no administration in time range)   atorvastatin (LIPITOR) tablet 40 mg (has no administration in time range)   calcium acetate (PHOSLO) capsule 667 mg (has no administration in time range)   heparin (porcine) subcutaneous injection 5,000 Units (has no administration in time range)   insulin lispro (HumaLOG) 100 units/mL subcutaneous injection 1-5 Units (has no administration in time range)   insulin lispro (HumaLOG) 100 units/mL subcutaneous injection 1-5 Units (has no administration in time range)   hydrALAZINE (APRESOLINE) injection 5 mg (has no administration in time range)   carvedilol (COREG) tablet 12 5 mg (has no administration in time range)   ceftriaxone (ROCEPHIN) 2 g/50 mL in dextrose IVPB (0 mg Intravenous Stopped 6/11/22 0000)       Diagnostic Studies  Results Reviewed     Procedure Component Value Units Date/Time    High Sensitivity Troponin I Random [[id number]]     Lab Status: No result Specimen: Blood     C-reactive protein [[id number]]     Lab Status: No result Specimen: Blood     D-dimer, quantitative [[id number]]     Lab Status: No result Specimen: Blood     NT-BNP PRO [[id number]]     Lab Status: No result Specimen: Blood     CK (with reflex to MB) [[id number]]     Lab Status: No result Specimen: Blood     TSH, 3rd generation with Free T4 reflex [[id number]]  (Normal) Collected: 06/11/22 0002    Lab Status: Final result Specimen: Blood from Arm, Left Updated: 06/11/22 0039     TSH 3RD GENERATON 3 386 uIU/mL     Narrative:      Patients undergoing fluorescein dye angiography may retain small amounts of fluorescein in the body for 48-72 hours post procedure  Samples containing fluorescein can produce falsely depressed TSH values  If the patient had this procedure,a specimen should be resubmitted post fluorescein clearance  Fingerstick Glucose (POCT) [[id number]]  (Normal) Collected: 06/11/22 0017    Lab Status: Final result Updated: 06/11/22 0018     POC Glucose 114 mg/dl     Urine Microscopic [[id number]]  (Abnormal) Collected: 06/10/22 2331    Lab Status: Final result Specimen: Urine, Indwelling Khan Catheter Updated: 06/10/22 2346     RBC, UA None Seen /hpf      WBC, UA 4-10 /hpf      Epithelial Cells Occasional /hpf      Bacteria, UA Occasional /hpf     UA w Reflex to Microscopic w Reflex to Culture [[id number]]  (Abnormal) Collected: 06/10/22 2331    Lab Status: Final result Specimen: Urine, Indwelling Khan Catheter Updated: 06/10/22 2338     Color, UA Yellow     Clarity, UA Clear     Specific Gravity, UA 1 015     pH, UA 8 0     Leukocytes, UA Trace     Nitrite, UA Negative     Protein,  (2+) mg/dl      Glucose, UA Negative mg/dl      Ketones, UA Negative mg/dl      Urobilinogen, UA 0 2 E U /dl      Bilirubin, UA Negative     Blood, UA Negative    COVID/FLU/RSV - 2 hour TAT [[id number]]  (Abnormal) Collected: 06/10/22 2241    Lab Status: Final result Specimen: Nares from Nose Updated: 06/10/22 2327     SARS-CoV-2 Positive     INFLUENZA A PCR Negative     INFLUENZA B PCR Negative     RSV PCR Negative    Narrative:      FOR PEDIATRIC PATIENTS - copy/paste COVID Guidelines URL to browser: [email or web address]    SARS-CoV-2 assay is a Nucleic Acid Amplification assay intended for the  qualitative detection of nucleic acid from SARS-CoV-2 in nasopharyngeal  swabs   Results are for the presumptive identification of SARS-CoV-2 RNA  Positive results are indicative of infection with SARS-CoV-2, the virus  causing COVID-19, but do not rule out bacterial infection or co-infection  with other viruses  Laboratories within the [place] and its  territories are required to report all positive results to the appropriate  public health authorities  Negative results do not preclude SARS-CoV-2  infection and should not be used as the sole basis for treatment or other  patient management decisions  Negative results must be combined with  clinical observations, patient history, and epidemiological information  This test has not been FDA cleared or approved  This test has been authorized by FDA under an Emergency Use Authorization  (EUA)  This test is only authorized for the duration of time the  declaration that circumstances exist justifying the authorization of the  emergency use of an in vitro diagnostic tests for detection of SARS-CoV-2  virus and/or diagnosis of COVID-19 infection under section 564(b)(1) of  the Act, 21 U  S C  107FRK-1(B)(1), unless the authorization is terminated  or revoked sooner  The test has been validated but independent review by FDA  and CLIA is pending  Test performed using [facility]: This RT-PCR assay targets N2,  a region unique to SARS-CoV-2  A conserved region in the E-gene was chosen  for pan-Sarbecovirus detection which includes SARS-CoV-2  Procalcitonin [[id number]]  (Normal) Collected: 06/10/22 2241    Lab Status: Final result Specimen: Blood from Arm, Right Updated: 06/10/22 2316     Procalcitonin 0 08 ng/ml     Lactic acid [[id number]]  (Normal) Collected: 06/10/22 2241    Lab Status: Final result Specimen: Blood from Arm, Right Updated: 06/10/22 2308     LACTIC ACID 1 0 mmol/L     Narrative:      Result may be elevated if tourniquet was used during collection      Comprehensive metabolic panel [[id number]]  (Abnormal) Collected: 06/10/22 2241    Lab Status: Final result Specimen: Blood from Arm, Right Updated: 06/10/22 2306     Sodium 136 mmol/L      Potassium 4 9 mmol/L      Chloride 98 mmol/L      CO2 32 mmol/L      ANION GAP 6 mmol/L      BUN 27 mg/dL      Creatinine 4 59 mg/dL      Glucose 79 mg/dL      Calcium 8 7 mg/dL      Corrected Calcium 9 5 mg/dL      AST 16 U/L      ALT 16 U/L      Alkaline Phosphatase 73 U/L      Total Protein 7 4 g/dL      Albumin 3 0 g/dL      Total Bilirubin 0 31 mg/dL      eGFR 9 ml/min/1 73sq m     Narrative:      [place] guidelines for Chronic Kidney Disease (CKD):     Stage 1 with normal or high GFR (GFR > 90 mL/min/1 73 square meters)    Stage 2 Mild CKD (GFR = 60-89 mL/min/1 73 square meters)    Stage 3A Moderate CKD (GFR = 45-59 mL/min/1 73 square meters)    Stage 3B Moderate CKD (GFR = 30-44 mL/min/1 73 square meters)    Stage 4 Severe CKD (GFR = 15-29 mL/min/1 73 square meters)    Stage 5 End Stage CKD (GFR <15 mL/min/1 73 square meters)  Note: GFR calculation is accurate only with a steady state creatinine    Magnesium [[id number]]  (Abnormal) Collected: 06/10/22 2241    Lab Status: Final result Specimen: Blood from Arm, Right Updated: 06/10/22 2306     Magnesium 3 0 mg/dL     Lipase [[id number]]  (Abnormal) Collected: 06/10/22 2241    Lab Status: Final result Specimen: Blood from Arm, Right Updated: 06/10/22 2306     Lipase 513 u/L     Protime-INR [[id number]]  (Normal) Collected: 06/10/22 2241    Lab Status: Final result Specimen: Blood from Arm, Right Updated: 06/10/22 2301     Protime 14 0 seconds      INR 1 11    APTT [[id number]]  (Normal) Collected: 06/10/22 2241    Lab Status: Final result Specimen: Blood from Arm, Right Updated: 06/10/22 2301     PTT 34 seconds     Blood gas, Venous [[id number]]  (Abnormal) Collected: 06/10/22 2241    Lab Status: Final result Specimen: Blood from Arm, Right Updated: 06/10/22 2250     pH, Jeffrey 7 496     pCO2, Jeffrey 38 6 mm Hg      pO2, Jeffrey 81 0 mm Hg      HCO3, Jeffrey 29 2 mmol/L      Base Excess, Jeffrey 5 5 mmol/L      O2 Content, Jeffrey 10 9 ml/dL      O2 HGB, VENOUS 92 6 %     CBC and differential [[id number]]  (Abnormal) Collected: 06/10/22 2241    Lab Status: Final result Specimen: Blood from Arm, Right Updated: 06/10/22 2248     WBC 4 87 Thousand/uL      RBC 2 54 Million/uL      Hemoglobin 7 5 g/dL      Hematocrit 23 9 %      MCV 94 fL      MCH 29 5 pg      MCHC 31 4 g/dL      RDW 14 9 %      MPV 10 2 fL      Platelets 609 Thousands/uL      nRBC 0 /100 WBCs      Neutrophils Relative 58 %      Immat GRANS % 0 %      Lymphocytes Relative 29 %      Monocytes Relative 10 %      Eosinophils Relative 2 %      Basophils Relative 1 %      Neutrophils Absolute 2 83 Thousands/µL      Immature Grans Absolute 0 01 Thousand/uL      Lymphocytes Absolute 1 43 Thousands/µL      Monocytes Absolute 0 49 Thousand/µL      Eosinophils Absolute 0 08 Thousand/µL      Basophils Absolute 0 03 Thousands/µL     Blood culture #1 [[id number]] Collected: 06/10/22 2241    Lab Status: In process Specimen: Blood from Arm, Right Updated: 06/10/22 2246    Blood culture #2 [[id number]] Collected: 06/10/22 2241    Lab Status: In process Specimen: Blood from Arm, Right Updated: 06/10/22 2246    Fingerstick Glucose (POCT) [[id number]]  (Normal) Collected: 06/10/22 2242    Lab Status: Final result Updated: 06/10/22 2245     POC Glucose 89 mg/dl                  CT head without contrast   Final Result by Yuni Crum MD (06/10 2326)      No acute intracranial abnormality                    Workstation performed: [id number]         XR chest portable - 1 view    (Results Pending)   VAS carotid complete study    (Results Pending)   VAS lower limb venous duplex study, complete bilateral    (Results Pending)              Procedures  ECG 12 Lead Documentation Only    Date/Time: 6/10/2022 10:59 PM  Performed by: Eleazar Miranda DO  Authorized by: Eleazar Miranda DO     Indications / Diagnosis:  Sepsis  ECG reviewed by me, the ED Provider: yes    Patient location:  ED  Previous ECG:     Previous ECG:  Compared to current    Similarity:  No change    Comparison to cardiac monitor: Yes    Interpretation:     Interpretation: abnormal    Rate:     ECG rate:  52    ECG rate assessment: bradycardic    Rhythm:     Rhythm: sinus bradycardia    Ectopy:     Ectopy: none    QRS:     QRS intervals:  Normal  Conduction:     Conduction: normal    ST segments:     ST segments:  Non-specific  T waves:     T waves: non-specific and peaked      Peaked:  V2, V3 and V4  Other findings:     Other findings: prolonged qTc interval      CriticalCare Time  Performed by: Maritza Maloney DO  Authorized by: Maritza Maloney DO     Critical care provider statement:     Critical care time (minutes):  45    Critical care time was exclusive of:  Separately billable procedures and treating other patients and teaching time    Critical care was necessary to treat or prevent imminent or life-threatening deterioration of the following conditions:  Sepsis    Critical care was time spent personally by me on the following activities:  Blood draw for specimens, obtaining history from patient or surrogate, development of treatment plan with patient or surrogate, discussions with consultants, evaluation of patient's response to treatment, examination of patient, interpretation of cardiac output measurements, ordering and performing treatments and interventions, ordering and review of laboratory studies, ordering and review of radiographic studies, review of old charts and re-evaluation of patient's condition    I assumed direction of critical care for this patient from another provider in my specialty: no               ED Course                            Initial Sepsis Screening     Row Name 06/10/22 1761                Is the patient's history suggestive of a new or worsening infection?  Yes (Proceed)  -GS        Suspected source of infection suspect infection, source unknown  -GS        Are two or more of the following signs & symptoms of infection both present and new to the patient? --        Indicate SIRS criteria Hypothermia < 36C (96 8F); Tachypnea > 20 resp per min  -GS        If the answer is yes to both questions, suspicion of sepsis is present --        If severe sepsis is present AND tissue hypoperfusion perists in the hour after fluid resuscitation or lactate > 4, the patient meets criteria for SEPTIC SHOCK --        Are any of the following organ dysfunction criteria present within 6 hours of suspected infection and SIRS criteria that are NOT considered to be chronic conditions? No  -GS        Organ dysfunction --        Date of presentation of severe sepsis --        Time of presentation of severe sepsis --        Tissue hypoperfusion persists in the hour after crystalloid fluid administration, evidenced, by either: --        Was hypotension present within one hour of the conclusion of crystalloid fluid administration? No  -GS        Date of presentation of septic shock --        Time of presentation of septic shock --              User Key  (r) = Recorded By, (t) = Taken By, (c) = Cosigned By    [facility] [address]  Name Provider Type    GS Brad Parson DO Physician                SBIRT 22yo+    [address] [facility] Chace Most Recent Value   SBIRT (25 yo +)    In order to provide better care to our patients, we are screening all of our patients for alcohol and drug use  Would it be okay to ask you these screening questions? No Filed at: 06/10/2022 2223                    MDM  Number of Diagnoses or Management Options  Chronic anemia: new and requires workup  COVID-19: new and requires workup  ESRD on hemodialysis [facility]): new and requires workup  Hypothermia, initial encounter: new and requires workup  Diagnosis management comments: 10:37 PM  Sepsis protocol started    Given patient's possible syncopal event, bradycardia and hypertension I will obtain a CT scan to evaluate for any sort of intracranial hemorrhage, elevation of ICP, etcetera  Will hold off on IV fluids at this time until further labs and imaging or resulted  Will continue with warming blanket for hypothermia  Patient does have a prolonged QTC so will try to avoid any antagonizing medications  11:36 PM  Patient remained stable but still hypothermic  Patient is COVID positive  Critical care texted to discuss case for disposition  11:43 PM  Spoke with Critical Care  They state that the patient can actually go to level to step-down  There nurses will set up the warming blanket but otherwise since the patient is actually improving there is no critical care needs at this time but they are available if needed  Will discuss with Internal Medicine  11:45 PM  Temp went from 90 5 now is 91 4  Patient admitted to Internal Medicine  Amount and/or Complexity of Data Reviewed  Clinical lab tests: ordered and reviewed  Tests in the radiology section of CPT®: ordered and reviewed  Tests in the medicine section of CPT®: reviewed and ordered  Review and summarize past medical records: yes  Independent visualization of images, tracings, or specimens: yes        Disposition  Final diagnoses:   COVID-19   Hypothermia, initial encounter   Chronic anemia   ESRD on hemodialysis [facility])     Time reflects when diagnosis was documented in both MDM as applicable and the Disposition within this note     Time User Action Codes Description Comment    6/10/2022 11:38 PM Tempie Settle Add [U07 1] COVID-19     6/10/2022 11:38 PM [facility]  XXXA] Hypothermia, initial encounter     6/10/2022 11:39 PM Tempie Settle Add [D64 9] Chronic anemia     6/10/2022 11:39 PM Michael Vasques Add [N18 6,  Z99 2] ESRD on hemodialysis [facility])       ED Disposition     ED Disposition   Admit    Condition   Stable    Date/Time   Sat Jun 11, 2022 12:11 AM    Comment   Case was discussed with SLIM and the patient's admission status was agreed to be Admission Status: inpatient status to the service of Dr Migue Woo              Follow-up Information    None         Current Discharge Medication List      CONTINUE these medications which have NOT CHANGED    Details   Accu-Chek FastClix Lancets MISC Inject under the skin 2 (two) times a day Test  Qty: 102 each, Refills: 5    Associated Diagnoses: Type 2 diabetes mellitus with hyperglycemia, with long-term current use of insulin (HCC)      Alcohol Swabs (EASY TOUCH ALCOHOL PREP MEDIUM) 70 % PADS USE 2 TO 3 X PER DAY  Refills: 3      aspirin 81 mg chewable tablet Chew 1 tablet (81 mg total) daily  Qty: 30 tablet, Refills: 0    Associated Diagnoses: Resolved ischemic left middle cerebral artery (MCA) stroke in remote past      atorvastatin (LIPITOR) 40 mg tablet Take 1 tablet (40 mg total) by mouth daily  Qty: 90 tablet, Refills: 3    Associated Diagnoses: Controlled type 2 diabetes mellitus without complication, with long-term current use of insulin ([facility])      carvedilol (COREG) 25 mg tablet Take 1 tablet (25 mg total) by mouth 2 (two) times a day with meals  Qty: 60 tablet, Refills: 3    Associated Diagnoses: Essential hypertension      cloNIDine (CATAPRES-TTS-2) 0 2 mg/24 hr Place 1 patch on the skin once a week      gabapentin (NEURONTIN) 300 mg capsule Take 1 capsule (300 mg total) by mouth daily at bedtime  Qty: 30 capsule, Refills: 0    Associated Diagnoses: Type 2 diabetes mellitus with stage 3b chronic kidney disease, with long-term current use of insulin ([facility])      Glucagon (Gvoke HypoPen 2-Pack) 1 MG/0 2ML SOAJ Inject 1 mg under the skin      hydrALAZINE (APRESOLINE) 50 mg tablet Take 1 tablet (50 mg total) by mouth every 8 (eight) hours  Qty: 90 tablet, Refills: 0    Associated Diagnoses: Essential (primary) hypertension      insulin glargine (Lantus SoloStar) 100 units/mL injection pen Inject 15 Units under the skin daily      Insulin Pen Needle (Unifine Pentips) 32G X 4 MM MISC Use daily  Qty: 100 each, Refills: 2    Associated Diagnoses: Type 2 diabetes mellitus with stage 3b chronic kidney disease, with long-term current use of insulin ([facility])      Krill Oil 1000 MG CAPS Take by mouth      linaGLIPtin (Tradjenta) 5 MG TABS Take 5 mg by mouth daily      calcium acetate (PHOSLO) capsule Take 1 capsule (667 mg total) by mouth 3 (three) times a day with meals  Qty: 90 capsule, Refills: 0    Associated Diagnoses: ESRD (end stage renal disease) ([facility])      ergocalciferol (ERGOCALCIFEROL) 1 25 MG (80267 UT) capsule Take 50,000 Units by mouth every 30 (thirty) days        Lokelma 5 g PACK MIX 1 PACKET IN 3 TABLESPOONS OF WATER AND DRINK BY MOUTH ON MONDAYS WEDNESDAYS FRIDAYS AND SUNDAYS      NIFEdipine (ADALAT CC) 60 MG 24 hr tablet Take 1 tablet (60 mg total) by mouth 2 (two) times a day  Qty: 60 tablet, Refills: 2    Associated Diagnoses: Hypertensive urgency      terazosin (HYTRIN) 5 mg capsule Take 10 mg by mouth daily at bedtime       torsemide (DEMADEX) 20 mg tablet TAKE ONE TABLET BY MOUTH EVERY DAY  Qty: 30 tablet, Refills: 0    Associated Diagnoses: Bilateral lower extremity edema             No discharge procedures on file      PDMP Review     None          ED Provider  Electronically Signed by           Ezequiel De DO  06/11/22 0134

## 2022-06-11 NOTE — ASSESSMENT & PLAN NOTE
Lab Results   Component Value Date    EGFR 9 06/10/2022    EGFR 7 03/28/2022    EGFR 12 02/16/2022    CREATININE 4 59 (H) 06/10/2022    CREATININE 5 36 (H) 03/28/2022    CREATININE 3 68 (H) 02/16/2022     On hemodialysis every Monday, Wednesday and Friday  Plan  > consult nephrology

## 2022-06-11 NOTE — ASSESSMENT & PLAN NOTE
-Found to be hypothermic with temperature 91 4 Farenheit in the ER  -COVID positive  -syncopal episode at home earlier in the day  -previous admission in December 2021 for hypothermia then which was thought secondary to hypoglycemia then  -CXR with cardiomegaly, increased haziness in b/l lung fields concerning for increased fluid per my read, awaiting official read  -CT Head negative for acute intracranial abnormality  -TSH within normal limits, glucose within normal limits in ER  -evaluated by ICU team in the ER who reports patient okay for step-down level 2  -no focal neurologic deficits on exam  Plan  > Exact etiology of hypothermia unclear, but patient is COVID positive and from review of notes it appears patient has had hypothermia in the past attributed to hypoglycemia    > admit to step-down level to with [employer]  Frequent temperature checks to ensure improvement    Neuro checks q 4 hours  > treatment per COVID protocol  > syncope workup with monitoring on telemetry, echocardiogram and ultrasound carotids  > follow-up blood cultures done in the ER

## 2022-06-11 NOTE — PLAN OF CARE
Problem: Potential for Falls  Goal: Patient will remain free of falls  Description: INTERVENTIONS:  - Educate patient/family on patient safety including physical limitations  - Instruct patient to call for assistance with activity   - Consult OT/PT to assist with strengthening/mobility   - Keep Call bell within reach  - Keep bed low and locked with side rails adjusted as appropriate  - Keep care items and personal belongings within reach  - Initiate and maintain comfort rounds  - Make Fall Risk Sign visible to staff  - Offer Toileting every  Hours, in advance of need  - Initiate/Maintain alarm  - Obtain necessary fall risk management equipment:   - Apply yellow socks and bracelet for high fall risk patients  - Consider moving patient to room near nurses station  6/11/2022 0903 by Alexey White RN  Outcome: Progressing  6/11/2022 0903 by Alexey White RN  Outcome: Progressing     Problem: MOBILITY - ADULT  Goal: Maintain or return to baseline ADL function  Description: INTERVENTIONS:  -  Assess patient's ability to carry out ADLs; assess patient's baseline for ADL function and identify physical deficits which impact ability to perform ADLs (bathing, care of mouth/teeth, toileting, grooming, dressing, etc )  - Assess/evaluate cause of self-care deficits   - Assess range of motion  - Assess patient's mobility; develop plan if impaired  - Assess patient's need for assistive devices and provide as appropriate  - Encourage maximum independence but intervene and supervise when necessary  - Involve family in performance of ADLs  - Assess for home care needs following discharge   - Consider OT consult to assist with ADL evaluation and planning for discharge  - Provide patient education as appropriate  6/11/2022 0903 by Alexey White RN  Outcome: Progressing  6/11/2022 0903 by Alexey White RN  Outcome: Progressing  Goal: Maintains/Returns to pre admission functional level  Description: INTERVENTIONS:  - Perform BMAT or MOVE assessment daily    - Set and communicate daily mobility goal to care team and patient/family/caregiver  - Collaborate with rehabilitation services on mobility goals if consulted  - Perform Range of Motion  times a day  - Reposition patient every  hours    - Dangle patient  times a day  - Stand patient  times a day  - Ambulate patient  times a day  - Out of bed to chair  times a day   - Out of bed for meal times a day  - Out of bed for toileting  - Record patient progress and toleration of activity level   6/11/2022 0903 by Peter Albert RN  Outcome: Progressing  6/11/2022 0903 by Peter Albert RN  Outcome: Progressing     Problem: Prexisting or High Potential for Compromised Skin Integrity  Goal: Skin integrity is maintained or improved  Description: INTERVENTIONS:  - Identify patients at risk for skin breakdown  - Assess and monitor skin integrity  - Assess and monitor nutrition and hydration status  - Monitor labs   - Assess for incontinence   - Turn and reposition patient  - Assist with mobility/ambulation  - Relieve pressure over bony prominences  - Avoid friction and shearing  - Provide appropriate hygiene as needed including keeping skin clean and dry  - Evaluate need for skin moisturizer/barrier cream  - Collaborate with interdisciplinary team   - Patient/family teaching  - Consider wound care consult   6/11/2022 0903 by Peter Albert RN  Outcome: Progressing  6/11/2022 0903 by Peter Albert RN  Outcome: Progressing     Problem: CARDIOVASCULAR - ADULT  Goal: Maintains optimal cardiac output and hemodynamic stability  Description: INTERVENTIONS:  - Monitor I/O, vital signs and rhythm  - Monitor for S/S and trends of decreased cardiac output  - Administer and titrate ordered vasoactive medications to optimize hemodynamic stability  - Assess quality of pulses, skin color and temperature  - Assess for signs of decreased coronary artery perfusion  - Instruct patient to report change in severity of symptoms  Outcome: Progressing  Goal: Absence of cardiac dysrhythmias or at baseline rhythm  Description: INTERVENTIONS:  - Continuous cardiac monitoring, vital signs, obtain 12 lead EKG if ordered  - Administer antiarrhythmic and heart rate control medications as ordered  - Monitor electrolytes and administer replacement therapy as ordered  Outcome: Progressing     Problem: GENITOURINARY - ADULT  Goal: Urinary catheter remains patent  Description: INTERVENTIONS:  - Assess patency of urinary catheter  - If patient has a chronic kumari, consider changing catheter if non-functioning  - Follow guidelines for intermittent irrigation of non-functioning urinary catheter  Outcome: Progressing     Problem: METABOLIC, FLUID AND ELECTROLYTES - ADULT  Goal: Electrolytes maintained within normal limits  Description: INTERVENTIONS:  - Monitor labs and assess patient for signs and symptoms of electrolyte imbalances  - Administer electrolyte replacement as ordered  - Monitor response to electrolyte replacements, including repeat lab results as appropriate  - Instruct patient on fluid and nutrition as appropriate  Outcome: Progressing

## 2022-06-11 NOTE — CONSULTS
Consultation - Nephrology   Tamar Davis 78 y o  female MRN: [id number]  Unit/Bed#: [id number] Encounter: [id number]    Inpatient consult to Nephrology  Consult performed by: Larissa Mascorro MD  Consult ordered by: Rodri Oneal DO          History of Present Illness   Physician Requesting Consult: Danyelle Magallanes*  Reason for Consult / Principal Problem:  ESRD    HPI: Tamar Davis is a 78y o  year old female with PMH of ESRD on HD/diabetes mellitus type 2/chronic lacunar infarcts who presents with unwitnessed syncopal episode earlier in the day at her home being found hypothermic in the emergency room at [facility]  Apparently she called her daughter brought to the emergency room temperature was 91° found to be COVID positive  Patient now markedly improved and asymptomatic  She denies any chest pain shortness of breath  She denies any nausea vomiting or diarrhea    She denies any active urinary symptoms although she does have a Khan catheter at this time  She denies any cough at this time or fevers or chills        History obtained from the patient, the chart and the old records which I completely reviewed        Review of systems:  General:  See HPI, no fevers or chills  Cardiovascular:  No chest pain or shortness of breath or leg swelling at this time  Respiratory:  No cough or colds  Gastrointestinal:  No nausea vomiting or diarrhea or abdominal pain  Genitourinary:  No urinary symptoms  Neuro:  No significant headache  All other systems were reviewed and are negative    Historical Information   Past Medical History:   Diagnosis Date    CHF (congestive heart failure) ([facility] )     CKD (chronic kidney disease) stage 5, GFR less than 15 ml/min [facility]     Diabetes mellitus ([facility] )     Diabetic nephropathy [facility]     Hemodialysis patient ([facility] )     via permacath right chest / [facility] Tues/Thurs and Sat    Hyperlipidemia     Hypertension     Muscle weakness     Orthodontics     sleeps with retainer at night    Risk for falls     Uses walker     per dtr active in the house able to cook/light cleaning as able     Past Surgical History:   Procedure Laterality Date    CATARACT EXTRACTION Bilateral     EGD      IR AV FISTULAGRAM/GRAFTOGRAM  5/9/2022    IR BIOPSY BONE MARROW  9/15/2021    IR BIOPSY KIDNEY RANDOM  9/15/2021    IR NON-TUNNELED CENTRAL LINE PLACEMENT  12/7/2021    IR TUNNELED CENTRAL LINE CHECK/CHANGE/REPOSITION  4/1/2022    IR TUNNELED DIALYSIS CATHETER PLACEMENT  12/9/2021    MA ANASTOMOSIS,AV,ANY SITE Left 2/16/2022    Procedure: CREATION FISTULA ARTERIOVENOUS (AV);   Surgeon: Shameka Ortiz DO;  Location: [facility];  Service: Vascular     Social History   Social History     Substance and Sexual Activity   Alcohol Use Not Currently     Social History     Substance and Sexual Activity   Drug Use Not Currently     Social History     Tobacco Use   Smoking Status Never Smoker   Smokeless Tobacco Never Used   Tobacco Comment    NO TOBACCO USE     Family History   Problem Relation Age of Onset    Hypertension Mother     Diabetes Father     Hypertension Sister     Diabetes Sister     Hypertension Brother        Meds/Allergies   all current active meds have been reviewed, current meds:   Current Facility-Administered Medications   Medication Dose Route Frequency    aspirin chewable tablet 81 mg  81 mg Oral Daily    atorvastatin (LIPITOR) tablet 40 mg  40 mg Oral Daily    calcium acetate (PHOSLO) capsule 667 mg  667 mg Oral TID With Meals    carvedilol (COREG) tablet 12 5 mg  12 5 mg Oral BID With Meals    heparin (porcine) subcutaneous injection 5,000 Units  5,000 Units Subcutaneous Q8H [address]    hydrALAZINE (APRESOLINE) injection 5 mg  5 mg Intravenous Q6H PRN    insulin lispro (HumaLOG) 100 units/mL subcutaneous injection 1-5 Units  1-5 Units Subcutaneous Q6H [address]    insulin lispro (HumaLOG) 100 units/mL subcutaneous injection 1-5 Units  1-5 Units Subcutaneous 0200    and PTA meds:    Medications Prior to Admission   Medication    Accu-Chek FastClix Lancets MISC    Alcohol Swabs (EASY TOUCH ALCOHOL PREP MEDIUM) 70 % PADS    aspirin 81 mg chewable tablet    atorvastatin (LIPITOR) 40 mg tablet    carvedilol (COREG) 25 mg tablet    cloNIDine (CATAPRES-TTS-2) 0 2 mg/24 hr    gabapentin (NEURONTIN) 300 mg capsule    Glucagon (Gvoke HypoPen 2-Pack) 1 MG/0 2ML SOAJ    hydrALAZINE (APRESOLINE) 50 mg tablet    insulin glargine (Lantus SoloStar) 100 units/mL injection pen    Insulin Pen Needle (Unifine Pentips) 32G X 4 MM MISC    Krill Oil 1000 MG CAPS    linaGLIPtin (Tradjenta) 5 MG TABS    calcium acetate (PHOSLO) capsule    ergocalciferol (ERGOCALCIFEROL) 1 25 MG (46809 UT) capsule    Lokelma 5 g PACK    NIFEdipine (ADALAT CC) 60 MG 24 hr tablet    terazosin (HYTRIN) 5 mg capsule    torsemide (DEMADEX) 20 mg tablet       Allergies   Allergen Reactions    Amlodipine Edema and Eye Swelling    Lisinopril Angioedema     Bilateral periorbital edema that was significant       Objective     Intake/Output Summary (Last 24 hours) at 6/11/2022 0917  Last data filed at 6/11/2022 0000  Gross per 24 hour   Intake 50 ml   Output --   Net 50 ml     Patient Vitals for the past 24 hrs:   BP Temp Temp src Pulse Resp SpO2   06/11/22 0802 154/71 98 1 °F (36 7 °C) -- -- 20 --   06/11/22 0604 156/73 98 °F (36 7 °C) -- 68 -- 97 %   06/11/22 0406 -- (!) 97 3 °F (36 3 °C) -- 67 -- 99 %   06/11/22 0155 -- (!) 97 3 °F (36 3 °C) -- 66 -- 98 %   06/11/22 0136 164/76 -- -- 64 22 98 %   06/11/22 0100 -- (!) 94 3 °F (34 6 °C) Probe 64 16 --   06/11/22 0005 -- (!) 92 1 °F (33 4 °C) Probe -- -- --   06/10/22 2345 129/77 (!) 91 4 °F (33 °C) Probe (!) 52 16 97 %   06/10/22 2315 (!) 188/85 -- -- 55 16 97 %   06/10/22 2227 (!) 194/89 -- -- -- -- --   06/10/22 2224 -- (!) 91 °F (32 8 °C) Rectal -- -- --   06/10/22 2204 (!) 170/102 -- -- (!) 49 22 99 %       Invasive Devices: Urethral Catheter 16 Fr  (Active)   Reasons to continue Urinary Catheter  Acute urinary retention/obstruction failing urinary retention protocol 06/10/22 2322   Site Assessment Clean 06/10/22 2322   Khan Care Done 06/11/22 0858   Collection Container Standard drainage bag 06/10/22 2322   Securement Method Securing device (Describe); Other (Comment) 06/10/22 2322     Vitals:    06/11/22 0802   BP: 154/71   Pulse:    Resp: 20   Temp: 98 1 °F (36 7 °C)   SpO2:      General:  Well-developed well-nourished no acute distress  Skin:  No acute rash  Eyes:  No scleral icterus and noninjected  ENT:  Normocephalic/atraumatic, mucous membranes moist  Neck:  Supple, no jugular distention, no carotid bruits, 1+ carotid upstroke, no thyromegaly and trachea midline  Back:  No CVA tenderness  Chest:  Clear to auscultation percussion, no use of accessory respiratory muscles, good respiratory effort  Right TDC clean and dry bandage  CVS:  Regular rate and rhythm without a murmur rub or gallops appreciable  Abdomen:  Soft and nontender normal bowel sounds, no hepatosplenomegaly or bruits appreciable  Extremities:  No clubbing, no cyanosis, no edema, 2+ dorsalis pedis pulses no femoral bruits and no significant arthritic changes  Neuro:  No gross focality  Psych:  Alert oriented and appropriate    Current Weight:    First Weight:      Lab Results:  I have personally reviewed pertinent labs    CBC:   Lab Results   Component Value Date    WBC 5 49 06/11/2022    WBC 6 4 06/08/2018    RBC 2 22 (L) 06/11/2022    RBC 4 68 06/08/2018     CMP:   Lab Results   Component Value Date     06/08/2018    CL 95 (L) 06/11/2022     06/08/2018    CO2 31 06/11/2022    CO2 27 06/08/2018    ANIONGAP 10 06/08/2018    BUN 28 (H) 06/11/2022    BUN 25 06/08/2018    CREATININE 4 61 (H) 06/11/2022    CALCIUM 8 0 (L) 06/11/2022    CALCIUM 9 4 06/08/2018    AST 14 06/11/2022    AST 20 06/08/2018    ALT 13 06/11/2022    ALT 25 06/08/2018    ALKPHOS 61 06/11/2022    ALKPHOS 83 06/08/2018    PROT 7 5 06/08/2018    BILITOT 0 3 06/08/2018    EGFR 9 06/11/2022     Phosphorus:   Lab Results   Component Value Date    PHOS 3 5 12/13/2021     Magnesium:   Lab Results   Component Value Date    MG 2 7 (H) 06/11/2022     Urinalysis:   Lab Results   Component Value Date    COLORU Yellow 06/10/2022    CLARITYU Clear 06/10/2022    SPECGRAV 1 015 06/10/2022    PHUR 8 0 06/10/2022    PHUR 8 0 12/16/2021    LEUKOCYTESUR Trace (A) 06/10/2022    NITRITE Negative 06/10/2022    GLUCOSEU Negative 06/10/2022    KETONESU Negative 06/10/2022    BILIRUBINUR Negative 06/10/2022    BLOODU Negative 06/10/2022     BMP:   Lab Results   Component Value Date    SODIUM 131 (L) 06/11/2022    CO2 31 06/11/2022    CO2 27 06/08/2018    BUN 28 (H) 06/11/2022    BUN 25 06/08/2018    CREATININE 4 61 (H) 06/11/2022    CALCIUM 8 0 (L) 06/11/2022    CALCIUM 9 4 06/08/2018     ABGs: No results found for: Birgit 30  Radiology review:  Chest x-ray demonstrates mild-to-moderate pulmonary edema possible pneumonia  By my personal exam chest x-ray clearly shows fluid overload/pulmonary edema  Assessment/Plan   78y o  year old female with PMH of ESRD on HD/diabetes mellitus type 2/chronic lacunar infarcts who presents with unwitnessed syncopal episode earlier in the day at her home being found hypothermic in the emergency room at [facility]  Apparently she called her daughter brought to the emergency room temperature was 91° found to be COVID positive  1  ESRD:  · TTS at [facility] [address]   Access:  [facility]   Next treatment:  Today please see orders    Discussed with2  Volume/blood pressure:   Volume:  Volume overloaded with pulmonary edema will ultrafilter with HD   Blood pressure: Acceptable  o Recommendation:  - Ultrafiltration with HD today  - Maintain current antihypertensive regimen monitor      3  Electrolytes:   Hyperkalemia:  Treat medically and then HD        4  MBD of ESRD:   Hyperphosphatemia:  Maintain renal diet and check phosphorus   Secondary hyperparathyroidism:  Maintain outpatient treatment    5  Anemia of ESRD:  Patient is not on any CHRISTAL agent I will determine the reason   Current hemoglobin:  6 7 to be transfused   Will check iron studies   Will check stool for occult blood   Treatment:  Transfuse   Transfuse for hemoglobin less than 7 0 per primary service      6  Reason for hospitalization:   COVID/syncope to be treated by primary service    7  Other major problems:   Diabetes mellitus per primary service   History of lacunar infarct    Discussed with SLIM regarding treatment for hyperkalemia and HD    Portions of the record may have been created with voice recognition software  Occasional wrong word or "sound a like" substitutions may have occurred due to the inherent limitations of voice recognition software  Read the chart carefully and recognize, using context, where substitutions have occurred

## 2022-06-11 NOTE — ASSESSMENT & PLAN NOTE
-reports watching TV earlier in the day and then having an episode of syncope with loss of consciousness for unknown duration as it was unwitnessed   -EKG sinus bradycardia, rate 52, nonspecific ST changes  -CT head negative for acute intracranial abnormality  -no focal neurologic deficits on exam  -in setting of COVID infection and hypothermia  -previously underwent MRI brain in December with chronic lacunar infarcts  Plan  > Syncope workup with monitoring on telemetry, echocardiogram and ultrasound carotids    > bradycardia noted on EKG and will halve the dose of PTA Coreg and put on strict hold parameters  > consider Cardiology consultation in the morning  > consider Neurology consultation in the morning regarding further imaging including MRI brain  > although no tachycardia or shortness of breath, will order bilateral lower extremity duplex to rule out DVT

## 2022-06-11 NOTE — PLAN OF CARE
Net UF goal 2kg over 3 hours as frederic via RIJ TDC  Next HD tx Tues, 6/14  See flowsheets for further assessment details  ~1130: Pt hypoglycemic per POCT, bedside RN gave x1 D50 @1145, POCT recheck @ 1200 105  Post-Dialysis RN Treatment Note    Blood Pressure:  Pre 161/70 mm/Hg  Post 142/68 mmHg   EDW   64 5 kg    Weight:  Pre 66 7 kg   Post 64 5 kg   Mode of weight measurement: Bed Scale   Volume Removed  2000 ml    Treatment duration 180 minutes    NS given  No    Treatment shortened?  No   Medications given during Rx Hecterol 1mcg, calcium gluconate 1g   Estimated Kt/V  1 64   Access type: Permacath/TDC   Access Issues: No    Report called to primary nurse   Yes, V A , RN

## 2022-06-12 ENCOUNTER — APPOINTMENT (INPATIENT)
Dept: RADIOLOGY | Facility: HOSPITAL | Age: 67
DRG: 177 | End: 2022-06-12
Payer: MEDICARE

## 2022-06-12 ENCOUNTER — APPOINTMENT (INPATIENT)
Dept: NON INVASIVE DIAGNOSTICS | Facility: HOSPITAL | Age: 67
DRG: 177 | End: 2022-06-12
Payer: MEDICARE

## 2022-06-12 PROBLEM — T68.XXXA HYPOTHERMIA: Status: RESOLVED | Noted: 2021-12-16 | Resolved: 2022-06-12

## 2022-06-12 PROBLEM — D64.9 ACUTE ON CHRONIC ANEMIA: Status: ACTIVE | Noted: 2021-07-19

## 2022-06-12 LAB
ABO GROUP BLD BPU: NORMAL
ALBUMIN SERPL BCP-MCNC: 3.3 G/DL (ref 3.5–5)
ALP SERPL-CCNC: 78 U/L (ref 46–116)
ALT SERPL W P-5'-P-CCNC: 17 U/L (ref 12–78)
ANION GAP SERPL CALCULATED.3IONS-SCNC: 10 MMOL/L (ref 4–13)
AORTIC ROOT: 2.9 CM
AORTIC VALVE MEAN VELOCITY: 19.8 M/S
APICAL FOUR CHAMBER EJECTION FRACTION: 67 %
ASCENDING AORTA: 3.4 CM
AST SERPL W P-5'-P-CCNC: 17 U/L (ref 5–45)
AV AREA BY CONTINUOUS VTI: 1.6 CM2
AV AREA PEAK VELOCITY: 1.5 CM2
AV LVOT MEAN GRADIENT: 3 MMHG
AV LVOT PEAK GRADIENT: 6 MMHG
AV MEAN GRADIENT: 18 MMHG
AV PEAK GRADIENT: 31 MMHG
AV VALVE AREA: 1.6 CM2
AV VELOCITY RATIO: 0.42
BASOPHILS # BLD AUTO: 0.02 THOUSANDS/ΜL (ref 0–0.1)
BASOPHILS NFR BLD AUTO: 0 % (ref 0–1)
BILIRUB SERPL-MCNC: 0.48 MG/DL (ref 0.2–1)
BPU ID: NORMAL
BUN SERPL-MCNC: 21 MG/DL (ref 5–25)
CALCIUM ALBUM COR SERPL-MCNC: 9.5 MG/DL (ref 8.3–10.1)
CALCIUM SERPL-MCNC: 8.9 MG/DL (ref 8.3–10.1)
CHLORIDE SERPL-SCNC: 99 MMOL/L (ref 100–108)
CO2 SERPL-SCNC: 29 MMOL/L (ref 21–32)
CREAT SERPL-MCNC: 3.8 MG/DL (ref 0.6–1.3)
CROSSMATCH: NORMAL
DOP CALC AO PEAK VEL: 2.8 M/S
DOP CALC AO VTI: 60.53 CM
DOP CALC LVOT AREA: 3.46 CM2
DOP CALC LVOT DIAMETER: 2.1 CM
DOP CALC LVOT PEAK VEL VTI: 28 CM
DOP CALC LVOT PEAK VEL: 1.18 M/S
DOP CALC LVOT STROKE INDEX: 56 ML/M2
DOP CALC LVOT STROKE VOLUME: 96.93 CM3
E WAVE DECELERATION TIME: 112 MS
EOSINOPHIL # BLD AUTO: 0 THOUSAND/ΜL (ref 0–0.61)
EOSINOPHIL NFR BLD AUTO: 0 % (ref 0–6)
ERYTHROCYTE [DISTWIDTH] IN BLOOD BY AUTOMATED COUNT: 15.6 % (ref 11.6–15.1)
FERRITIN SERPL-MCNC: 1174 NG/ML (ref 8–388)
FRACTIONAL SHORTENING: 20 % (ref 28–44)
GFR SERPL CREATININE-BSD FRML MDRD: 11 ML/MIN/1.73SQ M
GLUCOSE SERPL-MCNC: 182 MG/DL (ref 65–140)
GLUCOSE SERPL-MCNC: 196 MG/DL (ref 65–140)
GLUCOSE SERPL-MCNC: 213 MG/DL (ref 65–140)
GLUCOSE SERPL-MCNC: 213 MG/DL (ref 65–140)
GLUCOSE SERPL-MCNC: 240 MG/DL (ref 65–140)
GLUCOSE SERPL-MCNC: 246 MG/DL (ref 65–140)
GLUCOSE SERPL-MCNC: 348 MG/DL (ref 65–140)
HCT VFR BLD AUTO: 29.7 % (ref 34.8–46.1)
HEMOCCULT STL QL IA: NEGATIVE
HGB BLD-MCNC: 9.6 G/DL (ref 11.5–15.4)
IMM GRANULOCYTES # BLD AUTO: 0.06 THOUSAND/UL (ref 0–0.2)
IMM GRANULOCYTES NFR BLD AUTO: 1 % (ref 0–2)
INTERVENTRICULAR SEPTUM IN DIASTOLE (PARASTERNAL SHORT AXIS VIEW): 1.3 CM
INTERVENTRICULAR SEPTUM: 1.3 CM (ref 0.6–1.1)
IRON SATN MFR SERPL: 38 % (ref 15–50)
IRON SERPL-MCNC: 78 UG/DL (ref 50–170)
LAAS-AP2: 18.4 CM2
LAAS-AP4: 20.2 CM2
LEFT ATRIUM SIZE: 4.3 CM
LEFT INTERNAL DIMENSION IN SYSTOLE: 3.5 CM (ref 2.1–4)
LEFT VENTRICULAR INTERNAL DIMENSION IN DIASTOLE: 4.4 CM (ref 3.5–6)
LEFT VENTRICULAR POSTERIOR WALL IN END DIASTOLE: 1.3 CM
LEFT VENTRICULAR STROKE VOLUME: 36 ML
LVSV (TEICH): 36 ML
LYMPHOCYTES # BLD AUTO: 0.79 THOUSANDS/ΜL (ref 0.6–4.47)
LYMPHOCYTES NFR BLD AUTO: 8 % (ref 14–44)
MCH RBC QN AUTO: 28.7 PG (ref 26.8–34.3)
MCHC RBC AUTO-ENTMCNC: 32.3 G/DL (ref 31.4–37.4)
MCV RBC AUTO: 89 FL (ref 82–98)
MONOCYTES # BLD AUTO: 0.23 THOUSAND/ΜL (ref 0.17–1.22)
MONOCYTES NFR BLD AUTO: 2 % (ref 4–12)
MV E'TISSUE VEL-LAT: 8 CM/S
MV E'TISSUE VEL-SEP: 5 CM/S
MV PEAK A VEL: 1.27 M/S
MV PEAK E VEL: 124 CM/S
NEUTROPHILS # BLD AUTO: 8.45 THOUSANDS/ΜL (ref 1.85–7.62)
NEUTS SEG NFR BLD AUTO: 89 % (ref 43–75)
NRBC BLD AUTO-RTO: 0 /100 WBCS
PLATELET # BLD AUTO: 306 THOUSANDS/UL (ref 149–390)
PMV BLD AUTO: 10.1 FL (ref 8.9–12.7)
POTASSIUM SERPL-SCNC: 4.8 MMOL/L (ref 3.5–5.3)
PROT SERPL-MCNC: 8.1 G/DL (ref 6.4–8.2)
RBC # BLD AUTO: 3.34 MILLION/UL (ref 3.81–5.12)
RIGHT ATRIAL 2D VOLUME: 44 ML
RIGHT ATRIUM AREA SYSTOLE A4C: 17 CM2
RIGHT VENTRICLE ID DIMENSION: 4.3 CM
SL CV LEFT ATRIUM LENGTH A2C: 4.9 CM
SL CV PED ECHO LEFT VENTRICLE DIASTOLIC VOLUME (MOD BIPLANE) 2D: 86 ML
SL CV PED ECHO LEFT VENTRICLE SYSTOLIC VOLUME (MOD BIPLANE) 2D: 50 ML
SODIUM SERPL-SCNC: 138 MMOL/L (ref 136–145)
TIBC SERPL-MCNC: 207 UG/DL (ref 250–450)
TR MAX PG: 51 MMHG
TR PEAK VELOCITY: 3.6 M/S
TRICUSPID VALVE PEAK REGURGITATION VELOCITY: 3.58 M/S
UNIT DISPENSE STATUS: NORMAL
UNIT PRODUCT CODE: NORMAL
UNIT PRODUCT VOLUME: 350 ML
UNIT RH: NORMAL
WBC # BLD AUTO: 9.55 THOUSAND/UL (ref 4.31–10.16)

## 2022-06-12 PROCEDURE — 74018 RADEX ABDOMEN 1 VIEW: CPT

## 2022-06-12 PROCEDURE — 93306 TTE W/DOPPLER COMPLETE: CPT

## 2022-06-12 PROCEDURE — 82948 REAGENT STRIP/BLOOD GLUCOSE: CPT

## 2022-06-12 PROCEDURE — 99232 SBSQ HOSP IP/OBS MODERATE 35: CPT | Performed by: INTERNAL MEDICINE

## 2022-06-12 PROCEDURE — 82728 ASSAY OF FERRITIN: CPT | Performed by: INTERNAL MEDICINE

## 2022-06-12 PROCEDURE — G0328 FECAL BLOOD SCRN IMMUNOASSAY: HCPCS | Performed by: INTERNAL MEDICINE

## 2022-06-12 PROCEDURE — 80053 COMPREHEN METABOLIC PANEL: CPT | Performed by: INTERNAL MEDICINE

## 2022-06-12 PROCEDURE — 93306 TTE W/DOPPLER COMPLETE: CPT | Performed by: INTERNAL MEDICINE

## 2022-06-12 PROCEDURE — 85025 COMPLETE CBC W/AUTO DIFF WBC: CPT | Performed by: INTERNAL MEDICINE

## 2022-06-12 PROCEDURE — 83540 ASSAY OF IRON: CPT | Performed by: INTERNAL MEDICINE

## 2022-06-12 PROCEDURE — 83550 IRON BINDING TEST: CPT | Performed by: INTERNAL MEDICINE

## 2022-06-12 RX ORDER — INSULIN LISPRO 100 [IU]/ML
1-5 INJECTION, SOLUTION INTRAVENOUS; SUBCUTANEOUS
Status: DISCONTINUED | OUTPATIENT
Start: 2022-06-12 | End: 2022-06-15 | Stop reason: HOSPADM

## 2022-06-12 RX ORDER — CLONIDINE 0.2 MG/24H
0.2 PATCH, EXTENDED RELEASE TRANSDERMAL WEEKLY
Status: DISCONTINUED | OUTPATIENT
Start: 2022-06-12 | End: 2022-06-15

## 2022-06-12 RX ORDER — INSULIN LISPRO 100 [IU]/ML
1-5 INJECTION, SOLUTION INTRAVENOUS; SUBCUTANEOUS EVERY 6 HOURS SCHEDULED
Status: DISCONTINUED | OUTPATIENT
Start: 2022-06-12 | End: 2022-06-12

## 2022-06-12 RX ADMIN — METOCLOPRAMIDE 10 MG: 5 INJECTION, SOLUTION INTRAMUSCULAR; INTRAVENOUS at 22:29

## 2022-06-12 RX ADMIN — CARVEDILOL 12.5 MG: 12.5 TABLET, FILM COATED ORAL at 17:39

## 2022-06-12 RX ADMIN — INSULIN LISPRO 1 UNITS: 100 INJECTION, SOLUTION INTRAVENOUS; SUBCUTANEOUS at 17:42

## 2022-06-12 RX ADMIN — CALCIUM CARBONATE (ANTACID) CHEW TAB 500 MG 500 MG: 500 CHEW TAB at 04:00

## 2022-06-12 RX ADMIN — HEPARIN SODIUM 5000 UNITS: 5000 INJECTION INTRAVENOUS; SUBCUTANEOUS at 05:51

## 2022-06-12 RX ADMIN — CARVEDILOL 12.5 MG: 12.5 TABLET, FILM COATED ORAL at 07:48

## 2022-06-12 RX ADMIN — CALCIUM ACETATE 667 MG: 667 CAPSULE ORAL at 07:49

## 2022-06-12 RX ADMIN — CALCIUM ACETATE 667 MG: 667 CAPSULE ORAL at 11:46

## 2022-06-12 RX ADMIN — HEPARIN SODIUM 5000 UNITS: 5000 INJECTION INTRAVENOUS; SUBCUTANEOUS at 22:16

## 2022-06-12 RX ADMIN — INSULIN LISPRO 1 UNITS: 100 INJECTION, SOLUTION INTRAVENOUS; SUBCUTANEOUS at 22:17

## 2022-06-12 RX ADMIN — ACETAMINOPHEN 975 MG: 325 TABLET, FILM COATED ORAL at 22:29

## 2022-06-12 RX ADMIN — HYDRALAZINE HYDROCHLORIDE 5 MG: 20 INJECTION, SOLUTION INTRAMUSCULAR; INTRAVENOUS at 11:46

## 2022-06-12 RX ADMIN — INSULIN LISPRO 4 UNITS: 100 INJECTION, SOLUTION INTRAVENOUS; SUBCUTANEOUS at 11:49

## 2022-06-12 RX ADMIN — CLONIDINE 0.2 MG: 0.2 PATCH TRANSDERMAL at 12:44

## 2022-06-12 RX ADMIN — HEPARIN SODIUM 5000 UNITS: 5000 INJECTION INTRAVENOUS; SUBCUTANEOUS at 13:01

## 2022-06-12 RX ADMIN — METOCLOPRAMIDE 10 MG: 5 INJECTION, SOLUTION INTRAMUSCULAR; INTRAVENOUS at 04:00

## 2022-06-12 RX ADMIN — ACETAMINOPHEN 975 MG: 325 TABLET, FILM COATED ORAL at 03:59

## 2022-06-12 RX ADMIN — INSULIN LISPRO 2 UNITS: 100 INJECTION, SOLUTION INTRAVENOUS; SUBCUTANEOUS at 00:38

## 2022-06-12 RX ADMIN — INSULIN LISPRO 1 UNITS: 100 INJECTION, SOLUTION INTRAVENOUS; SUBCUTANEOUS at 05:51

## 2022-06-12 RX ADMIN — METOCLOPRAMIDE 10 MG: 5 INJECTION, SOLUTION INTRAMUSCULAR; INTRAVENOUS at 13:00

## 2022-06-12 RX ADMIN — ATORVASTATIN CALCIUM 40 MG: 40 TABLET, FILM COATED ORAL at 07:48

## 2022-06-12 RX ADMIN — CALCIUM ACETATE 667 MG: 667 CAPSULE ORAL at 17:39

## 2022-06-12 RX ADMIN — ASPIRIN 81 MG CHEWABLE TABLET 81 MG: 81 TABLET CHEWABLE at 07:48

## 2022-06-12 NOTE — PLAN OF CARE
Problem: Potential for Falls  Goal: Patient will remain free of falls  Description: INTERVENTIONS:  - Educate patient/family on patient safety including physical limitations  - Instruct patient to call for assistance with activity   - Consult OT/PT to assist with strengthening/mobility   - Keep Call bell within reach  - Keep bed low and locked with side rails adjusted as appropriate  - Keep care items and personal belongings within reach  - Initiate and maintain comfort rounds  - Make Fall Risk Sign visible to staff  - Offer Toileting every  Hours, in advance of need  - Initiate/Maintain alarm  - Obtain necessary fall risk management equipment:   - Apply yellow socks and bracelet for high fall risk patients  - Consider moving patient to room near nurses station  6/11/2022 2208 by Chad Mathis RN  Outcome: Progressing  6/11/2022 2208 by Chad Mathis RN  Outcome: Progressing     Problem: MOBILITY - ADULT  Goal: Maintain or return to baseline ADL function  Description: INTERVENTIONS:  -  Assess patient's ability to carry out ADLs; assess patient's baseline for ADL function and identify physical deficits which impact ability to perform ADLs (bathing, care of mouth/teeth, toileting, grooming, dressing, etc )  - Assess/evaluate cause of self-care deficits   - Assess range of motion  - Assess patient's mobility; develop plan if impaired  - Assess patient's need for assistive devices and provide as appropriate  - Encourage maximum independence but intervene and supervise when necessary  - Involve family in performance of ADLs  - Assess for home care needs following discharge   - Consider OT consult to assist with ADL evaluation and planning for discharge  - Provide patient education as appropriate  6/11/2022 2208 by Chad Mathis RN  Outcome: Progressing  6/11/2022 2208 by Chad Mathis RN  Outcome: Progressing  Goal: Maintains/Returns to pre admission functional level  Description: INTERVENTIONS:  - Perform BMAT or MOVE assessment daily    - Set and communicate daily mobility goal to care team and patient/family/caregiver  - Collaborate with rehabilitation services on mobility goals if consulted  - Perform Range of Motion  times a day  - Reposition patient every  hours    - Dangle patient  times a day  - Stand patient  times a day  - Ambulate patient  times a day  - Out of bed to chair  times a day   - Out of bed for meals times a day  - Out of bed for toileting  - Record patient progress and toleration of activity level   6/11/2022 2208 by Timo Pisano RN  Outcome: Progressing  6/11/2022 2208 by Timo Pisano RN  Outcome: Progressing     Problem: Prexisting or High Potential for Compromised Skin Integrity  Goal: Skin integrity is maintained or improved  Description: INTERVENTIONS:  - Identify patients at risk for skin breakdown  - Assess and monitor skin integrity  - Assess and monitor nutrition and hydration status  - Monitor labs   - Assess for incontinence   - Turn and reposition patient  - Assist with mobility/ambulation  - Relieve pressure over bony prominences  - Avoid friction and shearing  - Provide appropriate hygiene as needed including keeping skin clean and dry  - Evaluate need for skin moisturizer/barrier cream  - Collaborate with interdisciplinary team   - Patient/family teaching  - Consider wound care consult   6/11/2022 2208 by Timo Psiano RN  Outcome: Progressing  6/11/2022 2208 by Timo Pisano RN  Outcome: Progressing     Problem: CARDIOVASCULAR - ADULT  Goal: Maintains optimal cardiac output and hemodynamic stability  Description: INTERVENTIONS:  - Monitor I/O, vital signs and rhythm  - Monitor for S/S and trends of decreased cardiac output  - Administer and titrate ordered vasoactive medications to optimize hemodynamic stability  - Assess quality of pulses, skin color and temperature  - Assess for signs of decreased coronary artery perfusion  - Instruct patient to report change in severity of symptoms  6/11/2022 2208 by Pepito Helm RN  Outcome: Progressing  6/11/2022 2208 by Pepito Helm RN  Outcome: Progressing  Goal: Absence of cardiac dysrhythmias or at baseline rhythm  Description: INTERVENTIONS:  - Continuous cardiac monitoring, vital signs, obtain 12 lead EKG if ordered  - Administer antiarrhythmic and heart rate control medications as ordered  - Monitor electrolytes and administer replacement therapy as ordered  6/11/2022 2208 by Pepito Helm RN  Outcome: Progressing  6/11/2022 2208 by Pepito Helm RN  Outcome: Progressing     Problem: GENITOURINARY - ADULT  Goal: Urinary catheter remains patent  Description: INTERVENTIONS:  - Assess patency of urinary catheter  - If patient has a chronic kumari, consider changing catheter if non-functioning  - Follow guidelines for intermittent irrigation of non-functioning urinary catheter  6/11/2022 2208 by Pepito Helm RN  Outcome: Progressing  6/11/2022 2208 by Pepito Helm RN  Outcome: Progressing     Problem: METABOLIC, FLUID AND ELECTROLYTES - ADULT  Goal: Electrolytes maintained within normal limits  Description: INTERVENTIONS:  - Monitor labs and assess patient for signs and symptoms of electrolyte imbalances  - Administer electrolyte replacement as ordered  - Monitor response to electrolyte replacements, including repeat lab results as appropriate  - Instruct patient on fluid and nutrition as appropriate  6/11/2022 2208 by Pepito Helm RN  Outcome: Progressing  6/11/2022 2208 by Pepito Helm RN  Outcome: Progressing  Goal: Fluid balance maintained  Description: INTERVENTIONS:  - Monitor labs   - Monitor I/O and WT  - Instruct patient on fluid and nutrition as appropriate  - Assess for signs & symptoms of volume excess or deficit  6/11/2022 2208 by Pepito Helm RN  Outcome: Progressing  6/11/2022 2208 by Pepito Helm RN  Outcome: Progressing     Problem: METABOLIC, FLUID AND ELECTROLYTES - ADULT  Goal: Fluid balance maintained  Description: INTERVENTIONS:  - Monitor labs   - Monitor I/O and WT  - Instruct patient on fluid and nutrition as appropriate  - Assess for signs & symptoms of volume excess or deficit  6/11/2022 2208 by Gala Canales RN  Outcome: Progressing  6/11/2022 2208 by Gala Canales RN  Outcome: Progressing     Problem: INFECTION - ADULT  Goal: Absence or prevention of progression during hospitalization  Description: INTERVENTIONS:  - Assess and monitor for signs and symptoms of infection  - Monitor lab/diagnostic results  - Monitor all insertion sites, i e  indwelling lines, tubes, and drains  - Monitor endotracheal if appropriate and nasal secretions for changes in amount and color  - [place] appropriate cooling/warming therapies per order  - Administer medications as ordered  - Instruct and encourage patient and family to use good hand hygiene technique  - Identify and instruct in appropriate isolation precautions for identified infection/condition  Outcome: Progressing  Goal: Absence of fever/infection during neutropenic period  Description: INTERVENTIONS:  - Monitor WBC    Outcome: Progressing

## 2022-06-12 NOTE — ASSESSMENT & PLAN NOTE
Lab Results   Component Value Date    HGBA1C 7 5 (H) 03/28/2022       Recent Labs     06/11/22 2035 06/12/22  0031 06/12/22  0547 06/12/22  0752   POCGLU 208* 246* 213* 213*       Blood Sugar Average: Last 72 hrs:  (P) 872 [id number]     -history of type 2 diabetes and on PTA Tradjenta; review of previous admission from December 2021 shows that patient's long-acting glargine was stopped then due to hypoglycemic episodes that were thought to be causing encephalopathy and hypothermia then  -BG >200 on the last several checks  Plan  - continue glucose checks  - Continue SSI, will increase algorithm

## 2022-06-12 NOTE — PROGRESS NOTES
[address] [place] [address]  Progress Note Rubin Lockett 1955, 78 y o  female MRN: [id number]  Unit/Bed#: [address] 2 [address] [id number] Encounter: [id number]  Primary Care Provider: Jina Silva MD   Date and time admitted to hospital: 6/10/2022 10:05 PM    [address] positive in the ER  -Not on it any supplemental oxygen and denies any shortness of breath  Plan  > COVID labs and isolation  Hold off IV steroids or other therapeutic agents as patient without oxygen requirement  > heparin subQ 5000 units q8h for dvt ppx   > having some abdominal pain, will check KUB    Syncope  Assessment & Plan  -reports watching TV earlier in the day and then having an episode of syncope with loss of consciousness for unknown duration as it was unwitnessed   -EKG sinus bradycardia, rate 52, nonspecific ST changes  -CT head negative for acute intracranial abnormality  -no focal neurologic deficits on exam  -in setting of COVID infection and hypothermia  -previously underwent MRI brain in December with chronic lacunar infarcts  Plan  > Syncope workup with monitoring on telemetry, echocardiogram and ultrasound carotids    > bradycardia noted on EKG and will halve the dose of PTA Coreg and put on strict hold parameters  > Continue current meds, if becomes bradycardic again will decrease coreg further    ESRD (end stage renal disease) on dialysis [facility])  Assessment & Plan  Lab Results   Component Value Date    EGFR 14 06/11/2022    EGFR 9 06/11/2022    EGFR 9 06/10/2022    CREATININE 3 10 (H) 06/11/2022    CREATININE 4 61 (H) 06/11/2022    CREATININE 4 59 (H) 06/10/2022     On hemodialysis every Monday, Wednesday and Friday  Plan  > Nephrology on board, had dialysis yesterday  > electrolytes improved, monitor labs, dialysis tomorrow    Acute on chronic anemia  Assessment & Plan  - 2/2 ESRD but Hb dropped to 6 7 yesterday  - Received 1 units transfusion and Hb today is 9  - Follow up FOBT, report of abdominal pain today, if FOBT positive may need GI consult  - Trend Hb daily  - Iron studies also pending    Type 2 diabetes mellitus with chronic kidney disease on chronic dialysis, with long-term current use of insulin [facility])  Assessment & Plan  Lab Results   Component Value Date    HGBA1C 7 5 (H) 2022       Recent Labs     22  2035 22  0031 22  0547 22  0752   POCGLU 208* 246* 213* 213*       Blood Sugar Average: Last 72 hrs:  (P) 966 [id number]     -history of type 2 diabetes and on PTA Tradjenta; review of previous admission from 2021 shows that patient's long-acting glargine was stopped then due to hypoglycemic episodes that were thought to be causing encephalopathy and hypothermia then  -BG >200 on the last several checks  Plan  - continue glucose checks  - Continue SSI, will increase algorithm      VTE Pharmacologic Prophylaxis:   Pharmacologic: Heparin  Mechanical VTE Prophylaxis in Place: Yes    Patient Centered Rounds: I have performed bedside rounds with nursing staff today  Discussions with Specialists or Other Care Team Provider: Nephrology, nursing    Education and Discussions with Family / Patient: patient    Time Spent for Care: 30 minutes  More than 50% of total time spent on counseling and coordination of care as described above      Current Length of Stay: 1 day(s)    Current Patient Status: Inpatient   Certification Statement: The patient will continue to require additional inpatient hospital stay due to evaluation of abdominal pain, follow up of hemoglobin    Discharge Plan: once hemoglobin has been stable persistently and abdominal pain evaluation has been completed    Code Status: Prior      Subjective:   Patient seen, feels better than yesterday, reports abdominal pain    Objective:     Vitals:   Temp (24hrs), Av 8 °F (36 6 °C), Min:96 5 °F (35 8 °C), Max:98 6 °F (37 °C)    Temp:  [96 5 °F (35 8 °C)-98 6 °F (37 °C)] 98 6 °F (37 °C)  HR:  [56-91] 91  Resp:  [15-20] 20  BP: (137-195)/(52-92) 143/83  SpO2:  [92 %-100 %] 92 %  There is no height or weight on file to calculate BMI  Input and Output Summary (last 24 hours): Intake/Output Summary (Last 24 hours) at 6/12/2022 0954  Last data filed at 6/11/2022 1809  Gross per 24 hour   Intake 850 ml   Output 2750 ml   Net -1900 ml       Physical Exam:     Physical Exam  Constitutional:       General: She is not in acute distress  Appearance: She is not diaphoretic  HENT:      Head: Normocephalic and atraumatic  Nose: Nose normal       Mouth/Throat:      Pharynx: No oropharyngeal exudate  Eyes:      General: No scleral icterus  Conjunctiva/sclera: Conjunctivae normal       Pupils: Pupils are equal, round, and reactive to light  Neck:      Thyroid: No thyromegaly  Vascular: No JVD  Trachea: No tracheal deviation  Cardiovascular:      Rate and Rhythm: Normal rate and regular rhythm  Heart sounds: Normal heart sounds  No murmur heard  No friction rub  No gallop  Pulmonary:      Effort: Pulmonary effort is normal  No respiratory distress  Breath sounds: Normal breath sounds  No stridor  No wheezing or rales  Abdominal:      General: Bowel sounds are normal  There is no distension  Palpations: Abdomen is soft  Tenderness: There is abdominal tenderness  There is no guarding or rebound  Comments: Diffuse abdominal tenderness but no guarding or rigidity   Musculoskeletal:         General: No deformity  Normal range of motion  Cervical back: Normal range of motion and neck supple  Lymphadenopathy:      Cervical: No cervical adenopathy  Skin:     General: Skin is warm  Findings: No erythema or rash  Neurological:      Mental Status: She is alert and oriented to person, place, and time  Cranial Nerves: No cranial nerve deficit  Sensory: No sensory deficit           Additional Data:     Labs:    Results from last 7 days   Lab Units 06/12/22  0913   WBC Thousand/uL 9 55   HEMOGLOBIN g/dL 9 6*   HEMATOCRIT % 29 7*   PLATELETS Thousands/uL 306   NEUTROS PCT % 89*   LYMPHS PCT % 8*   MONOS PCT % 2*   EOS PCT % 0     Results from last 7 days   Lab Units 06/11/22  1214 06/11/22  0637   SODIUM mmol/L 137 131*   POTASSIUM mmol/L 3 9 6 1*   CHLORIDE mmol/L 101 95*   CO2 mmol/L 32 31   BUN mg/dL 21 28*   CREATININE mg/dL 3 10* 4 61*   ANION GAP mmol/L 4 5   CALCIUM mg/dL 8 2* 8 0*   ALBUMIN g/dL  --  2 6*   TOTAL BILIRUBIN mg/dL  --  0 22   ALK PHOS U/L  --  61   ALT U/L  --  13   AST U/L  --  14   GLUCOSE RANDOM mg/dL 94 205*     Results from last 7 days   Lab Units 06/10/22  2241   INR  1 11     Results from last 7 days   Lab Units 06/12/22  0752 06/12/22  0547 06/12/22  0031 06/11/22  2035 06/11/22  1609 06/11/22  1330 06/11/22  1203 06/11/22  1129 06/11/22  0804 06/11/22  0602 06/11/22  0017 06/10/22  2242   POC GLUCOSE mg/dl 213* 213* 246* 208* 282* 110 105 40* 179* 236* 114 89         Results from last 7 days   Lab Units 06/10/22  2241   LACTIC ACID mmol/L 1 0   PROCALCITONIN ng/ml 0 08           * I Have Reviewed All Lab Data Listed Above  * Additional Pertinent Lab Tests Reviewed: [facility] 67 Admission Reviewed    Imaging:    Imaging Reports Reviewed Today Include: CXR  Imaging Personally Reviewed by Myself Includes:  CXR    Recent Cultures (last 7 days):     Results from last 7 days   Lab Units 06/10/22  2241   BLOOD CULTURE  No Growth at 24 hrs  No Growth at 24 hrs         Last 24 Hours Medication List:   Current Facility-Administered Medications   Medication Dose Route Frequency Provider Last Rate    acetaminophen  975 mg Oral Q6H PRN MARLENA Weathers      aspirin  81 mg Oral Daily Katelin Lamonte, DO      atorvastatin  40 mg Oral Daily Katelin Lamonte, DO      calcium acetate  667 mg Oral TID With Meals Katelin Lamonte, DO      calcium carbonate  500 mg Oral Daily PRN MARLENA Weathers  carvedilol  12 5 mg Oral BID With Meals Rodri Blend, DO      doxercalciferol  1 mcg Intravenous Once per day on Mon Wed Fri Larissa Mascorro MD      heparin (porcine)  5,000 Units Subcutaneous [facility] Rodri Blend, DO      hydrALAZINE  5 mg Intravenous Q6H PRN Rodri Blend, DO      insulin lispro  1-5 Units Subcutaneous Q6H [facility] Rodri Blend, DO      insulin lispro  1-5 Units Subcutaneous 0200 Rodri Blend, DO      metoclopramide  10 mg Intravenous Q6H PRN MARLENA Verma      sodium bicarbonate  50 mEq Intravenous Once Jessenia Hahn MD          Today, Patient Was Seen By: Jessenia Hahn MD    ** Please Note: Dictation voice to text software may have been used in the creation of this document   **

## 2022-06-12 NOTE — ASSESSMENT & PLAN NOTE
-reports watching TV earlier in the day and then having an episode of syncope with loss of consciousness for unknown duration as it was unwitnessed   -EKG sinus bradycardia, rate 52, nonspecific ST changes  -CT head negative for acute intracranial abnormality  -no focal neurologic deficits on exam  -in setting of COVID infection and hypothermia  -previously underwent MRI brain in December with chronic lacunar infarcts  Plan  > Syncope workup with monitoring on telemetry, echocardiogram and ultrasound carotids    > bradycardia noted on EKG and will halve the dose of PTA Coreg and put on strict hold parameters  > Continue current meds, if becomes bradycardic again will decrease coreg further

## 2022-06-12 NOTE — ASSESSMENT & PLAN NOTE
-COVID positive in the ER  -Not on it any supplemental oxygen and denies any shortness of breath  Plan  > COVID labs and isolation  Hold off IV steroids or other therapeutic agents as patient without oxygen requirement     > heparin subQ 5000 units q8h for dvt ppx   > having some abdominal pain, will check KUB

## 2022-06-12 NOTE — PLAN OF CARE
Problem: Potential for Falls  Goal: Patient will remain free of falls  Description: INTERVENTIONS:  - Educate patient/family on patient safety including physical limitations  - Instruct patient to call for assistance with activity   - Consult OT/PT to assist with strengthening/mobility   - Keep Call bell within reach  - Keep bed low and locked with side rails adjusted as appropriate  - Keep care items and personal belongings within reach  - Initiate and maintain comfort rounds  - Make Fall Risk Sign visible to staff  - Offer Toileting every  Hours, in advance of need  - Initiate/Maintain alarm  - Obtain necessary fall risk management equipment:   - Apply yellow socks and bracelet for high fall risk patients  - Consider moving patient to room near nurses station  Outcome: Progressing     Problem: MOBILITY - ADULT  Goal: Maintain or return to baseline ADL function  Description: INTERVENTIONS:  -  Assess patient's ability to carry out ADLs; assess patient's baseline for ADL function and identify physical deficits which impact ability to perform ADLs (bathing, care of mouth/teeth, toileting, grooming, dressing, etc )  - Assess/evaluate cause of self-care deficits   - Assess range of motion  - Assess patient's mobility; develop plan if impaired  - Assess patient's need for assistive devices and provide as appropriate  - Encourage maximum independence but intervene and supervise when necessary  - Involve family in performance of ADLs  - Assess for home care needs following discharge   - Consider OT consult to assist with ADL evaluation and planning for discharge  - Provide patient education as appropriate  Outcome: Progressing  Goal: Maintains/Returns to pre admission functional level  Description: INTERVENTIONS:  - Perform BMAT or MOVE assessment daily    - Set and communicate daily mobility goal to care team and patient/family/caregiver     - Collaborate with rehabilitation services on mobility goals if consulted  - Perform Range of Motion 4 times a day  - Reposition patient every 2 hours    - Dangle patient 4 times a day  - Stand patient 4 times a day  - Ambulate patient 4 times a day  - Out of bed to chair 4 times a day   - Out of bed for meals 4 times a day  - Out of bed for toileting  - Record patient progress and toleration of activity level   Outcome: Progressing     Problem: Prexisting or High Potential for Compromised Skin Integrity  Goal: Skin integrity is maintained or improved  Description: INTERVENTIONS:  - Identify patients at risk for skin breakdown  - Assess and monitor skin integrity  - Assess and monitor nutrition and hydration status  - Monitor labs   - Assess for incontinence   - Turn and reposition patient  - Assist with mobility/ambulation  - Relieve pressure over bony prominences  - Avoid friction and shearing  - Provide appropriate hygiene as needed including keeping skin clean and dry  - Evaluate need for skin moisturizer/barrier cream  - Collaborate with interdisciplinary team   - Patient/family teaching  - Consider wound care consult   Outcome: Progressing     Problem: CARDIOVASCULAR - ADULT  Goal: Maintains optimal cardiac output and hemodynamic stability  Description: INTERVENTIONS:  - Monitor I/O, vital signs and rhythm  - Monitor for S/S and trends of decreased cardiac output  - Administer and titrate ordered vasoactive medications to optimize hemodynamic stability  - Assess quality of pulses, skin color and temperature  - Assess for signs of decreased coronary artery perfusion  - Instruct patient to report change in severity of symptoms  Outcome: Progressing  Goal: Absence of cardiac dysrhythmias or at baseline rhythm  Description: INTERVENTIONS:  - Continuous cardiac monitoring, vital signs, obtain 12 lead EKG if ordered  - Administer antiarrhythmic and heart rate control medications as ordered  - Monitor electrolytes and administer replacement therapy as ordered  Outcome: Progressing Problem: GENITOURINARY - ADULT  Goal: Urinary catheter remains patent  Description: INTERVENTIONS:  - Assess patency of urinary catheter  - If patient has a chronic kumari, consider changing catheter if non-functioning  - Follow guidelines for intermittent irrigation of non-functioning urinary catheter  Outcome: Progressing     Problem: METABOLIC, FLUID AND ELECTROLYTES - ADULT  Goal: Electrolytes maintained within normal limits  Description: INTERVENTIONS:  - Monitor labs and assess patient for signs and symptoms of electrolyte imbalances  - Administer electrolyte replacement as ordered  - Monitor response to electrolyte replacements, including repeat lab results as appropriate  - Instruct patient on fluid and nutrition as appropriate  Outcome: Progressing  Goal: Fluid balance maintained  Description: INTERVENTIONS:  - Monitor labs   - Monitor I/O and WT  - Instruct patient on fluid and nutrition as appropriate  - Assess for signs & symptoms of volume excess or deficit  Outcome: Progressing     Problem: INFECTION - ADULT  Goal: Absence or prevention of progression during hospitalization  Description: INTERVENTIONS:  - Assess and monitor for signs and symptoms of infection  - Monitor lab/diagnostic results  - Monitor all insertion sites, i e  indwelling lines, tubes, and drains  - Monitor endotracheal if appropriate and nasal secretions for changes in amount and color  - [place] appropriate cooling/warming therapies per order  - Administer medications as ordered  - Instruct and encourage patient and family to use good hand hygiene technique  - Identify and instruct in appropriate isolation precautions for identified infection/condition  Outcome: Progressing     Problem: INFECTION - ADULT  Goal: Absence of fever/infection during neutropenic period  Description: INTERVENTIONS:  - Monitor WBC    Outcome: Completed

## 2022-06-12 NOTE — ASSESSMENT & PLAN NOTE
- 2/2 ESRD but Hb dropped to 6 7 yesterday  - Received 1 units transfusion and Hb today is 9  - Follow up FOBT, report of abdominal pain today, if FOBT positive may need GI consult  - Trend Hb daily  - Iron studies also pending

## 2022-06-12 NOTE — ASSESSMENT & PLAN NOTE
Lab Results   Component Value Date    EGFR 14 06/11/2022    EGFR 9 06/11/2022    EGFR 9 06/10/2022    CREATININE 3 10 (H) 06/11/2022    CREATININE 4 61 (H) 06/11/2022    CREATININE 4 59 (H) 06/10/2022     On hemodialysis every Monday, Wednesday and Friday  Plan  > Nephrology on board, had dialysis yesterday  > electrolytes improved, monitor labs, dialysis tomorrow

## 2022-06-13 ENCOUNTER — APPOINTMENT (INPATIENT)
Dept: NON INVASIVE DIAGNOSTICS | Facility: HOSPITAL | Age: 67
DRG: 177 | End: 2022-06-13
Payer: MEDICARE

## 2022-06-13 ENCOUNTER — APPOINTMENT (INPATIENT)
Dept: RADIOLOGY | Facility: HOSPITAL | Age: 67
DRG: 177 | End: 2022-06-13
Payer: MEDICARE

## 2022-06-13 LAB
BASOPHILS # BLD AUTO: 0.02 THOUSANDS/ΜL (ref 0–0.1)
BASOPHILS NFR BLD AUTO: 0 % (ref 0–1)
EOSINOPHIL # BLD AUTO: 0 THOUSAND/ΜL (ref 0–0.61)
EOSINOPHIL NFR BLD AUTO: 0 % (ref 0–6)
ERYTHROCYTE [DISTWIDTH] IN BLOOD BY AUTOMATED COUNT: 15.4 % (ref 11.6–15.1)
GLUCOSE SERPL-MCNC: 184 MG/DL (ref 65–140)
GLUCOSE SERPL-MCNC: 187 MG/DL (ref 65–140)
GLUCOSE SERPL-MCNC: 212 MG/DL (ref 65–140)
GLUCOSE SERPL-MCNC: 251 MG/DL (ref 65–140)
HCT VFR BLD AUTO: 31.4 % (ref 34.8–46.1)
HGB BLD-MCNC: 10.5 G/DL (ref 11.5–15.4)
IMM GRANULOCYTES # BLD AUTO: 0.08 THOUSAND/UL (ref 0–0.2)
IMM GRANULOCYTES NFR BLD AUTO: 1 % (ref 0–2)
LYMPHOCYTES # BLD AUTO: 1.16 THOUSANDS/ΜL (ref 0.6–4.47)
LYMPHOCYTES NFR BLD AUTO: 9 % (ref 14–44)
MCH RBC QN AUTO: 30.7 PG (ref 26.8–34.3)
MCHC RBC AUTO-ENTMCNC: 33.4 G/DL (ref 31.4–37.4)
MCV RBC AUTO: 92 FL (ref 82–98)
MONOCYTES # BLD AUTO: 0.84 THOUSAND/ΜL (ref 0.17–1.22)
MONOCYTES NFR BLD AUTO: 7 % (ref 4–12)
NEUTROPHILS # BLD AUTO: 10.38 THOUSANDS/ΜL (ref 1.85–7.62)
NEUTS SEG NFR BLD AUTO: 83 % (ref 43–75)
NRBC BLD AUTO-RTO: 0 /100 WBCS
PLATELET # BLD AUTO: 308 THOUSANDS/UL (ref 149–390)
PMV BLD AUTO: 10.6 FL (ref 8.9–12.7)
RBC # BLD AUTO: 3.42 MILLION/UL (ref 3.81–5.12)
WBC # BLD AUTO: 12.48 THOUSAND/UL (ref 4.31–10.16)

## 2022-06-13 PROCEDURE — 99232 SBSQ HOSP IP/OBS MODERATE 35: CPT | Performed by: INTERNAL MEDICINE

## 2022-06-13 PROCEDURE — 82948 REAGENT STRIP/BLOOD GLUCOSE: CPT

## 2022-06-13 PROCEDURE — 85025 COMPLETE CBC W/AUTO DIFF WBC: CPT | Performed by: STUDENT IN AN ORGANIZED HEALTH CARE EDUCATION/TRAINING PROGRAM

## 2022-06-13 PROCEDURE — 93970 EXTREMITY STUDY: CPT

## 2022-06-13 PROCEDURE — 71045 X-RAY EXAM CHEST 1 VIEW: CPT

## 2022-06-13 PROCEDURE — 99232 SBSQ HOSP IP/OBS MODERATE 35: CPT | Performed by: STUDENT IN AN ORGANIZED HEALTH CARE EDUCATION/TRAINING PROGRAM

## 2022-06-13 PROCEDURE — 93970 EXTREMITY STUDY: CPT | Performed by: SURGERY

## 2022-06-13 RX ORDER — SENNOSIDES 8.6 MG
2 TABLET ORAL
Status: DISCONTINUED | OUTPATIENT
Start: 2022-06-13 | End: 2022-06-15 | Stop reason: HOSPADM

## 2022-06-13 RX ORDER — POLYETHYLENE GLYCOL 3350 17 G/17G
17 POWDER, FOR SOLUTION ORAL DAILY
Status: DISCONTINUED | OUTPATIENT
Start: 2022-06-13 | End: 2022-06-15 | Stop reason: HOSPADM

## 2022-06-13 RX ORDER — DOXAZOSIN 2 MG/1
2 TABLET ORAL DAILY
Status: DISCONTINUED | OUTPATIENT
Start: 2022-06-13 | End: 2022-06-15 | Stop reason: HOSPADM

## 2022-06-13 RX ORDER — HYDRALAZINE HYDROCHLORIDE 25 MG/1
50 TABLET, FILM COATED ORAL EVERY 8 HOURS SCHEDULED
Status: DISCONTINUED | OUTPATIENT
Start: 2022-06-13 | End: 2022-06-15 | Stop reason: HOSPADM

## 2022-06-13 RX ORDER — BISACODYL 10 MG
10 SUPPOSITORY, RECTAL RECTAL ONCE
Status: COMPLETED | OUTPATIENT
Start: 2022-06-13 | End: 2022-06-13

## 2022-06-13 RX ADMIN — CARVEDILOL 12.5 MG: 12.5 TABLET, FILM COATED ORAL at 08:00

## 2022-06-13 RX ADMIN — SENNOSIDES 17.2 MG: 8.6 TABLET ORAL at 21:38

## 2022-06-13 RX ADMIN — HYDRALAZINE HYDROCHLORIDE 5 MG: 20 INJECTION, SOLUTION INTRAMUSCULAR; INTRAVENOUS at 08:00

## 2022-06-13 RX ADMIN — CARVEDILOL 12.5 MG: 12.5 TABLET, FILM COATED ORAL at 16:28

## 2022-06-13 RX ADMIN — CALCIUM ACETATE 667 MG: 667 CAPSULE ORAL at 11:49

## 2022-06-13 RX ADMIN — INSULIN LISPRO 2 UNITS: 100 INJECTION, SOLUTION INTRAVENOUS; SUBCUTANEOUS at 16:28

## 2022-06-13 RX ADMIN — DOXAZOSIN 2 MG: 2 TABLET ORAL at 11:49

## 2022-06-13 RX ADMIN — INSULIN LISPRO 2 UNITS: 100 INJECTION, SOLUTION INTRAVENOUS; SUBCUTANEOUS at 08:00

## 2022-06-13 RX ADMIN — HEPARIN SODIUM 5000 UNITS: 5000 INJECTION INTRAVENOUS; SUBCUTANEOUS at 05:24

## 2022-06-13 RX ADMIN — BISACODYL 10 MG: 10 SUPPOSITORY RECTAL at 14:00

## 2022-06-13 RX ADMIN — INSULIN LISPRO 1 UNITS: 100 INJECTION, SOLUTION INTRAVENOUS; SUBCUTANEOUS at 21:40

## 2022-06-13 RX ADMIN — HYDRALAZINE HYDROCHLORIDE 50 MG: 25 TABLET, FILM COATED ORAL at 08:06

## 2022-06-13 RX ADMIN — HYDRALAZINE HYDROCHLORIDE 50 MG: 25 TABLET, FILM COATED ORAL at 14:00

## 2022-06-13 RX ADMIN — HEPARIN SODIUM 5000 UNITS: 5000 INJECTION INTRAVENOUS; SUBCUTANEOUS at 14:00

## 2022-06-13 RX ADMIN — DOXERCALCIFEROL 1 MCG: 4 INJECTION, SOLUTION INTRAVENOUS at 08:00

## 2022-06-13 RX ADMIN — CALCIUM ACETATE 667 MG: 667 CAPSULE ORAL at 16:28

## 2022-06-13 RX ADMIN — INSULIN LISPRO 1 UNITS: 100 INJECTION, SOLUTION INTRAVENOUS; SUBCUTANEOUS at 11:49

## 2022-06-13 RX ADMIN — HYDRALAZINE HYDROCHLORIDE 50 MG: 25 TABLET, FILM COATED ORAL at 21:38

## 2022-06-13 RX ADMIN — ATORVASTATIN CALCIUM 40 MG: 40 TABLET, FILM COATED ORAL at 08:00

## 2022-06-13 RX ADMIN — ASPIRIN 81 MG CHEWABLE TABLET 81 MG: 81 TABLET CHEWABLE at 08:00

## 2022-06-13 RX ADMIN — HEPARIN SODIUM 5000 UNITS: 5000 INJECTION INTRAVENOUS; SUBCUTANEOUS at 21:38

## 2022-06-13 RX ADMIN — POLYETHYLENE GLYCOL 3350 17 G: 17 POWDER, FOR SOLUTION ORAL at 14:00

## 2022-06-13 RX ADMIN — CALCIUM ACETATE 667 MG: 667 CAPSULE ORAL at 08:00

## 2022-06-13 NOTE — PROGRESS NOTES
NEPHROLOGY PROGRESS NOTE   Grecia Maza 78 y o  female MRN: [id number]  Unit/Bed#: [facility] 68 2 [address] 87 [id number] Encounter: [id number]      HPI/24hr EVENTS:    -15-year-old female, PMH:  ESRD on HD, diabetes 2, CVA who presented after an unwitnessed syncopal episode, found to be hypothermic in the emergency department as well as COVID positive  Nephrology consult for HD management    -no acute events overnight, continued hypertension    ASSESSMENT/PLAN:    ESRD  -TTS at [facility]/[address] [place] [address]  -access right tunneled PermCath  -last treatment 6/11 (UF2 2L), next treatment plan for tomorrow 6/14 (orders placed)  -estimated dry weight 64 5 kg    CKD anemia  -not on CHRISTAL 2nd to CVA? -most recent hemoglobin 10 5  -iron panel:  Iron 78, ferritin elevated, iron sat 38    CKD mineral bone disease  -continue monitor PTH as outpatient  -continue Hectorol 1 mcg 3 times weekly with dialysis  -continue PhosLo 667 mg t i d  A c  Blood pressure  -blood pressure remains elevated   -recently added Cardura 2 mg daily, currently on Coreg 12 5 mg b i d , clonidine patch 0 2 mg and hydralazine 50 mg t i d , monitor response Cardura  -noted allergies to calcium channel blockers and Ace inhibitors    Syncope  -care per primary    COVID-19  -care per primary, on room air    Urinary retention?  -okay to DC Khan catheter, use urinary retention protocol  -likely placed for temperature management    Addition medical problems:  CVA, diabetes 2    DISPOSITION:  Stable for discharge if blood pressure can be controlled with addition of oral agents    SUBJECTIVE:  No complaints    ROS:  Review of Systems   Constitutional: Negative for chills and fever  Respiratory: Negative for shortness of breath  Cardiovascular: Negative for chest pain and leg swelling  Neurological: Negative  All other systems reviewed and are negative         OBJECTIVE:  Current Weight: Weight - Scale: 66 7 kg (147 lb)  Vitals:    06/12/22 2108 06/13/22 0032 06/13/22 0340 06/13/22 0745   BP: (!) 182/91 (!) 172/74 (!) 180/89 (!) 211/101   BP Location:       Pulse:  91 95 94   Resp: 20 20 13   Temp: 98 °F (36 7 °C) 98 6 °F (37 °C) 98 9 °F (37 2 °C) 98 7 °F (37 1 °C)   TempSrc: Oral Oral Oral    SpO2: (!) 88% 92% 94% 94%   Weight:       Height:         No intake or output data in the 24 hours ending 06/13/22 1010  Physical Exam  Vitals and nursing note reviewed  Constitutional:       General: She is not in acute distress  Appearance: Normal appearance  She is not toxic-appearing or diaphoretic  HENT:      Head: Normocephalic and atraumatic  Nose: Nose normal       Mouth/Throat:      Mouth: Mucous membranes are moist    Eyes:      General: No scleral icterus  Cardiovascular:      Rate and Rhythm: Normal rate and regular rhythm  Pulses: Normal pulses  Pulmonary:      Effort: Pulmonary effort is normal  No respiratory distress  Breath sounds: Normal breath sounds  No wheezing or rales  Chest:       Abdominal:      General: Abdomen is flat  Palpations: Abdomen is soft  Genitourinary:     Comments: Khan catheter with brown urine in Khan bag  Musculoskeletal:      Cervical back: Neck supple  Right lower leg: No edema  Left lower leg: No edema  Skin:     General: Skin is warm and dry  Capillary Refill: Capillary refill takes less than 2 seconds  Neurological:      Mental Status: She is alert and oriented to person, place, and time     Psychiatric:         Mood and Affect: Mood normal           Medications:    Current Facility-Administered Medications:     acetaminophen (TYLENOL) tablet 975 mg, 975 mg, Oral, Q6H PRN, MARLENA Akins, 975 mg at 06/12/22 2229    aspirin chewable tablet 81 mg, 81 mg, Oral, Daily, Xenia Binet, DO, 81 mg at 06/13/22 0800    atorvastatin (LIPITOR) tablet 40 mg, 40 mg, Oral, Daily, Xenia Binet, DO, 40 mg at 06/13/22 0800    calcium acetate (PHOSLO) capsule 667 mg, 667 mg, Oral, TID With Meals, Savanna Dudley DO, 667 mg at 06/13/22 0800    calcium carbonate (TUMS) chewable tablet 500 mg, 500 mg, Oral, Daily PRN, Richmond Sebastian CRNP, 500 mg at 06/12/22 0400    carvedilol (COREG) tablet 12 5 mg, 12 5 mg, Oral, BID With Meals, Savanna Dudley DO, 12 5 mg at 06/13/22 0800    cloNIDine (CATAPRES-TTS-2) 0 2 mg/24 hr TD weekly patch, 0 2 mg, Transdermal, Weekly, Rodrigue Mackenzie MD, 0 2 mg at 06/12/22 1244    doxazosin (CARDURA) tablet 2 mg, 2 mg, Oral, Daily, Tia Rowley DO    doxercalciferol (HECTOROL) injection 1 mcg, 1 mcg, Intravenous, Once per day on Mon Wed Fri, Avi Maravilla MD, 1 mcg at 06/13/22 0800    heparin (porcine) subcutaneous injection 5,000 Units, 5,000 Units, Subcutaneous, Q8H [address], Savanna Dudley DO, 5,000 Units at 06/13/22 0524    hydrALAZINE (APRESOLINE) injection 5 mg, 5 mg, Intravenous, Q6H PRN, Savanna Dudley DO, 5 mg at 06/13/22 0800    hydrALAZINE (APRESOLINE) tablet 50 mg, 50 mg, Oral, Q8H [address], Lani Field MD, 50 mg at 06/13/22 0806    insulin lispro (HumaLOG) 100 units/mL subcutaneous injection 1-5 Units, 1-5 Units, Subcutaneous, HS, Rodrigue Mackenzie MD, 1 Units at 06/12/22 2217    insulin lispro (HumaLOG) 100 units/mL subcutaneous injection 1-5 Units, 1-5 Units, Subcutaneous, TID With Meals, Rodrigue Mackenzie MD, 2 Units at 06/13/22 0800    metoclopramide (REGLAN) injection 10 mg, 10 mg, Intravenous, Q6H PRN, SHIV DennyNP, 10 mg at 06/12/22 2229    sodium bicarbonate 8 4 % injection 50 mEq, 50 mEq, Intravenous, Once, Rodrigue Mackenzie MD    Laboratory Results:  Results from last 7 days   Lab Units 06/13/22  0821 06/12/22  0913 06/11/22  1214 06/11/22  0846 06/11/22  0637 06/10/22  2241 06/09/22  1356   WBC Thousand/uL 12 48* 9 55  --   --  5 49 4 87  --    HEMOGLOBIN g/dL 10 5* 9 6*  --  6 7* 6 7* 7 5*  --    HEMATOCRIT % 31 4* 29 7*  --  21 2* 20 8* 23 9*  --    PLATELETS Thousands/uL 308 306  --   --  248 261  --    POTASSIUM mmol/L  --  4 8 3 9  -- 6  1* 4 9 5 6*   CHLORIDE mmol/L  --  99* 101  --  95* 98*  --    CO2 mmol/L  --  29 32  --  31 32  --    BUN mg/dL  --  21 21  --  28* 27*  --    CREATININE mg/dL  --  3 80* 3 10*  --  4 61* 4 59*  --    CALCIUM mg/dL  --  8 9 8 2*  --  8 0* 8 7  --    MAGNESIUM mg/dL  --   --   --   --  2 7* 3 0*  --        I have personally reviewed the blood work as stated above and in my note  I have personally reviewed internal medicine note

## 2022-06-13 NOTE — PLAN OF CARE
Problem: Potential for Falls  Goal: Patient will remain free of falls  Description: INTERVENTIONS:  - Educate patient/family on patient safety including physical limitations  - Instruct patient to call for assistance with activity   - Consult OT/PT to assist with strengthening/mobility   - Keep Call bell within reach  - Keep bed low and locked with side rails adjusted as appropriate  - Keep care items and personal belongings within reach  - Initiate and maintain comfort rounds  - Make Fall Risk Sign visible to staff  - Apply yellow socks and bracelet for high fall risk patients  - Consider moving patient to room near nurses station  Outcome: Progressing     Problem: MOBILITY - ADULT  Goal: Maintain or return to baseline ADL function  Description: INTERVENTIONS:  -  Assess patient's ability to carry out ADLs; assess patient's baseline for ADL function and identify physical deficits which impact ability to perform ADLs (bathing, care of mouth/teeth, toileting, grooming, dressing, etc )  - Assess/evaluate cause of self-care deficits   - Assess range of motion  - Assess patient's mobility; develop plan if impaired  - Assess patient's need for assistive devices and provide as appropriate  - Encourage maximum independence but intervene and supervise when necessary  - Involve family in performance of ADLs  - Assess for home care needs following discharge   - Consider OT consult to assist with ADL evaluation and planning for discharge  - Provide patient education as appropriate  Outcome: Progressing  Goal: Maintains/Returns to pre admission functional level  Description: INTERVENTIONS:  - Perform BMAT or MOVE assessment daily    - Set and communicate daily mobility goal to care team and patient/family/caregiver  - Collaborate with rehabilitation services on mobility goals if consulted  - Perform Range of Motion 3 times a day  - Reposition patient every 2 hours    - Dangle patient 3 times a day  - Stand patient 3 times a day  - Ambulate patient 3 times a day  - Out of bed to chair 3 times a day   - Out of bed for meals 3 times a day  - Out of bed for toileting  - Record patient progress and toleration of activity level   Outcome: Progressing     Problem: Prexisting or High Potential for Compromised Skin Integrity  Goal: Skin integrity is maintained or improved  Description: INTERVENTIONS:  - Identify patients at risk for skin breakdown  - Assess and monitor skin integrity  - Assess and monitor nutrition and hydration status  - Monitor labs   - Assess for incontinence   - Turn and reposition patient  - Assist with mobility/ambulation  - Relieve pressure over bony prominences  - Avoid friction and shearing  - Provide appropriate hygiene as needed including keeping skin clean and dry  - Evaluate need for skin moisturizer/barrier cream  - Collaborate with interdisciplinary team   - Patient/family teaching  - Consider wound care consult   Outcome: Progressing     Problem: CARDIOVASCULAR - ADULT  Goal: Maintains optimal cardiac output and hemodynamic stability  Description: INTERVENTIONS:  - Monitor I/O, vital signs and rhythm  - Monitor for S/S and trends of decreased cardiac output  - Administer and titrate ordered vasoactive medications to optimize hemodynamic stability  - Assess quality of pulses, skin color and temperature  - Assess for signs of decreased coronary artery perfusion  - Instruct patient to report change in severity of symptoms  Outcome: Progressing  Goal: Absence of cardiac dysrhythmias or at baseline rhythm  Description: INTERVENTIONS:  - Continuous cardiac monitoring, vital signs, obtain 12 lead EKG if ordered  - Administer antiarrhythmic and heart rate control medications as ordered  - Monitor electrolytes and administer replacement therapy as ordered  Outcome: Progressing     Problem: GENITOURINARY - ADULT  Goal: Urinary catheter remains patent  Description: INTERVENTIONS:  - Assess patency of urinary catheter  - If patient has a chronic kumari, consider changing catheter if non-functioning  - Follow guidelines for intermittent irrigation of non-functioning urinary catheter  Outcome: Progressing     Problem: METABOLIC, FLUID AND ELECTROLYTES - ADULT  Goal: Electrolytes maintained within normal limits  Description: INTERVENTIONS:  - Monitor labs and assess patient for signs and symptoms of electrolyte imbalances  - Administer electrolyte replacement as ordered  - Monitor response to electrolyte replacements, including repeat lab results as appropriate  - Instruct patient on fluid and nutrition as appropriate  Outcome: Progressing  Goal: Fluid balance maintained  Description: INTERVENTIONS:  - Monitor labs   - Monitor I/O and WT  - Instruct patient on fluid and nutrition as appropriate  - Assess for signs & symptoms of volume excess or deficit  Outcome: Progressing     Problem: INFECTION - ADULT  Goal: Absence or prevention of progression during hospitalization  Description: INTERVENTIONS:  - Assess and monitor for signs and symptoms of infection  - Monitor lab/diagnostic results  - Monitor all insertion sites, i e  indwelling lines, tubes, and drains  - Monitor endotracheal if appropriate and nasal secretions for changes in amount and color  - [place] appropriate cooling/warming therapies per order  - Administer medications as ordered  - Instruct and encourage patient and family to use good hand hygiene technique  - Identify and instruct in appropriate isolation precautions for identified infection/condition  Outcome: Progressing

## 2022-06-13 NOTE — ASSESSMENT & PLAN NOTE
-COVID positive in the ER  -Not on it any supplemental oxygen and denies any shortness of breath  -heparin subq ppx

## 2022-06-13 NOTE — CASE MANAGEMENT
Case Management Progress Note    Patient name [facility]  Location [facility] 68 2 /South 2 Britta Wong* MRN [id number]  : 1955 Date 2022       LOS (days): 2  Geometric Mean LOS (GMLOS) (days): 5 40  Days to GMLOS:2 7        OBJECTIVE:        Current admission status: Inpatient  Preferred Pharmacy:   [facility],[address], [address]  [address] [address]  Phone: [phone] Fax: [phone]    Primary Care Provider: Jina Silva MD    Primary Insurance: MEDICARE  Secondary Insurance: [address] [place] [address]    PROGRESS NOTE: Attempted to complete CM assessment with interpretor service Donnell Gonzalez ([id number]), pt unable to answer questions   Left voice message for [place] (radha per pt) @ [phone]

## 2022-06-13 NOTE — NURSING NOTE
Amy Fatima ([id number]) assisted with translation for shift assessment , safety and fall precautions, and patient education  Patient oriented x2-3 with disorientation to time and situation  Patient pleasant and follows directions  Vocalized understanding of kumari, fall precautions, and medications given

## 2022-06-13 NOTE — PLAN OF CARE
Problem: Potential for Falls  Goal: Patient will remain free of falls  Description: INTERVENTIONS:  - Educate patient/family on patient safety including physical limitations  - Instruct patient to call for assistance with activity   - Consult OT/PT to assist with strengthening/mobility   - Keep Call bell within reach  - Keep bed low and locked with side rails adjusted as appropriate  - Keep care items and personal belongings within reach  - Initiate and maintain comfort rounds  - Make Fall Risk Sign visible to staff  - Offer Toileting every  Hours, in advance of need  - Initiate/Maintain alarm  - Obtain necessary fall risk management equipment:   - Apply yellow socks and bracelet for high fall risk patients  - Consider moving patient to room near nurses station  Outcome: Progressing     Problem: MOBILITY - ADULT  Goal: Maintain or return to baseline ADL function  Description: INTERVENTIONS:  -  Assess patient's ability to carry out ADLs; assess patient's baseline for ADL function and identify physical deficits which impact ability to perform ADLs (bathing, care of mouth/teeth, toileting, grooming, dressing, etc )  - Assess/evaluate cause of self-care deficits   - Assess range of motion  - Assess patient's mobility; develop plan if impaired  - Assess patient's need for assistive devices and provide as appropriate  - Encourage maximum independence but intervene and supervise when necessary  - Involve family in performance of ADLs  - Assess for home care needs following discharge   - Consider OT consult to assist with ADL evaluation and planning for discharge  - Provide patient education as appropriate  Outcome: Progressing  Goal: Maintains/Returns to pre admission functional level  Description: INTERVENTIONS:  - Perform BMAT or MOVE assessment daily    - Set and communicate daily mobility goal to care team and patient/family/caregiver     - Collaborate with rehabilitation services on mobility goals if consulted  - Perform Range of Motion  times a day  - Reposition patient every  hours    - Dangle patient  times a day  - Stand patient  times a day  - Ambulate patient  times a day  - Out of bed to chair  times a day   - Out of bed for meals times a day  - Out of bed for toileting  - Record patient progress and toleration of activity level   Outcome: Progressing     Problem: Prexisting or High Potential for Compromised Skin Integrity  Goal: Skin integrity is maintained or improved  Description: INTERVENTIONS:  - Identify patients at risk for skin breakdown  - Assess and monitor skin integrity  - Assess and monitor nutrition and hydration status  - Monitor labs   - Assess for incontinence   - Turn and reposition patient  - Assist with mobility/ambulation  - Relieve pressure over bony prominences  - Avoid friction and shearing  - Provide appropriate hygiene as needed including keeping skin clean and dry  - Evaluate need for skin moisturizer/barrier cream  - Collaborate with interdisciplinary team   - Patient/family teaching  - Consider wound care consult   Outcome: Progressing     Problem: CARDIOVASCULAR - ADULT  Goal: Maintains optimal cardiac output and hemodynamic stability  Description: INTERVENTIONS:  - Monitor I/O, vital signs and rhythm  - Monitor for S/S and trends of decreased cardiac output  - Administer and titrate ordered vasoactive medications to optimize hemodynamic stability  - Assess quality of pulses, skin color and temperature  - Assess for signs of decreased coronary artery perfusion  - Instruct patient to report change in severity of symptoms  Outcome: Progressing  Goal: Absence of cardiac dysrhythmias or at baseline rhythm  Description: INTERVENTIONS:  - Continuous cardiac monitoring, vital signs, obtain 12 lead EKG if ordered  - Administer antiarrhythmic and heart rate control medications as ordered  - Monitor electrolytes and administer replacement therapy as ordered  Outcome: Progressing     Problem: GENITOURINARY - ADULT  Goal: Urinary catheter remains patent  Description: INTERVENTIONS:  - Assess patency of urinary catheter  - If patient has a chronic kumari, consider changing catheter if non-functioning  - Follow guidelines for intermittent irrigation of non-functioning urinary catheter  Outcome: Progressing     Problem: METABOLIC, FLUID AND ELECTROLYTES - ADULT  Goal: Electrolytes maintained within normal limits  Description: INTERVENTIONS:  - Monitor labs and assess patient for signs and symptoms of electrolyte imbalances  - Administer electrolyte replacement as ordered  - Monitor response to electrolyte replacements, including repeat lab results as appropriate  - Instruct patient on fluid and nutrition as appropriate  Outcome: Progressing  Goal: Fluid balance maintained  Description: INTERVENTIONS:  - Monitor labs   - Monitor I/O and WT  - Instruct patient on fluid and nutrition as appropriate  - Assess for signs & symptoms of volume excess or deficit  Outcome: Progressing

## 2022-06-13 NOTE — UTILIZATION REVIEW
ATTENTION: This patient's Medicare Benefits Exhausted  An authorization is required for the admission     Inpatient Admission Authorization Request   NOTIFICATION OF INPATIENT ADMISSION/INPATIENT AUTHORIZATION REQUEST   SERVICING FACILITY:   [address] [place] [address]  Tax ID: [id number]  NPI: [id number]  Place of Service: Inpatient [address]  Place of Service Code: 24     ATTENDING PROVIDER:  Attending Name and NPI#: Armando Torres [place] [[id number]]  Address: [address] [place] [address]  Phone: [phone]     UTILIZATION REVIEW CONTACT:  [facility] Utilization   Network Utilization Review Department  Phone: [phone]  Fax: [phone]  Email: [email or web address]     PHYSICIAN ADVISORY SERVICES:  FOR [facility]LA-UP-[facility] REVIEW - MEDICAL NECESSITY DENIAL  Phone: [phone]  Fax: [phone]  Email: [email or web address]     TYPE OF REQUEST:  Inpatient Status     ADMISSION INFORMATION:  ADMISSION DATE/TIME: 6/11/22 12:09 AM  PATIENT DIAGNOSIS CODE/DESCRIPTION:  Dizziness [R42]  Chronic anemia [D64 9]  ESRD on hemodialysis ([facility] [address] ) [N18 6, Z99 2]  Hypothermia, initial encounter [T68  XXXA]  COVID-19 [U07 1]  DISCHARGE DATE/TIME: No discharge date for patient encounter  IMPORTANT INFORMATION:  Please contact the [facility] directly with any questions or concerns regarding this request  Department voicemails are confidential     Send requests for admission clinical reviews, concurrent reviews, approvals, and administrative denials due to lack of clinical to fax [phone]

## 2022-06-13 NOTE — ASSESSMENT & PLAN NOTE
Elevated d-dimer of 2 on admission, with presentation of syncope and collapse  2D echo shows new pulmonary hypertension when compared to previous  Will obtain CT PE study to rule PE, hx of ESRD plan for dialysis tomorrow  Doppler lower extremity duplex pending

## 2022-06-13 NOTE — CASE MANAGEMENT
Case Management Assessment & Discharge Planning Note    Patient name [facility]  Location [facility] /[facility] 2 Yaz Avery* MRN [id number]  : 1955 Date 2022       Current Admission Date: 6/10/2022  Current Admission Diagnosis:Syncope   Patient Active Problem List    Diagnosis Date Noted    Syncope 2022    COVID 2022    Diarrhea 2022    Preoperative clearance 2022    Acute metabolic encephalopathy due to hypoglycemia 2021    Cerebral infarction, chronic 12/10/2021    ESRD (end stage renal disease) on dialysis ([facility] ) 2021    Bilateral hip pain 2021    Generalized abdominal pain 10/17/2021    Heart murmur 2021    Abnormal findings on diagnostic imaging of abdomen 2021    Biclonal gammopathy 08/10/2021    Acute on chronic anemia 2021    Nephrotic range proteinuria 2021    Asymptomatic hypertensive urgency 2021    Vitamin D deficiency 2021    Angioedema 2020    Influenza vaccination declined 2020    Refused diphtheria-tetanus vaccine 2020    Other constipation 2020    Encounter for screening mammogram for breast cancer 2020    Encounter for screening colonoscopy 2020    [facility]     Systolic murmur     Hyperlipemia 10/17/2019    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 ([facility] ) 10/17/2019    Type 2 diabetes mellitus with chronic kidney disease on chronic dialysis, with long-term current use of insulin ([facility] ) 2015    Essential (primary) hypertension 2015    Elevated TSH 2015      LOS (days): 2  Geometric Mean LOS (GMLOS) (days): 5 40  Days to GMLOS:2 7     OBJECTIVE:    Risk of Unplanned Readmission Score: 24 56         Current admission status: Inpatient       Preferred Pharmacy:   [address] [place] [address],[address], [place] [address]  [address] [facility] Calin Godoy [facility]  Phone: [phone] Fax: [phone]    Primary Care Provider: Ana Cortez MD    Primary Insurance: MEDICARE  Secondary Insurance: [address]    ASSESSMENT:  [facility], [address] [place] [address] [address] Representative - Other   Primary Phone: [phone] (Home)               Advance Directives  Does patient have a [address]?: No  Does patient currently have a [facility] decision maker?: Yes, please see Health Care Proxy section Shelli Eis [phone])  Does patient have Advance Directives?: No  Was patient offered paperwork?: No  Primary Contact: [address]         Readmission Root Cause  30 Day Readmission: No    Patient Information  Admitted from[de-identified] Home  Mental Status: Other (Comment) (appeared drowsy/unable to answer )  During Assessment patient was accompanied by: Not accompanied during assessment  Assessment information provided by[de-identified] Son  Primary Caregiver: Self  Support Systems: Son, Daughter  [place] Edu[place] of Residence: [address] [place] [address] do you live in?: [address] [place] [address] entry access options  Select all that apply : Stairs  Number of steps to enter home  : 1  Do the steps have railings?: No  Type of Current Residence: [address] home  Upon entering residence, is there a bedroom on the main floor (no further steps)?: No  A bedroom is located on the following floor levels of residence (select all that apply):: 2nd Floor  Upon entering residence, is there a bathroom on the main floor (no further steps)?: Yes  Number of steps to 2nd floor from main floor: One Flight  In the last 12 months, was there a time when you were not able to pay the mortgage or rent on time?: No  In the last 12 months, how many places have you lived?: 1  In the last 12 months, was there a time when you did not have a steady place to sleep or slept in a shelter (including now)?: No  Homeless/housing insecurity resource given?: N/A  Living Arrangements: Lives w/ Son  Is patient a ?: No    Activities of Daily Living Prior to Admission  Functional Status: Independent  Completes ADLs independently?: Yes  Ambulates independently?:  (occasional use of quad cane per son)  Does patient use assisted devices?:  (occassionally uses quad cane)  Does patient have a history of Outpatient Therapy (PT/OT)?: Yes (about 1 month in jan/feb 2022 - agency unknown)  Does the patient have a history of Short-Term Rehab?: No  Does patient have a history of HHC?: No  Does patient currently have [facility]?: Yes (cook/clean - unknown agency)    [facility]  Type of Current Home Care Services: Home health aide  Current Home Health Follow-Up Provider[de-identified] PCP    Patient Information Continued  Income Source: SSI/SSD  Does patient have prescription coverage?: Yes  Does patient receive dialysis treatments?:  ([place] M/W/F time CHANGE to 12N after d/c)  Does patient have a history of substance abuse?: No  Does patient have a history of Mental Health Diagnosis?: No         Means of Transportation  Means of Transport to Appts[de-identified]  Automatic Data)  In the past 12 months, has lack of transportation kept you from medical appointments or from getting medications?: No  In the past 12 months, has lack of transportation kept you from meetings, work, or from getting things needed for daily living?: No        DISCHARGE DETAILS:    Discharge planning discussed with[de-identified] stalin Castro [phone]                                     Other Referral/Resources/Interventions Provided:  Interventions: Dialysis  Referral Comments: Spoke with stalin Strange being COVID + Paolo Windham & will continue dialysis at with Monday, Wednesday, Friday schedule, CHAIR TIME CHANGE TO 12 NOON  Automatic Data will be notified of change by Clinic

## 2022-06-13 NOTE — CASE MANAGEMENT
Case Management Assessment & Discharge Planning Note    Patient name [facility]  Location \[facility]"" 68 2 /South 2 Madhavi Townsend* MRN [id number]  : 1955 Date 2022       Current Admission Date: 6/10/2022  Current Admission Diagnosis:Syncope   Patient Active Problem List    Diagnosis Date Noted    Syncope 2022    COVID 2022    Diarrhea 2022    Preoperative clearance 2022    Acute metabolic encephalopathy due to hypoglycemia 2021    Cerebral infarction, chronic 12/10/2021    ESRD (end stage renal disease) on dialysis ([facility] ) 2021    Bilateral hip pain 2021    Generalized abdominal pain 10/17/2021    Heart murmur 2021    Abnormal findings on diagnostic imaging of abdomen 2021    Biclonal gammopathy 08/10/2021    Acute on chronic anemia 2021    Nephrotic range proteinuria 2021    Asymptomatic hypertensive urgency 2021    Vitamin D deficiency 2021    Angioedema 2020    Influenza vaccination declined 2020    Refused diphtheria-tetanus vaccine 2020    Other constipation 2020    Encounter for screening mammogram for breast cancer 2020    Encounter for screening colonoscopy 2020    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 ) 10/17/2019    Type 2 diabetes mellitus with chronic kidney disease on chronic dialysis, with long-term current use of insulin ([facility] ) 2015    Essential (primary) hypertension 2015    Elevated TSH 2015      LOS (days): 2  Geometric Mean LOS (GMLOS) (days): 5 40  Days to GMLOS:2 7     OBJECTIVE:    Risk of Unplanned Readmission Score: 24 56         Current admission status: Inpatient       Preferred Pharmacy:   [address] [place] [address],[address], [place] [address]  [address] [facility] Jani Keyes [address] [place] [address]  Phone: [phone] Fax: [phone]    Primary Care Provider: Evelio Awad MD    Primary Insurance: MEDICARE  Secondary Insurance: [address]    ASSESSMENT:  199 [facility], [address] [place] [address] [address] Representative - Other   Primary Phone: [phone] (Home)               Advance Directives  Does patient have a [address] [facility] [address]?: No  Does patient currently have a [facility] decision maker?: Yes, please see Health Care Proxy section Noe Friend [phone])  Does patient have Advance Directives?: No  Was patient offered paperwork?: No  Primary Contact: [address]         Readmission Root Cause  30 Day Readmission: No    Patient Information  Admitted from[de-identified] Home  Mental Status: Other (Comment) (appeared drowsy/unable to answer )  During Assessment patient was accompanied by: Not accompanied during assessment  Assessment information provided by[de-identified] Edgardo Roque [phone]  Primary Caregiver: Self  Support Systems: Son, Daughter  [place] of Residence: [address] [place] [address] do you live in?: [address] [place] [address] entry access options  Select all that apply : Stairs  Number of steps to enter home  : 1  Do the steps have railings?: No  Type of Current Residence: [address] home  Upon entering residence, is there a bedroom on the main floor (no further steps)?: No  A bedroom is located on the following floor levels of residence (select all that apply):: 2nd Floor  Upon entering residence, is there a bathroom on the main floor (no further steps)?: Yes  Number of steps to 2nd floor from main floor: One Flight  In the last 12 months, was there a time when you were not able to pay the mortgage or rent on time?: No  In the last 12 months, how many places have you lived?: 1  In the last 12 months, was there a time when you did not have a steady place to sleep or slept in a shelter (including now)?: No  Homeless/housing insecurity resource given?: N/A  Living Arrangements: Lives w/ Son  Is patient a ?: No    Activities of Daily Living Prior to Admission  Functional Status: Independent  Completes ADLs independently?: Yes  Ambulates independently?:  (occasional use of quad cane per son)  Does patient use assisted devices?:  (occassionally uses quad cane)  Does patient have a history of Outpatient Therapy (PT/OT)?: Yes (about 1 month in jan/feb 2022 - agency unknown)  Does the patient have a history of Short-Term Rehab?: No  Does patient have a history of HHC?: No  Does patient currently have [facility] [address]?: Yes (cook/clean - unknown agency)    [address] [place] [address]  Type of Current Home Care Services: Home health aide  Current Home Health Follow-Up Provider[de-identified] PCP    Patient Information Continued  Income Source: SSI/SSD  Does patient have prescription coverage?: Yes  Does patient receive dialysis treatments?:  ([place] M/W/F time CHANGE to 12N after d/c)  Does patient have a history of substance abuse?: No  Does patient have a history of Mental Health Diagnosis?: No         Means of Transportation  Means of Transport to [facility][de-identified]  [facility])  In the past 12 months, has lack of transportation kept you from medical appointments or from getting medications?: No  In the past 12 months, has lack of transportation kept you from meetings, work, or from getting things needed for daily living?: No        DISCHARGE DETAILS:

## 2022-06-13 NOTE — PROGRESS NOTES
[address] [place] [address]  Progress Note Suraj Lito 1955, 78 y o  female MRN: [id number]  Unit/Bed#: [facility] 68 2 [address] 87 [id number] Encounter: [id number]  Primary Care Provider: Randy Breen MD   Date and time admitted to hospital: 6/10/2022 10:05 PM    * Syncope  Assessment & Plan  Reports watching TV earlier in the day and then having an episode of syncope with loss of consciousness for unknown duration as it was unwitnessed   - CT head negative for acute intracranial abnormality  - no focal neurologic deficits on exam  - Possibly in setting of COVID infection and hypothermia  - Prior MRI brain in December with chronic lacunar infarcts  - PT and OT to evaluate  - Recommend carotid dopplers outpatient, unable to complete inpatient    [address] positive in the ER  -Not on it any supplemental oxygen and denies any shortness of breath  -heparin subq ppx    ESRD (end stage renal disease) on dialysis [facility])  Assessment & Plan  Lab Results   Component Value Date    EGFR 11 06/12/2022    EGFR 14 06/11/2022    EGFR 9 06/11/2022    CREATININE 3 80 (H) 06/12/2022    CREATININE 3 10 (H) 06/11/2022    CREATININE 4 61 (H) 06/11/2022     On hemodialysis every Monday, Wednesday and Friday  Nephrology following    Acute on chronic anemia  Assessment & Plan  - 2/2 ESRD but Hb dropped to 6 7 yesterday  - Received 1 units transfusion and Hb today is 9  - H/H stable currently    Positive D dimer  Assessment & Plan  Elevated d-dimer of 2 on admission, with presentation of syncope and collapse  2D echo shows new pulmonary hypertension when compared to previous  Will obtain CT PE study to rule PE, hx of ESRD plan for dialysis tomorrow  Doppler lower extremity duplex pending    Type 2 diabetes mellitus with chronic kidney disease on chronic dialysis, with long-term current use of insulin [facility])  Assessment & Plan  Lab Results   Component Value Date    HGBA1C 7 5 (H) 03/28/2022       Recent Labs     22  2106 22  0749 22  1131 22  1613   POCGLU 196* 251* 187* 212*       Blood Sugar Average: Last 72 hrs:  (P) 189 5     - continue glucose checks  - Continue SSI        VTE Pharmacologic Prophylaxis:   Pharmacologic: Heparin  Mechanical VTE Prophylaxis in Place: Yes    Patient Centered Rounds: I have performed bedside rounds with nursing staff today  Discussions with Specialists or Other Care Team Provider: Nephro    Education and Discussions with Family / Patient: patient, unable to reach son on phone    Time Spent for Care: 30 minutes  More than 50% of total time spent on counseling and coordination of care as described above  Current Length of Stay: 2 day(s)    Current Patient Status: Inpatient   Certification Statement: The patient will continue to require additional inpatient hospital stay due to PT and OT evaluation, CT PE study    Discharge Plan: 24-48 hours    Code Status: Prior      Subjective:   No events overnight, no complaints today  Denies any cp, sob, nausea or vomiting  Objective:     Vitals:   Temp (24hrs), Av 8 °F (37 1 °C), Min:98 °F (36 7 °C), Max:100 °F (37 8 °C)    Temp:  [98 °F (36 7 °C)-100 °F (37 8 °C)] 98 5 °F (36 9 °C)  HR:  [84-95] 92  Resp:  [13-20] 18  BP: (171-211)/() 171/80  SpO2:  [88 %-96 %] 96 %  Body mass index is 26 89 kg/m²  Input and Output Summary (last 24 hours): Intake/Output Summary (Last 24 hours) at 2022 1700  Last data filed at 2022 1201  Gross per 24 hour   Intake 120 ml   Output 200 ml   Net -80 ml       Physical Exam:     Physical Exam  Vitals and nursing note reviewed  Constitutional:       Appearance: Normal appearance  She is normal weight  HENT:      Head: Normocephalic and atraumatic  Eyes:      General: No scleral icterus  Conjunctiva/sclera: Conjunctivae normal    Cardiovascular:      Rate and Rhythm: Normal rate and regular rhythm  Heart sounds: Murmur heard     Pulmonary: Breath sounds: No wheezing or rhonchi  Abdominal:      General: Bowel sounds are normal  There is no distension  Palpations: Abdomen is soft  Musculoskeletal:         General: No swelling  Right lower leg: No edema  Left lower leg: No edema  Skin:     General: Skin is warm and dry  Neurological:      General: No focal deficit present  Mental Status: She is alert  Mental status is at baseline  Psychiatric:         Mood and Affect: Mood normal        Additional Data:     Labs:    Results from last 7 days   Lab Units 06/13/22  0821   WBC Thousand/uL 12 48*   HEMOGLOBIN g/dL 10 5*   HEMATOCRIT % 31 4*   PLATELETS Thousands/uL 308   NEUTROS PCT % 83*   LYMPHS PCT % 9*   MONOS PCT % 7   EOS PCT % 0     Results from last 7 days   Lab Units 06/12/22  0913   SODIUM mmol/L 138   POTASSIUM mmol/L 4 8   CHLORIDE mmol/L 99*   CO2 mmol/L 29   BUN mg/dL 21   CREATININE mg/dL 3 80*   ANION GAP mmol/L 10   CALCIUM mg/dL 8 9   ALBUMIN g/dL 3 3*   TOTAL BILIRUBIN mg/dL 0 48   ALK PHOS U/L 78   ALT U/L 17   AST U/L 17   GLUCOSE RANDOM mg/dL 240*     Results from last 7 days   Lab Units 06/10/22  2241   INR  1 11     Results from last 7 days   Lab Units 06/13/22  1613 06/13/22  1131 06/13/22  0749 06/12/22  2106 06/12/22  1547 06/12/22  1135 06/12/22  0752 06/12/22  0547 06/12/22  0031 06/11/22  2035 06/11/22  1609 06/11/22  1330   POC GLUCOSE mg/dl 212* 187* 251* 196* 182* 348* 213* 213* 246* 208* 282* 110         Results from last 7 days   Lab Units 06/10/22  2241   LACTIC ACID mmol/L 1 0   PROCALCITONIN ng/ml 0 08           * I Have Reviewed All Lab Data Listed Above  * Additional Pertinent Lab Tests Reviewed: All Labs For Current Hospital Admission Reviewed    Imaging:    XR chest portable    Result Date: 6/13/2022  Impression: CHF with mild pulmonary edema   Workstation performed: [id number]     XR chest portable - 1 view    Result Date: 6/11/2022  Impression: Mild to moderate pulmonary edema suspected  Superimposed multifocal pneumonia/pneumonitis difficult to exclude  Workstation performed: [id number]     XR abdomen 1 view kub    Result Date: 6/13/2022  Impression: Prominent stool within the rectal vault  No bowel obstruction  Workstation performed: [id number]     CT head without contrast    Result Date: 6/10/2022  Impression: No acute intracranial abnormality  Workstation performed: [id number]       Recent Cultures (last 7 days):     Results from last 7 days   Lab Units 06/10/22  2241   BLOOD CULTURE  No Growth at 48 hrs  No Growth at 48 hrs         Last 24 Hours Medication List:   Current Facility-Administered Medications   Medication Dose Route Frequency Provider Last Rate    acetaminophen  975 mg Oral Q6H PRN MARLENA Walton      aspirin  81 mg Oral Daily Burton Moran, DO      atorvastatin  40 mg Oral Daily Burton Moran DO      calcium acetate  667 mg Oral TID With Meals Burton Moran DO      calcium carbonate  500 mg Oral Daily PRN MARLENA Walton      carvedilol  12 5 mg Oral BID With Meals Burton Moran DO      cloNIDine  0 2 mg Transdermal Weekly Blayne Glass MD      doxazosin  2 mg Oral Daily Darius Cavazos,       doxercalciferol  1 mcg Intravenous Once per day on Mon Wed Fri Lemuel Dueñas MD      heparin (porcine)  5,000 Units Subcutaneous [facility] Burton Moran DO      hydrALAZINE  5 mg Intravenous Q6H PRN Burton Moran DO      hydrALAZINE  50 mg Oral Q8H Kylee Devlin MD      insulin lispro  1-5 Units Subcutaneous HS Blayne Glass MD      insulin lispro  1-5 Units Subcutaneous TID With Meals Blayne Glass MD      metoclopramide  10 mg Intravenous Q6H PRN MARLENA Walton      polyethylene glycol  17 g Oral Daily Iza Carmona MD      senna  2 tablet Oral HS Iza Carmona MD      sodium bicarbonate  50 mEq Intravenous Once Blayne Glass MD          Today, Patient Was Seen By: Iza Carmona MD    ** Please Note: Dictation voice to text software may have been used in the creation of this document   **

## 2022-06-13 NOTE — ASSESSMENT & PLAN NOTE
Lab Results   Component Value Date    HGBA1C 7 5 (H) 03/28/2022       Recent Labs     06/12/22  2106 06/13/22  0749 06/13/22  1131 06/13/22  1613   POCGLU 196* 251* 187* 212*       Blood Sugar Average: Last 72 hrs:  (P) 189 5     - continue glucose checks  - Continue SSI

## 2022-06-13 NOTE — UTILIZATION REVIEW
Initial Clinical Review    Admission: Date/Time/Statement:   Admission Orders (From admission, onward)     Ordered        06/11/22 0009  INPATIENT ADMISSION  Once                      Orders Placed This Encounter   Procedures    INPATIENT ADMISSION     Standing Status:   Standing     Number of Occurrences:   1     Order Specific Question:   Level of Care     Answer:   Level 2 Stepdown / HOT [14]     Order Specific Question:   Estimated length of stay     Answer:   More than 2 Midnights     Order Specific Question:   Certification     Answer:   I certify that inpatient services are medically necessary for this patient for a duration of greater than two midnights  See H&P and MD Progress Notes for additional information about the patient's course of treatment  ED Arrival Information     Expected   -    Arrival   6/10/2022 21:58    Acuity   Emergent            Means of arrival   Walk-In    Escorted by   Family Member    Service   Hospitalist    Admission type   Emergency            Arrival complaint   syncope           Chief Complaint   Patient presents with    Dizziness     Pt was at dialysis and had syncopal episode, reports dizziness, CP, pale and diaphoretic in triage       Initial Presentation: 78 y o  female  Presents to ED from home  After an unwitnessed syncopal episode earlier in the day,  Unknown amount of time  Called daughter to bring her to ED  In ED, found with a t emp of  91 and COVID  Positive  Felt better with [facility]  PMH  Is  ESRD on dialysis, DM2, chronic lacunar infarct and hypoglycemia  Bradycardia noted on  EKG  Ct head negative  Admit  Ip with Syncope, COVID  And plan is  Monitor labs and vital signs,  No O2 requirements at present, hold on steroids, nephrology consult, continue home  meds and possibly  Cardiology consult  Nephrology consult  ( 6/11)    Current hemoglobin   6 7  And  Transfusion ordered  Transfuse for hemoglobin < 7    Continue  Dialysis schedule   Tues, Thurs and Sat  Continue current  Treatment plan  On  dialysis   6/11  Date:    6/12        Day 2:   S/P  1 U PRBC and hemoglobin improved to 9  Remains on tele  Complains of  Abdominal pain, may need GI consult  Continue to monitor respiratory status  States feels   Improved since admission        ED Triage Vitals   Temperature Pulse Respirations Blood Pressure SpO2   06/10/22 2224 06/10/22 2204 06/10/22 2204 06/10/22 2204 06/10/22 2204   (!) 91 °F (32 8 °C) (!) 49 22 (!) 170/102 99 %      Temp Source Heart Rate Source Patient Position - Orthostatic VS BP Location FiO2 (%)   06/10/22 2224 06/10/22 2204 06/10/22 2204 06/10/22 2204 --   Rectal Monitor Sitting Right arm       Pain Score       06/10/22 2204       No Pain          Wt Readings from Last 1 Encounters:   06/12/22 66 7 kg (147 lb)     Additional Vital Signs:   22 19:36:30 100 °F (37 8 °C) 84 20 186/88 Abnormal  121 92 % None (Room air) --   06/12/22 1523 99 2 °F (37 3 °C) 92 20 176/95 Abnormal  122 89 % Abnormal  None (Room air) Lying   06/12/22 1402 -- 90 -- 178/83 Abnormal  -- -- -- --   06/12/22 11:55:20 -- 93 -- 213/99 Abnormal  137 84 % Abnormal  -- Lying   06/12/22 11:39:45 98 4 °F (36 9 °C) 94 18 205/91 Abnormal  129 86 % Abnormal  None (Room air) Lying   06/12/22 07:56:32 98 6 °F (37 °C) 91 20 143/83 103 92 % None (Room air) Lying   06/12/22 03:31:22 97 9 °F (36 6 °C) 86 20 143/88 106 94 % None (Room air) --   06/11/22 23:07:26 98 2 °F (36 8 °C) 82 20 172/84 Abnormal  113 98 % -- --   06/11/22 2100 -- 88 -- -- -- 98 % -- --   06/11/22 20:26:40 98 4 °F (36 9 °C) 83 18 180/92 Abnormal  121 97 % None (Room air) --   06/11/22 1809 -- -- 18 -- -- -- -- --   06/11/22 18:04:40 98 4 °F (36 9 °C) 86 18 195/81 Abnormal  119 96 % None (Room air) Lying   06/11/22 1444 97 6 °F (36 4 °C) 76 18 166/70 102 98 % None (Room air) Lying   06/11/22 1437 -- 76 15 142/68 -- 97 % -- Lying   06/11/22 1430 97 °F (36 1 °C) Abnormal  68 -- 139/62 -- -- -- -- 06/11/22 1418 -- 70 -- -- -- 100 % -- --   06/11/22 1414 96 5 °F (35 8 °C) Abnormal  70 16 176/78 Abnormal  -- -- -- --   06/11/22 1400 -- 62 15 157/67 -- 98 % -- --   06/11/22 1330 -- 63 16 152/69 -- -- -- --   06/11/22 1300 -- 57 15 137/61 -- -- -- --   06/11/22 1230 -- 57 16 139/61 -- -- -- --   06/11/22 1200 -- 57 15 157/69 -- 92 % -- --   06/11/22 1150 -- 56 16 140/52 -- -- -- --   06/11/22 1145 -- 59 18 161/70 -- 95 % None (Room air) Lying   06/11/22 0858 -- -- -- -- -- -- None (Room air) --   06/11/22 08:02:39 98 1 °F (36 7 °C) -- 20 154/71 99 -- -- --   06/11/22 06:04:37 98 °F (36 7 °C) 68 -- 156/73 101 97 % -- --   06/11/22 04:06:33 97 3 °F (36 3 °C) Abnormal  67 -- -- -- 99 % -- --   06/11/22 0155 97 3 °F (36 3 °C) Abnormal  66 -- -- -- 98 % -- --   06/11/22 01:36:19 -- 64 22 164/76 105 98 % -- --   06/11/22 0100 94 3 °F (34 6 °C) Abnormal  64 16 -- -- -- -- --   06/11/22 0005 92 1 °F (33 4 °C) Abnormal  -- -- -- -- -- -- --       Weights (last 14 days)    Date/Time Weight Height   06/12/22 1402 66 7 kg (147 lb) 5' 2" (1 575 m)       Pertinent Labs/Diagnostic Test Results:   EKG   SB   HR  62  Nonspecific   ST cahnges  XR abdomen 1 view kub   Final Result by Suraj Sears MD (06/13 1036)   Prominent stool within the rectal vault  No bowel obstruction  Workstation performed: [id number]         XR chest portable - 1 view   Final Result by Tomás Farias MD (06/11 2268)      Mild to moderate pulmonary edema suspected  Superimposed multifocal pneumonia/pneumonitis difficult to exclude  Workstation performed: [id number]         CT head without contrast   Final Result by Ashley Jarrett MD (06/10 6770)      No acute intracranial abnormality                    Workstation performed: [id number]         VAS carotid complete study    (Results Pending)   VAS lower limb venous duplex study, complete bilateral    (Results Pending)   XR chest portable    (Results Pending)     Results from last 7 days   Lab Units 06/10/22  2241   SARS-COV-2  Positive*     Results from last 7 days   Lab Units 06/13/22  0821 06/12/22  0913 06/11/22  0846 06/11/22  0637 06/10/22  2241   WBC Thousand/uL 12 48* 9 55  --  5 49 4 87   HEMOGLOBIN g/dL 10 5* 9 6* 6 7* 6 7* 7 5*   HEMATOCRIT % 31 4* 29 7* 21 2* 20 8* 23 9*   PLATELETS Thousands/uL 308 306  --  248 261   NEUTROS ABS Thousands/µL 10 38* 8 45*  --  3 61 2 83         Results from last 7 days   Lab Units 06/12/22  0913 06/11/22  1214 06/11/22  0637 06/10/22  2241 06/09/22  1356   SODIUM mmol/L 138 137 131* 136  --    POTASSIUM mmol/L 4 8 3 9 6 1* 4 9 5 6*   CHLORIDE mmol/L 99* 101 95* 98*  --    CO2 mmol/L 29 32 31 32  --    ANION GAP mmol/L 10 4 5 6  --    BUN mg/dL 21 21 28* 27*  --    CREATININE mg/dL 3 80* 3 10* 4 61* 4 59*  --    EGFR ml/min/1 73sq m 11 14 9 9  --    CALCIUM mg/dL 8 9 8 2* 8 0* 8 7  --    MAGNESIUM mg/dL  --   --  2 7* 3 0*  --      Results from last 7 days   Lab Units 06/12/22  0913 06/11/22  0637 06/10/22  2241   AST U/L 17 14 16   ALT U/L 17 13 16   ALK PHOS U/L 78 61 73   TOTAL PROTEIN g/dL 8 1 6 5 7 4   ALBUMIN g/dL 3 3* 2 6* 3 0*   TOTAL BILIRUBIN mg/dL 0 48 0 22 0 31     Results from last 7 days   Lab Units 06/13/22  0749 06/12/22  2106 06/12/22  1547 06/12/22  1135 06/12/22  0752 06/12/22  0547 06/12/22  0031 06/11/22  2035 06/11/22  1609 06/11/22  1330 06/11/22  1203 06/11/22  1129   POC GLUCOSE mg/dl 251* 196* 182* 348* 213* 213* 246* 208* 282* 110 105 40*     Results from last 7 days   Lab Units 06/12/22  0913 06/11/22  1214 06/11/22  0637 06/10/22  2241   GLUCOSE RANDOM mg/dL 240* 94 205* 79              Results from last 7 days   Lab Units 06/10/22  2241   PH JJ  7 496*   PCO2 JJ mm Hg 38 6*   PO2 JJ mm Hg 81 0*   HCO3 JJ mmol/L 29 2   BASE EXC JJ mmol/L 5 5   O2 CONTENT JJ ml/dL 10 9   O2 HGB, VENOUS % 92 6*         Results from last 7 days   Lab Units 06/11/22  0218   CK TOTAL U/L 110         Results from last 7 days Lab Units 06/11/22  0218   D-DIMER QUANTITATIVE ug/ml FEU 2 02*     Results from last 7 days   Lab Units 06/10/22  2241   PROTIME seconds 14 0   INR  1 11   PTT seconds 34     Results from last 7 days   Lab Units 06/11/22  0002   TSH 3RD GENERATON uIU/mL 3 386     Results from last 7 days   Lab Units 06/10/22  2241   PROCALCITONIN ng/ml 0 08     Results from last 7 days   Lab Units 06/10/22  2241   LACTIC ACID mmol/L 1 0             Results from last 7 days   Lab Units 06/11/22  0218   NT-PRO BNP pg/mL 5,807*     Results from last 7 days   Lab Units 06/12/22  0549   FERRITIN ng/mL 1,174*         Results from last 7 days   Lab Units 06/10/22  2241   LIPASE u/L 513*     Results from last 7 days   Lab Units 06/11/22  0218   CRP mg/L 14 4*             Results from last 7 days   Lab Units 06/10/22  2331   CLARITY UA  Clear   COLOR UA  Yellow   SPEC GRAV UA  1 015   PH UA  8 0   GLUCOSE UA mg/dl Negative   KETONES UA mg/dl Negative   BLOOD UA  Negative   PROTEIN UA mg/dl 100 (2+)*   NITRITE UA  Negative   BILIRUBIN UA  Negative   UROBILINOGEN UA E U /dl 0 2   LEUKOCYTES UA  Trace*   WBC UA /hpf 4-10*   RBC UA /hpf None Seen   BACTERIA UA /hpf Occasional   EPITHELIAL CELLS WET PREP /hpf Occasional     Results from last 7 days   Lab Units 06/10/22  2241   INFLUENZA A PCR  Negative   INFLUENZA B PCR  Negative   RSV PCR  Negative               Results from last 7 days   Lab Units 06/10/22  2241   BLOOD CULTURE  No Growth at 48 hrs  No Growth at 48 hrs                 ED Treatment:   Medication Administration from 06/10/2022 2158 to 06/11/2022 0130       Date/Time Order Dose Route Action Comments     06/11/2022 0000 ceftriaxone (ROCEPHIN) 2 g/50 mL in dextrose IVPB 0 mg Intravenous Stopped      06/10/2022 2317 ceftriaxone (ROCEPHIN) 2 g/50 mL in dextrose IVPB 2,000 mg Intravenous New Bag         Present on Admission:   (Resolved) Hypothermia      Admitting Diagnosis: Dizziness [R42]  Chronic anemia [D64 9]  ESRD on hemodialysis ([facility] [address] ) [N18 6, Z99 2]  Hypothermia, initial encounter [T68  XXXA]  COVID-19 [U07 1]  Age/Sex: 78 y o  female  Admission Orders:  Scheduled Medications:  aspirin, 81 mg, Oral, Daily  atorvastatin, 40 mg, Oral, Daily  calcium acetate, 667 mg, Oral, TID With Meals  carvedilol, 12 5 mg, Oral, BID With Meals  cloNIDine, 0 2 mg, Transdermal, Weekly  doxazosin, 2 mg, Oral, Daily  doxercalciferol, 1 mcg, Intravenous, Once per day on Mon Wed Fri  heparin (porcine), 5,000 Units, Subcutaneous, Q8H DEBBIE  hydrALAZINE, 50 mg, Oral, Q8H [address]  insulin lispro, 1-5 Units, Subcutaneous, HS  insulin lispro, 1-5 Units, Subcutaneous, TID With Meals  sodium bicarbonate, 50 mEq, Intravenous, Once      Continuous IV Infusions:     PRN Meds:  acetaminophen, 975 mg, Oral, Q6H PRN  calcium carbonate, 500 mg, Oral, Daily PRN  hydrALAZINE, 5 mg, Intravenous, Q6H PRN  metoclopramide, 10 mg, Intravenous, Q6H PRN        IP CONSULT TO NEPHROLOGY    Network Utilization Review Department  ATTENTION: Please call with any questions or concerns to [phone] and carefully listen to the prompts so that you are directed to the right person  All voicemails are confidential   Nikita Grier all requests for admission clinical reviews, approved or denied determinations and any other requests to dedicated fax number below belonging to the campus where the patient is receiving treatment   List of dedicated fax numbers for the Facilities:  [address] DENIALS (Administrative/Medical Necessity) [phone]   [address] [place] [address] (Maternity/NICU/Pediatrics) [phone]   [address] [place] [address]  [address] [facility] Loco Beaulieu [address]   [address] [address] [place] [address] Earnestine Gregory [address] [place] [id number] [phone]

## 2022-06-13 NOTE — ASSESSMENT & PLAN NOTE
Lab Results   Component Value Date    EGFR 11 06/12/2022    EGFR 14 06/11/2022    EGFR 9 06/11/2022    CREATININE 3 80 (H) 06/12/2022    CREATININE 3 10 (H) 06/11/2022    CREATININE 4 61 (H) 06/11/2022     On hemodialysis every Monday, Wednesday and Friday  Nephrology following

## 2022-06-13 NOTE — ASSESSMENT & PLAN NOTE
- 2/2 ESRD but Hb dropped to 6 7 yesterday  - Received 1 units transfusion and Hb today is 9  - H/H stable currently

## 2022-06-13 NOTE — ASSESSMENT & PLAN NOTE
Reports watching TV earlier in the day and then having an episode of syncope with loss of consciousness for unknown duration as it was unwitnessed   - CT head negative for acute intracranial abnormality  - no focal neurologic deficits on exam  - Possibly in setting of COVID infection and hypothermia  - Prior MRI brain in December with chronic lacunar infarcts  - PT and OT to evaluate  - Recommend carotid dopplers outpatient, unable to complete inpatient

## 2022-06-14 ENCOUNTER — APPOINTMENT (INPATIENT)
Dept: DIALYSIS | Facility: HOSPITAL | Age: 67
DRG: 177 | End: 2022-06-14
Payer: MEDICARE

## 2022-06-14 ENCOUNTER — APPOINTMENT (INPATIENT)
Dept: CT IMAGING | Facility: HOSPITAL | Age: 67
DRG: 177 | End: 2022-06-14
Payer: MEDICARE

## 2022-06-14 LAB
ANION GAP SERPL CALCULATED.3IONS-SCNC: 10 MMOL/L (ref 4–13)
BASOPHILS # BLD AUTO: 0.02 THOUSANDS/ΜL (ref 0–0.1)
BASOPHILS NFR BLD AUTO: 0 % (ref 0–1)
BUN SERPL-MCNC: 46 MG/DL (ref 5–25)
CALCIUM SERPL-MCNC: 8.6 MG/DL (ref 8.3–10.1)
CHLORIDE SERPL-SCNC: 101 MMOL/L (ref 100–108)
CO2 SERPL-SCNC: 30 MMOL/L (ref 21–32)
CREAT SERPL-MCNC: 5.92 MG/DL (ref 0.6–1.3)
EOSINOPHIL # BLD AUTO: 0.01 THOUSAND/ΜL (ref 0–0.61)
EOSINOPHIL NFR BLD AUTO: 0 % (ref 0–6)
ERYTHROCYTE [DISTWIDTH] IN BLOOD BY AUTOMATED COUNT: 15 % (ref 11.6–15.1)
GFR SERPL CREATININE-BSD FRML MDRD: 6 ML/MIN/1.73SQ M
GLUCOSE SERPL-MCNC: 104 MG/DL (ref 65–140)
GLUCOSE SERPL-MCNC: 180 MG/DL (ref 65–140)
GLUCOSE SERPL-MCNC: 191 MG/DL (ref 65–140)
GLUCOSE SERPL-MCNC: 226 MG/DL (ref 65–140)
GLUCOSE SERPL-MCNC: 237 MG/DL (ref 65–140)
HCT VFR BLD AUTO: 28 % (ref 34.8–46.1)
HGB BLD-MCNC: 8.9 G/DL (ref 11.5–15.4)
IMM GRANULOCYTES # BLD AUTO: 0.04 THOUSAND/UL (ref 0–0.2)
IMM GRANULOCYTES NFR BLD AUTO: 1 % (ref 0–2)
LYMPHOCYTES # BLD AUTO: 1.38 THOUSANDS/ΜL (ref 0.6–4.47)
LYMPHOCYTES NFR BLD AUTO: 17 % (ref 14–44)
MCH RBC QN AUTO: 28.9 PG (ref 26.8–34.3)
MCHC RBC AUTO-ENTMCNC: 31.8 G/DL (ref 31.4–37.4)
MCV RBC AUTO: 91 FL (ref 82–98)
MONOCYTES # BLD AUTO: 0.78 THOUSAND/ΜL (ref 0.17–1.22)
MONOCYTES NFR BLD AUTO: 10 % (ref 4–12)
NEUTROPHILS # BLD AUTO: 5.86 THOUSANDS/ΜL (ref 1.85–7.62)
NEUTS SEG NFR BLD AUTO: 72 % (ref 43–75)
NRBC BLD AUTO-RTO: 0 /100 WBCS
PLATELET # BLD AUTO: 273 THOUSANDS/UL (ref 149–390)
PMV BLD AUTO: 10.7 FL (ref 8.9–12.7)
POTASSIUM SERPL-SCNC: 4.7 MMOL/L (ref 3.5–5.3)
RBC # BLD AUTO: 3.08 MILLION/UL (ref 3.81–5.12)
SODIUM SERPL-SCNC: 141 MMOL/L (ref 136–145)
WBC # BLD AUTO: 8.09 THOUSAND/UL (ref 4.31–10.16)

## 2022-06-14 PROCEDURE — 85025 COMPLETE CBC W/AUTO DIFF WBC: CPT | Performed by: STUDENT IN AN ORGANIZED HEALTH CARE EDUCATION/TRAINING PROGRAM

## 2022-06-14 PROCEDURE — 71275 CT ANGIOGRAPHY CHEST: CPT

## 2022-06-14 PROCEDURE — 90935 HEMODIALYSIS ONE EVALUATION: CPT | Performed by: INTERNAL MEDICINE

## 2022-06-14 PROCEDURE — G1004 CDSM NDSC: HCPCS

## 2022-06-14 PROCEDURE — 82948 REAGENT STRIP/BLOOD GLUCOSE: CPT

## 2022-06-14 PROCEDURE — 99232 SBSQ HOSP IP/OBS MODERATE 35: CPT | Performed by: STUDENT IN AN ORGANIZED HEALTH CARE EDUCATION/TRAINING PROGRAM

## 2022-06-14 PROCEDURE — 80048 BASIC METABOLIC PNL TOTAL CA: CPT | Performed by: STUDENT IN AN ORGANIZED HEALTH CARE EDUCATION/TRAINING PROGRAM

## 2022-06-14 RX ADMIN — INSULIN LISPRO 2 UNITS: 100 INJECTION, SOLUTION INTRAVENOUS; SUBCUTANEOUS at 18:20

## 2022-06-14 RX ADMIN — POLYETHYLENE GLYCOL 3350 17 G: 17 POWDER, FOR SOLUTION ORAL at 09:13

## 2022-06-14 RX ADMIN — DOXAZOSIN 2 MG: 2 TABLET ORAL at 09:13

## 2022-06-14 RX ADMIN — CARVEDILOL 12.5 MG: 12.5 TABLET, FILM COATED ORAL at 18:20

## 2022-06-14 RX ADMIN — INSULIN LISPRO 2 UNITS: 100 INJECTION, SOLUTION INTRAVENOUS; SUBCUTANEOUS at 09:12

## 2022-06-14 RX ADMIN — CALCIUM ACETATE 667 MG: 667 CAPSULE ORAL at 18:20

## 2022-06-14 RX ADMIN — HYDRALAZINE HYDROCHLORIDE 50 MG: 25 TABLET, FILM COATED ORAL at 21:51

## 2022-06-14 RX ADMIN — ATORVASTATIN CALCIUM 40 MG: 40 TABLET, FILM COATED ORAL at 09:12

## 2022-06-14 RX ADMIN — INSULIN LISPRO 1 UNITS: 100 INJECTION, SOLUTION INTRAVENOUS; SUBCUTANEOUS at 21:51

## 2022-06-14 RX ADMIN — HEPARIN SODIUM 5000 UNITS: 5000 INJECTION INTRAVENOUS; SUBCUTANEOUS at 13:58

## 2022-06-14 RX ADMIN — CALCIUM ACETATE 667 MG: 667 CAPSULE ORAL at 09:17

## 2022-06-14 RX ADMIN — HEPARIN SODIUM 5000 UNITS: 5000 INJECTION INTRAVENOUS; SUBCUTANEOUS at 21:51

## 2022-06-14 RX ADMIN — HYDRALAZINE HYDROCHLORIDE 50 MG: 25 TABLET, FILM COATED ORAL at 13:58

## 2022-06-14 RX ADMIN — HYDRALAZINE HYDROCHLORIDE 50 MG: 25 TABLET, FILM COATED ORAL at 06:12

## 2022-06-14 RX ADMIN — IOHEXOL 70 ML: 350 INJECTION, SOLUTION INTRAVENOUS at 08:00

## 2022-06-14 RX ADMIN — CARVEDILOL 12.5 MG: 12.5 TABLET, FILM COATED ORAL at 09:17

## 2022-06-14 RX ADMIN — SENNOSIDES 17.2 MG: 8.6 TABLET ORAL at 21:51

## 2022-06-14 RX ADMIN — ASPIRIN 81 MG CHEWABLE TABLET 81 MG: 81 TABLET CHEWABLE at 09:12

## 2022-06-14 RX ADMIN — HEPARIN SODIUM 5000 UNITS: 5000 INJECTION INTRAVENOUS; SUBCUTANEOUS at 06:12

## 2022-06-14 NOTE — OCCUPATIONAL THERAPY NOTE
Occupational Therapy Note:    Patient Name: Grecia Maza  MCKXO'U Date: 6/14/2022    OT consult received  Chart reviewed  Attempted to see pt for OT eval, however pt on HD at this time  Will cx and attempt OT eval at later time as schedule permits      LORE Hendrix/HEMAL

## 2022-06-14 NOTE — PROGRESS NOTES
NEPHROLOGY HEMODIALYSIS PROCEDURE NOTE    Seen and examined on hemodialysis  Offers no new complaints  No reports of chest pain shortness of breath or abdominal pain  QB: 400  QD: 500  Access: permcath  Dialyzer: 160  Projected Kt/V:-  Sodium: 138  Potassium: 2  Bicarbonate: 35  Ultrafiltration: 2  Medications given on HD: Hectorol     Physical Exam:    /63   Pulse 68   Temp 98 7 °F (37 1 °C) (Oral)   Resp 18   Ht 5' 2" (1 575 m)   Wt 66 7 kg (147 lb)   SpO2 98%   BMI 26 89 kg/m²     General:  No acute distress appears comfortable  CVS:  Regular  Lungs:  Clear to auscultation  Abdomen:  Soft nontender  Access:  PermCath, non mature left upper arm AV access  Extremities:  No significant edema    Assessment:  1  End-stage renal disease on maintenance hemodialysis currently Tuesday Thursday Saturday  2  Unwitnessed apparent syncopal episode  3  COVID-19 positive, patient remains relatively asymptomatic  4  Accelerated hypertension, continue with hydralazine, Coreg, doxazosin  Challenge weight with hemodialysis today  5  Anemia of chronic kidney disease hemoglobin stable at 8 9, currently not on CHRISTAL therapy given history of CVA  6   Urinary retention, post Khan catheter    Plan:  Challenge estimated dry weight by approximately 2 kg  Continue with current antihypertensive regimen  Tentative possible discharge in next 24-48 hours  Discussed with primary service

## 2022-06-14 NOTE — ASSESSMENT & PLAN NOTE
Elevated d-dimer of 2 on admission, with presentation of syncope and collapse  2D echo shows new pulmonary hypertension when compared to previous  CT PE neg for PE  Doppler lower extremity duplex neg for DVT  Suspect likely due to COVID infection

## 2022-06-14 NOTE — ASSESSMENT & PLAN NOTE
Lab Results   Component Value Date    EGFR 6 06/14/2022    EGFR 11 06/12/2022    EGFR 14 06/11/2022    CREATININE 5 92 (H) 06/14/2022    CREATININE 3 80 (H) 06/12/2022    CREATININE 3 10 (H) 06/11/2022     On hemodialysis every Monday, Wednesday and Friday  Nephrology following  Plan to challenge patient's dry weight

## 2022-06-14 NOTE — PHYSICAL THERAPY NOTE
PHYSICAL THERAPY NOTE          Patient Name: Manuel DE LA ROSAI Date: 6/14/2022     PT consult received  Chart reviewed  Patient currently on dialysis  Will continue to follow as appropriate  Lux Byers, PT

## 2022-06-14 NOTE — PROGRESS NOTES
[facility]  Progress Note Andres Pantoja 1955, 78 y o  female MRN: [id number]  Unit/Bed#: Neilu Ghazal [address] 206-02 Encounter: [id number]  Primary Care Provider: Carolyn Coelho MD   Date and time admitted to hospital: 6/10/2022 10:05 PM    * Syncope  Assessment & Plan  Reports watching TV earlier in the day and then having an episode of syncope with loss of consciousness for unknown duration as it was unwitnessed   - CT head negative for acute intracranial abnormality  - no focal neurologic deficits on exam  - Possibly in setting of COVID infection and hypothermia  - Prior MRI brain in December with chronic lacunar infarcts  - Recommend carotid dopplers outpatient, unable to complete inpatient  Pending PT and OT evaluation    COVID  Assessment & Plan  -COVID positive in the ER  - has required 1L NC on and off  - imaging without infiltrates to suggest PNA    ESRD (end stage renal disease) on dialysis [facility])  Assessment & Plan  Lab Results   Component Value Date    EGFR 6 06/14/2022    EGFR 11 06/12/2022    EGFR 14 06/11/2022    CREATININE 5 92 (H) 06/14/2022    CREATININE 3 80 (H) 06/12/2022    CREATININE 3 10 (H) 06/11/2022     On hemodialysis every Monday, Wednesday and Friday  Nephrology following  Plan to challenge patient's dry weight    Acute on chronic anemia  Assessment & Plan  Likely in setting of ESRD  - H/H stable currently s/p 1 unit pRBC    Positive D dimer  Assessment & Plan  Elevated d-dimer of 2 on admission, with presentation of syncope and collapse  2D echo shows new pulmonary hypertension when compared to previous  CT PE neg for PE  Doppler lower extremity duplex neg for DVT  Suspect likely due to COVID infection    Type 2 diabetes mellitus with chronic kidney disease on chronic dialysis, with long-term current use of insulin [facility])  Assessment & Plan  Lab Results   Component Value Date    HGBA1C 7 5 (H) 03/28/2022       Recent Labs     06/13/22 2128 22  0733 22  1151 22  1650   POCGLU 184* 226* 104 237*       Blood Sugar Average: Last 72 hrs:  (P) 060 [id number]     - continue glucose checks  - Continue SSI        VTE Pharmacologic Prophylaxis:   Pharmacologic: Heparin  Mechanical VTE Prophylaxis in Place: Yes    Patient Centered Rounds: I have performed bedside rounds with nursing staff today  Discussions with Specialists or Other Care Team Provider: Nephro    Education and Discussions with Family / Patient: patient, son on phone    Time Spent for Care: 30 minutes  More than 50% of total time spent on counseling and coordination of care as described above  Current Length of Stay: 3 day(s)    Current Patient Status: Inpatient   Certification Statement: The patient will continue to require additional inpatient hospital stay due to pending PT and OT evaluation    Discharge Plan: 24-48 hours    Code Status: Prior      Subjective:   No events overnight, reports doing well  Tolerating diet  Denies any CP, SOB nausea or vomiting  Objective:     Vitals:   Temp (24hrs), Av 4 °F (36 9 °C), Min:98 °F (36 7 °C), Max:98 9 °F (37 2 °C)    Temp:  [98 °F (36 7 °C)-98 9 °F (37 2 °C)] 98 4 °F (36 9 °C)  HR:  [65-91] 75  Resp:  [16-20] 20  BP: (113-210)/() 154/79  SpO2:  [79 %-99 %] 95 %  Body mass index is 26 89 kg/m²  Input and Output Summary (last 24 hours): Intake/Output Summary (Last 24 hours) at 2022 1733  Last data filed at 2022 1230  Gross per 24 hour   Intake 1240 ml   Output 2500 ml   Net -1260 ml       Physical Exam:     Physical Exam  Vitals and nursing note reviewed  Constitutional:       Appearance: Normal appearance  She is normal weight  HENT:      Head: Normocephalic and atraumatic  Eyes:      General: No scleral icterus  Conjunctiva/sclera: Conjunctivae normal    Cardiovascular:      Rate and Rhythm: Normal rate and regular rhythm  Pulmonary:      Breath sounds: No wheezing or rhonchi  Abdominal:      General: Bowel sounds are normal  There is no distension  Palpations: Abdomen is soft  Musculoskeletal:         General: No swelling  Right lower leg: No edema  Left lower leg: No edema  Skin:     General: Skin is warm and dry  Neurological:      General: No focal deficit present  Mental Status: She is alert  Mental status is at baseline  Psychiatric:         Mood and Affect: Mood normal        Additional Data:     Labs:    Results from last 7 days   Lab Units 06/14/22  0509   WBC Thousand/uL 8 09   HEMOGLOBIN g/dL 8 9*   HEMATOCRIT % 28 0*   PLATELETS Thousands/uL 273   NEUTROS PCT % 72   LYMPHS PCT % 17   MONOS PCT % 10   EOS PCT % 0     Results from last 7 days   Lab Units 06/14/22  0509 06/12/22  0913   SODIUM mmol/L 141 138   POTASSIUM mmol/L 4 7 4 8   CHLORIDE mmol/L 101 99*   CO2 mmol/L 30 29   BUN mg/dL 46* 21   CREATININE mg/dL 5 92* 3 80*   ANION GAP mmol/L 10 10   CALCIUM mg/dL 8 6 8 9   ALBUMIN g/dL  --  3 3*   TOTAL BILIRUBIN mg/dL  --  0 48   ALK PHOS U/L  --  78   ALT U/L  --  17   AST U/L  --  17   GLUCOSE RANDOM mg/dL 191* 240*     Results from last 7 days   Lab Units 06/10/22  2241   INR  1 11     Results from last 7 days   Lab Units 06/14/22  1650 06/14/22  1151 06/14/22  0733 06/13/22  2128 06/13/22  1613 06/13/22  1131 06/13/22  0749 06/12/22  2106 06/12/22  1547 06/12/22  1135 06/12/22  0752 06/12/22  0547   POC GLUCOSE mg/dl 237* 104 226* 184* 212* 187* 251* 196* 182* 348* 213* 213*         Results from last 7 days   Lab Units 06/10/22  2241   LACTIC ACID mmol/L 1 0   PROCALCITONIN ng/ml 0 08           * I Have Reviewed All Lab Data Listed Above  * Additional Pertinent Lab Tests Reviewed: All Labs For Current Hospital Admission Reviewed    Imaging:    XR chest portable    Result Date: 6/13/2022  Impression: CHF with mild pulmonary edema   Workstation performed: [id number]     XR chest portable - 1 view    Result Date: 6/11/2022  Impression: Mild to moderate pulmonary edema suspected  Superimposed multifocal pneumonia/pneumonitis difficult to exclude  Workstation performed: [id number]     XR abdomen 1 view kub    Result Date: 6/13/2022  Impression: Prominent stool within the rectal vault  No bowel obstruction  Workstation performed: [id number]     CT head without contrast    Result Date: 6/10/2022  Impression: No acute intracranial abnormality  Workstation performed: [id number]     CTA chest pe study    Result Date: 6/14/2022  Impression: No pulmonary embolus  Mild interstitial edema with small effusions  Small pericardial effusion  Workstation performed: [id number]       Recent Cultures (last 7 days):     Results from last 7 days   Lab Units 06/10/22  2241   BLOOD CULTURE  No Growth at 72 hrs  No Growth at 72 hrs         Last 24 Hours Medication List:   Current Facility-Administered Medications   Medication Dose Route Frequency Provider Last Rate    acetaminophen  975 mg Oral Q6H PRN [place], CRNP      aspirin  81 mg Oral Daily Savanna Dudley, DO      atorvastatin  40 mg Oral Daily Savanna Dudley DO      calcium acetate  667 mg Oral TID With Meals Savanna Dudley,       calcium carbonate  500 mg Oral Daily PRN RichmondMARLENA Morillo      carvedilol  12 5 mg Oral BID With Meals Savanna Dudley DO      cloNIDine  0 2 mg Transdermal Weekly Rodrigue Mackenzie MD      doxazosin  2 mg Oral Daily Mónica Bansal DO      doxercalciferol  1 mcg Intravenous Once per day on Mon Wed Fri Avi Maravilla MD      heparin (porcine)  5,000 Units Subcutaneous [facility] Savanna Dudley DO      hydrALAZINE  5 mg Intravenous Q6H PRN Savanna Dudley DO      hydrALAZINE  50 mg Oral Q8H Linnette Marte MD      insulin lispro  1-5 Units Subcutaneous HS Rodrigue Mackenzie MD      insulin lispro  1-5 Units Subcutaneous TID With Meals Rodrigue Mackenzie MD      metoclopramide  10 mg Intravenous Q6H PRN MARLENA Denny      polyethylene glycol  17 g Oral Daily Kristie Spangler MD      senna  2 tablet Oral HS Kristie Spangler MD      sodium bicarbonate  50 mEq Intravenous Once Sharyle Night, MD          Today, Patient Was Seen By: Kristie Spangler MD    ** Please Note: Dictation voice to text software may have been used in the creation of this document   **

## 2022-06-14 NOTE — PLAN OF CARE
Problem: Potential for Falls  Goal: Patient will remain free of falls  Description: INTERVENTIONS:  - Educate patient/family on patient safety including physical limitations  - Instruct patient to call for assistance with activity   - Consult OT/PT to assist with strengthening/mobility   - Keep Call bell within reach  - Keep bed low and locked with side rails adjusted as appropriate  - Keep care items and personal belongings within reach  - Initiate and maintain comfort rounds  - Make Fall Risk Sign visible to staff  - Apply yellow socks and bracelet for high fall risk patients  - Consider moving patient to room near nurses station  Outcome: Progressing     Problem: MOBILITY - ADULT  Goal: Maintain or return to baseline ADL function  Description: INTERVENTIONS:  -  Assess patient's ability to carry out ADLs; assess patient's baseline for ADL function and identify physical deficits which impact ability to perform ADLs (bathing, care of mouth/teeth, toileting, grooming, dressing, etc )  - Assess/evaluate cause of self-care deficits   - Assess range of motion  - Assess patient's mobility; develop plan if impaired  - Assess patient's need for assistive devices and provide as appropriate  - Encourage maximum independence but intervene and supervise when necessary  - Involve family in performance of ADLs  - Assess for home care needs following discharge   - Consider OT consult to assist with ADL evaluation and planning for discharge  - Provide patient education as appropriate  Outcome: Progressing  Goal: Maintains/Returns to pre admission functional level  Description: INTERVENTIONS:  - Perform BMAT or MOVE assessment daily    - Set and communicate daily mobility goal to care team and patient/family/caregiver     - Collaborate with rehabilitation services on mobility goals if consulted  - Out of bed for toileting  - Record patient progress and toleration of activity level   Outcome: Progressing     Problem: Prexisting or High Potential for Compromised Skin Integrity  Goal: Skin integrity is maintained or improved  Description: INTERVENTIONS:  - Identify patients at risk for skin breakdown  - Assess and monitor skin integrity  - Assess and monitor nutrition and hydration status  - Monitor labs   - Assess for incontinence   - Turn and reposition patient  - Assist with mobility/ambulation  - Relieve pressure over bony prominences  - Avoid friction and shearing  - Provide appropriate hygiene as needed including keeping skin clean and dry  - Evaluate need for skin moisturizer/barrier cream  - Collaborate with interdisciplinary team   - Patient/family teaching  - Consider wound care consult   Outcome: Progressing     Problem: CARDIOVASCULAR - ADULT  Goal: Maintains optimal cardiac output and hemodynamic stability  Description: INTERVENTIONS:  - Monitor I/O, vital signs and rhythm  - Monitor for S/S and trends of decreased cardiac output  - Administer and titrate ordered vasoactive medications to optimize hemodynamic stability  - Assess quality of pulses, skin color and temperature  - Assess for signs of decreased coronary artery perfusion  - Instruct patient to report change in severity of symptoms  Outcome: Progressing  Goal: Absence of cardiac dysrhythmias or at baseline rhythm  Description: INTERVENTIONS:  - Continuous cardiac monitoring, vital signs, obtain 12 lead EKG if ordered  - Administer antiarrhythmic and heart rate control medications as ordered  - Monitor electrolytes and administer replacement therapy as ordered  Outcome: Progressing     Problem: GENITOURINARY - ADULT  Goal: Urinary catheter remains patent  Description: INTERVENTIONS:  - Assess patency of urinary catheter  - If patient has a chronic kumari, consider changing catheter if non-functioning  - Follow guidelines for intermittent irrigation of non-functioning urinary catheter  Outcome: Progressing     Problem: METABOLIC, FLUID AND ELECTROLYTES - ADULT  Goal: Electrolytes maintained within normal limits  Description: INTERVENTIONS:  - Monitor labs and assess patient for signs and symptoms of electrolyte imbalances  - Administer electrolyte replacement as ordered  - Monitor response to electrolyte replacements, including repeat lab results as appropriate  - Instruct patient on fluid and nutrition as appropriate  Outcome: Progressing  Goal: Fluid balance maintained  Description: INTERVENTIONS:  - Monitor labs   - Monitor I/O and WT  - Instruct patient on fluid and nutrition as appropriate  - Assess for signs & symptoms of volume excess or deficit  Outcome: Progressing     Problem: INFECTION - ADULT  Goal: Absence or prevention of progression during hospitalization  Description: INTERVENTIONS:  - Assess and monitor for signs and symptoms of infection  - Monitor lab/diagnostic results  - Monitor all insertion sites, i e  indwelling lines, tubes, and drains  - Monitor endotracheal if appropriate and nasal secretions for changes in amount and color  - [place] appropriate cooling/warming therapies per order  - Administer medications as ordered  - Instruct and encourage patient and family to use good hand hygiene technique  - Identify and instruct in appropriate isolation precautions for identified infection/condition  Outcome: Progressing

## 2022-06-14 NOTE — PLAN OF CARE
Problem: Potential for Falls  Goal: Patient will remain free of falls  Description: INTERVENTIONS:  - Educate patient/family on patient safety including physical limitations  - Instruct patient to call for assistance with activity   - Consult OT/PT to assist with strengthening/mobility   - Keep Call bell within reach  - Keep bed low and locked with side rails adjusted as appropriate  - Keep care items and personal belongings within reach  - Initiate and maintain comfort rounds  - Make Fall Risk Sign visible to staff  - Apply yellow socks and bracelet for high fall risk patients  - Consider moving patient to room near nurses station  Outcome: Progressing     Problem: MOBILITY - ADULT  Goal: Maintain or return to baseline ADL function  Description: INTERVENTIONS:  -  Assess patient's ability to carry out ADLs; assess patient's baseline for ADL function and identify physical deficits which impact ability to perform ADLs (bathing, care of mouth/teeth, toileting, grooming, dressing, etc )  - Assess/evaluate cause of self-care deficits   - Assess range of motion  - Assess patient's mobility; develop plan if impaired  - Assess patient's need for assistive devices and provide as appropriate  - Encourage maximum independence but intervene and supervise when necessary  - Involve family in performance of ADLs  - Assess for home care needs following discharge   - Consider OT consult to assist with ADL evaluation and planning for discharge  - Provide patient education as appropriate  Outcome: Progressing  Goal: Maintains/Returns to pre admission functional level  Description: INTERVENTIONS:  - Perform BMAT or MOVE assessment daily    - Set and communicate daily mobility goal to care team and patient/family/caregiver     - Collaborate with rehabilitation services on mobility goals if consulted  - Out of bed for toileting  - Record patient progress and toleration of activity level   Outcome: Progressing     Problem: Prexisting or High Potential for Compromised Skin Integrity  Goal: Skin integrity is maintained or improved  Description: INTERVENTIONS:  - Identify patients at risk for skin breakdown  - Assess and monitor skin integrity  - Assess and monitor nutrition and hydration status  - Monitor labs   - Assess for incontinence   - Turn and reposition patient  - Assist with mobility/ambulation  - Relieve pressure over bony prominences  - Avoid friction and shearing  - Provide appropriate hygiene as needed including keeping skin clean and dry  - Evaluate need for skin moisturizer/barrier cream  - Collaborate with interdisciplinary team   - Patient/family teaching  - Consider wound care consult   Outcome: Progressing     Problem: CARDIOVASCULAR - ADULT  Goal: Maintains optimal cardiac output and hemodynamic stability  Description: INTERVENTIONS:  - Monitor I/O, vital signs and rhythm  - Monitor for S/S and trends of decreased cardiac output  - Administer and titrate ordered vasoactive medications to optimize hemodynamic stability  - Assess quality of pulses, skin color and temperature  - Assess for signs of decreased coronary artery perfusion  - Instruct patient to report change in severity of symptoms  Outcome: Progressing  Goal: Absence of cardiac dysrhythmias or at baseline rhythm  Description: INTERVENTIONS:  - Continuous cardiac monitoring, vital signs, obtain 12 lead EKG if ordered  - Administer antiarrhythmic and heart rate control medications as ordered  - Monitor electrolytes and administer replacement therapy as ordered  Outcome: Progressing     Problem: GENITOURINARY - ADULT  Goal: Urinary catheter remains patent  Description: INTERVENTIONS:  - Assess patency of urinary catheter  - If patient has a chronic kumari, consider changing catheter if non-functioning  - Follow guidelines for intermittent irrigation of non-functioning urinary catheter  Outcome: Progressing     Problem: METABOLIC, FLUID AND ELECTROLYTES - ADULT  Goal: Electrolytes maintained within normal limits  Description: INTERVENTIONS:  - Monitor labs and assess patient for signs and symptoms of electrolyte imbalances  - Administer electrolyte replacement as ordered  - Monitor response to electrolyte replacements, including repeat lab results as appropriate  - Instruct patient on fluid and nutrition as appropriate  Outcome: Progressing  Goal: Fluid balance maintained  Description: INTERVENTIONS:  - Monitor labs   - Monitor I/O and WT  - Instruct patient on fluid and nutrition as appropriate  - Assess for signs & symptoms of volume excess or deficit  Outcome: Progressing     Problem: INFECTION - ADULT  Goal: Absence or prevention of progression during hospitalization  Description: INTERVENTIONS:  - Assess and monitor for signs and symptoms of infection  - Monitor lab/diagnostic results  - Monitor all insertion sites, i e  indwelling lines, tubes, and drains  - Monitor endotracheal if appropriate and nasal secretions for changes in amount and color  - [place] appropriate cooling/warming therapies per order  - Administer medications as ordered  - Instruct and encourage patient and family to use good hand hygiene technique  - Identify and instruct in appropriate isolation precautions for identified infection/condition  Outcome: Progressing

## 2022-06-14 NOTE — ASSESSMENT & PLAN NOTE
Lab Results   Component Value Date    HGBA1C 7 5 (H) 03/28/2022       Recent Labs     06/13/22  2128 06/14/22  0733 06/14/22  1151 06/14/22  1650   POCGLU 184* 226* 104 237*       Blood Sugar Average: Last 72 hrs:  (P) 161 [id number]     - continue glucose checks  - Continue SSI

## 2022-06-14 NOTE — PLAN OF CARE
Post-Dialysis RN Treatment Note    Blood Pressure:  Pre   170/87  mm/Hg  Post    150/70  66     mmHg   EDW   TBD kg    Weight:  Pre    62 7 kg   Post  60 7   kg   Mode of weight measurement:  Bed   Volume Removed    2000  ml    Treatment duration 180 minutes    NS given     Treatment shortened? Covid   Medications given during Rx NA   Estimated Kt/V  NA   Access type: CVC   Access Issues:  NA   Report called to primary nurse   YES Khoa Martell RN          Goal 3 0 Kg challenge with treatment    Problem: METABOLIC, FLUID AND ELECTROLYTES - ADULT  Goal: Electrolytes maintained within normal limits  Description: INTERVENTIONS:  - Monitor labs and assess patient for signs and symptoms of electrolyte imbalances  - Administer electrolyte replacement as ordered  - Monitor response to electrolyte replacements, including repeat lab results as appropriate  - Instruct patient on fluid and nutrition as appropriate  Outcome: Progressing  Goal: Fluid balance maintained  Description: INTERVENTIONS:  - Monitor labs   - Monitor I/O and WT  - Instruct patient on fluid and nutrition as appropriate  - Assess for signs & symptoms of volume excess or deficit  Outcome: Progressing

## 2022-06-14 NOTE — ASSESSMENT & PLAN NOTE
-COVID positive in the ER  - has required 1L NC on and off  - imaging without infiltrates to suggest PNA

## 2022-06-15 ENCOUNTER — HOME HEALTH ADMISSION (OUTPATIENT)
Dept: HOME HEALTH SERVICES | Facility: HOME HEALTHCARE | Age: 67
End: 2022-06-15

## 2022-06-15 ENCOUNTER — APPOINTMENT (INPATIENT)
Dept: RADIOLOGY | Facility: HOSPITAL | Age: 67
DRG: 177 | End: 2022-06-15
Payer: MEDICARE

## 2022-06-15 ENCOUNTER — HOME CARE VISIT (OUTPATIENT)
Dept: HOME HEALTH SERVICES | Facility: HOME HEALTHCARE | Age: 67
End: 2022-06-15

## 2022-06-15 VITALS
HEIGHT: 62 IN | HEART RATE: 71 BPM | OXYGEN SATURATION: 94 % | WEIGHT: 147 LBS | BODY MASS INDEX: 27.05 KG/M2 | SYSTOLIC BLOOD PRESSURE: 167 MMHG | TEMPERATURE: 98.7 F | DIASTOLIC BLOOD PRESSURE: 70 MMHG | RESPIRATION RATE: 18 BRPM

## 2022-06-15 LAB
GLUCOSE SERPL-MCNC: 192 MG/DL (ref 65–140)
GLUCOSE SERPL-MCNC: 308 MG/DL (ref 65–140)

## 2022-06-15 PROCEDURE — 97166 OT EVAL MOD COMPLEX 45 MIN: CPT

## 2022-06-15 PROCEDURE — 99239 HOSP IP/OBS DSCHRG MGMT >30: CPT | Performed by: STUDENT IN AN ORGANIZED HEALTH CARE EDUCATION/TRAINING PROGRAM

## 2022-06-15 PROCEDURE — 82948 REAGENT STRIP/BLOOD GLUCOSE: CPT

## 2022-06-15 PROCEDURE — 71045 X-RAY EXAM CHEST 1 VIEW: CPT

## 2022-06-15 PROCEDURE — 97162 PT EVAL MOD COMPLEX 30 MIN: CPT

## 2022-06-15 PROCEDURE — 94761 N-INVAS EAR/PLS OXIMETRY MLT: CPT

## 2022-06-15 RX ORDER — DOXAZOSIN 2 MG/1
2 TABLET ORAL DAILY
Qty: 30 TABLET | Refills: 0 | Status: SHIPPED | OUTPATIENT
Start: 2022-06-16 | End: 2022-09-14

## 2022-06-15 RX ORDER — CLONIDINE 0.3 MG/24H
0.3 PATCH, EXTENDED RELEASE TRANSDERMAL WEEKLY
Status: DISCONTINUED | OUTPATIENT
Start: 2022-06-15 | End: 2022-06-15 | Stop reason: HOSPADM

## 2022-06-15 RX ORDER — CARVEDILOL 12.5 MG/1
12.5 TABLET ORAL 2 TIMES DAILY WITH MEALS
Qty: 60 TABLET | Refills: 0 | Status: SHIPPED | OUTPATIENT
Start: 2022-06-15 | End: 2022-09-14

## 2022-06-15 RX ORDER — CLONIDINE 0.3 MG/24H
1 PATCH, EXTENDED RELEASE TRANSDERMAL WEEKLY
Qty: 4 PATCH | Refills: 0 | Status: SHIPPED | OUTPATIENT
Start: 2022-06-15 | End: 2022-09-14

## 2022-06-15 RX ADMIN — INSULIN LISPRO 1 UNITS: 100 INJECTION, SOLUTION INTRAVENOUS; SUBCUTANEOUS at 08:00

## 2022-06-15 RX ADMIN — ATORVASTATIN CALCIUM 40 MG: 40 TABLET, FILM COATED ORAL at 09:15

## 2022-06-15 RX ADMIN — CLONIDINE 0.3 MG: 0.3 PATCH TRANSDERMAL at 11:15

## 2022-06-15 RX ADMIN — HYDRALAZINE HYDROCHLORIDE 50 MG: 25 TABLET, FILM COATED ORAL at 05:39

## 2022-06-15 RX ADMIN — CARVEDILOL 12.5 MG: 12.5 TABLET, FILM COATED ORAL at 09:15

## 2022-06-15 RX ADMIN — ASPIRIN 81 MG CHEWABLE TABLET 81 MG: 81 TABLET CHEWABLE at 09:15

## 2022-06-15 RX ADMIN — DOXAZOSIN 2 MG: 2 TABLET ORAL at 09:17

## 2022-06-15 RX ADMIN — HEPARIN SODIUM 5000 UNITS: 5000 INJECTION INTRAVENOUS; SUBCUTANEOUS at 05:39

## 2022-06-15 RX ADMIN — CALCIUM ACETATE 667 MG: 667 CAPSULE ORAL at 07:45

## 2022-06-15 NOTE — PLAN OF CARE
Problem: OCCUPATIONAL THERAPY ADULT  Goal: Performs self-care activities at highest level of function for planned discharge setting  See evaluation for individualized goals  Description: Treatment Interventions: ADL retraining, UE strengthening/ROM, Functional transfer training, Endurance training, Patient/family training, Equipment evaluation/education, Compensatory technique education, Continued evaluation, Activityengagement, Energy conservation          See flowsheet documentation for full assessment, interventions and recommendations  Note: Limitation: Decreased ADL status, Decreased UE strength, Decreased endurance, Decreased self-care trans, Decreased high-level ADLs  Prognosis: Good  Assessment: Pt is a 78 y o  female seen for OT evaluation s/p adm to [address] [facility] [address] on 6/10/2022 w/ Syncope, Hypothermia, and COVID-19  CT Head negative for acute intracranial abnormality  Comorbidities affecting pts functional performance include a significant PMH of CHF, ESRD on hemodialysis, DM, Chronic lacunar infarct, hypoglycemia  Pt with active OT orders and activity orders for Activity as tolerated  Pt lives with son in a multi-level house with 1 BELINDA and FOS to 2nd floor bed/bath  Pt reports son is always home and able to assist as needed  At baseline, pt was I w/ ADLs and functional transfers/mobility w/ use of RW  Pt required assist w/ IADLs  (-)   (+) fall PTA  Upon evaluation, pt currently requires Supervision for UB ADLs, Min A for LB ADLs, Supervision for toileting, Mod I for bed mobility, and Supervision for functional mobility/transfers 2* the following deficits impacting occupational performance: decreased strength, decreased balance and decreased tolerance  These impairments, as well at pts fall risk, steps within home environment, difficulty performing ADLS limit pts ability to safely engage in all baseline areas of occupation   Based on the aforementioned OT evaluation, functional performance deficits, and assessments, pt has been identified as a Moderate complexity evaluation  Pt to continue to benefit from continued acute OT services during hospital stay to address defined deficits and to maximize level of functional independence in the following Occupational Performance areas: bathing/shower, toilet hygiene, dressing, medication management, health maintenance, functional mobility, community mobility and clothing management  From OT standpoint, recommend Home PT upon D/C   OT will continue to follow pt 2-3x/wk to address the following goals to  w/in 10-14 days:     OT Discharge Recommendation: Home with home health rehabilitation  OT - OK to Discharge: Yes (when medically cleared)

## 2022-06-15 NOTE — PLAN OF CARE
Problem: PHYSICAL THERAPY ADULT  Goal: Performs mobility at highest level of function for planned discharge setting  See evaluation for individualized goals  Description: Treatment/Interventions: Functional transfer training, LE strengthening/ROM, Elevations, Therapeutic exercise, Endurance training, Patient/family training, Equipment eval/education, Bed mobility, Gait training, Compensatory technique education, Spoke to nursing, OT, Spoke to case management  Equipment Recommended:  (pt owns RW)       See flowsheet documentation for full assessment, interventions and recommendations  Note: Prognosis: Good  Problem List: Decreased strength, Decreased endurance, Impaired balance, Decreased mobility, Impaired judgement, Decreased safety awareness  Assessment: Pt is a 78 y o  female seen for PT evaluation s/p admission to [address] [place] [address] on 6/10/2022 with Syncope  Order placed for PT services  Upon evaluation: Pt is presenting with impaired functional mobility due to decreased strength, decreased endurance, impaired balance, gait deviations, decreased safety awareness, impaired judgment, and fall risk requiring  mod I assistance for bed mobility and supervision assistance for transfers and ambulation with no AD   Pt's clinical presentation is currently evolving given the functional mobility deficits above coupled with fall risks as indicated by AM-PAC 6-Clicks: 21/34 as well as hx of falls, impaired balance, polypharmacy, impaired judgement, and decreased safety awareness and combined with medical complications of hypertension , abnormal renal lab values, abnormal H&H, and need for input for mobility technique/safety  Pt's PMHx and comorbidities that may affect physical performance and progress include: CHF, CKD, DM, and HTN   Personal factors affecting pt at time of IE include: step(s) to enter environment, multi-level environment, inability to perform IADLs, inability to navigate community distances, recent fall(s)/fall history, and preferred language not [place] (language barrier)  Pt will benefit from continued skilled PT services to address deficits as defined above and to maximize level of functional mobility to facilitate return toward PLOF and improved QOL  From PT/mobility standpoint, recommendation at time of d/c would be Home PT pending progress in order to reduce fall risk and maximize pt's functional independence and consistency with mobility in order to facilitate return to PLOF  Recommend trial with walker next 1-2 sessions and ther ex next 1-2 sessions  Barriers to Discharge: None        PT Discharge Recommendation: Home with home health rehabilitation          See flowsheet documentation for full assessment

## 2022-06-15 NOTE — PHYSICAL THERAPY NOTE
PHYSICAL THERAPY EVALUATION  NAME:  Marycarmen Quintanilla  DATE: 06/15/22    AGE:   78 y o  Mrn:   [id number]  ADMIT DX:  Dizziness [R42]  Chronic anemia [D64 9]  ESRD on hemodialysis ([facility] ) [N18 6, Z99 2]  Hypothermia, initial encounter [T68  XXXA]  COVID-19 [U07 1]    Past Medical History:   Diagnosis Date    CHF (congestive heart failure) ([facility])     CKD (chronic kidney disease) stage 5, GFR less than 15 ml/min ([facility])     Diabetes mellitus ([place] [id number] )     Diabetic nephropathy ([facility] )     Hemodialysis patient ([place] [address] )     via permacath right chest / [facility] [place] Tues/Thurs and Sat    Hyperlipidemia     Hypertension     Muscle weakness     Orthodontics     sleeps with retainer at night    Risk for falls     Uses walker     per dtr active in the house able to cook/light cleaning as able     Length Of Stay: 4  Performed at least 2 patient identifiers during session: Name and Birthday  PHYSICAL THERAPY EVALUATION :        06/15/22 0853   Note Type   Note type Evaluation   Pain Assessment   Pain Assessment Tool 0-10   Pain Score No Pain   Restrictions/Precautions   Other Precautions Airborne/isolation;Contact/isolation; Chair Alarm; Bed Alarm; Fall Risk   Home Living   Type of Home House  (1 BELINDA)   Home Layout Two level;Bed/bath upstairs   Bathroom Shower/Tub Walk-in shower   Bathroom Toilet Standard   Bathroom Equipment Grab bars in shower; Shower chair;Grab bars around toilet   Home Equipment Walker;Cane  (PTA pt used RW for all mobility)   Additional Comments Pt reports living in 2 SH with FF to bed/bath with 1 BELINDA with son who pt reports is always home with her   Prior Function   Level of [place] Independent with ADLs and functional mobility   Lives With Son   Receives Help From Family   ADL Assistance Independent   IADLs Needs assistance   Falls in the last 6 months 1 to 4   Comments Pt reports ambulating with RW at baseline, completes ADLs at mod I, and has assistance for IADLs and transportation   Cognition   Overall Cognitive Status WFL   Orientation Level Oriented X4   Following Commands Follows one step commands without difficulty   RLE Assessment   RLE Assessment WFL  (4/5 strength)   LLE Assessment   LLE Assessment WFL  (4/5 strength)   Bed Mobility   Supine to Sit 6  Modified independent   Additional items HOB elevated; Bedrails   Transfers   Sit to Stand 5  Supervision   Additional items Verbal cues   Stand to Sit 5  Supervision   Additional items Verbal cues   Additional Comments No AD, VC for safety   Ambulation/Elevation   Gait pattern Decreased foot clearance; Wide LESLY; Short stride   Gait Assistance 5  Supervision   Additional items Verbal cues   Assistive Device None   Distance 50' Pt additionally completed standing marches x 12 to simulate stair negotation with no UE support and close supervision for safety   Balance   Static Sitting Normal   Dynamic Sitting Good   Static Standing Fair +   Dynamic Standing Fair   Ambulatory Fair   Endurance Deficit   Endurance Deficit Yes   Endurance Deficit Description fatigue   Activity Tolerance   Activity Tolerance Patient limited by fatigue   Medical Staff Made Aware OTMary   Nurse Made Aware Evonne LABOY   Assessment   Prognosis Good   Problem List Decreased strength;Decreased endurance; Impaired balance;Decreased mobility; Impaired judgement;Decreased safety awareness   Barriers to Discharge None   Goals   Patient Goals Go home today   STG Expiration Date 06/29/22   Plan   Treatment/Interventions Functional transfer training;LE strengthening/ROM; Elevations; Therapeutic exercise; Endurance training;Patient/family training;Equipment eval/education; Bed mobility;Gait training; Compensatory technique education;Spoke to nursing;OT;Spoke to case management   PT Frequency 1-2x/wk   Recommendation   PT Discharge Recommendation Home with home health rehabilitation   Equipment Recommended   (pt owns RW)   AM-PAC Basic Mobility Inpatient   Turning in Bed Without Bedrails 4   Lying on Back to Sitting on Edge of Flat Bed 4   Moving Bed to Chair 3   Standing Up From Chair 3   Walk in Room 3   Climb 3-5 Stairs 3   Basic Mobility Inpatient Raw Score 20   Basic Mobility Standardized Score 43 99   Highest Level Of Mobility   JH-HLM Goal 6: Walk 10 steps or more   JH-HLM Achieved 7: Walk 25 feet or more   End of Consult   Patient Position at End of Consult Bedside chair;Bed/Chair alarm activated; All needs within reach     The patient's AM-PAC Basic Mobility Inpatient Short Form Raw Score is 20    A Raw score of greater than 16 suggests the patient may benefit from discharge to home  Please also refer to the recommendation of the Physical Therapist for safe discharge planning  (Please find full objective findings from PT assessment regarding body systems outlined above)  Assessment: Pt is a 78 y o  female seen for PT evaluation s/p admission to [address] [place] [address] on 6/10/2022 with Syncope  Order placed for PT services  Upon evaluation: Pt is presenting with impaired functional mobility due to decreased strength, decreased endurance, impaired balance, gait deviations, decreased safety awareness, impaired judgment, and fall risk requiring  mod I assistance for bed mobility and supervision assistance for transfers and ambulation with no AD   Pt's clinical presentation is currently evolving given the functional mobility deficits above coupled with fall risks as indicated by AM-PAC 6-Clicks: 42/15 as well as hx of falls, impaired balance, polypharmacy, impaired judgement, and decreased safety awareness and combined with medical complications of hypertension , abnormal renal lab values, abnormal H&H, and need for input for mobility technique/safety  Pt's PMHx and comorbidities that may affect physical performance and progress include: CHF, CKD, DM, and HTN   Personal factors affecting pt at time of IE include: step(s) to enter environment, multi-level environment, inability to perform IADLs, inability to navigate community distances, recent fall(s)/fall history, and preferred language not [place] (language barrier)  Pt will benefit from continued skilled PT services to address deficits as defined above and to maximize level of functional mobility to facilitate return toward PLOF and improved QOL  From PT/mobility standpoint, recommendation at time of d/c would be Home PT pending progress in order to reduce fall risk and maximize pt's functional independence and consistency with mobility in order to facilitate return to PLOF  Recommend trial with walker next 1-2 sessions and ther ex next 1-2 sessions  Goals: Pt will: Perform transfers with modified I to increase Indep in home environment and prepare for ambulation  Pt will ambulate with LRAD for >/= 150' with  modified I  to improve gait quality and promote proper use of assistive device and to access home environment  Pt will complete >/= 12 steps with with unilateral handrail with modified I to return to home with BELINDA and return to multilevel home  Increase bilateral LE strength 1/2 grade to facilitate independent mobility and Increase all balance 1/2 grade to decrease risk for falls          Connie Samayoa, PT,DPT

## 2022-06-15 NOTE — CASE COMMUNICATION
PC to pt to schedule home PT start of care for 06/16/22 Thursday  Pt reports having dialysis on Thursday and unable to see therapist on Thursday

## 2022-06-15 NOTE — ASSESSMENT & PLAN NOTE
-COVID positive in the ER  - imaging without infiltrates to suggest PNA  Patient on room air, home oxygen evaluation showed that patient does not qualify

## 2022-06-15 NOTE — RESPIRATORY THERAPY NOTE
Home Oxygen Qualifying Test     Patient name: Michael Nava        : 1955   Date of Test:  Nahomy 15, 2022  Diagnosis:    Home Oxygen Test:    **Medicare Guidelines require item(s) 1-5 on all ambulatory patients or 1 and 2 on non-ambulatory patients  1  Baseline SPO2 on Room Air at rest 95%   a  If <= 88% on Room Air add O2 via NC to obtain SpO2 >=88%  If LPM needed, document LPM  needed to reach =>88%    2  SPO2 during exertion on Room Air 94%  a  During exertion monitor SPO2  If SPO2 increases >=89%, do not add supplemental oxygen    3  SPO2 on Oxygen at Rest N/A    4  SPO2 during exertion on Oxygen N/A    5  Test performed during exertion activity  Patient ambulated in room for approximately 6 minutes without difficulty  []  Supplemental Home Oxygen is indicated  [x]  Client does not qualify for home oxygen  Respiratory Additional Notes- Patient ambulated in room        [facility],[address], RT (decreased problem solving noted)           ADL  Feeding: Setup (for opening breakfast containers)  Grooming: Contact guard assistance (for balance while standing at sink for brushing teeth, min vcs for sequencing task) stood at sink x 4 min   UE Bathing: Stand by assistance (vcs for completeness of task)  LE Bathing: Minimal assistance (except BLE)  UE Dressing: Moderate assistance (donned hospital gown)  LE Dressing: Maximum assistance (for threading BLE into undergarment)  Toileting: Minimal assistance               Transfers  Sit to stand: Contact guard assistance  Stand to sit: Contact guard assistance  Toilet Transfers  Toilet - Technique: Ambulating  Equipment Used: Standard toilet  Toilet Transfer: Contact guard assistance    Balance  Sitting Balance: Stand by assistance  Standing Balance: Contact guard assistance           Functional Mobility  Functional - Mobility Device: Rolling Walker  Activity: To/from bathroom  Assist Level: Contact guard assistance  Functional Mobility Comments: unsteadiness noted, mod vcs for walker safety             Activity Tolerance:  Activity Tolerance: Patient limited by fatigue;Patient Tolerated treatment well (increasing tremors with fatigue noted)    Assessment:     Performance deficits / Impairments: Decreased functional mobility , Decreased ADL status, Decreased strength, Decreased safe awareness, Decreased balance, Decreased endurance  Prognosis: Good  Discharge Recommendations: Continue to assess pending progress, Patient would benefit from continued therapy after discharge    Patient Education:  Patient Education: role of OT, poc/goals, safety, t/f training, ADLs  Barriers to Learning: cognition? Equipment Recommendations: Other: cont to monitor    Safety:  Safety Devices in place: Yes  Type of devices:  All fall risk precautions in place, Gait belt, Call light within reach    Plan:  Times per week: 5x  Current Treatment Recommendations: Strengthening, Balance

## 2022-06-15 NOTE — CASE MANAGEMENT
Case Management Progress Note    Patient name [facility]  Location [facility] 2 /South 2 Preet Locke* MRN [id number]  : 1955 Date 6/15/2022       LOS (days): 4  Geometric Mean LOS (GMLOS) (days): 5 40  Days to GMLOS:0 9        OBJECTIVE:        Current admission status: Inpatient  Preferred Pharmacy:   [address] [place] [address],[address], [address]  [address] [address]  Phone: [phone] Fax: [phone]    Primary Care Provider: Mani Beckford MD    Primary Insurance: MEDICARE  Secondary Insurance: [address] [place] [address]    PROGRESS NOTE: Wade Fallon at [facility] [phone] has spoken with patient and made arrangements with Luisito to  patient for dialysis treatment tomorrow at 1115 am

## 2022-06-15 NOTE — DISCHARGE SUMMARY
[facility]  Discharge- [facility] 1955, 78 y o  female MRN: [id number]  Unit/Bed#: [place] [address] 206-02 Encounter: [id number]  Primary Care Provider: Ada Cordoba MD   Date and time admitted to hospital: 6/10/2022 10:05 PM    * Syncope  Assessment & Plan  Reports watching TV earlier in the day and then having an episode of syncope with loss of consciousness for unknown duration as it was unwitnessed   - CT head negative for acute intracranial abnormality  - no focal neurologic deficits on exam  - Possibly in setting of COVID infection and hypothermia, possible hypoglycemia  - Prior MRI brain in December with chronic lacunar infarcts  PT and OT evaluation recommend HHPT  Discharged home    [address] positive in the ER  - imaging without infiltrates to suggest PNA  Patient on room air, home oxygen evaluation showed that patient does not qualify    ESRD (end stage renal disease) on dialysis [facility])  Assessment & Plan  Lab Results   Component Value Date    EGFR 6 06/14/2022    EGFR 11 06/12/2022    EGFR 14 06/11/2022    CREATININE 5 92 (H) 06/14/2022    CREATININE 3 80 (H) 06/12/2022    CREATININE 3 10 (H) 06/11/2022     On hemodialysis every Monday, Wednesday and Friday  Nephrology following    Acute on chronic anemia  Assessment & Plan  Likely in setting of ESRD  - H/H stable currently s/p 1 unit pRBC    Positive D dimer  Assessment & Plan  Elevated d-dimer of 2 on admission, with presentation of syncope and collapse  2D echo shows new pulmonary hypertension when compared to previous  CT PE neg for PE  Doppler lower extremity duplex neg for DVT  Suspect likely due to COVID infection    Type 2 diabetes mellitus with chronic kidney disease on chronic dialysis, with long-term current use of insulin [facility])  Assessment & Plan  Lab Results   Component Value Date    HGBA1C 7 5 (H) 03/28/2022       Recent Labs     06/14/22  1650 06/14/22  2105 06/15/22  0750 06/15/22  1129   POCGLU 237* 180* 192* 308*       Blood Sugar Average: Last 72 hrs:  (P) 217 4375     On discharge recommend discontinuing insulin, continue Tradjenta  Monitor blood glucose and follow-up with endocrinologist outpatient        Discharging Physician / Practitioner: Maximino Morales MD  PCP: Arlyn Mattson MD  Admission Date:   Admission Orders (From admission, onward)     Ordered        06/11/22 0009  INPATIENT ADMISSION  Once                      Discharge Date: 06/15/22    Medical Problems             Resolved Problems  Date Reviewed: 6/15/2022          Resolved    Hypothermia 6/12/2022     Resolved by  Maximino Morales MD                Consultations During Hospital Stay:  · Nephrology    Procedures Performed:   · none    Significant Findings / Test Results:   XR chest portable    Result Date: 6/15/2022  Impression: Cardiomegaly  Improving CHF  Workstation performed: [id number]     XR chest portable    Result Date: 6/13/2022  Impression: CHF with mild pulmonary edema  Workstation performed: [id number]     XR chest portable - 1 view    Result Date: 6/11/2022  Impression: Mild to moderate pulmonary edema suspected  Superimposed multifocal pneumonia/pneumonitis difficult to exclude  Workstation performed: [id number]     XR abdomen 1 view kub    Result Date: 6/13/2022  Impression: Prominent stool within the rectal vault  No bowel obstruction  Workstation performed: [id number]     CT head without contrast    Result Date: 6/10/2022  Impression: No acute intracranial abnormality  Workstation performed: [id number]     CTA chest pe study    Result Date: 6/14/2022  · Impression: No pulmonary embolus  Mild interstitial edema with small effusions  Small pericardial effusion    Workstation performed: [id number]   ·     Incidental Findings:   · none     Test Results Pending at Discharge (will require follow up):   · none     Outpatient Tests Requested:  · none    Complications:  none    Reason for Admission: Syncope    Hospital Course:     Mc Adame is a 78 y o  female patient who originally presented to the hospital on 6/10/2022 due to syncope  Patient was reported found with loss of consciousness, found to be COVID positive  CT head without any acute processes  Elevated D-dimer, CT PE negative for PE, lower extremity Doppler without any DVTs  Suspect a patient's symptoms may be due to COVID, patient did briefly require 1-2 L, home O2 eval she had patient does not need oxygen on discharge  Unclear of etiology of patient's syncopal episode, but patient was monitored over several days without any complaints of shortness of breath, chest pains  2D echo showed no significant valvular abnormalities  PT and OT recommended home health and physical therapy  Patient was discharged home  Patient did require 1 unit of PRBCs transfused, suspect likely chronic anemia in the setting of ESRD  Please see above list of diagnoses and related plan for additional information  Condition at Discharge: fair     Discharge Day Visit / Exam:     Subjective:  Patient reports doing well  Slept well, tolerated diet  Denies any CP, SOB or nausea/vomiting  Ambulated with PT and agreeable to home with home health and PT  Vitals: Blood Pressure: 167/70 (06/15/22 0740)  Pulse: 71 (06/15/22 0740)  Temperature: 98 7 °F (37 1 °C) (06/15/22 0740)  Temp Source: Oral (06/15/22 0740)  Respirations: 18 (06/15/22 0740)  Height: 5' 2" (157 5 cm) (06/12/22 1402)  Weight - Scale: 66 7 kg (147 lb) (06/12/22 1402)  SpO2: 94 % (06/15/22 0740)  Exam:   Physical Exam  Vitals and nursing note reviewed  Constitutional:       Appearance: Normal appearance  She is normal weight  HENT:      Head: Normocephalic and atraumatic  Eyes:      General: No scleral icterus  Conjunctiva/sclera: Conjunctivae normal    Cardiovascular:      Rate and Rhythm: Normal rate and regular rhythm  Pulmonary:      Breath sounds:  No wheezing or rhonchi  Abdominal:      General: Bowel sounds are normal  There is no distension  Palpations: Abdomen is soft  Musculoskeletal:         General: No swelling  Right lower leg: No edema  Left lower leg: No edema  Skin:     General: Skin is warm and dry  Neurological:      General: No focal deficit present  Mental Status: She is alert  Mental status is at baseline  Psychiatric:         Mood and Affect: Mood normal        Discussion with Family: patient, son on phone    Discharge instructions/Information to patient and family:   See after visit summary for information provided to patient and family  Provisions for Follow-Up Care:  See after visit summary for information related to follow-up care and any pertinent home health orders  Disposition:     Home      Planned Readmission: none     Discharge Statement:  I spent 35 minutes discharging the patient  This time was spent on the day of discharge  I had direct contact with the patient on the day of discharge  Greater than 50% of the total time was spent examining patient, answering all patient questions, arranging and discussing plan of care with patient as well as directly providing post-discharge instructions  Additional time then spent on discharge activities  Discharge Medications:  See after visit summary for reconciled discharge medications provided to patient and family        ** Please Note: This note has been constructed using a voice recognition system **

## 2022-06-15 NOTE — ASSESSMENT & PLAN NOTE
Lab Results   Component Value Date    EGFR 6 06/14/2022    EGFR 11 06/12/2022    EGFR 14 06/11/2022    CREATININE 5 92 (H) 06/14/2022    CREATININE 3 80 (H) 06/12/2022    CREATININE 3 10 (H) 06/11/2022     On hemodialysis every Monday, Wednesday and Friday  Nephrology following

## 2022-06-15 NOTE — PLAN OF CARE
Problem: Potential for Falls  Goal: Patient will remain free of falls  Description: INTERVENTIONS:  - Educate patient/family on patient safety including physical limitations  - Instruct patient to call for assistance with activity   - Consult OT/PT to assist with strengthening/mobility   - Keep Call bell within reach  - Keep bed low and locked with side rails adjusted as appropriate  - Keep care items and personal belongings within reach  - Initiate and maintain comfort rounds  - Make Fall Risk Sign visible to staff  - Offer Toileting every 2 Hours, in advance of need  - Initiate/Maintain bed alarm  - Obtain necessary fall risk management equipment:   - Apply yellow socks and bracelet for high fall risk patients  - Consider moving patient to room near nurses station  Outcome: Progressing     Problem: MOBILITY - ADULT  Goal: Maintain or return to baseline ADL function  Description: INTERVENTIONS:  -  Assess patient's ability to carry out ADLs; assess patient's baseline for ADL function and identify physical deficits which impact ability to perform ADLs (bathing, care of mouth/teeth, toileting, grooming, dressing, etc )  - Assess/evaluate cause of self-care deficits   - Assess range of motion  - Assess patient's mobility; develop plan if impaired  - Assess patient's need for assistive devices and provide as appropriate  - Encourage maximum independence but intervene and supervise when necessary  - Involve family in performance of ADLs  - Assess for home care needs following discharge   - Consider OT consult to assist with ADL evaluation and planning for discharge  - Provide patient education as appropriate  Outcome: Progressing  Goal: Maintains/Returns to pre admission functional level  Description: INTERVENTIONS:  - Perform BMAT or MOVE assessment daily    - Set and communicate daily mobility goal to care team and patient/family/caregiver     - Collaborate with rehabilitation services on mobility goals if consulted  - Perform Range of Motion 3 times a day  - Reposition patient every 2 hours    - Dangle patient 3 times a day  - Stand patient 3 times a day  - Ambulate patient 3 times a day  - Out of bed to chair 3 times a day   - Out of bed for meals 3 times a day  - Out of bed for toileting  - Record patient progress and toleration of activity level   Outcome: Progressing     Problem: Prexisting or High Potential for Compromised Skin Integrity  Goal: Skin integrity is maintained or improved  Description: INTERVENTIONS:  - Identify patients at risk for skin breakdown  - Assess and monitor skin integrity  - Assess and monitor nutrition and hydration status  - Monitor labs   - Assess for incontinence   - Turn and reposition patient  - Assist with mobility/ambulation  - Relieve pressure over bony prominences  - Avoid friction and shearing  - Provide appropriate hygiene as needed including keeping skin clean and dry  - Evaluate need for skin moisturizer/barrier cream  - Collaborate with interdisciplinary team   - Patient/family teaching  - Consider wound care consult   Outcome: Progressing     Problem: CARDIOVASCULAR - ADULT  Goal: Maintains optimal cardiac output and hemodynamic stability  Description: INTERVENTIONS:  - Monitor I/O, vital signs and rhythm  - Monitor for S/S and trends of decreased cardiac output  - Administer and titrate ordered vasoactive medications to optimize hemodynamic stability  - Assess quality of pulses, skin color and temperature  - Assess for signs of decreased coronary artery perfusion  - Instruct patient to report change in severity of symptoms  Outcome: Progressing  Goal: Absence of cardiac dysrhythmias or at baseline rhythm  Description: INTERVENTIONS:  - Continuous cardiac monitoring, vital signs, obtain 12 lead EKG if ordered  - Administer antiarrhythmic and heart rate control medications as ordered  - Monitor electrolytes and administer replacement therapy as ordered  Outcome: Progressing

## 2022-06-15 NOTE — CASE MANAGEMENT
Case Management Discharge Planning Note    Patient name [facility]  Location [facility] 68 2 /South 2 Christina Otto* MRN [id number]  : 1955 Date 6/15/2022       Current Admission Date: 6/10/2022  Current Admission Diagnosis:Syncope   Patient Active Problem List    Diagnosis Date Noted    Syncope 2022    COVID 2022    Diarrhea 2022    Preoperative clearance 2022    Acute metabolic encephalopathy due to hypoglycemia 2021    Cerebral infarction, chronic 12/10/2021    ESRD (end stage renal disease) on dialysis ([facility] ) 2021    Bilateral hip pain 2021    Generalized abdominal pain 10/17/2021    Heart murmur 2021    Abnormal findings on diagnostic imaging of abdomen 2021    Biclonal gammopathy 08/10/2021    Acute on chronic anemia 2021    Nephrotic range proteinuria 2021    Asymptomatic hypertensive urgency 2021    Vitamin D deficiency 2021    Angioedema 2020    Influenza vaccination declined 2020    Refused diphtheria-tetanus vaccine 2020    Other constipation 2020    Encounter for screening mammogram for breast cancer 2020    Encounter for screening colonoscopy 2020    Chronic diastolic heart failure ([facility] )     Systolic murmur     Hyperlipemia 10/17/2019    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 ([facility] ) 10/17/2019    Type 2 diabetes mellitus with chronic kidney disease on chronic dialysis, with long-term current use of insulin ([facility] ) 2015    Essential (primary) hypertension 2015    Positive D dimer 2015      LOS (days): 4  Geometric Mean LOS (GMLOS) (days): 5 40  Days to GMLOS:0 9     OBJECTIVE:  Risk of Unplanned Readmission Score: 28 6         Current admission status: Inpatient   Preferred Pharmacy:   [address] [place] [address],[address], [place] - [address] [facility]  Phone: [phone] Fax: [phone]    Primary Care Provider: Shahzad Aguilar MD    Primary Insurance: MEDICARE  Secondary Insurance: [address] [place] [address]    DISCHARGE DETAILS:    Discharge planning discussed with[de-identified] stalin Lindsey of Choice: Yes  Comments - Freedom of Choice: No preference for [facility] home PT   [facility] referral made per Zeny Schultz                               Other Referral/Resources/Interventions Provided:  Interventions: Outpatient PT  Referral Comments: [facility] for PT         Treatment Team Recommendation: Home  Discharge Destination Plan[de-identified]  (With PT)

## 2022-06-15 NOTE — OCCUPATIONAL THERAPY NOTE
Occupational Therapy Evaluation     Patient Name: Henrene Najjar  PGKML'T Date: 6/15/2022  Problem List  Principal Problem:    Syncope  Active Problems:    Type 2 diabetes mellitus with chronic kidney disease on chronic dialysis, with long-term current use of insulin ([facility])    Essential (primary) hypertension    Positive D dimer    Acute on chronic anemia    ESRD (end stage renal disease) on dialysis ([facility] )    COVID    Past Medical History  Past Medical History:   Diagnosis Date    CHF (congestive heart failure) ([facility])     CKD (chronic kidney disease) stage 5, GFR less than 15 ml/min ([facility])     Diabetes mellitus ([facility] )     Diabetic nephropathy ([facility] )     Hemodialysis patient ([place] [address] )     via permacath right chest / [facility] Tues/Thurs and Sat    Hyperlipidemia     Hypertension     Muscle weakness     Orthodontics     sleeps with retainer at night    Risk for falls     Uses walker     per dtr active in the house able to cook/light cleaning as able     Past Surgical History  Past Surgical History:   Procedure Laterality Date    CATARACT EXTRACTION Bilateral     EGD      IR AV FISTULAGRAM/GRAFTOGRAM  5/9/2022    IR BIOPSY BONE MARROW  9/15/2021    IR BIOPSY KIDNEY RANDOM  9/15/2021    IR NON-TUNNELED CENTRAL LINE PLACEMENT  12/7/2021    IR TUNNELED CENTRAL LINE CHECK/CHANGE/REPOSITION  4/1/2022    IR TUNNELED DIALYSIS CATHETER PLACEMENT  12/9/2021    AZ ANASTOMOSIS,AV,ANY SITE Left 2/16/2022    Procedure: CREATION FISTULA ARTERIOVENOUS (AV); Surgeon: Raymond Douglass DO;  Location: [facility];  Service: Vascular           06/15/22 0852   OT Last Visit   OT Visit Date 06/15/22   Note Type   Note type Evaluation   Restrictions/Precautions   Weight Bearing Precautions Per Order No   Other Precautions Contact/isolation; Airborne/isolation; Bed Alarm; Chair Alarm; Fall Risk   Pain Assessment   Pain Assessment Tool 0-10   Pain Score No Pain   Home Living   Type of Home House   Home Layout Multi-level;Bed/bath upstairs  (1 BELINDA) Bathroom Shower/Tub Walk-in shower   Bathroom Toilet Standard   Bathroom Equipment Grab bars in shower; Shower chair;Grab bars around toilet   [address] Walker;Cane;Quad cane   Additional Comments Pt lives with son in a multi-level house with 1 BELINDA and FOS to 2nd floor bed/bath  Pt reports son is always home and able to assist as needed  Prior Function   Level of S[place] Independent with ADLs and functional mobility   Lives With Son   Receives Help From Family   ADL Assistance Independent   IADLs Needs assistance   Falls in the last 6 months 1 to 4   Vocational Retired   Comments At baseline, pt was I w/ ADLs and functional transfers/mobility w/ use of RW  Pt required assist w/ IADLs  (-)   (+) fall PTA  Lifestyle   Autonomy At baseline, pt was I w/ ADLs and functional transfers/mobility w/ use of RW  Pt required assist w/ IADLs  (-)   (+) fall PTA  Reciprocal Relationships Son   Service to Others Retired   Intrinsic Gratification Watching TV   Psychosocial   Psychosocial (WDL) WDL   ADL   Where Assessed Chair   Eating Assistance 7  [facility] 5  [address] [facility] 5  Supervision/Setup   LB [address]  [address] 5  Supervision/Setup    [facility] 4  [address] [facility]  5  [address] [facility] 5  Supervision/Setup   Bed Mobility   Supine to Sit 6  Modified independent   Additional items HOB elevated; Bedrails   Sit to Supine Unable to assess   Additional Comments Pt seated OOB in chair with chair alarm activated at end of session  Call bell and phone within reach  All needs met and pt reports no further questions for OT at this time     Transfers   Sit to Stand 5  Supervision   Additional items Verbal cues   Stand to Sit 5  Supervision   Additional items Verbal cues   Functional Mobility   Functional Mobility 5 Supervision   Additional Comments Assist x1 w/o use of AD  No overt LOBs   Balance   Static Sitting Normal   Dynamic Sitting Good   Static Standing Fair +   Dynamic Standing Fair   Ambulatory Fair -   Activity Tolerance   Activity Tolerance Patient limited by fatigue   Medical Staff Made Rayne Freeman, PT   Nurse Made Aware yes; aCrey Obrien, RN   RUE Assessment   RUE Assessment WFL   RUE Strength   RUE Overall Strength Within Functional Limits - able to perform ADL tasks with strength  (4-/5 throughout)   LUE Assessment   LUE Assessment WFL   LUE Strength   LUE Overall Strength Within Functional Limits - able to perform ADL tasks with strength  (4-/5 throughout)   Hand Function   Gross Motor Coordination Functional   Fine Motor Coordination Functional   Sensation   Light Touch No apparent deficits   Proprioception   Proprioception No apparent deficits   Vision-Basic Assessment   Current Vision Wears glasses only for reading   Vision - Complex Assessment   Ocular Range of Motion [facility]   Acuity Able to read clock/calendar on wall without difficulty; Able to read employee name badge without difficulty   Perception   Inattention/Neglect Appears intact   Cognition   Overall Cognitive Status [facility]   Arousal/Participation Alert; Cooperative   Attention Within functional limits   Orientation Level Oriented to person;Oriented to place;Oriented to time   Memory Within functional limits   Following Commands Follows one step commands with increased time or repetition   Comments Pt Arabic speaking only   Assessment   Limitation Decreased ADL status; Decreased UE strength;Decreased endurance;Decreased self-care trans;Decreased high-level ADLs   Prognosis Good   Assessment Pt is a 78 y o  female seen for OT evaluation s/p adm to [facility] on 6/10/2022 w/ Syncope, Hypothermia, and COVID-19  CT Head negative for acute intracranial abnormality   Comorbidities affecting pts functional performance include a significant PMH of CHF, ESRD on hemodialysis, DM, Chronic lacunar infarct, hypoglycemia  Pt with active OT orders and activity orders for Activity as tolerated  Pt lives with son in a multi-level house with 1 BELINDA and FOS to 2nd floor bed/bath  Pt reports son is always home and able to assist as needed  At baseline, pt was I w/ ADLs and functional transfers/mobility w/ use of RW  Pt required assist w/ IADLs  (-)   (+) fall PTA  Upon evaluation, pt currently requires Supervision for UB ADLs, Min A for LB ADLs, Supervision for toileting, Mod I for bed mobility, and Supervision for functional mobility/transfers 2* the following deficits impacting occupational performance: decreased strength, decreased balance and decreased tolerance  These impairments, as well at pts fall risk, steps within home environment, difficulty performing ADLS limit pts ability to safely engage in all baseline areas of occupation  Based on the aforementioned OT evaluation, functional performance deficits, and assessments, pt has been identified as a Moderate complexity evaluation  Pt to continue to benefit from continued acute OT services during hospital stay to address defined deficits and to maximize level of functional independence in the following Occupational Performance areas: bathing/shower, toilet hygiene, dressing, medication management, health maintenance, functional mobility, community mobility and clothing management  From OT standpoint, recommend Home PT upon D/C  OT will continue to follow pt 2-3x/wk to address the following goals to  w/in 10-14 days:   Goals   Patient Goals To go home today   LTG Time Frame 10-14   Long Term Goal Please refer to LTGs listed below   Plan   Treatment Interventions ADL retraining;UE strengthening/ROM; Functional transfer training; Endurance training;Patient/family training;Equipment evaluation/education; Compensatory technique education;Continued evaluation; Activityengagement; Energy conservation   Goal Expiration Date 06/29/22   OT Treatment Day 0   OT Frequency 2-3x/wk   Recommendation   OT Discharge Recommendation Home with home health rehabilitation   OT - OK to Discharge Yes  (when medically cleared)   Additional Comments  The patient's raw score on the AM-PAC Daily Activity inpatient short form is 20, standardized score is 42 03, greater than 39 4  Patients at this level are likely to benefit from discharge to home  Please refer to the recommendation of the Occupational Therapist for safe discharge planning     AM-PAC Daily Activity Inpatient   Lower Body Dressing 3   Bathing 3   Toileting 3   Upper Body Dressing 3   Grooming 4   Eating 4   Daily Activity Raw Score 20   Daily Activity Standardized Score (Calc for Raw Score >=11) 42 03   AM-[facility] Applied Cognition Inpatient   Following a Speech/Presentation 4   Understanding Ordinary Conversation 4   Taking Medications 3   Remembering Where Things Are Placed or Put Away 4   Remembering List of 4-5 Errands 4   Taking Care of Complicated Tasks 3   Applied Cognition Raw Score 22   Applied Cognition Standardized Score 47 83       GOALS    Pt will improve activity tolerance to G for min 30 min txment sessions for increase engagement in functional tasks    Pt will complete bed mobility at a Mod I level w/ G balance/safety demonstrated to decrease caregiver assistance required     Pt will complete UB dressing/self care w/ mod I using adaptive device and DME as needed     Pt will complete LB dressing/self care w/ mod I using adaptive device and DME as needed    Pt will complete toileting w/ mod I w/ G hygiene/thoroughness using DME as needed    Pt will improve functional transfers to Mod I on/off all surfaces using DME as needed w/ G balance/safety     Pt will improve functional mobility during ADL/IADL/leisure tasks to Mod I using DME as needed w/ G balance/safety     Pt will be attentive 100% of the time during ongoing cognitive assessment w/ G participation to assist w/ safe d/c planning/recommendations    Pt will demonstrate G carryover of pt/caregiver education and training as appropriate w/o cues w/ good tolerance to increase safety during functional tasks    Pt will increase BUE strength by 1MM grade via AROM exercises to increase independence in ADLs and transfers    Pt will verbalize 3 potential fall hazards and identify appropriate compensatory techniques to decrease fall risk in home environment     Pt will increase standing tolerance to 10-15 mins with Fair+ dynamic standing balance to increase safety during participation in ADLs       Sena Russell, OTR/L

## 2022-06-15 NOTE — ASSESSMENT & PLAN NOTE
Reports watching TV earlier in the day and then having an episode of syncope with loss of consciousness for unknown duration as it was unwitnessed   - CT head negative for acute intracranial abnormality  - no focal neurologic deficits on exam  - Possibly in setting of COVID infection and hypothermia, possible hypoglycemia  - Prior MRI brain in December with chronic lacunar infarcts  PT and OT evaluation recommend HHPT  Discharged home

## 2022-06-15 NOTE — ASSESSMENT & PLAN NOTE
Lab Results   Component Value Date    HGBA1C 7 5 (H) 03/28/2022       Recent Labs     06/14/22  1650 06/14/22  2105 06/15/22  0750 06/15/22  1129   POCGLU 237* 180* 192* 308*       Blood Sugar Average: Last 72 hrs:  (P) 217 4375     On discharge recommend discontinuing insulin, continue Tradjenta  Monitor blood glucose and follow-up with endocrinologist outpatient

## 2022-06-15 NOTE — PLAN OF CARE
Problem: Potential for Falls  Goal: Patient will remain free of falls  Description: INTERVENTIONS:  - Educate patient/family on patient safety including physical limitations  - Instruct patient to call for assistance with activity   - Consult OT/PT to assist with strengthening/mobility   - Keep Call bell within reach  - Keep bed low and locked with side rails adjusted as appropriate  - Keep care items and personal belongings within reach  - Initiate and maintain comfort rounds  - Make Fall Risk Sign visible to staff  - Offer Toileting every 2 Hours, in advance of need  - Initiate/Maintain bed alarm  - Obtain necessary fall risk management equipment:   - Apply yellow socks and bracelet for high fall risk patients  - Consider moving patient to room near nurses station  Outcome: Progressing     Problem: MOBILITY - ADULT  Goal: Maintain or return to baseline ADL function  Description: INTERVENTIONS:  -  Assess patient's ability to carry out ADLs; assess patient's baseline for ADL function and identify physical deficits which impact ability to perform ADLs (bathing, care of mouth/teeth, toileting, grooming, dressing, etc )  - Assess/evaluate cause of self-care deficits   - Assess range of motion  - Assess patient's mobility; develop plan if impaired  - Assess patient's need for assistive devices and provide as appropriate  - Encourage maximum independence but intervene and supervise when necessary  - Involve family in performance of ADLs  - Assess for home care needs following discharge   - Consider OT consult to assist with ADL evaluation and planning for discharge  - Provide patient education as appropriate  Outcome: Progressing  Goal: Maintains/Returns to pre admission functional level  Description: INTERVENTIONS:  - Perform BMAT or MOVE assessment daily    - Set and communicate daily mobility goal to care team and patient/family/caregiver     - Collaborate with rehabilitation services on mobility goals if consulted  - Perform Range of Motion 3 times a day  - Reposition patient every 2 hours    - Dangle patient 3 times a day  - Stand patient 3 times a day  - Ambulate patient 3 times a day  - Out of bed to chair 3 times a day   - Out of bed for meals 3 times a day  - Out of bed for toileting  - Record patient progress and toleration of activity level   Outcome: Progressing     Problem: Prexisting or High Potential for Compromised Skin Integrity  Goal: Skin integrity is maintained or improved  Description: INTERVENTIONS:  - Identify patients at risk for skin breakdown  - Assess and monitor skin integrity  - Assess and monitor nutrition and hydration status  - Monitor labs   - Assess for incontinence   - Turn and reposition patient  - Assist with mobility/ambulation  - Relieve pressure over bony prominences  - Avoid friction and shearing  - Provide appropriate hygiene as needed including keeping skin clean and dry  - Evaluate need for skin moisturizer/barrier cream  - Collaborate with interdisciplinary team   - Patient/family teaching  - Consider wound care consult   Outcome: Progressing     Problem: CARDIOVASCULAR - ADULT  Goal: Maintains optimal cardiac output and hemodynamic stability  Description: INTERVENTIONS:  - Monitor I/O, vital signs and rhythm  - Monitor for S/S and trends of decreased cardiac output  - Administer and titrate ordered vasoactive medications to optimize hemodynamic stability  - Assess quality of pulses, skin color and temperature  - Assess for signs of decreased coronary artery perfusion  - Instruct patient to report change in severity of symptoms  Outcome: Progressing  Goal: Absence of cardiac dysrhythmias or at baseline rhythm  Description: INTERVENTIONS:  - Continuous cardiac monitoring, vital signs, obtain 12 lead EKG if ordered  - Administer antiarrhythmic and heart rate control medications as ordered  - Monitor electrolytes and administer replacement therapy as ordered  Outcome: Progressing     Problem: GENITOURINARY - ADULT  Goal: Urinary catheter remains patent  Description: INTERVENTIONS:  - Assess patency of urinary catheter  - If patient has a chronic kumari, consider changing catheter if non-functioning  - Follow guidelines for intermittent irrigation of non-functioning urinary catheter  Outcome: Progressing     Problem: METABOLIC, FLUID AND ELECTROLYTES - ADULT  Goal: Electrolytes maintained within normal limits  Description: INTERVENTIONS:  - Monitor labs and assess patient for signs and symptoms of electrolyte imbalances  - Administer electrolyte replacement as ordered  - Monitor response to electrolyte replacements, including repeat lab results as appropriate  - Instruct patient on fluid and nutrition as appropriate  Outcome: Progressing  Goal: Fluid balance maintained  Description: INTERVENTIONS:  - Monitor labs   - Monitor I/O and WT  - Instruct patient on fluid and nutrition as appropriate  - Assess for signs & symptoms of volume excess or deficit  Outcome: Progressing     Problem: INFECTION - ADULT  Goal: Absence or prevention of progression during hospitalization  Description: INTERVENTIONS:  - Assess and monitor for signs and symptoms of infection  - Monitor lab/diagnostic results  - Monitor all insertion sites, i e  indwelling lines, tubes, and drains  - Monitor endotracheal if appropriate and nasal secretions for changes in amount and color  - Lapaz appropriate cooling/warming therapies per order  - Administer medications as ordered  - Instruct and encourage patient and family to use good hand hygiene technique  - Identify and instruct in appropriate isolation precautions for identified infection/condition  Outcome: Progressing

## 2022-06-16 LAB
BACTERIA BLD CULT: NORMAL
BACTERIA BLD CULT: NORMAL

## 2022-06-17 ENCOUNTER — HOME CARE VISIT (OUTPATIENT)
Dept: HOME HEALTH SERVICES | Facility: HOME HEALTHCARE | Age: 67
End: 2022-06-17

## 2022-06-17 NOTE — CASE COMMUNICATION
TC to patient with use of language line  Interpretor [id number] left a message for the patient regarding the home physical therapy referral  Explained that the physical therapist will call to schedule a visit for Monday 6/20/22  New SOC date will be 6/20/22

## 2022-06-19 ENCOUNTER — HOME CARE VISIT (OUTPATIENT)
Dept: HOME HEALTH SERVICES | Facility: HOME HEALTHCARE | Age: 67
End: 2022-06-19

## 2022-06-20 ENCOUNTER — HOME CARE VISIT (OUTPATIENT)
Dept: HOME HEALTH SERVICES | Facility: HOME HEALTHCARE | Age: 67
End: 2022-06-20

## 2022-06-20 NOTE — CASE COMMUNICATION
Pt reports she is doing well since hospitalization and having no difficulty with mobility  pt declining home PT services at this time  Notified pt she would need new orders from PCP should she need therapy services in the future  Pt verbalizes understanding  Pt not admitted to [facility]

## 2022-07-05 ENCOUNTER — TELEPHONE (OUTPATIENT)
Dept: OTHER | Facility: OTHER | Age: 67
End: 2022-07-05

## 2022-07-05 NOTE — TELEPHONE ENCOUNTER
Lab Result: Potassium 6 3   Date/Time Drawn: 7/5/2022 @ 11:45 am   Ordering Provider: Dr Cass Preston Name: May Alvarez following critical/stat result was read back to the lab as stated above and [employer] to the on-call provider

## 2022-07-05 NOTE — PROGRESS NOTES
Potassium 6 3  Called patient and she had dialysis today   Will review at dialysis  Advancement Flap (Double) Text: The defect edges were debeveled with a #15 scalpel blade.  Given the location of the defect and the proximity to free margins a double advancement flap was deemed most appropriate.  Using a sterile surgical marker, the appropriate advancement flaps were drawn incorporating the defect and placing the expected incisions within the relaxed skin tension lines where possible.    The area thus outlined was incised deep to adipose tissue with a #15 scalpel blade.  The skin margins were undermined to an appropriate distance in all directions utilizing iris scissors.

## 2022-07-06 ENCOUNTER — OFFICE VISIT (OUTPATIENT)
Dept: VASCULAR SURGERY | Facility: CLINIC | Age: 67
End: 2022-07-06
Payer: MEDICARE

## 2022-07-06 ENCOUNTER — TELEPHONE (OUTPATIENT)
Dept: VASCULAR SURGERY | Facility: CLINIC | Age: 67
End: 2022-07-06

## 2022-07-06 VITALS
HEIGHT: 62 IN | BODY MASS INDEX: 27.46 KG/M2 | HEART RATE: 72 BPM | WEIGHT: 149.2 LBS | SYSTOLIC BLOOD PRESSURE: 182 MMHG | DIASTOLIC BLOOD PRESSURE: 70 MMHG | OXYGEN SATURATION: 97 %

## 2022-07-06 DIAGNOSIS — N18.6 ESRD (END STAGE RENAL DISEASE) ON DIALYSIS (HCC): Primary | ICD-10-CM

## 2022-07-06 DIAGNOSIS — Z99.2 ESRD (END STAGE RENAL DISEASE) ON DIALYSIS (HCC): Primary | ICD-10-CM

## 2022-07-06 PROCEDURE — 99214 OFFICE O/P EST MOD 30 MIN: CPT | Performed by: SURGERY

## 2022-07-06 RX ORDER — CEFAZOLIN SODIUM 1 G/50ML
1000 SOLUTION INTRAVENOUS ONCE
Status: CANCELLED | OUTPATIENT
Start: 2022-09-02 | End: 2022-07-06

## 2022-07-06 RX ORDER — CHLORHEXIDINE GLUCONATE 0.12 MG/ML
15 RINSE ORAL EVERY 12 HOURS SCHEDULED
Status: CANCELLED | OUTPATIENT
Start: 2022-09-02

## 2022-07-06 RX ORDER — CHLORHEXIDINE GLUCONATE 0.12 MG/ML
15 RINSE ORAL ONCE
Status: CANCELLED | OUTPATIENT
Start: 2022-09-02 | End: 2022-07-06

## 2022-07-06 NOTE — TELEPHONE ENCOUNTER
REMINDER: Under Reason For Call, comments MUST be formatted as:   (Surgeon's Initials) / (Procedure)      Special Instructions / FYI:     Procedure: Revision of left arm fistula (superficialization/transposition)    Level: 4 - Route clearance(s) to [facility] Surgery Coordinator Pool    Allergies: Amlodipine and Lisinopril    Instructions Given: NO Bowel Prep General Instructions     Dialysis: Yes  Where: [facility]  // Days: Tuesday, Thursday, and Saturday    Return Visit Required Prior to Procedure: No     Consent: I certify that patient has signed, printed, timed, and dated their surgery consent  I certify that the patient's LEGAL NAME and DATE OF BIRTH are written in the upper left corner on BOTH sides of the consent  I certify that BOTH sides of the completed surgery consent have been scanned into the patient's Epic chart by myself on 7/6/2022  Yes, I have LABELED the consent in Epic as Consent for Vascular Procedure  For Surgical Clearances     Levels   1-3   ROUTE this encounter to [facility] Clearance Pool (AND)   [facility] Surgery Coordinator Pool     Level   4   ROUTE this encounter to [facility] Surgery Coordinator Pool       HYDRATION CLEARANCES   ONLY ROUTE TO  [facility] Clearance Pool     Patient does not require any pre operative clearance  Yes, I have ROUTED this encounter to [facility] Surgery Coordinator and/or [facility] Clearance Pool

## 2022-07-06 NOTE — PROGRESS NOTES
Assessment/Plan:    ESRD (end stage renal disease) on dialysis [facility])  Lab Results   Component Value Date    EGFR 6 06/14/2022    EGFR 11 06/12/2022    EGFR 14 06/11/2022    CREATININE 5 92 (H) 06/14/2022    CREATININE 3 80 (H) 06/12/2022    CREATININE 3 10 (H) 06/11/2022   Ronny Schwab is a 62-year-old woman with end-stage renal disease who underwent a left brachiocephalic AV fistula creation on 02/16/22  Despite hemodialysis duplex suggesting adequate maturation with depth less than 6 mm throughout the dialysis center is reporting multiple infiltrations and difficulty cannulating fistula  I have requested the fistula be superficialize  He has an excellent thrill and bruit  She has significant ecchymosis from recent infiltrations  As per dialysis center request I will attempt to superficialize fistula however will require that recent infiltration/ecchymosis /hematoma resolved prior to procedure  Recommend procedure be deferred for approximately 4-6 weeks to allow resolution of ecchymosis/infiltration  Of note history/communication through language line  Elias Alfred")  During visit after explaining difficulty with cannulation of fistula patient reported " they are stupid over there "    Discussed risk of infection, bleeding, injury to fistula and adjacent structures during revision  Diagnoses and all orders for this visit:    ESRD (end stage renal disease) on dialysis [facility])  -     Case request operating room: REVISION AV FISTULA (superficalize/transposition); Standing  -     Basic metabolic panel; Future  -     CBC and Platelet; Future  -     Case request operating room: REVISION AV FISTULA (superficalize/transposition)    Other orders  -     Diet NPO; Sips with meds; Standing  -     Void on call to OR; Standing  -     Insert peripheral IV; Standing  -     Nursing Communication CHG bath, have staff wash entire body (neck down) per pre op bathing protocol   Routine, evening prior to, and day of surgery ; Standing  -     Nursing Communication Swab both nares with Povidone-Iodine solution, EXCLUDE if patient has shellfish/Iodine allergy  Routine, day of surgery, on call to OR ; Standing  -     chlorhexidine (PERIDEX) 0 12 % oral rinse 15 mL  -     Place sequential compression device; Standing  -     chlorhexidine (PERIDEX) 0 12 % oral rinse 15 mL  -     ceFAZolin (ANCEF) IVPB (premix in dextrose) 1,000 mg 50 mL          Subjective:      Patient ID: Conchis Alvarado is a 78 y o  female  Patient is here today for a follow up exam to discuss a superficialization of her AVF per Zabrina  Patient is s/p a creation of LUE AVF done 2/16/2022  Patient is positive for bruit and thrill  She denies any pain, tingling or numbness  She is getting HD T-TH-S at [facility] in [place] via right side perma cath  She is taking ASA 81 mg and Atorvastatin  Patient is a non-smoker  Ronny Schwab returns to the office at the request of the dialysis center due to difficulty cannulating her left upper extremity fistula which was created back on 02/16/2022  Despite hemodialysis duplex suggesting adequate maturation with depth less than 6 mm they continued to have difficulty with access  She is here in the office with moderate-sized ecchymosis overlying her upper arm with underlying hematoma  The fistula is patent clinically  The following portions of the patient's history were reviewed and updated as appropriate: allergies, current medications, past family history, past medical history, past social history, past surgical history and problem list     Review of Systems   Constitutional: Negative  HENT: Negative  Eyes: Negative  Respiratory: Negative  Cardiovascular: Negative  Gastrointestinal: Negative  Endocrine: Negative  Genitourinary: Negative  Musculoskeletal: Negative  Skin: Negative  Allergic/Immunologic: Negative  Neurological: Negative  Hematological: Bruises/bleeds easily  Psychiatric/Behavioral: Negative  I have personally reviewed the ROS entered by MA and agree as documented  Objective:      BP (!) 182/70 (BP Location: Right arm, Patient Position: Sitting, Cuff Size: Standard)   Pulse 72   Ht 5' 2" (1 575 m)   Wt 67 7 kg (149 lb 3 2 oz)   SpO2 97%   BMI 27 29 kg/m²          Physical Exam  Constitutional:       General: She is not in acute distress  Appearance: She is well-developed  HENT:      Head: Normocephalic and atraumatic  Eyes:      General: No scleral icterus  Conjunctiva/sclera: Conjunctivae normal    Neck:      Trachea: No tracheal deviation  Cardiovascular:      Rate and Rhythm: Normal rate and regular rhythm  Heart sounds: Normal heart sounds  Comments: L arm fistula with good thrill/bruit  Pulmonary:      Effort: Pulmonary effort is normal       Breath sounds: Normal breath sounds  Abdominal:      General: There is no distension  Palpations: Abdomen is soft  There is no mass (no appreciable aortic pulsation/aneurysm)  Tenderness: There is no abdominal tenderness  There is no guarding or rebound  Musculoskeletal:         General: Normal range of motion  Cervical back: Normal range of motion and neck supple  Skin:     General: Skin is warm and dry  Findings: Bruising (moderate size ecchymosis overlying fisutla outflow circuit) present  Neurological:      Mental Status: She is alert and oriented to person, place, and time  Psychiatric:         Mood and Affect: Mood normal          Behavior: Behavior normal          Thought Content:  Thought content normal          Judgment: Judgment normal        Operative Scheduling Information:    Hospital:  [facility]    Physician:  Ramón Chen    Surgery:  Revision of left arm fistula (superficialization/transposition)    Urgency:  Standard    Level:  Level 4: Outpatients to be scheduled for screening procedures and elective surgery that can be delayed for longer than one month without reasonable expectation of detriment to patient      Case Length:  2 5 hours    Post-op Bed:  Outpatient    OR Table:  Standard    Equipment Needs:  None    Medication Instructions:  Aspirin:   Continue (do not hold)    Hydration:  No    Contrast Allergy:  no

## 2022-07-06 NOTE — ASSESSMENT & PLAN NOTE
Lab Results   Component Value Date    EGFR 6 06/14/2022    EGFR 11 06/12/2022    EGFR 14 06/11/2022    CREATININE 5 92 (H) 06/14/2022    CREATININE 3 80 (H) 06/12/2022    CREATININE 3 10 (H) 06/11/2022   Aaron Amaya is a 50-year-old woman with end-stage renal disease who underwent a left brachiocephalic AV fistula creation on 02/16/22  Despite hemodialysis duplex suggesting adequate maturation with depth less than 6 mm throughout the dialysis center is reporting multiple infiltrations and difficulty cannulating fistula  I have requested the fistula be superficialize  He has an excellent thrill and bruit  She has significant ecchymosis from recent infiltrations  As per dialysis center request I will attempt to superficialize fistula however will require that recent infiltration/ecchymosis /hematoma resolved prior to procedure  Recommend procedure be deferred for approximately 4-6 weeks to allow resolution of ecchymosis/infiltration  Of note history/communication through language line  Jarvis Warren")  During visit after explaining difficulty with cannulation of fistula patient reported " they are stupid over there "    Discussed risk of infection, bleeding, injury to fistula and adjacent structures during revision

## 2022-07-07 ENCOUNTER — DOCUMENTATION (OUTPATIENT)
Dept: OTHER | Facility: HOSPITAL | Age: 67
End: 2022-07-07

## 2022-07-08 NOTE — TELEPHONE ENCOUNTER
Verified patient's insurance   CONFIRMED - Patient's insurance is Medicare  Is patient requesting a call when authorization has been obtained? Patient did not request a call  Surgery Date: 9/2/22 As per dialysis center request I will attempt to superficialize fistula however will require that recent infiltration/ecchymosis /hematoma resolved prior to procedure  Recommend procedure be deferred for approximately 4-6 weeks to allow resolution of ecchymosis/infiltration  Primary Surgeon: Alvarez Baker // David Yoder (NPI: [id number])  Assisting Surgeon: Not Applicable (N/A)  Facility: [facility] (Tax: [id number] / NPI: [id number])  Inpatient / Outpatient: Outpatient  Level: 4    Clearance Received: No clearance ordered  Consent Received: Yes, scanned into Epic on 7/6/22  Medication Hold / Last Dose: Aspirin:   Continue (do not hold)  VQI Spreadsheet: Not Applicable (N/A)  IR Notified: Not Applicable (N/A)  Rep  Notified: Not Applicable (N/A)  Equipment Needs: Not Applicable (N/A)  Vas Lab Requested: Not Applicable (N/A)  Patient Contacted: 7/8/22 MAXIMILIANO HUYNH LEILA W/[place]    Diagnosis: N18 6 Z99 2  Procedure/ CPT Code(s): REVISION of Arteriovenous Graft withOUT Thrombosis // CPT: 27248    For varicose vein related procedures:   Last LEVDR: Not Applicable (N/A)  CEAP Classification: Not Applicable (N/A)  VCSS: Not Applicable (N/A)    Post Operative Date/ Time: 9/14/22 , 930AM [place] with David Yoder (NPI: [id number])     *Please review medication hold(s), PATs, and check H&P with patient  *  PATIENT WAS MAILED SURGERY/SHOWERING/DISCHARGE/COVID INSTRUCTIONS AFTER REVIEWING WITH THEM VIA PHONE CALL

## 2022-07-18 ENCOUNTER — APPOINTMENT (OUTPATIENT)
Dept: LAB | Facility: HOSPITAL | Age: 67
End: 2022-07-18
Payer: MEDICARE

## 2022-07-18 DIAGNOSIS — E78.5 DYSLIPIDEMIA: ICD-10-CM

## 2022-07-18 DIAGNOSIS — E55.9 VITAMIN D DEFICIENCY: ICD-10-CM

## 2022-07-18 DIAGNOSIS — N25.81 HYPERPARATHYROIDISM DUE TO RENAL INSUFFICIENCY (HCC): ICD-10-CM

## 2022-07-18 DIAGNOSIS — N18.6 ESRD (END STAGE RENAL DISEASE) ON DIALYSIS (HCC): ICD-10-CM

## 2022-07-18 DIAGNOSIS — Z99.2 ESRD (END STAGE RENAL DISEASE) ON DIALYSIS (HCC): ICD-10-CM

## 2022-07-18 DIAGNOSIS — Z79.4 TYPE 2 DIABETES MELLITUS WITH DIABETIC POLYNEUROPATHY, WITH LONG-TERM CURRENT USE OF INSULIN (HCC): ICD-10-CM

## 2022-07-18 DIAGNOSIS — I10 BENIGN ESSENTIAL HTN: ICD-10-CM

## 2022-07-18 DIAGNOSIS — E11.42 TYPE 2 DIABETES MELLITUS WITH DIABETIC POLYNEUROPATHY, WITH LONG-TERM CURRENT USE OF INSULIN (HCC): ICD-10-CM

## 2022-07-18 DIAGNOSIS — R94.6 ABNORMAL THYROID FUNCTION TEST: ICD-10-CM

## 2022-07-18 LAB
25(OH)D3 SERPL-MCNC: 74.2 NG/ML (ref 30–100)
ALBUMIN SERPL BCP-MCNC: 3.7 G/DL (ref 3.5–5)
ALP SERPL-CCNC: 74 U/L (ref 43–122)
ALT SERPL W P-5'-P-CCNC: 12 U/L
ANION GAP SERPL CALCULATED.3IONS-SCNC: 7 MMOL/L (ref 5–14)
AST SERPL W P-5'-P-CCNC: 26 U/L (ref 14–36)
BASOPHILS # BLD AUTO: 0.03 THOUSANDS/ΜL (ref 0–0.1)
BASOPHILS NFR BLD AUTO: 0 % (ref 0–1)
BILIRUB SERPL-MCNC: 0.35 MG/DL (ref 0.2–1)
BUN SERPL-MCNC: 42 MG/DL (ref 5–25)
CALCIUM SERPL-MCNC: 8.3 MG/DL (ref 8.4–10.2)
CHLORIDE SERPL-SCNC: 103 MMOL/L (ref 96–108)
CHOLEST SERPL-MCNC: 163 MG/DL
CO2 SERPL-SCNC: 29 MMOL/L (ref 21–32)
CREAT SERPL-MCNC: 4.88 MG/DL (ref 0.6–1.2)
EOSINOPHIL # BLD AUTO: 0.31 THOUSAND/ΜL (ref 0–0.61)
EOSINOPHIL NFR BLD AUTO: 5 % (ref 0–6)
ERYTHROCYTE [DISTWIDTH] IN BLOOD BY AUTOMATED COUNT: 14 % (ref 11.6–15.1)
EST. AVERAGE GLUCOSE BLD GHB EST-MCNC: 146 MG/DL
GFR SERPL CREATININE-BSD FRML MDRD: 8 ML/MIN/1.73SQ M
GLUCOSE P FAST SERPL-MCNC: 96 MG/DL (ref 70–99)
HBA1C MFR BLD: 6.7 %
HCT VFR BLD AUTO: 25.3 % (ref 34.8–46.1)
HDLC SERPL-MCNC: 39 MG/DL
HGB BLD-MCNC: 7.3 G/DL (ref 11.5–15.4)
IMM GRANULOCYTES # BLD AUTO: 0.02 THOUSAND/UL (ref 0–0.2)
IMM GRANULOCYTES NFR BLD AUTO: 0 % (ref 0–2)
LDLC SERPL CALC-MCNC: 103 MG/DL
LYMPHOCYTES # BLD AUTO: 1.37 THOUSANDS/ΜL (ref 0.6–4.47)
LYMPHOCYTES NFR BLD AUTO: 20 % (ref 14–44)
MCH RBC QN AUTO: 28.7 PG (ref 26.8–34.3)
MCHC RBC AUTO-ENTMCNC: 28.9 G/DL (ref 31.4–37.4)
MCV RBC AUTO: 100 FL (ref 82–98)
MONOCYTES # BLD AUTO: 0.59 THOUSAND/ΜL (ref 0.17–1.22)
MONOCYTES NFR BLD AUTO: 9 % (ref 4–12)
NEUTROPHILS # BLD AUTO: 4.51 THOUSANDS/ΜL (ref 1.85–7.62)
NEUTS SEG NFR BLD AUTO: 66 % (ref 43–75)
NONHDLC SERPL-MCNC: 124 MG/DL
NRBC BLD AUTO-RTO: 0 /100 WBCS
PLATELET # BLD AUTO: 219 THOUSANDS/UL (ref 149–390)
PMV BLD AUTO: 9.9 FL (ref 8.9–12.7)
POTASSIUM SERPL-SCNC: 5.7 MMOL/L (ref 3.5–5.3)
PROT SERPL-MCNC: 7.2 G/DL (ref 6.4–8.4)
PTH-INTACT SERPL-MCNC: 254.7 PG/ML (ref 18.4–80.1)
RBC # BLD AUTO: 2.54 MILLION/UL (ref 3.81–5.12)
SODIUM SERPL-SCNC: 139 MMOL/L (ref 135–147)
T4 FREE SERPL-MCNC: 1.01 NG/DL (ref 0.76–1.46)
TRIGL SERPL-MCNC: 104 MG/DL
TSH SERPL DL<=0.05 MIU/L-ACNC: 0.39 UIU/ML (ref 0.45–4.5)
WBC # BLD AUTO: 6.83 THOUSAND/UL (ref 4.31–10.16)

## 2022-07-18 PROCEDURE — 80053 COMPREHEN METABOLIC PANEL: CPT

## 2022-07-18 PROCEDURE — 82306 VITAMIN D 25 HYDROXY: CPT

## 2022-07-18 PROCEDURE — 80061 LIPID PANEL: CPT

## 2022-07-18 PROCEDURE — 85025 COMPLETE CBC W/AUTO DIFF WBC: CPT

## 2022-07-18 PROCEDURE — 84443 ASSAY THYROID STIM HORMONE: CPT

## 2022-07-18 PROCEDURE — 36415 COLL VENOUS BLD VENIPUNCTURE: CPT

## 2022-07-18 PROCEDURE — 83970 ASSAY OF PARATHORMONE: CPT

## 2022-07-18 PROCEDURE — 83036 HEMOGLOBIN GLYCOSYLATED A1C: CPT

## 2022-07-18 PROCEDURE — 84439 ASSAY OF FREE THYROXINE: CPT

## 2022-07-30 ENCOUNTER — TELEPHONE (OUTPATIENT)
Dept: OTHER | Facility: OTHER | Age: 67
End: 2022-07-30

## 2022-07-30 ENCOUNTER — TELEPHONE (OUTPATIENT)
Dept: OTHER | Facility: HOSPITAL | Age: 67
End: 2022-07-30

## 2022-07-30 NOTE — TELEPHONE ENCOUNTER
Lab Result: Hemoglobin 6 7   Date/Time Drawn: 07/30/2022  1330   Ordering Provider: Dr Shasta Felix Name: Emir Reese       The following critical/stat result was read back to the lab as stated above and [employer] to the on-call provider  The provider confirmed receipt of the message

## 2022-07-31 NOTE — TELEPHONE ENCOUNTER
Informed patient that hb was 6 7  She is hd TTS  Not on bubba  Informed patient to go to ED to get PRBC  Italian speaking  IF patient does not go to ED, will need PRBC arranged at infusion center  Will also inform outpatient nephrologist- Dr Biggs Pore

## 2022-08-01 NOTE — TELEPHONE ENCOUNTER
Authorization requirements reviewed  Please refer to Velvet Hercules / Ayo Simmons number [id number] for case updates  No authorization required

## 2022-08-02 ENCOUNTER — TELEPHONE (OUTPATIENT)
Dept: NEPHROLOGY | Facility: CLINIC | Age: 67
End: 2022-08-02

## 2022-08-02 NOTE — TELEPHONE ENCOUNTER
rec'd phone call from lab with stat result   Patient's hemoglobin was 6 8  Sent [place] Text to Dr Jn Ivory

## 2022-08-02 NOTE — TELEPHONE ENCOUNTER
Discussed with advanced practitioner Jacqueline nicholson at [address] [place] [address] dialysis unit  Recommended considering PRBC at infusion center    Will also forward message to Dr Lacy Lombardi

## 2022-08-31 ENCOUNTER — ANESTHESIA EVENT (OUTPATIENT)
Dept: PERIOP | Facility: HOSPITAL | Age: 67
End: 2022-08-31
Payer: MEDICARE

## 2022-09-01 ENCOUNTER — PREP FOR PROCEDURE (OUTPATIENT)
Dept: VASCULAR SURGERY | Facility: CLINIC | Age: 67
End: 2022-09-01

## 2022-09-01 NOTE — ANESTHESIA PREPROCEDURE EVALUATION
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)      /RENAL   (+) ESRD (end stage renal disease) on dialysis ([facility] [address] )  S/p AVF 2/2022  HD on T-TH-S  Last dialysis yesterday  HEMATOLOGY   (+) Acute on chronic anemia      NEURO/PSYCH   (+) Cerebral infarction, chronic     (+) 8/24/22 Hgb 8 5  8/2/22 T&S B positive with neg antibody screen     6/12/22 ECHO  Left Ventricle Normal left ventricular cavity size, mild concentric left ventricular hypertrophy, normal left ventricular systolic function normal regional wall motion  Ejection fraction is determined as around 65%  Grade 2 diastolic dysfunction  Estimated left ventricular filling pressure is significantly increased  Right Ventricle Normal right ventricular size and systolic function  Estimated right ventricular systolic pressure is increased, 54 mmHg  Moderate pulmonary hypertension  Left Atrium Mild left atrial cavity enlargement  Intact interatrial septum  Right Atrium Borderline right atrial cavity enlargement  The tip of dialysis catheter is noted in the right atrial cavity  Aortic Valve Tricuspid aortic valve with sclerosis and focal calcification and reduced cuspal separation  Mild approaching moderate aortic valve stenosis  Peak transaortic velocity is 2 9 m/sec, mean gradient is 18 mmHg, calculated aortic valve area is 1 4 cm2  Doppler velocity index is 0 43  Mitral Valve Mitral annular calcification and mitral valve leaflet thickening with sclerosis and slightly reduced mobility  No mitral valve stenosis      Mild mitral valve regurgitation  Tricuspid Valve Mild tricuspid valve leaflet thickening and sclerosis  Mild, 2+ on a scale of 0-4 tricuspid valve regurgitation  Pulmonic Valve The pulmonic valve was not well visualized  Trace pulmonic valve regurgitation noted  Ascending Aorta Aortic root and proximal ascending aorta are normal in size on 2D imaging  IVC/SVC Inferior vena cava is normal in size and  demonstrates appropriate respiratory phasic changes in diameter  Physical Exam    Airway    Mallampati score: II  TM Distance: >3 FB  Neck ROM: full     Dental   upper dentures,     Cardiovascular  Rhythm: regular, Rate: normal,     Pulmonary  Breath sounds clear to auscultation,     Other Findings        Anesthesia Plan  ASA Score- 4     Anesthesia Type- general with ASA Monitors  Additional Monitors:   Airway Plan:           Plan Factors-Exercise tolerance (METS): >4 METS  Chart reviewed  Existing labs reviewed  Induction- intravenous  Postoperative Plan- Plan for postoperative opioid use  Planned trial extubation    Informed Consent- Anesthetic plan and risks discussed with patient

## 2022-09-02 ENCOUNTER — HOSPITAL ENCOUNTER (OUTPATIENT)
Facility: HOSPITAL | Age: 67
Setting detail: OUTPATIENT SURGERY
Discharge: HOME | End: 2022-09-02
Attending: SURGERY | Admitting: SURGERY
Payer: MEDICARE

## 2022-09-02 ENCOUNTER — ANESTHESIA (OUTPATIENT)
Dept: PERIOP | Facility: HOSPITAL | Age: 67
End: 2022-09-02
Payer: MEDICARE

## 2022-09-02 VITALS
BODY MASS INDEX: 27.22 KG/M2 | RESPIRATION RATE: 16 BRPM | DIASTOLIC BLOOD PRESSURE: 68 MMHG | HEART RATE: 63 BPM | OXYGEN SATURATION: 98 % | TEMPERATURE: 97.6 F | SYSTOLIC BLOOD PRESSURE: 160 MMHG | HEIGHT: 61 IN | WEIGHT: 144.18 LBS

## 2022-09-02 DIAGNOSIS — Z99.2 ESRD (END STAGE RENAL DISEASE) ON DIALYSIS (HCC): Primary | ICD-10-CM

## 2022-09-02 DIAGNOSIS — N18.6 ESRD (END STAGE RENAL DISEASE) ON DIALYSIS (HCC): Primary | ICD-10-CM

## 2022-09-02 LAB
ANION GAP SERPL CALCULATED.3IONS-SCNC: 9 MMOL/L (ref 4–13)
BUN SERPL-MCNC: 30 MG/DL (ref 5–25)
CALCIUM SERPL-MCNC: 8.9 MG/DL (ref 8.3–10.1)
CHLORIDE SERPL-SCNC: 101 MMOL/L (ref 96–108)
CO2 SERPL-SCNC: 28 MMOL/L (ref 21–32)
CREAT SERPL-MCNC: 4.28 MG/DL (ref 0.6–1.3)
ERYTHROCYTE [DISTWIDTH] IN BLOOD BY AUTOMATED COUNT: 13.9 % (ref 11.6–15.1)
GFR SERPL CREATININE-BSD FRML MDRD: 10 ML/MIN/1.73SQ M
GLUCOSE P FAST SERPL-MCNC: 80 MG/DL (ref 65–99)
GLUCOSE SERPL-MCNC: 131 MG/DL (ref 65–140)
GLUCOSE SERPL-MCNC: 54 MG/DL (ref 65–140)
GLUCOSE SERPL-MCNC: 61 MG/DL (ref 65–140)
GLUCOSE SERPL-MCNC: 68 MG/DL (ref 65–140)
GLUCOSE SERPL-MCNC: 71 MG/DL (ref 65–140)
GLUCOSE SERPL-MCNC: 80 MG/DL (ref 65–140)
HCT VFR BLD AUTO: 29.5 % (ref 34.8–46.1)
HGB BLD-MCNC: 9.2 G/DL (ref 11.5–15.4)
MCH RBC QN AUTO: 29.3 PG (ref 26.8–34.3)
MCHC RBC AUTO-ENTMCNC: 31.2 G/DL (ref 31.4–37.4)
MCV RBC AUTO: 94 FL (ref 82–98)
PLATELET # BLD AUTO: 247 THOUSANDS/UL (ref 149–390)
PMV BLD AUTO: 10 FL (ref 8.9–12.7)
POTASSIUM SERPL-SCNC: 3.9 MMOL/L (ref 3.5–5.3)
POTASSIUM SERPL-SCNC: 4.4 MMOL/L (ref 3.5–5.3)
RBC # BLD AUTO: 3.14 MILLION/UL (ref 3.81–5.12)
SODIUM SERPL-SCNC: 138 MMOL/L (ref 135–147)
WBC # BLD AUTO: 7.71 THOUSAND/UL (ref 4.31–10.16)

## 2022-09-02 PROCEDURE — 85027 COMPLETE CBC AUTOMATED: CPT | Performed by: ANESTHESIOLOGY

## 2022-09-02 PROCEDURE — 82948 REAGENT STRIP/BLOOD GLUCOSE: CPT

## 2022-09-02 PROCEDURE — 99024 POSTOP FOLLOW-UP VISIT: CPT | Performed by: SURGERY

## 2022-09-02 PROCEDURE — 36832 AV FISTULA REVISION OPEN: CPT | Performed by: SURGERY

## 2022-09-02 PROCEDURE — 80048 BASIC METABOLIC PNL TOTAL CA: CPT

## 2022-09-02 PROCEDURE — 84132 ASSAY OF SERUM POTASSIUM: CPT | Performed by: ANESTHESIOLOGY

## 2022-09-02 RX ORDER — MEPERIDINE HYDROCHLORIDE 25 MG/ML
12.5 INJECTION INTRAMUSCULAR; INTRAVENOUS; SUBCUTANEOUS
Status: DISCONTINUED | OUTPATIENT
Start: 2022-09-02 | End: 2022-09-02 | Stop reason: HOSPADM

## 2022-09-02 RX ORDER — PROPOFOL 10 MG/ML
INJECTION, EMULSION INTRAVENOUS AS NEEDED
Status: DISCONTINUED | OUTPATIENT
Start: 2022-09-02 | End: 2022-09-02

## 2022-09-02 RX ORDER — MAGNESIUM HYDROXIDE 1200 MG/15ML
LIQUID ORAL AS NEEDED
Status: DISCONTINUED | OUTPATIENT
Start: 2022-09-02 | End: 2022-09-02 | Stop reason: HOSPADM

## 2022-09-02 RX ORDER — OXYCODONE HYDROCHLORIDE AND ACETAMINOPHEN 5; 325 MG/1; MG/1
1 TABLET ORAL EVERY 4 HOURS PRN
Qty: 20 TABLET | Refills: 0 | Status: SHIPPED | OUTPATIENT
Start: 2022-09-02 | End: 2022-09-12

## 2022-09-02 RX ORDER — MIDAZOLAM HYDROCHLORIDE 2 MG/2ML
INJECTION, SOLUTION INTRAMUSCULAR; INTRAVENOUS AS NEEDED
Status: DISCONTINUED | OUTPATIENT
Start: 2022-09-02 | End: 2022-09-02

## 2022-09-02 RX ORDER — LIDOCAINE HYDROCHLORIDE 10 MG/ML
INJECTION, SOLUTION EPIDURAL; INFILTRATION; INTRACAUDAL; PERINEURAL AS NEEDED
Status: DISCONTINUED | OUTPATIENT
Start: 2022-09-02 | End: 2022-09-02 | Stop reason: HOSPADM

## 2022-09-02 RX ORDER — PROPOFOL 10 MG/ML
INJECTION, EMULSION INTRAVENOUS CONTINUOUS PRN
Status: DISCONTINUED | OUTPATIENT
Start: 2022-09-02 | End: 2022-09-02

## 2022-09-02 RX ORDER — CEFAZOLIN SODIUM 1 G/50ML
1000 SOLUTION INTRAVENOUS ONCE
Status: COMPLETED | OUTPATIENT
Start: 2022-09-02 | End: 2022-09-02

## 2022-09-02 RX ORDER — CHLORHEXIDINE GLUCONATE 0.12 MG/ML
15 RINSE ORAL EVERY 12 HOURS SCHEDULED
Status: DISCONTINUED | OUTPATIENT
Start: 2022-09-02 | End: 2022-09-02 | Stop reason: HOSPADM

## 2022-09-02 RX ORDER — FENTANYL CITRATE 50 UG/ML
INJECTION, SOLUTION INTRAMUSCULAR; INTRAVENOUS AS NEEDED
Status: DISCONTINUED | OUTPATIENT
Start: 2022-09-02 | End: 2022-09-02

## 2022-09-02 RX ORDER — HYDROMORPHONE HCL/PF 1 MG/ML
0.5 SYRINGE (ML) INJECTION
Status: DISCONTINUED | OUTPATIENT
Start: 2022-09-02 | End: 2022-09-02 | Stop reason: HOSPADM

## 2022-09-02 RX ORDER — ONDANSETRON 2 MG/ML
4 INJECTION INTRAMUSCULAR; INTRAVENOUS ONCE AS NEEDED
Status: DISCONTINUED | OUTPATIENT
Start: 2022-09-02 | End: 2022-09-02 | Stop reason: HOSPADM

## 2022-09-02 RX ORDER — METOPROLOL TARTRATE 5 MG/5ML
2.5 INJECTION INTRAVENOUS ONCE
Status: DISCONTINUED | OUTPATIENT
Start: 2022-09-02 | End: 2022-09-02 | Stop reason: HOSPADM

## 2022-09-02 RX ORDER — SODIUM CHLORIDE, SODIUM LACTATE, POTASSIUM CHLORIDE, CALCIUM CHLORIDE 600; 310; 30; 20 MG/100ML; MG/100ML; MG/100ML; MG/100ML
125 INJECTION, SOLUTION INTRAVENOUS CONTINUOUS
Status: DISCONTINUED | OUTPATIENT
Start: 2022-09-02 | End: 2022-09-02 | Stop reason: HOSPADM

## 2022-09-02 RX ORDER — SODIUM CHLORIDE, SODIUM LACTATE, POTASSIUM CHLORIDE, CALCIUM CHLORIDE 600; 310; 30; 20 MG/100ML; MG/100ML; MG/100ML; MG/100ML
INJECTION, SOLUTION INTRAVENOUS CONTINUOUS PRN
Status: DISCONTINUED | OUTPATIENT
Start: 2022-09-02 | End: 2022-09-02

## 2022-09-02 RX ORDER — CHLORHEXIDINE GLUCONATE 0.12 MG/ML
15 RINSE ORAL ONCE
Status: COMPLETED | OUTPATIENT
Start: 2022-09-02 | End: 2022-09-02

## 2022-09-02 RX ORDER — OXYCODONE HYDROCHLORIDE AND ACETAMINOPHEN 5; 325 MG/1; MG/1
1 TABLET ORAL EVERY 4 HOURS PRN
Status: DISCONTINUED | OUTPATIENT
Start: 2022-09-02 | End: 2022-09-02 | Stop reason: HOSPADM

## 2022-09-02 RX ORDER — GLYCOPYRROLATE 0.2 MG/ML
INJECTION INTRAMUSCULAR; INTRAVENOUS AS NEEDED
Status: DISCONTINUED | OUTPATIENT
Start: 2022-09-02 | End: 2022-09-02

## 2022-09-02 RX ORDER — FENTANYL CITRATE/PF 50 MCG/ML
25 SYRINGE (ML) INJECTION
Status: DISCONTINUED | OUTPATIENT
Start: 2022-09-02 | End: 2022-09-02 | Stop reason: HOSPADM

## 2022-09-02 RX ADMIN — FENTANYL CITRATE 25 MCG: 50 INJECTION INTRAMUSCULAR; INTRAVENOUS at 09:21

## 2022-09-02 RX ADMIN — PROPOFOL 40 MG: 10 INJECTION, EMULSION INTRAVENOUS at 09:06

## 2022-09-02 RX ADMIN — PROPOFOL 40 MCG/KG/MIN: 10 INJECTION, EMULSION INTRAVENOUS at 09:06

## 2022-09-02 RX ADMIN — FENTANYL CITRATE 25 MCG: 50 INJECTION INTRAMUSCULAR; INTRAVENOUS at 09:46

## 2022-09-02 RX ADMIN — GLYCOPYRROLATE 0.1 MCG: 0.2 INJECTION, SOLUTION INTRAMUSCULAR; INTRAVENOUS at 10:07

## 2022-09-02 RX ADMIN — FENTANYL CITRATE 25 MCG: 50 INJECTION INTRAMUSCULAR; INTRAVENOUS at 09:17

## 2022-09-02 RX ADMIN — MIDAZOLAM 2 MG: 1 INJECTION INTRAMUSCULAR; INTRAVENOUS at 09:02

## 2022-09-02 RX ADMIN — CHLORHEXIDINE GLUCONATE 0.12% ORAL RINSE 15 ML: 1.2 LIQUID ORAL at 08:22

## 2022-09-02 RX ADMIN — SODIUM CHLORIDE, SODIUM LACTATE, POTASSIUM CHLORIDE, AND CALCIUM CHLORIDE: .6; .31; .03; .02 INJECTION, SOLUTION INTRAVENOUS at 09:02

## 2022-09-02 RX ADMIN — FENTANYL CITRATE 25 MCG: 50 INJECTION INTRAMUSCULAR; INTRAVENOUS at 09:25

## 2022-09-02 RX ADMIN — CEFAZOLIN SODIUM 1000 MG: 1 SOLUTION INTRAVENOUS at 09:03

## 2022-09-02 NOTE — H&P
H & P  Plan: Revision of Left upper extremity fistula  Operative site marked    As discussed in office fistula clinically and on duplex appears superficial (at least near elbow) however dialysis center was having difficulty accessing fistula  Will attempt to superficial more central (towards shoulder) portion of fistula  She understands risk of procedure including possible injury to fistula  Despite risks she wishes to proceed  Progress Notes  Lis Phillips DO (Physician)   Vascular Surgery  Assessment/Plan:     ESRD (end stage renal disease) on dialysis [facility])        Lab Results   Component Value Date     EGFR 6 06/14/2022     EGFR 11 06/12/2022     EGFR 14 06/11/2022     CREATININE 5 92 (H) 06/14/2022     CREATININE 3 80 (H) 06/12/2022     CREATININE 3 10 (H) 06/11/2022   Glenroy Abreu is a 52-year-old woman with end-stage renal disease who underwent a left brachiocephalic AV fistula creation on 02/16/22  Despite hemodialysis duplex suggesting adequate maturation with depth less than 6 mm throughout the dialysis center is reporting multiple infiltrations and difficulty cannulating fistula  I have requested the fistula be superficialize  He has an excellent thrill and bruit  She has significant ecchymosis from recent infiltrations  As per dialysis center request I will attempt to superficialize fistula however will require that recent infiltration/ecchymosis /hematoma resolved prior to procedure  Recommend procedure be deferred for approximately 4-6 weeks to allow resolution of ecchymosis/infiltration  Of note history/communication through language line  Kev Albert")    During visit after explaining difficulty with cannulation of fistula patient reported " they are stupid over there "     Discussed risk of infection, bleeding, injury to fistula and adjacent structures during revision          Diagnoses and all orders for this visit:     ESRD (end stage renal disease) on dialysis [facility])  -     Case request operating room: REVISION AV FISTULA (superficalize/transposition); Standing  -     Basic metabolic panel; Future  -     CBC and Platelet; Future  -     Case request operating room: REVISION AV FISTULA (superficalize/transposition)     Other orders  -     Diet NPO; Sips with meds; Standing  -     Void on call to OR; Standing  -     Insert peripheral IV; Standing  -     Nursing Communication CHG bath, have staff wash entire body (neck down) per pre op bathing protocol  Routine, evening prior to, and day of surgery ; Standing  -     Nursing Communication Swab both nares with Povidone-Iodine solution, EXCLUDE if patient has shellfish/Iodine allergy  Routine, day of surgery, on call to OR ; Standing  -     chlorhexidine (PERIDEX) 0 12 % oral rinse 15 mL  -     Place sequential compression device; Standing  -     chlorhexidine (PERIDEX) 0 12 % oral rinse 15 mL  -     ceFAZolin (ANCEF) IVPB (premix in dextrose) 1,000 mg 50 mL            Subjective:       Patient ID: Nubia Menard is a 78 y o  female      Patient is here today for a follow up exam to discuss a superficialization of her AVF per Zabrina  Patient is s/p a creation of LUE AVF done 2/16/2022  Patient is positive for bruit and thrill  She denies any pain, tingling or numbness  She is getting HD T-TH-S at [facility] in [place] via right side perma cath  She is taking ASA 81 mg and Atorvastatin  Patient is a non-smoker    Florecita Mulligan returns to the office at the request of the dialysis center due to difficulty cannulating her left upper extremity fistula which was created back on 02/16/2022  Despite hemodialysis duplex suggesting adequate maturation with depth less than 6 mm they continued to have difficulty with access  She is here in the office with moderate-sized ecchymosis overlying her upper arm with underlying hematoma    The fistula is patent clinically         The following portions of the patient's history were reviewed and updated as appropriate: allergies, current medications, past family history, past medical history, past social history, past surgical history and problem list      Review of Systems   Constitutional: Negative  HENT: Negative  Eyes: Negative  Respiratory: Negative  Cardiovascular: Negative  Gastrointestinal: Negative  Endocrine: Negative  Genitourinary: Negative  Musculoskeletal: Negative  Skin: Negative  Allergic/Immunologic: Negative  Neurological: Negative  Hematological: Bruises/bleeds easily  Psychiatric/Behavioral: Negative  I have personally reviewed the ROS entered by MA and agree as documented      Objective:        BP (!) 182/70 (BP Location: Right arm, Patient Position: Sitting, Cuff Size: Standard)   Pulse 72   Ht 5' 2" (1 575 m)   Wt 67 7 kg (149 lb 3 2 oz)   SpO2 97%   BMI 27 29 kg/m²             Physical Exam  Constitutional:       General: She is not in acute distress  Appearance: She is well-developed  HENT:      Head: Normocephalic and atraumatic  Eyes:      General: No scleral icterus  Conjunctiva/sclera: Conjunctivae normal    Neck:      Trachea: No tracheal deviation  Cardiovascular:      Rate and Rhythm: Normal rate and regular rhythm  Heart sounds: Normal heart sounds  Comments: L arm fistula with good thrill/bruit  Pulmonary:      Effort: Pulmonary effort is normal       Breath sounds: Normal breath sounds  Abdominal:      General: There is no distension  Palpations: Abdomen is soft  There is no mass (no appreciable aortic pulsation/aneurysm)  Tenderness: There is no abdominal tenderness  There is no guarding or rebound  Musculoskeletal:         General: Normal range of motion  Cervical back: Normal range of motion and neck supple  Skin:     General: Skin is warm and dry  Findings: Bruising (moderate size ecchymosis overlying fisutla outflow circuit) present     Neurological:      Mental Status: She is alert and oriented to person, place, and time  Psychiatric:         Mood and Affect: Mood normal          Behavior: Behavior normal          Thought Content:  Thought content normal          Judgment: Judgment normal

## 2022-09-02 NOTE — ANESTHESIA POSTPROCEDURE EVALUATION
Post-Op Assessment Note    CV Status:  Stable    Pain management: adequate     Mental Status:  Alert and awake   Hydration Status:  Euvolemic   PONV Controlled:  Controlled   Airway Patency:  Patent      Post Op Vitals Reviewed: Yes      Staff: Anesthesiologist         No complications documented      BP      Temp      Pulse     Resp      SpO2      /68   Pulse 63   Temp 97 6 °F (36 4 °C) (Temporal)   Resp 16   Ht 5' 1" (1 549 m)   Wt 65 4 kg (144 lb 2 9 oz)   SpO2 98%   BMI 27 24 kg/m²
Post-Op Assessment Note    CV Status:  Stable  Pain Score: 0    Pain management: adequate     Mental Status:  Alert and awake   Hydration Status:  Euvolemic   PONV Controlled:  Controlled   Airway Patency:  Patent      Post Op Vitals Reviewed: Yes      Staff: CRNA         No complications documented      BP  103/55   Temp   97 2   Pulse  58   Resp   18   SpO2   100%
Name band;

## 2022-09-02 NOTE — INTERIM OP NOTE
REVISION AV FISTULA (superficalize/transposition)  Postoperative Note  PATIENT NAME: Phyllis Fajardo  : 1955  MRN: [id number]  AL HYBRID 09    Surgery Date: 2022    Preop Diagnosis:  ESRD (end stage renal disease) on dialysis ([facility] [address] ) [N18 6, Z99 2]    Post-Op Diagnosis Codes:     * ESRD (end stage renal disease) on dialysis ([facility] [address] ) [N18 6, Z99 2]    Procedure(s) (LRB):  REVISION AV FISTULA (superficalize/transposition) (Left)    Surgeon(s) and Role:     * Marcellus Dolan DO - Primary     * Corby Soriano MD - Assisting    Specimens:  * No specimens in log *    Estimated Blood Loss:   Minimal    Anesthesia Type:   General     Findings:   Superficialization of left BC AVF  Small segment 2-3cm with focal stenosis - manually dilated  Good thrill at conclusion of procedure  Complications:   None    SIGNATURE: Lis Phillips DO   DATE: 2022   TIME: 10:26 AM

## 2022-09-02 NOTE — OP NOTE
OPERATIVE REPORT  PATIENT NAME: Vamshi Winn    :  1955  MRN: [id number]  Pt Location: [facility] 09    SURGERY DATE: 2022    Surgeon(s) and Role:     * Lis Phillips DO - Primary     * Yoan Pang MD - Assisting    Preop Diagnosis:  ESRD (end stage renal disease) on dialysis ([facility] [address] ) [N18 6, Z99 2]    Post-Op Diagnosis Codes:     * ESRD (end stage renal disease) on dialysis ([facility] [address] ) [N18 6, Z99 2]    Procedure(s) (LRB):  REVISION AV FISTULA (superficalize/transposition) (Left)    Specimen(s):  * No specimens in log *    Estimated Blood Loss:   Minimal    Drains:  * No LDAs found *    Anesthesia Type:   General    Operative Indications:  ESRD (end stage renal disease) on dialysis ([facility] [address] ) [N18 6, Z99 2]  Patient is 60-year-old woman with end-stage renal disease on hemodialysis who previously underwent a left brachiocephalic AV fistula creation back in February  Due to the deep nature of the fistula the there was difficulty cannulating the fistula  There was an episode of infiltration  She was referred back to the office for possible superficialization of her fistula  The procedure, risks, benefits, alternatives, anticipated postop course were discussed in detail  Patient was agreeable to proceed  Written consent was obtained  Patient brought to the operating room for the above-mentioned procedure  Operative Findings:    Superficialization of left BC AVF  Small segment 2-3cm with focal stenosis - manually dilated  Good thrill at conclusion of procedure  Complications:   None    Procedure and Technique:  Patient was properly identified in the preop holding area  Patient's name, laterality, and nature procedure verified  The operative site was marked  Patient was brought to the operating room where she was positioned supine on the OR table  After adequate sedation left upper extremity circumferentially prepped and draped in usual sterile fashion    A preoperative dose of antibiotics was administered  A formal time-out was performed and all in agreement  A skin incision was carried out overlying the dialysis outflow circuit using a 15  Scalpel  The incision was deepened down through the subcutaneous tissue using the electrocautery  Self-retaining retractors were used to facilitate exposure  Sharp dissection was next carried down onto the cephalic vein outflow tract  The cephalic vein was circumferentially mobilized and freed from its surrounding tissue  Several venous tributaries were divided between silk ties  Once an adequate segment of cephalic vein was mobilized a subcutaneous flap was created laterally  The subcutaneous flap was next reapproximated beneath the cephalic vein using a running Vicryl suture  Hemostasis was secured  The wound was irrigated  The subcutaneous tissue was next reapproximated using interrupted Vicryl suture  The skin was closed using skin staples  A sterile dressing consisting of Telfa Tegaderm was applied  Patient had a palpable thrill at conclusion of procedure     I was present for the entire procedure and A qualified resident physician was not available    Patient Disposition:  PACU       SIGNATURE: Daniel Mena DO  DATE: September 2, 2022  TIME: 3:03 PM

## 2022-09-02 NOTE — DISCHARGE INSTRUCTIONS
DISCHARGE INSTRUCTIONS  DIALYSIS FISTULA SURGERY    ACTIVITY:  Limit use of the operated arm to what is necessary for the first day after surgery  On the second day after surgery, you may start to increase use of your arm as tolerated  Avoid heavy lifting (no more than 15 lbs) for the first one week  You should start to exercise your hand on the side of the fistula by squeezing a stress ball or a rolled-up sock  This increases blood flow in your fistula and arm so your fistula will function better  Feel for a thrill every day  The thrill is the vibration or pulse you feel over the fistula that means the blood is flowing through it  If you cannot feel a thrill, call our office ([phone])  DIET:   Resume your normal diet  Good nutrition is important for healing of your incision  DRESSING:   You may have surgical glue at your surgical site  There are stitches present under the skin which will absorb on their own  The glue is used to cover the incision, assist in closure, and prevent contamination  This adhesive will darken and peel away on its own within one to two weeks  Do not pick at it  If you have a dressing over your surgical site, remove this on the second day after surgery  INCISION:   If you do not have a dialysis catheter in place, you may shower and get your incision wet  Wash incision daily with soap and water, but do not rub or scrub the incision; rinse thoroughly and pat dry  You may have stitches or staples to close your incision and it is okay for these to get wet  Do not bathe in a tub or swim for the first 4 week following surgery or if you have any open wounds  It is normal to have mild swelling or discoloration around the incision  If increasing redness or pain develops, call our office immediately  Numbness in the region of the incision may occur following the surgery  This normally improves over six to twelve months    If you have numbness or pain in your hand, please call our office immediately  DO NOT put any powders, creams, ointments, or lotions on your incision  ARM SWELLING:    Most patients have some noticeable arm swelling after surgery  This usually disappears within a few weeks  If swelling is present, elevate the arm whenever possible  RESTRICTIONS:   Do NOT have blood draws, IV's, or blood pressures performed on the operated arm  FISTULA USE:    Your fistula will not be used until it has fully matured - approximately 6 to 12 weeks  If you are using a catheter for dialysis, this will not be removed until after your fistula has matured and is being used for dialysis without any issues  FOLLOW UP STUDIES:  A Doppler ultrasound will be performed about 5-6 weeks after surgery  Your surgeon will arrange this at your first postoperative visit  FOLLOW UP APPOINTMENTS:  Making and keeping follow up appointments and ultrasound tests are important to your recovery  If you have difficulty making it to or keeping your follow up appointments, call the office  If you have increased pain, fever >101 5, increased drainage, redness or a bad smell at your surgery site, new coldness/numbness of your arm or leg, please call us immediately and GO directly to the ER  PLEASE CALL THE OFFICE IF YOU HAVE ANY QUESTIONS  [phone]  [phone]  [address] [place] [address] , [address], Carlotta Na, [address]  600 [facility] 20, [address], [place], 210 [facility]  [address], [facility], 98 [facility]  611 [facility], [address] [facility], [address] [place] [address], [address] [facility] [address]  [address], [address]st [address], [address]on [address]  [address], [address] [facility], [address] [id number] [facility]  1307 [facility], [address] [facility], [facility], [address]  [address] [facility], 532 [facility], [address] [facility], [address] V[address]  [address] [facility], [address]

## 2022-09-06 ENCOUNTER — TELEPHONE (OUTPATIENT)
Dept: VASCULAR SURGERY | Facility: CLINIC | Age: 67
End: 2022-09-06

## 2022-09-06 NOTE — TELEPHONE ENCOUNTER
Vascular Nurse Navigator Post Op Phone Call    Post-Discharge phone call attempted to assess patient recovery after vascular surgery I left a message on answering machine for patient's son as she preferred us to talk to him and she was at dialysis  Will attempt to contact at later date  Pt's chart was reviewed prior to call and leaving message  Procedure: REVISION AV FISTULA (superficalize/transposition) (Left)    Date of Procedure:  9/2/22    Surgeon:    * Archana Matos DO - Primary     * Kylee Dorado MD - Assisting      Anticoagulation pt was discharged on post op?: Aspirin    Dialysis Days and Location:  [facility] at [facility]    Reminded pt of the following in message:    NEXT SCHEDULED OFFICE VISIT:  9/14/22 at 9:30 am with Dr Aleta Saldaña at [address] [place] [address]    To contact [facility] with any concerns

## 2022-09-12 NOTE — TELEPHONE ENCOUNTER
Attempted to contact patient's son Akua Miller and left him a message to return call  Also in message reminded him of appointment for patient on 9/14/22 at 9:30 am with Dr Vasile Smith at [facility]

## 2022-09-14 ENCOUNTER — OFFICE VISIT (OUTPATIENT)
Dept: VASCULAR SURGERY | Facility: CLINIC | Age: 67
End: 2022-09-14

## 2022-09-14 VITALS
SYSTOLIC BLOOD PRESSURE: 162 MMHG | WEIGHT: 141 LBS | HEIGHT: 61 IN | BODY MASS INDEX: 26.62 KG/M2 | TEMPERATURE: 99.5 F | HEART RATE: 85 BPM | DIASTOLIC BLOOD PRESSURE: 82 MMHG

## 2022-09-14 DIAGNOSIS — Z99.2 ESRD (END STAGE RENAL DISEASE) ON DIALYSIS (HCC): Primary | ICD-10-CM

## 2022-09-14 DIAGNOSIS — N18.6 ESRD (END STAGE RENAL DISEASE) ON DIALYSIS (HCC): Primary | ICD-10-CM

## 2022-09-14 PROCEDURE — 99024 POSTOP FOLLOW-UP VISIT: CPT | Performed by: SURGERY

## 2022-09-14 NOTE — PROGRESS NOTES
Assessment/Plan:    ESRD (end stage renal disease) on dialysis [facility])  Lab Results   Component Value Date    EGFR 10 09/02/2022    EGFR 8 07/18/2022    EGFR 6 06/14/2022    CREATININE 4 28 (H) 09/02/2022    CREATININE 4 88 (H) 07/18/2022    CREATININE 5 92 (H) 06/14/2022   Patient returns to the office status post superficialization of left brachiocephalic AV fistula  The incision is healing well and well approximated  Staples removed today in the office Steri-Strips applied  Patient has an excellent thrill  Patient was residual postoperative edema to left upper arm  Okay to attempt fistula access in approximately 2 weeks  Diagnoses and all orders for this visit:    ESRD (end stage renal disease) on dialysis [facility])          Subjective:      Patient ID: Rosemary Becerra is a 78 y o  female  Pt presents today PO L AVF revision on 9/2/22  Pt denies pain, numbness, or tingling  Incision is clean and dry  + Thrill and bruit  Pt is currently on dialysis [facility]Th-S @ [facility]  Pt is taking asa 81mg and atorvastatin  Kelechi Lyoner returns to the office status post superficialization of left brachiocephalic AV fistula  The following portions of the patient's history were reviewed and updated as appropriate: allergies, current medications, past family history, past medical history, past social history, past surgical history and problem list     Review of Systems   Constitutional: Negative  HENT: Negative  Eyes: Negative  Respiratory: Negative  Cardiovascular: Negative  Gastrointestinal: Negative  Endocrine: Negative  Genitourinary: Negative  Musculoskeletal: Negative  Skin: Negative  Allergic/Immunologic: Negative  Neurological: Negative  Hematological: Negative  Psychiatric/Behavioral: Negative  I have personally reviewed the ROS entered by MA and agree as documented      Objective:      /82 (BP Location: Right arm, Patient Position: Sitting, Cuff Size: Standard)   Pulse 85   Temp 99 5 °F (37 5 °C) (Tympanic)   Ht 5' 1" (1 549 m)   Wt 64 kg (141 lb)   BMI 26 64 kg/m²          Physical Exam  Constitutional:       General: She is not in acute distress  Appearance: She is not ill-appearing, toxic-appearing or diaphoretic  HENT:      Head: Normocephalic and atraumatic  Eyes:      General: No scleral icterus  Skin:     General: Skin is warm  Neurological:      Mental Status: She is alert  Psychiatric:         Mood and Affect: Mood normal          Behavior: Behavior normal          Thought Content: Thought content normal          Judgment: Judgment normal        left upper arm incision is healing well  There was an excellent thrill and bruit  The staples removed and Steri-Strips applied

## 2022-09-14 NOTE — ASSESSMENT & PLAN NOTE
Lab Results   Component Value Date    EGFR 10 09/02/2022    EGFR 8 07/18/2022    EGFR 6 06/14/2022    CREATININE 4 28 (H) 09/02/2022    CREATININE 4 88 (H) 07/18/2022    CREATININE 5 92 (H) 06/14/2022   Patient returns to the office status post superficialization of left brachiocephalic AV fistula  The incision is healing well and well approximated  Staples removed today in the office Steri-Strips applied  Patient has an excellent thrill  Patient was residual postoperative edema to left upper arm  Okay to attempt fistula access in approximately 2 weeks

## 2022-10-07 ENCOUNTER — APPOINTMENT (OUTPATIENT)
Dept: LAB | Facility: HOSPITAL | Age: 67
End: 2022-10-07
Payer: MEDICARE

## 2022-10-07 DIAGNOSIS — Z79.4 TYPE 2 DIABETES MELLITUS WITH DIABETIC POLYNEUROPATHY, WITH LONG-TERM CURRENT USE OF INSULIN (HCC): ICD-10-CM

## 2022-10-07 DIAGNOSIS — E11.42 TYPE 2 DIABETES MELLITUS WITH DIABETIC POLYNEUROPATHY, WITH LONG-TERM CURRENT USE OF INSULIN (HCC): ICD-10-CM

## 2022-10-07 LAB
EST. AVERAGE GLUCOSE BLD GHB EST-MCNC: 160 MG/DL
HBA1C MFR BLD: 7.2 %

## 2022-10-07 PROCEDURE — 83036 HEMOGLOBIN GLYCOSYLATED A1C: CPT

## 2022-10-14 ENCOUNTER — TELEPHONE (OUTPATIENT)
Dept: NEPHROLOGY | Facility: CLINIC | Age: 67
End: 2022-10-14

## 2022-10-14 NOTE — TELEPHONE ENCOUNTER
Used  # [id number]    Received a call from patient wanting to schedule an appointment with Dr Donta Gutierrez due to patients hemoglobin being low  Patient wants to know if an injection is needed  Patient stated she is on dialysis   Please advise

## 2022-10-21 ENCOUNTER — HOSPITAL ENCOUNTER (OUTPATIENT)
Dept: RADIOLOGY | Facility: HOSPITAL | Age: 67
Discharge: HOME | End: 2022-10-21
Attending: INTERNAL MEDICINE
Payer: MEDICARE

## 2022-10-21 DIAGNOSIS — N18.9 CKD (CHRONIC KIDNEY DISEASE): ICD-10-CM

## 2022-10-21 PROCEDURE — 36589 REMOVAL TUNNELED CV CATH: CPT | Performed by: RADIOLOGY

## 2022-10-21 PROCEDURE — 36589 REMOVAL TUNNELED CV CATH: CPT

## 2022-10-21 NOTE — SEDATION DOCUMENTATION
Pt had a right permacath removal in IR  Pt tolerated well, gauze and tegaderm to site  AVS printed and give to pt and her family member  Reviewed instructions  All questions answered  Pt d/c home

## 2022-10-21 NOTE — DISCHARGE INSTRUCTIONS
Perma-cath Removal   WHAT YOU NEED TO KNOW:   A perma-cath is a catheter placed through a vein into or near your right atrium  Your right atrium is the right upper chamber of your heart  A perma-cath is used for dialysis in an emergency or until a long-term device is ready to use  Or you no longer need dialysis  DISCHARGE INSTRUCTIONS:   Call 911 for any of the following: You feel lightheaded, short of breath, and have chest pain  You start bleeding    Contact Interventional Radiology at [phone] Samir PATIENTS: Contact Interventional Radiology at [phone]) Markusmague Aldridge PATIENTS: Contact Interventional Radiology at [phone]) if:  Blood soaks through your bandage  You have new swelling in your arm, neck, face, or chest on your right side  Your bruises or pain get worse  You have a fever or chills  Persistent nausea or vomiting  Your incision is red, swollen, or draining pus  You have questions or concerns about your condition or care  Self-care:   Resume your normal diet  Small sips of flat soda will help with nausea  Keep your dressings dry  Do not get your perma-cath site wet until the incision closes  You may take a tub bath, but do not get your dressings wet  Water in your wound can cause bacteria to grow and cause an infection  If your dressing gets wet, remove the wet dressing and apply a dry bandaid  Keep it covered until the incision closes  This should only take a few days to heal  Do not use soaps or ointments  Immediately after your catheter is removed, no strenuous activity for twenty four hours and stay in an upright sitting position for two hours  Follow up with your healthcare provider as directed: Write down your questions so you remember to ask them during your visits  Perma-cath Removal   WHAT YOU NEED TO KNOW:   A perma-cath is a catheter placed through a vein into or near your right atrium  Your right atrium is the right upper chamber of your heart   A perma-cath is used for dialysis in an emergency or until a long-term device is ready to use  Or you no longer need dialysis  DISCHARGE INSTRUCTIONS:   Call 911 for any of the following: You feel lightheaded, short of breath, and have chest pain  You start bleeding    Contact Interventional Radiology at [phone] Samir PATIENTS: Contact Interventional Radiology at [phone]) Nic Molina PATIENTS: Contact Interventional Radiology at [phone]) if:  Blood soaks through your bandage  You have new swelling in your arm, neck, face, or chest on your right side  Your bruises or pain get worse  You have a fever or chills  Persistent nausea or vomiting  Your incision is red, swollen, or draining pus  You have questions or concerns about your condition or care  Self-care:   Resume your normal diet  Small sips of flat soda will help with nausea  Keep your dressings dry  Do not get your perma-cath site wet until the incision closes  You may take a tub bath, but do not get your dressings wet  Water in your wound can cause bacteria to grow and cause an infection  If your dressing gets wet, remove the wet dressing and apply a dry bandaid  Keep it covered until the incision closes  This should only take a few days to heal  Do not use soaps or ointments  Immediately after your catheter is removed, no strenuous activity for twenty four hours and stay in an upright sitting position for two hours  Follow up with your healthcare provider as directed: Write down your questions so you remember to ask them during your visits

## 2022-12-25 NOTE — ASSESSMENT & PLAN NOTE
Wt Readings from Last 3 Encounters:   09/13/21 73 4 kg (161 lb 13 1 oz)   08/31/21 71 2 kg (157 lb)   08/26/21 70 4 kg (155 lb 3 2 oz)     · She is euvolemic  Lee Ramos(Attending)

## 2023-01-20 ENCOUNTER — APPOINTMENT (OUTPATIENT)
Dept: LAB | Facility: HOSPITAL | Age: 68
End: 2023-01-20

## 2023-01-20 DIAGNOSIS — E78.5 DYSLIPIDEMIA: ICD-10-CM

## 2023-01-20 DIAGNOSIS — E55.9 VITAMIN D DEFICIENCY: ICD-10-CM

## 2023-01-20 DIAGNOSIS — E11.42 CONTROLLED TYPE 2 DIABETES MELLITUS WITH DIABETIC POLYNEUROPATHY, WITH LONG-TERM CURRENT USE OF INSULIN (HCC): ICD-10-CM

## 2023-01-20 DIAGNOSIS — N25.81 HYPERPARATHYROIDISM DUE TO RENAL INSUFFICIENCY (HCC): ICD-10-CM

## 2023-01-20 DIAGNOSIS — Z79.4 CONTROLLED TYPE 2 DIABETES MELLITUS WITH DIABETIC POLYNEUROPATHY, WITH LONG-TERM CURRENT USE OF INSULIN (HCC): ICD-10-CM

## 2023-01-20 LAB
25(OH)D3 SERPL-MCNC: 45.3 NG/ML (ref 30–100)
ALBUMIN SERPL BCP-MCNC: 3.6 G/DL (ref 3.5–5)
ALP SERPL-CCNC: 80 U/L (ref 43–122)
ALT SERPL W P-5'-P-CCNC: 32 U/L
ANION GAP SERPL CALCULATED.3IONS-SCNC: 7 MMOL/L (ref 5–14)
AST SERPL W P-5'-P-CCNC: 29 U/L (ref 14–36)
BILIRUB SERPL-MCNC: 0.5 MG/DL (ref 0.2–1)
BUN SERPL-MCNC: 37 MG/DL (ref 5–25)
CALCIUM SERPL-MCNC: 8.7 MG/DL (ref 8.4–10.2)
CHLORIDE SERPL-SCNC: 99 MMOL/L (ref 96–108)
CHOLEST SERPL-MCNC: 132 MG/DL
CO2 SERPL-SCNC: 32 MMOL/L (ref 21–32)
CREAT SERPL-MCNC: 4.47 MG/DL (ref 0.6–1.2)
EST. AVERAGE GLUCOSE BLD GHB EST-MCNC: 280 MG/DL
GFR SERPL CREATININE-BSD FRML MDRD: 9 ML/MIN/1.73SQ M
GLUCOSE P FAST SERPL-MCNC: 194 MG/DL (ref 70–99)
HBA1C MFR BLD: 11.4 %
HDLC SERPL-MCNC: 38 MG/DL
LDLC SERPL CALC-MCNC: 71 MG/DL
NONHDLC SERPL-MCNC: 94 MG/DL
POTASSIUM SERPL-SCNC: 4.5 MMOL/L (ref 3.5–5.3)
PROT SERPL-MCNC: 6.8 G/DL (ref 6.4–8.4)
PTH-INTACT SERPL-MCNC: 266.7 PG/ML (ref 18.4–80.1)
SODIUM SERPL-SCNC: 138 MMOL/L (ref 135–147)
T4 FREE SERPL-MCNC: 1.38 NG/DL (ref 0.76–1.46)
TRIGL SERPL-MCNC: 116 MG/DL
TSH SERPL DL<=0.05 MIU/L-ACNC: 5.86 UIU/ML (ref 0.45–4.5)

## 2023-03-17 ENCOUNTER — PREP FOR PROCEDURE (OUTPATIENT)
Dept: INTERVENTIONAL RADIOLOGY/VASCULAR | Facility: CLINIC | Age: 68
End: 2023-03-17

## 2023-03-17 DIAGNOSIS — Z99.2 ESRD (END STAGE RENAL DISEASE) ON DIALYSIS (HCC): Primary | ICD-10-CM

## 2023-03-17 DIAGNOSIS — N18.6 ESRD (END STAGE RENAL DISEASE) ON DIALYSIS (HCC): Primary | ICD-10-CM

## 2023-03-21 DIAGNOSIS — Z99.2 ESRD (END STAGE RENAL DISEASE) ON DIALYSIS (HCC): Primary | ICD-10-CM

## 2023-03-21 DIAGNOSIS — N18.6 ESRD (END STAGE RENAL DISEASE) ON DIALYSIS (HCC): Primary | ICD-10-CM

## 2023-03-23 ENCOUNTER — TELEPHONE (OUTPATIENT)
Dept: NEPHROLOGY | Facility: CLINIC | Age: 68
End: 2023-03-23

## 2023-03-23 NOTE — TELEPHONE ENCOUNTER
Used :     Spoke with patient regarding scheduling a transplant evaluation with Dr Carrington Dempsey  explained to patient that [facility] does not accept [facility]  I stated that she would either have to pay whatever Medicare part b doesn't cover or change insurance  Patient wants to try [facility]  I faxed form

## 2023-03-24 NOTE — TELEPHONE ENCOUNTER
Used : [id number]    I was informed that [facility] if it is secondary  I called patient to apologized and explained that [facility] since it is secondary  Patient did scheduled with Dr Colin Saldivar for 08/09/23 and is on the cancellation list  I mailed appointment reminder card to patient  I called Transplant and General Surgery with [facility] and spoke with Christopher Kebede to disregard the transplant consultation request form

## 2023-03-27 NOTE — TELEPHONE ENCOUNTER
----- Message from Yasemin sent at 9/24/2021  2:12 PM EDT -----  Regarding: Needs sooner apt  Spoke with patient and she will complete repeat blood work  Patient needs sooner apt than 10/14  Thank you!   (Patient Libyan Speaking only)  ----- Message -----  From: Catherine Marshall DO  Sent: 9/18/2021   6:57 AM EDT  To: [facility] [place] [facility]    Patient needs follow up appt in 2-3 weeks if possible, repeat BMP next week  We also need to follow up Renal BX report next week   Thank you Detail Level: Zone Include Location In Plan?: No

## 2023-03-31 ENCOUNTER — HOSPITAL ENCOUNTER (OUTPATIENT)
Dept: INTERVENTIONAL RADIOLOGY/VASCULAR | Facility: HOSPITAL | Age: 68
Discharge: HOME | End: 2023-03-31
Attending: RADIOLOGY

## 2023-03-31 VITALS
OXYGEN SATURATION: 95 % | TEMPERATURE: 96.7 F | HEIGHT: 61 IN | SYSTOLIC BLOOD PRESSURE: 171 MMHG | HEART RATE: 70 BPM | BODY MASS INDEX: 26.62 KG/M2 | DIASTOLIC BLOOD PRESSURE: 73 MMHG | WEIGHT: 141 LBS | RESPIRATION RATE: 18 BRPM

## 2023-03-31 DIAGNOSIS — N18.6 ESRD (END STAGE RENAL DISEASE) ON DIALYSIS (HCC): Primary | ICD-10-CM

## 2023-03-31 DIAGNOSIS — N18.6 TYPE 2 DIABETES MELLITUS WITH CHRONIC KIDNEY DISEASE ON CHRONIC DIALYSIS, WITH LONG-TERM CURRENT USE OF INSULIN (HCC): ICD-10-CM

## 2023-03-31 DIAGNOSIS — E11.22 TYPE 2 DIABETES MELLITUS WITH CHRONIC KIDNEY DISEASE ON CHRONIC DIALYSIS, WITH LONG-TERM CURRENT USE OF INSULIN (HCC): ICD-10-CM

## 2023-03-31 DIAGNOSIS — N18.6 ESRD (END STAGE RENAL DISEASE) ON DIALYSIS (HCC): ICD-10-CM

## 2023-03-31 DIAGNOSIS — E55.9 VITAMIN D DEFICIENCY: ICD-10-CM

## 2023-03-31 DIAGNOSIS — I10 ESSENTIAL (PRIMARY) HYPERTENSION: ICD-10-CM

## 2023-03-31 DIAGNOSIS — Z99.2 TYPE 2 DIABETES MELLITUS WITH CHRONIC KIDNEY DISEASE ON CHRONIC DIALYSIS, WITH LONG-TERM CURRENT USE OF INSULIN (HCC): ICD-10-CM

## 2023-03-31 DIAGNOSIS — Z79.4 TYPE 2 DIABETES MELLITUS WITH CHRONIC KIDNEY DISEASE ON CHRONIC DIALYSIS, WITH LONG-TERM CURRENT USE OF INSULIN (HCC): ICD-10-CM

## 2023-03-31 DIAGNOSIS — Z99.2 ESRD (END STAGE RENAL DISEASE) ON DIALYSIS (HCC): ICD-10-CM

## 2023-03-31 DIAGNOSIS — Z99.2 ESRD (END STAGE RENAL DISEASE) ON DIALYSIS (HCC): Primary | ICD-10-CM

## 2023-03-31 LAB — GLUCOSE SERPL-MCNC: 205 MG/DL (ref 65–140)

## 2023-03-31 RX ORDER — FENTANYL CITRATE 50 UG/ML
INJECTION, SOLUTION INTRAMUSCULAR; INTRAVENOUS AS NEEDED
Status: COMPLETED | OUTPATIENT
Start: 2023-03-31 | End: 2023-03-31

## 2023-03-31 RX ORDER — HEPARIN SODIUM 1000 [USP'U]/ML
INJECTION, SOLUTION INTRAVENOUS; SUBCUTANEOUS AS NEEDED
Status: COMPLETED | OUTPATIENT
Start: 2023-03-31 | End: 2023-03-31

## 2023-03-31 RX ORDER — SODIUM ZIRCONIUM CYCLOSILICATE 5 G/5G
POWDER, FOR SUSPENSION ORAL
Qty: 16 EACH | Refills: 3 | Status: SHIPPED | OUTPATIENT
Start: 2023-03-31

## 2023-03-31 RX ORDER — LIDOCAINE HYDROCHLORIDE 10 MG/ML
INJECTION, SOLUTION EPIDURAL; INFILTRATION; INTRACAUDAL; PERINEURAL AS NEEDED
Status: COMPLETED | OUTPATIENT
Start: 2023-03-31 | End: 2023-03-31

## 2023-03-31 RX ORDER — INSULIN LISPRO-AABC 100 [IU]/ML
INJECTION, SOLUTION SUBCUTANEOUS
COMMUNITY
Start: 2023-01-20

## 2023-03-31 RX ORDER — MIDAZOLAM HYDROCHLORIDE 2 MG/2ML
INJECTION, SOLUTION INTRAMUSCULAR; INTRAVENOUS AS NEEDED
Status: COMPLETED | OUTPATIENT
Start: 2023-03-31 | End: 2023-03-31

## 2023-03-31 RX ADMIN — FENTANYL CITRATE 50 MCG: 50 INJECTION, SOLUTION INTRAMUSCULAR; INTRAVENOUS at 13:03

## 2023-03-31 RX ADMIN — LIDOCAINE HYDROCHLORIDE 2 ML: 10 INJECTION, SOLUTION EPIDURAL; INFILTRATION; INTRACAUDAL; PERINEURAL at 12:58

## 2023-03-31 RX ADMIN — IOHEXOL 85 ML: 350 INJECTION, SOLUTION INTRAVENOUS at 13:15

## 2023-03-31 RX ADMIN — FENTANYL CITRATE 25 MCG: 50 INJECTION, SOLUTION INTRAMUSCULAR; INTRAVENOUS at 13:32

## 2023-03-31 RX ADMIN — MIDAZOLAM 1 MG: 1 INJECTION INTRAMUSCULAR; INTRAVENOUS at 13:03

## 2023-03-31 RX ADMIN — HEPARIN SODIUM 300 UNITS: 1000 INJECTION INTRAVENOUS; SUBCUTANEOUS at 13:38

## 2023-03-31 RX ADMIN — MIDAZOLAM 0.5 MG: 1 INJECTION INTRAMUSCULAR; INTRAVENOUS at 13:32

## 2023-03-31 NOTE — NURSING NOTE
Woggle device removed by IR RN  Dressings to fistula gram site remain clean, dry and intact  Written and verbal instructions given to pt and her home attendant Maricruz Mcnulty, who verbalize an understanding, used

## 2023-03-31 NOTE — H&P
"Interventional Radiology  History and Physical 3/31/2023     Lorne Ritter   [birth year]   [id number]    Assessment/Plan:    78year old female with a GINGER BCF presenting with high venous pressure and poor flow rates  Prior intervention in December 2019 where Bob Risser segment stenosis was treated with 6mm angioplasty  Plan for fistulagram with intervention including angioplasty, possible stent placement  Procedure, risks, benefits and alternatives were discussed with the patient  Consent obtained at bedside  BP (!) 187/82 (BP Location: Right arm)   Pulse 68   Temp (!) 96 4 °F (35 8 °C) (Temporal)   Resp 18   Ht 5' 1\" (1 549 m)   Wt 64 kg (141 lb)   SpO2 98%   BMI 26 64 kg/m²       Problem List Items Addressed This Visit        Genitourinary    ESRD (end stage renal disease) on dialysis ([facility] [address] )    Relevant Orders    IR AV fistulagram/graftogram          Subjective: Normal state of health, no acute complaints  Patient ID: Lorne Ritter is a 78 y o  female  History of Present Illness  See A/P above  Review of Systems   Respiratory: Negative  Cardiovascular: Negative            Past Medical History:   Diagnosis Date   • CHF (congestive heart failure) ([facility])    • CKD (chronic kidney disease) stage 5, GFR less than 15 ml/min ([facility])    • Diabetes mellitus ([facility] [address] )    • Diabetic nephropathy ([facility] [address] )    • Hemodialysis patient ([facility] [address] )     via permacath right chest / Acey Flicker Tues/Thurs and Sat   • Hyperlipidemia    • Hypertension    • Muscle weakness    • Orthodontics     sleeps with retainer at night   • Risk for falls    • Uses walker     per dtr active in the house able to cook/light cleaning as able        Past Surgical History:   Procedure Laterality Date   • CATARACT EXTRACTION Bilateral    • EGD     • IR AV FISTULAGRAM/GRAFTOGRAM  5/9/2022   • IR BIOPSY BONE MARROW  9/15/2021   • IR BIOPSY KIDNEY RANDOM  9/15/2021   • IR NON-TUNNELED CENTRAL LINE PLACEMENT  12/7/2021   • IR TUNNELED CENTRAL " LINE CHECK/CHANGE/REPOSITION  4/1/2022   • IR TUNNELED DIALYSIS CATHETER PLACEMENT  12/9/2021   • IR TUNNELED DIALYSIS CATHETER REMOVAL  10/21/2022   • UT ARTERIOVENOUS ANASTOMOSIS OPEN DIRECT Left 2/16/2022    Procedure: CREATION FISTULA ARTERIOVENOUS (AV); Surgeon: Cherelle Skelton DO;  Location: [facility];  Service: Vascular   • UT REVJ OPN ARVEN FSTL W/O [address] DIAL GRF Left 9/2/2022    Procedure: REVISION AV FISTULA (superficalize/transposition); Surgeon: Cherelle Skelton DO;  Location: [facility];  Service: Vascular        Social History     Tobacco Use   Smoking Status Never   Smokeless Tobacco Never   Tobacco Comments    NO TOBACCO USE        Social History     Substance and Sexual Activity   Alcohol Use Not Currently        Social History     Substance and Sexual Activity   Drug Use Not Currently        Allergies   Allergen Reactions   • Amlodipine Edema and Eye Swelling   • Lisinopril Angioedema     Bilateral periorbital edema that was significant       Current Outpatient Medications   Medication Sig Dispense Refill   • carvedilol (COREG) 12 5 mg tablet Take 1 tablet (12 5 mg total) by mouth 2 (two) times a day with meals 60 tablet 0   • cloNIDine (CATAPRES-TTS-3) 0 3 mg/24 hr Place 1 patch (0 3 mg total) on the skin once a week 4 patch 0   • doxazosin (CARDURA) 2 mg tablet Take 1 tablet (2 mg total) by mouth daily 30 tablet 0   • ergocalciferol (ERGOCALCIFEROL) 1 25 MG (60291 UT) capsule Take 50,000 Units by mouth every 30 (thirty) days       • gabapentin (NEURONTIN) 300 mg capsule Take 1 capsule (300 mg total) by mouth daily at bedtime 30 capsule 0   • hydrALAZINE (APRESOLINE) 50 mg tablet Take 1 tablet (50 mg total) by mouth every 8 (eight) hours (Patient taking differently: Take 100 mg by mouth every 8 (eight) hours) 90 tablet 0   • Insulin Lispro-aabc, 1 U Dial, (Elsyumjev MonetikPen) 100 UNIT/ML SOPN Inject 6u sc with breakfast/lunch/dinner       • linaGLIPtin (Tradjenta) 5 MG TABS Take 5 mg by "mouth daily     • torsemide (DEMADEX) 20 mg tablet TAKE ONE TABLET BY MOUTH EVERY DAY (Patient taking differently: Taking every other day) 30 tablet 0   • atorvastatin (LIPITOR) 40 mg tablet Take 1 tablet (40 mg total) by mouth daily (Patient taking differently: Take 80 mg by mouth daily at bedtime) 90 tablet 3   • calcium acetate (PHOSLO) capsule Take 1 capsule (667 mg total) by mouth 3 (three) times a day with meals 90 capsule 0   • Glucagon (Gvoke HypoPen 2-Pack) 1 MG/0 2ML SOAJ Inject 1 mg under the skin     • Krill Oil 1000 MG CAPS Take by mouth     • Lokelma 5 g PACK MIX 1 PACKET IN 3 TABLESPOONS OF WATER AND DRINK BY MOUTH ON MONDAYS WEDNESDAYS FRIDAYS AND SUNDAYS 16 each 3     No current facility-administered medications for this encounter  Objective:    Vitals:    03/31/23 1103 03/31/23 1109   BP: (!) 187/82    BP Location: Right arm    Pulse: 68    Resp: 18    Temp: (!) 96 4 °F (35 8 °C)    TempSrc: Temporal    SpO2: 98%    Weight:  64 kg (141 lb)   Height:  5' 1\" (1 549 m)        Physical Exam  Cardiovascular:      Rate and Rhythm: Normal rate and regular rhythm  Pulmonary:      Effort: Pulmonary effort is normal            No results found for: BNP   Lab Results   Component Value Date    WBC 4 83 10/07/2022    HGB 12 5 01/05/2023    HCT 29 4 (L) 10/07/2022    MCV 96 10/07/2022     10/07/2022     Lab Results   Component Value Date    INR 1 11 06/10/2022    INR 1 09 12/16/2021    INR 1 35 (H) 12/06/2021    PROTIME 14 0 06/10/2022    PROTIME 13 8 12/16/2021    PROTIME 16 4 (H) 12/06/2021     Lab Results   Component Value Date    PTT 34 06/10/2022         I have personally reviewed pertinent imaging and laboratory results  Code Status: Prior  Advance Directive and Living Will:      Power of :    POLST:      This text is generated with voice recognition software  There may be translation, syntax,  or grammatical errors   If you have any questions, please contact the dictating " provider

## 2023-03-31 NOTE — NURSING NOTE
Pt instructed to take her blood pressure medication as directed when she gets home  Pt's care taker states she will be due for a dose

## 2023-03-31 NOTE — DISCHARGE INSTRUCTIONS
Fistulagram   WHAT YOU NEED TO KNOW:   Your arm or leg my  be sore, swollen, and bruised after the procedure  This is normal and should get better in a few days  DISCHARGE INSTRUCTIONS:     Contact Interventional Radiology at [phone] and follow prompts if you experience any:    You have a fever or chills  Your puncture site is red, swollen, or draining pus  You have nausea or are vomiting  Your skin is itchy, swollen, or you have a rash  You cannot feel a thrill over your graft or fistula  You have questions or concerns about your condition or care  Seek care immediately if:     You have bleeding that does not stop after 10 minutes of holding firm, direct pressure over the puncture site  Blood soaks through your bandage  Your hand or foot closest to the graft or fistula feels cold, painful, or numb  Your hand or foot closest to the graft or fistula is pale or blue  You have trouble moving your arm or leg closest to the graft or fistula  Your bruise suddenly gets bigger  Care for your wound as directed:  Remove the bandage in 4 to 6 hours or as         directed  Wash the area once a day with soap and water  Gently pat the area dry  Apply firm, steady pressure to the puncture site if it bleeds  Use a clean gauze or towel to hold pressure for 10 to 15 minutes  Call 911 if you cannot stop the bleeding or the bleeding gets heavier  Feel for a thrill once a day or as directed  Place your index and second finger over your fistula or graft as directed  You should feel a vibration  The vibration means that blood is flowing through your graft or fistula correctly  Rest your arm or leg as directed  Do not lift anything heavier than 5 pounds or do strenuous activity for 24 hours  Prevent damage to your graft or fistula  Do not wear tight-fitting clothing over your graft or fistula  Do not wear tight jewelry on the arm or leg with the graft or fistula   Tell healthcare providers not to do, IVs, blood draws, and blood pressure readings in the arm with your graft or fistula  Do not allow flu shots or vaccinations in your arm with your graft or fistula  Follow up with your healthcare provider as directed        Moderate Sedation   WHAT YOU NEED TO KNOW:   Moderate sedation, or conscious sedation, is medicine used during procedures to help you feel relaxed and calm  You will be awake and able to follow directions without anxiety or pain  You will remember little to none of the procedure  You may feel tired, weak, or unsteady on your feet after you get sedation  You may also have trouble concentrating or short-term memory loss  These symptoms should go away in 24 hours or less  DISCHARGE INSTRUCTIONS:   Call 911 or have someone else call for any of the following: You have sudden trouble breathing  You cannot be woken  Seek care immediately if:   You have a severe headache or dizziness  Your heart is beating faster than usual   Contact your healthcare provider if:   You have a fever  You have nausea or are vomiting for more than 8 hours after the procedure  Your skin is itchy, swollen, or you have a rash  You have questions or concerns about your condition or care  Self-care:   Have someone stay with you for 24 hours  This person can drive you to errands and help you do things around the house  This person can also watch for problems  Rest and do quiet activities for 24 hours  Do not exercise, ride a bike, or play sports  Stand up slowly to prevent dizziness and falls  Take short walks around the house with another person  Slowly return to your usual activities the next day  Do not drive or use dangerous machines or tools for 24 hours  You may injure yourself or others  Examples include a lawnmower, saw, or drill  Do not return to work for 24 hours if you use dangerous machines or tools for work        Do not make important decisions for 24 hours  For example, do not sign important papers or invest money  Drink liquids as directed  Liquids help flush the sedation medicine out of your body  Ask how much liquid to drink each day and which liquids are best for you  Eat small, frequent meals to prevent nausea and vomiting  Start with clear liquids such as juice or broth  If you do not vomit after clear liquids, you can eat your usual foods  Do not drink alcohol or take medicines that make you drowsy  This includes medicines that help you sleep and anxiety medicines  Ask your healthcare provider if it is safe for you to take pain medicine  Follow up with your healthcare provider as directed: Write down your questions so you remember to ask them during your visits  © 2017 [address] Information is for End User's use only and may not be sold, redistributed or otherwise used for commercial purposes  All illustrations and images included in CareNotes® are the copyrighted property of [employer] , [employer]  or Landen Minaya  The above information is an  only  It is not intended as medical advice for individual conditions or treatments  Talk to your doctor, nurse or pharmacist before following any medical regimen to see if it is safe and effective for you

## 2023-03-31 NOTE — BRIEF OP NOTE (RAD/CATH)
INTERVENTIONAL RADIOLOGY PROCEDURE NOTE    Date: 3/31/2023    Procedure:   Procedure Summary     Date: 03/31/23 Room / Location: [facility] Interventional Radiology    Anesthesia Start:  Anesthesia Stop:     Procedure: IR AV FISTULAGRAM/GRAFTOGRAM Diagnosis:       ESRD (end stage renal disease) on dialysis ([address] )      (HVP, poor cannulation, poor flow rates)    Scheduled Providers:  Responsible Provider:     Anesthesia Type: Not recorded ASA Status: Not recorded          Preoperative diagnosis:   1  ESRD (end stage renal disease) on dialysis [facility])         Postoperative diagnosis: Same  Surgeon: Francoise Zacarias MD     Assistant: None  No qualified resident was available  Blood loss: Minimal    Specimens: None     Findings:     Entirety of outflow with segmental at least 75% stenosis: serial 5 then 7mm angioplasty across JA segment and proximal outflow  7mm angioplasty across mid and distal cephalic outflow  7mm DCB angioplasty across two regions of severe stenosis between cannulation zone aneurysms, and distal cephalic outflow  Less than 30% stenosis across these regions at completion  Technically challenging case where initially both antegrade and retrograde sheaths were occlusive within stenoses  50% anastomotic stenosis: 5mm angioplasty without significant residual stenosis post intervention  Central veins patent  Complications: None immediate      Anesthesia: conscious sedation

## 2023-03-31 NOTE — NURSING NOTE
Pt returned to [facility] awake,alert,no complaints, dressing clean, dry and intact, (+) bruit and thrill noted, woggle device in place

## 2023-07-26 ENCOUNTER — PREP FOR PROCEDURE (OUTPATIENT)
Dept: INTERVENTIONAL RADIOLOGY/VASCULAR | Facility: CLINIC | Age: 68
End: 2023-07-26

## 2023-07-26 ENCOUNTER — APPOINTMENT (OUTPATIENT)
Dept: LAB | Facility: HOSPITAL | Age: 68
End: 2023-07-26
Payer: MEDICARE

## 2023-07-26 DIAGNOSIS — Z83.49 FAMILY HISTORY OF THYROID DISEASE: ICD-10-CM

## 2023-07-26 DIAGNOSIS — E78.5 DYSLIPIDEMIA: ICD-10-CM

## 2023-07-26 DIAGNOSIS — Z99.2 ESRD (END STAGE RENAL DISEASE) ON DIALYSIS (HCC): Primary | ICD-10-CM

## 2023-07-26 DIAGNOSIS — Z79.4 TYPE 2 DIABETES MELLITUS WITH DIABETIC POLYNEUROPATHY, WITH LONG-TERM CURRENT USE OF INSULIN (HCC): ICD-10-CM

## 2023-07-26 DIAGNOSIS — N18.6 ESRD (END STAGE RENAL DISEASE) ON DIALYSIS (HCC): Primary | ICD-10-CM

## 2023-07-26 DIAGNOSIS — E11.42 TYPE 2 DIABETES MELLITUS WITH DIABETIC POLYNEUROPATHY, WITH LONG-TERM CURRENT USE OF INSULIN (HCC): ICD-10-CM

## 2023-07-26 LAB
ALBUMIN SERPL BCP-MCNC: 4.1 G/DL (ref 3.5–5)
ALP SERPL-CCNC: 67 U/L (ref 34–104)
ALT SERPL W P-5'-P-CCNC: 13 U/L (ref 7–52)
ANION GAP SERPL CALCULATED.3IONS-SCNC: 12 MMOL/L
AST SERPL W P-5'-P-CCNC: 18 U/L (ref 13–39)
BILIRUB SERPL-MCNC: 0.41 MG/DL (ref 0.2–1)
BUN SERPL-MCNC: 37 MG/DL (ref 5–25)
CALCIUM SERPL-MCNC: 9.3 MG/DL (ref 8.4–10.2)
CHLORIDE SERPL-SCNC: 94 MMOL/L (ref 96–108)
CHOLEST SERPL-MCNC: 247 MG/DL
CO2 SERPL-SCNC: 30 MMOL/L (ref 21–32)
CREAT SERPL-MCNC: 5.64 MG/DL (ref 0.6–1.3)
EST. AVERAGE GLUCOSE BLD GHB EST-MCNC: 186 MG/DL
GFR SERPL CREATININE-BSD FRML MDRD: 7 ML/MIN/1.73SQ M
GLUCOSE P FAST SERPL-MCNC: 105 MG/DL (ref 65–99)
HBA1C MFR BLD: 8.1 %
HDLC SERPL-MCNC: 40 MG/DL
LDLC SERPL CALC-MCNC: 164 MG/DL (ref 0–100)
NONHDLC SERPL-MCNC: 207 MG/DL
POTASSIUM SERPL-SCNC: 5.7 MMOL/L (ref 3.5–5.3)
PROT SERPL-MCNC: 7.7 G/DL (ref 6.4–8.4)
SODIUM SERPL-SCNC: 136 MMOL/L (ref 135–147)
T4 FREE SERPL-MCNC: 0.91 NG/DL (ref 0.61–1.12)
TRIGL SERPL-MCNC: 214 MG/DL
TSH SERPL DL<=0.05 MIU/L-ACNC: 3.86 UIU/ML (ref 0.45–4.5)

## 2023-07-26 PROCEDURE — 84443 ASSAY THYROID STIM HORMONE: CPT

## 2023-07-26 PROCEDURE — 80053 COMPREHEN METABOLIC PANEL: CPT

## 2023-07-26 PROCEDURE — 36415 COLL VENOUS BLD VENIPUNCTURE: CPT

## 2023-07-26 PROCEDURE — 84439 ASSAY OF FREE THYROXINE: CPT

## 2023-07-26 PROCEDURE — 83036 HEMOGLOBIN GLYCOSYLATED A1C: CPT

## 2023-07-26 PROCEDURE — 80061 LIPID PANEL: CPT

## 2023-07-27 DIAGNOSIS — Z99.2 TYPE 2 DIABETES MELLITUS WITH CHRONIC KIDNEY DISEASE ON CHRONIC DIALYSIS, WITH LONG-TERM CURRENT USE OF INSULIN (HCC): ICD-10-CM

## 2023-07-27 DIAGNOSIS — N18.6 TYPE 2 DIABETES MELLITUS WITH CHRONIC KIDNEY DISEASE ON CHRONIC DIALYSIS, WITH LONG-TERM CURRENT USE OF INSULIN (HCC): ICD-10-CM

## 2023-07-27 DIAGNOSIS — Z99.2 ESRD (END STAGE RENAL DISEASE) ON DIALYSIS (HCC): ICD-10-CM

## 2023-07-27 DIAGNOSIS — E87.5 HYPERKALEMIA: Primary | ICD-10-CM

## 2023-07-27 DIAGNOSIS — N18.6 ESRD (END STAGE RENAL DISEASE) ON DIALYSIS (HCC): ICD-10-CM

## 2023-07-27 DIAGNOSIS — I10 ESSENTIAL (PRIMARY) HYPERTENSION: ICD-10-CM

## 2023-07-27 DIAGNOSIS — E11.22 TYPE 2 DIABETES MELLITUS WITH CHRONIC KIDNEY DISEASE ON CHRONIC DIALYSIS, WITH LONG-TERM CURRENT USE OF INSULIN (HCC): ICD-10-CM

## 2023-07-27 DIAGNOSIS — Z79.4 TYPE 2 DIABETES MELLITUS WITH CHRONIC KIDNEY DISEASE ON CHRONIC DIALYSIS, WITH LONG-TERM CURRENT USE OF INSULIN (HCC): ICD-10-CM

## 2023-07-27 DIAGNOSIS — E55.9 VITAMIN D DEFICIENCY: ICD-10-CM

## 2023-08-03 ENCOUNTER — TELEPHONE (OUTPATIENT)
Dept: INTERVENTIONAL RADIOLOGY/VASCULAR | Facility: HOSPITAL | Age: 68
End: 2023-08-03

## 2023-08-04 ENCOUNTER — TELEPHONE (OUTPATIENT)
Dept: INTERVENTIONAL RADIOLOGY/VASCULAR | Facility: HOSPITAL | Age: 68
End: 2023-08-04

## 2023-08-08 ENCOUNTER — TELEPHONE (OUTPATIENT)
Dept: NEPHROLOGY | Facility: CLINIC | Age: 68
End: 2023-08-08

## 2023-08-08 NOTE — TELEPHONE ENCOUNTER
Called pt with  to confirm 8/9/23 appt. Pt asked to cancel due to a conflicting appt and will be calling the office at another time to reschedule.

## 2023-08-09 ENCOUNTER — HOSPITAL ENCOUNTER (OUTPATIENT)
Dept: INTERVENTIONAL RADIOLOGY/VASCULAR | Facility: HOSPITAL | Age: 68
Discharge: HOME | End: 2023-08-09
Attending: STUDENT IN AN ORGANIZED HEALTH CARE EDUCATION/TRAINING PROGRAM | Admitting: RADIOLOGY
Payer: MEDICARE

## 2023-08-09 VITALS
HEART RATE: 65 BPM | RESPIRATION RATE: 16 BRPM | OXYGEN SATURATION: 97 % | TEMPERATURE: 97.1 F | WEIGHT: 141 LBS | BODY MASS INDEX: 26.62 KG/M2 | DIASTOLIC BLOOD PRESSURE: 59 MMHG | HEIGHT: 61 IN | SYSTOLIC BLOOD PRESSURE: 110 MMHG

## 2023-08-09 DIAGNOSIS — N18.6 ESRD (END STAGE RENAL DISEASE) ON DIALYSIS (HCC): ICD-10-CM

## 2023-08-09 DIAGNOSIS — Z99.2 ESRD (END STAGE RENAL DISEASE) ON DIALYSIS (HCC): ICD-10-CM

## 2023-08-09 LAB — GLUCOSE SERPL-MCNC: 211 MG/DL (ref 65–140)

## 2023-08-09 PROCEDURE — C1894 INTRO/SHEATH, NON-LASER: HCPCS

## 2023-08-09 PROCEDURE — C1725 CATH, TRANSLUMIN NON-LASER: HCPCS

## 2023-08-09 PROCEDURE — 99153 MOD SED SAME PHYS/QHP EA: CPT

## 2023-08-09 PROCEDURE — C1887 CATHETER, GUIDING: HCPCS

## 2023-08-09 PROCEDURE — 36902 INTRO CATH DIALYSIS CIRCUIT: CPT | Performed by: RADIOLOGY

## 2023-08-09 PROCEDURE — 82948 REAGENT STRIP/BLOOD GLUCOSE: CPT

## 2023-08-09 PROCEDURE — C2623 CATH, TRANSLUMIN, DRUG-COAT: HCPCS

## 2023-08-09 PROCEDURE — C1769 GUIDE WIRE: HCPCS

## 2023-08-09 PROCEDURE — 99152 MOD SED SAME PHYS/QHP 5/>YRS: CPT | Performed by: RADIOLOGY

## 2023-08-09 PROCEDURE — 99152 MOD SED SAME PHYS/QHP 5/>YRS: CPT

## 2023-08-09 PROCEDURE — 36902 INTRO CATH DIALYSIS CIRCUIT: CPT

## 2023-08-09 RX ORDER — FENTANYL CITRATE 50 UG/ML
INJECTION, SOLUTION INTRAMUSCULAR; INTRAVENOUS AS NEEDED
Status: COMPLETED | OUTPATIENT
Start: 2023-08-09 | End: 2023-08-09

## 2023-08-09 RX ORDER — MIDAZOLAM HYDROCHLORIDE 2 MG/2ML
INJECTION, SOLUTION INTRAMUSCULAR; INTRAVENOUS AS NEEDED
Status: COMPLETED | OUTPATIENT
Start: 2023-08-09 | End: 2023-08-09

## 2023-08-09 RX ORDER — LIDOCAINE WITH 8.4% SOD BICARB 0.9%(10ML)
SYRINGE (ML) INJECTION AS NEEDED
Status: COMPLETED | OUTPATIENT
Start: 2023-08-09 | End: 2023-08-09

## 2023-08-09 RX ADMIN — IOHEXOL 26 ML: 350 INJECTION, SOLUTION INTRAVENOUS at 15:26

## 2023-08-09 RX ADMIN — Medication 5 ML: at 14:48

## 2023-08-09 RX ADMIN — MIDAZOLAM 1 MG: 1 INJECTION INTRAMUSCULAR; INTRAVENOUS at 14:52

## 2023-08-09 RX ADMIN — FENTANYL CITRATE 50 MCG: 50 INJECTION, SOLUTION INTRAMUSCULAR; INTRAVENOUS at 14:52

## 2023-08-09 RX ADMIN — Medication 3 ML: at 15:14

## 2023-08-09 NOTE — BRIEF OP NOTE (RAD/CATH)
INTERVENTIONAL RADIOLOGY PROCEDURE NOTE    Date: 8/9/2023    Procedure:   Procedure Summary     Date: 08/09/23 Room / Location: [facility] Interventional Radiology    Anesthesia Start:  Anesthesia Stop:     Procedure: IR AV FISTULAGRAM/GRAFTOGRAM Diagnosis:       ESRD (end stage renal disease) on dialysis ([address])      (HVP, HAP)    Scheduled Providers:  Responsible Provider:     Anesthesia Type: Not recorded ASA Status: Not recorded          Preoperative diagnosis:   1. ESRD (end stage renal disease) on dialysis [facility])         Postoperative diagnosis: Same. Surgeon: Darshan Cade MD     Assistant: None. No qualified resident was available. Blood loss: Minimal    Specimens: None    Findings:  1. Long segment restenosis of cephalic vein treated with 7 mm angioplasty and spot treated with 7 mm drug-coated balloon  2. Mild stenosis of juxta-anastomotic basilic vein treated with 6 mm angioplasty    Complications: None immediate.     Anesthesia: conscious sedation

## 2023-08-09 NOTE — NURSING NOTE
Both the antegrade and retrograde "woggle" devices removed. Hemostasis observed. Gauze and tape dressings applied.

## 2023-08-09 NOTE — H&P
Interventional Radiology  History and Physical 8/9/2023     [facility]   1955   [id number]    H&P reviewed. There have been no interval changes since the time the H&P was written. /73 (BP Location: Right arm)   Pulse 61   Temp (!) 96.2 °F (35.7 °C) (Temporal)   Resp 18   Ht 5' 1" (1.549 m)   Wt 64 kg (141 lb)   SpO2 98%   BMI 26.64 kg/m²     Prior imaging was reviewed. Presents today for fistula evaluation for reported high venous pressures and high arterial pressures. The procedure was discussed with her by video  and all questions answered. Left arm fistula is patent but pulsatile. Informed written consent was obtained.     Nathalia Dumont MD

## 2023-08-09 NOTE — DISCHARGE INSTRUCTIONS
Fistulagram   WHAT YOU NEED TO KNOW:   Your arm or leg my  be sore, swollen, and bruised after the procedure. This is normal and should get better in a few days. DISCHARGE INSTRUCTIONS:     Contact Interventional Radiology at [phone] and follow prompts if you experience any:    You have a fever or chills. Your puncture site is red, swollen, or draining pus. You have nausea or are vomiting. Your skin is itchy, swollen, or you have a rash. You cannot feel a thrill over your graft or fistula. You have questions or concerns about your condition or care. Seek care immediately if:     You have bleeding that does not stop after 10 minutes of holding firm, direct pressure over the puncture site. Blood soaks through your bandage. Your hand or foot closest to the graft or fistula feels cold, painful, or numb. Your hand or foot closest to the graft or fistula is pale or blue. You have trouble moving your arm or leg closest to the graft or fistula. Your bruise suddenly gets bigger. Care for your wound as directed:  Remove the bandage in 4 to 6 hours or as         directed. Wash the area once a day with soap and water. Gently pat the area dry. Apply firm, steady pressure to the puncture site if it bleeds. Use a clean gauze or towel to hold pressure for 10 to 15 minutes. Call 911 if you cannot stop the bleeding or the bleeding gets heavier. Feel for a thrill once a day or as directed. Place your index and second finger over your fistula or graft as directed. You should feel a vibration. The vibration means that blood is flowing through your graft or fistula correctly. Rest your arm or leg as directed. Do not lift anything heavier than 5 pounds or do strenuous activity for 24 hours. Prevent damage to your graft or fistula. Do not wear tight-fitting clothing over your graft or fistula. Do not wear tight jewelry on the arm or leg with the graft or fistula.  Tell healthcare providers not to do, IVs, blood draws, and blood pressure readings in the arm with your graft or fistula. Do not allow flu shots or vaccinations in your arm with your graft or fistula. Follow up with your healthcare provider as directed Procedural Sedation   WHAT YOU NEED TO KNOW:   Procedural sedation is medicine used during procedures to help you feel relaxed and calm. You will remember little to none of the procedure. After sedation you may feel tired, weak, or unsteady on your feet. You may also have trouble concentrating or short-term memory loss. These symptoms should go away in 24 hours or less. DISCHARGE INSTRUCTIONS:     Call 911 or have someone else call for any of the following: You have sudden trouble breathing. You cannot be woken. Contact Interventional Radiology at [phone]   [facility]ir PATIENTS: Contact Interventional Radiology at [phone]) Conor Rios PATIENTS: Contact Interventional Radiology at [phone]) if any of the following occur: You have a severe headache or dizziness. Your heart is beating faster than usual.    You have a fever or chills. Your skin is itchy, swollen, or you have a rash. You have nausea or are vomiting for more than 8 hours after the procedure. You have questions or concerns about your condition or care. Self-care:   Have someone stay with you for 24 hours. This person can drive you to errands and help you do things around the house. This person can also watch for problems. Rest and do quiet activities for 24 hours. Do not exercise, ride a bike, or play sports. Stand up slowly to prevent dizziness and falls. Take short walks around the house with another person. Slowly return to your usual activities the next day. Do not drive or use dangerous machines or tools for 24 hours. You may injure yourself or others. Examples include a lawnmower, saw, or drill.  Do not return to work for 24 hours if you use dangerous machines or tools for work. Do not make important decisions for 24 hours. For example, do not sign important papers or invest money. Drink liquids as directed. Liquids help flush the sedation medicine out of your body. Ask how much liquid to drink each day and which liquids are best for you. Eat small, frequent meals to prevent nausea and vomiting. Start with clear liquids such as juice or broth. If you do not vomit after clear liquids, you can eat your usual foods. Do not drink alcohol or take medicines that make you drowsy. This includes medicines that help you sleep and anxiety medicines. Ask your healthcare provider if it is safe for you to take pain medicine. Follow up with your healthcare provider as directed: Write down your questions so you remember to ask them during your visits.

## 2023-08-09 NOTE — NURSING NOTE
Pt is awake,alert,tolerated diet, Dressing to left arm is dry and intact, (+) bruit and thrill noted. Written and verbal instructions given to pt in [place]. Pt and her family verbalize an understanding.

## 2023-08-11 NOTE — ASSESSMENT & PLAN NOTE
Called patient to schedule for the ACC Program. LVM   Lab Results   Component Value Date    EGFR 15 09/17/2021    EGFR 14 09/16/2021    EGFR 16 09/15/2021    CREATININE 3 14 (H) 09/17/2021    CREATININE 3 35 (H) 09/16/2021    CREATININE 2 91 (H) 09/15/2021     · Appreciate nephrology recommendations  · Creatinine at 3 1  · Diuretics were restarted qod  · S/p renal biopsy  · D/c to home

## 2023-08-14 ENCOUNTER — APPOINTMENT (OUTPATIENT)
Dept: LAB | Facility: HOSPITAL | Age: 68
End: 2023-08-14
Payer: MEDICARE

## 2023-08-14 DIAGNOSIS — D47.2 SMOLDERING MULTIPLE MYELOMA: ICD-10-CM

## 2023-08-14 PROCEDURE — 84165 PROTEIN E-PHORESIS SERUM: CPT

## 2023-08-14 PROCEDURE — 36415 COLL VENOUS BLD VENIPUNCTURE: CPT

## 2023-08-14 PROCEDURE — 83521 IG LIGHT CHAINS FREE EACH: CPT

## 2023-08-15 LAB
KAPPA LC FREE SER-MCNC: 156.8 MG/L (ref 3.3–19.4)
KAPPA LC FREE/LAMBDA FREE SER: 0.2 {RATIO} (ref 0.26–1.65)
LAMBDA LC FREE SERPL-MCNC: 793 MG/L (ref 5.7–26.3)

## 2023-08-16 LAB
ALBUMIN SERPL ELPH-MCNC: 3.69 G/DL (ref 3.2–5.1)
ALBUMIN SERPL ELPH-MCNC: 52.7 % (ref 48–70)
ALPHA1 GLOB SERPL ELPH-MCNC: 0.32 G/DL (ref 0.15–0.47)
ALPHA1 GLOB SERPL ELPH-MCNC: 4.6 % (ref 1.8–7)
ALPHA2 GLOB SERPL ELPH-MCNC: 1.03 G/DL (ref 0.42–1.04)
ALPHA2 GLOB SERPL ELPH-MCNC: 14.7 % (ref 5.9–14.9)
BETA GLOB ABNORMAL SERPL ELPH-MCNC: 0.29 G/DL (ref 0.31–0.57)
BETA1 GLOB SERPL ELPH-MCNC: 4.2 % (ref 4.7–7.7)
BETA2 GLOB SERPL ELPH-MCNC: 6.8 % (ref 3.1–7.9)
BETA2+GAMMA GLOB SERPL ELPH-MCNC: 0.48 G/DL (ref 0.2–0.58)
GAMMA GLOB ABNORMAL SERPL ELPH-MCNC: 1.19 G/DL (ref 0.4–1.66)
GAMMA GLOB SERPL ELPH-MCNC: 17 % (ref 6.9–22.3)
IGG/ALB SER: 1.11 {RATIO} (ref 1.1–1.8)
M PEAK ID 2: 2.1 %
M PROTEIN 1 MFR SERPL ELPH: 2.1 %
M PROTEIN 1 SERPL ELPH-MCNC: 0.15 G/DL
M PROTEIN 2 SERPL ELPH-MCNC: 0.15 G/DL
PROT PATTERN SERPL ELPH-IMP: ABNORMAL
PROT SERPL-MCNC: 7 G/DL (ref 6.4–8.2)

## 2023-08-16 PROCEDURE — 84165 PROTEIN E-PHORESIS SERUM: CPT | Performed by: PATHOLOGY

## 2023-08-31 DIAGNOSIS — N18.6 ESRD (END STAGE RENAL DISEASE) ON DIALYSIS (HCC): Primary | ICD-10-CM

## 2023-08-31 DIAGNOSIS — Z99.2 ESRD (END STAGE RENAL DISEASE) ON DIALYSIS (HCC): Primary | ICD-10-CM

## 2023-08-31 DIAGNOSIS — I10 ESSENTIAL (PRIMARY) HYPERTENSION: Primary | ICD-10-CM

## 2023-08-31 RX ORDER — CLONIDINE HYDROCHLORIDE 0.2 MG/1
0.2 TABLET ORAL 3 TIMES DAILY
Qty: 270 TABLET | Refills: 4 | Status: SHIPPED | OUTPATIENT
Start: 2023-08-31

## 2023-08-31 RX ORDER — VITAMIN B COMPLEX-VITAMIN C-FOLIC ACID 0.8 MG TABLET
0.8 TABLET DAILY
Qty: 90 TABLET | Refills: 3 | Status: SHIPPED | OUTPATIENT
Start: 2023-08-31

## 2023-09-19 DIAGNOSIS — I10 ESSENTIAL (PRIMARY) HYPERTENSION: ICD-10-CM

## 2023-09-19 RX ORDER — CLONIDINE HYDROCHLORIDE 0.2 MG/1
0.2 TABLET ORAL 3 TIMES DAILY
Qty: 270 TABLET | Refills: 4 | Status: SHIPPED | OUTPATIENT
Start: 2023-09-19

## 2023-10-06 ENCOUNTER — HOSPITAL ENCOUNTER (INPATIENT)
Facility: HOSPITAL | Age: 68
LOS: 2 days | Discharge: HOME | End: 2023-10-08
Attending: EMERGENCY MEDICINE
Payer: MEDICARE

## 2023-10-06 ENCOUNTER — APPOINTMENT (EMERGENCY)
Dept: CT IMAGING | Facility: HOSPITAL | Age: 68
End: 2023-10-06
Payer: MEDICARE

## 2023-10-06 ENCOUNTER — APPOINTMENT (EMERGENCY)
Dept: RADIOLOGY | Facility: HOSPITAL | Age: 68
End: 2023-10-06
Payer: MEDICARE

## 2023-10-06 DIAGNOSIS — I50.33 ACUTE ON CHRONIC DIASTOLIC HEART FAILURE (HCC): ICD-10-CM

## 2023-10-06 DIAGNOSIS — I10 ESSENTIAL (PRIMARY) HYPERTENSION: ICD-10-CM

## 2023-10-06 DIAGNOSIS — Z12.31 ENCOUNTER FOR SCREENING MAMMOGRAM FOR BREAST CANCER: ICD-10-CM

## 2023-10-06 DIAGNOSIS — J90 PLEURAL EFFUSION, BILATERAL: Primary | ICD-10-CM

## 2023-10-06 DIAGNOSIS — Z99.2 ESRD (END STAGE RENAL DISEASE) ON DIALYSIS (HCC): ICD-10-CM

## 2023-10-06 DIAGNOSIS — N18.6 ESRD (END STAGE RENAL DISEASE) ON DIALYSIS (HCC): ICD-10-CM

## 2023-10-06 DIAGNOSIS — R03.0 ELEVATED BLOOD PRESSURE READING: ICD-10-CM

## 2023-10-06 DIAGNOSIS — D64.9 ANEMIA: ICD-10-CM

## 2023-10-06 PROBLEM — I16.1 HYPERTENSIVE EMERGENCY: Status: ACTIVE | Noted: 2023-10-06

## 2023-10-06 LAB
2HR DELTA HS TROPONIN: 3 NG/L
4HR DELTA HS TROPONIN: 4 NG/L
ALBUMIN SERPL BCP-MCNC: 3.4 G/DL (ref 3.5–5)
ALP SERPL-CCNC: 53 U/L (ref 34–104)
ALT SERPL W P-5'-P-CCNC: 5 U/L (ref 7–52)
ANION GAP SERPL CALCULATED.3IONS-SCNC: 10 MMOL/L
AST SERPL W P-5'-P-CCNC: 16 U/L (ref 13–39)
ATRIAL RATE: 69 BPM
ATRIAL RATE: 72 BPM
BASOPHILS # BLD AUTO: 0.02 THOUSANDS/ÂΜL (ref 0–0.1)
BASOPHILS NFR BLD AUTO: 0 % (ref 0–1)
BILIRUB SERPL-MCNC: 0.66 MG/DL (ref 0.2–1)
BNP SERPL-MCNC: 2420 PG/ML (ref 0–100)
BUN SERPL-MCNC: 17 MG/DL (ref 5–25)
CALCIUM ALBUM COR SERPL-MCNC: 8.7 MG/DL (ref 8.3–10.1)
CALCIUM SERPL-MCNC: 8.2 MG/DL (ref 8.4–10.2)
CARDIAC TROPONIN I PNL SERPL HS: 25 NG/L
CARDIAC TROPONIN I PNL SERPL HS: 28 NG/L
CARDIAC TROPONIN I PNL SERPL HS: 29 NG/L
CHLORIDE SERPL-SCNC: 95 MMOL/L (ref 96–108)
CO2 SERPL-SCNC: 27 MMOL/L (ref 21–32)
CREAT SERPL-MCNC: 4.04 MG/DL (ref 0.6–1.3)
EOSINOPHIL # BLD AUTO: 0.07 THOUSAND/ÂΜL (ref 0–0.61)
EOSINOPHIL NFR BLD AUTO: 1 % (ref 0–6)
ERYTHROCYTE [DISTWIDTH] IN BLOOD BY AUTOMATED COUNT: 14.4 % (ref 11.6–15.1)
GFR SERPL CREATININE-BSD FRML MDRD: 10 ML/MIN/1.73SQ M
GLUCOSE SERPL-MCNC: 195 MG/DL (ref 65–140)
GLUCOSE SERPL-MCNC: 261 MG/DL (ref 65–140)
HCT VFR BLD AUTO: 26.3 % (ref 34.8–46.1)
HGB BLD-MCNC: 8 G/DL (ref 11.5–15.4)
IMM GRANULOCYTES # BLD AUTO: 0.02 THOUSAND/UL (ref 0–0.2)
IMM GRANULOCYTES NFR BLD AUTO: 0 % (ref 0–2)
LIPASE SERPL-CCNC: 7 U/L (ref 11–82)
LYMPHOCYTES # BLD AUTO: 1.04 THOUSANDS/ÂΜL (ref 0.6–4.47)
LYMPHOCYTES NFR BLD AUTO: 18 % (ref 14–44)
MCH RBC QN AUTO: 28.3 PG (ref 26.8–34.3)
MCHC RBC AUTO-ENTMCNC: 30.4 G/DL (ref 31.4–37.4)
MCV RBC AUTO: 93 FL (ref 82–98)
MONOCYTES # BLD AUTO: 0.44 THOUSAND/ÂΜL (ref 0.17–1.22)
MONOCYTES NFR BLD AUTO: 7 % (ref 4–12)
NEUTROPHILS # BLD AUTO: 4.36 THOUSANDS/ÂΜL (ref 1.85–7.62)
NEUTS SEG NFR BLD AUTO: 74 % (ref 43–75)
NRBC BLD AUTO-RTO: 0 /100 WBCS
P AXIS: 34 DEGREES
P AXIS: 46 DEGREES
PLATELET # BLD AUTO: 193 THOUSANDS/UL (ref 149–390)
PMV BLD AUTO: 10.5 FL (ref 8.9–12.7)
POTASSIUM SERPL-SCNC: 4 MMOL/L (ref 3.5–5.3)
PR INTERVAL: 158 MS
PR INTERVAL: 166 MS
PROT SERPL-MCNC: 6.4 G/DL (ref 6.4–8.4)
QRS AXIS: 77 DEGREES
QRS AXIS: 99 DEGREES
QRSD INTERVAL: 90 MS
QRSD INTERVAL: 96 MS
QT INTERVAL: 510 MS
QT INTERVAL: 512 MS
QTC INTERVAL: 548 MS
QTC INTERVAL: 558 MS
RBC # BLD AUTO: 2.83 MILLION/UL (ref 3.81–5.12)
SODIUM SERPL-SCNC: 132 MMOL/L (ref 135–147)
T WAVE AXIS: -12 DEGREES
T WAVE AXIS: 25 DEGREES
VENTRICULAR RATE: 69 BPM
VENTRICULAR RATE: 72 BPM
WBC # BLD AUTO: 5.95 THOUSAND/UL (ref 4.31–10.16)

## 2023-10-06 PROCEDURE — 99223 1ST HOSP IP/OBS HIGH 75: CPT | Performed by: PHYSICIAN ASSISTANT

## 2023-10-06 PROCEDURE — G1004 CDSM NDSC: HCPCS

## 2023-10-06 PROCEDURE — 84484 ASSAY OF TROPONIN QUANT: CPT | Performed by: EMERGENCY MEDICINE

## 2023-10-06 PROCEDURE — 80053 COMPREHEN METABOLIC PANEL: CPT | Performed by: EMERGENCY MEDICINE

## 2023-10-06 PROCEDURE — 83880 ASSAY OF NATRIURETIC PEPTIDE: CPT | Performed by: EMERGENCY MEDICINE

## 2023-10-06 PROCEDURE — 96375 TX/PRO/DX INJ NEW DRUG ADDON: CPT

## 2023-10-06 PROCEDURE — 99291 CRITICAL CARE FIRST HOUR: CPT | Performed by: EMERGENCY MEDICINE

## 2023-10-06 PROCEDURE — 96374 THER/PROPH/DIAG INJ IV PUSH: CPT

## 2023-10-06 PROCEDURE — 85025 COMPLETE CBC W/AUTO DIFF WBC: CPT | Performed by: EMERGENCY MEDICINE

## 2023-10-06 PROCEDURE — 83690 ASSAY OF LIPASE: CPT | Performed by: EMERGENCY MEDICINE

## 2023-10-06 PROCEDURE — 93005 ELECTROCARDIOGRAM TRACING: CPT

## 2023-10-06 PROCEDURE — 93010 ELECTROCARDIOGRAM REPORT: CPT | Performed by: STUDENT IN AN ORGANIZED HEALTH CARE EDUCATION/TRAINING PROGRAM

## 2023-10-06 PROCEDURE — 99284 EMERGENCY DEPT VISIT MOD MDM: CPT

## 2023-10-06 PROCEDURE — 82948 REAGENT STRIP/BLOOD GLUCOSE: CPT

## 2023-10-06 PROCEDURE — 74177 CT ABD & PELVIS W/CONTRAST: CPT

## 2023-10-06 PROCEDURE — 71046 X-RAY EXAM CHEST 2 VIEWS: CPT

## 2023-10-06 PROCEDURE — 36415 COLL VENOUS BLD VENIPUNCTURE: CPT | Performed by: EMERGENCY MEDICINE

## 2023-10-06 RX ORDER — METOCLOPRAMIDE HYDROCHLORIDE 5 MG/ML
10 INJECTION INTRAMUSCULAR; INTRAVENOUS ONCE
Status: COMPLETED | OUTPATIENT
Start: 2023-10-06 | End: 2023-10-06

## 2023-10-06 RX ORDER — INSULIN GLARGINE 100 [IU]/ML
20 INJECTION, SOLUTION SUBCUTANEOUS EVERY MORNING
Status: DISCONTINUED | OUTPATIENT
Start: 2023-10-07 | End: 2023-10-08 | Stop reason: HOSPADM

## 2023-10-06 RX ORDER — CLONIDINE HYDROCHLORIDE 0.2 MG/1
0.2 TABLET ORAL ONCE
Status: COMPLETED | OUTPATIENT
Start: 2023-10-06 | End: 2023-10-06

## 2023-10-06 RX ORDER — ASCORBIC ACID, THIAMINE MONONITRATE,RIBOFLAVIN, NIACINAMIDE, PYRIDOXINE HYDROCHLORIDE, FOLIC ACID, CYANOCOBALAMIN, BIOTIN, CALCIUM PANTOTHENATE, 100; 1.5; 1.7; 20; 10; 1; 6000; 150000; 5 MG/1; MG/1; MG/1; MG/1; MG/1; MG/1; UG/1; UG/1; MG/1
1 CAPSULE, LIQUID FILLED ORAL
Status: DISCONTINUED | OUTPATIENT
Start: 2023-10-07 | End: 2023-10-08 | Stop reason: HOSPADM

## 2023-10-06 RX ORDER — HYDRALAZINE HYDROCHLORIDE 25 MG/1
100 TABLET, FILM COATED ORAL EVERY 8 HOURS SCHEDULED
Status: DISCONTINUED | OUTPATIENT
Start: 2023-10-06 | End: 2023-10-08 | Stop reason: HOSPADM

## 2023-10-06 RX ORDER — FUROSEMIDE 10 MG/ML
40 INJECTION INTRAMUSCULAR; INTRAVENOUS
Status: DISCONTINUED | OUTPATIENT
Start: 2023-10-07 | End: 2023-10-07

## 2023-10-06 RX ORDER — ATORVASTATIN CALCIUM 80 MG/1
80 TABLET, FILM COATED ORAL
Status: DISCONTINUED | OUTPATIENT
Start: 2023-10-06 | End: 2023-10-08 | Stop reason: HOSPADM

## 2023-10-06 RX ORDER — GABAPENTIN 300 MG/1
300 CAPSULE ORAL
Status: DISCONTINUED | OUTPATIENT
Start: 2023-10-06 | End: 2023-10-08 | Stop reason: HOSPADM

## 2023-10-06 RX ORDER — FUROSEMIDE 10 MG/ML
40 INJECTION INTRAMUSCULAR; INTRAVENOUS ONCE
Status: COMPLETED | OUTPATIENT
Start: 2023-10-06 | End: 2023-10-06

## 2023-10-06 RX ORDER — CLONIDINE HYDROCHLORIDE 0.1 MG/1
0.3 TABLET ORAL 3 TIMES DAILY
Status: DISCONTINUED | OUTPATIENT
Start: 2023-10-06 | End: 2023-10-08 | Stop reason: HOSPADM

## 2023-10-06 RX ORDER — CARVEDILOL 25 MG/1
25 TABLET ORAL 2 TIMES DAILY WITH MEALS
Status: DISCONTINUED | OUTPATIENT
Start: 2023-10-07 | End: 2023-10-08 | Stop reason: HOSPADM

## 2023-10-06 RX ORDER — HYDRALAZINE HYDROCHLORIDE 20 MG/ML
10 INJECTION INTRAMUSCULAR; INTRAVENOUS ONCE
Status: COMPLETED | OUTPATIENT
Start: 2023-10-06 | End: 2023-10-06

## 2023-10-06 RX ORDER — HEPARIN SODIUM 5000 [USP'U]/ML
5000 INJECTION, SOLUTION INTRAVENOUS; SUBCUTANEOUS EVERY 8 HOURS SCHEDULED
Status: DISCONTINUED | OUTPATIENT
Start: 2023-10-06 | End: 2023-10-08 | Stop reason: HOSPADM

## 2023-10-06 RX ORDER — CALCIUM ACETATE 667 MG/1
667 CAPSULE ORAL
Status: DISCONTINUED | OUTPATIENT
Start: 2023-10-07 | End: 2023-10-08 | Stop reason: HOSPADM

## 2023-10-06 RX ORDER — FAMOTIDINE 20 MG/1
20 TABLET, FILM COATED ORAL ONCE
Status: COMPLETED | OUTPATIENT
Start: 2023-10-06 | End: 2023-10-06

## 2023-10-06 RX ORDER — MAGNESIUM HYDROXIDE/ALUMINUM HYDROXICE/SIMETHICONE 120; 1200; 1200 MG/30ML; MG/30ML; MG/30ML
30 SUSPENSION ORAL ONCE
Status: COMPLETED | OUTPATIENT
Start: 2023-10-06 | End: 2023-10-06

## 2023-10-06 RX ORDER — CARVEDILOL 12.5 MG/1
12.5 TABLET ORAL ONCE
Status: COMPLETED | OUTPATIENT
Start: 2023-10-06 | End: 2023-10-06

## 2023-10-06 RX ORDER — HYDRALAZINE HYDROCHLORIDE 50 MG/1
100 TABLET, FILM COATED ORAL ONCE
Status: COMPLETED | OUTPATIENT
Start: 2023-10-06 | End: 2023-10-06

## 2023-10-06 RX ORDER — INSULIN LISPRO 100 [IU]/ML
1-5 INJECTION, SOLUTION INTRAVENOUS; SUBCUTANEOUS
Status: DISCONTINUED | OUTPATIENT
Start: 2023-10-06 | End: 2023-10-08 | Stop reason: HOSPADM

## 2023-10-06 RX ADMIN — CARVEDILOL 12.5 MG: 12.5 TABLET, FILM COATED ORAL at 20:56

## 2023-10-06 RX ADMIN — HYDRALAZINE HYDROCHLORIDE 10 MG: 20 INJECTION, SOLUTION INTRAMUSCULAR; INTRAVENOUS at 21:01

## 2023-10-06 RX ADMIN — HYDRALAZINE HYDROCHLORIDE 10 MG: 20 INJECTION INTRAMUSCULAR; INTRAVENOUS at 18:22

## 2023-10-06 RX ADMIN — IOHEXOL 100 ML: 350 INJECTION, SOLUTION INTRAVENOUS at 17:01

## 2023-10-06 RX ADMIN — INSULIN LISPRO 2 UNITS: 100 INJECTION, SOLUTION INTRAVENOUS; SUBCUTANEOUS at 23:33

## 2023-10-06 RX ADMIN — FUROSEMIDE 40 MG: 10 INJECTION, SOLUTION INTRAVENOUS at 17:51

## 2023-10-06 RX ADMIN — ATORVASTATIN CALCIUM 80 MG: 80 TABLET, FILM COATED ORAL at 23:34

## 2023-10-06 RX ADMIN — GABAPENTIN 300 MG: 300 CAPSULE ORAL at 23:34

## 2023-10-06 RX ADMIN — METOCLOPRAMIDE 10 MG: 5 INJECTION, SOLUTION INTRAMUSCULAR; INTRAVENOUS at 17:12

## 2023-10-06 RX ADMIN — HEPARIN SODIUM 5000 UNITS: 5000 INJECTION INTRAVENOUS; SUBCUTANEOUS at 23:34

## 2023-10-06 RX ADMIN — HYDRALAZINE HYDROCHLORIDE 100 MG: 50 TABLET, FILM COATED ORAL at 20:56

## 2023-10-06 RX ADMIN — ALUMINUM HYDROXIDE, MAGNESIUM HYDROXIDE, AND DIMETHICONE 30 ML: 200; 20; 200 SUSPENSION ORAL at 17:14

## 2023-10-06 RX ADMIN — CLONIDINE HYDROCHLORIDE 0.2 MG: 0.2 TABLET ORAL at 20:56

## 2023-10-06 RX ADMIN — FAMOTIDINE 20 MG: 20 TABLET ORAL at 17:14

## 2023-10-06 NOTE — ED PROVIDER NOTES
History  Chief Complaint   Patient presents with   • Abdominal Pain     Pt reports upper abdominal pain with lack of appetite and nausea beginning this week. Reports has some gas pain as well. Denies diarrhea or constipation. No sick exposure. 81 yo F h/o ESRD on HD (T/Th/Sat), IDDM; presenting for evaluation of abdominal pain and concern for high BP readings at home. Kazakh speaking, daughter at bedside providing translation, declined [employer] . Pt reports upper abdominal pain the past week, point to epigastric region. Reports feeling full, decreased appetite/po intake, last ate something yesterday. Occasional nausea/vomiting, not currently. Last BM 6 days ago and reports normal at that time. Reports feeling she can't take a deep breath because of the abdominal fullness. Pt also concerned for high BP readings, state she has been checking at home the last 1-2 weeks. She went to [facility] on 9/21 for high BP reading. She reports that after that her meds were adjusted by Nephrologist. Reports taking as prescribed. Denies fevers, HA, CP, diarrhea, dark/bloody stools, dysuria              Per independent review of external records:  - 9/21/23 ER [facility]- dx of asymptomatic HTN  Home regimen Coreg, HCTZ, Clonidine 0.2 mg TID  - 9/22/23 CT myeloma scan- small b/l pleural effusions with interstitial edema  - 6/12/22 ECHO: grade 2 diastolic dysfunction, EF 31%, mild-moderate AS      Prior to Admission Medications   Prescriptions Last Dose Informant Patient Reported? Taking? B Complex-C-Folic Acid (Renal Vitamin) 0.8 MG TABS   No Yes   Sig: Take 1 tablet (0.8 mg total) by mouth in the morning   Glucagon (Gvoke HypoPen 2-Pack) 1 MG/0.2ML SOAJ  Self Yes Yes   Sig: Inject 1 mg under the skin   Insulin Lispro-aabc, 1 U Dial, (Lyumjev KwikPen) 100 UNIT/ML SOPN   Yes Yes   Sig: Inject 6u sc with breakfast/lunch/dinner.    Krill Oil 1000 MG CAPS Not Taking Self Yes No   Sig: Take by mouth   Patient not taking: Reported on 10/6/2023   Sodium Zirconium Cyclosilicate (Lokelma) 10 g   No Yes   Sig: Take one pack as directed on Monday, Wednesday, Friday and sunday   atorvastatin (LIPITOR) 40 mg tablet  Self No Yes   Sig: Take 1 tablet (40 mg total) by mouth daily   Patient taking differently: Take 80 mg by mouth daily at bedtime   calcium acetate (PHOSLO) capsule  Self No Yes   Sig: Take 1 capsule (667 mg total) by mouth 3 (three) times a day with meals   carvedilol (COREG) 12.5 mg tablet  Self No Yes   Sig: Take 1 tablet (12.5 mg total) by mouth 2 (two) times a day with meals   cloNIDine (CATAPRES) 0.2 mg tablet   No Yes   Sig: Take 1 tablet (0.2 mg total) by mouth 3 (three) times a day   doxazosin (CARDURA) 2 mg tablet  Self No Yes   Sig: Take 1 tablet (2 mg total) by mouth daily   ergocalciferol (ERGOCALCIFEROL) 1.25 MG (13939 UT) capsule  Self Yes Yes   Sig: Take 50,000 Units by mouth every 30 (thirty) days     gabapentin (NEURONTIN) 300 mg capsule  Self No Yes   Sig: Take 1 capsule (300 mg total) by mouth daily at bedtime   hydrALAZINE (APRESOLINE) 50 mg tablet  Self No Yes   Sig: Take 1 tablet (50 mg total) by mouth every 8 (eight) hours   Patient taking differently: Take 100 mg by mouth every 8 (eight) hours   linaGLIPtin (Tradjenta) 5 MG TABS  Self Yes Yes   Sig: Take 5 mg by mouth daily   torsemide (DEMADEX) 20 mg tablet  Self No Yes   Sig: TAKE ONE TABLET BY MOUTH EVERY DAY   Patient taking differently: Taking every other day      Facility-Administered Medications: None       Past Medical History:   Diagnosis Date   • CHF (congestive heart failure) ([facility])    • CKD (chronic kidney disease) stage 5, GFR less than 15 ml/min ([facility])    • Diabetes mellitus ([address])    • Diabetic nephropathy ([address])    • Hemodialysis patient ([address])     via permacath right chest / [facility] Tues/Thurs and Sat   • Hyperlipidemia    • Hypertension    • Muscle weakness    • Orthodontics     sleeps with retainer at night   • Risk for falls    • Uses walker per dtr active in the house able to cook/light cleaning as able       Past Surgical History:   Procedure Laterality Date   • CATARACT EXTRACTION Bilateral    • EGD     • IR AV FISTULAGRAM/GRAFTOGRAM  5/9/2022   • IR AV FISTULAGRAM/GRAFTOGRAM  3/31/2023   • IR AV FISTULAGRAM/GRAFTOGRAM  8/9/2023   • IR BIOPSY BONE MARROW  9/15/2021   • IR BIOPSY KIDNEY RANDOM  9/15/2021   • IR NON-TUNNELED CENTRAL LINE PLACEMENT  12/7/2021   • IR TUNNELED CENTRAL LINE CHECK/CHANGE/REPOSITION  4/1/2022   • IR TUNNELED DIALYSIS CATHETER PLACEMENT  12/9/2021   • IR TUNNELED DIALYSIS CATHETER REMOVAL  10/21/2022   • OK ARTERIOVENOUS ANASTOMOSIS OPEN DIRECT Left 2/16/2022    Procedure: CREATION FISTULA ARTERIOVENOUS (AV); Surgeon: Dragan Luna DO;  Location: [facility];  Service: Vascular   • OK REVJ OPN ARVEN FSTL W/O [id number] [facility] DIAL GRF Left 9/2/2022    Procedure: REVISION AV FISTULA (superficalize/transposition); Surgeon: Dragan Luna DO;  Location: [facility];  Service: Vascular       Family History   Problem Relation Age of Onset   • Hypertension Mother    • Diabetes Father    • Hypertension Sister    • Diabetes Sister    • Hypertension Brother      I have reviewed and agree with the history as documented. E-Cigarette/Vaping   • E-Cigarette Use Never User      E-Cigarette/Vaping Substances   • Nicotine No    • THC No    • CBD No    • Flavoring No    • Other No    • Unknown No      Social History     Tobacco Use   • Smoking status: Never   • Smokeless tobacco: Never   • Tobacco comments:     NO TOBACCO USE   Vaping Use   • Vaping Use: Never used   Substance Use Topics   • Alcohol use: Not Currently   • Drug use: Not Currently       Review of Systems    Physical Exam  Physical Exam  Vitals and nursing note reviewed. Constitutional:       General: She is not in acute distress. Appearance: She is well-developed. HENT:      Head: Normocephalic and atraumatic.       Nose: Nose normal.   Eyes: Conjunctiva/sclera: Conjunctivae normal.   Cardiovascular:      Rate and Rhythm: Normal rate and regular rhythm. Heart sounds: Murmur heard. Pulmonary:      Effort: Pulmonary effort is normal. No respiratory distress. Breath sounds: Normal breath sounds. No stridor. No wheezing or rales. Chest:      Chest wall: No tenderness. Abdominal:      Palpations: Abdomen is soft. Tenderness: There is no guarding or rebound. Comments: Mild epigastric ttp, no rebound/guarding   Musculoskeletal:         General: No swelling or deformity. Cervical back: Normal range of motion and neck supple. Skin:     General: Skin is warm and dry. Findings: No rash. Neurological:      General: No focal deficit present. Mental Status: She is alert and oriented to person, place, and time. Motor: No abnormal muscle tone. Coordination: Coordination normal.   Psychiatric:         Thought Content:  Thought content normal.         Judgment: Judgment normal.         Vital Signs  ED Triage Vitals   Temperature Pulse Respirations Blood Pressure SpO2   10/06/23 1606 10/06/23 1606 10/06/23 1606 10/06/23 1607 10/06/23 1606   98.4 °F (36.9 °C) 68 16 (!) 216/141 95 %      Temp Source Heart Rate Source Patient Position - Orthostatic VS BP Location FiO2 (%)   10/06/23 1606 10/06/23 1606 10/06/23 1607 10/06/23 1607 --   Oral Monitor Sitting Right arm       Pain Score       10/06/23 1750       1           Vitals:    10/06/23 2115 10/06/23 2130 10/06/23 2145 10/06/23 2200   BP: (!) 184/78 (!) 184/77 (!) 178/76 (!) 183/76   Pulse: 68 70 69 67   Patient Position - Orthostatic VS: Lying Lying Lying Lying         Visual Acuity      ED Medications  Medications   metoclopramide (REGLAN) injection 10 mg (10 mg Intravenous Given 10/6/23 1712)   famotidine (PEPCID) tablet 20 mg (20 mg Oral Given 10/6/23 1714)   aluminum-magnesium hydroxide-simethicone (MAALOX) oral suspension 30 mL (30 mL Oral Given 10/6/23 1714) iohexol (OMNIPAQUE) 350 MG/ML injection (MULTI-DOSE) 100 mL (100 mL Intravenous Given 10/6/23 1701)   furosemide (LASIX) injection 40 mg (40 mg Intravenous Given 10/6/23 1751)   hydrALAZINE (APRESOLINE) injection 10 mg (10 mg Intravenous Given 10/6/23 1822)   carvedilol (COREG) tablet 12.5 mg (12.5 mg Oral Given 10/6/23 2056)   cloNIDine (CATAPRES) tablet 0.2 mg (0.2 mg Oral Given 10/6/23 2056)   hydrALAZINE (APRESOLINE) tablet 100 mg (100 mg Oral Given 10/6/23 2056)   hydrALAZINE (APRESOLINE) injection 10 mg (10 mg Intravenous Given 10/6/23 2101)       Diagnostic Studies  Results Reviewed     Procedure Component Value Units Date/Time    HS Troponin I 4hr [[id number]]  (Normal) Collected: 10/06/23 2103    Lab Status: Final result Specimen: Blood from Hand, Right Updated: 10/06/23 2130     hs TnI 4hr 29 ng/L      Delta 4hr hsTnI 4 ng/L     B-Type Natriuretic Peptide(BNP) [[id number]]  (Abnormal) Collected: 10/06/23 1657    Lab Status: Final result Specimen: Blood from Hand, Right Updated: 10/06/23 2051     BNP 2,420 pg/mL     HS Troponin I 2hr [[id number]]  (Normal) Collected: 10/06/23 1909    Lab Status: Final result Specimen: Blood from Hand, Right Updated: 10/06/23 1936     hs TnI 2hr 28 ng/L      Delta 2hr hsTnI 3 ng/L     HS Troponin 0hr (reflex protocol) [[id number]]  (Normal) Collected: 10/06/23 1657    Lab Status: Final result Specimen: Blood from Hand, Right Updated: 10/06/23 1743     hs TnI 0hr 25 ng/L     Lipase [[id number]]  (Abnormal) Collected: 10/06/23 1657    Lab Status: Final result Specimen: Blood from Hand, Right Updated: 10/06/23 1737     Lipase 7 u/L     Comprehensive metabolic panel [[id number]]  (Abnormal) Collected: 10/06/23 1657    Lab Status: Final result Specimen: Blood from Hand, Right Updated: 10/06/23 1737     Sodium 132 mmol/L      Potassium 4.0 mmol/L      Chloride 95 mmol/L      CO2 27 mmol/L      ANION GAP 10 mmol/L      BUN 17 mg/dL      Creatinine 4.04 mg/dL      Glucose 195 mg/dL Calcium 8.2 mg/dL      Corrected Calcium 8.7 mg/dL      AST 16 U/L      ALT 5 U/L      Alkaline Phosphatase 53 U/L      Total Protein 6.4 g/dL      Albumin 3.4 g/dL      Total Bilirubin 0.66 mg/dL      eGFR 10 ml/min/1.73sq m     Narrative:      National Kidney Disease Foundation guidelines for Chronic Kidney Disease (CKD):   •  Stage 1 with normal or high GFR (GFR > 90 mL/min/1.73 square meters)  •  Stage 2 Mild CKD (GFR = 60-89 mL/min/1.73 square meters)  •  Stage 3A Moderate CKD (GFR = 45-59 mL/min/1.73 square meters)  •  Stage 3B Moderate CKD (GFR = 30-44 mL/min/1.73 square meters)  •  Stage 4 Severe CKD (GFR = 15-29 mL/min/1.73 square meters)  •  Stage 5 End Stage CKD (GFR <15 mL/min/1.73 square meters)  Note: GFR calculation is accurate only with a steady state creatinine    CBC and differential [[id number]]  (Abnormal) Collected: 10/06/23 1657    Lab Status: Final result Specimen: Blood from Hand, Right Updated: 10/06/23 1721     WBC 5.95 Thousand/uL      RBC 2.83 Million/uL      Hemoglobin 8.0 g/dL      Hematocrit 26.3 %      MCV 93 fL      MCH 28.3 pg      MCHC 30.4 g/dL      RDW 14.4 %      MPV 10.5 fL      Platelets 695 Thousands/uL      nRBC 0 /100 WBCs      Neutrophils Relative 74 %      Immat GRANS % 0 %      Lymphocytes Relative 18 %      Monocytes Relative 7 %      Eosinophils Relative 1 %      Basophils Relative 0 %      Neutrophils Absolute 4.36 Thousands/µL      Immature Grans Absolute 0.02 Thousand/uL      Lymphocytes Absolute 1.04 Thousands/µL      Monocytes Absolute 0.44 Thousand/µL      Eosinophils Absolute 0.07 Thousand/µL      Basophils Absolute 0.02 Thousands/µL                  CT abdomen pelvis with contrast   ED Interpretation by Sheryle Sick, DO (10/06 1734)   FINDINGS:     ABDOMEN     LOWER CHEST: Bilateral groundglass opacities in the imaged portions of the lungs. Small bilateral pleural effusions. Cardiomegaly.  Mitral annular calcification.     LIVER/BILIARY TREE: Unremarkable.     GALLBLADDER:  No calcified gallstones. No pericholecystic inflammatory change.     SPLEEN:  Unremarkable.     PANCREAS:  Unremarkable.     ADRENAL GLANDS:  Unremarkable.     KIDNEYS/URETERS:  Unremarkable. No hydronephrosis.     STOMACH AND BOWEL: Normal course and caliber of the stomach and duodenum. No dilated loops of bowel or bowel wall thickening.     APPENDIX: Stable mildly distended caliber of the appendix without periappendiceal inflammation.     ABDOMINOPELVIC CAVITY:  No ascites. No pneumoperitoneum. No lymphadenopathy.     VESSELS:  Unremarkable for patient's age.     PELVIS     REPRODUCTIVE ORGANS:  Unremarkable for patient's age.     URINARY BLADDER:  Unremarkable.     ABDOMINAL WALL/INGUINAL REGIONS:  Unremarkable.     OSSEOUS STRUCTURES:  No    acute fracture or destructive osseous lesion.     IMPRESSION:     1. Diffuse groundglass opacities and small bilateral pleural effusions may be due to pulmonary edema versus multifocal pneumonia. 2.  No acute findings in the abdomen or pelvis. Final Result by Baljinder Montoya MD (10/06 1732)      1. Diffuse groundglass opacities and small bilateral pleural effusions may be due to pulmonary edema versus multifocal pneumonia. 2.  No acute findings in the abdomen or pelvis. The study was marked in [facility] for immediate notification.       Workstation performed: [id number]         XR chest 2 views    (Results Pending)              Procedures  CriticalCare Time    Date/Time: 10/6/2023 5:00 PM    Performed by: Luli López DO  Authorized by: Luli López DO    Critical care provider statement:     Critical care time (minutes):  31    Critical care time was exclusive of:  Separately billable procedures and treating other patients and teaching time    Critical care was necessary to treat or prevent imminent or life-threatening deterioration of the following conditions:  Respiratory failure (hypoxic respiratory failure 2/2 pleural effusions, hypertensive urgency)    Critical care was time spent personally by me on the following activities:  Obtaining history from patient or surrogate, development of treatment plan with patient or surrogate, evaluation of patient's response to treatment, examination of patient, interpretation of cardiac output measurements, ordering and performing treatments and interventions, ordering and review of laboratory studies, ordering and review of radiographic studies, re-evaluation of patient's condition and review of old charts  Comments:      Hypoxia requiring supplemental o2, pleural effusions/pulm congestion requiring IV lasix, elevated BP requiring IV antihypertensives, frequent reassessments             ED Course  ED Course as of 10/06/23 2222   Fri Oct 06, 2023   1712 CXR independently interpreted by myself; pulm vascular congestion, b/l pleural effusions, pending radiologist read   1722 EKG independently interpreted by myself:  NSR @ 69 bpm, normal axis, qtc prolonged at 548, st dep/twi III, st dep avF, [facility]   1724 Hemoglobin(!): 8.0  10.3 on 9/21, but as low as 7.6-8.5 in July 2023, per [facility] records   1738 B/l pleural effusions noted, pt does make urine and takes Torsemide at home, will give dose of IV lasix   1744 hs TnI 0hr: 25   1753 Blood Pressure(!): 224/96   1816 Pt became hypoxic and requiring 2L NC and BP elevated again.  Reviewed results with pt/daughter and they are agreeable to admission             HEART Risk Score    Flowsheet Row Most Recent Value   Heart Score Risk Calculator    History 0 Filed at: 10/06/2023 1748   ECG 1 Filed at: 10/06/2023 1748   Age 2 Filed at: 10/06/2023 1748   Risk Factors 2 Filed at: 10/06/2023 1748   Troponin 1 Filed at: 10/06/2023 1748   HEART Score 6 Filed at: 10/06/2023 1748                                      Medical Decision Making  79 yo F presenting with upper abdominal discomfort/early satiety/N/V, elevated BP- will treat sx, CBC to assess for leukocytosis/anemia, CMP to assess for elevated LFTs/REI/electrolyte abn, lipase to r/o pancreatitis, EKG to r/o STEMI/ischemic changes, troponin to eval for ischemia, CT A/P to r/o bowel obstruction/pancreatitis/gb pathology, dispo pending workup    ER workup with persistently elevated BP readings and b/l pleural effusions  Started IV Lasix and treatment of BP  Admitted for further management        Anemia: chronic illness or injury  Elevated blood pressure reading: acute illness or injury  Pleural effusion, bilateral: acute illness or injury  Amount and/or Complexity of Data Reviewed  External Data Reviewed: labs, radiology, ECG and notes. Labs: ordered. Decision-making details documented in ED Course. Radiology: ordered and independent interpretation performed. Decision-making details documented in ED Course. ECG/medicine tests: ordered and independent interpretation performed. Decision-making details documented in ED Course. Risk  OTC drugs. Prescription drug management. Decision regarding hospitalization. Disposition  Final diagnoses:   Pleural effusion, bilateral   Anemia   Elevated blood pressure reading     Time reflects when diagnosis was documented in both MDM as applicable and the Disposition within this note     Time User Action Codes Description Comment    10/6/2023  5:45 PM Jackeline WALDRON Add [J90] Pleural effusion, bilateral     10/6/2023  5:45 PM Garciachay Palm Add [D64.9] Anemia     10/6/2023  5:45 PM Jackeline WALDRON Add [R03.0] Elevated blood pressure reading       ED Disposition     ED Disposition   Admit    Condition   Stable    Date/Time   Fri Oct 6, 2023  6:22 PM    Comment   Case was discussed with [facility] and the patient's admission status was agreed to be Admission Status: inpatient status to the service of Dr. Roby Fu .            Follow-up Information    None         Patient's Medications   Discharge Prescriptions    No medications on file       No discharge procedures on file.    PDMP Review     None          ED Provider  Electronically Signed by           Priyank Wise DO  10/06/23 0635

## 2023-10-07 ENCOUNTER — APPOINTMENT (INPATIENT)
Dept: DIALYSIS | Facility: HOSPITAL | Age: 68
End: 2023-10-07
Payer: MEDICARE

## 2023-10-07 LAB
ANION GAP SERPL CALCULATED.3IONS-SCNC: 6 MMOL/L
ATRIAL RATE: 67 BPM
BUN SERPL-MCNC: 21 MG/DL (ref 5–25)
CALCIUM SERPL-MCNC: 8 MG/DL (ref 8.4–10.2)
CHLORIDE SERPL-SCNC: 94 MMOL/L (ref 96–108)
CO2 SERPL-SCNC: 32 MMOL/L (ref 21–32)
CREAT SERPL-MCNC: 4.55 MG/DL (ref 0.6–1.3)
GFR SERPL CREATININE-BSD FRML MDRD: 9 ML/MIN/1.73SQ M
GLUCOSE SERPL-MCNC: 109 MG/DL (ref 65–140)
GLUCOSE SERPL-MCNC: 263 MG/DL (ref 65–140)
GLUCOSE SERPL-MCNC: 74 MG/DL (ref 65–140)
GLUCOSE SERPL-MCNC: 79 MG/DL (ref 65–140)
GLUCOSE SERPL-MCNC: 99 MG/DL (ref 65–140)
P AXIS: 37 DEGREES
PLATELET # BLD AUTO: 175 THOUSANDS/UL (ref 149–390)
PMV BLD AUTO: 11.2 FL (ref 8.9–12.7)
POTASSIUM SERPL-SCNC: 3.6 MMOL/L (ref 3.5–5.3)
PR INTERVAL: 160 MS
QRS AXIS: 80 DEGREES
QRSD INTERVAL: 94 MS
QT INTERVAL: 524 MS
QTC INTERVAL: 553 MS
SODIUM SERPL-SCNC: 132 MMOL/L (ref 135–147)
T WAVE AXIS: -11 DEGREES
VENTRICULAR RATE: 67 BPM

## 2023-10-07 PROCEDURE — 82948 REAGENT STRIP/BLOOD GLUCOSE: CPT

## 2023-10-07 PROCEDURE — 93010 ELECTROCARDIOGRAM REPORT: CPT | Performed by: INTERNAL MEDICINE

## 2023-10-07 PROCEDURE — 99232 SBSQ HOSP IP/OBS MODERATE 35: CPT | Performed by: STUDENT IN AN ORGANIZED HEALTH CARE EDUCATION/TRAINING PROGRAM

## 2023-10-07 PROCEDURE — 80048 BASIC METABOLIC PNL TOTAL CA: CPT | Performed by: PHYSICIAN ASSISTANT

## 2023-10-07 PROCEDURE — 99223 1ST HOSP IP/OBS HIGH 75: CPT | Performed by: INTERNAL MEDICINE

## 2023-10-07 PROCEDURE — 85049 AUTOMATED PLATELET COUNT: CPT | Performed by: PHYSICIAN ASSISTANT

## 2023-10-07 RX ORDER — TORSEMIDE 100 MG/1
100 TABLET ORAL DAILY
Status: DISCONTINUED | OUTPATIENT
Start: 2023-10-08 | End: 2023-10-08 | Stop reason: HOSPADM

## 2023-10-07 RX ADMIN — HEPARIN SODIUM 5000 UNITS: 5000 INJECTION INTRAVENOUS; SUBCUTANEOUS at 22:03

## 2023-10-07 RX ADMIN — GABAPENTIN 300 MG: 300 CAPSULE ORAL at 22:03

## 2023-10-07 RX ADMIN — INSULIN LISPRO 2 UNITS: 100 INJECTION, SOLUTION INTRAVENOUS; SUBCUTANEOUS at 12:41

## 2023-10-07 RX ADMIN — CLONIDINE HYDROCHLORIDE 0.3 MG: 0.1 TABLET ORAL at 09:30

## 2023-10-07 RX ADMIN — INSULIN GLARGINE 20 UNITS: 100 INJECTION, SOLUTION SUBCUTANEOUS at 09:30

## 2023-10-07 RX ADMIN — CLONIDINE HYDROCHLORIDE 0.3 MG: 0.1 TABLET ORAL at 18:12

## 2023-10-07 RX ADMIN — HYDRALAZINE HYDROCHLORIDE 100 MG: 25 TABLET, FILM COATED ORAL at 22:03

## 2023-10-07 RX ADMIN — ASCORBIC ACID, THIAMINE MONONITRATE,RIBOFLAVIN, NIACINAMIDE, PYRIDOXINE HYDROCHLORIDE, FOLIC ACID, CYANOCOBALAMIN, BIOTIN, CALCIUM PANTOTHENATE, 1 CAPSULE: 100; 1.5; 1.7; 20; 10; 1; 6000; 150000; 5 CAPSULE, LIQUID FILLED ORAL at 18:12

## 2023-10-07 RX ADMIN — CLONIDINE HYDROCHLORIDE 0.3 MG: 0.1 TABLET ORAL at 21:58

## 2023-10-07 RX ADMIN — HEPARIN SODIUM 5000 UNITS: 5000 INJECTION INTRAVENOUS; SUBCUTANEOUS at 13:23

## 2023-10-07 RX ADMIN — HYDRALAZINE HYDROCHLORIDE 100 MG: 25 TABLET, FILM COATED ORAL at 13:23

## 2023-10-07 RX ADMIN — CARVEDILOL 25 MG: 25 TABLET, FILM COATED ORAL at 09:30

## 2023-10-07 RX ADMIN — HYDRALAZINE HYDROCHLORIDE 100 MG: 25 TABLET, FILM COATED ORAL at 07:31

## 2023-10-07 RX ADMIN — CARVEDILOL 25 MG: 25 TABLET, FILM COATED ORAL at 18:12

## 2023-10-07 RX ADMIN — CALCIUM ACETATE 667 MG: 667 CAPSULE ORAL at 18:12

## 2023-10-07 RX ADMIN — CALCIUM ACETATE 667 MG: 667 CAPSULE ORAL at 09:30

## 2023-10-07 RX ADMIN — CALCIUM ACETATE 667 MG: 667 CAPSULE ORAL at 12:41

## 2023-10-07 RX ADMIN — HEPARIN SODIUM 5000 UNITS: 5000 INJECTION INTRAVENOUS; SUBCUTANEOUS at 07:31

## 2023-10-07 RX ADMIN — ATORVASTATIN CALCIUM 80 MG: 80 TABLET, FILM COATED ORAL at 22:03

## 2023-10-07 NOTE — H&P
233 [facility]  H&P  Name: Alan Byers 80 y.o. female I MRN: [id number]  Unit/Bed#: [address] [address] [id number] [place] [address] [place] 204-01 I Date of Admission: 10/6/2023   Date of Service: 10/6/2023 I Hospital Day: 0      Assessment/Plan   * Hypertensive emergency  Assessment & Plan  Poa c/b acute on chronic CHF. Pt has had difficult to control bp x 2 weeks with recently having catapres increased to 0.3mg po tid, and is also on hydralazine 100mg tid, coreg 25mg bid and torsemide 20mg po daily. She doesn't believe she is on doxazosin presently. No missed HD or otc nsaids/cough/cold meds. -improved w/night meds and two doses of iv hydralazine 10mg.  -consult nephrology for ultrafiltration  -admit to ip  Given concomoitant chf    Acute on chronic diastolic heart failure (HCC)  Assessment & Plan  Wt Readings from Last 3 Encounters:   10/06/23 73.6 kg (162 lb 4.1 oz)   08/09/23 64 kg (141 lb)   03/31/23 64 kg (141 lb)     poa by tamayo, orthopnea/pnd elevated bnp of 2000s and cxr wet read concerning for b/l vascular congestion  rec'd iv lasix in ed wlil give another 40mg iv lasix tonight to help with bp control and volume overload  -daily weights I/os, consult nephrology for HD for mgmt of volume overload  -update echo    ESRD (end stage renal disease) on dialysis [facility])  Assessment & Plan  Lab Results   Component Value Date    EGFR 10 10/06/2023    EGFR 7 07/26/2023    EGFR 5 04/17/2023    CREATININE 4.04 (H) 10/06/2023    CREATININE 5.64 (H) 07/26/2023    CREATININE 6.66 (H) 04/17/2023   on hd T/Th/S and on transplant list  Consult nephrology for ip dialysis    Type 2 diabetes mellitus with chronic kidney disease on chronic dialysis, with long-term current use of insulin ([address])  Assessment & Plan  Lab Results   Component Value Date    HGBA1C 8.1 (H) 07/26/2023       No results for input(s): "POCGLU" in the last 72 hours.     Blood Sugar Average: Last 72 hrs:  on lantus 20 iu qam and humalog (~10 iu) tid ac and tradjenta  Hold tradjenta continue lantus at 20 iu qam and ssi/poc bs       VTE Pharmacologic Prophylaxis: VTE Score: 4 Moderate Risk (Score 3-4) - Pharmacological DVT Prophylaxis Ordered: heparin. Code Status:fc  Discussion with family: Updated  (daughter) at bedside. Anticipated Length of Stay: Patient will be admitted on an inpatient basis with an anticipated length of stay of greater than 2 midnights secondary to htn emergency and chf. Total Time Spent on Date of Encounter in care of patient:  mins. This time was spent on one or more of the following: performing physical exam; counseling and coordination of care; obtaining or reviewing history; documenting in the medical record; reviewing/ordering tests, medications or procedures; communicating with other healthcare professionals and discussing with patient's family/caregivers. Chief Complaint: sob x 2-3 d abd pain x 2-3d    History of Present Illness:  Melanie Bo is a 80 y.o. female with a PMH of chronic diastolic chf, esrd on HD T/Th/S, htn, iddm who presents with sob/cough and abd discomfort. Pt is Thai speaking only. Conversation occurred between the pt/myself in [address] [place] [address]. She notes some intermittent n/v earlier in the week now resolved. Also notes tamayo, pnd orthopnea and in ed was found to have volume overload and htn emergency. She notes no missed hd or missed medications and recently had her meds increased to catapres 0.3mg q 8 h. Denies cp palpitations sore throat cough fevers/chills. .  No LE edema. No missed hd sessoins or missed mediations. We'll admit for chf                                                                                                                                                                                                   Review of Systems:  Review of Systems   Constitutional: Negative for appetite change, chills and fever. HENT: Negative for congestion and sore throat.     Respiratory: Positive for shortness of breath. Cardiovascular: Negative for chest pain. Gastrointestinal: Positive for abdominal pain (epigastric pain), nausea and vomiting (vomiting x  1 earlier in week). Negative for diarrhea. Neurological: Positive for light-headedness. Past Medical and Surgical History:   Past Medical History:   Diagnosis Date   • CHF (congestive heart failure) ([address])    • CKD (chronic kidney disease) stage 5, GFR less than 15 ml/min ([facility])    • Diabetes mellitus ([address])    • Diabetic nephropathy ([address])    • Hemodialysis patient ([address])     via permacath right chest / [facility] Pander Tues/Thurs and Sat   • Hyperlipidemia    • Hypertension    • Muscle weakness    • Orthodontics     sleeps with retainer at night   • Risk for falls    • Uses walker     per dtr active in the house able to cook/light cleaning as able       Past Surgical History:   Procedure Laterality Date   • CATARACT EXTRACTION Bilateral    • EGD     • IR AV FISTULAGRAM/GRAFTOGRAM  5/9/2022   • IR AV FISTULAGRAM/GRAFTOGRAM  3/31/2023   • IR AV FISTULAGRAM/GRAFTOGRAM  8/9/2023   • IR BIOPSY BONE MARROW  9/15/2021   • IR BIOPSY KIDNEY RANDOM  9/15/2021   • IR NON-TUNNELED CENTRAL LINE PLACEMENT  12/7/2021   • IR TUNNELED CENTRAL LINE CHECK/CHANGE/REPOSITION  4/1/2022   • IR TUNNELED DIALYSIS CATHETER PLACEMENT  12/9/2021   • IR TUNNELED DIALYSIS CATHETER REMOVAL  10/21/2022   • [place] ARTERIOVENOUS ANASTOMOSIS OPEN DIRECT Left 2/16/2022    Procedure: CREATION FISTULA ARTERIOVENOUS (AV); Surgeon: Sy Barcenas DO;  Location: [facility];  Service: Vascular   • [place] REVJ OPN ARVEN FSTL W/O [id number] [facility],[address] DIAL GRF Left 9/2/2022    Procedure: REVISION AV FISTULA (superficalize/transposition); Surgeon: Sy Barcenas DO;  Location: [facility];  Service: Vascular       Meds/Allergies:  Prior to Admission medications    Medication Sig Start Date End Date Taking?  Authorizing Provider   atorvastatin (LIPITOR) 40 mg tablet Take 1 tablet (40 mg total) by mouth daily  Patient taking differently: Take 80 mg by mouth daily at bedtime 1/6/21  Yes Devora Uribe MD   B Complex-C-Folic Acid (Renal Vitamin) 0.8 MG TABS Take 1 tablet (0.8 mg total) by mouth in the morning 8/31/23  Yes Sai Oropeza MD   calcium acetate (PHOSLO) capsule Take 1 capsule (667 mg total) by mouth 3 (three) times a day with meals 12/14/21  Yes Alvaro Mullins DO   carvedilol (COREG) 12.5 mg tablet Take 1 tablet (12.5 mg total) by mouth 2 (two) times a day with meals 6/15/22 10/6/23 Yes Cathy Cole MD   cloNIDine (CATAPRES) 0.2 mg tablet Take 1 tablet (0.2 mg total) by mouth 3 (three) times a day 9/19/23  Yes Sai Oropeza MD   doxazosin (CARDURA) 2 mg tablet Take 1 tablet (2 mg total) by mouth daily 6/16/22 10/6/23 Yes Cathy Cole MD   ergocalciferol (ERGOCALCIFEROL) 1.25 MG (27362 UT) capsule Take 50,000 Units by mouth every 30 (thirty) days   10/22/19  Yes Historical Provider, MD   gabapentin (NEURONTIN) 300 mg capsule Take 1 capsule (300 mg total) by mouth daily at bedtime 12/14/21  Yes Alvaro Mullins DO   Glucagon (Gvoke HypoPen 2-Pack) 1 MG/0.2ML SOAJ Inject 1 mg under the skin 4/13/22  Yes Historical Provider, MD   hydrALAZINE (APRESOLINE) 50 mg tablet Take 1 tablet (50 mg total) by mouth every 8 (eight) hours  Patient taking differently: Take 100 mg by mouth every 8 (eight) hours 12/14/21  Yes Alvaro Mullins DO   Insulin Lispro-aabc, 1 U Dial, (Lyumjev KwikPen) 100 UNIT/ML SOPN Inject 6u sc with breakfast/lunch/dinner.  1/20/23  Yes Historical Provider, MD   linaGLIPtin (Tradjenta) 5 MG TABS Take 5 mg by mouth daily 5/4/21 10/6/23 Yes Historical Provider, MD   Sodium Zirconium Cyclosilicate (Lokelma) 10 g Take one pack as directed on Monday, Wednesday, Friday and sunday 7/27/23  Yes Sai Oropeza MD   torsemide (DEMADEX) 20 mg tablet TAKE ONE TABLET BY MOUTH EVERY DAY  Patient taking differently: Taking every other day 12/1/21  Yes Annel Castellanos MD   Krill Oil 1000 MG CAPS Take by mouth  Patient not taking: Reported on 10/6/2023    Historical Provider, MD   cloNIDine (CATAPRES-TTS-3) 0.3 mg/24 hr Place 1 patch (0.3 mg total) on the skin once a week 6/15/22 8/31/23  Callum Bonilla MD     I have reviewed home medications with patient personally. Allergies: Allergies   Allergen Reactions   • Amlodipine Edema and Eye Swelling   • Lisinopril Angioedema     Bilateral periorbital edema that was significant       Social History:  Marital Status: /Civil Union   Occupation:   Patient Pre-hospital Living Situation:   Patient Pre-hospital Level of Mobility:   Patient Pre-hospital Diet Restrictions:   Substance Use History:   Social History     Substance and Sexual Activity   Alcohol Use Not Currently     Social History     Tobacco Use   Smoking Status Never   Smokeless Tobacco Never   Tobacco Comments    NO TOBACCO USE     Social History     Substance and Sexual Activity   Drug Use Not Currently       Family History:  Family History   Problem Relation Age of Onset   • Hypertension Mother    • Diabetes Father    • Hypertension Sister    • Diabetes Sister    • Hypertension Brother        Physical Exam:     Vitals:   Blood Pressure: 159/69 (10/06/23 2318)  Pulse: 64 (10/06/23 2318)  Temperature: 97.6 °F (36.4 °C) (10/06/23 2318)  Temp Source: Oral (10/06/23 1606)  Respirations: 20 (10/06/23 2318)  Weight - Scale: 73.6 kg (162 lb 4.1 oz) (10/06/23 1606)  SpO2: 96 % (10/06/23 2318)    Physical Exam  Vitals reviewed. Constitutional:       General: She is not in acute distress. Appearance: She is ill-appearing. She is not toxic-appearing or diaphoretic. HENT:      Head: Normocephalic and atraumatic. Right Ear: External ear normal.      Left Ear: External ear normal.      Mouth/Throat:      Mouth: Mucous membranes are moist.   Eyes:      Extraocular Movements: Extraocular movements intact. Cardiovascular:      Rate and Rhythm: Normal rate and regular rhythm.       Heart sounds: No murmur heard. No friction rub. No gallop. Pulmonary:      Effort: Pulmonary effort is normal. No respiratory distress. Breath sounds: Normal breath sounds. No stridor. No wheezing or rhonchi. Abdominal:      General: There is no distension. Palpations: Abdomen is soft. There is no mass. Tenderness: There is no abdominal tenderness. There is no guarding or rebound. Hernia: No hernia is present. Musculoskeletal:      Right lower leg: No edema. Left lower leg: No edema. Skin:     General: Skin is warm and dry. Neurological:      Mental Status: She is alert. Mental status is at baseline. Psychiatric:         Mood and Affect: Mood normal.          Additional Data:     Lab Results:  Results from last 7 days   Lab Units 10/06/23  1657   WBC Thousand/uL 5.95   HEMOGLOBIN g/dL 8.0*   HEMATOCRIT % 26.3*   PLATELETS Thousands/uL 193   NEUTROS PCT % 74   LYMPHS PCT % 18   MONOS PCT % 7   EOS PCT % 1     Results from last 7 days   Lab Units 10/06/23  1657   SODIUM mmol/L 132*   POTASSIUM mmol/L 4.0   CHLORIDE mmol/L 95*   CO2 mmol/L 27   BUN mg/dL 17   CREATININE mg/dL 4.04*   ANION GAP mmol/L 10   CALCIUM mg/dL 8.2*   ALBUMIN g/dL 3.4*   TOTAL BILIRUBIN mg/dL 0.66   ALK PHOS U/L 53   ALT U/L 5*   AST U/L 16   GLUCOSE RANDOM mg/dL 195*                       Lines/Drains:  Invasive Devices     Peripheral Intravenous Line  Duration           Peripheral IV 10/06/23 Dorsal (posterior); Right Hand <1 day          Line  Duration           Hemodialysis AV Fistula 02/16/22 Left Forearm 597 days                    Imaging: Reviewed radiology reports from this admission including: chest xray  CT abdomen pelvis with contrast   ED Interpretation by Kiera Augustin DO (10/06 3936)   FINDINGS:     ABDOMEN     LOWER CHEST: Bilateral groundglass opacities in the imaged portions of the lungs. Small bilateral pleural effusions. Cardiomegaly.  Mitral annular calcification.     LIVER/BILIARY TREE: Unremarkable.     GALLBLADDER:  No calcified gallstones. No pericholecystic inflammatory change.     SPLEEN:  Unremarkable.     PANCREAS:  Unremarkable.     ADRENAL GLANDS:  Unremarkable.     KIDNEYS/URETERS:  Unremarkable. No hydronephrosis.     STOMACH AND BOWEL: Normal course and caliber of the stomach and duodenum. No dilated loops of bowel or bowel wall thickening.     APPENDIX: Stable mildly distended caliber of the appendix without periappendiceal inflammation.     ABDOMINOPELVIC CAVITY:  No ascites. No pneumoperitoneum. No lymphadenopathy.     VESSELS:  Unremarkable for patient's age.     PELVIS     REPRODUCTIVE ORGANS:  Unremarkable for patient's age.     URINARY BLADDER:  Unremarkable.     ABDOMINAL WALL/INGUINAL REGIONS:  Unremarkable.     OSSEOUS STRUCTURES:  No    acute fracture or destructive osseous lesion.     IMPRESSION:     1. Diffuse groundglass opacities and small bilateral pleural effusions may be due to pulmonary edema versus multifocal pneumonia. 2.  No acute findings in the abdomen or pelvis. Final Result by Brandin Dominguez MD (10/06 1732)      1. Diffuse groundglass opacities and small bilateral pleural effusions may be due to pulmonary edema versus multifocal pneumonia. 2.  No acute findings in the abdomen or pelvis. The study was marked in [facility] for immediate notification. Workstation performed: [id number]         XR chest 2 views    (Results Pending)       EKG and Other Studies Reviewed on Admission:   · EKG: NSR. HR  .    ** Please Note: This note has been constructed using a voice recognition system.  **

## 2023-10-07 NOTE — ASSESSMENT & PLAN NOTE
Wt Readings from Last 3 Encounters:   10/07/23 74 kg (163 lb 2.3 oz)   08/09/23 64 kg (141 lb)   03/31/23 64 kg (141 lb)     Secondary to clinical volume overload. · Diuretic management per nephrology.   · Daily weights, I/Os  · Repeat Echo pending

## 2023-10-07 NOTE — PROGRESS NOTES
[facility]  Progress Note  Name: Francesca Anderson  MRN: [id number]  Unit/Bed#: [place] 2 -01 I Date of Admission: 10/6/2023   Date of Service: 10/7/2023 I Hospital Day: 1    Assessment/Plan   * Hypertensive emergency  Assessment & Plan  80year old female presented with hypertensive emergency. Clinically volume overloaded. Improving clinically. Receiving HD at the time of evaluation. · Blood pressure levels improved dramatically since admission. Continue carvedilol, clonidine, hydralazine, torsemide  · Appreciate nephrology recommendations    ESRD (end stage renal disease) on dialysis [facility])  Assessment & Plan  Lab Results   Component Value Date    EGFR 9 10/07/2023    EGFR 10 10/06/2023    EGFR 7 07/26/2023    CREATININE 4.55 (H) 10/07/2023    CREATININE 4.04 (H) 10/06/2023    CREATININE 5.64 (H) 07/26/2023     TThS HD. Management per nephrology. Patient reportedly listed at [facility] transplant list.    Acute on chronic diastolic heart failure [facility])  Assessment & Plan  Wt Readings from Last 3 Encounters:   10/07/23 74 kg (163 lb 2.3 oz)   08/09/23 64 kg (141 lb)   03/31/23 64 kg (141 lb)     Secondary to clinical volume overload. · Diuretic management per nephrology.   · Daily weights, I/Os  · Repeat Echo pending    Type 2 diabetes mellitus with chronic kidney disease on chronic dialysis, with long-term current use of insulin [facility])  Assessment & Plan  Lab Results   Component Value Date    HGBA1C 8.1 (H) 07/26/2023       Recent Labs     10/06/23  2328 10/07/23  0743 10/07/23  1112 10/07/23  1617   POCGLU 261* 79 263* 109       Blood Sugar Average: Last 72 hrs:  (P) 178     Home: Lantus 20U AM, Humalog 10U TID AC, Tradjenta  Inpatient regimen: Glargine 20U AM, sliding scale         VTE Pharmacologic Prophylaxis:   Pharmacologic: Heparin  Mechanical VTE Prophylaxis in Place: Yes    Discussions with Specialists or Other Care Team Provider: nursing    Education and Discussions with Family / Patient: patient    Current Length of Stay: 1 day(s)    Current Patient Status: Inpatient   Certification Statement: The patient will continue to require additional inpatient hospital stay due to hd, nephrology evaluation, hypertensive emergency    Discharge Plan: active    Code Status: Level 1 - Full Code      Subjective:   Patient seen and examined at bedside. She is comfortable, no new complaints overnight. Reports that her breathing is much improved. I used a  Lawanda [id number] Apofore     Objective:     Vitals:   Temp (24hrs), Av.8 °F (36.6 °C), Min:97.6 °F (36.4 °C), Max:98.3 °F (36.8 °C)    Temp:  [97.6 °F (36.4 °C)-98.3 °F (36.8 °C)] 97.6 °F (36.4 °C)  HR:  [55-74] 58  Resp:  [16-25] 18  BP: (140-224)/(61-96) 140/61  SpO2:  [88 %-100 %] 94 %  Body mass index is 30.83 kg/m². Input and Output Summary (last 24 hours): Intake/Output Summary (Last 24 hours) at 10/7/2023 1652  Last data filed at 10/7/2023 1412  Gross per 24 hour   Intake 680 ml   Output --   Net 680 ml       Physical Exam:     Physical Exam  Vitals reviewed. Constitutional:       General: She is not in acute distress. HENT:      Head: Normocephalic. Nose: Nose normal.      Mouth/Throat:      Mouth: Mucous membranes are moist.   Eyes:      General: No scleral icterus. Cardiovascular:      Rate and Rhythm: Normal rate. Pulmonary:      Effort: Pulmonary effort is normal. No respiratory distress. Abdominal:      General: There is no distension. Palpations: Abdomen is soft. Tenderness: There is no abdominal tenderness. Musculoskeletal:      Comments: Left arm av fistula   Skin:     General: Skin is warm. Neurological:      Mental Status: She is alert. Mental status is at baseline.    Psychiatric:         Mood and Affect: Mood normal.         Behavior: Behavior normal.       Additional Data:     Labs:    Results from last 7 days   Lab Units 10/07/23  0456 10/06/23  1657   WBC Thousand/uL --  5.95   HEMOGLOBIN g/dL  --  8.0*   HEMATOCRIT %  --  26.3*   PLATELETS Thousands/uL 175 193   NEUTROS PCT %  --  74   LYMPHS PCT %  --  18   MONOS PCT %  --  7   EOS PCT %  --  1     Results from last 7 days   Lab Units 10/07/23  0456 10/06/23  1657   SODIUM mmol/L 132* 132*   POTASSIUM mmol/L 3.6 4.0   CHLORIDE mmol/L 94* 95*   CO2 mmol/L 32 27   BUN mg/dL 21 17   CREATININE mg/dL 4.55* 4.04*   ANION GAP mmol/L 6 10   CALCIUM mg/dL 8.0* 8.2*   ALBUMIN g/dL  --  3.4*   TOTAL BILIRUBIN mg/dL  --  0.66   ALK PHOS U/L  --  53   ALT U/L  --  5*   AST U/L  --  16   GLUCOSE RANDOM mg/dL 74 195*         Results from last 7 days   Lab Units 10/07/23  1617 10/07/23  1112 10/07/23  0743 10/06/23  2328   POC GLUCOSE mg/dl 109 263* 79 261*                   * I Have Reviewed All Lab Data Listed Above. * Additional Pertinent Lab Tests Reviewed: [address] Admission Reviewed      Lines:   Invasive Devices     Peripheral Intravenous Line  Duration           Peripheral IV 10/06/23 Dorsal (posterior); Right Hand <1 day          Line  Duration           Hemodialysis AV Fistula 02/16/22 Left Forearm 598 days                   Imaging:    Imaging Reports Reviewed Today Include: cxr and ct a/p    Recent Cultures (last 7 days):           Last 24 Hours Medication List:   Current Facility-Administered Medications   Medication Dose Route Frequency Provider Last Rate   • atorvastatin  80 mg Oral HS Andree Almazan PA-C     • b complex-vitamin C-folic acid  1 capsule Oral Daily With Dinner Andree Almazan PA-C     • calcium acetate  667 mg Oral TID With Meals Andree Almazan PA-C     • carvedilol  25 mg Oral BID With Meals Andree Almazan PA-C     • cloNIDine  0.3 mg Oral TID Andree Almazan PA-C     • gabapentin  300 mg Oral HS Andree Almazan PA-C     • heparin (porcine)  5,000 Units Subcutaneous Q8H [address] Andree M Delabre, PA-C     • hydrALAZINE  100 mg Oral Q8H [address]  Andree Calvo PA-C     • insulin glargine  20 Units Subcutaneous QAM Andree Almazan PA-C     • insulin lispro  1-5 Units Subcutaneous 4x Daily (AC & HS) Andree Almazan PA-C     • [START ON 10/8/2023] Sodium Zirconium Cyclosilicate  10 g Oral Once per day on Sun Mon Wed Fri Andree Almazan PA-C     • [START ON 10/8/2023] torsemide  100 mg Oral Daily Reed Orellana MD          Today, Patient Was Seen By: Mireya Henry MD    ** Please Note: Dictation voice to text software may have been used in the creation of this document.  **

## 2023-10-07 NOTE — ASSESSMENT & PLAN NOTE
Lab Results   Component Value Date    HGBA1C 8.1 (H) 07/26/2023       No results for input(s): "POCGLU" in the last 72 hours.     Blood Sugar Average: Last 72 hrs:  on lantus 20 iu qam and humalog (~10 iu) tid ac and tradjenta  Hold tradjenta continue lantus at 20 iu qam and ssi/poc bs

## 2023-10-07 NOTE — ASSESSMENT & PLAN NOTE
Lab Results   Component Value Date    EGFR 10 10/06/2023    EGFR 7 07/26/2023    EGFR 5 04/17/2023    CREATININE 4.04 (H) 10/06/2023    CREATININE 5.64 (H) 07/26/2023    CREATININE 6.66 (H) 04/17/2023   on hd T/Th/S and on transplant list  Consult nephrology for ip dialysis

## 2023-10-07 NOTE — CONSULTS
Pt appears to have improvement in po intake/BP. Will continue to monitor pt for po intake/wts. Pt declined further education regarding ESRD and diet and CHF and diet stating she has sufficient resources provided by dialysis center. Pt states she does follow dialysis center dietary recommendations.

## 2023-10-07 NOTE — ASSESSMENT & PLAN NOTE
Wt Readings from Last 3 Encounters:   10/06/23 73.6 kg (162 lb 4.1 oz)   08/09/23 64 kg (141 lb)   03/31/23 64 kg (141 lb)     poa by tamayo, orthopnea/pnd elevated bnp of 2000s and cxr wet read concerning for b/l vascular congestion  rec'd iv lasix in ed wlil give another 40mg iv lasix tonight to help with bp control and volume overload  -daily weights I/os, consult nephrology for HD for mgmt of volume overload  -update echo

## 2023-10-07 NOTE — ASSESSMENT & PLAN NOTE
Lab Results   Component Value Date    EGFR 9 10/07/2023    EGFR 10 10/06/2023    EGFR 7 07/26/2023    CREATININE 4.55 (H) 10/07/2023    CREATININE 4.04 (H) 10/06/2023    CREATININE 5.64 (H) 07/26/2023     TThS HD. Management per nephrology.  Patient reportedly listed at [facility] transplant list.

## 2023-10-07 NOTE — PLAN OF CARE
Target UF Goal  3.5  L as tolerated. Patient dialyzing for  3.5hrs  on 4K bath for serum K of 3.6 per protocol. Treatment plan reviewed with Nephrology. Post-Dialysis RN Treatment Note    Blood Pressure:  Pre 173/80 mm/Hg  Post 166/63 mmHg   EDW  73 kg    Weight:  Pre 77.2 kg   Post 73.1 kg   Mode of weight measurement: Bed Scale   Volume Removed  3000 ml    Treatment duration  3.5   NS given  No    Treatment shortened?  Yes, describe: 20 mins early due to conductivity issues   Medications given during Rx None Reported   Estimated Kt/V  1.21   Access type: AV fistula   Access Issues: No    Report called to primary nurse   Yes, Rayray Velasquez, RN via [facility]        Problem: METABOLIC, FLUID AND ELECTROLYTES - ADULT  Goal: Electrolytes maintained within normal limits  Description: INTERVENTIONS:  - Monitor labs and assess patient for signs and symptoms of electrolyte imbalances  - Administer electrolyte replacement as ordered  - Monitor response to electrolyte replacements, including repeat lab results as appropriate  - Instruct patient on fluid and nutrition as appropriate  Outcome: Progressing  Goal: Fluid balance maintained  Description: INTERVENTIONS:  - Monitor labs   - Monitor I/O and WT  - Instruct patient on fluid and nutrition as appropriate  - Assess for signs & symptoms of volume excess or deficit  Outcome: Progressing

## 2023-10-07 NOTE — CONSULTS
[address] [facility] 80 y.o. female MRN: [id number]  Unit/Bed#: [place] 2 -01 Encounter: [id number]    ASSESSMENT and PLAN:    41-year-old female with a history of end-stage renal disease on hemodialysis, hypertension, diabetes mellitus type 2, diastolic congestive heart failure who presented for shortness of breath and abdominal pain. She was also found to have an accelerated hypertension on presentation. Nephrology consult for ESRD management. End Stage Kidney Disease  -Modality:In-Center Hemodialysis  -Schedule: Tuesday/Thursday/Saturday  -Dialysis Unit: [facility]  -Access: Left Arm AV Fistula  -Presciption: Reviewed. Dry weight 73 kg  -Status: Chest x-ray shows cardiomegaly and congestive heart failure with an accelerated hypertension. --Being evaluated for transplant at [facility]:   · Plan for hemodialysis today  · We will aim for 3.5 to 4 L of ultrafiltration, despite her only being 1 L above her usual dry weight. We will challenge her dry weight. · Hold Lasix  · Plan to start oral torsemide tomorrow, but will start at a higher dose, will start torsemide 100 mg tomorrow    Severe asymptomatic hypertension  -- Blood pressure has improved  -- Examines volume overloaded and in congestive heart failure  -- Plan to challenge dry weight on dialysis today  -- Continue carvedilol 25 mg twice a day, clonidine 0.3 mg 3 times daily, currently on furosemide 40 mg IV twice daily, hydralazine 100 mg every 8 hours    Hyponatremia  -- Volume mediated, monitor with ultrafiltration  -- Concurrent renal failure  -- Plan to start higher dose of torsemide tomorrow    Anemia of chronic kidney disease  -- We will hold inpatient CHRISTAL at this time due to high blood pressure.  Neha Smith as outpatient    Acute on chronic diastolic congestive heart failure  -- May be possibly precipitated by hypertension  -- Currently on IV Lasix with a good response in regards to the blood pressure  -- We will plan to challenge dry weight. Discontinue IV Lasix. We will start a higher dose of torsemide tomorrow    SUMMARY OF RECOMMENDATIONS:    Please see above    HISTORY OF PRESENT ILLNESS:  Requesting Physician: Jess Castro MD  Reason for Consult: ESRD    Maggi Wright is a 80 y.o. female who was admitted to [email or web address] after presenting with shortness of breath and abdominal pain. A renal consultation is requested today for assistance in the management of ESRD. Patient presenting with shortness of breath and abdominal pain for the past 2 to 3 days. She has not missed any dialysis treatments. She presented also with severe asymptomatic hypertension. Chest x-ray shows findings consistent with congestive heart failure.     PAST MEDICAL HISTORY:  Past Medical History:   Diagnosis Date   • CHF (congestive heart failure) ([facility])    • CKD (chronic kidney disease) stage 5, GFR less than 15 ml/min ([facility])    • Diabetes mellitus ([address])    • Diabetic nephropathy ([address])    • Hemodialysis patient ([address])     via [facility]ath right chest / Elyn Pickler Tues/Thurs and Sat   • Hyperlipidemia    • Hypertension    • Muscle weakness    • Orthodontics     sleeps with retainer at night   • Risk for falls    • Uses walker     per dtr active in the house able to cook/light cleaning as able       PAST SURGICAL HISTORY:  Past Surgical History:   Procedure Laterality Date   • CATARACT EXTRACTION Bilateral    • EGD     • IR AV FISTULAGRAM/GRAFTOGRAM  5/9/2022   • IR AV FISTULAGRAM/GRAFTOGRAM  3/31/2023   • IR AV FISTULAGRAM/GRAFTOGRAM  8/9/2023   • IR BIOPSY BONE MARROW  9/15/2021   • IR BIOPSY KIDNEY RANDOM  9/15/2021   • IR NON-TUNNELED CENTRAL LINE PLACEMENT  12/7/2021   • IR TUNNELED CENTRAL LINE CHECK/CHANGE/REPOSITION  4/1/2022   • IR TUNNELED DIALYSIS CATHETER PLACEMENT  12/9/2021   • IR TUNNELED DIALYSIS CATHETER REMOVAL  10/21/2022   • NH ARTERIOVENOUS ANASTOMOSIS OPEN DIRECT Left 2/16/2022 Procedure: CREATION FISTULA ARTERIOVENOUS (AV); Surgeon: Melody Lewis DO;  Location: [facility];  Service: Vascular   • IA REVJ OPN ARVEN FSTL W/O [id number] DIAL [facility] Left 9/2/2022    Procedure: REVISION AV FISTULA (superficalize/transposition);   Surgeon: Melody Lewis DO;  Location: [facility];  Service: Vascular       ALLERGIES:  Allergies   Allergen Reactions   • Amlodipine Edema and Eye Swelling   • Lisinopril Angioedema     Bilateral periorbital edema that was significant       SOCIAL HISTORY:  Social History     Substance and Sexual Activity   Alcohol Use Not Currently     Social History     Substance and Sexual Activity   Drug Use Not Currently     Social History     Tobacco Use   Smoking Status Never   Smokeless Tobacco Never   Tobacco Comments    NO TOBACCO USE       FAMILY HISTORY:  Family History   Problem Relation Age of Onset   • Hypertension Mother    • Diabetes Father    • Hypertension Sister    • Diabetes Sister    • Hypertension Brother        MEDICATIONS:    Current Facility-Administered Medications:   •  atorvastatin (LIPITOR) tablet 80 mg, 80 mg, Oral, HS, Andree Almazan PA-C, 80 mg at 10/06/23 2334  •  b complex-vitamin C-folic acid (RENAL) capsule 1 capsule, 1 capsule, Oral, Daily With Dinner, Andree Almazan PA-C  •  calcium acetate (PHOSLO) capsule 667 mg, 667 mg, Oral, TID With Meals, Andree Almazan PA-C  •  carvedilol (COREG) tablet 25 mg, 25 mg, Oral, BID With Meals, Andree Almazan PA-C  •  cloNIDine (CATAPRES) tablet 0.3 mg, 0.3 mg, Oral, TID, Andree Almazan PA-C  •  furosemide (LASIX) injection 40 mg, 40 mg, Intravenous, BID (diuretic), Andree Almazan PA-C  •  gabapentin (NEURONTIN) capsule 300 mg, 300 mg, Oral, HS, Andree Almazan PA-C, 300 mg at 10/06/23 2334  •  heparin (porcine) subcutaneous injection 5,000 Units, 5,000 Units, Subcutaneous, Q8H [address], 5,000 Units at 10/07/23 0731 **AND** [COMPLETED] Platelet count, , , Once, Andree Almazan PA-C  • hydrALAZINE (APRESOLINE) tablet 100 mg, 100 mg, Oral, Q8H [address], Andree Almazan PA-C, 100 mg at 10/07/23 0731  •  insulin glargine (LANTUS) subcutaneous injection 20 Units 0.2 mL, 20 Units, Subcutaneous, QAM, Andree Almazan PA-C  •  insulin lispro (HumaLOG) 100 units/mL subcutaneous injection 1-5 Units, 1-5 Units, Subcutaneous, 4x Daily (AC & HS), 2 Units at 10/06/23 2333 **AND** Fingerstick Glucose (POCT), , , 4x Daily AC and at bedtime, Andree Almazan PA-C  •  [START ON 10/8/2023] Sodium Zirconium Cyclosilicate (Lokelma) 10 g, 10 g, Oral, Once per day on Sun Mon Wed Fri, [address] NAYE Almazan PA-C    REVIEW OF SYSTEMS:  Constitutional: Negative for fatigue, anorexia, fever, chills, diaphoresis  HENT: Negative for postnasal drip  Eyes: Negative for visual disturbance. Respiratory: Negative for cough, shortness of breath and wheezing. Cardiovascular: Negative for chest pain, palpitations and leg swelling. Gastrointestinal: Negative for abdominal pain, constipation, diarrhea, nausea and vomiting. Genitourinary: No dysuria, hematuria  Endocrine: Negative for polyuria. Musculoskeletal: Negative for arthralgias, back pain and joint swelling. Skin: Negative for rash. Neurological: Negative for focal weakness, headaches, dizziness. Hematological: Negative for easy bruising or bleeding. Psychiatric/Behavioral: Negative for confusion and sleep disturbance. All the systems were reviewed and were negative except as documented on the HPI.     PHYSICAL EXAM:  Current Weight: Weight - Scale: 74 kg (163 lb 2.3 oz)  First Weight: Weight - Scale: 73.6 kg (162 lb 4.1 oz)  Vitals:    10/06/23 2318 10/07/23 0600 10/07/23 0731 10/07/23 0732   BP: 159/69  149/68 149/68   BP Location: Right arm      Pulse: 64   57   Resp: 20      Temp: 97.6 °F (36.4 °C)      TempSrc: Oral      SpO2: 96%   94%   Weight: 73.8 kg (162 lb 11.2 oz) 74 kg (163 lb 2.3 oz)     Height: 5' 1" (1.549 m)        No intake or output data in the 24 hours ending 10/07/23 0736  Physical Exam  Vitals and nursing note reviewed. Constitutional:       General: She is not in acute distress. Appearance: She is well-developed. She is obese. HENT:      Head: Normocephalic and atraumatic. Eyes:      General: No scleral icterus. Conjunctiva/sclera: Conjunctivae normal.      Pupils: Pupils are equal, round, and reactive to light. Cardiovascular:      Rate and Rhythm: Normal rate and regular rhythm. Heart sounds: S1 normal and S2 normal. No murmur heard. No friction rub. No gallop. Pulmonary:      Effort: Pulmonary effort is normal. No respiratory distress. Breath sounds: Normal breath sounds. No wheezing or rales. Abdominal:      General: Bowel sounds are normal.      Palpations: Abdomen is soft. Tenderness: There is no abdominal tenderness. There is no rebound. Musculoskeletal:         General: Normal range of motion. Cervical back: Normal range of motion and neck supple. Skin:     Findings: No rash. Neurological:      Mental Status: She is alert and oriented to person, place, and time.    Psychiatric:         Behavior: Behavior normal.           Invasive Devices:      Lab Results:   Results from last 7 days   Lab Units 10/07/23  0456 10/06/23  1657   WBC Thousand/uL  --  5.95   HEMOGLOBIN g/dL  --  8.0*   HEMATOCRIT %  --  26.3*   PLATELETS Thousands/uL 175 193   POTASSIUM mmol/L 3.6 4.0   CHLORIDE mmol/L 94* 95*   CO2 mmol/L 32 27   BUN mg/dL 21 17   CREATININE mg/dL 4.55* 4.04*   CALCIUM mg/dL 8.0* 8.2*   ALK PHOS U/L  --  53   ALT U/L  --  5*   AST U/L  --  16

## 2023-10-07 NOTE — PLAN OF CARE
Problem: Potential for Falls  Goal: Patient will remain free of falls  Description: INTERVENTIONS:  - Educate patient/family on patient safety including physical limitations  - Instruct patient to call for assistance with activity   - Consult OT/PT to assist with strengthening/mobility   - Keep Call bell within reach  - Keep bed low and locked with side rails adjusted as appropriate  - Keep care items and personal belongings within reach  - Initiate and maintain comfort rounds  - Make Fall Risk Sign visible to staff  - Offer Toileting   - Initiate/Maintain alarm  - Obtain necessary fall risk management equipment  - Apply yellow socks and bracelet for high fall risk patients  - Consider moving patient to room near nurses station  Outcome: Progressing     Problem: PAIN - ADULT  Goal: Verbalizes/displays adequate comfort level or baseline comfort level  Description: Interventions:  - Encourage patient to monitor pain and request assistance  - Assess pain using appropriate pain scale  - Administer analgesics based on type and severity of pain and evaluate response  - Implement non-pharmacological measures as appropriate and evaluate response  - Consider cultural and social influences on pain and pain management  - Notify physician/advanced practitioner if interventions unsuccessful or patient reports new pain  Outcome: Progressing     Problem: SAFETY ADULT  Goal: Patient will remain free of falls  Description: INTERVENTIONS:  - Educate patient/family on patient safety including physical limitations  - Instruct patient to call for assistance with activity   - Consult OT/PT to assist with strengthening/mobility   - Keep Call bell within reach  - Keep bed low and locked with side rails adjusted as appropriate  - Keep care items and personal belongings within reach  - Initiate and maintain comfort rounds  - Make Fall Risk Sign visible to staff  - Offer Toileting   - Initiate/Maintain alarm  - Obtain necessary fall risk management equipment  - Apply yellow socks and bracelet for high fall risk patients  - Consider moving patient to room near nurses station  Outcome: Progressing  Goal: Maintain or return to baseline ADL function  Description: INTERVENTIONS:  -  Assess patient's ability to carry out ADLs; assess patient's baseline for ADL function and identify physical deficits which impact ability to perform ADLs (bathing, care of mouth/teeth, toileting, grooming, dressing, etc.)  - Assess/evaluate cause of self-care deficits   - Assess range of motion  - Assess patient's mobility; develop plan if impaired  - Assess patient's need for assistive devices and provide as appropriate  - Encourage maximum independence but intervene and supervise when necessary  - Involve family in performance of ADLs  - Assess for home care needs following discharge   - Consider OT consult to assist with ADL evaluation and planning for discharge  - Provide patient education as appropriate  Outcome: Progressing  Goal: Maintains/Returns to pre admission functional level  Description: INTERVENTIONS:  - Perform BMAT or MOVE assessment daily.   - Set and communicate daily mobility goal to care team and patient/family/caregiver.    - Collaborate with rehabilitation services on mobility goals if consulted  - Perform Range of Motion   - Reposition patient   - Dangle patient   - Stand patient  - Ambulate patient   - Out of bed to chair   - Out of bed for meals   - Out of bed for toileting  - Record patient progress and toleration of activity level   Outcome: Progressing     Problem: DISCHARGE PLANNING  Goal: Discharge to home or other facility with appropriate resources  Description: INTERVENTIONS:  - Identify barriers to discharge w/patient and caregiver  - Arrange for needed discharge resources and transportation as appropriate  - Identify discharge learning needs (meds, wound care, etc.)  - Arrange for interpretive services to assist at discharge as needed  - Refer to Case Management Department for coordinating discharge planning if the patient needs post-hospital services based on physician/advanced practitioner order or complex needs related to functional status, cognitive ability, or social support system  Outcome: Progressing     Problem: Knowledge Deficit  Goal: Patient/family/caregiver demonstrates understanding of disease process, treatment plan, medications, and discharge instructions  Description: Complete learning assessment and assess knowledge base.   Interventions:  - Provide teaching at level of understanding  - Provide teaching via preferred learning methods  Outcome: Progressing     Problem: CARDIOVASCULAR - ADULT  Goal: Maintains optimal cardiac output and hemodynamic stability  Description: INTERVENTIONS:  - Monitor I/O, vital signs and rhythm  - Monitor for S/S and trends of decreased cardiac output  - Administer and titrate ordered vasoactive medications to optimize hemodynamic stability  - Assess quality of pulses, skin color and temperature  - Assess for signs of decreased coronary artery perfusion  - Instruct patient to report change in severity of symptoms  Outcome: Progressing     Problem: GASTROINTESTINAL - ADULT  Goal: Minimal or absence of nausea and/or vomiting  Description: INTERVENTIONS:  - Administer IV fluids if ordered to ensure adequate hydration  - Maintain NPO status until nausea and vomiting are resolved  - Nasogastric tube if ordered  - Administer ordered antiemetic medications as needed  - Provide nonpharmacologic comfort measures as appropriate  - Advance diet as tolerated, if ordered  - Consider nutrition services referral to assist patient with adequate nutrition and appropriate food choices  Outcome: Progressing  Goal: Maintains or returns to baseline bowel function  Description: INTERVENTIONS:  - Assess bowel function  - Encourage oral fluids to ensure adequate hydration  - Administer IV fluids if ordered to ensure adequate hydration  - Administer ordered medications as needed  - Encourage mobilization and activity  - Consider nutritional services referral to assist patient with adequate nutrition and appropriate food choices  Outcome: Progressing  Goal: Maintains adequate nutritional intake  Description: INTERVENTIONS:  - Monitor percentage of each meal consumed  - Identify factors contributing to decreased intake, treat as appropriate  - Assist with meals as needed  - Monitor I&O, weight, and lab values if indicated  - Obtain nutrition services referral as needed  Outcome: Progressing  Goal: Oral mucous membranes remain intact  Description: INTERVENTIONS  - Assess oral mucosa and hygiene practices  - Implement preventative oral hygiene regimen  - Implement oral medicated treatments as ordered  - Initiate Nutrition services referral as needed  Outcome: Progressing

## 2023-10-07 NOTE — ASSESSMENT & PLAN NOTE
80year old female presented with hypertensive emergency. Clinically volume overloaded. Improving clinically. Receiving HD at the time of evaluation. · Blood pressure levels improved dramatically since admission.  Continue carvedilol, clonidine, hydralazine, torsemide  · Appreciate nephrology recommendations

## 2023-10-07 NOTE — ASSESSMENT & PLAN NOTE
Poa c/b acute on chronic CHF. Pt has had difficult to control bp x 2 weeks with recently having catapres increased to 0.3mg po tid, and is also on hydralazine 100mg tid, coreg 25mg bid and torsemide 20mg po daily. She doesn't believe she is on doxazosin presently. No missed HD or otc nsaids/cough/cold meds.   -improved w/night meds and two doses of iv hydralazine 10mg.  -consult nephrology for ultrafiltration  -admit to ip  Given concomoitant chf

## 2023-10-07 NOTE — ASSESSMENT & PLAN NOTE
Lab Results   Component Value Date    HGBA1C 8.1 (H) 07/26/2023       Recent Labs     10/06/23  2328 10/07/23  0743 10/07/23  1112 10/07/23  1617   POCGLU 261* 79 263* 109       Blood Sugar Average: Last 72 hrs:  (P) 178     Home: Lantus 20U AM, Humalog 10U TID AC, Tradjenta  Inpatient regimen: Glargine 20U AM, sliding scale

## 2023-10-07 NOTE — PLAN OF CARE
Problem: Potential for Falls  Goal: Patient will remain free of falls  Description: INTERVENTIONS:  - Educate patient/family on patient safety including physical limitations  - Instruct patient to call for assistance with activity   - Consult OT/PT to assist with strengthening/mobility   - Keep Call bell within reach  - Keep bed low and locked with side rails adjusted as appropriate  - Keep care items and personal belongings within reach  - Initiate and maintain comfort rounds  - Make Fall Risk Sign visible to staff  - Offer Toileting every 2 Hours, in advance of need  - Initiate/Maintain bed alarm  - Obtain necessary fall risk management equipment:   - Apply yellow socks and bracelet for high fall risk patients  - Consider moving patient to room near nurses station  Outcome: Progressing     Problem: PAIN - ADULT  Goal: Verbalizes/displays adequate comfort level or baseline comfort level  Description: Interventions:  - Encourage patient to monitor pain and request assistance  - Assess pain using appropriate pain scale  - Administer analgesics based on type and severity of pain and evaluate response  - Implement non-pharmacological measures as appropriate and evaluate response  - Consider cultural and social influences on pain and pain management  - Notify physician/advanced practitioner if interventions unsuccessful or patient reports new pain  Outcome: Progressing     Problem: SAFETY ADULT  Goal: Patient will remain free of falls  Description: INTERVENTIONS:  - Educate patient/family on patient safety including physical limitations  - Instruct patient to call for assistance with activity   - Consult OT/PT to assist with strengthening/mobility   - Keep Call bell within reach  - Keep bed low and locked with side rails adjusted as appropriate  - Keep care items and personal belongings within reach  - Initiate and maintain comfort rounds  - Make Fall Risk Sign visible to staff  - Offer Toileting every 2 Hours, in advance of need  - Initiate/Maintain bed alarm  - Obtain necessary fall risk management equipment:   - Apply yellow socks and bracelet for high fall risk patients  - Consider moving patient to room near nurses station  Outcome: Progressing  Goal: Maintain or return to baseline ADL function  Description: INTERVENTIONS:  -  Assess patient's ability to carry out ADLs; assess patient's baseline for ADL function and identify physical deficits which impact ability to perform ADLs (bathing, care of mouth/teeth, toileting, grooming, dressing, etc.)  - Assess/evaluate cause of self-care deficits   - Assess range of motion  - Assess patient's mobility; develop plan if impaired  - Assess patient's need for assistive devices and provide as appropriate  - Encourage maximum independence but intervene and supervise when necessary  - Involve family in performance of ADLs  - Assess for home care needs following discharge   - Consider OT consult to assist with ADL evaluation and planning for discharge  - Provide patient education as appropriate  Outcome: Progressing  Goal: Maintains/Returns to pre admission functional level  Description: INTERVENTIONS:  - Perform BMAT or MOVE assessment daily.   - Set and communicate daily mobility goal to care team and patient/family/caregiver. - Collaborate with rehabilitation services on mobility goals if consulted  - Perform Range of Motion 2 times a day. - Reposition patient every 3 hours.   - Dangle patient 2 times a day  - Stand patient 3 times a day  - Ambulate patient 3 times a day  - Out of bed to chair 3 times a day   - Out of bed for meals 3 times a day  - Out of bed for toileting  - Record patient progress and toleration of activity level   Outcome: Progressing     Problem: DISCHARGE PLANNING  Goal: Discharge to home or other facility with appropriate resources  Description: INTERVENTIONS:  - Identify barriers to discharge w/patient and caregiver  - Arrange for needed discharge resources and transportation as appropriate  - Identify discharge learning needs (meds, wound care, etc.)  - Arrange for interpretive services to assist at discharge as needed  - Refer to Case Management Department for coordinating discharge planning if the patient needs post-hospital services based on physician/advanced practitioner order or complex needs related to functional status, cognitive ability, or social support system  Outcome: Progressing     Problem: Knowledge Deficit  Goal: Patient/family/caregiver demonstrates understanding of disease process, treatment plan, medications, and discharge instructions  Description: Complete learning assessment and assess knowledge base.   Interventions:  - Provide teaching at level of understanding  - Provide teaching via preferred learning methods  Outcome: Progressing     Problem: CARDIOVASCULAR - ADULT  Goal: Maintains optimal cardiac output and hemodynamic stability  Description: INTERVENTIONS:  - Monitor I/O, vital signs and rhythm  - Monitor for S/S and trends of decreased cardiac output  - Administer and titrate ordered vasoactive medications to optimize hemodynamic stability  - Assess quality of pulses, skin color and temperature  - Assess for signs of decreased coronary artery perfusion  - Instruct patient to report change in severity of symptoms  Outcome: Progressing     Problem: GASTROINTESTINAL - ADULT  Goal: Minimal or absence of nausea and/or vomiting  Description: INTERVENTIONS:  - Administer IV fluids if ordered to ensure adequate hydration  - Maintain NPO status until nausea and vomiting are resolved  - Nasogastric tube if ordered  - Administer ordered antiemetic medications as needed  - Provide nonpharmacologic comfort measures as appropriate  - Advance diet as tolerated, if ordered  - Consider nutrition services referral to assist patient with adequate nutrition and appropriate food choices  Outcome: Progressing  Goal: Maintains or returns to baseline bowel function  Description: INTERVENTIONS:  - Assess bowel function  - Encourage oral fluids to ensure adequate hydration  - Administer IV fluids if ordered to ensure adequate hydration  - Administer ordered medications as needed  - Encourage mobilization and activity  - Consider nutritional services referral to assist patient with adequate nutrition and appropriate food choices  Outcome: Progressing  Goal: Maintains adequate nutritional intake  Description: INTERVENTIONS:  - Monitor percentage of each meal consumed  - Identify factors contributing to decreased intake, treat as appropriate  - Assist with meals as needed  - Monitor I&O, weight, and lab values if indicated  - Obtain nutrition services referral as needed  Outcome: Progressing  Goal: Oral mucous membranes remain intact  Description: INTERVENTIONS  - Assess oral mucosa and hygiene practices  - Implement preventative oral hygiene regimen  - Implement oral medicated treatments as ordered  - Initiate Nutrition services referral as needed  Outcome: Progressing

## 2023-10-08 ENCOUNTER — APPOINTMENT (INPATIENT)
Dept: NON INVASIVE DIAGNOSTICS | Facility: HOSPITAL | Age: 68
End: 2023-10-08
Payer: MEDICARE

## 2023-10-08 VITALS
WEIGHT: 163 LBS | TEMPERATURE: 98.2 F | DIASTOLIC BLOOD PRESSURE: 77 MMHG | BODY MASS INDEX: 30.78 KG/M2 | OXYGEN SATURATION: 97 % | HEART RATE: 65 BPM | SYSTOLIC BLOOD PRESSURE: 162 MMHG | HEIGHT: 61 IN | RESPIRATION RATE: 20 BRPM

## 2023-10-08 LAB
ANION GAP SERPL CALCULATED.3IONS-SCNC: 5 MMOL/L
AORTIC ROOT: 2.1 CM
AORTIC VALVE MEAN VELOCITY: 22.5 M/S
APICAL FOUR CHAMBER EJECTION FRACTION: 58 %
AV AREA BY CONTINUOUS VTI: 1.2 CM2
AV AREA PEAK VELOCITY: 1.3 CM2
AV LVOT MEAN GRADIENT: 4 MMHG
AV LVOT PEAK GRADIENT: 7 MMHG
AV MEAN GRADIENT: 22 MMHG
AV PEAK GRADIENT: 38 MMHG
AV VALVE AREA: 1.31 CM2
AV VELOCITY RATIO: 0.43
BUN SERPL-MCNC: 12 MG/DL (ref 5–25)
CALCIUM SERPL-MCNC: 8.2 MG/DL (ref 8.4–10.2)
CHLORIDE SERPL-SCNC: 98 MMOL/L (ref 96–108)
CO2 SERPL-SCNC: 32 MMOL/L (ref 21–32)
CREAT SERPL-MCNC: 3.68 MG/DL (ref 0.6–1.3)
DOP CALC AO PEAK VEL: 3.09 M/S
DOP CALC AO VTI: 78.88 CM
DOP CALC LVOT AREA: 3.46 CM2
DOP CALC LVOT CARDIAC INDEX: 3.78 L/MIN/M2
DOP CALC LVOT CARDIAC OUTPUT: 6.47 L/MIN
DOP CALC LVOT DIAMETER: 2.1 CM
DOP CALC LVOT PEAK VEL VTI: 29.82 CM
DOP CALC LVOT PEAK VEL: 1.32 M/S
DOP CALC LVOT STROKE INDEX: 53.8 ML/M2
DOP CALC LVOT STROKE VOLUME: 103.23 CM3
E WAVE DECELERATION TIME: 157 MS
E/A RATIO: 0.8
ERYTHROCYTE [DISTWIDTH] IN BLOOD BY AUTOMATED COUNT: 14.7 % (ref 11.6–15.1)
FRACTIONAL SHORTENING: 49 (ref 28–44)
GFR SERPL CREATININE-BSD FRML MDRD: 12 ML/MIN/1.73SQ M
GLUCOSE SERPL-MCNC: 100 MG/DL (ref 65–140)
GLUCOSE SERPL-MCNC: 146 MG/DL (ref 65–140)
GLUCOSE SERPL-MCNC: 342 MG/DL (ref 65–140)
GLUCOSE SERPL-MCNC: 50 MG/DL (ref 65–140)
GLUCOSE SERPL-MCNC: 72 MG/DL (ref 65–140)
HCT VFR BLD AUTO: 25.4 % (ref 34.8–46.1)
HGB BLD-MCNC: 7.9 G/DL (ref 11.5–15.4)
INTERVENTRICULAR SEPTUM IN DIASTOLE (PARASTERNAL SHORT AXIS VIEW): 1.6 CM
INTERVENTRICULAR SEPTUM: 1.6 CM (ref 0.6–1.1)
LAAS-AP2: 16.1 CM2
LAAS-AP4: 18 CM2
LEFT ATRIUM AREA SYSTOLE SINGLE PLANE A4C: 17.7 CM2
LEFT ATRIUM SIZE: 4.4 CM
LEFT ATRIUM VOLUME (MOD BIPLANE): 47 ML
LEFT ATRIUM VOLUME INDEX (MOD BIPLANE): 27.5 ML/M2
LEFT INTERNAL DIMENSION IN SYSTOLE: 2.4 CM (ref 2.1–4)
LEFT VENTRICULAR INTERNAL DIMENSION IN DIASTOLE: 4.7 CM (ref 3.5–6)
LEFT VENTRICULAR POSTERIOR WALL IN END DIASTOLE: 1.6 CM
LEFT VENTRICULAR STROKE VOLUME: 84 ML
LVSV (TEICH): 84 ML
MAGNESIUM SERPL-MCNC: 2 MG/DL (ref 1.9–2.7)
MCH RBC QN AUTO: 28.8 PG (ref 26.8–34.3)
MCHC RBC AUTO-ENTMCNC: 31.1 G/DL (ref 31.4–37.4)
MCV RBC AUTO: 93 FL (ref 82–98)
MV E'TISSUE VEL-SEP: 6 CM/S
MV PEAK A VEL: 1.49 M/S
MV PEAK E VEL: 119 CM/S
MV STENOSIS PRESSURE HALF TIME: 45 MS
MV VALVE AREA P 1/2 METHOD: 4.89
PLATELET # BLD AUTO: 188 THOUSANDS/UL (ref 149–390)
PMV BLD AUTO: 11.5 FL (ref 8.9–12.7)
POTASSIUM SERPL-SCNC: 4.4 MMOL/L (ref 3.5–5.3)
RA PRESSURE ESTIMATED: 8 MMHG
RBC # BLD AUTO: 2.74 MILLION/UL (ref 3.81–5.12)
RIGHT ATRIUM AREA SYSTOLE A4C: 12 CM2
RIGHT VENTRICLE ID DIMENSION: 3.5 CM
RV PSP: 58 MMHG
SL CV LEFT ATRIUM LENGTH A2C: 4.7 CM
SL CV LV EF: 58
SL CV PED ECHO LEFT VENTRICLE DIASTOLIC VOLUME (MOD BIPLANE) 2D: 104 ML
SL CV PED ECHO LEFT VENTRICLE SYSTOLIC VOLUME (MOD BIPLANE) 2D: 20 ML
SODIUM SERPL-SCNC: 135 MMOL/L (ref 135–147)
TR MAX PG: 50 MMHG
TR PEAK VELOCITY: 3.5 M/S
TRICUSPID ANNULAR PLANE SYSTOLIC EXCURSION: 2.2 CM
TRICUSPID VALVE PEAK REGURGITATION VELOCITY: 3.54 M/S
WBC # BLD AUTO: 4.81 THOUSAND/UL (ref 4.31–10.16)

## 2023-10-08 PROCEDURE — 83735 ASSAY OF MAGNESIUM: CPT | Performed by: STUDENT IN AN ORGANIZED HEALTH CARE EDUCATION/TRAINING PROGRAM

## 2023-10-08 PROCEDURE — 85027 COMPLETE CBC AUTOMATED: CPT | Performed by: STUDENT IN AN ORGANIZED HEALTH CARE EDUCATION/TRAINING PROGRAM

## 2023-10-08 PROCEDURE — 99239 HOSP IP/OBS DSCHRG MGMT >30: CPT | Performed by: STUDENT IN AN ORGANIZED HEALTH CARE EDUCATION/TRAINING PROGRAM

## 2023-10-08 PROCEDURE — 93306 TTE W/DOPPLER COMPLETE: CPT

## 2023-10-08 PROCEDURE — 93306 TTE W/DOPPLER COMPLETE: CPT | Performed by: STUDENT IN AN ORGANIZED HEALTH CARE EDUCATION/TRAINING PROGRAM

## 2023-10-08 PROCEDURE — 80048 BASIC METABOLIC PNL TOTAL CA: CPT | Performed by: STUDENT IN AN ORGANIZED HEALTH CARE EDUCATION/TRAINING PROGRAM

## 2023-10-08 PROCEDURE — 82948 REAGENT STRIP/BLOOD GLUCOSE: CPT

## 2023-10-08 RX ORDER — HYDRALAZINE HYDROCHLORIDE 50 MG/1
100 TABLET, FILM COATED ORAL EVERY 8 HOURS SCHEDULED
Start: 2023-10-08

## 2023-10-08 RX ORDER — CLONIDINE HYDROCHLORIDE 0.3 MG/1
0.3 TABLET ORAL 3 TIMES DAILY
Qty: 90 TABLET | Refills: 0 | Status: SHIPPED | OUTPATIENT
Start: 2023-10-08 | End: 2023-11-07

## 2023-10-08 RX ORDER — TORSEMIDE 100 MG/1
100 TABLET ORAL DAILY
Qty: 30 TABLET | Refills: 0 | Status: SHIPPED | OUTPATIENT
Start: 2023-10-09 | End: 2023-11-08

## 2023-10-08 RX ORDER — CARVEDILOL 25 MG/1
25 TABLET ORAL 2 TIMES DAILY WITH MEALS
Qty: 60 TABLET | Refills: 0 | Status: SHIPPED | OUTPATIENT
Start: 2023-10-08 | End: 2023-11-07

## 2023-10-08 RX ADMIN — CARVEDILOL 25 MG: 25 TABLET, FILM COATED ORAL at 16:05

## 2023-10-08 RX ADMIN — CARVEDILOL 25 MG: 25 TABLET, FILM COATED ORAL at 07:47

## 2023-10-08 RX ADMIN — HYDRALAZINE HYDROCHLORIDE 100 MG: 25 TABLET, FILM COATED ORAL at 05:10

## 2023-10-08 RX ADMIN — CALCIUM ACETATE 667 MG: 667 CAPSULE ORAL at 07:47

## 2023-10-08 RX ADMIN — HEPARIN SODIUM 5000 UNITS: 5000 INJECTION INTRAVENOUS; SUBCUTANEOUS at 15:20

## 2023-10-08 RX ADMIN — INSULIN LISPRO 3 UNITS: 100 INJECTION, SOLUTION INTRAVENOUS; SUBCUTANEOUS at 12:23

## 2023-10-08 RX ADMIN — TORSEMIDE 100 MG: 100 TABLET ORAL at 08:52

## 2023-10-08 RX ADMIN — SODIUM ZIRCONIUM CYCLOSILICATE 10 G: 5 POWDER, FOR SUSPENSION ORAL at 08:52

## 2023-10-08 RX ADMIN — CLONIDINE HYDROCHLORIDE 0.3 MG: 0.1 TABLET ORAL at 08:51

## 2023-10-08 RX ADMIN — HEPARIN SODIUM 5000 UNITS: 5000 INJECTION INTRAVENOUS; SUBCUTANEOUS at 05:13

## 2023-10-08 RX ADMIN — CALCIUM ACETATE 667 MG: 667 CAPSULE ORAL at 12:23

## 2023-10-08 RX ADMIN — HYDRALAZINE HYDROCHLORIDE 100 MG: 25 TABLET, FILM COATED ORAL at 15:17

## 2023-10-08 RX ADMIN — CLONIDINE HYDROCHLORIDE 0.3 MG: 0.1 TABLET ORAL at 15:17

## 2023-10-08 RX ADMIN — ASCORBIC ACID, THIAMINE MONONITRATE,RIBOFLAVIN, NIACINAMIDE, PYRIDOXINE HYDROCHLORIDE, FOLIC ACID, CYANOCOBALAMIN, BIOTIN, CALCIUM PANTOTHENATE, 1 CAPSULE: 100; 1.5; 1.7; 20; 10; 1; 6000; 150000; 5 CAPSULE, LIQUID FILLED ORAL at 16:05

## 2023-10-08 NOTE — ASSESSMENT & PLAN NOTE
Lab Results   Component Value Date    EGFR 12 10/08/2023    EGFR 9 10/07/2023    EGFR 10 10/06/2023    CREATININE 3.68 (H) 10/08/2023    CREATININE 4.55 (H) 10/07/2023    CREATININE 4.04 (H) 10/06/2023     [facility] HD. Management per nephrology.  Patient reportedly listed at [facility] transplant list.  · Cleared for discharge

## 2023-10-08 NOTE — ASSESSMENT & PLAN NOTE
80year old female presented with hypertensive emergency. Much improved after receiving hemodialysis. · Cleared by nephrology for discharge. Medication changes were made. For further dry weight challenge per Dr. Sanches Dose on her next HD session on Tuesday.   · On discharge, continue coreg 25mg BID, torsemide 100mg daily, clonidine 0.3mg TID, hydralazine 100mg Q8 hours

## 2023-10-08 NOTE — ASSESSMENT & PLAN NOTE
Lab Results   Component Value Date    HGBA1C 8.1 (H) 07/26/2023       Recent Labs     10/07/23  2111 10/08/23  0727 10/08/23  0753 10/08/23  1058   POCGLU 99 50* 100 342*       Blood Sugar Average: Last 72 hrs:  (P) 162.875     Hypoglycemic overnight. Held her AM glargine today. Resume home medications on discharge.

## 2023-10-08 NOTE — PLAN OF CARE
Problem: Potential for Falls  Goal: Patient will remain free of falls  Description: INTERVENTIONS:  - Educate patient/family on patient safety including physical limitations  - Instruct patient to call for assistance with activity   - Consult OT/PT to assist with strengthening/mobility   - Keep Call bell within reach  - Keep bed low and locked with side rails adjusted as appropriate  - Keep care items and personal belongings within reach  - Initiate and maintain comfort rounds  - Make Fall Risk Sign visible to staff  - Offer Toileting every  Hours, in advance of need  - Initiate/Maintain alarm  - Obtain necessary fall risk management equipment:   - Apply yellow socks and bracelet for high fall risk patients  - Consider moving patient to room near nurses station  Outcome: Progressing     Problem: PAIN - ADULT  Goal: Verbalizes/displays adequate comfort level or baseline comfort level  Description: Interventions:  - Encourage patient to monitor pain and request assistance  - Assess pain using appropriate pain scale  - Administer analgesics based on type and severity of pain and evaluate response  - Implement non-pharmacological measures as appropriate and evaluate response  - Consider cultural and social influences on pain and pain management  - Notify physician/advanced practitioner if interventions unsuccessful or patient reports new pain  Outcome: Progressing     Problem: SAFETY ADULT  Goal: Patient will remain free of falls  Description: INTERVENTIONS:  - Educate patient/family on patient safety including physical limitations  - Instruct patient to call for assistance with activity   - Consult OT/PT to assist with strengthening/mobility   - Keep Call bell within reach  - Keep bed low and locked with side rails adjusted as appropriate  - Keep care items and personal belongings within reach  - Initiate and maintain comfort rounds  - Make Fall Risk Sign visible to staff  - Offer Toileting every  Hours, in advance of need  - Initiate/Maintain alarm  - Obtain necessary fall risk management equipment:   - Apply yellow socks and bracelet for high fall risk patients  - Consider moving patient to room near nurses station  Outcome: Progressing  Goal: Maintain or return to baseline ADL function  Description: INTERVENTIONS:  -  Assess patient's ability to carry out ADLs; assess patient's baseline for ADL function and identify physical deficits which impact ability to perform ADLs (bathing, care of mouth/teeth, toileting, grooming, dressing, etc.)  - Assess/evaluate cause of self-care deficits   - Assess range of motion  - Assess patient's mobility; develop plan if impaired  - Assess patient's need for assistive devices and provide as appropriate  - Encourage maximum independence but intervene and supervise when necessary  - Involve family in performance of ADLs  - Assess for home care needs following discharge   - Consider OT consult to assist with ADL evaluation and planning for discharge  - Provide patient education as appropriate  Outcome: Progressing  Goal: Maintains/Returns to pre admission functional level  Description: INTERVENTIONS:  - Perform BMAT or MOVE assessment daily.   - Set and communicate daily mobility goal to care team and patient/family/caregiver. - Collaborate with rehabilitation services on mobility goals if consulted  - Perform Range of Motion  times a day. - Reposition patient every  hours.   - Dangle patient  times a day  - Stand patient  times a day  - Ambulate patient  times a day  - Out of bed to chair  times a day   - Out of bed for meals times a day  - Out of bed for toileting  - Record patient progress and toleration of activity level   Outcome: Progressing     Problem: DISCHARGE PLANNING  Goal: Discharge to home or other facility with appropriate resources  Description: INTERVENTIONS:  - Identify barriers to discharge w/patient and caregiver  - Arrange for needed discharge resources and transportation as appropriate  - Identify discharge learning needs (meds, wound care, etc.)  - Arrange for interpretive services to assist at discharge as needed  - Refer to Case Management Department for coordinating discharge planning if the patient needs post-hospital services based on physician/advanced practitioner order or complex needs related to functional status, cognitive ability, or social support system  Outcome: Progressing     Problem: Knowledge Deficit  Goal: Patient/family/caregiver demonstrates understanding of disease process, treatment plan, medications, and discharge instructions  Description: Complete learning assessment and assess knowledge base.   Interventions:  - Provide teaching at level of understanding  - Provide teaching via preferred learning methods  Outcome: Progressing     Problem: CARDIOVASCULAR - ADULT  Goal: Maintains optimal cardiac output and hemodynamic stability  Description: INTERVENTIONS:  - Monitor I/O, vital signs and rhythm  - Monitor for S/S and trends of decreased cardiac output  - Administer and titrate ordered vasoactive medications to optimize hemodynamic stability  - Assess quality of pulses, skin color and temperature  - Assess for signs of decreased coronary artery perfusion  - Instruct patient to report change in severity of symptoms  Outcome: Progressing     Problem: GASTROINTESTINAL - ADULT  Goal: Minimal or absence of nausea and/or vomiting  Description: INTERVENTIONS:  - Administer IV fluids if ordered to ensure adequate hydration  - Maintain NPO status until nausea and vomiting are resolved  - Nasogastric tube if ordered  - Administer ordered antiemetic medications as needed  - Provide nonpharmacologic comfort measures as appropriate  - Advance diet as tolerated, if ordered  - Consider nutrition services referral to assist patient with adequate nutrition and appropriate food choices  Outcome: Progressing  Goal: Maintains or returns to baseline bowel function  Description: INTERVENTIONS:  - Assess bowel function  - Encourage oral fluids to ensure adequate hydration  - Administer IV fluids if ordered to ensure adequate hydration  - Administer ordered medications as needed  - Encourage mobilization and activity  - Consider nutritional services referral to assist patient with adequate nutrition and appropriate food choices  Outcome: Progressing  Goal: Maintains adequate nutritional intake  Description: INTERVENTIONS:  - Monitor percentage of each meal consumed  - Identify factors contributing to decreased intake, treat as appropriate  - Assist with meals as needed  - Monitor I&O, weight, and lab values if indicated  - Obtain nutrition services referral as needed  Outcome: Progressing  Goal: Oral mucous membranes remain intact  Description: INTERVENTIONS  - Assess oral mucosa and hygiene practices  - Implement preventative oral hygiene regimen  - Implement oral medicated treatments as ordered  - Initiate Nutrition services referral as needed  Outcome: Progressing     Problem: METABOLIC, FLUID AND ELECTROLYTES - ADULT  Goal: Electrolytes maintained within normal limits  Description: INTERVENTIONS:  - Monitor labs and assess patient for signs and symptoms of electrolyte imbalances  - Administer electrolyte replacement as ordered  - Monitor response to electrolyte replacements, including repeat lab results as appropriate  - Instruct patient on fluid and nutrition as appropriate  Outcome: Progressing  Goal: Fluid balance maintained  Description: INTERVENTIONS:  - Monitor labs   - Monitor I/O and WT  - Instruct patient on fluid and nutrition as appropriate  - Assess for signs & symptoms of volume excess or deficit  Outcome: Progressing     Problem: Nutrition/Hydration-ADULT  Goal: Nutrient/Hydration intake appropriate for improving, restoring or maintaining nutritional needs  Description: Monitor and assess patient's nutrition/hydration status for malnutrition. Collaborate with interdisciplinary team and initiate plan and interventions as ordered. Monitor patient's weight and dietary intake as ordered or per policy. Utilize nutrition screening tool and intervene as necessary. Determine patient's food preferences and provide high-protein, high-caloric foods as appropriate.      INTERVENTIONS:  - Monitor oral intake, urinary output, labs, and treatment plans  - Assess nutrition and hydration status and recommend course of action  - Evaluate amount of meals eaten  - Assist patient with eating if necessary   - Allow adequate time for meals  - Recommend/ encourage appropriate diets, oral nutritional supplements, and vitamin/mineral supplements  - Order, calculate, and assess calorie counts as needed  - Recommend, monitor, and adjust tube feedings and TPN/PPN based on assessed needs  - Assess need for intravenous fluids  - Provide specific nutrition/hydration education as appropriate  - Include patient/family/caregiver in decisions related to nutrition  Outcome: Progressing

## 2023-10-08 NOTE — ASSESSMENT & PLAN NOTE
Wt Readings from Last 3 Encounters:   10/08/23 73.9 kg (163 lb)   08/09/23 64 kg (141 lb)   03/31/23 64 kg (141 lb)     Secondary to clinical volume overload. Improved.

## 2023-10-08 NOTE — CASE MANAGEMENT
Case Management Assessment & Discharge Planning Note    Patient name Rudolph Billingsley  Location [place] [facility] /[facility]* MRN [id number]  : 1955 Date 10/8/2023       Current Admission Date: 10/6/2023  Current Admission Diagnosis:Hypertensive emergency   Patient Active Problem List    Diagnosis Date Noted   • Hypertensive emergency 10/06/2023   • Syncope 2022   • COVID 2022   • Diarrhea 2022   • Preoperative clearance 2022   • Acute metabolic encephalopathy due to hypoglycemia 2021   • Cerebral infarction, chronic 12/10/2021   • ESRD (end stage renal disease) on dialysis ([address]) 2021   • Bilateral hip pain 2021   • Generalized abdominal pain 10/17/2021   • Heart murmur 2021   • Abnormal findings on diagnostic imaging of abdomen 2021   • Biclonal gammopathy 08/10/2021   • Acute on chronic anemia 2021   • Nephrotic range proteinuria 2021   • Asymptomatic hypertensive urgency 2021   • Vitamin D deficiency 2021   • Angioedema 2020   • Influenza vaccination declined 2020   • Refused diphtheria-tetanus vaccine 2020   • Other constipation 2020   • Encounter for screening mammogram for breast cancer 2020   • Encounter for screening colonoscopy 2020   • Acute on chronic diastolic heart failure ([address])    • Systolic murmur    • Hyperlipemia 10/17/2019   • 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 [address]  Phone: [phone] Fax: [phone] [address] [facility] [address] [address]  Phone: [phone] Fax: [phone]    Primary Care Provider: Pepper Briones MD    Primary Insurance: MEDICARE  Secondary Insurance: Nighat Cruz    ASSESSMENT:  [address] [facility], [address], [address] Representative - Daughter   Primary Phone: [phone] (Mobile)               Advance Directives  Primary Contact: Cordell Alaina (daughter) [phone]              Patient Information  Admitted from[de-identified] Home  Mental Status: Alert  During Assessment patient was accompanied by: Not accompanied during assessment  Assessment information provided by[de-identified] Daughter  Primary Caregiver: Self  Support Systems: Home care staff, Family members  [place] of Residence: [address] [place] [address] do you live in?: [address] [facility] entry access options.  Select all that apply.: Stairs  Number of steps to enter home.: 1  Type of Current Residence: 3 story home  Upon entering residence, is there a bedroom on the main floor (no further steps)?: No  A bedroom is located on the following floor levels of residence (select all that apply):: 2nd Floor  Upon entering residence, is there a bathroom on the main floor (no further steps)?: Yes  Living Arrangements: Lives w/ Son    Activities of Daily Living Prior to Admission  Functional Status: Independent  Completes ADLs independently?: Yes  Ambulates independently?: Yes  Does patient use assisted devices?: Yes  Assisted Devices (DME) used: [employer] (in community)  Does patient currently own DME?: Yes  What DME does the patient currently own?: [employer]  Does patient have a history of Outpatient Therapy (PT/OT)?: Yes  Does the patient have a history of Short-Term Rehab?: No  Does patient have a history of [facility]?: Yes  Does patient currently have [address]?: No         Patient Information Continued  Income Source: SSI/SSD  Does patient receive dialysis treatments?: Yes (Tues/Thurs/Sat [employer])  Does patient have a history of substance abuse?: No  Does patient have a history of Mental Health Diagnosis?: No         Means of Transportation  Means of Transport to Appts[de-identified] Other (Comment)        DISCHARGE DETAILS:    Discharge planning discussed with[de-identified] Daughter        CM contacted family/caregiver?: Yes             Contacts  Patient Contacts: Zee Barnes  Relationship to Patient[de-identified] Family  Reason/Outcome: Emergency Contact, Discharge Planning              Other Referral/Resources/Interventions Provided:  Interventions: None Indicated               Transport at Discharge : Family                                      Additional Comments: Daughter reported pt was IPTA but has aides that come to home 3x/wk from 8-5 to assist with cooking/cleaning.

## 2023-10-08 NOTE — DISCHARGE SUMMARY
[address] [facility]  Discharge- 809 [facility] 1955, 80 y.o. female MRN: [id number]  Unit/Bed#: 1575 [address] 204-01 Encounter: [id number]  Primary Care Provider: Marques Martinez MD   Date and time admitted to hospital: 10/6/2023  4:08 PM    * Hypertensive emergency  Assessment & Plan  80year old female presented with hypertensive emergency. Much improved after receiving hemodialysis. · Cleared by nephrology for discharge. Medication changes were made. For further dry weight challenge per Dr. Fred Oropeza on her next HD session on Tuesday. · On discharge, continue coreg 25mg BID, torsemide 100mg daily, clonidine 0.3mg TID, hydralazine 100mg Q8 hours    ESRD (end stage renal disease) on dialysis [facility])  Assessment & Plan  Lab Results   Component Value Date    EGFR 12 10/08/2023    EGFR 9 10/07/2023    EGFR 10 10/06/2023    CREATININE 3.68 (H) 10/08/2023    CREATININE 4.55 (H) 10/07/2023    CREATININE 4.04 (H) 10/06/2023     TThS HD. Management per nephrology. Patient reportedly listed at [facility] transplant list.  · Cleared for discharge    Acute on chronic diastolic heart failure [facility])  Assessment & Plan  Wt Readings from Last 3 Encounters:   10/08/23 73.9 kg (163 lb)   08/09/23 64 kg (141 lb)   03/31/23 64 kg (141 lb)     Secondary to clinical volume overload. Improved. Type 2 diabetes mellitus with chronic kidney disease on chronic dialysis, with long-term current use of insulin [facility])  Assessment & Plan  Lab Results   Component Value Date    HGBA1C 8.1 (H) 07/26/2023       Recent Labs     10/07/23  2111 10/08/23  0727 10/08/23  0753 10/08/23  1058   POCGLU 99 50* 100 342*       Blood Sugar Average: Last 72 hrs:  (P) 162.875     Hypoglycemic overnight. Held her AM glargine today. Resume home medications on discharge.       Discharging Physician / Practitioner: Naya Owens MD  PCP: Marques Martinez, MD  Admission Date:   Admission Orders (From admission, onward)     Ordered 10/06/23 1915  INPATIENT ADMISSION  Once                      Discharge Date: 10/08/23    Medical Problems     Resolved Problems  Date Reviewed: 10/8/2023   None         Consultations During Hospital Stay:  · Nutrition, nephrology    Procedures Performed:   · none    Significant Findings / Test Results:   · Imaging  · XR Chest:    Cardiomegaly and CHF. · CT abdomen pelvis:    1.  Diffuse groundglass opacities and small bilateral pleural effusions may be due to pulmonary edema versus multifocal pneumonia. 2.  No acute findings in the abdomen or pelvis. · Echo:    •  Left Ventricle: Left ventricular cavity size is normal. Wall thickness is increased. There is moderate concentric hypertrophy. The left ventricular ejection fraction is 58%. Systolic function is normal. Wall motion is normal. Diastolic function is mildly abnormal, consistent with grade I (abnormal) relaxation. •  Right Ventricle: Right ventricular cavity size is normal. Systolic function is normal.  •  Left Atrium: The atrium is mildly dilated. •  Aortic Valve: The aortic valve is trileaflet. The leaflets are severely thickened. The leaflets are severely calcified. There is moderately reduced mobility. There is mild to moderate stenosis. The aortic valve peak velocity is 3.09 m/s. The aortic valve mean gradient is 22 mmHg. The dimensionless velocity index is 0.43. The aortic valve area is 1.31 cm2. •  Mitral Valve: There is mild to moderate annular calcification. There is mild regurgitation. •  Tricuspid Valve: There is mild to moderate regurgitation. The right ventricular systolic pressure is moderately to severely elevated. The estimated right ventricular systolic pressure is 63.55 mmHg. •  Prior TTE study available for comparison. Prior study date: 6/12/2022. No significant changes noted compared to the prior study.     Incidental Findings:   · As written above     Test Results Pending at Discharge (will require follow up):   · none Outpatient Tests Requested:  · Pcp, cbc, bmp    Complications:  none    Reason for Admission: shortness of breath. Hospital Course:     Hong Berrios is a 80 y.o. female patient who originally presented to the hospital on 10/6/2023 due to shortness of breath. Patient is a 60-year-old female with history of chronic diastolic congestive heart failure, ESRD on hemodialysis, hypertension, and type 2 diabetes mellitus who presented to our ED due to shortness of breath, cough, and abdominal discomfort. Clinical presentation consistent with fluid overload. X-ray chest and CT abdomen pelvis were performed. There was concern for diffuse groundglass opacities which may be due to pulmonary edema versus pneumonia. She does not have any fevers, elevated white count, or symptoms to suggest an acute infection. Her clinical presentation seems to be more consistent with fluid overload. She had hypertensive emergency which required urgent hemodialysis. Nephrology was able to do so which resulted in dramatic improvement overnight. Patient reports that she is feeling much better, back to her baseline levels, and is not short of breath. She was cleared by nephrology for discharge with some dose adjustments including discharging her with torsemide 100 mg daily. She was made aware that they will likely challenge her dry weight this coming Tuesday. I spoke to her and the rest of her family at bedside and addressed questions to their satisfaction. Patient agrees to follow-up with her providers as scheduled and to take her medications as prescribed. All questions were addressed. she understood the need to seek immediate medical attention should she develop any chest pain, shortness of breath, severe pain, fever, chills, or any other concerning symptoms. Please see above list of diagnoses and related plan for additional information.      Condition at Discharge: fair     Discharge Day Visit / Exam: Subjective:  Patient seen and examined at bedside. Comfortable. No new issues or complaints overnight. Vitals: Blood Pressure: 162/77 (10/08/23 1515)  Pulse: 65 (10/08/23 1515)  Temperature: 98.2 °F (36.8 °C) (10/08/23 0731)  Temp Source: Oral (10/08/23 0731)  Respirations: 20 (10/08/23 0731)  Height: 5' 1" (154.9 cm) (10/08/23 1318)  Weight - Scale: 73.9 kg (163 lb) (10/08/23 1318)  SpO2: 97 % (10/08/23 1515)  Exam:   Physical Exam  Vitals reviewed. Constitutional:       General: She is not in acute distress. HENT:      Head: Normocephalic. Nose: Nose normal.      Mouth/Throat:      Mouth: Mucous membranes are moist.   Eyes:      General: No scleral icterus. Cardiovascular:      Rate and Rhythm: Normal rate. Pulmonary:      Effort: Pulmonary effort is normal. No respiratory distress. Breath sounds: No wheezing or rales. Abdominal:      General: There is no distension. Palpations: Abdomen is soft. Tenderness: There is no abdominal tenderness. Skin:     General: Skin is warm. Neurological:      Mental Status: She is alert. Mental status is at baseline. Psychiatric:         Mood and Affect: Mood normal.         Behavior: Behavior normal.         Thought Content: Thought content normal.       Discussion with Family: family members at bedside. Discharge instructions/Information to patient and family:   See after visit summary for information provided to patient and family. Provisions for Follow-Up Care:  See after visit summary for information related to follow-up care and any pertinent home health orders. Disposition:     Home    Planned Readmission: No     Discharge Statement:  I spent 37 minutes discharging the patient. This time was spent on the day of discharge. I had direct contact with the patient on the day of discharge.  Greater than 50% of the total time was spent examining patient, answering all patient questions, arranging and discussing plan of care with patient as well as directly providing post-discharge instructions. Additional time then spent on discharge activities. Discharge Medications:  See after visit summary for reconciled discharge medications provided to patient and family.       ** Please Note: This note has been constructed using a voice recognition system **

## 2023-10-24 ENCOUNTER — TELEPHONE (OUTPATIENT)
Dept: NEPHROLOGY | Facility: CLINIC | Age: 68
End: 2023-10-24

## 2023-10-24 DIAGNOSIS — I10 ESSENTIAL (PRIMARY) HYPERTENSION: Primary | ICD-10-CM

## 2023-10-24 RX ORDER — DOXAZOSIN 2 MG/1
2 TABLET ORAL 2 TIMES DAILY
Qty: 180 TABLET | Refills: 3 | Status: SHIPPED | OUTPATIENT
Start: 2023-10-24 | End: 2023-10-25 | Stop reason: SDUPTHER

## 2023-10-24 NOTE — TELEPHONE ENCOUNTER
Timothy Beckman called and stated the Doxazosin 2mg tablet was not covered by the insurance but the Doxazosin 4mg is. If Dr. Pema Bustos can resend script with new instructions. Please advise.

## 2023-10-25 ENCOUNTER — APPOINTMENT (OUTPATIENT)
Dept: LAB | Facility: HOSPITAL | Age: 68
End: 2023-10-25
Payer: MEDICARE

## 2023-10-25 DIAGNOSIS — Z79.4 TYPE 2 DIABETES MELLITUS WITH DIABETIC POLYNEUROPATHY, WITH LONG-TERM CURRENT USE OF INSULIN (HCC): ICD-10-CM

## 2023-10-25 DIAGNOSIS — E78.5 DYSLIPIDEMIA: ICD-10-CM

## 2023-10-25 DIAGNOSIS — I10 ESSENTIAL (PRIMARY) HYPERTENSION: ICD-10-CM

## 2023-10-25 DIAGNOSIS — E55.9 VITAMIN D DEFICIENCY: ICD-10-CM

## 2023-10-25 DIAGNOSIS — E11.42 TYPE 2 DIABETES MELLITUS WITH DIABETIC POLYNEUROPATHY, WITH LONG-TERM CURRENT USE OF INSULIN (HCC): ICD-10-CM

## 2023-10-25 LAB
25(OH)D3 SERPL-MCNC: 47.8 NG/ML (ref 30–100)
ALBUMIN SERPL BCP-MCNC: 3.6 G/DL (ref 3.5–5)
ALP SERPL-CCNC: 51 U/L (ref 34–104)
ALT SERPL W P-5'-P-CCNC: 7 U/L (ref 7–52)
ANION GAP SERPL CALCULATED.3IONS-SCNC: 10 MMOL/L
AST SERPL W P-5'-P-CCNC: 13 U/L (ref 13–39)
BILIRUB SERPL-MCNC: 0.78 MG/DL (ref 0.2–1)
BUN SERPL-MCNC: 10 MG/DL (ref 5–25)
CALCIUM SERPL-MCNC: 8.5 MG/DL (ref 8.4–10.2)
CHLORIDE SERPL-SCNC: 93 MMOL/L (ref 96–108)
CHOLEST SERPL-MCNC: 183 MG/DL
CO2 SERPL-SCNC: 32 MMOL/L (ref 21–32)
CREAT SERPL-MCNC: 3.73 MG/DL (ref 0.6–1.3)
EST. AVERAGE GLUCOSE BLD GHB EST-MCNC: 140 MG/DL
GFR SERPL CREATININE-BSD FRML MDRD: 11 ML/MIN/1.73SQ M
GLUCOSE P FAST SERPL-MCNC: 185 MG/DL (ref 65–99)
HBA1C MFR BLD: 6.5 %
HDLC SERPL-MCNC: 43 MG/DL
LDLC SERPL CALC-MCNC: 115 MG/DL (ref 0–100)
NONHDLC SERPL-MCNC: 140 MG/DL
POTASSIUM SERPL-SCNC: 3.4 MMOL/L (ref 3.5–5.3)
PROT SERPL-MCNC: 6.4 G/DL (ref 6.4–8.4)
SODIUM SERPL-SCNC: 135 MMOL/L (ref 135–147)
TRIGL SERPL-MCNC: 124 MG/DL

## 2023-10-25 PROCEDURE — 80053 COMPREHEN METABOLIC PANEL: CPT

## 2023-10-25 PROCEDURE — 80061 LIPID PANEL: CPT

## 2023-10-25 PROCEDURE — 82306 VITAMIN D 25 HYDROXY: CPT

## 2023-10-25 PROCEDURE — 36415 COLL VENOUS BLD VENIPUNCTURE: CPT

## 2023-10-25 PROCEDURE — 83036 HEMOGLOBIN GLYCOSYLATED A1C: CPT

## 2023-10-25 RX ORDER — DOXAZOSIN MESYLATE 4 MG/1
2 TABLET ORAL 2 TIMES DAILY
Qty: 90 TABLET | Refills: 3 | Status: SHIPPED | OUTPATIENT
Start: 2023-10-25

## 2023-10-25 RX ORDER — DOXAZOSIN MESYLATE 4 MG/1
2 TABLET ORAL 2 TIMES DAILY
Qty: 90 TABLET | Refills: 3 | Status: SHIPPED | OUTPATIENT
Start: 2023-10-25 | End: 2023-10-25 | Stop reason: SDUPTHER

## 2024-01-24 ENCOUNTER — APPOINTMENT (OUTPATIENT)
Dept: LAB | Facility: HOSPITAL | Age: 69
End: 2024-01-24
Payer: MEDICARE

## 2024-01-24 DIAGNOSIS — Z79.4 TYPE 2 DIABETES MELLITUS WITH DIABETIC POLYNEUROPATHY, WITH LONG-TERM CURRENT USE OF INSULIN (HCC): ICD-10-CM

## 2024-01-24 DIAGNOSIS — E11.42 TYPE 2 DIABETES MELLITUS WITH DIABETIC POLYNEUROPATHY, WITH LONG-TERM CURRENT USE OF INSULIN (HCC): ICD-10-CM

## 2024-01-24 LAB
ALBUMIN SERPL BCP-MCNC: 4 G/DL (ref 3.5–5)
ALP SERPL-CCNC: 83 U/L (ref 34–104)
ALT SERPL W P-5'-P-CCNC: 29 U/L (ref 7–52)
ANION GAP SERPL CALCULATED.3IONS-SCNC: 12 MMOL/L
AST SERPL W P-5'-P-CCNC: 25 U/L (ref 13–39)
BILIRUB SERPL-MCNC: 0.47 MG/DL (ref 0.2–1)
BUN SERPL-MCNC: 32 MG/DL (ref 5–25)
CALCIUM SERPL-MCNC: 9.1 MG/DL (ref 8.4–10.2)
CHLORIDE SERPL-SCNC: 94 MMOL/L (ref 96–108)
CO2 SERPL-SCNC: 31 MMOL/L (ref 21–32)
CREAT SERPL-MCNC: 5.27 MG/DL (ref 0.6–1.3)
EST. AVERAGE GLUCOSE BLD GHB EST-MCNC: 189 MG/DL
GFR SERPL CREATININE-BSD FRML MDRD: 7 ML/MIN/1.73SQ M
GLUCOSE P FAST SERPL-MCNC: 175 MG/DL (ref 65–99)
HBA1C MFR BLD: 8.2 %
POTASSIUM SERPL-SCNC: 4.9 MMOL/L (ref 3.5–5.3)
PROT SERPL-MCNC: 7.4 G/DL (ref 6.4–8.4)
SODIUM SERPL-SCNC: 137 MMOL/L (ref 135–147)
TSH SERPL DL<=0.05 MIU/L-ACNC: 5.36 UIU/ML (ref 0.45–4.5)

## 2024-01-24 PROCEDURE — 80053 COMPREHEN METABOLIC PANEL: CPT

## 2024-01-24 PROCEDURE — 36415 COLL VENOUS BLD VENIPUNCTURE: CPT

## 2024-01-24 PROCEDURE — 84443 ASSAY THYROID STIM HORMONE: CPT

## 2024-01-24 PROCEDURE — 83036 HEMOGLOBIN GLYCOSYLATED A1C: CPT

## 2024-02-20 ENCOUNTER — TELEPHONE (OUTPATIENT)
Dept: NEPHROLOGY | Facility: CLINIC | Age: 69
End: 2024-02-20

## 2024-02-20 NOTE — TELEPHONE ENCOUNTER
Pt will need a transplant pre eval appointment. Called pt with an interpretor's assistance, she was driving home and was unable to schedule appointment and asked that we call her back tomorrow.

## 2024-02-27 ENCOUNTER — TRANSCRIBE ORDERS (OUTPATIENT)
Dept: RADIOLOGY | Facility: HOSPITAL | Age: 69
End: 2024-02-27

## 2024-02-27 ENCOUNTER — PREP FOR PROCEDURE (OUTPATIENT)
Dept: INTERVENTIONAL RADIOLOGY/VASCULAR | Facility: CLINIC | Age: 69
End: 2024-02-27

## 2024-02-27 DIAGNOSIS — N18.6 ESRD (END STAGE RENAL DISEASE) ON DIALYSIS (HCC): Primary | ICD-10-CM

## 2024-02-27 DIAGNOSIS — N18.9 CHRONIC KIDNEY DISEASE, UNSPECIFIED CKD STAGE: Primary | ICD-10-CM

## 2024-02-27 DIAGNOSIS — I50.33 ACUTE ON CHRONIC DIASTOLIC HEART FAILURE (HCC): ICD-10-CM

## 2024-02-27 DIAGNOSIS — Z99.2 ESRD (END STAGE RENAL DISEASE) ON DIALYSIS (HCC): Primary | ICD-10-CM

## 2024-02-27 DIAGNOSIS — I10 ESSENTIAL (PRIMARY) HYPERTENSION: ICD-10-CM

## 2024-02-27 RX ORDER — CLONIDINE HYDROCHLORIDE 0.3 MG/1
0.3 TABLET ORAL 3 TIMES DAILY
Qty: 270 TABLET | Refills: 4 | Status: SHIPPED | OUTPATIENT
Start: 2024-02-27

## 2024-02-27 RX ORDER — HYDRALAZINE HYDROCHLORIDE 100 MG/1
100 TABLET, FILM COATED ORAL EVERY 8 HOURS SCHEDULED
Qty: 270 TABLET | Refills: 3 | Status: SHIPPED | OUTPATIENT
Start: 2024-02-27

## 2024-02-27 RX ORDER — CARVEDILOL 25 MG/1
25 TABLET ORAL 2 TIMES DAILY WITH MEALS
Qty: 180 TABLET | Refills: 3 | Status: SHIPPED | OUTPATIENT
Start: 2024-02-27

## 2024-02-27 RX ORDER — DOXAZOSIN 2 MG/1
2 TABLET ORAL 2 TIMES DAILY
Qty: 180 TABLET | Refills: 3 | Status: SHIPPED | OUTPATIENT
Start: 2024-02-27

## 2024-02-27 RX ORDER — TORSEMIDE 100 MG/1
100 TABLET ORAL DAILY
Qty: 90 TABLET | Refills: 3 | Status: SHIPPED | OUTPATIENT
Start: 2024-02-27

## 2024-02-27 RX ORDER — DOXAZOSIN MESYLATE 4 MG/1
4 TABLET ORAL 2 TIMES DAILY
Qty: 180 TABLET | Refills: 3 | Status: SHIPPED | OUTPATIENT
Start: 2024-02-27 | End: 2024-02-27 | Stop reason: SDUPTHER

## 2024-03-05 DIAGNOSIS — I10 ESSENTIAL (PRIMARY) HYPERTENSION: ICD-10-CM

## 2024-03-05 RX ORDER — DOXAZOSIN MESYLATE 4 MG/1
4 TABLET ORAL 2 TIMES DAILY
Qty: 180 TABLET | Refills: 3 | Status: SHIPPED | OUTPATIENT
Start: 2024-03-05

## 2024-03-07 ENCOUNTER — TELEPHONE (OUTPATIENT)
Dept: RADIOLOGY | Facility: HOSPITAL | Age: 69
End: 2024-03-07

## 2024-03-08 ENCOUNTER — HOSPITAL ENCOUNTER (OUTPATIENT)
Dept: BONE DENSITY | Facility: MEDICAL CENTER | Age: 69
Discharge: HOME | End: 2024-03-08
Payer: MEDICARE

## 2024-03-08 DIAGNOSIS — M85.89 OSTEOPENIA OF MULTIPLE SITES: ICD-10-CM

## 2024-03-08 PROCEDURE — 77080 DXA BONE DENSITY AXIAL: CPT

## 2024-03-13 ENCOUNTER — HOSPITAL ENCOUNTER (OUTPATIENT)
Dept: INTERVENTIONAL RADIOLOGY/VASCULAR | Facility: HOSPITAL | Age: 69
Discharge: HOME | End: 2024-03-13
Attending: STUDENT IN AN ORGANIZED HEALTH CARE EDUCATION/TRAINING PROGRAM
Payer: MEDICARE

## 2024-03-13 VITALS
HEART RATE: 68 BPM | SYSTOLIC BLOOD PRESSURE: 140 MMHG | BODY MASS INDEX: 28.32 KG/M2 | WEIGHT: 150 LBS | TEMPERATURE: 96.6 F | HEIGHT: 61 IN | RESPIRATION RATE: 18 BRPM | DIASTOLIC BLOOD PRESSURE: 67 MMHG | OXYGEN SATURATION: 93 %

## 2024-03-13 DIAGNOSIS — N18.6 ESRD (END STAGE RENAL DISEASE) ON DIALYSIS (HCC): ICD-10-CM

## 2024-03-13 DIAGNOSIS — Z99.2 ESRD (END STAGE RENAL DISEASE) ON DIALYSIS (HCC): ICD-10-CM

## 2024-03-13 LAB — GLUCOSE SERPL-MCNC: 204 MG/DL (ref 65–140)

## 2024-03-13 PROCEDURE — 82948 REAGENT STRIP/BLOOD GLUCOSE: CPT

## 2024-03-13 PROCEDURE — C1894 INTRO/SHEATH, NON-LASER: HCPCS

## 2024-03-13 PROCEDURE — 76937 US GUIDE VASCULAR ACCESS: CPT

## 2024-03-13 PROCEDURE — C1769 GUIDE WIRE: HCPCS

## 2024-03-13 PROCEDURE — 99153 MOD SED SAME PHYS/QHP EA: CPT

## 2024-03-13 PROCEDURE — C1725 CATH, TRANSLUMIN NON-LASER: HCPCS

## 2024-03-13 PROCEDURE — 36902 INTRO CATH DIALYSIS CIRCUIT: CPT

## 2024-03-13 PROCEDURE — 99152 MOD SED SAME PHYS/QHP 5/>YRS: CPT | Performed by: STUDENT IN AN ORGANIZED HEALTH CARE EDUCATION/TRAINING PROGRAM

## 2024-03-13 PROCEDURE — 99152 MOD SED SAME PHYS/QHP 5/>YRS: CPT

## 2024-03-13 PROCEDURE — 36902 INTRO CATH DIALYSIS CIRCUIT: CPT | Performed by: STUDENT IN AN ORGANIZED HEALTH CARE EDUCATION/TRAINING PROGRAM

## 2024-03-13 PROCEDURE — 76937 US GUIDE VASCULAR ACCESS: CPT | Performed by: STUDENT IN AN ORGANIZED HEALTH CARE EDUCATION/TRAINING PROGRAM

## 2024-03-13 RX ORDER — MIDAZOLAM HYDROCHLORIDE 2 MG/2ML
INJECTION, SOLUTION INTRAMUSCULAR; INTRAVENOUS AS NEEDED
Status: COMPLETED | OUTPATIENT
Start: 2024-03-13 | End: 2024-03-13

## 2024-03-13 RX ORDER — LIDOCAINE WITH 8.4% SOD BICARB 0.9%(10ML)
SYRINGE (ML) INJECTION AS NEEDED
Status: COMPLETED | OUTPATIENT
Start: 2024-03-13 | End: 2024-03-13

## 2024-03-13 RX ORDER — FENTANYL CITRATE 50 UG/ML
INJECTION, SOLUTION INTRAMUSCULAR; INTRAVENOUS AS NEEDED
Status: COMPLETED | OUTPATIENT
Start: 2024-03-13 | End: 2024-03-13

## 2024-03-13 RX ADMIN — FENTANYL CITRATE 50 MCG: 50 INJECTION, SOLUTION INTRAMUSCULAR; INTRAVENOUS at 13:28

## 2024-03-13 RX ADMIN — MIDAZOLAM 1 MG: 1 INJECTION INTRAMUSCULAR; INTRAVENOUS at 13:28

## 2024-03-13 RX ADMIN — IOHEXOL 64 ML: 350 INJECTION, SOLUTION INTRAVENOUS at 14:26

## 2024-03-13 RX ADMIN — Medication 5 ML: at 13:27

## 2024-03-13 NOTE — BRIEF OP NOTE (RAD/CATH)
INTERVENTIONAL RADIOLOGY PROCEDURE NOTE    Date: 3/13/2024    Procedure:   Procedure Summary       Date: 03/13/24 Room / Location: [facility] Interventional Radiology    Anesthesia Start:  Anesthesia Stop:     Procedure: IR AV FISTULAGRAM/GRAFTOGRAM Diagnosis:       ESRD (end stage renal disease) on dialysis (HCC)      (HVP)    Scheduled Providers:  Responsible Provider:     Anesthesia Type: Not recorded ASA Status: Not recorded            Preoperative diagnosis:   1. ESRD (end stage renal disease) on dialysis (HCC)         Postoperative diagnosis: Same.    Surgeon: Gee Mao MD     Assistant: None. No qualified resident was available.    Blood loss: 5 ml    Specimens: none.     Findings:   Fistulagram showed recurrent central swing segment and new cephalic arch stenosis.    Central swing segment was HPB POBA'd with 8 mm balloon; cephalic arch was treated with 7 mm HPB POBA.  Both had focal disruption requiring additional balloon tamponade.    Complications: None immediate.    Anesthesia: conscious sedation

## 2024-03-13 NOTE — H&P
Interventional Radiology Preprocedure Note    History/Indication for procedure:   Lela Corado is a 68 y.o. female with a PMH of ESRD on HD dialyzed through a GINGER BCF who presents for fistulagraphy for the indication of HVP.    The last intervention was August 2023.    Relevant past medical history:    Past Medical History:   Diagnosis Date    CHF (congestive heart failure) (HCC)     CKD (chronic kidney disease) stage 5, GFR less than 15 ml/min (HCC)     Diabetes mellitus (HCC)     Diabetic nephropathy (HCC)     Hemodialysis patient ([facility])     via [facility] right chest / [facility] Tues/Thurs and Sat    Hyperlipidemia     Hypertension     Muscle weakness     Orthodontics     sleeps with retainer at night    Risk for falls     Uses walker     per dtr active in the house able to cook/light cleaning as able     Patient Active Problem List   Diagnosis    Type 2 diabetes mellitus with chronic kidney disease on chronic dialysis, with long-term current use of insulin (HCC)    Essential (primary) hypertension    Positive D dimer    Acute on chronic diastolic heart failure (HCC)    Systolic murmur    Hyperlipemia    Encounter for screening mammogram for breast cancer    Encounter for screening colonoscopy    Other constipation    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)    Influenza vaccination declined    Refused diphtheria-tetanus vaccine    Angioedema    Vitamin D deficiency    Asymptomatic hypertensive urgency    Acute on chronic anemia    Nephrotic range proteinuria    Biclonal gammopathy    Heart murmur    Generalized abdominal pain    Abnormal findings on diagnostic imaging of abdomen    Bilateral hip pain    ESRD (end stage renal disease) on dialysis (HCC)    Cerebral infarction, chronic    Acute metabolic encephalopathy due to hypoglycemia    Diarrhea    Preoperative clearance    Syncope    COVID    Hypertensive emergency       /77 (BP  "Location: Right arm)   Pulse 64   Temp (!) 97 °F (36.1 °C) (Temporal)   Resp 18   Ht 5' 1\" (1.549 m)   Wt 68 kg (150 lb)   SpO2 100%   BMI 28.34 kg/m²     Medications:    Inpatient Medications:     Scheduled Medications:      Infusions:  No current facility-administered medications for this encounter.      PRN:      Outpatient Medications:  Current Outpatient Medications on File Prior to Encounter   Medication Sig Dispense Refill    atorvastatin (LIPITOR) 40 mg tablet Take 1 tablet (40 mg total) by mouth daily (Patient taking differently: Take 80 mg by mouth daily at bedtime) 90 tablet 3    B Complex-C-Folic Acid (Renal Vitamin) 0.8 MG TABS Take 1 tablet (0.8 mg total) by mouth in the morning 90 tablet 3    calcium acetate (PHOSLO) capsule Take 1 capsule (667 mg total) by mouth 3 (three) times a day with meals 90 capsule 0    carvedilol (COREG) 25 mg tablet Take 1 tablet (25 mg total) by mouth 2 (two) times a day with meals 180 tablet 3    cloNIDine (CATAPRES) 0.3 mg tablet Take 1 tablet (0.3 mg total) by mouth 3 (three) times a day 270 tablet 4    doxazosin (CARDURA) 4 mg tablet Take 1 tablet (4 mg total) by mouth 2 (two) times a day 180 tablet 3    ergocalciferol (ERGOCALCIFEROL) 1.25 MG (31329 UT) capsule Take 50,000 Units by mouth every 30 (thirty) days        gabapentin (NEURONTIN) 300 mg capsule Take 1 capsule (300 mg total) by mouth daily at bedtime 30 capsule 0    hydrALAZINE (APRESOLINE) 100 MG tablet Take 1 tablet (100 mg total) by mouth every 8 (eight) hours 270 tablet 3    Insulin Lispro-aabc, 1 U Dial, (Lyumjev KwikPen) 100 UNIT/ML SOPN Inject 6u sc with breakfast/lunch/dinner.      linaGLIPtin (Tradjenta) 5 MG TABS Take 5 mg by mouth daily      Sodium Zirconium Cyclosilicate (Lokelma) 10 g Take one pack as directed on Monday, Wednesday, Friday and sunday 30 each 3    torsemide (DEMADEX) 100 mg tablet Take 1 tablet (100 mg total) by mouth daily 90 tablet 3    Glucagon (Gvoke HypoPen 2-Pack) 1 " MG/0.2ML SOAJ Inject 1 mg under the skin      [DISCONTINUED] cloNIDine (CATAPRES-TTS-3) 0.3 mg/24 hr Place 1 patch (0.3 mg total) on the skin once a week 4 patch 0     No current facility-administered medications on file prior to encounter.       Allergies   Allergen Reactions    Amlodipine Edema and Eye Swelling    Lisinopril Angioedema     Bilateral periorbital edema that was significant       Anticoagulants: none    ASA classification: ASA 3 - Patient with moderate systemic disease with functional limitations    Airway Assessment: II (hard and soft palate, upper portion of tonsils anduvula visible)    Relevant family history: None    Relevant review of systems: None    Prior sedation/anesthesia: yes    Can the patient lie flat? Yes     NPO Status: yes    Labs:   CBC with diff:   Lab Results   Component Value Date    WBC 4.81 10/08/2023    HGB 7.9 (L) 10/08/2023    HCT 25.4 (L) 10/08/2023    MCV 93 10/08/2023     10/08/2023    RBC 2.74 (L) 10/08/2023    MCH 28.8 10/08/2023    MCHC 31.1 (L) 10/08/2023    RDW 14.7 10/08/2023    MPV 11.5 10/08/2023    NRBC 0 10/06/2023     BMP/CMP:  Lab Results   Component Value Date     06/08/2018    K 4.9 01/24/2024    K 5.2 09/21/2023    CL 94 (L) 01/24/2024    CL 95 (L) 09/21/2023    CO2 31 01/24/2024    CO2 33 (H) 09/21/2023    ANIONGAP 10 06/08/2018    BUN 32 (H) 01/24/2024    BUN 28 (H) 09/21/2023    CREATININE 5.27 (H) 01/24/2024    CREATININE 5.75 (H) 09/21/2023    CALCIUM 9.1 01/24/2024    CALCIUM 8.5 09/21/2023    AST 25 01/24/2024    AST 26 09/21/2023    ALT 29 01/24/2024    ALT 7 09/21/2023    ALKPHOS 83 01/24/2024    ALKPHOS 61 09/21/2023    PROT 7.5 06/08/2018    BILITOT 0.3 06/08/2018    EGFR 7 01/24/2024    EGFR 8 (L) 09/21/2023    EGFR 39 (L) 11/29/2020   ,     Coags:   Lab Results   Component Value Date    PT 13.9 07/24/2023    PTT 34 06/10/2022    INR 1.1 07/24/2023   ,          Relevant imaging studies:   Reviewed.    Directed physical  examination:  CONSTITUTIONAL: The patient appeared well in no acute distress.   NEUROLOGICAL: alert, awake, answering questions appropriately.  PSYCHIATRIC: Affect normal.  PULMONARY: No respiratory distress.  CARDIAC: normal sinus rhythm on monitor, without tachycardia.  GASTROINTESTINAL: abdomen was soft, round, nontender.  EXTREMITIES: No cyanosis.  GINGER BCF is heavily pulsatile. Mild cannulation zone aneurysms.    Assessment/Plan:   For diagnostic fistulagram and possible treatment.    Sedation/Anesthesia plan:  Moderate sedation will be used as needed for procedure.    Consent with alternatives to the procedure, risks and benefits have been explained and discussed with the patient/patient's family: yes.    During the informed consent process, the following was discussed, and the patient was educated concerning paclitaxel coated balloons and stents, which may be appropriate for the patient's up-coming fistulagram procedure: Analysis of randomized trials suggest a possible increased death rate after two years in patients treated with paclitaxel-coated balloons and paclitaxel-eluting stents compared to patients treated with control devices (non-coated balloons or bare metal stents) specifically in patients with lower extremity claudication. This has not been corroborated for dialysis access circuits.  The specific cause for this observation is yet to be determined. Currently, the FDA believes that the benefits continue to outweigh the risks for approved paclitaxel-coated balloons and paclitaxel-eluting stents when used in accordance with their indications for use. The patient gave informed consent for the use of these devices if appropriately indicated for treatment.

## 2024-03-13 NOTE — NURSING NOTE
Patient returned from IR in stable condition and tolerated bed rest without difficulty. Food and drink given. Woggle suture was in upon return, dry dressing to site without drainage present. Woggle removed 10 minutes prior to discharge and dry pressure dressing put in place. Patient tolerated well and VS WNL for patient's baseline. AVS printed and reviewed with patient, care-giver at bedside and both verbalized understanding. Patient discharged in stable condition.

## 2024-03-13 NOTE — SEDATION DOCUMENTATION
Date of Service: 09/24/2021    SUBJECTIVE:  A 58-year-old woman here for a 6-month followup. She has hypertension, hyperglycemia, hyperlipidemia, peripheral vascular disease with previous femoral bypass about 10 years ago, history of a stroke in 2014. She is on antihypertensive medicines, statins. She had her left knee replaced earlier this year and recently [facility] by the [facility]opedist, which she has found to be somewhat helpful. Still has some knee pain, but seems to be getting better. She started doing water aerobics because they have opened the pool now. She denies any chest pains, any shortness of breath, any swallowing problems. PHYSICAL EXAMINATION:  VITAL SIGNS:  On exam, her weight is up about 4-5 pounds in the last 6 months. Her blood pressure was 150/78. LUNGS:  Clear. HEART:  Regular rate and rhythm. EXTREMITIES:  She had trace to 1+ edema bilaterally. She had blood work done last time, none was done this time. ASSESSMENT AND PLAN:  1. Hypertension, controlled per home monitoring. Continue same medications. 2.  Lower extremity edema, probably multifactorial including Nifedipine use as well as her weight. I offered to increase her low-dose diuretic if she wishes and she is going to continue to work with the therapist.      At this point, plan is to continue the same medication. She will follow up in 6 months. Will likely get blood work at that time. Encouraged weight loss. Flu shot was given.       Dictated By: Wally Mallory MD  Signing Provider: MD DEACON Chung/felix ([id number])  DD: 09/24/2021 17:44:29 TD: 09/27/2021 05:06:41    Copy Sent To: Report to Sophy Diallo will be ready to receive patient.

## 2024-03-13 NOTE — DISCHARGE INSTRUCTIONS
Fistulogram     Interventional Radiology Phone Number: [phone]  WHAT YOU NEED TO KNOW:   Your arm or leg may be sore, swollen, and bruised after the procedure. This is normal and should get better in a few days.   DISCHARGE INSTRUCTIONS:   Call 911 for any of the following:   You have any of the following signs of a heart attack:    Squeezing, pressure, or pain in your chest that lasts longer than 5 minutes or returns  Discomfort or pain in your back, neck, jaw, stomach, or arm   Trouble breathing  Nausea or vomiting  Lightheadedness or a sudden cold sweat, especially with chest pain or trouble breathing  You have any of the following signs of a stroke:    Numbness or drooping on one side of your face   Weakness in an arm or leg  Confusion or difficulty speaking  Dizziness, a severe headache, or vision loss  You feel lightheaded, short of breath, and have chest pain.   You cough up blood.   You have trouble breathing.  You have bleeding that does not stop after 10 minutes of holding firm, direct pressure over the puncture site.  Seek care immediately if:   Blood soaks through your bandage.  Your hand or foot closest to the graft or fistula feels cold, painful, or numb.   Your hand or foot closest to the graft or fistula is pale or blue.   You have trouble moving your arm or leg closest to the graft or fistula.   Your bruise suddenly gets bigger.  Contact your healthcare provider if:   You have a fever or chills.  Your puncture site is red, swollen, or draining pus.  You have nausea or are vomiting.  Your skin is itchy, swollen, or you have a rash.  You cannot feel a thrill over your graft or fistula.   You have questions or concerns about your condition or care.  Medicines:  You may  need any of the following:  Acetaminophen  decreases pain. It is available without a doctor's order. Ask how much to take and how often to take it. Follow directions. Read the labels of all other medicines you are using to see if  they also contain acetaminophen, or ask your doctor or pharmacist. Acetaminophen can cause liver damage if not taken correctly. Do not take more than 4 grams (4,000 milligrams) of acetaminophen in one day.   Blood thinners help prevent blood clots. Examples of blood thinners include heparin and warfarin. Clots can cause strokes, heart attacks, and death. The following are general safety guidelines to follow while you are taking a blood thinner:  Watch for bleeding and bruising while you take blood thinners. Watch for bleeding from your gums or nose. Watch for blood in your urine and bowel movements. Use a soft washcloth on your skin, and a soft toothbrush to brush your teeth. This can keep your skin and gums from bleeding. If you shave, use an electric shaver. Do not play contact sports.   Tell your dentist and other healthcare providers that you take anticoagulants. Wear a bracelet or necklace that says you take this medicine.   Do not start or stop any medicines unless your healthcare provider tells you to. Many medicines cannot be used with blood thinners.   Tell your healthcare provider right away if you forget to take the medicine, or if you take too much.  Warfarin  is a blood thinner that you may need to take. The following are things you should be aware of if you take warfarin.  Foods and medicines can affect the amount of warfarin in your blood. Do not make major changes to your diet while you take warfarin. Warfarin works best when you eat about the same amount of vitamin K every day. Vitamin K is found in green leafy vegetables and certain other foods. Ask for more information about what to eat when you are taking warfarin.  You will need to see your healthcare provider for follow-up visits when you are on warfarin. You will need regular blood tests. These tests are used to decide how much medicine you need.  Take your medicine as directed.  Call your healthcare provider if you think your medicine is not  helping or if you have side effects. Tell him if you are allergic to any medicine. Keep a list of the medicines, vitamins, and herbs you take. Include the amounts, and when and why you take them. Bring the list or the pill bottles to follow-up visits. Carry your medicine list with you in case of an emergency.  Care for your wound as directed:  Remove the bandage in 4 to 6 hours or as directed. Wash the area once a day with soap and water. Gently pat the area dry.   Self-care:   Apply firm, steady pressure to the puncture site if it bleeds.  Use a clean gauze or towel to hold pressure for 10 to 15 minutes. Call 911 if you cannot stop the bleeding or the bleeding gets heavier.   Feel for a thrill once a day or as directed.  Place your index and second finger over your fistula or graft as directed. You should feel a vibration. The vibration means that blood is flowing through your graft or fistula correctly.   Rest your arm or leg as directed.  Do not lift anything heavier than 5 pounds or do strenuous activity for 24 hours.   Prevent damage to your graft or fistula.  Do not wear tight-fitting clothing over your graft or fistula. Do not wear tight jewelry on the arm or leg with the graft or fistula. Tell healthcare providers not to do, IVs, blood draws, and blood pressure readings in the arm with your graft or fistula. Do not allow flu shots or vaccinations in your arm with your graft or fistula.  Follow up with your healthcare provider as directed:  Write down your questions so you remember to ask them during your visits.     © 2016 [employer] Information is for End User's use only and may not be sold, redistributed or otherwise used for commercial purposes. All illustrations and images included in CareNotes® are the copyrighted property of [employer] or [employer]  The above information is an  only. It is not intended as medical advice for individual conditions or  treatments. Talk to your doctor, nurse or pharmacist before following any medical regimen to see if it is safe and effective for you.Procedural Sedation   WHAT YOU NEED TO KNOW:   Procedural sedation is medicine used during procedures to help you feel relaxed and calm. You will remember little to none of the procedure. After sedation you may feel tired, weak, or unsteady on your feet. You may also have trouble concentrating or short-term memory loss. These symptoms should go away in 24 hours or less.   DISCHARGE INSTRUCTIONS:     Call 911 or have someone else call for any of the following:   You have sudden trouble breathing.     You cannot be woken.      Contact Interventional Radiology at [phone]   ([facility] PATIENTS: Contact Interventional Radiology at [phone]) ([facility] PATIENTS: Contact Interventional Radiology at [phone]) if any of the following occur:     You have a severe headache or dizziness.     Your heart is beating faster than usual.    You have a fever or chills.     Your skin is itchy, swollen, or you have a rash.     You have nausea or are vomiting for more than 8 hours after the procedure.      You have questions or concerns about your condition or care.  Self-care:   Have someone stay with you for 24 hours. This person can drive you to errands and help you do things around the house. This person can also watch for problems.      Rest and do quiet activities for 24 hours. Do not exercise, ride a bike, or play sports. Stand up slowly to prevent dizziness and falls. Take short walks around the house with another person. Slowly return to your usual activities the next day.      Do not drive or use dangerous machines or tools for 24 hours. You may injure yourself or others. Examples include a lawnmower, saw, or drill. Do not return to work for 24 hours if you use dangerous machines or tools for work.      Do not make important decisions for 24 hours. For example, do not sign important papers  or invest money.      Drink liquids as directed. Liquids help flush the sedation medicine out of your body. Ask how much liquid to drink each day and which liquids are best for you.      Eat small, frequent meals to prevent nausea and vomiting. Start with clear liquids such as juice or broth. If you do not vomit after clear liquids, you can eat your usual foods.      Do not drink alcohol or take medicines that make you drowsy. This includes medicines that help you sleep and anxiety medicines. Ask your healthcare provider if it is safe for you to take pain medicine.  Follow up with your healthcare provider as directed: Write down your questions so you remember to ask them during your visits. Procedural Sedation   WHAT YOU NEED TO KNOW:   Procedural sedation is medicine used during procedures to help you feel relaxed and calm. You will remember little to none of the procedure. After sedation you may feel tired, weak, or unsteady on your feet. You may also have trouble concentrating or short-term memory loss. These symptoms should go away in 24 hours or less.   DISCHARGE INSTRUCTIONS:     Call 911 or have someone else call for any of the following:   You have sudden trouble breathing.     You cannot be woken.      Contact Interventional Radiology at [phone]   ([facility] PATIENTS: Contact Interventional Radiology at [phone]) ([facility] PATIENTS: Contact Interventional Radiology at [phone]) if any of the following occur:     You have a severe headache or dizziness.     Your heart is beating faster than usual.    You have a fever or chills.     Your skin is itchy, swollen, or you have a rash.     You have nausea or are vomiting for more than 8 hours after the procedure.      You have questions or concerns about your condition or care.  Self-care:   Have someone stay with you for 24 hours. This person can drive you to errands and help you do things around the house. This person can also watch for problems.       Rest and do quiet activities for 24 hours. Do not exercise, ride a bike, or play sports. Stand up slowly to prevent dizziness and falls. Take short walks around the house with another person. Slowly return to your usual activities the next day.      Do not drive or use dangerous machines or tools for 24 hours. You may injure yourself or others. Examples include a lawnmower, saw, or drill. Do not return to work for 24 hours if you use dangerous machines or tools for work.      Do not make important decisions for 24 hours. For example, do not sign important papers or invest money.      Drink liquids as directed. Liquids help flush the sedation medicine out of your body. Ask how much liquid to drink each day and which liquids are best for you.      Eat small, frequent meals to prevent nausea and vomiting. Start with clear liquids such as juice or broth. If you do not vomit after clear liquids, you can eat your usual foods.      Do not drink alcohol or take medicines that make you drowsy. This includes medicines that help you sleep and anxiety medicines. Ask your healthcare provider if it is safe for you to take pain medicine.  Follow up with your healthcare provider as directed: Write down your questions so you remember to ask them during your visits.

## 2024-03-19 DIAGNOSIS — I10 ESSENTIAL (PRIMARY) HYPERTENSION: ICD-10-CM

## 2024-03-19 RX ORDER — DOXAZOSIN MESYLATE 4 MG/1
4 TABLET ORAL 2 TIMES DAILY
Qty: 180 TABLET | Refills: 3 | Status: SHIPPED | OUTPATIENT
Start: 2024-03-19

## 2024-03-26 DIAGNOSIS — E87.5 HYPERKALEMIA: ICD-10-CM

## 2024-03-26 DIAGNOSIS — Z99.2 ESRD (END STAGE RENAL DISEASE) ON DIALYSIS (HCC): ICD-10-CM

## 2024-03-26 DIAGNOSIS — N18.6 ESRD (END STAGE RENAL DISEASE) ON DIALYSIS (HCC): ICD-10-CM

## 2024-04-30 DIAGNOSIS — E83.39 HYPERPHOSPHATEMIA: Primary | ICD-10-CM

## 2024-04-30 RX ORDER — CALCIUM ACETATE 667 MG/1
667 CAPSULE ORAL 2 TIMES DAILY WITH MEALS
Qty: 180 CAPSULE | Refills: 3 | Status: SHIPPED | OUTPATIENT
Start: 2024-04-30

## 2024-06-15 ENCOUNTER — PREP FOR PROCEDURE (OUTPATIENT)
Dept: INTERVENTIONAL RADIOLOGY/VASCULAR | Facility: CLINIC | Age: 69
End: 2024-06-15

## 2024-06-15 DIAGNOSIS — Z99.2 ESRD (END STAGE RENAL DISEASE) ON DIALYSIS (HCC): Primary | ICD-10-CM

## 2024-06-15 DIAGNOSIS — N18.6 ESRD (END STAGE RENAL DISEASE) ON DIALYSIS (HCC): Primary | ICD-10-CM

## 2024-07-03 ENCOUNTER — TELEPHONE (OUTPATIENT)
Dept: INTERVENTIONAL RADIOLOGY/VASCULAR | Facility: HOSPITAL | Age: 69
End: 2024-07-03

## 2024-07-09 ENCOUNTER — HOSPITAL ENCOUNTER (INPATIENT)
Facility: HOSPITAL | Age: 69
LOS: 2 days | Discharge: HOME | DRG: 252 | End: 2024-07-11
Attending: EMERGENCY MEDICINE | Admitting: INTERNAL MEDICINE
Payer: COMMERCIAL

## 2024-07-09 DIAGNOSIS — Z79.4 CONTROLLED TYPE 2 DIABETES MELLITUS WITHOUT COMPLICATION, WITH LONG-TERM CURRENT USE OF INSULIN (HCC): ICD-10-CM

## 2024-07-09 DIAGNOSIS — I16.1 HYPERTENSIVE EMERGENCY: ICD-10-CM

## 2024-07-09 DIAGNOSIS — N18.6 ESRD (END STAGE RENAL DISEASE) ON DIALYSIS (HCC): ICD-10-CM

## 2024-07-09 DIAGNOSIS — Z99.2 ESRD (END STAGE RENAL DISEASE) ON DIALYSIS (HCC): ICD-10-CM

## 2024-07-09 DIAGNOSIS — E11.9 CONTROLLED TYPE 2 DIABETES MELLITUS WITHOUT COMPLICATION, WITH LONG-TERM CURRENT USE OF INSULIN (HCC): ICD-10-CM

## 2024-07-09 DIAGNOSIS — T82.9XXA COMPLICATION OF ARTERIOVENOUS DIALYSIS FISTULA, INITIAL ENCOUNTER: Primary | ICD-10-CM

## 2024-07-09 PROBLEM — D62 ACUTE BLOOD LOSS ANEMIA: Status: ACTIVE | Noted: 2024-07-09

## 2024-07-09 PROBLEM — I16.0 HYPERTENSIVE URGENCY: Status: ACTIVE | Noted: 2023-10-06

## 2024-07-09 LAB
ABO GROUP BLD: NORMAL
ANION GAP SERPL CALCULATED.3IONS-SCNC: 11 MMOL/L (ref 4–13)
ANION GAP SERPL CALCULATED.3IONS-SCNC: 9 MMOL/L (ref 4–13)
APTT PPP: 31 SECONDS (ref 23–37)
BASOPHILS # BLD AUTO: 0.05 THOUSANDS/ÂΜL (ref 0–0.1)
BASOPHILS NFR BLD AUTO: 1 % (ref 0–1)
BLD GP AB SCN SERPL QL: POSITIVE
BUN SERPL-MCNC: 24 MG/DL (ref 5–25)
BUN SERPL-MCNC: 29 MG/DL (ref 5–25)
CALCIUM SERPL-MCNC: 8.6 MG/DL (ref 8.4–10.2)
CALCIUM SERPL-MCNC: 9 MG/DL (ref 8.4–10.2)
CHLORIDE SERPL-SCNC: 90 MMOL/L (ref 96–108)
CHLORIDE SERPL-SCNC: 90 MMOL/L (ref 96–108)
CO2 SERPL-SCNC: 29 MMOL/L (ref 21–32)
CO2 SERPL-SCNC: 33 MMOL/L (ref 21–32)
CREAT SERPL-MCNC: 3.52 MG/DL (ref 0.6–1.3)
CREAT SERPL-MCNC: 4.37 MG/DL (ref 0.6–1.3)
EOSINOPHIL # BLD AUTO: 0.18 THOUSAND/ÂΜL (ref 0–0.61)
EOSINOPHIL NFR BLD AUTO: 3 % (ref 0–6)
ERYTHROCYTE [DISTWIDTH] IN BLOOD BY AUTOMATED COUNT: 15.4 % (ref 11.6–15.1)
GFR SERPL CREATININE-BSD FRML MDRD: 12 ML/MIN/1.73SQ M
GFR SERPL CREATININE-BSD FRML MDRD: 9 ML/MIN/1.73SQ M
GLUCOSE SERPL-MCNC: 114 MG/DL (ref 65–140)
GLUCOSE SERPL-MCNC: 173 MG/DL (ref 65–140)
HCT VFR BLD AUTO: 32.4 % (ref 34.8–46.1)
HCT VFR BLD AUTO: 37.4 % (ref 34.8–46.1)
HGB BLD-MCNC: 10.6 G/DL (ref 11.5–15.4)
HGB BLD-MCNC: 12.2 G/DL (ref 11.5–15.4)
IMM GRANULOCYTES # BLD AUTO: 0.07 THOUSAND/UL (ref 0–0.2)
IMM GRANULOCYTES NFR BLD AUTO: 1 % (ref 0–2)
INR PPP: 1.06 (ref 0.84–1.19)
LYMPHOCYTES # BLD AUTO: 1.08 THOUSANDS/ÂΜL (ref 0.6–4.47)
LYMPHOCYTES NFR BLD AUTO: 16 % (ref 14–44)
MAGNESIUM SERPL-MCNC: 2.3 MG/DL (ref 1.9–2.7)
MCH RBC QN AUTO: 28.5 PG (ref 26.8–34.3)
MCHC RBC AUTO-ENTMCNC: 32.6 G/DL (ref 31.4–37.4)
MCV RBC AUTO: 87 FL (ref 82–98)
MONOCYTES # BLD AUTO: 0.67 THOUSAND/ÂΜL (ref 0.17–1.22)
MONOCYTES NFR BLD AUTO: 10 % (ref 4–12)
NEUTROPHILS # BLD AUTO: 4.65 THOUSANDS/ÂΜL (ref 1.85–7.62)
NEUTS SEG NFR BLD AUTO: 69 % (ref 43–75)
NRBC BLD AUTO-RTO: 0 /100 WBCS
PLATELET # BLD AUTO: 246 THOUSANDS/UL (ref 149–390)
PMV BLD AUTO: 10.1 FL (ref 8.9–12.7)
POTASSIUM SERPL-SCNC: 3.2 MMOL/L (ref 3.5–5.3)
POTASSIUM SERPL-SCNC: 4 MMOL/L (ref 3.5–5.3)
PROTHROMBIN TIME: 14 SECONDS (ref 11.6–14.5)
RBC # BLD AUTO: 4.28 MILLION/UL (ref 3.81–5.12)
RH BLD: POSITIVE
SODIUM SERPL-SCNC: 130 MMOL/L (ref 135–147)
SODIUM SERPL-SCNC: 132 MMOL/L (ref 135–147)
SPECIMEN EXPIRATION DATE: NORMAL
WBC # BLD AUTO: 6.7 THOUSAND/UL (ref 4.31–10.16)

## 2024-07-09 PROCEDURE — 86870 RBC ANTIBODY IDENTIFICATION: CPT | Performed by: INTERNAL MEDICINE

## 2024-07-09 PROCEDURE — 99222 1ST HOSP IP/OBS MODERATE 55: CPT

## 2024-07-09 PROCEDURE — 86900 BLOOD TYPING SEROLOGIC ABO: CPT | Performed by: EMERGENCY MEDICINE

## 2024-07-09 PROCEDURE — 85730 THROMBOPLASTIN TIME PARTIAL: CPT | Performed by: EMERGENCY MEDICINE

## 2024-07-09 PROCEDURE — 96375 TX/PRO/DX INJ NEW DRUG ADDON: CPT

## 2024-07-09 PROCEDURE — 36415 COLL VENOUS BLD VENIPUNCTURE: CPT | Performed by: EMERGENCY MEDICINE

## 2024-07-09 PROCEDURE — 85610 PROTHROMBIN TIME: CPT | Performed by: EMERGENCY MEDICINE

## 2024-07-09 PROCEDURE — 83036 HEMOGLOBIN GLYCOSYLATED A1C: CPT | Performed by: NURSE PRACTITIONER

## 2024-07-09 PROCEDURE — 80048 BASIC METABOLIC PNL TOTAL CA: CPT | Performed by: NURSE PRACTITIONER

## 2024-07-09 PROCEDURE — 99291 CRITICAL CARE FIRST HOUR: CPT | Performed by: NURSE PRACTITIONER

## 2024-07-09 PROCEDURE — 99284 EMERGENCY DEPT VISIT MOD MDM: CPT

## 2024-07-09 PROCEDURE — 96365 THER/PROPH/DIAG IV INF INIT: CPT

## 2024-07-09 PROCEDURE — 85018 HEMOGLOBIN: CPT | Performed by: NURSE PRACTITIONER

## 2024-07-09 PROCEDURE — 86850 RBC ANTIBODY SCREEN: CPT | Performed by: EMERGENCY MEDICINE

## 2024-07-09 PROCEDURE — 86901 BLOOD TYPING SEROLOGIC RH(D): CPT | Performed by: EMERGENCY MEDICINE

## 2024-07-09 PROCEDURE — 85025 COMPLETE CBC W/AUTO DIFF WBC: CPT | Performed by: EMERGENCY MEDICINE

## 2024-07-09 PROCEDURE — 99285 EMERGENCY DEPT VISIT HI MDM: CPT | Performed by: EMERGENCY MEDICINE

## 2024-07-09 PROCEDURE — 0X373ZZ CONTROL BLEEDING IN LEFT UPPER EXTREMITY, PERCUTANEOUS APPROACH: ICD-10-PCS | Performed by: SURGERY

## 2024-07-09 PROCEDURE — 83735 ASSAY OF MAGNESIUM: CPT | Performed by: NURSE PRACTITIONER

## 2024-07-09 PROCEDURE — 80048 BASIC METABOLIC PNL TOTAL CA: CPT | Performed by: EMERGENCY MEDICINE

## 2024-07-09 PROCEDURE — 85014 HEMATOCRIT: CPT | Performed by: NURSE PRACTITIONER

## 2024-07-09 PROCEDURE — 96366 THER/PROPH/DIAG IV INF ADDON: CPT

## 2024-07-09 RX ORDER — HYDRALAZINE HYDROCHLORIDE 20 MG/ML
5 INJECTION INTRAMUSCULAR; INTRAVENOUS ONCE
Status: COMPLETED | OUTPATIENT
Start: 2024-07-09 | End: 2024-07-09

## 2024-07-09 RX ORDER — INSULIN LISPRO 100 [IU]/ML
1-5 INJECTION, SOLUTION INTRAVENOUS; SUBCUTANEOUS EVERY 6 HOURS SCHEDULED
Status: DISCONTINUED | OUTPATIENT
Start: 2024-07-10 | End: 2024-07-10

## 2024-07-09 RX ORDER — CLONIDINE HYDROCHLORIDE 0.1 MG/1
0.3 TABLET ORAL 3 TIMES DAILY
Status: DISCONTINUED | OUTPATIENT
Start: 2024-07-09 | End: 2024-07-11 | Stop reason: HOSPADM

## 2024-07-09 RX ORDER — CHLORHEXIDINE GLUCONATE ORAL RINSE 1.2 MG/ML
15 SOLUTION DENTAL EVERY 12 HOURS SCHEDULED
Status: DISCONTINUED | OUTPATIENT
Start: 2024-07-09 | End: 2024-07-11 | Stop reason: HOSPADM

## 2024-07-09 RX ORDER — HYDRALAZINE HYDROCHLORIDE 50 MG/1
100 TABLET, FILM COATED ORAL EVERY 8 HOURS SCHEDULED
Status: DISCONTINUED | OUTPATIENT
Start: 2024-07-09 | End: 2024-07-11 | Stop reason: HOSPADM

## 2024-07-09 RX ORDER — ONDANSETRON 2 MG/ML
4 INJECTION INTRAMUSCULAR; INTRAVENOUS ONCE
Status: COMPLETED | OUTPATIENT
Start: 2024-07-09 | End: 2024-07-09

## 2024-07-09 RX ORDER — TORSEMIDE 20 MG/1
100 TABLET ORAL DAILY
Status: DISCONTINUED | OUTPATIENT
Start: 2024-07-10 | End: 2024-07-11 | Stop reason: HOSPADM

## 2024-07-09 RX ORDER — GABAPENTIN 300 MG/1
300 CAPSULE ORAL
Status: DISCONTINUED | OUTPATIENT
Start: 2024-07-09 | End: 2024-07-11 | Stop reason: HOSPADM

## 2024-07-09 RX ORDER — DOXAZOSIN 2 MG/1
4 TABLET ORAL 2 TIMES DAILY
Status: DISCONTINUED | OUTPATIENT
Start: 2024-07-09 | End: 2024-07-11 | Stop reason: HOSPADM

## 2024-07-09 RX ORDER — ACETAMINOPHEN 10 MG/ML
1000 INJECTION, SOLUTION INTRAVENOUS ONCE
Status: COMPLETED | OUTPATIENT
Start: 2024-07-10 | End: 2024-07-10

## 2024-07-09 RX ORDER — CARVEDILOL 25 MG/1
25 TABLET ORAL 2 TIMES DAILY WITH MEALS
Status: DISCONTINUED | OUTPATIENT
Start: 2024-07-10 | End: 2024-07-11 | Stop reason: HOSPADM

## 2024-07-09 RX ADMIN — CHLORHEXIDINE GLUCONATE 15 ML: 1.2 RINSE ORAL at 22:43

## 2024-07-09 RX ADMIN — SODIUM CHLORIDE 5 MG/HR: 0.9 INJECTION, SOLUTION INTRAVENOUS at 23:21

## 2024-07-09 RX ADMIN — HYDRALAZINE HYDROCHLORIDE 5 MG: 20 INJECTION, SOLUTION INTRAMUSCULAR; INTRAVENOUS at 17:33

## 2024-07-09 RX ADMIN — HYDRALAZINE HYDROCHLORIDE 100 MG: 50 TABLET ORAL at 22:43

## 2024-07-09 RX ADMIN — DOXAZOSIN 4 MG: 4 TABLET ORAL at 22:43

## 2024-07-09 RX ADMIN — HYDRALAZINE HYDROCHLORIDE 5 MG: 20 INJECTION, SOLUTION INTRAMUSCULAR; INTRAVENOUS at 17:49

## 2024-07-09 RX ADMIN — CLONIDINE HYDROCHLORIDE 0.3 MG: 0.2 TABLET ORAL at 22:43

## 2024-07-09 RX ADMIN — ONDANSETRON 4 MG: 2 INJECTION INTRAMUSCULAR; INTRAVENOUS at 19:59

## 2024-07-09 RX ADMIN — SODIUM CHLORIDE 5 MG/HR: 0.9 INJECTION, SOLUTION INTRAVENOUS at 17:58

## 2024-07-09 RX ADMIN — GABAPENTIN 300 MG: 300 CAPSULE ORAL at 22:43

## 2024-07-09 NOTE — ED PROVIDER NOTES
History  Chief Complaint   Patient presents with    Dialysis Shunt Problem     Pt was at dialysis, got a full treatment, had the clamp on her arm for approx 90 min PTA, would not stop bleeding, clamp present on arrival. Denies symptoms      HPI  Patient is a 69 y.o. female with PMHx ESRD on HD [facility] who presents to the ED via EMS for evaluation of bleeding LUE fistula. Patient completed dialysis treatment today with no issues, following HD had bleeding from the fistula that did not stop with pressure. Began around 3pm. Had the clamp for approximately 90 minutes then was sent to the ED for persistent bleed. Upon arrival, patient noted to be hypertensive with brisk bleeding from fistual site. Did not take her 2pm medications including carvedilol and hydralazine. No AC/AP use. Denies any other complaints or concerns.    Prior to Admission Medications   Prescriptions Last Dose Informant Patient Reported? Taking?   B Complex-C-Folic Acid (Renal Vitamin) 0.8 MG TABS   No No   Sig: Take 1 tablet (0.8 mg total) by mouth in the morning   Glucagon (Gvoke HypoPen 2-Pack) 1 MG/0.2ML SOAJ  Self Yes No   Sig: Inject 1 mg under the skin   Insulin Lispro-aabc, 1 U Dial, (Lyumjev KwikPen) 100 UNIT/ML SOPN   Yes No   Sig: Inject 6u sc with breakfast/lunch/dinner.   Sodium Zirconium Cyclosilicate (Lokelma) 10 g   No No   Sig: Take 1 packet (10 g total) by mouth daily Take one pack as directed on Monday, Wednesday, Friday and sunday   atorvastatin (LIPITOR) 40 mg tablet  Self No No   Sig: Take 1 tablet (40 mg total) by mouth daily   calcium acetate (PHOSLO) capsule  Self No No   Sig: Take 1 capsule (667 mg total) by mouth 3 (three) times a day with meals   calcium acetate (PHOSLO) capsule   No No   Sig: Take 1 capsule (667 mg total) by mouth 2 (two) times a day with meals   carvedilol (COREG) 25 mg tablet   No No   Sig: Take 1 tablet (25 mg total) by mouth 2 (two) times a day with meals   cloNIDine (CATAPRES) 0.3 mg tablet   No No    Sig: Take 1 tablet (0.3 mg total) by mouth 3 (three) times a day   doxazosin (CARDURA) 4 mg tablet   No No   Sig: Take 1 tablet (4 mg total) by mouth 2 (two) times a day   ergocalciferol (ERGOCALCIFEROL) 1.25 MG (94239 UT) capsule  Self Yes No   Sig: Take 50,000 Units by mouth every 30 (thirty) days     gabapentin (NEURONTIN) 300 mg capsule  Self No No   Sig: Take 1 capsule (300 mg total) by mouth daily at bedtime   hydrALAZINE (APRESOLINE) 100 MG tablet   No No   Sig: Take 1 tablet (100 mg total) by mouth every 8 (eight) hours   linaGLIPtin (Tradjenta) 5 MG TABS  Self Yes No   Sig: Take 5 mg by mouth daily   torsemide (DEMADEX) 100 mg tablet   No No   Sig: Take 1 tablet (100 mg total) by mouth daily      Facility-Administered Medications: None       Past Medical History:   Diagnosis Date    CHF (congestive heart failure) (HCC)     CKD (chronic kidney disease) stage 5, GFR less than 15 ml/min (HCC)     Diabetes mellitus (HCC)     Diabetic nephropathy (HCC)     Hemodialysis patient (HCC)     via [facility]acath right chest / [facility] Tues/Thurs and Sat    Hyperlipidemia     Hypertension     Muscle weakness     Orthodontics     sleeps with retainer at night    Risk for falls     Uses walker     per dtr active in the house able to cook/light cleaning as able       Past Surgical History:   Procedure Laterality Date    CATARACT EXTRACTION Bilateral     EGD      IR AV FISTULAGRAM/GRAFTOGRAM  5/9/2022    IR AV FISTULAGRAM/GRAFTOGRAM  3/31/2023    IR AV FISTULAGRAM/GRAFTOGRAM  8/9/2023    IR AV FISTULAGRAM/GRAFTOGRAM  3/13/2024    IR AV FISTULAGRAM/GRAFTOGRAM  7/10/2024    IR BIOPSY BONE MARROW  9/15/2021    IR BIOPSY KIDNEY RANDOM  9/15/2021    IR NON-TUNNELED CENTRAL LINE PLACEMENT  12/7/2021    IR TUNNELED CENTRAL LINE CHECK/CHANGE/REPOSITION  4/1/2022    IR TUNNELED DIALYSIS CATHETER PLACEMENT  12/9/2021    IR TUNNELED DIALYSIS CATHETER REMOVAL  10/21/2022    IL ARTERIOVENOUS ANASTOMOSIS OPEN DIRECT Left 2/16/2022     Procedure: CREATION FISTULA ARTERIOVENOUS (AV);  Surgeon: iLs Phillips DO;  Location: [facility];  Service: Vascular    IL REVJ OPN ARVEN FSTL W/O THRMBC DIAL GRF Left 9/2/2022    Procedure: REVISION AV FISTULA (superficalize/transposition);  Surgeon: Lis Phillips DO;  Location: [facility];  Service: Vascular       Family History   Problem Relation Age of Onset    Hypertension Mother     Diabetes Father     Hypertension Sister     Diabetes Sister     Hypertension Brother      I have reviewed and agree with the history as documented.    E-Cigarette/Vaping    E-Cigarette Use Never User      E-Cigarette/Vaping Substances    Nicotine No     THC No     CBD No     Flavoring No     Other No     Unknown No      Social History     Tobacco Use    Smoking status: Never     Passive exposure: Never    Smokeless tobacco: Never    Tobacco comments:     NO TOBACCO USE   Vaping Use    Vaping status: Never Used   Substance Use Topics    Alcohol use: Not Currently    Drug use: Not Currently        Review of Systems  All other systems reviewed and negative unless otherwise stated in HPI above.    Physical Exam  ED Triage Vitals   Temperature Pulse Respirations Blood Pressure SpO2   07/09/24 1631 07/09/24 1631 07/09/24 1631 07/09/24 1631 07/09/24 1631   (!) 97.3 °F (36.3 °C) 65 17 (!) 216/95 100 %      Temp Source Heart Rate Source Patient Position - Orthostatic VS BP Location FiO2 (%)   07/09/24 1631 07/09/24 1631 07/09/24 1631 07/09/24 1631 --   Oral Monitor Sitting Right arm       Pain Score       07/09/24 2248       No Pain             Orthostatic Vital Signs  Vitals:    07/11/24 1100 07/11/24 1130 07/11/24 1146 07/11/24 1526   BP: (!) 123/47 (!) 109/47 136/50 163/66   Pulse: (!) 51 (!) 52 57 59   Patient Position - Orthostatic VS:  Lying         Physical Exam  Vitals and nursing note reviewed.   Constitutional:       General: She is not in acute distress.  HENT:      Head: Normocephalic and atraumatic.       Mouth/Throat:      Mouth: Mucous membranes are moist.   Eyes:      General: No scleral icterus.     Conjunctiva/sclera: Conjunctivae normal.   Cardiovascular:      Rate and Rhythm: Normal rate and regular rhythm.      Heart sounds: Normal heart sounds.   Pulmonary:      Effort: Pulmonary effort is normal. No respiratory distress.      Breath sounds: Normal breath sounds.   Abdominal:      Palpations: Abdomen is soft.      Tenderness: There is no abdominal tenderness. There is no guarding or rebound.   Musculoskeletal:         General: No deformity. Normal range of motion.      Cervical back: Normal range of motion and neck supple.      Comments: +fistula to LUE with pulsatile bleed   Skin:     General: Skin is warm.      Capillary Refill: Capillary refill takes less than 2 seconds.      Findings: No rash.   Neurological:      Mental Status: She is alert. Mental status is at baseline.   Psychiatric:         Mood and Affect: Mood normal.         Behavior: Behavior normal.         ED Medications  Medications   hydrALAZINE (APRESOLINE) injection 5 mg (5 mg Intravenous Given 7/9/24 1733)   hydrALAZINE (APRESOLINE) injection 5 mg (5 mg Intravenous Given 7/9/24 1749)   ondansetron (ZOFRAN) injection 4 mg (4 mg Intravenous Given 7/9/24 1959)   acetaminophen (Ofirmev) injection 1,000 mg (0 mg Intravenous Stopped 7/10/24 0057)   midazolam (VERSED) injection (1 mg Intravenous Given 7/10/24 1347)   fentaNYL injection (50 mcg Intravenous Given 7/10/24 1347)   sodium chloride 0.9 % infusion (0 mL/hr  Stopped 7/10/24 1427)   iohexol (OMNIPAQUE) 350 MG/ML injection (SINGLE-DOSE) 50 mL (40 mL Other Given 7/10/24 1428)       Diagnostic Studies  Results Reviewed       Procedure Component Value Units Date/Time    Basic metabolic panel [[id number]]  (Abnormal) Collected: 07/09/24 1729    Lab Status: Final result Specimen: Blood from Arm, Right Updated: 07/09/24 1813     Sodium 132 mmol/L      Potassium 3.2 mmol/L      Chloride 90 mmol/L       CO2 33 mmol/L      ANION GAP 9 mmol/L      BUN 24 mg/dL      Creatinine 3.52 mg/dL      Glucose 114 mg/dL      Calcium 9.0 mg/dL      eGFR 12 ml/min/1.73sq m     Narrative:      National Kidney Disease Foundation guidelines for Chronic Kidney Disease (CKD):     Stage 1 with normal or high GFR (GFR > 90 mL/min/1.73 square meters)    Stage 2 Mild CKD (GFR = 60-89 mL/min/1.73 square meters)    Stage 3A Moderate CKD (GFR = 45-59 mL/min/1.73 square meters)    Stage 3B Moderate CKD (GFR = 30-44 mL/min/1.73 square meters)    Stage 4 Severe CKD (GFR = 15-29 mL/min/1.73 square meters)    Stage 5 End Stage CKD (GFR <15 mL/min/1.73 square meters)  Note: GFR calculation is accurate only with a steady state creatinine    Protime-INR [[id number]]  (Normal) Collected: 07/09/24 1729    Lab Status: Final result Specimen: Blood from Arm, Right Updated: 07/09/24 1801     Protime 14.0 seconds      INR 1.06    APTT [[id number]]  (Normal) Collected: 07/09/24 1729    Lab Status: Final result Specimen: Blood from Arm, Right Updated: 07/09/24 1801     PTT 31 seconds     CBC and differential [[id number]]  (Abnormal) Collected: 07/09/24 1729    Lab Status: Final result Specimen: Blood from Arm, Right Updated: 07/09/24 1750     WBC 6.70 Thousand/uL      RBC 4.28 Million/uL      Hemoglobin 12.2 g/dL      Hematocrit 37.4 %      MCV 87 fL      MCH 28.5 pg      MCHC 32.6 g/dL      RDW 15.4 %      MPV 10.1 fL      Platelets 246 Thousands/uL      nRBC 0 /100 WBCs      Segmented % 69 %      Immature Grans % 1 %      Lymphocytes % 16 %      Monocytes % 10 %      Eosinophils Relative 3 %      Basophils Relative 1 %      Absolute Neutrophils 4.65 Thousands/µL      Absolute Immature Grans 0.07 Thousand/uL      Absolute Lymphocytes 1.08 Thousands/µL      Absolute Monocytes 0.67 Thousand/µL      Eosinophils Absolute 0.18 Thousand/µL      Basophils Absolute 0.05 Thousands/µL                    IR AV fistulagram/graftogram   Final Result by Evan Goins MD  (07/10 1438)      1. Recurrent high-grade stenoses along the left cephalic vein outflow and terminal cephalic arch treated with 7 mm high-pressure and 7 mm drug coated balloon angioplasties.   2. Arterial anastomosis and central veins were patent.      Plan:      The fistula can be used for dialysis immediately.      Workstation performed: [id number]               Procedures  Procedures      ED Course                             SBIRT 20yo+      Flowsheet Row Most Recent Value   Initial Alcohol Screen: US AUDIT-C     1. How often do you have a drink containing alcohol? 0 Filed at: 07/09/2024 1631   2. How many drinks containing alcohol do you have on a typical day you are drinking?  0 Filed at: 07/09/2024 1631   3b. FEMALE Any Age, or MALE 65+: How often do you have 4 or more drinks on one occassion? 0 Filed at: 07/09/2024 1631   Audit-C Score 0 Filed at: 07/09/2024 1631   FELIX: How many times in the past year have you...    Used an illegal drug or used a prescription medication for non-medical reasons? Never Filed at: 07/09/2024 1631                  Medical Decision Making  Patient evaluated immediately upon arrival due to complaint of bleeding fistula after dialysis.  Dressing and clamp applied by dialysis taken down and replaced with Surgifoam with bottle cap and pressure dressing.  Patient noted to be bleeding persistently, dressing taken down again and now having directed pressure with Surgifoam held over lower puncture site.  Patient's blood pressure noted to be elevated throughout evaluation.  Patient given hydralazine 5 mg IV twice without significant improvement.  Then started on a Cardene drip for blood pressure control.  Blood pressure was able to be controlled and general surgery/vascular surgery evaluated patient when systolic was less than 160.  Bleeding noted to be a slow ooze during surgical evaluation after BP control.  Surgical glue applied by general surgery at bedside with hemostasis achieved.   Recommend admission for IR fistulogram.  Patient states she was supposed to have this procedure done tomorrow, will opt for inpatient due to hypertensive episode worsening fistula bleed and need for Cardene drip.  Case discussed with critical care who will admit patient to stepdown 1.  Patient is agreeable with plan for admission.    Problems Addressed:  Complication of arteriovenous dialysis fistula, initial encounter: acute illness or injury  Hypertensive emergency: acute illness or injury    Amount and/or Complexity of Data Reviewed  Labs: ordered.    Risk  Prescription drug management.  Decision regarding hospitalization.          Disposition  Final diagnoses:   Complication of arteriovenous dialysis fistula, initial encounter   Hypertensive emergency     Time reflects when diagnosis was documented in both MDM as applicable and the Disposition within this note       Time User Action Codes Description Comment    7/9/2024  7:20 PM Donna James [T82.9XXA] Complication of arteriovenous dialysis fistula, initial encounter     7/9/2024  7:20 PM Donna James Add [I16.1] Hypertensive emergency     7/9/2024 10:33 PM Luisana Quiroz Add [N18.6,  Z99.2] ESRD (end stage renal disease) on dialysis ([facility])     7/11/2024  1:34 PM Rina Wheatley Add [E11.9,  Z79.4] Controlled type 2 diabetes mellitus without complication, with long-term current use of insulin ([facility])           ED Disposition       ED Disposition   Admit    Condition   Stable    Date/Time   Tue Jul 9, 2024 2004    Comment   Case discussed with CC and admission status agreed to be SD1 level of care under Dr. CARLOTTA Benavides.             Follow-up Information       Follow up With Specialties Details Why Contact Info    Nito Stone MD Internal Medicine Follow up in 1 week(s)  [address]   [address]  [facility] [id number]  [phone]      [facility] Vascular Surgery Pod Vascular Surgery Follow up in 1 week(s)  [address]  [place] [address]            Discharge Medication List as of 7/11/2024  3:49 PM        CONTINUE these medications which have NOT CHANGED    Details   atorvastatin (LIPITOR) 40 mg tablet Take 1 tablet (40 mg total) by mouth daily, Starting Wed 1/6/2021, Normal      B Complex-C-Folic Acid (Renal Vitamin) 0.8 MG TABS Take 1 tablet (0.8 mg total) by mouth in the morning, Starting Thu 8/31/2023, Normal      calcium acetate (PHOSLO) capsule Take 1 capsule (667 mg total) by mouth 3 (three) times a day with meals, Starting Tue 12/14/2021, Normal      carvedilol (COREG) 25 mg tablet Take 1 tablet (25 mg total) by mouth 2 (two) times a day with meals, Starting Tue 2/27/2024, Normal      cloNIDine (CATAPRES) 0.3 mg tablet Take 1 tablet (0.3 mg total) by mouth 3 (three) times a day, Starting Tue 2/27/2024, Normal      doxazosin (CARDURA) 4 mg tablet Take 1 tablet (4 mg total) by mouth 2 (two) times a day, Starting Tue 3/19/2024, Normal      ergocalciferol (ERGOCALCIFEROL) 1.25 MG (83519 UT) capsule Take 50,000 Units by mouth every 30 (thirty) days  , Starting Tue 10/22/2019, Historical Med      gabapentin (NEURONTIN) 300 mg capsule Take 1 capsule (300 mg total) by mouth daily at bedtime, Starting Tue 12/14/2021, Normal      Glucagon (Gvoke HypoPen 2-Pack) 1 MG/0.2ML SOAJ Inject 1 mg under the skin, Starting Wed 4/13/2022, Historical Med      hydrALAZINE (APRESOLINE) 100 MG tablet Take 1 tablet (100 mg total) by mouth every 8 (eight) hours, Starting Tue 2/27/2024, Normal      Insulin Lispro-aabc, 1 U Dial, (Lyumjev KwikPen) 100 UNIT/ML SOPN Inject 6u sc with breakfast/lunch/dinner., Historical Med      linaGLIPtin (Tradjenta) 5 MG TABS Take 5 mg by mouth daily, Starting Tue 5/4/2021, Until Wed 3/13/2024, Historical Med      Sodium Zirconium Cyclosilicate (Lokelma) 10 g Take 1 packet (10 g total) by mouth daily Take one pack as directed on Monday, Wednesday, Friday and sunday, Starting Tue 3/26/2024, Normal      torsemide  (DEMADEX) 100 mg tablet Take 1 tablet (100 mg total) by mouth daily, Starting Tue 2/27/2024, Normal           Outpatient Discharge Orders   Discharge Diet     Call provider for: active or persistent bleeding       PDMP Review         Value Time User    PDMP Reviewed  Yes 7/9/2024  8:02 PM MARLENA Paige             ED Provider  Attending physically available and evaluated Lela Corado. I managed the patient along with the ED Attending.    Electronically Signed by           Donna Cr DO  07/16/24 0111

## 2024-07-10 ENCOUNTER — APPOINTMENT (INPATIENT)
Dept: RADIOLOGY | Facility: HOSPITAL | Age: 69
DRG: 252 | End: 2024-07-10
Payer: COMMERCIAL

## 2024-07-10 LAB
ANION GAP SERPL CALCULATED.3IONS-SCNC: 7 MMOL/L (ref 4–13)
BASOPHILS # BLD AUTO: 0.05 THOUSANDS/ÂΜL (ref 0–0.1)
BASOPHILS NFR BLD AUTO: 1 % (ref 0–1)
BLOOD GROUP ANTIBODIES SERPL: NORMAL
BUN SERPL-MCNC: 32 MG/DL (ref 5–25)
CALCIUM SERPL-MCNC: 8.3 MG/DL (ref 8.4–10.2)
CHLORIDE SERPL-SCNC: 93 MMOL/L (ref 96–108)
CO2 SERPL-SCNC: 31 MMOL/L (ref 21–32)
CREAT SERPL-MCNC: 4.78 MG/DL (ref 0.6–1.3)
EOSINOPHIL # BLD AUTO: 0.08 THOUSAND/ÂΜL (ref 0–0.61)
EOSINOPHIL NFR BLD AUTO: 1 % (ref 0–6)
ERYTHROCYTE [DISTWIDTH] IN BLOOD BY AUTOMATED COUNT: 15.3 % (ref 11.6–15.1)
EST. AVERAGE GLUCOSE BLD GHB EST-MCNC: 200 MG/DL
GFR SERPL CREATININE-BSD FRML MDRD: 8 ML/MIN/1.73SQ M
GLUCOSE SERPL-MCNC: 103 MG/DL (ref 65–140)
GLUCOSE SERPL-MCNC: 167 MG/DL (ref 65–140)
GLUCOSE SERPL-MCNC: 174 MG/DL (ref 65–140)
GLUCOSE SERPL-MCNC: 197 MG/DL (ref 65–140)
GLUCOSE SERPL-MCNC: 98 MG/DL (ref 65–140)
HBA1C MFR BLD: 8.6 %
HCT VFR BLD AUTO: 32.3 % (ref 34.8–46.1)
HCT VFR BLD AUTO: 32.7 % (ref 34.8–46.1)
HGB BLD-MCNC: 10.7 G/DL (ref 11.5–15.4)
HGB BLD-MCNC: 10.8 G/DL (ref 11.5–15.4)
IMM GRANULOCYTES # BLD AUTO: 0.03 THOUSAND/UL (ref 0–0.2)
IMM GRANULOCYTES NFR BLD AUTO: 1 % (ref 0–2)
LYMPHOCYTES # BLD AUTO: 1.7 THOUSANDS/ÂΜL (ref 0.6–4.47)
LYMPHOCYTES NFR BLD AUTO: 27 % (ref 14–44)
MAGNESIUM SERPL-MCNC: 2.5 MG/DL (ref 1.9–2.7)
MCH RBC QN AUTO: 29.3 PG (ref 26.8–34.3)
MCHC RBC AUTO-ENTMCNC: 33.4 G/DL (ref 31.4–37.4)
MCV RBC AUTO: 88 FL (ref 82–98)
MONOCYTES # BLD AUTO: 0.57 THOUSAND/ÂΜL (ref 0.17–1.22)
MONOCYTES NFR BLD AUTO: 9 % (ref 4–12)
NEUTROPHILS # BLD AUTO: 3.96 THOUSANDS/ÂΜL (ref 1.85–7.62)
NEUTS SEG NFR BLD AUTO: 61 % (ref 43–75)
NRBC BLD AUTO-RTO: 0 /100 WBCS
PHOSPHATE SERPL-MCNC: 4 MG/DL (ref 2.3–4.1)
PLATELET # BLD AUTO: 219 THOUSANDS/UL (ref 149–390)
PMV BLD AUTO: 9.9 FL (ref 8.9–12.7)
POTASSIUM SERPL-SCNC: 4.3 MMOL/L (ref 3.5–5.3)
RBC # BLD AUTO: 3.69 MILLION/UL (ref 3.81–5.12)
SODIUM SERPL-SCNC: 131 MMOL/L (ref 135–147)
WBC # BLD AUTO: 6.39 THOUSAND/UL (ref 4.31–10.16)

## 2024-07-10 PROCEDURE — 85025 COMPLETE CBC W/AUTO DIFF WBC: CPT | Performed by: NURSE PRACTITIONER

## 2024-07-10 PROCEDURE — NC001 PR NO CHARGE: Performed by: PHYSICIAN ASSISTANT

## 2024-07-10 PROCEDURE — 36902 INTRO CATH DIALYSIS CIRCUIT: CPT | Performed by: RADIOLOGY

## 2024-07-10 PROCEDURE — C1769 GUIDE WIRE: HCPCS

## 2024-07-10 PROCEDURE — 80048 BASIC METABOLIC PNL TOTAL CA: CPT | Performed by: NURSE PRACTITIONER

## 2024-07-10 PROCEDURE — C1725 CATH, TRANSLUMIN NON-LASER: HCPCS

## 2024-07-10 PROCEDURE — 36902 INTRO CATH DIALYSIS CIRCUIT: CPT

## 2024-07-10 PROCEDURE — 99233 SBSQ HOSP IP/OBS HIGH 50: CPT | Performed by: INTERNAL MEDICINE

## 2024-07-10 PROCEDURE — 82948 REAGENT STRIP/BLOOD GLUCOSE: CPT

## 2024-07-10 PROCEDURE — 99223 1ST HOSP IP/OBS HIGH 75: CPT | Performed by: INTERNAL MEDICINE

## 2024-07-10 PROCEDURE — C1894 INTRO/SHEATH, NON-LASER: HCPCS

## 2024-07-10 PROCEDURE — 85014 HEMATOCRIT: CPT | Performed by: NURSE PRACTITIONER

## 2024-07-10 PROCEDURE — 99152 MOD SED SAME PHYS/QHP 5/>YRS: CPT | Performed by: RADIOLOGY

## 2024-07-10 PROCEDURE — B51WYZZ FLUOROSCOPY OF DIALYSIS SHUNT/FISTULA USING OTHER CONTRAST: ICD-10-PCS | Performed by: RADIOLOGY

## 2024-07-10 PROCEDURE — 83735 ASSAY OF MAGNESIUM: CPT | Performed by: NURSE PRACTITIONER

## 2024-07-10 PROCEDURE — 84100 ASSAY OF PHOSPHORUS: CPT | Performed by: NURSE PRACTITIONER

## 2024-07-10 PROCEDURE — 76937 US GUIDE VASCULAR ACCESS: CPT

## 2024-07-10 PROCEDURE — 057F3Z1 DILATION OF LEFT CEPHALIC VEIN USING DRUG-COATED BALLOON, PERCUTANEOUS APPROACH: ICD-10-PCS | Performed by: RADIOLOGY

## 2024-07-10 PROCEDURE — 99153 MOD SED SAME PHYS/QHP EA: CPT

## 2024-07-10 PROCEDURE — 99152 MOD SED SAME PHYS/QHP 5/>YRS: CPT

## 2024-07-10 PROCEDURE — 76937 US GUIDE VASCULAR ACCESS: CPT | Performed by: RADIOLOGY

## 2024-07-10 PROCEDURE — C2623 CATH, TRANSLUMIN, DRUG-COAT: HCPCS

## 2024-07-10 PROCEDURE — 85018 HEMOGLOBIN: CPT | Performed by: NURSE PRACTITIONER

## 2024-07-10 PROCEDURE — 99232 SBSQ HOSP IP/OBS MODERATE 35: CPT | Performed by: SURGERY

## 2024-07-10 RX ORDER — MIDAZOLAM HYDROCHLORIDE 2 MG/2ML
INJECTION, SOLUTION INTRAMUSCULAR; INTRAVENOUS AS NEEDED
Status: COMPLETED | OUTPATIENT
Start: 2024-07-10 | End: 2024-07-10

## 2024-07-10 RX ORDER — FENTANYL CITRATE 50 UG/ML
INJECTION, SOLUTION INTRAMUSCULAR; INTRAVENOUS AS NEEDED
Status: COMPLETED | OUTPATIENT
Start: 2024-07-10 | End: 2024-07-10

## 2024-07-10 RX ORDER — INSULIN LISPRO 100 [IU]/ML
1-5 INJECTION, SOLUTION INTRAVENOUS; SUBCUTANEOUS
Status: DISCONTINUED | OUTPATIENT
Start: 2024-07-10 | End: 2024-07-11 | Stop reason: HOSPADM

## 2024-07-10 RX ORDER — SODIUM CHLORIDE 9 MG/ML
INJECTION, SOLUTION INTRAVENOUS
Status: COMPLETED | OUTPATIENT
Start: 2024-07-10 | End: 2024-07-10

## 2024-07-10 RX ORDER — CALCITRIOL 0.25 UG/1
0.75 CAPSULE, LIQUID FILLED ORAL 3 TIMES WEEKLY
Status: DISCONTINUED | OUTPATIENT
Start: 2024-07-11 | End: 2024-07-11 | Stop reason: HOSPADM

## 2024-07-10 RX ORDER — INSULIN LISPRO 100 [IU]/ML
1-5 INJECTION, SOLUTION INTRAVENOUS; SUBCUTANEOUS
Status: DISCONTINUED | OUTPATIENT
Start: 2024-07-10 | End: 2024-07-10

## 2024-07-10 RX ADMIN — HYDRALAZINE HYDROCHLORIDE 100 MG: 50 TABLET ORAL at 14:58

## 2024-07-10 RX ADMIN — INSULIN LISPRO 1 UNITS: 100 INJECTION, SOLUTION INTRAVENOUS; SUBCUTANEOUS at 21:35

## 2024-07-10 RX ADMIN — HYDRALAZINE HYDROCHLORIDE 100 MG: 50 TABLET ORAL at 21:34

## 2024-07-10 RX ADMIN — IOHEXOL 40 ML: 350 INJECTION, SOLUTION INTRAVENOUS at 14:28

## 2024-07-10 RX ADMIN — TORSEMIDE 100 MG: 20 TABLET ORAL at 08:45

## 2024-07-10 RX ADMIN — CLONIDINE HYDROCHLORIDE 0.3 MG: 0.2 TABLET ORAL at 21:34

## 2024-07-10 RX ADMIN — CLONIDINE HYDROCHLORIDE 0.3 MG: 0.2 TABLET ORAL at 08:45

## 2024-07-10 RX ADMIN — INSULIN LISPRO 1 UNITS: 100 INJECTION, SOLUTION INTRAVENOUS; SUBCUTANEOUS at 00:42

## 2024-07-10 RX ADMIN — CHLORHEXIDINE GLUCONATE 15 ML: 1.2 RINSE ORAL at 21:34

## 2024-07-10 RX ADMIN — SODIUM CHLORIDE 50 ML/HR: 9 INJECTION, SOLUTION INTRAVENOUS at 13:42

## 2024-07-10 RX ADMIN — CARVEDILOL 25 MG: 25 TABLET, FILM COATED ORAL at 08:46

## 2024-07-10 RX ADMIN — ACETAMINOPHEN 1000 MG: 10 INJECTION INTRAVENOUS at 00:10

## 2024-07-10 RX ADMIN — FENTANYL CITRATE 50 MCG: 50 INJECTION INTRAMUSCULAR; INTRAVENOUS at 13:47

## 2024-07-10 RX ADMIN — GABAPENTIN 300 MG: 300 CAPSULE ORAL at 21:34

## 2024-07-10 RX ADMIN — HYDRALAZINE HYDROCHLORIDE 100 MG: 50 TABLET ORAL at 05:22

## 2024-07-10 RX ADMIN — MIDAZOLAM 1 MG: 1 INJECTION INTRAMUSCULAR; INTRAVENOUS at 13:47

## 2024-07-10 RX ADMIN — DOXAZOSIN 4 MG: 4 TABLET ORAL at 08:46

## 2024-07-10 RX ADMIN — INSULIN LISPRO 1 UNITS: 100 INJECTION, SOLUTION INTRAVENOUS; SUBCUTANEOUS at 17:33

## 2024-07-10 RX ADMIN — DOXAZOSIN 4 MG: 4 TABLET ORAL at 17:33

## 2024-07-10 NOTE — PROGRESS NOTES
"[facility]  Progress Note  Name: Lela Corado I  MRN: [id number]  Unit/Bed#: ICU 14 I Date of Admission: 7/9/2024   Date of Service: 7/10/2024 I Hospital Day: 1    Assessment & Plan   * Complication of AV dialysis fistula  Assessment & Plan  Patient presented to ER s/p HD treatment that ended at approx 3 pm.  Patient was noted to have bleeding at AV fistula site following HD treatment and clamp was applied x 90 minutes without resolution of bleeding.  Patient arrived to ER with clamp in place.  When clamp removed, was noted to have \"arterial spray\".  /107.   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  At that time, IR was consulted and she was noted to have stenosis of cephalic vein outflow and terminal cephalic arch.  Both vessels were treated with angioplasty.  In ER Hgb 12, INR 1.06  Denies AC/AP  Vascular surgery was consulted by ER and bleeding stopped with surgical glue.  No sutures required.    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  At that time, IR was consulted and she was noted to have stenosis of cephalic vein outflow and terminal cephalic arch.  Both vessels were treated with angioplasty.    Plan:   Consult vascular surgery, appreciate recommendations  Consult IR for AV fistulogram.  IR recommending SBP < 140 mmHg  NPO after MN for IR procedure  BP management as below  Apply pressure dressing over fistula site -- avoid wrapping circumferentially.    Neurovascular checks q4h    Acute blood loss anemia  Assessment & Plan  2/2 complication of AV fistula  Hgb in ER 12.2, INR 1.06  Type and screen in ER  Denies AC/AP    Plan:  Recheck hgb at 9 p.m. 10.6  Monitor h/h q6h  No immediate need for transfusion  Transfuse for Hgb < 7, or hemodynamic instability  Maintain pressure dressing as " above  IR consulted for AV fistulogram, appreciate recommendations  Vascular surgery consulted, appreciate recommendations  BP management as above    Hypertensive urgency  Assessment & Plan  On arrival to ER with acute elevation in /107.  Patient reports compliance to her medication regimen and denies changes.  She states that she has not had her 2pm BP medications as she was at her HD treatment.  Reportedly her BP was stable prior to and during her treatment.  She states that sometimes following her HD treatment her BP is high, but she takes her afternoon medications after her treatment is completed.    In the ER she was given hydralazine x2 doses with little improvement.  Vascular surgery was consulted 2/2 bleeding fistula and recommended nicardipine.  IR was consulted and also recommending goal SBP < 140 mmHg.  Nicardipine was started and BP at goal.    Plan:  Restart home medications  Titrate nicardipine for goal BP < 140 mmHg.  Attempt to wean off.  Close hemodynamic monitoring.   Vitals per unit protocol    ESRD (end stage renal disease) on dialysis (HCC)  Assessment & Plan  Lab Results   Component Value Date    EGFR 9 07/09/2024    EGFR 12 07/09/2024    EGFR 9 (L) 03/29/2024    CREATININE 4.37 (H) 07/09/2024    CREATININE 3.52 (H) 07/09/2024    CREATININE 5.14 (H) 03/29/2024     HD M-W-F.  Last HD session 7/9/24, completed full treatment at approx 3 pm  Patient has records from [facility] transplant program and [place] transplant program from 8/2023, unclear at this time if she is following up.  Follows with [facility] as outpatient.   Patient states that sometimes after a HD treatment her BP is high.  She states that her BP prior to and during treatment were not high.  BMP in ER with K 3.2.  This was drawn shortly after HD treatment.    Plan:  Labs in ER are shortly after completion of HD treatment, repeat BMP, H/H, Mag at 2100  Repeat labs at 9 pm K 4, mag 2.3  Consult Nephrology, appreciate  recommendations  HD per nephrology  No immediate need for HD this evening        Chronic diastolic heart failure (HCC)  Assessment & Plan  Wt Readings from Last 3 Encounters:   07/09/24 67.9 kg (149 lb 11.1 oz)   03/13/24 68 kg (150 lb)   10/08/23 73.9 kg (163 lb)     Currently examines euvolemic  [facility] 60-64%, G2DD, Moderate AS, est PAP 54.3 mmHg  Home regimen: torsemide, coreg    Plan:  Hypertension control as above  Continue home torsemide, coreg  Daily weights -- stand scale preferred.   Strict I/Os          Type 2 diabetes mellitus with chronic kidney disease on chronic dialysis, with long-term current use of insulin (HCC)  Assessment & Plan  Lab Results   Component Value Date    HGBA1C 8.4 (H) 03/29/2024       Recent Labs     07/10/24  0024   POCGLU 197*       Blood Sugar Average: Last 72 hrs:  (P) 197  Glucose 114 on arrival  Home regimen: Insulin Lispro-aabc    Plan:  Holding home meds for now  Continue acuchecks q6h with SSI  NPO for now 2/2 N/V in ER.  NPO after MN for IR procedure  Once able to take PO start CHO diet  Goal blood glucose 140-180  Avoid hypoglycemia             Disposition: Stepdown Level 1    ICU Core Measures     A: Assess, Prevent, and Manage Pain Has pain been assessed? Yes  Need for changes to pain regimen? No   B: Both SAT/SAT  N/A   C: Choice of Sedation RASS Goal: N/A patient not on sedation  Need for changes to sedation or analgesia regimen? No   D: Delirium CAM-ICU: Negative   E: Early Mobility  Plan for early mobility? Yes   F: Family Engagement Plan for family engagement today? Yes         Prophylaxis:  VTE Contraindicated secondary to: acute bleed from AV fistula   Stress Ulcer  not ordered         Significant 24hr Events     24hr events:   Admitted overnight with acute bleeding from AV fistula and hypertensive urgency.  Started on nicardipine in ER, after little improvement from hydralazine.    Surgical glue applied to site by Vascular surgery.    Initial Hgb 12.2, repeat 10.6  Home meds restarted, and nicardipine weaned off at 01:30     Subjective   Review of Systems: Review of Systems   Constitutional:  Negative for chills, fatigue and fever.   HENT:  Negative for ear pain, nosebleeds and sore throat.    Eyes:  Negative for visual disturbance.   Respiratory:  Negative for cough, chest tightness, shortness of breath and wheezing.    Cardiovascular:  Negative for chest pain, palpitations and leg swelling.   Gastrointestinal:  Negative for abdominal distention, abdominal pain, diarrhea, nausea and vomiting.   Endocrine: Negative.    Genitourinary:  Positive for decreased urine volume. Negative for difficulty urinating, dysuria, frequency and hematuria.   Musculoskeletal:  Negative for arthralgias and myalgias.   Skin:  Negative for color change, rash and wound.   Allergic/Immunologic: Negative.    Neurological:  Positive for headaches. Negative for dizziness, seizures, syncope and weakness.   Hematological: Negative.    Psychiatric/Behavioral: Negative.     All other systems reviewed and are negative.       Objective                            Vitals I/O      Most Recent Min/Max in 24hrs   Temp 97.8 °F (36.6 °C) Temp  Min: 97.3 °F (36.3 °C)  Max: 97.8 °F (36.6 °C)   Pulse (!) 54 Pulse  Min: 54  Max: 68   Resp 16 Resp  Min: 16  Max: 24   BP (!) 103/46 BP  Min: 101/59  Max: 235/107   O2 Sat 98 % SpO2  Min: 92 %  Max: 100 %    No intake or output data in the 24 hours ending 07/10/24 0132    Diet NPO; Sips with meds    Invasive Monitoring           Physical Exam   Physical Exam  Vitals and nursing note reviewed.   Eyes:      General: Vision grossly intact. NeglectNo scleral icterus.        Right eye: No discharge.         Left eye: No discharge.      Extraocular Movements: Extraocular movements intact.      Conjunctiva/sclera: Conjunctivae normal.      Pupils: Pupils are equal, round, and reactive to light.   Skin:     General: Skin is warm, dry and not mottled  extremities.      Capillary Refill: Capillary refill takes less than 2 seconds.      Coloration: Skin is not jaundiced or pale.      Findings: No lesion, rash or wound.          HENT:      Head: Normocephalic and atraumatic.      Right Ear: Hearing normal. No drainage.      Left Ear: Hearing normal. No drainage.      Nose: No nasal deformity, nasal tenderness, congestion, rhinorrhea, epistaxis or nasogastric tube.      Mouth/Throat:      Mouth: Mucous membranes are moist. No injury or angioedema.      Dentition: Normal dentition.      Pharynx: No oropharyngeal exudate.   Neck:      Vascular: No JVD.   no midline tenderness Cardiovascular:      Rate and Rhythm: Normal rate and regular rhythm.      Pulses: No decreased pulses.      Heart sounds: No murmur heard.     No friction rub. No gallop.   Musculoskeletal:         General: No swelling, tenderness, deformity or signs of injury.      Right lower leg: No edema.      Left lower leg: No edema.   Abdominal: General: Bowel sounds are normal. There is no distension.      Palpations: Abdomen is soft.      Tenderness: There is no abdominal tenderness.   Constitutional:       General: She is not in acute distress.     Appearance: She is well-developed and well-nourished. She is not ill-appearing or toxic-appearing.      Interventions: She is not sedated, not intubated and not restrained.  Pulmonary:      Effort: Pulmonary effort is normal. No accessory muscle usage, respiratory distress or accessory muscle usage. She is not intubated.      Breath sounds: Normal breath sounds. No wheezing, rhonchi or rales.   Psychiatric:         Mood and Affect:  mood and affect abnormal.        Speech: Speech is not no receptive aphasia or no expressive aphasia.   Neurological:      General: No focal deficit present.      Mental Status: She is alert and oriented to person, place and time. Mental status is at baseline. She is CAM ICU negative.      Cranial Nerves: No dysarthria or facial  asymmetry.      Sensory: Sensation is intact.      Motor: gross motor function is at baseline for patient. Strength full and intact in all extremities.            Diagnostic Studies      EKG: sinus rhythm   Imaging: no new imaging.    March 2024 IR AV fistulagram with stenosis of cephalic vein outflow and terminal cephalic arch requiring balloon angioplasty.   I have personally reviewed pertinent reports.   and I have personally reviewed pertinent films in PACS     Medications:  Scheduled PRN   carvedilol, 25 mg, BID With Meals  chlorhexidine, 15 mL, Q12H DEBBIE  cloNIDine, 0.3 mg, TID  doxazosin, 4 mg, BID  gabapentin, 300 mg, HS  hydrALAZINE, 100 mg, Q8H DEBBIE  insulin lispro, 1-5 Units, Q6H DEBBIE  torsemide, 100 mg, Daily          Continuous    niCARdipine, 1-15 mg/hr, Last Rate: Stopped (07/10/24 0131)         Labs:    CBC    Recent Labs     07/09/24  1729 07/09/24  2237   WBC 6.70  --    HGB 12.2 10.6*   HCT 37.4 32.4*     --      BMP    Recent Labs     07/09/24  1729 07/09/24  2302   SODIUM 132* 130*   K 3.2* 4.0   CL 90* 90*   CO2 33* 29   AGAP 9 11   BUN 24 29*   CREATININE 3.52* 4.37*   CALCIUM 9.0 8.6       Coags    Recent Labs     07/09/24  1729   INR 1.06   PTT 31        Additional Electrolytes  Recent Labs     07/09/24  2302   MG 2.3          Blood Gas    No recent results  No recent results LFTs  No recent results    Infectious  No recent results  Glucose  Recent Labs     07/09/24  1729 07/09/24  2302   GLUC 114 173*               MARLENA Paige

## 2024-07-10 NOTE — UTILIZATION REVIEW
Initial Clinical Review    Admission: Date/Time/Statement:   Admission Orders (From admission, onward)       Ordered        07/09/24 2000  INPATIENT ADMISSION  Once                          Orders Placed This Encounter   Procedures    INPATIENT ADMISSION     Standing Status:   Standing     Number of Occurrences:   1     Order Specific Question:   Level of Care     Answer:   Level 1 Stepdown [13]     Order Specific Question:   Estimated length of stay     Answer:   More than 2 Midnights     Order Specific Question:   Certification     Answer:   I certify that inpatient services are medically necessary for this patient for a duration of greater than two midnights. See H&P and MD Progress Notes for additional information about the patient's course of treatment.     ED Arrival Information       Expected   -    Arrival   7/9/2024 16:28    Acuity   Emergent              Means of arrival   Stretcher    Escorted by   [facility] ([facility])    Service   Hospitalist    Admission type   Emergency              Arrival complaint   bleeding             Chief Complaint   Patient presents with    Dialysis Shunt Problem     Pt was at dialysis, got a full treatment, had the clamp on her arm for approx 90 min PTA, would not stop bleeding, clamp present on arrival. Denies symptoms        Initial Presentation: 69 y.o. female to ED by EMS s/p HD treatment that ended at approx 3 pm. Noted to have bleeding at AV fistula site following HD treatment and clamp  applied x 90 minutes without resolution of bleeding. Arrived to ER with clamp in place.     EXAM  As AV Fistula clamp removed, arterial spray of blood. /107. Per Consult Vascular surgery given hydralazine x2 doses and started on nicardipine, and applied surgical glue to site to control bleeding. +Bruit/thrill, and palpable distal pulses, Labs HGB 12.2, INR 1.06 .   Inpatient SD1 admission due to complication of AV dialysis fistula, acute blood loss anemia, HTN Urgency,  ESRD  Recheck HGB in 1 HR then q 6HR, TX for HGB < 7, maintain pressue dressing; IR consult for AV fistulogram; Vascular consult, BP management restart home medfs; titrate IV Nicardipine GTT for goal BP < 140, close HD monitoring; Hemodialysis per Nephrology no urgent RX, hold off replacing potassium; recheck BMP in 1 HR. Cont home torsemide, coreg , daily WT, I/O, NPO, SSI  GEN Surgery  Bleeding was slowed to an ooze in the emergency department.  Glue was placed over top and bleeding ceased.  4 x 4 gauze and pressure dressing was placed over top.   Pain management, IR for possible fistulagram   Date: 7/10/2024   Day 2:   Gen Surgery Bleeding left brachiocephalic AVF controlled with pressure dressing and ultimately glue in the ED overnight. Plan for LUE fistulogram to assess for any outflow stenosis.   Critical care  Off nicardipine since 1AM. Cont O/P Carvedilol, Clonidine, Hydralazine, Doxazosin,Torsemide; fistulogram by IR today. HD M/W/FRI  accuchecks with ISS; add Lispro 11 units with meals when taking oral; give long-acting insulin 8 units now, start 16 units tomorrow AM if taking orals   INTERVENTIONAL RADIOLOGY PROCEDURE NOTE    Date: 7/10/2024  Procedure: LUE AV fistulogram  Findings:   Recurrent high grades stenoses along left cephalic vein outflow and terminal cephalic arch treated with 7 mm high pressure and 7 mm drug coated balloons.  Arterial anastomosis and central veins were patent.  Complications: None immediate.  Anesthesia: conscious sedation and local  Nephrology  next treatment  tomorrow. BP controlled, avoid aggressive BP management     ED Triage Vitals   Temperature Pulse Respirations Blood Pressure SpO2 Pain Score   07/09/24 1631 07/09/24 1631 07/09/24 1631 07/09/24 1631 07/09/24 1631 07/09/24 2248   (!) 97.3 °F (36.3 °C) 65 17 (!) 216/95 100 % No Pain     Weight (last 2 days)       Date/Time Weight    07/10/24 0532 67.9 (149.69)    07/09/24 2300 67.9 (149.69)    07/09/24 2215 67.9 (149.69)             Vital Signs (last 3 days)       Date/Time Temp Pulse Resp BP MAP (mmHg) SpO2 Calculated FIO2 (%) - Nasal Cannula Nasal Cannula O2 Flow Rate (L/min) O2 Device Patient Position - Orthostatic VS [place] Coma Scale Score Pain    07/10/24 15:16:45 97.2 °F (36.2 °C) 55 17 125/58 80 95 % -- -- -- -- -- --    07/10/24 1422 -- 55 12 149/61 -- 100 % 36 4 L/min Nasal cannula -- -- --    07/10/24 1417 -- 54 12 159/74 -- 100 % 60 10 L/min Non-rebreather mask -- -- --    07/10/24 1412 -- 53 16 140/71 -- 100 % 60 10 L/min Non-rebreather mask -- -- --    07/10/24 1407 -- 52 12 142/60 -- 100 % 60 10 L/min Non-rebreather mask -- -- --    07/10/24 1402 -- 52 20 144/57 -- 100 % 60 10 L/min Non-rebreather mask -- -- --    07/10/24 1400 -- -- -- -- -- -- 60 10 L/min Non-rebreather mask -- -- --    07/10/24 1357 -- 52 20 156/73 -- 100 % 60 10 L/min Non-rebreather mask -- -- --    07/10/24 1352 -- 54 18 107/55 -- 100 % 60 10 L/min Non-rebreather mask -- -- --    07/10/24 1347 -- 57 12 129/62 -- 100 % 32 3 L/min Nasal cannula -- -- --    07/10/24 1342 -- 58 16 154/68 -- 100 % 32 3 L/min Nasal cannula -- -- --    07/10/24 1337 -- 59 -- 146/67 -- 99 % 32 3 L/min Nasal cannula -- -- --    07/10/24 1100 97.4 °F (36.3 °C) -- -- -- -- -- -- -- -- -- -- --    07/10/24 0845 -- -- -- 135/55 -- -- -- -- -- -- -- --    07/10/24 0800 -- -- -- -- -- -- -- -- -- -- 15 --    07/10/24 0700 97.4 °F (36.3 °C) -- -- -- -- -- -- -- -- -- -- --    07/10/24 0640 -- 56 16 123/57 77 93 % -- -- -- -- -- --    07/10/24 0620 -- 54 14 118/55 76 95 % -- -- -- -- -- --    07/10/24 0610 -- 54 14 121/54 80 94 % -- -- -- -- -- --    07/10/24 0600 -- 54 15 134/59 85 96 % -- -- -- -- -- --    07/10/24 0535 -- 56 14 126/58 86 91 % -- -- -- -- -- --    07/10/24 0532 -- -- -- -- -- -- -- -- None (Room air) -- -- --    07/10/24 0530 -- -- -- -- -- -- -- -- None (Room air) -- -- --    07/10/24 0520 -- 56 9 138/64 89 100 % -- -- -- -- -- --    07/10/24 0510 -- 56 14  146/59 89 99 % -- -- -- -- -- --    07/10/24 0450 -- 58 15 141/57 75 99 % -- -- -- -- -- --    07/10/24 0445 -- 56 21 133/58 88 98 % -- -- -- -- -- --    07/10/24 0430 -- 54 25 137/64 100 99 % -- -- -- -- -- --    07/10/24 0415 -- 54 30 131/56 71 99 % -- -- -- -- -- --    07/10/24 0400 -- 54 29 132/57 89 99 % -- -- -- -- -- --    07/10/24 0340 -- 54 29 134/55 77 99 % -- -- -- -- -- --    07/10/24 0320 -- 54 29 131/55 76 99 % -- -- -- -- -- --    07/10/24 0310 -- 54 30 121/56 80 99 % -- -- -- -- -- --    07/10/24 0300 -- -- -- -- -- -- -- -- -- -- 15 --    07/10/24 0250 -- 54 31 124/60 93 99 % -- -- -- -- -- --    07/10/24 0240 -- 54 31 132/64 100 98 % -- -- -- -- -- --    07/10/24 0220 -- 54 31 118/53 73 98 % -- -- -- -- -- --    07/10/24 0210 -- 54 20 123/51 74 98 % -- -- -- -- -- --    07/10/24 0150 -- 56 16 122/53 79 98 % -- -- -- -- -- --    07/10/24 0135 -- 56 17 117/49 67 98 % -- -- -- -- -- --    07/10/24 0105 -- 54 16 103/46 67 98 % -- -- -- -- -- --    07/10/24 0030 -- 54 17 101/59 88 98 % 28 2 L/min Nasal cannula -- -- --    07/10/24 0015 -- 56 18 107/43 70 99 % -- -- -- -- -- --    07/09/24 2350 -- 60 19 118/49 72 92 % -- -- -- -- -- --    07/09/24 2337 -- 62 20 119/45 63 95 % -- -- -- -- -- --    07/09/24 2320 -- 66 24 138/54 75 95 % -- -- -- -- -- --    07/09/24 2315 -- 64 19 132/56 70 94 % -- -- -- -- -- --    07/09/24 2300 97.8 °F (36.6 °C) 64 19 128/47 66 96 % -- -- -- -- -- --    07/09/24 2248 -- -- -- -- -- -- -- -- None (Room air) -- 15 No Pain    07/09/24 2200 -- 60 18 122/56 81 98 % -- -- None (Room air) Lying -- --    07/09/24 2145 -- 60 16 130/75 90 97 % -- -- None (Room air) Lying -- --    07/09/24 2130 -- 62 18 124/60 86 97 % -- -- None (Room air) Lying -- --    07/09/24 2115 -- 62 18 124/59 85 97 % -- -- None (Room air) Lying -- --    07/09/24 2045 -- 60 18 123/60 87 95 % -- -- None (Room air) Lying -- --    07/09/24 2030 -- 60 18 123/57 82 96 % -- -- None (Room air) Sitting -- --     07/09/24 2015 -- 60 18 114/56 78 96 % -- -- None (Room air) Sitting -- --    07/09/24 1945 -- 60 16 125/58 84 96 % -- -- None (Room air) Lying -- --    07/09/24 1930 -- 62 18 117/55 79 97 % -- -- None (Room air) Lying -- --    07/09/24 1915 -- 58 18 125/60 87 96 % -- -- None (Room air) Sitting -- --    07/09/24 1845 -- 62 18 123/56 80 97 % -- -- None (Room air) Lying -- --    07/09/24 1814 -- 68 17 142/64 -- 98 % -- -- None (Room air) Lying -- --    07/09/24 1800 -- 66 -- 225/87 125 98 % -- -- None (Room air) Lying -- --    07/09/24 1752 -- 66 16 218/89 -- 97 % -- -- None (Room air) Lying -- --    07/09/24 1743 -- 66 17 197/82 -- 98 % -- -- None (Room air) Lying -- --    07/09/24 1730 -- 66 16 235/107 153 97 % -- -- None (Room air) Lying -- --    07/09/24 1631 97.3 °F (36.3 °C) 65 17 216/95 -- 100 % -- -- None (Room air) Sitting 15 --              Pertinent Labs/Diagnostic Test Results:   Radiology:  IR AV fistulagram/graftogram   Final Interpretation by Evan Goins MD (07/10 1438)      1. Recurrent high-grade stenoses along the left cephalic vein outflow and terminal cephalic arch treated with 7 mm high-pressure and 7 mm drug coated balloon angioplasties.   2. Arterial anastomosis and central veins were patent.      Plan:      The fistula can be used for dialysis immediately.      Workstation performed: [id number]           Cardiology:  No orders to display     GI:  No orders to display           Results from last 7 days   Lab Units 07/10/24  1020 07/10/24  0445 07/09/24  2237 07/09/24  1729   WBC Thousand/uL  --  6.39  --  6.70   HEMOGLOBIN g/dL 10.7* 10.8* 10.6* 12.2   HEMATOCRIT % 32.7* 32.3* 32.4* 37.4   PLATELETS Thousands/uL  --  219  --  246   TOTAL NEUT ABS Thousands/µL  --  3.96  --  4.65         Results from last 7 days   Lab Units 07/10/24  0445 07/09/24  2302 07/09/24  1729   SODIUM mmol/L 131* 130* 132*   POTASSIUM mmol/L 4.3 4.0 3.2*   CHLORIDE mmol/L 93* 90* 90*   CO2 mmol/L 31 29 33*   ANION GAP  "mmol/L 7 11 9   BUN mg/dL 32* 29* 24   CREATININE mg/dL 4.78* 4.37* 3.52*   EGFR ml/min/1.73sq m 8 9 12   CALCIUM mg/dL 8.3* 8.6 9.0   MAGNESIUM mg/dL 2.5 2.3  --    PHOSPHORUS mg/dL 4.0  --   --          Results from last 7 days   Lab Units 07/10/24  1151 07/10/24  0519 07/10/24  0024   POC GLUCOSE mg/dl 167* 103 197*     Results from last 7 days   Lab Units 07/10/24  0445 07/09/24  2302 07/09/24  1729   GLUCOSE RANDOM mg/dL 98 173* 114         Results from last 7 days   Lab Units 07/09/24  2237   HEMOGLOBIN A1C % 8.6*   EAG mg/dl 200     No results found for: \"BETA-HYDROXYBUTYRATE\"                           Results from last 7 days   Lab Units 07/09/24  1729   PROTIME seconds 14.0   INR  1.06   PTT seconds 31           ED Treatment-Medication Administration from 07/09/2024 1628 to 07/09/2024 2214         Date/Time Order Dose Route Action     07/09/2024 1733 hydrALAZINE (APRESOLINE) injection 5 mg 5 mg Intravenous Given     07/09/2024 1749 hydrALAZINE (APRESOLINE) injection 5 mg 5 mg Intravenous Given     07/09/2024 1758 niCARdipine (CARDENE) 25 mg (STANDARD CONCENTRATION) in sodium chloride 0.9% 250 mL 5 mg/hr Intravenous New Bag     07/09/2024 1959 ondansetron (ZOFRAN) injection 4 mg 4 mg Intravenous Given            Past Medical History:   Diagnosis Date    CHF (congestive heart failure) (HCC)     CKD (chronic kidney disease) stage 5, GFR less than 15 ml/min (HCC)     Diabetes mellitus (HCC)     Diabetic nephropathy (HCC)     Hemodialysis patient (HCC)     via permacath right chest / [facility] Tues/Thurs and Sat    Hyperlipidemia     Hypertension     Muscle weakness     Orthodontics     sleeps with retainer at night    Risk for falls     Uses walker     per dtr active in the house able to cook/light cleaning as able     Present on Admission:   Chronic diastolic heart failure (HCC)   Hypertensive urgency      Admitting Diagnosis: Hypertensive emergency [I16.1]  Complication of arteriovenous dialysis fistula, initial " encounter [T82.9XXA]  Age/Sex: 69 y.o. female  Admission Orders:  IR consult  Scheduled Medications:  [Transfer Hold] calcitriol, 0.75 mcg, Oral, Once per day on Tuesday Thursday Saturday  [Transfer Hold] carvedilol, 25 mg, Oral, BID With Meals  [Transfer Hold] chlorhexidine, 15 mL, Mouth/Throat, Q12H DEBBIE  [Transfer Hold] cloNIDine, 0.3 mg, Oral, TID  [Transfer Hold] doxazosin, 4 mg, Oral, BID  [Transfer Hold] gabapentin, 300 mg, Oral, HS  [Transfer Hold] hydrALAZINE, 100 mg, Oral, Q8H DEBBIE  [Transfer Hold] insulin lispro, 1-5 Units, Subcutaneous, Q6H DEBBIE  [Transfer Hold] torsemide, 100 mg, Oral, Daily      Continuous IV Infusions:   niCARdipine (CARDENE) 25 mg (STANDARD CONCENTRATION) in sodium chloride 0.9% 250 mL  Rate:  mL/hr Dose: 1-15 mg/hr  Freq: Titrated Route: IV  Last Dose: Stopped (07/10/24 0131)  Start: 07/09/24 1815 End: 07/10/24 0941    sodium chloride 0.9 % infusion  Freq: Intra-op continuous PRN  Last Dose: Stopped (07/10/24 1427)  Start: 07/10/24 1342 End: 07/10/24 1427  PRN Meds:       IP CONSULT TO VASCULAR SURGERY  IP CONSULT TO CASE MANAGEMENT  INPATIENT CONSULT TO IR  IP CONSULT TO NEPHROLOGY    Network Utilization Review Department  ATTENTION: Please call with any questions or concerns to [phone] and carefully listen to the prompts so that you are directed to the right person. All voicemails are confidential.   For Discharge needs, contact Care Management DC Support Team at [phone] opt. [phone]  Send all requests for admission clinical reviews, approved or denied determinations and any other requests to dedicated fax number below belonging to the campus where the patient is receiving treatment. List of dedicated fax numbers for the Facilities:  FACILITY NAME UR FAX NUMBER   ADMISSION DENIALS (Administrative/Medical Necessity) [phone]   DISCHARGE SUPPORT TEAM (NETWORK) [phone]   PARENT CHILD HEALTH (Maternity/NICU/Pediatrics) [phone]   [facility]  [place] [phone]   [facility] [phone]   [facility] [phone]   [facility] [phone]   [facility] [phone]   [facility] [phone]   [facility] [phone]   [facility] [phone]   [facility] [phone]   [facility] [phone]   [facility] [phone]   [facility] [phone]

## 2024-07-10 NOTE — PROGRESS NOTES
"Progress Note - Vascular Surgery   Lela Corado 69 y.o. female MRN: [id number]  Unit/Bed#: ICU 14 Encounter: [id number]    Assessment:  69 y.o. female who presented with bleeding from LUE fistula. Bleeding was controlled with glue and pressure dressing.    Afebrile, bradycardic and tachypneic overnight put on 2L.  Now AVSS on room air   Cr 4.78  HgB 10.8 from 10.6    Plan:  - IR fistulagram scheduled for 10am today  - NPO  - Neurovascular checks   - BP management  - Nicardipine weaned at 0130  - Maintain pressure dressing  - DVT ppx  - Pain and nausea control  - Incentive spirometry      Subjective/Objective     Subjective:   No acute events overnight. Patient is not in any pain. She denies chest pain, shortness of breath, fevers, chills, numbness or tingling.    Objective:     Blood pressure 126/58, pulse 56, temperature 97.8 °F (36.6 °C), temperature source Oral, resp. rate 14, height 5' 1\" (1.549 m), weight 67.9 kg (149 lb 11.1 oz), SpO2 91%, not currently breastfeeding.,Body mass index is 28.28 kg/m².    No intake or output data in the 24 hours ending 07/10/24 0543    Invasive Devices       Peripheral Intravenous Line  Duration             Peripheral IV 07/09/24 Right;Ventral (anterior) Forearm <1 day              Line  Duration             Hemodialysis AV Fistula 02/16/22 Left Forearm 874 days                    Physical Exam:   Gen: NAD, Comfortable  Neuro: A&O, No focal deficits  Head: Normal Cephalic, Atraumatic  Eye: EOMI, No scleral icterus  Neck: Midline trachea  CV: RRR. Palpable L radial and ulnar pulses  Pulm: Normal work of breathing, no respiratory distress  Abd: Soft, Non-Distended, Non-Tender   Ext: No edema, Non-tender. LUE fistula site dressing clean, dry, intact. No active bleeding noted from wound.  Skin: warm, dry. Dressing clean, dry, intact.      " Last visit: 10/19/21 NOV NA  Last refill: 12/17/21 filled by GI    GI inquiring if PCP can fill as pt was previously seen by Dr. Carson and is no longer provider here.     Refill set up below if appropriate.

## 2024-07-10 NOTE — H&P
Formerly [facility]  H&P  Name: Lela Corado 69 y.o. female I MRN: [id number]  Unit/Bed#: ICU 14 I Date of Admission: 7/9/2024   Date of Service: 7/9/2024 I Hospital Day: 0      Assessment & Plan   * Complication of AV dialysis fistula  Assessment & Plan  Patient presented to ER s/p HD treatment that ended at approx 3 pm.  Patient was noted to have bleeding at AV fistula site following HD treatment and clamp was applied x 90 minutes without resolution of bleeding.  Patient arrived to ER with clamp in place.  When clamp removed, was noted to have arterial spray.  /107.   Clamp   Hgb 12, INR 1.06  Denies AC/AP  Vascular surgery was consulted by ER and bleeding stopped with surgical glue.  No sutures required.    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  At that time, IR was consulted and she was noted to have stenosis of cephalic vein outflow and terminal cephalic arch.  Both vessels were treated with angioplasty.    Plan:   Consult vascular surgery, appreciate recommendations  Consult IR for AV fistulogram.  NPO after MN for IR procedure  BP management as below  Apply pressure dressing over fistula site -- avoid wrapping circumferentially.    Neurovascular checks q4h    Acute blood loss anemia  Assessment & Plan  2/2 complication of AV fistula  Hgb in ER 12.2, INR 1.06  Type and screen in ER  Denies AC/AP    Plan:  Recheck hgb at 2100  Monitor h/h q6h  No immediate need for transfusion  Transfuse for Hgb < 7, or hemodynamic instability  Maintain pressure dressing  IR consulted for AV fistulogram, appreciate recommendations  Vascular surgery consulted, appreciate recommendations  BP management as above    Hypertensive urgency  Assessment & Plan  On arrival to ER with acute elevation in /107.  Patient reports compliance to her medication regimen and denies  changes.  She states that she has not had her 2pm BP medications as she was at her HD treatment.  Reportedly her BP was stable prior to and during her treatment.  She states that sometimes following her HD treatment her BP is high, but she takes her afternoon medications after her treatment is completed.    In the ER she was given hydralazine x2 doses with little improvement.  Vascular surgery was consulted 2/2 bleeding fistula and recommended nicardipine.  IR was consulted and also recommending goal SBP < 140 mmHg.  Nicardipine was started and BP at goal.    Plan:  Restart home medications  Titrate nicardipine for goal BP < 140 mmHg  Close hemodynamic monitoring.   Vitals per unit protocol    ESRD (end stage renal disease) on dialysis (HCC)  Assessment & Plan  Lab Results   Component Value Date    EGFR 12 07/09/2024    EGFR 9 (L) 03/29/2024    EGFR 7 01/24/2024    CREATININE 3.52 (H) 07/09/2024    CREATININE 5.14 (H) 03/29/2024    CREATININE 5.27 (H) 01/24/2024     HD M-W-F.  Last HD session 7/9/24, completed full treatment at approx 3 pm  Patient has records from [facility] transplant program and [facility] transplant program from 8/2023, unclear at this time if she is following up.  Follows with [facility] Nephrology as outpatient.   Patient states that sometimes after a HD treatment her BP is high.  She states that her BP prior to and during treatment were not high.  BMP in ER with K 3.2.  This was drawn shortly after HD treatment.    Plan:  Labs in ER are shortly after completion of HD treatment, repeat BMP, H/H, Mag at 2100  Consult Nephrology, appreciate recommendations  HD per nephrology  No immediate need for HD this evening  Hold off replacing K at this time, given BMP drawn shortly after HD.  Recheck BMP 2100.        Chronic diastolic heart failure (HCC)  Assessment & Plan  Wt Readings from Last 3 Encounters:   03/13/24 68 kg (150 lb)   10/08/23 73.9 kg (163 lb)   08/09/23 64 kg (141 lb)     Currently examines  "euvolemic  Last echo 11/2023 at [facility]: EF 60-64%, G2DD, Moderate AS, est PAP 54.3 mmHg  Home regimen: torsemide, coreg    Plan:  Hypertension control as above  Continue home torsemide, coreg  Daily weights -- stand scale preferred.   Strict I/Os          Type 2 diabetes mellitus with chronic kidney disease on chronic dialysis, with long-term current use of insulin (HCC)  Assessment & Plan  Lab Results   Component Value Date    HGBA1C 8.4 (H) 03/29/2024       No results for input(s): \"POCGLU\" in the last 72 hours.    Blood Sugar Average: Last 72 hrs:    Glucose 114 on arrival  Home regimen: Insulin Lispro-aabc    Plan:  Holding home meds for now  Start acuchecks q6h with SSI  NPO for now 2/2 N/V in ER.  NPO after MN for IR procedure  Once able to take PO start CHO diet  Goal blood glucose 140-180  Avoid hypoglycemia           History of Present Illness     HPI: Lela Corado is a 69 y.o. who presents with acute bleeding from AV fistula.  She has PMHx ESRD on HD, HTN, HLD, chronic HFpEF, T2DM, and has beed evaluated in 2023 by [facility] and [facility] for possible kidney transplant.  Unclear if patient completed pre-transplant intake appointment. Of note, she had AV fistulagram March 2024 for GINGER brachiocephalic fistula with high venous pressures which required balloon angioplasty of the cephalic vein outflow and terminal cephalic arch.  Today she presents from her dialysis clinic.  Patient completed her treatment and was noted to have bleeding from AV fistula site.  Clamp was applied for 90 minutes without resolution of bleeding.  Reportedly her BP was stable prior to and during treatment.  On arrival to the ER, clamp was removed and ER provider described \"arterial spray\".  BP was noted to be 235/107.  She was given hydralazine x2 doses and started on nicardipine per vascular surgery recommendations.  Vascular surgery saw patient in ER and applied surgical glue to site to control bleeding.  +Bruit/thrill, and palpable " distal pulses noted.  She denies CP, SOB, but reports HA, N/V.  IR consulted for AV fistulagram tomorrow, and recommending maintaining SBP < 140 mmHg.  Will admit patient to Step Down Level 1 for BP management, neurovascular monitoring, and close monitoring of hgb.    History obtained from child, chart review, and the patient.  Patient's daughter present at bedside and assisted with history.   Review of Systems: Review of Systems   Constitutional:  Negative for chills, fatigue and fever.   HENT: Negative.  Negative for nosebleeds.         Reports headache.    Eyes: Negative.  Negative for visual disturbance.   Respiratory:  Negative for apnea, cough, shortness of breath and wheezing.    Cardiovascular:  Negative for chest pain, palpitations and leg swelling.   Gastrointestinal:  Negative for abdominal distention, abdominal pain, constipation, diarrhea, nausea and vomiting.   Endocrine: Negative.    Genitourinary:  Positive for decreased urine volume. Negative for difficulty urinating, frequency and hematuria.   Musculoskeletal:  Negative for arthralgias and myalgias.   Allergic/Immunologic: Negative.    Neurological:  Positive for headaches. Negative for dizziness, seizures, syncope, facial asymmetry, speech difficulty, weakness and light-headedness.   Hematological: Negative.    Psychiatric/Behavioral: Negative.       Disposition: Stepdown Level 1  Historical Information   Past Medical History:  No date: CHF (congestive heart failure) (Formerly [facility])  No date: CKD (chronic kidney disease) stage 5, GFR less than 15 ml/  min (Formerly [facility])  No date: Diabetes mellitus ([facility])  No date: Diabetic nephropathy (Formerly [facility])  No date: Hemodialysis patient (Formerly [facility])      Comment:  via permacath right chest / [facility] Tues/Thurs and Sat  No date: Hyperlipidemia  No date: Hypertension  No date: Muscle weakness  No date: Orthodontics      Comment:  sleeps with retainer at night  No date: Risk for falls  No date: Uses walker      Comment:  per dtr active in the  house able to cook/light cleaning                as able Past Surgical History:  No date: CATARACT EXTRACTION; Bilateral  No date: EGD  5/9/2022: IR AV FISTULAGRAM/GRAFTOGRAM  3/31/2023: IR AV FISTULAGRAM/GRAFTOGRAM  8/9/2023: IR AV FISTULAGRAM/GRAFTOGRAM  3/13/2024: IR AV FISTULAGRAM/GRAFTOGRAM  9/15/2021: IR BIOPSY BONE MARROW  9/15/2021: IR BIOPSY KIDNEY RANDOM  12/7/2021: IR NON-TUNNELED CENTRAL LINE PLACEMENT  4/1/2022: IR TUNNELED CENTRAL LINE CHECK/CHANGE/REPOSITION  12/9/2021: IR TUNNELED DIALYSIS CATHETER PLACEMENT  10/21/2022: IR TUNNELED DIALYSIS CATHETER REMOVAL  2/16/2022: IN ARTERIOVENOUS ANASTOMOSIS OPEN DIRECT; Left      Comment:  Procedure: CREATION FISTULA ARTERIOVENOUS (AV);                 Surgeon: Lis Phillips DO;  Location: [facility];                 Service: Vascular  9/2/2022: IN REVJ OPN ARVEN FSTL W/O THRMBC DIAL GRF; Left      Comment:  Procedure: REVISION AV FISTULA                (superficalize/transposition);  Surgeon: Lis Phillips DO;  Location: [facility];  Service: Vascular   Current Outpatient Medications   Medication Instructions    atorvastatin (LIPITOR) 40 mg, Oral, Daily    calcium acetate (PHOSLO) 667 mg, Oral, 3 times daily with meals    calcium acetate (PHOSLO) 667 mg, Oral, 2 times daily with meals    carvedilol (COREG) 25 mg, Oral, 2 times daily with meals    cloNIDine (CATAPRES) 0.3 mg, Oral, 3 times daily    doxazosin (CARDURA) 4 mg, Oral, 2 times daily    ergocalciferol (ERGOCALCIFEROL) 50,000 Units, Oral, Every 30 days    gabapentin (NEURONTIN) 300 mg, Oral, Daily at bedtime    Gvoke HypoPen 2-Pack 1 mg, Subcutaneous    hydrALAZINE (APRESOLINE) 100 mg, Oral, Every 8 hours scheduled    Insulin Lispro-aabc, 1 U Dial, (Lyumjemason KwikPen) 100 UNIT/ML SOPN Inject 6u sc with breakfast/lunch/dinner.    Renal Vitamin 0.8 mg, Oral, Daily    Sodium Zirconium Cyclosilicate (LOKELMA) 10 g, Oral, Daily, Take one pack as directed on Monday, Wednesday, Friday  and sunday    torsemide (DEMADEX) 100 mg, Oral, Daily    Tradjenta 5 mg, Oral, Daily    Allergies   Allergen Reactions    Amlodipine Edema and Eye Swelling    Lisinopril Angioedema     Bilateral periorbital edema that was significant      Social History     Tobacco Use    Smoking status: Never    Smokeless tobacco: Never    Tobacco comments:     NO TOBACCO USE   Vaping Use    Vaping status: Never Used   Substance Use Topics    Alcohol use: Not Currently    Drug use: Not Currently    Family History   Problem Relation Age of Onset    Hypertension Mother     Diabetes Father     Hypertension Sister     Diabetes Sister     Hypertension Brother           Objective                            Vitals I/O      Most Recent Min/Max in 24hrs   Temp 97.8 °F (36.6 °C) Temp  Min: 97.3 °F (36.3 °C)  Max: 97.8 °F (36.6 °C)   Pulse 66 Pulse  Min: 58  Max: 68   Resp (!) 24 Resp  Min: 16  Max: 24   /54 BP  Min: 114/56  Max: 235/107   O2 Sat 95 % SpO2  Min: 94 %  Max: 100 %    No intake or output data in the 24 hours ending 07/09/24 2334    Diet NPO; Sips with meds    Invasive Monitoring           Physical Exam   Physical Exam  Vitals and nursing note reviewed.   Eyes:      General: Vision grossly intact. NeglectNo scleral icterus.        Right eye: No discharge.         Left eye: No discharge.      Extraocular Movements: Extraocular movements intact.      Conjunctiva/sclera: Conjunctivae normal.   Skin:     General: Skin is warm, dry and not mottled extremities.      Capillary Refill: Capillary refill takes less than 2 seconds.      Coloration: Skin is not jaundiced or pale.      Findings: No lesion, rash or wound.          HENT:      Head: Normocephalic and atraumatic.      Right Ear: Hearing normal. No drainage.      Left Ear: Hearing normal. No drainage.      Nose: No nasal deformity, nasal tenderness, congestion, rhinorrhea, epistaxis or nasogastric tube.      Mouth/Throat:      Mouth: Mucous membranes are moist. No injury or  angioedema.      Dentition: Normal dentition.      Pharynx: No oropharyngeal exudate.   Neck:      Vascular: No JVD.   no midline tenderness Cardiovascular:      Rate and Rhythm: Normal rate and regular rhythm.      Pulses: No decreased pulses.      Heart sounds: No murmur heard.     No friction rub. No gallop.   Musculoskeletal:         General: No swelling, tenderness, deformity or signs of injury. Normal range of motion.      Right lower leg: No edema.      Left lower leg: No edema.   Abdominal: General: Bowel sounds are normal. There is no distension.      Palpations: Abdomen is soft.      Tenderness: There is no abdominal tenderness.   Constitutional:       General: She is not in acute distress.     Appearance: She is well-developed and well-nourished. She is not ill-appearing or toxic-appearing.      Interventions: She is not sedated, not intubated and not restrained.  Pulmonary:      Effort: Pulmonary effort is normal. No accessory muscle usage, respiratory distress or accessory muscle usage. She is not intubated.      Breath sounds: Normal breath sounds. No wheezing, rhonchi or rales.   Psychiatric:         Mood and Affect:  mood and affect abnormal.        Speech: Speech is not no receptive aphasia or no expressive aphasia.   Neurological:      General: No focal deficit present.      Mental Status: She is alert and oriented to person, place and time. Mental status is at baseline. She is CAM ICU negative.      Cranial Nerves: No dysarthria or facial asymmetry.      Sensory: Sensation is intact.      Motor: gross motor function is at baseline for patient. Strength full and intact in all extremities.            Diagnostic Studies      EKG: Sinus rhythm   Imaging: no new imagine  IR consult for AV fistulagram ordered and spoke to Dr. Aguiar.  Plan for procedure 7/10. I have personally reviewed pertinent reports.   and I have personally reviewed pertinent films in PACS     Medications:  Scheduled PRN   [START  ON 7/10/2024] carvedilol, 25 mg, BID With Meals  chlorhexidine, 15 mL, Q12H DEBBIE  cloNIDine, 0.3 mg, TID  doxazosin, 4 mg, BID  gabapentin, 300 mg, HS  hydrALAZINE, 100 mg, Q8H DEBBIE  [START ON 7/10/2024] insulin lispro, 1-5 Units, Q6H DEBBIE  [START ON 7/10/2024] torsemide, 100 mg, Daily          Continuous    niCARdipine, 1-15 mg/hr, Last Rate: 5 mg/hr (07/09/24 2321)         Labs:    CBC    Recent Labs     07/09/24  1729 07/09/24  2237   WBC 6.70  --    HGB 12.2 10.6*   HCT 37.4 32.4*     --      BMP    Recent Labs     07/09/24  1729 07/09/24  2302   SODIUM 132* 130*   K 3.2* 4.0   CL 90* 90*   CO2 33* 29   AGAP 9 11   BUN 24 29*   CREATININE 3.52* 4.37*   CALCIUM 9.0 8.6       Coags    Recent Labs     07/09/24  1729   INR 1.06   PTT 31        Additional Electrolytes  Recent Labs     07/09/24  2302   MG 2.3          Blood Gas    No recent results  No recent results LFTs  No recent results    Infectious  No recent results  Glucose  Recent Labs     07/09/24  1729 07/09/24  2302   GLUC 114 173*             Critical Care Time Statement: Upon my evaluation, this patient had a high probability of imminent or life-threatening deterioration due to hypertensive urgency, acute blood loss anemia, which required my direct attention, intervention, and personal management.  I spent a total of 45 minutes directly providing critical care services, including interpretation of complex medical databases, evaluating for the presence of life-threatening injuries or illnesses, complex medical decision making (to support/prevent further life-threatening deterioration)., interpretation of hemodynamic data, titration of vasoactive medications, and titration of continuous IV medications (drips). This time is exclusive of procedures, teaching, family meetings, and any prior time recorded by providers other than myself.        Anticipated Length of Stay is > 2 midnights  MARLENA Paige

## 2024-07-10 NOTE — ASSESSMENT & PLAN NOTE
Patient presented to ER s/p HD treatment that ended at approx 3 pm.  Patient was noted to have bleeding at AV fistula site following HD treatment and clamp was applied x 90 minutes without resolution of bleeding.  Patient arrived to ER with clamp in place.  When clamp removed, was noted to have arterial spray.  /107.   Clamp   Hgb 12, INR 1.06  Denies AC/AP  Vascular surgery was consulted by ER and bleeding stopped with surgical glue.  No sutures required.    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  At that time, IR was consulted and she was noted to have stenosis of cephalic vein outflow and terminal cephalic arch.  Both vessels were treated with angioplasty.    Plan:   Consult vascular surgery, appreciate recommendations  Consult IR for AV fistulogram.  NPO after MN for IR procedure  BP management as below  Apply pressure dressing over fistula site -- avoid wrapping circumferentially.    Neurovascular checks q4h

## 2024-07-10 NOTE — BRIEF OP NOTE (RAD/CATH)
INTERVENTIONAL RADIOLOGY PROCEDURE NOTE    Date: 7/10/2024    Procedure: LUE AV fistulogram  Procedure Summary       Date:  Room / Location:     Anesthesia Start:  Anesthesia Stop:     Procedure:  Diagnosis:     Scheduled Providers:  Responsible Provider:     Anesthesia Type: Not recorded ASA Status: Not recorded            Preoperative diagnosis:   1. Complication of arteriovenous dialysis fistula, initial encounter    2. Hypertensive emergency    3. ESRD (end stage renal disease) on dialysis (HCC)         Postoperative diagnosis: Same.    Surgeon: Evan Goins MD     Assistant: None. No qualified resident was available.    Blood loss: 5 mL    Specimens: None     Findings:   Recurrent high grades stenoses along left cephalic vein outflow and terminal cephalic arch treated with 7 mm high pressure and 7 mm drug coated balloons.  Arterial anastomosis and central veins were patent.    Complications: None immediate.    Anesthesia: conscious sedation and local

## 2024-07-10 NOTE — ASSESSMENT & PLAN NOTE
Wt Readings from Last 3 Encounters:   07/09/24 67.9 kg (149 lb 11.1 oz)   03/13/24 68 kg (150 lb)   10/08/23 73.9 kg (163 lb)     Currently examines euvolemic  Last echo 11/2023 at [facility]: EF 60-64%, G2DD, Moderate AS, est PAP 54.3 mmHg  Home regimen: torsemide, coreg    Plan:  Hypertension control as above  Continue home torsemide, coreg  Daily weights -- stand scale preferred.   Strict I/Os

## 2024-07-10 NOTE — PROGRESS NOTES
"[facility]  Progress Note  Name: Lela Corado I  MRN: [id number]  Unit/Bed#: ICU 14 I Date of Admission: 7/9/2024   Date of Service: 7/10/2024 I Hospital Day: 1    Assessment & Plan   * Complication of AV dialysis fistula  Assessment & Plan  Patient presented to ER s/p HD treatment that ended at approx 3 pm.  Patient was noted to have bleeding at AV fistula site following HD treatment and clamp was applied x 90 minutes without resolution of bleeding.  Patient arrived to ER with clamp in place.  When clamp removed, was noted to have \"arterial spray\".  /107.   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  At that time, IR was consulted and she was noted to have stenosis of cephalic vein outflow and terminal cephalic arch.  Both vessels were treated with angioplasty.  In ER Hgb 12, INR 1.06  Denies AC/AP  Vascular surgery was consulted by ER and bleeding stopped with surgical glue.  No sutures required.    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  At that time, IR was consulted and she was noted to have stenosis of cephalic vein outflow and terminal cephalic arch.  Both vessels were treated with angioplasty.    Plan:    Vascular surgery is following, appreciate recommendations  Consulted IR for AV fistulogram- scheduled for today.    NPO for procedure today- likely will be able to resume diet after  BP management as below  Neurovascular checks q4h    Acute blood loss anemia  Assessment & Plan  2/2 complication of AV fistula  Hgb in ER 12.2, INR 1.06  Type and screen in ER  Denies AC/AP    Plan:  Hgb has been stable  No immediate need for transfusion  Transfuse for Hgb < 7, or hemodynamic instability  Maintain pressure dressing as above      Hypertensive urgency  Assessment & Plan  On arrival to ER with " acute elevation in /107.  Patient reports compliance to her medication regimen and denies changes.  She states that she has not had her 2pm BP medications as she was at her HD treatment.  Reportedly her BP was stable prior to and during her treatment.  She states that sometimes following her HD treatment her BP is high, but she takes her afternoon medications after her treatment is completed.    In the ER she was given hydralazine x2 doses with little improvement.  Vascular surgery was consulted 2/2 bleeding fistula and recommended nicardipine.  IR was consulted and also recommending goal SBP < 140 mmHg.  Nicardipine was started and BP at goal.    Plan:  Restarted on home medications  [place] is now off  Close hemodynamic monitoring.   Vitals per unit protocol    ESRD (end stage renal disease) on dialysis ([facility])  Assessment & Plan  Lab Results   Component Value Date    EGFR 8 07/10/2024    EGFR 9 07/09/2024    EGFR 12 07/09/2024    CREATININE 4.78 (H) 07/10/2024    CREATININE 4.37 (H) 07/09/2024    CREATININE 3.52 (H) 07/09/2024     HD M-W-F.  Last HD session 7/9/24, completed full treatment at approx 3 pm  Patient has records from [facility] transplant program and [place] transplant program from 8/2023, unclear at this time if she is following up.  Follows with [facility]'s Nephrology as outpatient.   Patient states that sometimes after a HD treatment her BP is high.  She states that her BP prior to and during treatment were not high.  BMP in ER with K 3.2.  This was drawn shortly after HD treatment.    Plan:  Labs in ER are shortly after completion of HD treatment  Repeat labs at 9 pm K 4, mag 2.3  Consult Nephrology, appreciate recommendations  HD per nephrology          Chronic diastolic heart failure (HCC)  Assessment & Plan  Wt Readings from Last 3 Encounters:   07/10/24 67.9 kg (149 lb 11.1 oz)   03/13/24 68 kg (150 lb)   10/08/23 73.9 kg (163 lb)     Currently examines euvolemic  Last echo 11/2023 at [facility]: EF  60-64%, G2DD, Moderate AS, est PAP 54.3 mmHg  Home regimen: torsemide, coreg    Plan:  Hypertension control as above  Continue home torsemide, coreg  Daily weights -- stand scale preferred.   Strict I/Os          Type 2 diabetes mellitus with chronic kidney disease on chronic dialysis, with long-term current use of insulin (HCC)  Assessment & Plan  Lab Results   Component Value Date    HGBA1C 8.6 (H) 07/09/2024       Recent Labs     07/10/24  0024 07/10/24  0519   POCGLU 197* 103         Blood Sugar Average: Last 72 hrs:    Glucose 114 on arrival  Home regimen: Insulin Lispro-aabc    Plan:  Holding home meds for now  Continue acuchecks q6h with SSI  NPO for now 2/2 N/V in ER.  Once able to take PO start CHO diet  Goal blood glucose 140-180  Avoid hypoglycemia             Disposition: Med Surg    ICU Core Measures     A: Assess, Prevent, and Manage Pain Has pain been assessed? Yes  Need for changes to pain regimen? No   B: Both SAT/SAT  N/A   C: Choice of Sedation RASS Goal: N/A patient not on sedation  Need for changes to sedation or analgesia regimen? NA   D: Delirium CAM-ICU: Negative   E: Early Mobility  Plan for early mobility? Yes   F: Family Engagement Plan for family engagement today? Yes         Prophylaxis:  VTE Contraindicated secondary to: fistula bleeding   Stress Ulcer  not ordered         Significant 24hr Events     24hr events: No events overnight. Bleeding has resolved. Denies pain     Subjective   Review of Systems: Review of Systems   All other systems reviewed and are negative.       Objective                            Vitals I/O      Most Recent Min/Max in 24hrs   Temp (!) 97.4 °F (36.3 °C) Temp  Min: 97.3 °F (36.3 °C)  Max: 97.8 °F (36.6 °C)   Pulse 56 Pulse  Min: 54  Max: 68   Resp 16 Resp  Min: 9  Max: 31   /57 BP  Min: 101/59  Max: 235/107   O2 Sat 93 % SpO2  Min: 91 %  Max: 100 %    No intake or output data in the 24 hours ending 07/10/24 0827    Diet NPO; Sips with meds    Invasive  Monitoring           Physical Exam   Physical Exam  Eyes:      Pupils: Pupils are equal, round, and reactive to light.   Skin:     General: Skin is warm and dry.   HENT:      Head: Normocephalic.      Mouth/Throat:      Mouth: Mucous membranes are dry.   Cardiovascular:      Rate and Rhythm: Normal rate.   Musculoskeletal:      Comments: Left arm dressing in place, distal tot he dressing, the fistula does have a thrill but is also pulsitile   Abdominal:      Palpations: Abdomen is soft.      Tenderness: There is no abdominal tenderness.   Constitutional:       Appearance: She is well-developed.   Pulmonary:      Effort: Pulmonary effort is normal.      Breath sounds: Normal breath sounds.   Neurological:      General: No focal deficit present.      Mental Status: She is alert and oriented to person, place and time.      Motor: gross motor function is at baseline for patient. Strength full and intact in all extremities.            Diagnostic Studies      EKG: NSR  Imaging:  I have personally reviewed pertinent reports.   and I have personally reviewed pertinent films in PACS     Medications:  Scheduled PRN   carvedilol, 25 mg, BID With Meals  chlorhexidine, 15 mL, Q12H DEBBIE  cloNIDine, 0.3 mg, TID  doxazosin, 4 mg, BID  gabapentin, 300 mg, HS  hydrALAZINE, 100 mg, Q8H DEBBIE  insulin lispro, 1-5 Units, Q6H DEBBIE  torsemide, 100 mg, Daily          Continuous    niCARdipine, 1-15 mg/hr, Last Rate: Stopped (07/10/24 0131)         Labs:    CBC    Recent Labs     07/09/24  1729 07/09/24  2237 07/10/24  0445   WBC 6.70  --  6.39   HGB 12.2 10.6* 10.8*   HCT 37.4 32.4* 32.3*     --  219     BMP    Recent Labs     07/09/24  2302 07/10/24  0445   SODIUM 130* 131*   K 4.0 4.3   CL 90* 93*   CO2 29 31   AGAP 11 7   BUN 29* 32*   CREATININE 4.37* 4.78*   CALCIUM 8.6 8.3*       Coags    Recent Labs     07/09/24  1729   INR 1.06   PTT 31        Additional Electrolytes  Recent Labs     07/09/24  2302 07/10/24  0445   MG 2.3 2.5    PHOS  --  4.0          Blood Gas    No recent results  No recent results LFTs  No recent results    Infectious  No recent results  Glucose  Recent Labs     07/09/24  1729 07/09/24  2302 07/10/24  0445   GLUC 114 173* 98               MARLENA Hudson

## 2024-07-10 NOTE — ASSESSMENT & PLAN NOTE
2/2 complication of AV fistula  Hgb in ER 12.2, INR 1.06  Type and screen in ER  Denies AC/AP    Plan:  Hgb has been stable  No immediate need for transfusion  Transfuse for Hgb < 7, or hemodynamic instability  Maintain pressure dressing as above

## 2024-07-10 NOTE — CONSULTS
e-Consult (IPC)  - Interventional Radiology  Lela Corado 69 y.o. female MRN: [id number]  Unit/Bed#: ICU 14 Encounter: [id number]          Interventional Radiology has been consulted to evaluate Lela Corado    We were consulted concerning this patient with prolonged bleeding from LUE fistula.    Inpatient Consult to IR  Consult performed by: Ingrid Lopez PA-C  Consult ordered by: MARLENA Paige        07/10/24    Assessment/Recommendation:   Patient is a 68 y/o female with CHF, HTN, DM, ESRD on HD TuThuSat with LUE fistula who presented to the ED yesterday with prolonged bleeding from fistula. Patient received dialysis yesterday without issue, but bleeding from access site persisted. Clamping in ED with persistent bleeding. Bleeding terminated with glue and pressure dressing applied by surgery. Patient had been planned for outpatient IR LUE fistulagram at [place] today, but presented to the ED yesterday. Patient also noted to have hypertensive urgency in the ED and was admitted to the ICU with nicardipine drip to control BP. Patient with initial 2 point drop in Hgb from 12.2 to 10.6, but has remained stable, most recent 10.7. IR consulted to evaluate patient's fistula.    -Case discussed with Dr. Goins  -Plan for IR LUE fistulagram today, pending IR schedule  -Please keep patient NPO  -BP currently <140 and patient appears to be off nicardipine. Please maintain BP  -Remainder of care per primary team    11-20 minutes, >50% of the total time devoted to medical consultative verbal/EMR discussion between providers. Written report will be generated in the EMR.     Thank you for allowing Interventional Radiology to participate in the care of Lela Corado. Please don't hesitate to call or TigerText us with any questions.     Ingrid Lopez PA-C

## 2024-07-10 NOTE — ASSESSMENT & PLAN NOTE
"Lab Results   Component Value Date    HGBA1C 8.4 (H) 03/29/2024       No results for input(s): \"POCGLU\" in the last 72 hours.    Blood Sugar Average: Last 72 hrs:    Glucose 114 on arrival  Home regimen: Insulin Lispro-aabc    Plan:  Holding home meds for now  Start acuchecks q6h with SSI  NPO for now 2/2 N/V in ER.  NPO after MN for IR procedure  Once able to take PO start CHO diet  Goal blood glucose 140-180  Avoid hypoglycemia  "

## 2024-07-10 NOTE — CASE MANAGEMENT
Case Management Progress Note    Patient name Lela Corado  Location ICU 14/ICU 14 MRN [id number]  : 1955 Date 7/10/2024       LOS (days): 1  Geometric Mean LOS (GMLOS) (days): 5.1  Days to GMLOS:4.3        OBJECTIVE:        Current admission status: Inpatient  Preferred Pharmacy:   [place] [address] [facility] [address] [facility] [address]  Phone: [phone] Fax: [phone]    Primary Care Provider: Nito Stone MD    Primary Insurance: [facility]  Secondary Insurance:     PROGRESS NOTE:  CM left VM for pt's daughter Sheridan ([phone]) requesting return pc to complete CM assessment as pt was off the floor when CM attempted assessment.

## 2024-07-10 NOTE — ASSESSMENT & PLAN NOTE
Lab Results   Component Value Date    EGFR 8 07/10/2024    EGFR 9 07/09/2024    EGFR 12 07/09/2024    CREATININE 4.78 (H) 07/10/2024    CREATININE 4.37 (H) 07/09/2024    CREATININE 3.52 (H) 07/09/2024     HD M-W-F.  Last HD session 7/9/24, completed full treatment at approx 3 pm  Patient has records from [facility] transplant program and [facility] transplant program from 8/2023, unclear at this time if she is following up.  Follows with [facility] Nephrology as outpatient.   Patient states that sometimes after a HD treatment her BP is high.  She states that her BP prior to and during treatment were not high.  BMP in ER with K 3.2.  This was drawn shortly after HD treatment.    Plan:  Labs in ER are shortly after completion of HD treatment  Repeat labs at 9 pm K 4, mag 2.3  Consult Nephrology, appreciate recommendations  HD per nephrology

## 2024-07-10 NOTE — ASSESSMENT & PLAN NOTE
Wt Readings from Last 3 Encounters:   07/10/24 67.9 kg (149 lb 11.1 oz)   03/13/24 68 kg (150 lb)   10/08/23 73.9 kg (163 lb)     Currently examines euvolemic  Last echo 11/2023 at [facility]: EF 60-64%, G2DD, Moderate AS, est PAP 54.3 mmHg  Home regimen: torsemide, coreg    Plan:  Hypertension control as above  Continue home torsemide, coreg  Daily weights -- stand scale preferred.   Strict I/Os

## 2024-07-10 NOTE — ASSESSMENT & PLAN NOTE
Lab Results   Component Value Date    HGBA1C 8.6 (H) 07/09/2024       Recent Labs     07/10/24  0024 07/10/24  0519   POCGLU 197* 103         Blood Sugar Average: Last 72 hrs:    Glucose 114 on arrival  Home regimen: Insulin Lispro-aabc    Plan:  Holding home meds for now  Continue acuchecks q6h with SSI  NPO for now 2/2 N/V in ER.  Once able to take PO start CHO diet  Goal blood glucose 140-180  Avoid hypoglycemia

## 2024-07-10 NOTE — ASSESSMENT & PLAN NOTE
On arrival to ER with acute elevation in /107.  Patient reports compliance to her medication regimen and denies changes.  She states that she has not had her 2pm BP medications as she was at her HD treatment.  Reportedly her BP was stable prior to and during her treatment.  She states that sometimes following her HD treatment her BP is high, but she takes her afternoon medications after her treatment is completed.    In the ER she was given hydralazine x2 doses with little improvement.  Vascular surgery was consulted 2/2 bleeding fistula and recommended nicardipine.  IR was consulted and also recommending goal SBP < 140 mmHg.  Nicardipine was started and BP at goal.    Plan:  Restarted on home medications  Cardene is now off  Close hemodynamic monitoring.   Vitals per unit protocol

## 2024-07-10 NOTE — ASSESSMENT & PLAN NOTE
"Patient presented to ER s/p HD treatment that ended at approx 3 pm.  Patient was noted to have bleeding at AV fistula site following HD treatment and clamp was applied x 90 minutes without resolution of bleeding.  Patient arrived to ER with clamp in place.  When clamp removed, was noted to have \"arterial spray\".  /107.   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  At that time, IR was consulted and she was noted to have stenosis of cephalic vein outflow and terminal cephalic arch.  Both vessels were treated with angioplasty.  In ER Hgb 12, INR 1.06  Denies AC/AP  Vascular surgery was consulted by ER and bleeding stopped with surgical glue.  No sutures required.    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  At that time, IR was consulted and she was noted to have stenosis of cephalic vein outflow and terminal cephalic arch.  Both vessels were treated with angioplasty.    Plan:   Consult vascular surgery, appreciate recommendations  Consult IR for AV fistulogram.  IR recommending SBP < 140 mmHg  NPO after MN for IR procedure  BP management as below  Apply pressure dressing over fistula site -- avoid wrapping circumferentially.    Neurovascular checks q4h  "

## 2024-07-10 NOTE — ASSESSMENT & PLAN NOTE
Lab Results   Component Value Date    HGBA1C 8.4 (H) 03/29/2024       Recent Labs     07/10/24  0024   POCGLU 197*       Blood Sugar Average: Last 72 hrs:  (P) 197  Glucose 114 on arrival  Home regimen: Insulin Lispro-aabc    Plan:  Holding home meds for now  Continue acuchecks q6h with SSI  NPO for now 2/2 N/V in ER.  NPO after MN for IR procedure  Once able to take PO start CHO diet  Goal blood glucose 140-180  Avoid hypoglycemia

## 2024-07-10 NOTE — CONSULTS
NEPHROLOGY HOSPITAL CONSULTATION   Lela Corado 69 y.o. female MRN: [id number]  Unit/Bed#: ICU 14 Encounter: [id number]    ASSESSMENT and PLAN:    End-stage renal disease on hemodialysis:  Outpatient in center hemodialysis at [facility]  Treatment days: TTS  Target weight 66 kg  Last treatment was on 7/9.  Full treatment.  Slightly above target weight at the end of treatment.  No evidence of volume overload on exam today.  Next treatment planned for 7/11    Access:  Left arm AV fistula with good bruit.  Unable to check for thrill due to significant bandaging/compression bandage  Patient sent to the emergency room on 7/9 due to excessive bleeding following dialysis.  Unable to achieve homeostasis after >1 hour of compression.  Patient sent to the emergency room with clamp in place.  Clamp was removed there was an arterial spurt.  Vascular surgery achieved homeostasis with surgical glue.  Prior fistulogram March 2024 requiring balloon angioplasty.  Patient was scheduled for outpatient fistulogram on 7/10 but required admission to the hospital on 7/9  Fistulogram pending    Hypertension:  On arrival to the emergency room blood pressure was accelerated.  At the end of hemodialysis treatment yesterday blood pressure was noted to be 170/77.  Blood pressure prior to dialysis treatment was 137/62.  She generally takes her medicines after hemodialysis treatment  Current medications: Carvedilol, clonidine, doxazosin, hydralazine and torsemide.    Outpatient medications listed in [facility] rec: Clonidine 0.3 mg 3 times a day, Cardura 4 mg twice a day, Coreg 25 mg twice a day, hydralazine 100 mg 3 times a day, losartan 25 mg daily, torsemide 80 mg twice a day  Adjust medications prior to discharge.  At this time blood pressures acceptable  Volume status acceptable     ESRD MBD:  Secondary hyperparathyroidism of renal origin on calcitriol which we will continue  For control of hyperphosphatemia on calcium  acetate    ESRD anemia:  Outpatient on Mircera 50 mcg every 2 weeks  Last outpatient hemoglobin 9.6 on 7/2  Current hemoglobin acceptable, at goal for ESRD  Likely some degree of blood loss from AV fistula but according to [facility] staff no excessive blood loss.     Other: Chronic CHF with preserved ejection fraction.  No evidence of decompensation.  Diabetes mellitus type 2 management per primary team.  Hyperlipidemia    SUMMARY OF RECOMMENDATIONS:  Fistulogram today  Hemodialysis tomorrow    HISTORY OF PRESENT ILLNESS:  Requesting Physician: Casper Benavides MD  Reason for Consult: End-stage renal disease/hemodialysis    Lela Corado is a 69 y.o. female with a past medical history of end-stage renal disease on hemodialysis, hypertension, chronic CHF, diabetes mellitus type 2, hyperlipidemia who was admitted to [facility] after presenting with prolonged bleeding from AV fistula site after dialysis.  Staff was unable to achieve homeostasis after needles were removed.  Patient has had a past history of fistula complications requiring balloon angioplasty.  She was actually scheduled for IR fistulogram on the day following admission as an outpatient.  She was brought to the emergency room.  Clamp was still in place and when vascular clamp was removed from her arm there was an arterial spurt.  Vascular surgery was able to obtain control of the bleeding.  Hemoglobin essentially unchanged.  A renal consultation is requested today for assistance in the management of end-stage renal disease/hemodialysis.  Interview conducted via  #[id number]  PAST MEDICAL HISTORY:  Past Medical History:   Diagnosis Date    CHF (congestive heart failure) (HCC)     CKD (chronic kidney disease) stage 5, GFR less than 15 ml/min (HCC)     Diabetes mellitus (HCC)     Diabetic nephropathy (HCC)     Hemodialysis patient ([facility])     via permacath right chest / [facility] Tues/Thurs and Sat    Hyperlipidemia     Hypertension      Muscle weakness     Orthodontics     sleeps with retainer at night    Risk for falls     Uses walker     per dtr active in the house able to cook/light cleaning as able       PAST SURGICAL HISTORY:  Past Surgical History:   Procedure Laterality Date    CATARACT EXTRACTION Bilateral     EGD      IR AV FISTULAGRAM/GRAFTOGRAM  5/9/2022    IR AV FISTULAGRAM/GRAFTOGRAM  3/31/2023    IR AV FISTULAGRAM/GRAFTOGRAM  8/9/2023    IR AV FISTULAGRAM/GRAFTOGRAM  3/13/2024    IR BIOPSY BONE MARROW  9/15/2021    IR BIOPSY KIDNEY RANDOM  9/15/2021    IR NON-TUNNELED CENTRAL LINE PLACEMENT  12/7/2021    IR TUNNELED CENTRAL LINE CHECK/CHANGE/REPOSITION  4/1/2022    IR TUNNELED DIALYSIS CATHETER PLACEMENT  12/9/2021    IR TUNNELED DIALYSIS CATHETER REMOVAL  10/21/2022    NV ARTERIOVENOUS ANASTOMOSIS OPEN DIRECT Left 2/16/2022    Procedure: CREATION FISTULA ARTERIOVENOUS (AV);  Surgeon: Lis Phillips DO;  Location: [facility];  Service: Vascular    NV REVJ OPN ARVEN FSTL W/O THRMBC DIAL GRF Left 9/2/2022    Procedure: REVISION AV FISTULA (superficalize/transposition);  Surgeon: Lis Phillips DO;  Location: [facility];  Service: Vascular       ALLERGIES:  Allergies   Allergen Reactions    Amlodipine Edema and Eye Swelling    Lisinopril Angioedema     Bilateral periorbital edema that was significant       SOCIAL HISTORY:  Social History     Substance and Sexual Activity   Alcohol Use Not Currently     Social History     Substance and Sexual Activity   Drug Use Not Currently     Social History     Tobacco Use   Smoking Status Never    Passive exposure: Never   Smokeless Tobacco Never   Tobacco Comments    NO TOBACCO USE       FAMILY HISTORY:  Family History   Problem Relation Age of Onset    Hypertension Mother     Diabetes Father     Hypertension Sister     Diabetes Sister     Hypertension Brother        MEDICATIONS:    Current Facility-Administered Medications:     carvedilol (COREG) tablet 25 mg, 25 mg, Oral, BID With  Meals, MARLENA Paige    chlorhexidine (PERIDEX) 0.12 % oral rinse 15 mL, 15 mL, Mouth/Throat, Q12H DEBBIE, SHIV PaigeNP, 15 mL at 07/09/24 2243    cloNIDine (CATAPRES) tablet 0.3 mg, 0.3 mg, Oral, TID, SHIV PaigeNP, 0.3 mg at 07/09/24 2243    doxazosin (CARDURA) tablet 4 mg, 4 mg, Oral, BID, MARLENA Paige, 4 mg at 07/09/24 2243    gabapentin (NEURONTIN) capsule 300 mg, 300 mg, Oral, HS, MARLENA Paige, 300 mg at 07/09/24 2243    hydrALAZINE (APRESOLINE) tablet 100 mg, 100 mg, Oral, Q8H DEBBIE, SHIV PaigeNP, 100 mg at 07/10/24 0522    insulin lispro (HumALOG/ADMELOG) 100 units/mL subcutaneous injection 1-5 Units, 1-5 Units, Subcutaneous, Q6H DEBBIE, 1 Units at 07/10/24 0042 **AND** Fingerstick Glucose (POCT), , , Q6H, MARLENA Paige    niCARdipine (CARDENE) 25 mg (STANDARD CONCENTRATION) in sodium chloride 0.9% 250 mL, 1-15 mg/hr, Intravenous, Titrated, MARLENA Paige, Stopped at 07/10/24 0131    torsemide (DEMADEX) tablet 100 mg, 100 mg, Oral, Daily, MARLENA Paige    REVIEW OF SYSTEMS:  Constitutional: No unusual fatigue  HENT: Negative for postnasal drip  Eyes: Negative for visual disturbance.   Respiratory: Negative for cough, shortness of breath   Cardiovascular: Negative for chest pain and leg swelling.   Gastrointestinal: Negative for abdominal pain, constipation, diarrhea, nausea and vomiting.   Genitourinary: No dysuria, hematuria  Musculoskeletal: Negative for unusual arthralgias, back pain and joint swelling.   Skin: Negative for rash.   Neurological: Negative for focal weakness  Hematological: Positive for prolonged bleeding from AV fistula site.  Difficult to obtain homeostasis at dialysis unit  Psychiatric/Behavioral: Negative for confusion   All the systems were reviewed and were negative except as documented on the HPI.    PHYSICAL EXAM:  Current Weight: Weight - Scale: 67.9 kg (149 lb 11.1  oz)  First Weight: Weight - Scale: 67.9 kg (149 lb 11.1 oz)  Vitals:    07/10/24 0610 07/10/24 0620 07/10/24 0640 07/10/24 0700   BP: 121/54 118/55 123/57    Pulse: (!) 54 (!) 54 56    Resp: 14 14 16    Temp:    (!) 97.4 °F (36.3 °C)   TempSrc:    Oral   SpO2: 94% 95% 93%    Weight:       Height:         No intake or output data in the 24 hours ending 07/10/24 0757  Physical Exam  Constitutional:       General: She is not in acute distress.     Appearance: Normal appearance. She is well-developed. She is not ill-appearing, toxic-appearing or diaphoretic.   HENT:      Head: Normocephalic and atraumatic.      Nose: Nose normal. No congestion.      Mouth/Throat:      Mouth: Mucous membranes are moist.      Pharynx: No oropharyngeal exudate.   Eyes:      General: No scleral icterus.        Right eye: No discharge.         Left eye: No discharge.      Extraocular Movements: Extraocular movements intact.      Conjunctiva/sclera: Conjunctivae normal.   Neck:      Thyroid: No thyromegaly.      Vascular: No carotid bruit or JVD.      Trachea: No tracheal deviation.   Cardiovascular:      Rate and Rhythm: Regular rhythm. Bradycardia present.      Heart sounds: Murmur heard.      No friction rub. No gallop.      Arteriovenous access: Left arteriovenous access is present.     Comments: Good bruit noted.  Arm is wrapped with a compression bandage unable to assess for thrill.  Pulmonary:      Effort: Pulmonary effort is normal.      Breath sounds: Normal breath sounds.   Abdominal:      General: Bowel sounds are normal. There is no distension.      Palpations: Abdomen is soft. There is no mass.      Tenderness: There is no abdominal tenderness. There is no guarding or rebound.   Musculoskeletal:         General: No swelling, tenderness or signs of injury. Normal range of motion.      Cervical back: Normal range of motion and neck supple. No rigidity or tenderness.      Right lower leg: No edema.      Left lower leg: No edema.    Skin:     General: Skin is warm and dry.      Coloration: Skin is not jaundiced or pale.      Findings: No erythema or rash.   Neurological:      Mental Status: She is alert and oriented to person, place, and time.   Psychiatric:         Behavior: Behavior normal.         Thought Content: Thought content normal.         Judgment: Judgment normal.           Invasive Devices:      Lab Results:   Results from last 7 days   Lab Units 07/10/24  0445 07/09/24  2302 07/09/24  2237 07/09/24  1729   WBC Thousand/uL 6.39  --   --  6.70   HEMOGLOBIN g/dL 10.8*  --  10.6* 12.2   HEMATOCRIT % 32.3*  --  32.4* 37.4   PLATELETS Thousands/uL 219  --   --  246   POTASSIUM mmol/L 4.3 4.0  --  3.2*   CHLORIDE mmol/L 93* 90*  --  90*   CO2 mmol/L 31 29  --  33*   BUN mg/dL 32* 29*  --  24   CREATININE mg/dL 4.78* 4.37*  --  3.52*   CALCIUM mg/dL 8.3* 8.6  --  9.0   MAGNESIUM mg/dL 2.5 2.3  --   --    PHOSPHORUS mg/dL 4.0  --   --   --      Other Studies:

## 2024-07-10 NOTE — ASSESSMENT & PLAN NOTE
Lab Results   Component Value Date    EGFR 12 07/09/2024    EGFR 9 (L) 03/29/2024    EGFR 7 01/24/2024    CREATININE 3.52 (H) 07/09/2024    CREATININE 5.14 (H) 03/29/2024    CREATININE 5.27 (H) 01/24/2024     HD M-W-F.  Last HD session 7/9/24, completed full treatment at approx 3 pm  Patient has records from [facility] transplant program and [place] transplant program from 8/2023, unclear at this time if she is following up.  Follows with [facility] Nephrology as outpatient.   Patient states that sometimes after a HD treatment her BP is high.  She states that her BP prior to and during treatment were not high.  BMP in ER with K 3.2.  This was drawn shortly after HD treatment.    Plan:  Labs in ER are shortly after completion of HD treatment, repeat BMP, H/H, Mag at 2100  Consult Nephrology, appreciate recommendations  HD per nephrology  No immediate need for HD this evening  Hold off replacing K at this time, given BMP drawn shortly after HD.  Recheck BMP 2100.

## 2024-07-10 NOTE — ASSESSMENT & PLAN NOTE
On arrival to ER with acute elevation in /107.  Patient reports compliance to her medication regimen and denies changes.  She states that she has not had her 2pm BP medications as she was at her HD treatment.  Reportedly her BP was stable prior to and during her treatment.  She states that sometimes following her HD treatment her BP is high, but she takes her afternoon medications after her treatment is completed.    In the ER she was given hydralazine x2 doses with little improvement.  Vascular surgery was consulted 2/2 bleeding fistula and recommended nicardipine.  IR was consulted and also recommending goal SBP < 140 mmHg.  Nicardipine was started and BP at goal.    Plan:  Restart home medications  Titrate nicardipine for goal BP < 140 mmHg  Close hemodynamic monitoring.   Vitals per unit protocol

## 2024-07-10 NOTE — ASSESSMENT & PLAN NOTE
Wt Readings from Last 3 Encounters:   03/13/24 68 kg (150 lb)   10/08/23 73.9 kg (163 lb)   08/09/23 64 kg (141 lb)     Currently examines euvolemic  Last echo 11/2023 at [facility]: EF 60-64%, G2DD, Moderate AS, est PAP 54.3 mmHg  Home regimen: torsemide, coreg    Plan:  Hypertension control as above  Continue home torsemide, coreg  Daily weights -- stand scale preferred.   Strict I/Os

## 2024-07-10 NOTE — ASSESSMENT & PLAN NOTE
On arrival to ER with acute elevation in /107.  Patient reports compliance to her medication regimen and denies changes.  She states that she has not had her 2pm BP medications as she was at her HD treatment.  Reportedly her BP was stable prior to and during her treatment.  She states that sometimes following her HD treatment her BP is high, but she takes her afternoon medications after her treatment is completed.    In the ER she was given hydralazine x2 doses with little improvement.  Vascular surgery was consulted 2/2 bleeding fistula and recommended nicardipine.  IR was consulted and also recommending goal SBP < 140 mmHg.  Nicardipine was started and BP at goal.    Plan:  Restart home medications  Titrate nicardipine for goal BP < 140 mmHg.  Attempt to wean off.  Close hemodynamic monitoring.   Vitals per unit protocol

## 2024-07-10 NOTE — ASSESSMENT & PLAN NOTE
2/2 complication of AV fistula  Hgb in ER 12.2, INR 1.06  Type and screen in ER  Denies AC/AP    Plan:  Recheck hgb at 2100  Monitor h/h q6h  No immediate need for transfusion  Transfuse for Hgb < 7, or hemodynamic instability  Maintain pressure dressing  IR consulted for AV fistulogram, appreciate recommendations  Vascular surgery consulted, appreciate recommendations  BP management as above

## 2024-07-10 NOTE — ASSESSMENT & PLAN NOTE
Lab Results   Component Value Date    EGFR 9 07/09/2024    EGFR 12 07/09/2024    EGFR 9 (L) 03/29/2024    CREATININE 4.37 (H) 07/09/2024    CREATININE 3.52 (H) 07/09/2024    CREATININE 5.14 (H) 03/29/2024     HD M-W-F.  Last HD session 7/9/24, completed full treatment at approx 3 pm  Patient has records from [facility] transplant program and [place] transplant program from 8/2023, unclear at this time if she is following up.  Follows with [facility] Nephrology as outpatient.   Patient states that sometimes after a HD treatment her BP is high.  She states that her BP prior to and during treatment were not high.  BMP in ER with K 3.2.  This was drawn shortly after HD treatment.    Plan:  Labs in ER are shortly after completion of HD treatment, repeat BMP, H/H, Mag at 2100  Repeat labs at 9 pm K 4, mag 2.3  Consult Nephrology, appreciate recommendations  HD per nephrology  No immediate need for HD this evening

## 2024-07-10 NOTE — ASSESSMENT & PLAN NOTE
"Patient presented to ER s/p HD treatment that ended at approx 3 pm.  Patient was noted to have bleeding at AV fistula site following HD treatment and clamp was applied x 90 minutes without resolution of bleeding.  Patient arrived to ER with clamp in place.  When clamp removed, was noted to have \"arterial spray\".  /107.   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  At that time, IR was consulted and she was noted to have stenosis of cephalic vein outflow and terminal cephalic arch.  Both vessels were treated with angioplasty.  In ER Hgb 12, INR 1.06  Denies AC/AP  Vascular surgery was consulted by ER and bleeding stopped with surgical glue.  No sutures required.    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  At that time, IR was consulted and she was noted to have stenosis of cephalic vein outflow and terminal cephalic arch.  Both vessels were treated with angioplasty.    Plan:    Vascular surgery is following, appreciate recommendations  Consulted IR for AV fistulogram- scheduled for today.    NPO for procedure today- likely will be able to resume diet after  BP management as below  Neurovascular checks q4h  " This visit is being performed virtually via Video visit using [employer].   Clinical Location: [facility]  [facility]'s location Home and is physically present in   the [facility] at the time of this visit.       Follow up of metabolic syndrome, class 1 obesity.   Known history of gestational diabetes  as obesity related medical conditions.   Has been well with wonderful weight loss.   Her BMI is now at 22.       Other  than dry mouth , well tolerated med.    No change in her diet.   8 mg phentermine already worked well to curb down appetite.   2 regular meals a day with one snack.   Breakfast is mostly protein with fruits.   Lunch is protein shake.   Dinner is balanced with everything in small amount.   at least 4 ~5 cups of veggies, fruits.    2 days a week, working with a . Enjoying it.   Pilates once a week.      1 mo ago 1 yr ago 2 yr ago 3 yr ago 5 yr ago 6 yr ago 7 yr ago     TSH  0.350 - 5.000 mcUnits/mL 2.636 2.545 CM 2.541 2.288 2.234 1.834 1.391      Glucose  70 - 99 mg/dL 87     89   80              Ref Range & Units 1 mo ago 1 yr ago 2 yr ago 3 yr ago 5 yr ago 6 yr ago 7 yr ago   Cholesterol  <=199 mg/dL 212 High  218 High   High   High  167 R,  High  R,  R, CM   Comment:  Desirable         <200  Borderline High   200 to 239  High              >=240   Triglycerides  <=149 mg/dL 107 143  CM 87 31 R, CM 43 R, CM 42 R, CM   Comment:  Normal            <150  Borderline High   150 to 199  High              200 to 499  Very High         >=500   HDL  >=50 mg/dL 70 72 CM 71 CM 78 75 R, CM 77 R, CM 72 R, CM   Comment:  Low              <40  Borderline Low   40 to 49  Near Optimal     50 to 59  Optimal          >=60   LDL  <=129 mg/dL 121 117   High  CM 86 R,  R, CM 74 R, CM   Comment:  Optimal           <100  Near Optimal      100 to 129  Borderline High   130 to 159  High              160 to 189  Very High         >=190    Non-HDL Cholesterol  mg/dL 142 146   CM 92  CM 82 CM   Comment:  Therapeutic Target:  CHD and risk equivalents  <130  Multiple risk factors     <160  0 to 1 risk factor        <190   Cholesterol/ HDL Ratio  <=4.4 3.0 3.0 3.1 3.0 2.2 R 2.7 R 2.1        ALLERGIES:  No Known Allergies   Current Outpatient Medications   Medication Sig Dispense Refill    Phentermine HCl 8 MG Tab Take 8 mg by mouth daily. 30 tablet 2    metFORMIN (GLUCOPHAGE-XR) 500 MG 24 hr tablet Take 1 tablet by mouth daily. 90 tablet 1    DISPENSE Ashwaghandavit d3: dose unknown.Berberine supplement 2-3 x a day.      DISPENSE progest cream once nightly (1tsp) at bedtime.      Magnesium 500 MG Cap Take 200 mg by mouth. Chelated magnesium 500 mg at night time       Multiple Vitamins-Minerals (MULTIVITAL PO)       Omega-3 Fatty Acids (OMEGA 3 PO)        No current facility-administered medications for this visit.        I have personally reviewed and updated the following EPIC sections: Past Medical History     REVIEW OF SYSTEMS:  GENERAL:    Denies weakness, fatigue, fever, malaise.  HEENT:  Denies headache, lightheadedness, vertigo, double vision, blurred vision  NECK:  Denies lump, swollen glands, pain, stiffness.  RESPIRATORY:  Denies cough,  dyspnea, wheezing.  CARDIOVASCULAR:  Denies chest pain, palpitation,  edema, leg pain on walking.  GI: Denies heartburn, nausea, vomiting, change in bowel habits, abdominal pain, melena.  PHYSICAL EXAM:  GENERAL:  In no acute distress,  Looking her  age, in good  hygiene.  VITALS:  Visit Vitals  [facility] 10/04/2023 (Approximate) Comment: TL in past        PLAN:    Follow up of metabolic syndrome, class 1 obesity.   Known history of gestational diabetes  as obesity related medical conditions.   Has been well.   Great progress with current BMI 22 . Wonderful.    no more weight loss ! Needs to focus on maintenance of this weight.   Regular meals with good macro balance. Good choices of food.    Regular exercise mixed with cardio and resistance.   I only advised her to make sure she does proper amount of low fat dairy for both protein and calcium intake and to make sure she does whole grain .      Simply beautiful and encouraged her to continue.   Will continue med  . Will see her back in 6 months.   If she maintains her weight ( she can gain some too) , will taper down med further.

## 2024-07-10 NOTE — ASSESSMENT & PLAN NOTE
2/2 complication of AV fistula  Hgb in ER 12.2, INR 1.06  Type and screen in ER  Denies AC/AP    Plan:  Recheck hgb at 9 p.m. 10.6  Monitor h/h q6h  No immediate need for transfusion  Transfuse for Hgb < 7, or hemodynamic instability  Maintain pressure dressing as above  IR consulted for AV fistulogram, appreciate recommendations  Vascular surgery consulted, appreciate recommendations  BP management as above

## 2024-07-11 ENCOUNTER — APPOINTMENT (INPATIENT)
Dept: DIALYSIS | Facility: HOSPITAL | Age: 69
DRG: 252 | End: 2024-07-11
Payer: COMMERCIAL

## 2024-07-11 VITALS
SYSTOLIC BLOOD PRESSURE: 163 MMHG | HEIGHT: 61 IN | HEART RATE: 59 BPM | DIASTOLIC BLOOD PRESSURE: 66 MMHG | TEMPERATURE: 98.1 F | WEIGHT: 149.69 LBS | BODY MASS INDEX: 28.26 KG/M2 | RESPIRATION RATE: 16 BRPM | OXYGEN SATURATION: 93 %

## 2024-07-11 PROBLEM — I16.0 HYPERTENSIVE URGENCY: Status: RESOLVED | Noted: 2023-10-06 | Resolved: 2024-07-11

## 2024-07-11 LAB
ANION GAP SERPL CALCULATED.3IONS-SCNC: 10 MMOL/L (ref 4–13)
BUN SERPL-MCNC: 48 MG/DL (ref 5–25)
CALCIUM SERPL-MCNC: 8.3 MG/DL (ref 8.4–10.2)
CHLORIDE SERPL-SCNC: 90 MMOL/L (ref 96–108)
CO2 SERPL-SCNC: 30 MMOL/L (ref 21–32)
CREAT SERPL-MCNC: 6.3 MG/DL (ref 0.6–1.3)
ERYTHROCYTE [DISTWIDTH] IN BLOOD BY AUTOMATED COUNT: 15.4 % (ref 11.6–15.1)
GFR SERPL CREATININE-BSD FRML MDRD: 6 ML/MIN/1.73SQ M
GLUCOSE SERPL-MCNC: 175 MG/DL (ref 65–140)
GLUCOSE SERPL-MCNC: 190 MG/DL (ref 65–140)
GLUCOSE SERPL-MCNC: 198 MG/DL (ref 65–140)
GLUCOSE SERPL-MCNC: 198 MG/DL (ref 65–140)
GLUCOSE SERPL-MCNC: 212 MG/DL (ref 65–140)
HCT VFR BLD AUTO: 33 % (ref 34.8–46.1)
HGB BLD-MCNC: 10.6 G/DL (ref 11.5–15.4)
MCH RBC QN AUTO: 28.5 PG (ref 26.8–34.3)
MCHC RBC AUTO-ENTMCNC: 32.1 G/DL (ref 31.4–37.4)
MCV RBC AUTO: 89 FL (ref 82–98)
PLATELET # BLD AUTO: 228 THOUSANDS/UL (ref 149–390)
PMV BLD AUTO: 10.6 FL (ref 8.9–12.7)
POTASSIUM SERPL-SCNC: 4.7 MMOL/L (ref 3.5–5.3)
RBC # BLD AUTO: 3.72 MILLION/UL (ref 3.81–5.12)
SODIUM SERPL-SCNC: 130 MMOL/L (ref 135–147)
WBC # BLD AUTO: 6.06 THOUSAND/UL (ref 4.31–10.16)

## 2024-07-11 PROCEDURE — 85027 COMPLETE CBC AUTOMATED: CPT | Performed by: NURSE PRACTITIONER

## 2024-07-11 PROCEDURE — 80048 BASIC METABOLIC PNL TOTAL CA: CPT | Performed by: NURSE PRACTITIONER

## 2024-07-11 PROCEDURE — 99232 SBSQ HOSP IP/OBS MODERATE 35: CPT | Performed by: NURSE PRACTITIONER

## 2024-07-11 PROCEDURE — 90935 HEMODIALYSIS ONE EVALUATION: CPT | Performed by: INTERNAL MEDICINE

## 2024-07-11 PROCEDURE — 82948 REAGENT STRIP/BLOOD GLUCOSE: CPT

## 2024-07-11 PROCEDURE — 99239 HOSP IP/OBS DSCHRG MGMT >30: CPT | Performed by: PHYSICIAN ASSISTANT

## 2024-07-11 RX ADMIN — INSULIN LISPRO 1 UNITS: 100 INJECTION, SOLUTION INTRAVENOUS; SUBCUTANEOUS at 07:57

## 2024-07-11 RX ADMIN — CLONIDINE HYDROCHLORIDE 0.3 MG: 0.2 TABLET ORAL at 15:33

## 2024-07-11 RX ADMIN — TORSEMIDE 100 MG: 20 TABLET ORAL at 11:47

## 2024-07-11 RX ADMIN — INSULIN LISPRO 1 UNITS: 100 INJECTION, SOLUTION INTRAVENOUS; SUBCUTANEOUS at 11:46

## 2024-07-11 RX ADMIN — DOXAZOSIN 4 MG: 4 TABLET ORAL at 11:47

## 2024-07-11 RX ADMIN — CHLORHEXIDINE GLUCONATE 15 ML: 1.2 RINSE ORAL at 08:00

## 2024-07-11 RX ADMIN — CARVEDILOL 25 MG: 25 TABLET, FILM COATED ORAL at 11:47

## 2024-07-11 RX ADMIN — CLONIDINE HYDROCHLORIDE 0.3 MG: 0.2 TABLET ORAL at 11:47

## 2024-07-11 RX ADMIN — HYDRALAZINE HYDROCHLORIDE 100 MG: 50 TABLET ORAL at 05:00

## 2024-07-11 RX ADMIN — CALCITRIOL 0.75 MCG: 0.25 CAPSULE, LIQUID FILLED ORAL at 11:47

## 2024-07-11 RX ADMIN — HYDRALAZINE HYDROCHLORIDE 100 MG: 50 TABLET ORAL at 15:33

## 2024-07-11 NOTE — ASSESSMENT & PLAN NOTE
Lab Results   Component Value Date    HGB 10.6 (L) 07/11/2024    HGB 10.7 (L) 07/10/2024    HGB 10.8 (L) 07/10/2024    HGB 10.6 (L) 07/09/2024    HGB 12.2 07/09/2024    HGB 7.9 (L) 10/08/2023     Hemoglobin overall stable, check lab work during next hemodialysis session with nephrology outpatient  Return to the hospital any evidence of AV fistula bleeding  Outpatient on micera 50 mcg every 2 weeks

## 2024-07-11 NOTE — ASSESSMENT & PLAN NOTE
Admitted to the ICU initially on Cardene drip for hypertensive urgency  Blood pressure is now stabilized on home medications  Continue carvedilol, clonidine, doxazosin, hydralazine and torsemide

## 2024-07-11 NOTE — PLAN OF CARE
Problem: PAIN - ADULT  Goal: Verbalizes/displays adequate comfort level or baseline comfort level  Description: Interventions:  - Encourage patient to monitor pain and request assistance  - Assess pain using appropriate pain scale  - Administer analgesics based on type and severity of pain and evaluate response  - Implement non-pharmacological measures as appropriate and evaluate response  - Consider cultural and social influences on pain and pain management  - Notify physician/advanced practitioner if interventions unsuccessful or patient reports new pain  Outcome: Adequate for Discharge     Problem: SAFETY ADULT  Goal: Patient will remain free of falls  Description: INTERVENTIONS:  - Educate patient/family on patient safety including physical limitations  - Instruct patient to call for assistance with activity   - Consult OT/PT to assist with strengthening/mobility   - Keep Call bell within reach  - Keep bed low and locked with side rails adjusted as appropriate  - Keep care items and personal belongings within reach  - Initiate and maintain comfort rounds  - Make Fall Risk Sign visible to staff  - Apply yellow socks and bracelet for high fall risk patients  - Consider moving patient to room near nurses station  Outcome: Adequate for Discharge  Goal: Maintain or return to baseline ADL function  Description: INTERVENTIONS:  -  Assess patient's ability to carry out ADLs; assess patient's baseline for ADL function and identify physical deficits which impact ability to perform ADLs (bathing, care of mouth/teeth, toileting, grooming, dressing, etc.)  - Assess/evaluate cause of self-care deficits   - Assess range of motion  - Assess patient's mobility; develop plan if impaired  - Assess patient's need for assistive devices and provide as appropriate  - Encourage maximum independence but intervene and supervise when necessary  - Involve family in performance of ADLs  - Assess for home care needs following discharge   -  Consider OT consult to assist with ADL evaluation and planning for discharge  - Provide patient education as appropriate  Outcome: Adequate for Discharge  Goal: Maintains/Returns to pre admission functional level  Description: INTERVENTIONS:  - Perform AM-PAC 6 Click Basic Mobility/ Daily Activity assessment daily.  - Set and communicate daily mobility goal to care team and patient/family/caregiver.   -  - Out of bed for toileting  - Record patient progress and toleration of activity level   Outcome: Adequate for Discharge     Problem: DISCHARGE PLANNING  Goal: Discharge to home or other facility with appropriate resources  Description: INTERVENTIONS:  - Identify barriers to discharge w/patient and caregiver  - Arrange for needed discharge resources and transportation as appropriate  - Identify discharge learning needs (meds, wound care, etc.)  - Arrange for interpretive services to assist at discharge as needed  - Refer to Case Management Department for coordinating discharge planning if the patient needs post-hospital services based on physician/advanced practitioner order or complex needs related to functional status, cognitive ability, or social support system  Outcome: Adequate for Discharge     Problem: Knowledge Deficit  Goal: Patient/family/caregiver demonstrates understanding of disease process, treatment plan, medications, and discharge instructions  Description: Complete learning assessment and assess knowledge base.  Interventions:  - Provide teaching at level of understanding  - Provide teaching via preferred learning methods  Outcome: Adequate for Discharge     Problem: METABOLIC, FLUID AND ELECTROLYTES - ADULT  Goal: Electrolytes maintained within normal limits  Description: INTERVENTIONS:  - Monitor labs and assess patient for signs and symptoms of electrolyte imbalances  - Administer electrolyte replacement as ordered  - Monitor response to electrolyte replacements, including repeat lab results as  appropriate  - Instruct patient on fluid and nutrition as appropriate  Outcome: Adequate for Discharge  Goal: Fluid balance maintained  Description: INTERVENTIONS:  - Monitor labs   - Monitor I/O and WT  - Instruct patient on fluid and nutrition as appropriate  - Assess for signs & symptoms of volume excess or deficit  Outcome: Adequate for Discharge     Problem: SKIN/TISSUE INTEGRITY - ADULT  Goal: Skin Integrity remains intact(Skin Breakdown Prevention)  Description: Assess:    -Assess extremities for adequate circulation and sensation     Bed Management:  -Have minimal linens on bed & keep smooth, unwrinkled  -Change linens as needed when moist or perspiring      Toileting:  -Offer bedside commode      Skin Care:  -Avoid use of baby powder, tape, friction and shearing, hot water or constrictive clothing    Outcome: Adequate for Discharge     Problem: HEMATOLOGIC - ADULT  Goal: Maintains hematologic stability  Description: INTERVENTIONS  - Assess for signs and symptoms of bleeding or hemorrhage  - Monitor labs  - Administer supportive blood products/factors as ordered and appropriate  Outcome: Adequate for Discharge

## 2024-07-11 NOTE — HEMODIALYSIS
Net UF goal 2L over 3.5 hours as frederic via left AVF. Next HD treatment Saturday, 7/13. See flowsheets for further assessment details.    Post-Dialysis RN Treatment Note    Blood Pressure: see flowsheets   EDW  66 kg    Weight:  Pre 68 kg   Post 66 kg   Mode of weight measurement: Standing Scale   Volume Removed  2000 ml    Treatment duration 210 minutes    NS given  No    Treatment shortened? No   Medications given during Rx None Reported   Estimated Kt/V  N/A   Access type: AV fistula   Access Issues: No; unidentifiable skin lesion on access (Scab? Eschar? Ulcer?; Vasc team aware and f/u outpt) avoided while accessing   Report called to primary nurse   Yes

## 2024-07-11 NOTE — ASSESSMENT & PLAN NOTE
70 yo female w/ a hx of ESRD on HD qTThSa, HFpEF, DM II, HTN and HLD presented to [facility] on 7/9/24 w/ bleeding from LUE fistula and hypertensive urgency. Bleeding was controlled with surgical glue and pressure dressing and IR was consulted for fistulagram. Pt initially required a cardene drip for HTN in 200/100s and was admitted to ICU. HTN resolved and cardene drip weaned off ~01:30 on 7/10. Nephrology consulted for dialysis management.    Vascular surgery consulted for bleeding AVF. Pt has a L brachiocephalic AV fistula created on 2/16/22 w/ a subsequent superficialization on 9/2/22. Since fistula creation, pt has underwent several fistulagrams in IR; most recently on 3/13/24 w/ POBA to central swing segment and cephalic arch stenoses. Pt is S/P fistulagram in IR on 7/10 w/ DCB to L cephalic vein outflow and terminal cephalic arch; arterial anastomosis and central veins patent.      Plan:  -AVF bleeding; resolved w/ surgical glue and pressure dressing  -S/P fistulagram in IR w/ DCB to L cephalic vein outflow and terminal cephalic arch on 7/10  -AVF w/ (+) bruit/thrill; no arm pain, numbness or weakness   -Okay to use AVF for dialysis; avoid glued area  -Nephrology following for dialysis management; input appreciated  -Continue BP control per primary team  -Continue medical management per primary team  -Pt stable for discharge from a vascular surgery standpoint  -Plan follow up w/ pt in the vascular surgery office in 2 weeks to assess need for AVF revision; appt scheduled for 7/23  -Please contact vascular surgery for any further questions or concerns  -Will discuss w/ Dr. Moran

## 2024-07-11 NOTE — ASSESSMENT & PLAN NOTE
Lab Results   Component Value Date    HGBA1C 8.6 (H) 07/09/2024       Recent Labs     07/10/24  1643 07/10/24  2359 07/11/24  0611 07/11/24  0748   POCGLU 174* 190* 212* 198*       Blood Sugar Average: Last 72 hrs:  (P) [id number]  Occasion regimen for diabetes management follow-up with PCP outpatient

## 2024-07-11 NOTE — ASSESSMENT & PLAN NOTE
70 yo female w/ a hx of ESRD on HD qTThSa, HFpEF, DM II, HTN and HLD presented to [facility] on 7/9/24 w/ bleeding from LUE fistula and hypertensive urgency. Bleeding was controlled with surgical glue and pressure dressing and IR was consulted for fistulagram. Pt initially required a cardene drip for HTN in 200/100s and was admitted to ICU. HTN resolved and cardene drip weaned off ~01:30 on 7/10. Nephrology consulted for dialysis management.    Vascular surgery consulted for bleeding AVF. Pt has a L brachiocephalic AV fistula created on 2/16/22 w/ a subsequent superficialization on 9/2/22. Since fistula creation, pt has underwent several fistulagrams in IR; most recently on 3/13/24 w/ POBA to central swing segment and cephalic arch stenoses. Pt is S/P fistulagram in IR on 7/10 w/ DCB to L cephalic vein outflow and terminal cephalic arch; arterial anastomosis and central veins patent.      Plan:  -AVF bleeding; resolved w/ surgical glue and pressure dressing  -S/P fistulagram in IR w/ DCB to L cephalic vein outflow and terminal cephalic arch on 7/10  -AVF w/ (+) bruit/thrill; no arm pain, numbness or weakness   -Okay to use AVF for dialysis; avoid glued area  -Nephrology following for dialysis management; input appreciated  -Continue BP control per primary team  -Continue medical management per primary team  -Pt stable for discharge from a vascular surgery standpoint  -Plan follow up w/ pt in the vascular surgery office in 2 weeks to assess need for AVF revision  -Please contact vascular surgery for any further questions or concerns  -Will discuss w/ Dr. Moran

## 2024-07-11 NOTE — NURSING NOTE
Patient daughter present for AVS/both verbalized understanding of instructions provided. Pt to follow up with pcp and vascular. Pt to resume her dialysis schedule.  Pt discharged to home with all belongings. Transported to daughters car via wheelchair.

## 2024-07-11 NOTE — PROGRESS NOTES
[facility]  Progress Note  Name: Lela Corado I  MRN: [id number]  Unit/Bed#: [place] [address] -01 I Date of Admission: 7/9/2024   Date of Service: 7/11/2024 I Hospital Day: 2    Assessment & Plan   * Complication of AV dialysis fistula  Assessment & Plan  68 yo female w/ a hx of ESRD on HD qTThSa, HFpEF, DM II, HTN and HLD presented to [facility] on 7/9/24 w/ bleeding from LUE fistula and hypertensive urgency. Bleeding was controlled with surgical glue and pressure dressing and IR was consulted for fistulagram. Pt initially required a cardene drip for HTN in 200/100s and was admitted to ICU. HTN resolved and cardene drip weaned off ~01:30 on 7/10. Nephrology consulted for dialysis management.    Vascular surgery consulted for bleeding AVF. Pt has a L brachiocephalic AV fistula created on 2/16/22 w/ a subsequent superficialization on 9/2/22. Since fistula creation, pt has underwent several fistulagrams in IR; most recently on 3/13/24 w/ POBA to central swing segment and cephalic arch stenoses. Pt is S/P fistulagram in IR on 7/10 w/ DCB to L cephalic vein outflow and terminal cephalic arch; arterial anastomosis and central veins patent.      Plan:  -AVF bleeding; resolved w/ surgical glue and pressure dressing  -S/P fistulagram in IR w/ DCB to L cephalic vein outflow and terminal cephalic arch on 7/10  -AVF w/ (+) bruit/thrill; no arm pain, numbness or weakness   -Okay to use AVF for dialysis; avoid glued area  -Nephrology following for dialysis management; input appreciated  -Continue BP control per primary team  -Continue medical management per primary team  -Pt stable for discharge from a vascular surgery standpoint  -Plan follow up w/ pt in the vascular surgery office in 2 weeks to assess need for AVF revision; appt scheduled for 7/23  -Please contact vascular surgery for any further questions or concerns  -Will discuss w/ Dr. Moran      Subjective:  Pt seen for exam while sitting  "upright in bed; NAD. Pt denies any L arm pain, numbness or weakness and denies any complaints at this time.    Vitals:  /50   Pulse 57   Temp 98 °F (36.7 °C) (Tympanic)   Resp 16   Ht 5' 1\" (1.549 m)   Wt 67.9 kg (149 lb 11.1 oz)   SpO2 97%   BMI 28.28 kg/m²     I/Os:  I/O last 3 completed shifts:  In: 610 [P.O.:560; I.V.:50]  Out: 0   I/O this shift:  In: 680 [P.O.:180; I.V.:500]  Out: 2500 [Other:2500]    Lab Results and Cultures:   Lab Results   Component Value Date    WBC 6.06 07/11/2024    HGB 10.6 (L) 07/11/2024    HCT 33.0 (L) 07/11/2024    MCV 89 07/11/2024     07/11/2024     Lab Results   Component Value Date    CALCIUM 8.3 (L) 07/11/2024     06/08/2018    K 4.7 07/11/2024    CO2 30 07/11/2024    CL 90 (L) 07/11/2024    BUN 48 (H) 07/11/2024    CREATININE 6.30 (H) 07/11/2024     Lab Results   Component Value Date    INR 1.06 07/09/2024    INR 1.1 07/24/2023    INR 1.11 06/10/2022    PROTIME 14.0 07/09/2024    PROTIME 14.0 06/10/2022    PROTIME 13.8 12/16/2021       Medications:  Current Facility-Administered Medications   Medication Dose Route Frequency    calcitriol (ROCALTROL) capsule 0.75 mcg  0.75 mcg Oral Once per day on Tuesday Thursday Saturday    carvedilol (COREG) tablet 25 mg  25 mg Oral BID With Meals    chlorhexidine (PERIDEX) 0.12 % oral rinse 15 mL  15 mL Mouth/Throat Q12H DEBBIE    cloNIDine (CATAPRES) tablet 0.3 mg  0.3 mg Oral TID    doxazosin (CARDURA) tablet 4 mg  4 mg Oral BID    gabapentin (NEURONTIN) capsule 300 mg  300 mg Oral HS    hydrALAZINE (APRESOLINE) tablet 100 mg  100 mg Oral Q8H DEBBIE    insulin lispro (HumALOG/ADMELOG) 100 units/mL subcutaneous injection 1-5 Units  1-5 Units Subcutaneous 4x Daily (AC & HS)    torsemide (DEMADEX) tablet 100 mg  100 mg Oral Daily         Physical Exam:    General appearance: alert and oriented, in no acute distress  Skin:  skin color, texture, turgor normal; L AVF w/ scabbed area and surgical glue intact  Neurologic: " Grossly normal  Head: Normocephalic, without obvious abnormality, atraumatic  Lungs: clear to auscultation bilaterally  Heart: regular rate and rhythm; (+) murmur  Abdomen:  soft, non-tender  Extremities: extremities normal, warm and well-perfused; no cyanosis, clubbing, or edema    Wound/Incision:  Scabbed area on LUE AVF open to air w/ surgical glue intact. No further bleeding.      LUE    Pulse exam:  LUE brachiocephalic AVF (+) bruit/thrill  Radial: Right: 2+ Left:: 2+      MARLENA Webb  7/11/2024

## 2024-07-11 NOTE — UTILIZATION REVIEW
SEE INITIAL REVIEW AT BOTTOM    Continued Stay Review    Date: 7/11  Day 3: Has surpassed a 2nd midnight with active treatments and services.               Current Patient Class: IP  Current Level of Care: MS    HPI:69 y.o. female initially admitted on 7/9 with bleeding from AV fistula site following hemodialysis and HTN urgency on Cardene drip and repair of fistula.  .      Assessment/Plan:   Pt had hemodialysis this AM via AV fistula w/o incident.  AVF w/ bruit, thrill, no arm pain.  Ok for use avoiding glued area. On exam BP WNL to soft.  Pt  has no c/o pain, numbness. Will have next dialysis on 7/13.  On room air.  Pulse exam:  LUE brachiocephalic AVF (+) bruit/thrill.  Radial: Right: 2+ Left:: 2+.          Vital Signs (last 3 days)       Date/Time Temp Pulse Resp BP MAP (mmHg) SpO2 Calculated FIO2 (%) - Nasal Cannula Nasal Cannula O2 Flow Rate (L/min) O2 Device Patient Position - Orthostatic VS Justino Coma Scale Score Pain    07/11/24 1030 -- 50 16 108/47 -- -- -- -- -- -- -- --    07/11/24 1000 -- 59 15 99/50 -- -- -- -- -- -- -- --    07/11/24 0900 -- 60 16 107/49 -- -- -- -- -- -- -- --    07/11/24 0830 -- 55 15 132/66 -- -- -- -- -- -- -- --    07/11/24 0805 -- 60 16 142/67 -- -- -- -- -- -- -- --    07/11/24 0800 98 °F (36.7 °C) 64 18 130/62 91 100 % -- -- None (Room air) Lying -- No Pain    07/11/24 04:56:31 -- 65 -- 143/61 88 96 % -- -- -- -- -- --    07/10/24 21:16:04 97.3 °F (36.3 °C) 64 22 153/58 90 94 % -- -- -- -- -- --    07/10/24 2000 -- -- -- -- -- -- -- -- -- -- -- No Pain    07/10/24 15:16:45 97.2 °F (36.2 °C) 55 17 125/58 80 95 % -- -- None (Room air) -- -- --    07/10/24 1422 -- 55 12 149/61 -- 100 % 36 4 L/min Nasal cannula -- -- --    07/10/24 1417 -- 54 12 159/74 -- 100 % 60 10 L/min Non-rebreather mask -- -- --    07/10/24 1412 -- 53 16 140/71 -- 100 % 60 10 L/min Non-rebreather mask -- -- --    07/10/24 1407 -- 52 12 142/60 -- 100 % 60 10 L/min Non-rebreather mask -- -- --    07/10/24  1402 -- 52 20 144/57 -- 100 % 60 10 L/min Non-rebreather mask -- -- --    07/10/24 1400 -- -- -- -- -- -- 60 10 L/min Non-rebreather mask -- -- --    07/10/24 1357 -- 52 20 156/73 -- 100 % 60 10 L/min Non-rebreather mask -- -- --    07/10/24 1352 -- 54 18 107/55 -- 100 % 60 10 L/min Non-rebreather mask -- -- --    07/10/24 1347 -- 57 12 129/62 -- 100 % 32 3 L/min Nasal cannula -- -- --    07/10/24 1342 -- 58 16 154/68 -- 100 % 32 3 L/min Nasal cannula -- -- --    07/10/24 1337 -- 59 -- 146/67 -- 99 % 32 3 L/min Nasal cannula -- -- --    07/10/24 1100 97.4 °F (36.3 °C) -- -- -- -- -- -- -- -- -- -- --    07/10/24 0845 -- -- -- 135/55 -- -- -- -- -- -- -- --    07/10/24 0800 -- -- -- -- -- -- -- -- -- -- 15 --    07/10/24 0700 97.4 °F (36.3 °C) -- -- -- -- -- -- -- -- -- -- --    07/10/24 0640 -- 56 16 123/57 77 93 % -- -- -- -- -- --    07/10/24 0620 -- 54 14 118/55 76 95 % -- -- -- -- -- --    07/10/24 0610 -- 54 14 121/54 80 94 % -- -- -- -- -- --    07/10/24 0600 -- 54 15 134/59 85 96 % -- -- -- -- -- --    07/10/24 0535 -- 56 14 126/58 86 91 % -- -- -- -- -- --    07/10/24 0532 -- -- -- -- -- -- -- -- None (Room air) -- -- --    07/10/24 0530 -- -- -- -- -- -- -- -- None (Room air) -- -- --    07/10/24 0520 -- 56 9 138/64 89 100 % -- -- -- -- -- --    07/10/24 0510 -- 56 14 146/59 89 99 % -- -- -- -- -- --    07/10/24 0450 -- 58 15 141/57 75 99 % -- -- -- -- -- --    07/10/24 0445 -- 56 21 133/58 88 98 % -- -- -- -- -- --    07/10/24 0430 -- 54 25 137/64 100 99 % -- -- -- -- -- --    07/10/24 0415 -- 54 30 131/56 71 99 % -- -- -- -- -- --    07/10/24 0400 -- 54 29 132/57 89 99 % -- -- -- -- -- --    07/10/24 0340 -- 54 29 134/55 77 99 % -- -- -- -- -- --    07/10/24 0320 -- 54 29 131/55 76 99 % -- -- -- -- -- --    07/10/24 0310 -- 54 30 121/56 80 99 % -- -- -- -- -- --    07/10/24 0300 -- -- -- -- -- -- -- -- -- -- 15 --    07/10/24 0250 -- 54 31 124/60 93 99 % -- -- -- -- -- --    07/10/24 0240 -- 54 31 132/64  100 98 % -- -- -- -- -- --    07/10/24 0220 -- 54 31 118/53 73 98 % -- -- -- -- -- --    07/10/24 0210 -- 54 20 123/51 74 98 % -- -- -- -- -- --    07/10/24 0150 -- 56 16 122/53 79 98 % -- -- -- -- -- --    07/10/24 0135 -- 56 17 117/49 67 98 % -- -- -- -- -- --    07/10/24 0105 -- 54 16 103/46 67 98 % -- -- -- -- -- --    07/10/24 0030 -- 54 17 101/59 88 98 % 28 2 L/min Nasal cannula -- -- --    07/10/24 0015 -- 56 18 107/43 70 99 % -- -- -- -- -- --    07/09/24 2350 -- 60 19 118/49 72 92 % -- -- -- -- -- --    07/09/24 2337 -- 62 20 119/45 63 95 % -- -- -- -- -- --    07/09/24 2320 -- 66 24 138/54 75 95 % -- -- -- -- -- --    07/09/24 2315 -- 64 19 132/56 70 94 % -- -- -- -- -- --    07/09/24 2300 97.8 °F (36.6 °C) 64 19 128/47 66 96 % -- -- -- -- -- --    07/09/24 2248 -- -- -- -- -- -- -- -- None (Room air) -- 15 No Pain    07/09/24 2200 -- 60 18 122/56 81 98 % -- -- None (Room air) Lying -- --    07/09/24 2145 -- 60 16 130/75 90 97 % -- -- None (Room air) Lying -- --    07/09/24 2130 -- 62 18 124/60 86 97 % -- -- None (Room air) Lying -- --    07/09/24 2115 -- 62 18 124/59 85 97 % -- -- None (Room air) Lying -- --    07/09/24 2045 -- 60 18 123/60 87 95 % -- -- None (Room air) Lying -- --    07/09/24 2030 -- 60 18 123/57 82 96 % -- -- None (Room air) Sitting -- --    07/09/24 2015 -- 60 18 114/56 78 96 % -- -- None (Room air) Sitting -- --    07/09/24 1945 -- 60 16 125/58 84 96 % -- -- None (Room air) Lying -- --    07/09/24 1930 -- 62 18 117/55 79 97 % -- -- None (Room air) Lying -- --    07/09/24 1915 -- 58 18 125/60 87 96 % -- -- None (Room air) Sitting -- --    07/09/24 1845 -- 62 18 123/56 80 97 % -- -- None (Room air) Lying -- --    07/09/24 1814 -- 68 17 142/64 -- 98 % -- -- None (Room air) Lying -- --    07/09/24 1800 -- 66 -- 225/87 125 98 % -- -- None (Room air) Lying -- --    07/09/24 1752 -- 66 16 218/89 -- 97 % -- -- None (Room air) Lying -- --    07/09/24 1743 -- 66 17 197/82 -- 98 % -- -- None  (Room air) Lying -- --    07/09/24 1730 -- 66 16 235/107 153 97 % -- -- None (Room air) Lying -- --    07/09/24 1631 97.3 °F (36.3 °C) 65 17 216/95 -- 100 % -- -- None (Room air) Sitting 15 --          Weight (last 2 days)       Date/Time Weight    07/11/24 0553 67.9 (149.69)    07/10/24 0532 67.9 (149.69)    07/09/24 2300 67.9 (149.69)    07/09/24 2215 67.9 (149.69)              Pertinent Labs/Diagnostic Results:   Radiology:  IR AV fistulagram/graftogram   Final Interpretation by Evan Goins MD (07/10 1438)      1. Recurrent high-grade stenoses along the left cephalic vein outflow and terminal cephalic arch treated with 7 mm high-pressure and 7 mm drug coated balloon angioplasties.   2. Arterial anastomosis and central veins were patent.      Plan:      The fistula can be used for dialysis immediately.      Workstation performed: [id number]           Cardiology:  No orders to display     GI:  No orders to display           Results from last 7 days   Lab Units 07/11/24  0447 07/10/24  1020 07/10/24  0445 07/09/24  2237 07/09/24  1729   WBC Thousand/uL 6.06  --  6.39  --  6.70   HEMOGLOBIN g/dL 10.6* 10.7* 10.8* 10.6* 12.2   HEMATOCRIT % 33.0* 32.7* 32.3* 32.4* 37.4   PLATELETS Thousands/uL 228  --  219  --  246   TOTAL NEUT ABS Thousands/µL  --   --  3.96  --  4.65         Results from last 7 days   Lab Units 07/11/24  0447 07/10/24  0445 07/09/24  2302 07/09/24  1729   SODIUM mmol/L 130* 131* 130* 132*   POTASSIUM mmol/L 4.7 4.3 4.0 3.2*   CHLORIDE mmol/L 90* 93* 90* 90*   CO2 mmol/L 30 31 29 33*   ANION GAP mmol/L 10 7 11 9   BUN mg/dL 48* 32* 29* 24   CREATININE mg/dL 6.30* 4.78* 4.37* 3.52*   EGFR ml/min/1.73sq m 6 8 9 12   CALCIUM mg/dL 8.3* 8.3* 8.6 9.0   MAGNESIUM mg/dL  --  2.5 2.3  --    PHOSPHORUS mg/dL  --  4.0  --   --          Results from last 7 days   Lab Units 07/11/24  0748 07/11/24  0611 07/10/24  2359 07/10/24  1643 07/10/24  1151 07/10/24  0519 07/10/24  0024   POC GLUCOSE mg/dl 198* 212* 190*  174* 167* 103 197*     Results from last 7 days   Lab Units 07/11/24  0447 07/10/24  0445 07/09/24  2302 07/09/24  1729   GLUCOSE RANDOM mg/dL 198* 98 173* 114         Results from last 7 days   Lab Units 07/09/24  2237   HEMOGLOBIN A1C % 8.6*   EAG mg/dl 200     Results from last 7 days   Lab Units 07/09/24  1729   PROTIME seconds 14.0   INR  1.06   PTT seconds 31     Medications:   Scheduled Medications:  calcitriol, 0.75 mcg, Oral, Once per day on Tuesday Thursday Saturday  carvedilol, 25 mg, Oral, BID With Meals  chlorhexidine, 15 mL, Mouth/Throat, Q12H DEBBIE  cloNIDine, 0.3 mg, Oral, TID  doxazosin, 4 mg, Oral, BID  gabapentin, 300 mg, Oral, HS  hydrALAZINE, 100 mg, Oral, Q8H DEBBIE  insulin lispro, 1-5 Units, Subcutaneous, 4x Daily (AC & HS)  torsemide, 100 mg, Oral, Daily      Continuous IV Infusions:     PRN Meds:       Discharge Plan: TBD    Network Utilization Review Department  ATTENTION: Please call with any questions or concerns to [phone] and carefully listen to the prompts so that you are directed to the right person. All voicemails are confidential.   For Discharge needs, contact Care Management DC Support Team at [phone] opt. [phone]  Send all requests for admission clinical reviews, approved or denied determinations and any other requests to dedicated fax number below belonging to the campus where the patient is receiving treatment. List of dedicated fax numbers for the Facilities:  FACILITY NAME UR FAX NUMBER   ADMISSION DENIALS (Administrative/Medical Necessity) [phone]   DISCHARGE SUPPORT TEAM (NETWORK) [phone]   PARENT CHILD HEALTH (Maternity/NICU/Pediatrics) [phone]   [facility] [phone]   [facility] [phone]   [facility] [phone]   [facility] [phone]   [facility] [phone]   [facility] [phone]   [facility]  [facility] [phone]   [facility] [phone]   [facility] [phone]   [facility] [phone]   [facility] [phone]   [facility] [phone]

## 2024-07-11 NOTE — DISCHARGE SUMMARY
[facility]  Discharge- Lela Corado 1955, 69 y.o. female MRN: [id number]  Unit/Bed#: [place] [address] -01 Encounter: [id number]  Primary Care Provider: Nito Stone MD   Date and time admitted to hospital: 7/9/2024  4:29 PM    * Complication of AV dialysis fistula  Assessment & Plan  Presented with AV fistula arterial bleeding seen by vascular surgery and IR status post IR fistulogram  Patient without any evidence of ongoing bleeding and tolerated dialysis this morning  She was cleared for discharge by vascular nephrology standpoint today after dialysis  Follow-up with vascular surgery in 2 weeks continue follow-up with nephrology outpatient and follow-up with PCP in 1 week for posthospital follow-up  Return to the emergency department with any evidence of recurrent bleeding    Acute blood loss anemia  Assessment & Plan  Lab Results   Component Value Date    HGB 10.6 (L) 07/11/2024    HGB 10.7 (L) 07/10/2024    HGB 10.8 (L) 07/10/2024    HGB 10.6 (L) 07/09/2024    HGB 12.2 07/09/2024    HGB 7.9 (L) 10/08/2023     Hemoglobin overall stable, check lab work during next hemodialysis session with nephrology outpatient  Return to the hospital any evidence of AV fistula bleeding  Outpatient on micera 50 mcg every 2 weeks    ESRD (end stage renal disease) on dialysis (HCC)  Assessment & Plan  Lab Results   Component Value Date    EGFR 6 07/11/2024    EGFR 8 07/10/2024    EGFR 9 07/09/2024    CREATININE 6.30 (H) 07/11/2024    CREATININE 4.78 (H) 07/10/2024    CREATININE 4.37 (H) 07/09/2024   Seen in consultation with nephrology, continue hemodialysis at [facility] on Tuesday, Thursday, Saturday    Chronic diastolic heart failure (HCC)  Assessment & Plan  Wt Readings from Last 3 Encounters:   07/11/24 67.9 kg (149 lb 11.1 oz)   03/13/24 68 kg (150 lb)   10/08/23 73.9 kg (163 lb)     Receive dialysis this morning, appears euvolemic          Type 2 diabetes mellitus with chronic kidney  disease on chronic dialysis, with long-term current use of insulin (HCC)  Assessment & Plan  Lab Results   Component Value Date    HGBA1C 8.6 (H) 07/09/2024       Recent Labs     07/10/24  1643 07/10/24  2359 07/11/24  0611 07/11/24  0748   POCGLU 174* 190* 212* 198*       Blood Sugar Average: Last 72 hrs:  (P) [id number]  Occasion regimen for diabetes management follow-up with PCP outpatient      Hypertensive urgency-resolved as of 7/11/2024  Assessment & Plan  Admitted to the ICU initially on Cardene drip for hypertensive urgency  Blood pressure is now stabilized on home medications  Continue carvedilol, clonidine, doxazosin, hydralazine and torsemide        Medical Problems       Resolved Problems  Date Reviewed: 7/10/2024            Resolved    Hypertensive urgency 7/11/2024     Resolved by  Rina Wheatley PA-C        Discharging Physician / Practitioner: Rina Wheatley PA-C  PCP: Nito Stone MD  Admission Date:   Admission Orders (From admission, onward)       Ordered        07/09/24 2000  INPATIENT ADMISSION  Once                          Discharge Date: 07/11/24    Consultations During Hospital Stay:  Critical care   Vascular surgery   Nephrology   IR     Procedures Performed:   IR AV fistulogram  PROCEDURE DETAILS:     Pre-procedure  Consent: Informed consent for the procedure including risks, benefits and alternatives was obtained and time-out was performed prior to the procedure.  Preparation: The site was prepared and draped using maximal sterile barrier technique including cutaneous antisepsis.     Anesthesia/sedation  Level of anesthesia/sedation: Moderate sedation (conscious sedation)  Anesthesia/sedation administered by: IR nurse under attending supervision with continuous monitoring of the patient's level of consciousness and physiologic status  Total intra-service sedation time (minutes): 45     Initial dialysis fistula evaluation  Dialysis fistula side: Left  Dialysis  fistula type: Brachial-cephalic  Initial fistula palpation: Palpable thrill     Access  Local anesthesia was administered. The fistula was sonographically evaluated and judged to be patent. Real time ultrasound was used to visualize needle entry into the fistula and a permanent image was stored. A 7 Armenian sheath was placed.  Access technique: Micropuncture set with 21 gauge needle  Access direction: Toward venous outflow     Dialysis fistulagram  Initial fistulagram showed recurrent high-grade stenoses along the left cephalic vein outflow and at the terminal cephalic arch. Arterial anastomosis and central veins were patent.     Angioplasty  Angioplasty of the left cephalic vein was performed using 7 x 100 mm high-pressure balloon followed by 7 mm x 220 mm drug coated balloon.     Final fistulagram  Final fistulagram showed patent left cephalic vein and terminal cephalic arch.     Closure  The sheath was removed and hemostasis was achieved with manual compression.     Contrast  Contrast agent: Omnipaque  Contrast volume (mL): 40     Radiation Dose  Fluoroscopy time (minutes): 4.5  Reference air kerma (mGy): 79     Additional Details  Estimated blood loss (mL): 5  Complications: No immediate complications.     IMPRESSION:     1. Recurrent high-grade stenoses along the left cephalic vein outflow and terminal cephalic arch treated with 7 mm high-pressure and 7 mm drug coated balloon angioplasties.  2. Arterial anastomosis and central veins were patent.     Plan:     The fistula can be used for dialysis immediately.      Significant Findings / Test Results:   none    Incidental Findings:   emmy    Test Results Pending at Discharge (will require follow up):   none     Outpatient Tests Requested:  Vascular surgery in 2 weeks, nephrology, PCP    Complications: Hypertensive urgency, ABLA     Reason for Admission: Complication of AV fistula with bleeding    Hospital Course:   Lela Corado is a 69 y.o. female patient  "who originally presented to the hospital on 7/9/2024 due to bleeding at AV fistula site following hemodialysis treatment.  Clamp was applied without resolution of bleeding at dialysis therefore she arrived to the emergency department with clamp in place.  Patient was evaluated by vascular surgery and interventional radiology.  Vascular surgery was consulted and bleeding stopped with surgical glue.  Patient went under IR AV fistulogram noting recurrent high-grade stenosis along the left cephalic vein outflow and terminal cephalic arch treated with 7 mm high-pressure and drug-coated balloon angioplasties, arterial anastomosis and central veins were patent.  Patient tolerated dialysis this morning.  I discussed with vascular surgery and nephrology and both were in agreement with discharge today.  Continue close outpatient follow-up with vascular surgery and nephrology.  Please return the hospital with any bleeding.  Patient to follow-up with vascular surgery in 2 weeks.    Of note, patient also presented with hypertensive urgency she was briefly started on nicardipine.  This was discontinued and she was restarted on her home medications and blood pressure is now well-controlled.    Please see above list of diagnoses and related plan for additional information.     Condition at Discharge: stable    Discharge Day Visit / Exam:   Subjective: Patient has no complaints today.  She is currently on dialysis.  She is without any pain chest pain shortness of breath lightheadedness dizziness abdominal pain nausea vomiting diarrhea.    Vitals: Blood Pressure: (!) 108/47 (07/11/24 1030)  Pulse: (!) 50 (07/11/24 1030)  Temperature: 98 °F (36.7 °C) (07/11/24 0800)  Temp Source: Tympanic (07/11/24 0800)  Respirations: 16 (07/11/24 1030)  Height: 5' 1\" (154.9 cm) (07/09/24 2300)  Weight - Scale: 67.9 kg (149 lb 11.1 oz) (07/11/24 0553)  SpO2: 100 % (07/11/24 0800)  Exam:   Physical Exam  Vitals and nursing note reviewed. "   Constitutional:       General: She is not in acute distress.     Appearance: She is not ill-appearing or diaphoretic.   Cardiovascular:      Rate and Rhythm: Normal rate and regular rhythm.   Pulmonary:      Effort: Pulmonary effort is normal. No respiratory distress.      Breath sounds: Normal breath sounds. No wheezing or rales.   Abdominal:      General: Bowel sounds are normal.      Palpations: Abdomen is soft.   Musculoskeletal:      Right lower leg: No edema.      Left lower leg: No edema.   Skin:     General: Skin is warm.   Neurological:      Mental Status: She is alert. Mental status is at baseline.   Psychiatric:         Mood and Affect: Mood normal.          Discussion with Family: Updated  (daughter) via phone.    Discharge instructions/Information to patient and family:   See after visit summary for information provided to patient and family.      Provisions for Follow-Up Care:  See after visit summary for information related to follow-up care and any pertinent home health orders.      Mobility at time of Discharge:   Basic Mobility Inpatient Raw Score: 23  JH-HLM Goal: 7: Walk 25 feet or more  JH-HLM Achieved: 2: Bed activities/Dependent transfer  HLM Goal achieved. Continue to encourage appropriate mobility.     Disposition:   Home    Planned Readmission: none     Discharge Statement:  I spent 40 minutes discharging the patient. This time was spent on the day of discharge. I had direct contact with the patient on the day of discharge. Greater than 50% of the total time was spent examining patient, answering all patient questions, arranging and discussing plan of care with patient as well as directly providing post-discharge instructions.  Additional time then spent on discharge activities.    Discharge Medications:  See after visit summary for reconciled discharge medications provided to patient and/or family.      **Please Note: This note may have been constructed using a voice  recognition system**

## 2024-07-11 NOTE — PLAN OF CARE
Problem: PAIN - ADULT  Goal: Verbalizes/displays adequate comfort level or baseline comfort level  Description: Interventions:  - Encourage patient to monitor pain and request assistance  - Assess pain using appropriate pain scale  - Administer analgesics based on type and severity of pain and evaluate response  - Implement non-pharmacological measures as appropriate and evaluate response  - Consider cultural and social influences on pain and pain management  - Notify physician/advanced practitioner if interventions unsuccessful or patient reports new pain  Outcome: Progressing     Problem: SAFETY ADULT  Goal: Patient will remain free of falls  Description: INTERVENTIONS:  - Educate patient/family on patient safety including physical limitations  - Instruct patient to call for assistance with activity   - Consult OT/PT to assist with strengthening/mobility   - Keep Call bell within reach  - Keep bed low and locked with side rails adjusted as appropriate  - Keep care items and personal belongings within reach  - Initiate and maintain comfort rounds  - Make Fall Risk Sign visible to staff  - Offer Toileting every  Hours, in advance of need  - Initiate/Maintain alarm  - Obtain necessary fall risk management equipment:   - Apply yellow socks and bracelet for high fall risk patients  - Consider moving patient to room near nurses station  Outcome: Progressing  Goal: Maintain or return to baseline ADL function  Description: INTERVENTIONS:  -  Assess patient's ability to carry out ADLs; assess patient's baseline for ADL function and identify physical deficits which impact ability to perform ADLs (bathing, care of mouth/teeth, toileting, grooming, dressing, etc.)  - Assess/evaluate cause of self-care deficits   - Assess range of motion  - Assess patient's mobility; develop plan if impaired  - Assess patient's need for assistive devices and provide as appropriate  - Encourage maximum independence but intervene and supervise  when necessary  - Involve family in performance of ADLs  - Assess for home care needs following discharge   - Consider OT consult to assist with ADL evaluation and planning for discharge  - Provide patient education as appropriate  Outcome: Progressing  Goal: Maintains/Returns to pre admission functional level  Description: INTERVENTIONS:  - Perform AM-PAC 6 Click Basic Mobility/ Daily Activity assessment daily.  - Set and communicate daily mobility goal to care team and patient/family/caregiver.   - Collaborate with rehabilitation services on mobility goals if consulted  - Perform Range of Motion  times a day.  - Reposition patient every  hours.  - Dangle patient  times a day  - Stand patient  times a day  - Ambulate patient  times a day  - Out of bed to chair  times a day   - Out of bed for meals  times a day  - Out of bed for toileting  - Record patient progress and toleration of activity level   Outcome: Progressing     Problem: DISCHARGE PLANNING  Goal: Discharge to home or other facility with appropriate resources  Description: INTERVENTIONS:  - Identify barriers to discharge w/patient and caregiver  - Arrange for needed discharge resources and transportation as appropriate  - Identify discharge learning needs (meds, wound care, etc.)  - Arrange for interpretive services to assist at discharge as needed  - Refer to Case Management Department for coordinating discharge planning if the patient needs post-hospital services based on physician/advanced practitioner order or complex needs related to functional status, cognitive ability, or social support system  Outcome: Progressing     Problem: Knowledge Deficit  Goal: Patient/family/caregiver demonstrates understanding of disease process, treatment plan, medications, and discharge instructions  Description: Complete learning assessment and assess knowledge base.  Interventions:  - Provide teaching at level of understanding  - Provide teaching via preferred  learning methods  Outcome: Progressing     Problem: METABOLIC, FLUID AND ELECTROLYTES - ADULT  Goal: Electrolytes maintained within normal limits  Description: INTERVENTIONS:  - Monitor labs and assess patient for signs and symptoms of electrolyte imbalances  - Administer electrolyte replacement as ordered  - Monitor response to electrolyte replacements, including repeat lab results as appropriate  - Instruct patient on fluid and nutrition as appropriate  Outcome: Progressing  Goal: Fluid balance maintained  Description: INTERVENTIONS:  - Monitor labs   - Monitor I/O and WT  - Instruct patient on fluid and nutrition as appropriate  - Assess for signs & symptoms of volume excess or deficit  Outcome: Progressing     Problem: SKIN/TISSUE INTEGRITY - ADULT  Goal: Skin Integrity remains intact(Skin Breakdown Prevention)  Description: Assess:  -Perform Monroe assessment every   -Clean and moisturize skin every   -Inspect skin when repositioning, toileting, and assisting with ADLS  -Assess under medical devices such as  every   -Assess extremities for adequate circulation and sensation     Bed Management:  -Have minimal linens on bed & keep smooth, unwrinkled  -Change linens as needed when moist or perspiring  -Avoid sitting or lying in one position for more than  hours while in bed  -Keep HOB at degrees     Toileting:  -Offer bedside commode  -Assess for incontinence every   -Use incontinent care products after each incontinent episode such as     Activity:  -Mobilize patient  times a day  -Encourage activity and walks on unit  -Encourage or provide ROM exercises   -Turn and reposition patient every  Hours  -Use appropriate equipment to lift or move patient in bed  -Instruct/ Assist with weight shifting every  when out of bed in chair  -Consider limitation of chair time hour intervals    Skin Care:  -Avoid use of baby powder, tape, friction and shearing, hot water or constrictive clothing  -Relieve pressure over bony  prominences using   -Do not massage red bony areas    Next Steps:  -Teach patient strategies to minimize risks such as    -Consider consults to  interdisciplinary teams such as   Outcome: Progressing  Goal: Incision(s), wounds(s) or drain site(s) healing without S/S of infection  Description: INTERVENTIONS  - Assess and document dressing, incision, wound bed, drain sites and surrounding tissue  - Provide patient and family education  - Perform skin care/dressing changes every   Outcome: Progressing     Problem: HEMATOLOGIC - ADULT  Goal: Maintains hematologic stability  Description: INTERVENTIONS  - Assess for signs and symptoms of bleeding or hemorrhage  - Monitor labs  - Administer supportive blood products/factors as ordered and appropriate  Outcome: Progressing

## 2024-07-11 NOTE — ASSESSMENT & PLAN NOTE
Wt Readings from Last 3 Encounters:   07/11/24 67.9 kg (149 lb 11.1 oz)   03/13/24 68 kg (150 lb)   10/08/23 73.9 kg (163 lb)     Receive dialysis this morning, appears euvolemic

## 2024-07-11 NOTE — ASSESSMENT & PLAN NOTE
Lab Results   Component Value Date    EGFR 6 07/11/2024    EGFR 8 07/10/2024    EGFR 9 07/09/2024    CREATININE 6.30 (H) 07/11/2024    CREATININE 4.78 (H) 07/10/2024    CREATININE 4.37 (H) 07/09/2024   Seen in consultation with nephrology, continue hemodialysis at [facility] on Tuesday, Thursday, Saturday

## 2024-07-11 NOTE — PROGRESS NOTES
Progress note- Nephrology   Lela Mejiasirving 69 y.o. female MRN: [id number]  Unit/Bed#: [place] [address] -01 Encounter: [id number]      HEMODIALYSIS PROCEDURE NOTE  The patient was seen and examined on hemodialysis.  Time: 3.5 hours  Sodium: 137 Blood flow: 400   Dialyzer: F160 Potassium: 2 Dialysate flow: 500   Access: left AVF Bicarbonate: 35 Ultrafiltration goal: 2   Medications on HD: na       ASSESSMENT/PLAN:  End-stage renal disease:   On maintenance hemodialysis TTS at [facility]  EDW: Kilograms  Last dialysis treatment on 7/9/2024  Next dialysis treatment is scheduled  Avoid nephrotoxins, adjust meds to appropriate GFR  Will continue to follow I/O, labs and volume status    Access:   AV fistula-doing well on dialysis  Was seen in the ER on 7 9 due to excessive bleeding following dialysis unable to achieve homeostasis after an hour of compression  Asked for surgery evaluated and achieved homeostasis with surgical glue  Status post AV fistula gram 7/10/2024-showed recurrent high-grade stenosis along the left cephalic vein outflow and at at the terminal cephalic arch status post angioplasty of left cephalic vein-homostasis was achieved    Hypertension:  Current BP acceptable 100-1 20s  Current medications include: Carvedilol 25 mg 2 times daily, clonidine 0.3 mg 3 times daily, doxazosin 4 mg 2 times daily, hydralazine 100 mg every 8 hours, torsemide 100 mg daily  Avoid variations in blood pressure hypertension  Will UF as blood pressure allows    Anemia likely Chronic Kidney Disease:  Current hemoglobin 10.6  On Mircera 50 mcg every 2 weeks as outpatient  No need for Epogen while admitted  Transfuse for hemoglobin less than 7.0    Bone mineral disease of Chronic Kidney Disease:  Hyperphosphatemia: On calcium acetate outpatient  Secondary hyperparathyroidism: Trial as outpatient  Recommend renal diet  Check PTH and phosphorus as outpatient    Electrolytes/Acid Base:  Sodium, phosphorus, potassium, and  acidosis to be corrected with dialysis  Will continue to monitor     Other medical issues:  Chronic CHF with preserved EF-patient is well compensated  Diabetes type 2-management per primary team  Hyperlipidemia      Overall plan and recommendations:  Patient tolerating hemodialysis well via left AV fistula without bleeding or difficulties  For discharge when medically stable per primary team  Resume outpatient dialysis on discharge    Medical records through [facility] and care everywhere has been reviewed for this patient encounter      Review of systems:  Patient seen and examined at bedside  Review of Systems   Constitutional: Negative.  Negative for activity change, appetite change, chills, diaphoresis, fatigue and fever.   HENT: Negative.  Negative for congestion and facial swelling.    Respiratory: Negative.     Cardiovascular: Negative.    Gastrointestinal: Negative.    Endocrine: Negative.    Genitourinary: Negative.    Musculoskeletal: Negative.    Skin: Negative.    Allergic/Immunologic: Negative.    Neurological: Negative.    Hematological: Negative.    Psychiatric/Behavioral: Negative.          Physical Exam:  Current Weight: Weight - Scale: 67.9 kg (149 lb 11.1 oz)  Vitals:    07/11/24 1000 07/11/24 1030 07/11/24 1100 07/11/24 1130   BP: 99/50 (!) 108/47 (!) 123/47 (!) 109/47   BP Location:    Right arm   Pulse: 59 (!) 50 (!) 51 (!) 52   Resp: 15 16 15 16   Temp:       TempSrc:       SpO2:    96%   Weight:       Height:           Intake/Output Summary (Last 24 hours) at 7/11/2024 1134  Last data filed at 7/11/2024 1130  Gross per 24 hour   Intake 1290 ml   Output 2500 ml   Net -1210 ml       Physical Exam  Vitals and nursing note reviewed.   Constitutional:       Appearance: Normal appearance.   HENT:      Head: Normocephalic and atraumatic.      Nose: Nose normal.      Mouth/Throat:      Mouth: Mucous membranes are moist.      Pharynx: Oropharynx is clear.   Eyes:      Extraocular Movements:  Extraocular movements intact.      Conjunctiva/sclera: Conjunctivae normal.   Cardiovascular:      Rate and Rhythm: Normal rate and regular rhythm.      Pulses: Normal pulses.      Heart sounds: Normal heart sounds.   Pulmonary:      Effort: Pulmonary effort is normal.      Breath sounds: Normal breath sounds.   Abdominal:      General: Bowel sounds are normal.      Palpations: Abdomen is soft.   Musculoskeletal:         General: Normal range of motion.      Cervical back: Normal range of motion and neck supple.      Comments: History of left functioning well on dialysis   Skin:     General: Skin is warm and dry.   Neurological:      General: No focal deficit present.      Mental Status: She is alert and oriented to person, place, and time.   Psychiatric:         Mood and Affect: Mood normal.         Behavior: Behavior normal.          Medications:    Current Facility-Administered Medications:     calcitriol (ROCALTROL) capsule 0.75 mcg, 0.75 mcg, Oral, Once per day on Tuesday Thursday Saturday, MARLENA Hudson    carvedilol (COREG) tablet 25 mg, 25 mg, Oral, BID With Meals, Janet Chang, CRNP, 25 mg at 07/10/24 0846    chlorhexidine (PERIDEX) 0.12 % oral rinse 15 mL, 15 mL, Mouth/Throat, Q12H DEBBIE, Janet Chang, CRNP, 15 mL at 07/11/24 0800    cloNIDine (CATAPRES) tablet 0.3 mg, 0.3 mg, Oral, TID, Janet Chang, CRNP, 0.3 mg at 07/10/24 2134    doxazosin (CARDURA) tablet 4 mg, 4 mg, Oral, BID, Janet Chang, CRNP, 4 mg at 07/10/24 1733    gabapentin (NEURONTIN) capsule 300 mg, 300 mg, Oral, HS, Janet Chang, CRNP, 300 mg at 07/10/24 2134    hydrALAZINE (APRESOLINE) tablet 100 mg, 100 mg, Oral, Q8H DEBBIE, Janet Chang, CRNP, 100 mg at 07/11/24 0500    insulin lispro (HumALOG/ADMELOG) 100 units/mL subcutaneous injection 1-5 Units, 1-5 Units, Subcutaneous, 4x Daily (AC & HS), 1 Units at 07/11/24 0757 **AND** Fingerstick Glucose (POCT), , , Q6H, Enoc Hallman MD    torsemide (DEMADEX) tablet 100 mg, 100 mg,  "Oral, Daily, Janet Chang, MARLENA, 100 mg at 07/10/24 0845    Laboratory Results:  Results from last 7 days   Lab Units 07/11/24  0447 07/10/24  1020 07/10/24  0445 07/09/24  2302 07/09/24  2237 07/09/24  1729   WBC Thousand/uL 6.06  --  6.39  --   --  6.70   HEMOGLOBIN g/dL 10.6* 10.7* 10.8*  --  10.6* 12.2   HEMATOCRIT % 33.0* 32.7* 32.3*  --  32.4* 37.4   PLATELETS Thousands/uL 228  --  219  --   --  246   SODIUM mmol/L 130*  --  131* 130*  --  132*   POTASSIUM mmol/L 4.7  --  4.3 4.0  --  3.2*   CHLORIDE mmol/L 90*  --  93* 90*  --  90*   CO2 mmol/L 30  --  31 29  --  33*   BUN mg/dL 48*  --  32* 29*  --  24   CREATININE mg/dL 6.30*  --  4.78* 4.37*  --  3.52*   CALCIUM mg/dL 8.3*  --  8.3* 8.6  --  9.0   MAGNESIUM mg/dL  --   --  2.5 2.3  --   --    PHOSPHORUS mg/dL  --   --  4.0  --   --   --          Portions of the record may have been created with voice recognition software. Occasional wrong word or \"sound a like\" substitutions may have occurred due to the inherent limitations of voice recognition software. Read the chart carefully and recognize,   "

## 2024-07-11 NOTE — UTILIZATION REVIEW
NOTIFICATION OF INPATIENT ADMISSION   AUTHORIZATION REQUEST   SERVICING FACILITY:   [place] [address]  Tax ID: [id number]  NPI: [id number] ATTENDING PROVIDER:  Attending Name and NPI#: Rishi Linares Md [[id number]]  Address: [address] [place] [address]  Phone: [phone]     ADMISSION INFORMATION:  Place of Service: Inpatient [facility]  Place of Service Code: 21  Inpatient Admission Date/Time: 7/9/24  8:00 PM  Discharge Date/Time: No discharge date for patient encounter.  Admitting Diagnosis Code/Description:  Hypertensive emergency [I16.1]  Complication of arteriovenous dialysis fistula, initial encounter [T82.9XXA]     UTILIZATION REVIEW CONTACT:  Flora Ryan Utilization   Network Utilization Review Department  Phone: [phone]  Fax [phone]  Email: [email or web address]  Contact for approvals/pending authorizations, clinical reviews, and discharge.     PHYSICIAN ADVISORY SERVICES:  Medical Necessity Denial & Yvmo-jy-Jimj Review  Phone: [phone]  Fax: [phone]  Email: [email or web address]     DISCHARGE SUPPORT TEAM:  For Patients Discharge Needs & Updates  Phone: [phone] opt. 2 Fax: [phone]  Email: [email or web address]

## 2024-07-11 NOTE — ASSESSMENT & PLAN NOTE
Presented with AV fistula arterial bleeding seen by vascular surgery and IR status post IR fistulogram  Patient without any evidence of ongoing bleeding and tolerated dialysis this morning  She was cleared for discharge by vascular nephrology standpoint today after dialysis  Follow-up with vascular surgery in 2 weeks continue follow-up with nephrology outpatient and follow-up with PCP in 1 week for posthospital follow-up  Return to the emergency department with any evidence of recurrent bleeding

## 2024-07-12 NOTE — UTILIZATION REVIEW
NOTIFICATION OF ADMISSION DISCHARGE   This is a Notification of Discharge from [facility]. Please be advised that this patient has been discharge from our facility. Below you will find the admission and discharge date and time including the patient’s disposition.   UTILIZATION REVIEW CONTACT:  Flora Ryan MA  Utilization   Network Utilization Review Department  Phone: [phone] x carefully listen to the prompts. All voicemails are confidential.  Email: [email or web address]     ADMISSION INFORMATION  PRESENTATION DATE: 7/9/2024  4:29 PM  OBERVATION ADMISSION DATE: N/A  INPATIENT ADMISSION DATE: 7/9/24  8:00 PM   DISCHARGE DATE: 7/11/2024  5:01 PM   DISPOSITION:Home/Self Care    Network Utilization Review Department  ATTENTION: Please call with any questions or concerns to [phone] and carefully listen to the prompts so that you are directed to the right person. All voicemails are confidential.   For Discharge needs, contact Care Management DC Support Team at [phone] opt. 2  Send all requests for admission clinical reviews, approved or denied determinations and any other requests to dedicated fax number below belonging to the campus where the patient is receiving treatment. List of dedicated fax numbers for the Facilities:  FACILITY NAME UR FAX NUMBER   ADMISSION DENIALS (Administrative/Medical Necessity) [phone]   DISCHARGE SUPPORT TEAM ([facility]) [phone]   PARENT CHILD HEALTH (Maternity/NICU/Pediatrics) [phone]   [facility] [phone]   [facility] [phone]   [facility] [phone]   [facility] [phone]   [facility] [phone]   [facility] [phone]   General acute hospital [phone]   [facility]  [phone]   [facility] [phone]   [facility] [phone]   [facility] [phone]   [facility] [phone]

## 2024-07-17 ENCOUNTER — TELEPHONE (OUTPATIENT)
Age: 69
End: 2024-07-17

## 2024-07-17 NOTE — TELEPHONE ENCOUNTER
Pt calling in regards to her appt scheduled for 7/23 with DIM for PO fistulagram w/ DBC to cephalic stenosis. Pt has dialysis T/TH/S in the mornings and will be unable to make this appt. Please reach out to her to r/s.    Via  [place] [phone]

## 2024-07-24 NOTE — ED ATTENDING ATTESTATION
7/9/2024  I, Josette Arias MD, saw and evaluated the patient. I have discussed the patient with the resident/non-physician practitioner and agree with the resident's/non-physician practitioner's findings, Plan of Care, and MDM as documented in the resident's/non-physician practitioner's note, except where noted. All available labs and Radiology studies were reviewed.  I was present for key portions of any procedure(s) performed by the resident/non-physician practitioner and I was immediately available to provide assistance.       At this point I agree with the current assessment done in the Emergency Department.  I have conducted an independent evaluation of this patient a history and physical is as follows: patient with bleeding from dialysis shunt. Hypertensive as well. No response to dialysis clamp. Patient seen and evaluated by me. Required manual pressure and surgifoam. Unable to release manual pressure by me due to active bleeding. Immediate control of HTN was necessary. Obtained through IV meds and infusion. Once BP was controlled we were able to control bleeding from puncture site. Patient will require admission. Surgical team consulted.    ED Course         Critical Care Time  CriticalCare Time    Date/Time: 7/9/2024 5:30 PM    Performed by: Josette Arias MD  Authorized by: Josette Arias MD    Critical care provider statement:     Critical care time (minutes):  45    Critical care time was exclusive of:  Separately billable procedures and treating other patients and teaching time    Critical care was necessary to treat or prevent imminent or life-threatening deterioration of the following conditions:  Circulatory failure    Critical care was time spent personally by me on the following activities:  Obtaining history from patient or surrogate, development of treatment plan with patient or surrogate, discussions with consultants, examination of patient, evaluation of patient's  response to treatment, interpretation of cardiac output measurements, ordering and performing treatments and interventions, ordering and review of laboratory studies, ordering and review of radiographic studies and re-evaluation of patient's condition         1. I was told the name of the doctor(s) who took care of my child while in the hospital.    2. I have been told about any important findings on my child's plan of care.    3. The doctor clearly explained my child's diagnosis and other possible diagnoses that were considered.    4. My child's doctor explained all the tests that were done and their results (if available). I understand that some of the test results may not be ready before we go home and I was told how I can get these results. I understand that a summary of my child's hospitalization and important test results will be shared with my child's outpatient doctor.    5. My child's doctor talked to me about what I need to do when we go home.    6. I understand what signs and symptoms to watch for. I understand what symptoms I would need to call my doctor for and/or return to the hospital.    7. I have the phone number to call the hospital for results and/or questions after I leave the hospital.

## 2024-07-25 DIAGNOSIS — Z12.31 ENCOUNTER FOR SCREENING MAMMOGRAM FOR MALIGNANT NEOPLASM OF BREAST: ICD-10-CM

## 2024-07-25 DIAGNOSIS — N18.6 ESRD (END STAGE RENAL DISEASE) ON DIALYSIS (HCC): Primary | ICD-10-CM

## 2024-07-25 DIAGNOSIS — Z99.2 ESRD (END STAGE RENAL DISEASE) ON DIALYSIS (HCC): Primary | ICD-10-CM

## 2024-08-13 DIAGNOSIS — N18.6 ESRD (END STAGE RENAL DISEASE) (HCC): ICD-10-CM

## 2024-08-13 RX ORDER — CALCIUM ACETATE 667 MG/1
1334 CAPSULE ORAL
Qty: 270 CAPSULE | Refills: 3 | Status: SHIPPED | OUTPATIENT
Start: 2024-08-13

## 2024-09-05 DIAGNOSIS — E83.39 HYPERPHOSPHATEMIA: ICD-10-CM

## 2024-09-05 DIAGNOSIS — N18.6 ESRD (END STAGE RENAL DISEASE) ON DIALYSIS (HCC): Primary | ICD-10-CM

## 2024-09-05 DIAGNOSIS — Z99.2 ESRD (END STAGE RENAL DISEASE) ON DIALYSIS (HCC): Primary | ICD-10-CM

## 2024-09-10 DIAGNOSIS — I50.33 ACUTE ON CHRONIC DIASTOLIC HEART FAILURE (HCC): ICD-10-CM

## 2024-09-10 RX ORDER — TORSEMIDE 100 MG/1
100 TABLET ORAL DAILY
Qty: 90 TABLET | Refills: 3 | Status: SHIPPED | OUTPATIENT
Start: 2024-09-10

## 2024-10-21 ENCOUNTER — TELEPHONE (OUTPATIENT)
Age: 69
End: 2024-10-21

## 2024-10-21 NOTE — TELEPHONE ENCOUNTER
Caller: Lela Corado    Doctor: DIM    Reason for call: Pt has dialysis T/TH and will not be able to make this appt. Please reach out to her to r/s.     The only option available that appeared on the schedule was on a Tuesday, 12/31; it's too far out and and she has dialysis on Tuesdays.     Via  Devin [id number]    Call back#: [phone]

## 2024-12-09 NOTE — CONSULTS
Consultation - Vascular Surgery   Lela Corado 69 y.o. female MRN: [id number]  Unit/Bed#: ED-30 Encounter: [id number]      Assessment & Plan      Assessment:  69-year-old female with bleeding from the left upper extremity fistula.  Bleeding was slowed to an ooze in the emergency department.  Glue was placed over top and bleeding ceased.  4 x 4 gauze and pressure dressing was placed over top.    Plan  -Admit to internal medicine service  -Follow-up with IR for possible fistulagram  -DVT prophylaxis  -Pain and nausea control as needed  -Out of bed to ambulate  -Remainder of care per primary team    History of Present Illness   Physician Requesting Consult: Casper Benavides MD  Reason for Consult / Principal Problem: Bleeding from left upper extremity fistula  HPI: Lela Corado is a 69 y.o. year old female who presents with bleeding from LUE fistula. Pmhx includes ESRD on HD Tuesday, Thursday, Saturday.  Patient completed dialysis treatment earlier today with no issues following the dialysis treatment however bleeding from the fistula continued.  The bleeding began around 3 PM today and was clamped in the ED to no avail.  Patient presented with severe hypertension in the ED which caused bleeding to increase.  Patient takes carvedilol and hydralazine but was not able to due to her hemodialysis.  Patient denies any nausea, vomiting, fevers or chills.    Inpatient consult to Vascular Surgery  Consult performed by: Ash Neff PA-C  Consult ordered by: Josette Arias MD          Review of Systems   Constitutional:  Negative for chills and fever.   Respiratory:  Negative for apnea, cough and shortness of breath.    Cardiovascular:  Negative for chest pain, palpitations and leg swelling.   Gastrointestinal:  Negative for abdominal distention and abdominal pain.   Neurological:  Negative for tremors, weakness and numbness.       Historical Information   Past Medical History:   Diagnosis Date    CHF  Initial Treatment Date: 11-   Occupation: .   Referred by: Mu Powell MD  Primary Care Physician: Ashley Mirza MD   Date informed consent signed:  11-  Visit Number of Current Episode: 4  Length of Visit: 15 min     Subjective:   Oliva is a 55 year old female / who presents today for continued treatment of neck, upper, mid, and  low back pain. The neck and upper back pain is occasional to frequent, painless to moderate, dull and achy. The mid and lower back pain is constant, mild to severe, dull and achy and tight.  Reporting some reduction of pain since the second treatment at today's presentation.  Overall reporting feeling a little worse for the rest of the day after the last treatment then regressed back to baseline after that.    Objective findings   Moderate palpatory tenderness is noted over the cervical, thoracic, lumbar and bilateral sacroiliac joints at the PSIS level.  Moderate to severe joint restrictions are noted at the following levels: C0-C2, C4-T5, T8-L2, L4-S1 and at the bilateral sacroiliac joints. Muscle hypertonicity is graded at 3 or moderate and is contributing to restricted mobility in the involved areas. These areas include the hamstrings, anterior pelvic and gluteal musculature as well as the cervical, thoracic and lumbar paraspinals.    Patient has an MRI ordered for 12-.    Assessment:   Segmental and somatic dysfunction lumbar, cervical, thoracic and sacral region with lumbar facet pain.  Patient responding favorably.    Complicating Factors/Co-morbidities:   Chronic nature of complaints and lack of any regular consistent rehabilitative stretching or exercise for the last several years    Working diagnoses:     1. Segmental and somatic dysfunction of lumbar region    2. Segmental and somatic dysfunction of cervical region    3. Segmental and somatic dysfunction of thoracic region    4. Segmental and somatic dysfunction  (congestive heart failure) (HCC)     CKD (chronic kidney disease) stage 5, GFR less than 15 ml/min (HCC)     Diabetes mellitus (HCC)     Diabetic nephropathy (HCC)     Hemodialysis patient (HCC)     via [facility] right chest / [facility] Tues/Thurs and Sat    Hyperlipidemia     Hypertension     Muscle weakness     Orthodontics     sleeps with retainer at night    Risk for falls     Uses walker     per dtr active in the house able to cook/light cleaning as able     Past Surgical History:   Procedure Laterality Date    CATARACT EXTRACTION Bilateral     EGD      IR AV FISTULAGRAM/GRAFTOGRAM  5/9/2022    IR AV FISTULAGRAM/GRAFTOGRAM  3/31/2023    IR AV FISTULAGRAM/GRAFTOGRAM  8/9/2023    IR AV FISTULAGRAM/GRAFTOGRAM  3/13/2024    IR BIOPSY BONE MARROW  9/15/2021    IR BIOPSY KIDNEY RANDOM  9/15/2021    IR NON-TUNNELED CENTRAL LINE PLACEMENT  12/7/2021    IR TUNNELED CENTRAL LINE CHECK/CHANGE/REPOSITION  4/1/2022    IR TUNNELED DIALYSIS CATHETER PLACEMENT  12/9/2021    IR TUNNELED DIALYSIS CATHETER REMOVAL  10/21/2022    ID ARTERIOVENOUS ANASTOMOSIS OPEN DIRECT Left 2/16/2022    Procedure: CREATION FISTULA ARTERIOVENOUS (AV);  Surgeon: Lis Phillips DO;  Location: [facility];  Service: Vascular    ID REVJ OPN ARVEN FSTL W/O THRMBC DIAL GRF Left 9/2/2022    Procedure: REVISION AV FISTULA (superficalize/transposition);  Surgeon: Lis Phillips DO;  Location: [facility];  Service: Vascular     Social History   Social History     Substance and Sexual Activity   Alcohol Use Not Currently     Social History     Substance and Sexual Activity   Drug Use Not Currently     E-Cigarette/Vaping    E-Cigarette Use Never User      E-Cigarette/Vaping Substances    Nicotine No     THC No     CBD No     Flavoring No     Other No     Unknown No      Social History     Tobacco Use   Smoking Status Never   Smokeless Tobacco Never   Tobacco Comments    NO TOBACCO USE     Family History:   Family History   Problem Relation Age of Onset     of sacral region    5. Lumbar facet joint pain      Treatment today:   All joint restrictions in the cervical, thoracic, lumbar and bilateral sacroiliac joints was treated today and the exact levels are listed in the objective section. These areas were adjusted today utilizing a gentle diversified technique to correct aberrant joint function and reduce scar tissue formation. This procedure was done without incident at the site(s) of restriction.    Unattended interferential current was applied to the thoracic, lumbar and bilateral sacroiliac joint sites as noted in the objective section for 15 minutes at an intensity level that was comfortable for the patient. This modality was performed to reduce pain and gently decongest tissues to help the patient's condition heal. Patient tolerated the procedure well.    *Patient has an up-to-date, signed, commercial waiver form that is on the file as of visit date November 25, 2024 and is valid through November 25, 2025. Patient understands the electric stimulation, ultrasound therapy and therapeutic exercise treatment are a non-covered service(s) through Medicaid and agrees to be financially responsible for this service.*    Plan:  I'm recommending 2 treatments per week with chiropractic manipulative therapy for the next 3-6 weeks. I will reevaluate the patient after the first 6 treatments to assess for continued need and make every effort to reach our treatment goals and release the patient between 6 and 12 treatments total. Therapeutic exercises and passive modalities will be recommended throughout the treatment as clinically warranted and as outlined in the therapy plan section.     SHORT TERM GOALS    Decrease numerical pain score 100% from 4/10 at worst in the neck and upper back and 10/10 at worst in the lower back region to 0/10 for all area, Decrease neck pain/disability from 28% to less than or equal to 8%, Oswestry Low Back Pain Questionnaire from 54% to less than or  "Hypertension Mother     Diabetes Father     Hypertension Sister     Diabetes Sister     Hypertension Brother    }    Meds/Allergies   current meds:   Current Facility-Administered Medications   Medication Dose Route Frequency    niCARdipine (CARDENE) 25 mg (STANDARD CONCENTRATION) in sodium chloride 0.9% 250 mL  1-15 mg/hr Intravenous Titrated     Allergies   Allergen Reactions    Amlodipine Edema and Eye Swelling    Lisinopril Angioedema     Bilateral periorbital edema that was significant       Objective   Vitals: Blood pressure 125/58, pulse 60, temperature (!) 97.3 °F (36.3 °C), temperature source Oral, resp. rate 16, SpO2 96%, not currently breastfeeding.,There is no height or weight on file to calculate BMI.  No intake or output data in the 24 hours ending 07/09/24 2015  Invasive Devices       Peripheral Intravenous Line  Duration             Peripheral IV 07/09/24 Right;Ventral (anterior) Forearm <1 day              Line  Duration             Hemodialysis AV Fistula 02/16/22 Left Forearm 874 days                    Physical Exam  Constitutional:       Appearance: Normal appearance.   HENT:      Head: Normocephalic.   Cardiovascular:      Rate and Rhythm: Normal rate and regular rhythm.      Pulses: Normal pulses.           Radial pulses are 2+ on the right side and 2+ on the left side.      Comments: Left upper extremity fistula was pulsatile likely due to outflow obstruction.  Thrill was not felt.  Bleeding was slowed to and is and then covered with glue to cease bleeding.  After glue dried, 4 x 4 gauze was placed on top as well as ABD.  Skin:     General: Skin is warm and dry.   Neurological:      General: No focal deficit present.      Mental Status: She is alert.         Lab Results: Coags:   Lab Results   Component Value Date    PTT 31 07/09/2024    INR 1.06 07/09/2024   , Creatinine:   Lab Results   Component Value Date    CREATININE 3.52 (H) 07/09/2024   , Lipid Panel: No results found for: \"CHOL\", CBC " equal to 5%, and Increase PROM to WNL or best possible given pathologies    FUNCTIONAL GOALS:    Put on socks and shoes without restriction or pain.    Patient Instruction/Education:   Patient advised to utilize ice or cold compresses over the involved regions at home to help reduce pain and inflammation as needed. Patient stated understanding of, and was in agreement with, the discussed instructions.    Other treatment options discussed with patient:  Further recommendations will be guided, in part, by the outcome of care. No further recommendations or referrals will be needed unless patient fails to adequately respond to conservative care.       with diff:   Lab Results   Component Value Date    WBC 6.70 07/09/2024    HGB 12.2 07/09/2024    HCT 37.4 07/09/2024    MCV 87 07/09/2024     07/09/2024    RBC 4.28 07/09/2024    MCH 28.5 07/09/2024    MCHC 32.6 07/09/2024    RDW 15.4 (H) 07/09/2024    MPV 10.1 07/09/2024    NRBC 0 07/09/2024   , BMP/CMP:   Lab Results   Component Value Date    SODIUM 132 (L) 07/09/2024    K 3.2 (L) 07/09/2024    CL 90 (L) 07/09/2024    CO2 33 (H) 07/09/2024    BUN 24 07/09/2024    CREATININE 3.52 (H) 07/09/2024    CALCIUM 9.0 07/09/2024    EGFR 12 07/09/2024   , Coags:   Lab Results   Component Value Date    PTT 31 07/09/2024    INR 1.06 07/09/2024     Imaging Studies: I have personally reviewed pertinent reports.    EKG, Pathology, and Other Studies: I have personally reviewed pertinent reports.    VTE Prophylaxis: Sequential compression device (Venodyne)      Code Status: Prior  Advance Directive and Living Will:      Power of :    POLST:      Counseling / Coordination of Care  Counseling/Coordination of Care: Total floor / unit time spent today 30 minutes. Greater than 50% of total time was spent with the patient and / or family counseling and / or coordination of care. A description of the counseling / coordination of care:

## 2025-01-08 ENCOUNTER — APPOINTMENT (OUTPATIENT)
Dept: LAB | Facility: HOSPITAL | Age: 70
End: 2025-01-08
Payer: COMMERCIAL

## 2025-01-08 DIAGNOSIS — Z79.4 TYPE 2 DIABETES MELLITUS WITH DIABETIC POLYNEUROPATHY, WITH LONG-TERM CURRENT USE OF INSULIN (HCC): ICD-10-CM

## 2025-01-08 DIAGNOSIS — I10 BENIGN ESSENTIAL HTN: ICD-10-CM

## 2025-01-08 DIAGNOSIS — E78.5 DYSLIPIDEMIA: ICD-10-CM

## 2025-01-08 DIAGNOSIS — N25.81 HYPERPARATHYROIDISM DUE TO RENAL INSUFFICIENCY (HCC): ICD-10-CM

## 2025-01-08 DIAGNOSIS — E11.42 TYPE 2 DIABETES MELLITUS WITH DIABETIC POLYNEUROPATHY, WITH LONG-TERM CURRENT USE OF INSULIN (HCC): ICD-10-CM

## 2025-01-08 DIAGNOSIS — R94.6 ABNORMAL THYROID FUNCTION TEST: ICD-10-CM

## 2025-01-08 DIAGNOSIS — E55.9 VITAMIN D DEFICIENCY: ICD-10-CM

## 2025-01-08 LAB
25(OH)D3 SERPL-MCNC: 42.4 NG/ML (ref 30–100)
ALBUMIN SERPL BCG-MCNC: 4 G/DL (ref 3.5–5)
ALP SERPL-CCNC: 61 U/L (ref 34–104)
ALT SERPL W P-5'-P-CCNC: 11 U/L (ref 7–52)
ANION GAP SERPL CALCULATED.3IONS-SCNC: 10 MMOL/L (ref 4–13)
AST SERPL W P-5'-P-CCNC: 14 U/L (ref 13–39)
BASOPHILS # BLD AUTO: 0.05 THOUSANDS/ΜL (ref 0–0.1)
BASOPHILS NFR BLD AUTO: 1 % (ref 0–1)
BILIRUB SERPL-MCNC: 0.64 MG/DL (ref 0.2–1)
BUN SERPL-MCNC: 53 MG/DL (ref 5–25)
CALCIUM SERPL-MCNC: 9.3 MG/DL (ref 8.4–10.2)
CHLORIDE SERPL-SCNC: 99 MMOL/L (ref 96–108)
CHOLEST SERPL-MCNC: 169 MG/DL (ref ?–200)
CO2 SERPL-SCNC: 34 MMOL/L (ref 21–32)
CREAT SERPL-MCNC: 6.85 MG/DL (ref 0.6–1.3)
EOSINOPHIL # BLD AUTO: 0.18 THOUSAND/ΜL (ref 0–0.61)
EOSINOPHIL NFR BLD AUTO: 3 % (ref 0–6)
ERYTHROCYTE [DISTWIDTH] IN BLOOD BY AUTOMATED COUNT: 16.1 % (ref 11.6–15.1)
EST. AVERAGE GLUCOSE BLD GHB EST-MCNC: 180 MG/DL
GFR SERPL CREATININE-BSD FRML MDRD: 5 ML/MIN/1.73SQ M
GLUCOSE P FAST SERPL-MCNC: 85 MG/DL (ref 65–99)
HBA1C MFR BLD: 7.9 %
HCT VFR BLD AUTO: 38.5 % (ref 34.8–46.1)
HDLC SERPL-MCNC: 47 MG/DL
HGB BLD-MCNC: 11.7 G/DL (ref 11.5–15.4)
IMM GRANULOCYTES # BLD AUTO: 0.01 THOUSAND/UL (ref 0–0.2)
IMM GRANULOCYTES NFR BLD AUTO: 0 % (ref 0–2)
LDLC SERPL CALC-MCNC: 102 MG/DL (ref 0–100)
LYMPHOCYTES # BLD AUTO: 1.32 THOUSANDS/ΜL (ref 0.6–4.47)
LYMPHOCYTES NFR BLD AUTO: 24 % (ref 14–44)
MCH RBC QN AUTO: 29 PG (ref 26.8–34.3)
MCHC RBC AUTO-ENTMCNC: 30.4 G/DL (ref 31.4–37.4)
MCV RBC AUTO: 95 FL (ref 82–98)
MONOCYTES # BLD AUTO: 0.7 THOUSAND/ΜL (ref 0.17–1.22)
MONOCYTES NFR BLD AUTO: 13 % (ref 4–12)
NEUTROPHILS # BLD AUTO: 3.17 THOUSANDS/ΜL (ref 1.85–7.62)
NEUTS SEG NFR BLD AUTO: 59 % (ref 43–75)
NONHDLC SERPL-MCNC: 122 MG/DL
NRBC BLD AUTO-RTO: 0 /100 WBCS
PLATELET # BLD AUTO: 209 THOUSANDS/UL (ref 149–390)
PMV BLD AUTO: 10.3 FL (ref 8.9–12.7)
POTASSIUM SERPL-SCNC: 5.4 MMOL/L (ref 3.5–5.3)
PROT SERPL-MCNC: 7.4 G/DL (ref 6.4–8.4)
PTH-INTACT SERPL-MCNC: 247.2 PG/ML (ref 12–88)
RBC # BLD AUTO: 4.04 MILLION/UL (ref 3.81–5.12)
SODIUM SERPL-SCNC: 143 MMOL/L (ref 135–147)
T4 FREE SERPL-MCNC: 0.92 NG/DL (ref 0.61–1.12)
TRIGL SERPL-MCNC: 98 MG/DL (ref ?–150)
TSH SERPL DL<=0.05 MIU/L-ACNC: 5.1 UIU/ML (ref 0.45–4.5)
WBC # BLD AUTO: 5.43 THOUSAND/UL (ref 4.31–10.16)

## 2025-01-08 PROCEDURE — 36415 COLL VENOUS BLD VENIPUNCTURE: CPT

## 2025-01-08 PROCEDURE — 83970 ASSAY OF PARATHORMONE: CPT

## 2025-01-08 PROCEDURE — 84443 ASSAY THYROID STIM HORMONE: CPT

## 2025-01-08 PROCEDURE — 80061 LIPID PANEL: CPT

## 2025-01-08 PROCEDURE — 82306 VITAMIN D 25 HYDROXY: CPT

## 2025-01-08 PROCEDURE — 83036 HEMOGLOBIN GLYCOSYLATED A1C: CPT

## 2025-01-08 PROCEDURE — 84439 ASSAY OF FREE THYROXINE: CPT

## 2025-01-08 PROCEDURE — 85025 COMPLETE CBC W/AUTO DIFF WBC: CPT

## 2025-01-08 PROCEDURE — 80053 COMPREHEN METABOLIC PANEL: CPT

## 2025-01-19 DIAGNOSIS — I10 ESSENTIAL (PRIMARY) HYPERTENSION: ICD-10-CM

## 2025-01-21 RX ORDER — HYDRALAZINE HYDROCHLORIDE 100 MG/1
100 TABLET, FILM COATED ORAL EVERY 8 HOURS SCHEDULED
Qty: 270 TABLET | Refills: 3 | Status: SHIPPED | OUTPATIENT
Start: 2025-01-21

## 2025-02-14 ENCOUNTER — TELEPHONE (OUTPATIENT)
Dept: VASCULAR SURGERY | Facility: CLINIC | Age: 70
End: 2025-02-14

## 2025-02-14 ENCOUNTER — OFFICE VISIT (OUTPATIENT)
Dept: VASCULAR SURGERY | Facility: CLINIC | Age: 70
End: 2025-02-14
Payer: COMMERCIAL

## 2025-02-14 VITALS
HEART RATE: 68 BPM | HEIGHT: 61 IN | WEIGHT: 150 LBS | SYSTOLIC BLOOD PRESSURE: 188 MMHG | BODY MASS INDEX: 28.32 KG/M2 | DIASTOLIC BLOOD PRESSURE: 73 MMHG | OXYGEN SATURATION: 98 %

## 2025-02-14 DIAGNOSIS — Z99.2 ESRD (END STAGE RENAL DISEASE) ON DIALYSIS (HCC): ICD-10-CM

## 2025-02-14 DIAGNOSIS — T82.590A MALFUNCTION OF ARTERIOVENOUS DIALYSIS FISTULA, INITIAL ENCOUNTER (HCC): Primary | ICD-10-CM

## 2025-02-14 DIAGNOSIS — N18.6 ESRD (END STAGE RENAL DISEASE) ON DIALYSIS (HCC): ICD-10-CM

## 2025-02-14 DIAGNOSIS — Z99.2 ESRD (END STAGE RENAL DISEASE) ON DIALYSIS (HCC): Primary | ICD-10-CM

## 2025-02-14 DIAGNOSIS — N18.6 ESRD (END STAGE RENAL DISEASE) ON DIALYSIS (HCC): Primary | ICD-10-CM

## 2025-02-14 DIAGNOSIS — N18.6 END STAGE RENAL DISEASE (HCC): ICD-10-CM

## 2025-02-14 PROCEDURE — 99214 OFFICE O/P EST MOD 30 MIN: CPT | Performed by: SURGERY

## 2025-02-14 RX ORDER — CHLORHEXIDINE GLUCONATE ORAL RINSE 1.2 MG/ML
15 SOLUTION DENTAL ONCE
OUTPATIENT
Start: 2025-02-14 | End: 2025-02-14

## 2025-02-14 RX ORDER — CEFAZOLIN SODIUM 2 G/50ML
2000 SOLUTION INTRAVENOUS ONCE
OUTPATIENT
Start: 2025-02-14 | End: 2025-02-14

## 2025-02-14 NOTE — TELEPHONE ENCOUNTER
REMINDER: Under Reason For Call, comments MUST be formatted as:   (Surgeon's Initials) / (Procedure)      Special Instructions / FYI:     Consent: I certify that patient has signed, printed, timed, and dated their surgery consent.  I certify that the patient's LEGAL NAME and DATE OF BIRTH are written in the upper left corner on BOTH sides of the consent.  I certify that BOTH sides of the completed surgery consent have been scanned into the patient's Epic chart by myself on 2/14/2025.  Yes, I have LABELED the consent in Epic as Consent for Vascular Procedure.     For Surgical Clearances     Levels   1-3   ROUTE this encounter to [facility] Clearance Pool (AND)   [facility] Surgery Coordinator Pool     Level   4   ROUTE this encounter to [facility] Surgery Coordinator Pool       HYDRATION CLEARANCES   ONLY ROUTE TO  [facility] Clearance Pool       Yes, I have ROUTED this encounter to [facility] Surgery Coordinator and/or [facility] Clearance Pool.

## 2025-02-14 NOTE — PROGRESS NOTES
Name: Lela Corado      : 1955      MRN: [id number]  Encounter Provider: Lis Phillips DO  Encounter Date: 2025   Encounter department: [facility]  :  Assessment & Plan  Malfunction of arteriovenous dialysis fistula, initial encounter (HCC)    Orders:    Case request operating room: revision of left uper extemity fistula, possible conversion to graft, possible fistula ligation; Standing    Basic metabolic panel; Future    CBC and Platelet; Future    Protime-INR; Future    End stage renal disease ([facility])  Lab Results   Component Value Date    EGFR 5 2025    EGFR 8 (L) 10/30/2024    EGFR 5 (L) 10/29/2024    CREATININE 6.85 (H) 2025    CREATININE 5.61 (H) 10/30/2024    CREATININE 8.44 (H) 10/29/2024            ESRD (end stage renal disease) on dialysis ([facility])  Lab Results   Component Value Date    EGFR 5 2025    EGFR 8 (L) 10/30/2024    EGFR 5 (L) 10/29/2024    CREATININE 6.85 (H) 2025    CREATININE 5.61 (H) 10/30/2024    CREATININE 8.44 (H) 10/29/2024   History of left upper extremity AV fistula created back in .  In 2024 she did undergo a fistulogram.  She is now referred to the office by the dialysis center due to a dry scab along an aneurysmal segment of her fistula.  Patient denies any bleeding from the site.  She denies any trauma to the arm.  Due to potential for catastrophic bleeding if scab dislodged do recommend revision of fistula, possible conversion to graft, possible ligation.  She understands that she will likely require a temporary dialysis catheter following surgery.  All questions answered to her satisfaction.  Today's office visit was facilitated through the Armenian line  #[id number]    Orders:    Protime-INR; Future        History of Present Illness   HPI  Lela Corado is a 69 y.o. female who presents presents to the office at the request of the dialysis center secondary to a scab along the left upper  extremity AV fistula.  She previously underwent left upper extremity fistula superficialization back in September 2022.  She has had any interim a fistula gram in July 2024.  They are currently using the fistula without issues.  On exam she has aneurysmal segments along the upper extremity.  Along one of the aneurysmal segments there is a dry scab that has been present for approximately a week.  There is no surrounding erythema to suggest infection.  There is no fluctuance.  Given concern with dislodgment of the scab with potential catastrophic bleeding I am recommending a revision of the left upper extremity fistula, possible conversion to graft, possible ligation.  We did discuss that should she bleed she can apply digital compression and call 911 immediately.  Today's office visit was facilitated through the [place] line  #[id number]    Pt here for f/u to AVF created 7/10/24. Pt has dialysis on T/Th/Sat at [facility] in [place] for almost 2 yrs. Pt c/o small lump on left arm by dialysis site but no pain.   History obtained from: patient    Review of Systems   Constitutional: Negative.    HENT: Negative.     Eyes: Negative.    Respiratory: Negative.     Cardiovascular: Negative.    Gastrointestinal: Negative.    Endocrine: Negative.    Genitourinary: Negative.    Musculoskeletal: Negative.    Skin:  Positive for wound.   Allergic/Immunologic: Negative.    Neurological: Negative.    Hematological: Negative.    Psychiatric/Behavioral: Negative.       Past Medical History   Past Medical History:   Diagnosis Date    CHF (congestive heart failure) (HCC)     CKD (chronic kidney disease) stage 5, GFR less than 15 ml/min (HCC)     Diabetes mellitus (HCC)     Diabetic nephropathy (HCC)     Hemodialysis patient ([facility])     via permacath right chest / [facility] Tues/Thurs and Sat    Hyperlipidemia     Hypertension     Muscle weakness     Orthodontics     sleeps with retainer at night    Risk for falls     Uses walker     per  dtr active in the house able to cook/light cleaning as able     Past Surgical History:   Procedure Laterality Date    CATARACT EXTRACTION Bilateral     EGD      IR AV FISTULAGRAM/GRAFTOGRAM  5/9/2022    IR AV FISTULAGRAM/GRAFTOGRAM  3/31/2023    IR AV FISTULAGRAM/GRAFTOGRAM  8/9/2023    IR AV FISTULAGRAM/GRAFTOGRAM  3/13/2024    IR AV FISTULAGRAM/GRAFTOGRAM  7/10/2024    IR BIOPSY BONE MARROW  9/15/2021    IR BIOPSY KIDNEY RANDOM  9/15/2021    IR NON-TUNNELED CENTRAL LINE PLACEMENT  12/7/2021    IR TUNNELED CENTRAL LINE CHECK/CHANGE/REPOSITION  4/1/2022    IR TUNNELED DIALYSIS CATHETER PLACEMENT  12/9/2021    IR TUNNELED DIALYSIS CATHETER REMOVAL  10/21/2022    [place] ARTERIOVENOUS ANASTOMOSIS OPEN DIRECT Left 2/16/2022    Procedure: CREATION FISTULA ARTERIOVENOUS (AV);  Surgeon: Lis Phillips DO;  Location: [facility];  Service: Vascular    NY REVJ OPN ARVEN FSTL W/O THRMBC DIAL GRF Left 9/2/2022    Procedure: REVISION AV FISTULA (superficalize/transposition);  Surgeon: Lis Phillips DO;  Location: [facility];  Service: Vascular     Family History   Problem Relation Age of Onset    Hypertension Mother     Diabetes Father     Hypertension Sister     Diabetes Sister     Hypertension Brother       reports that she has never smoked. She has never been exposed to tobacco smoke. She has never used smokeless tobacco. She reports that she does not currently use alcohol. She reports that she does not currently use drugs.  Current Outpatient Medications on File Prior to Visit   Medication Sig Dispense Refill    atorvastatin (LIPITOR) 40 mg tablet Take 1 tablet (40 mg total) by mouth daily 90 tablet 3    B Complex-C-Folic Acid (Renal Vitamin) 0.8 MG TABS Take 1 tablet (0.8 mg total) by mouth in the morning 90 tablet 3    calcium acetate (PHOSLO) capsule Take 2 capsules (1,334 mg total) by mouth 3 (three) times a day with meals 270 capsule 3    carvedilol (COREG) 25 mg tablet Take 1 tablet (25 mg total) by mouth 2 (two)  times a day with meals 180 tablet 3    cloNIDine (CATAPRES) 0.3 mg tablet Take 1 tablet (0.3 mg total) by mouth 3 (three) times a day 270 tablet 4    doxazosin (CARDURA) 4 mg tablet Take 1 tablet (4 mg total) by mouth 2 (two) times a day 180 tablet 3    ergocalciferol (ERGOCALCIFEROL) 1.25 MG (49287 UT) capsule Take 50,000 Units by mouth every 30 (thirty) days        gabapentin (NEURONTIN) 300 mg capsule Take 1 capsule (300 mg total) by mouth daily at bedtime 30 capsule 0    hydrALAZINE (APRESOLINE) 100 MG tablet TAKE 1 TABLET (100 MG TOTAL) BY MOUTH EVERY 8 (EIGHT) HOURS 270 tablet 3    Insulin Lispro-aabc, 1 U Dial, (Lyumjev KwikPen) 100 UNIT/ML SOPN Inject 6u sc with breakfast/lunch/dinner.      Sodium Zirconium Cyclosilicate (Lokelma) 10 g Take 1 packet (10 g total) by mouth daily Take one pack as directed on Monday, Wednesday, Friday and sunday 90 each 3    Tenapanor HCl, CKD, 30 MG TABS Take 1 tablet by mouth 2 (two) times a day 180 tablet 3    torsemide (DEMADEX) 100 mg tablet TAKE 1 TABLET (100 MG TOTAL) BY MOUTH DAILY 90 tablet 3    Glucagon (Gvoke HypoPen 2-Pack) 1 MG/0.2ML SOAJ Inject 1 mg under the skin      linaGLIPtin (Tradjenta) 5 MG TABS Take 5 mg by mouth daily      [DISCONTINUED] cloNIDine (CATAPRES-TTS-3) 0.3 mg/24 hr Place 1 patch (0.3 mg total) on the skin once a week 4 patch 0     No current facility-administered medications on file prior to visit.     Allergies   Allergen Reactions    Amlodipine Edema and Eye Swelling    Lisinopril Angioedema     Bilateral periorbital edema that was significant      Current Outpatient Medications on File Prior to Visit   Medication Sig Dispense Refill    atorvastatin (LIPITOR) 40 mg tablet Take 1 tablet (40 mg total) by mouth daily 90 tablet 3    B Complex-C-Folic Acid (Renal Vitamin) 0.8 MG TABS Take 1 tablet (0.8 mg total) by mouth in the morning 90 tablet 3    calcium acetate (PHOSLO) capsule Take 2 capsules (1,334 mg total) by mouth 3 (three) times a day  "with meals 270 capsule 3    carvedilol (COREG) 25 mg tablet Take 1 tablet (25 mg total) by mouth 2 (two) times a day with meals 180 tablet 3    cloNIDine (CATAPRES) 0.3 mg tablet Take 1 tablet (0.3 mg total) by mouth 3 (three) times a day 270 tablet 4    doxazosin (CARDURA) 4 mg tablet Take 1 tablet (4 mg total) by mouth 2 (two) times a day 180 tablet 3    ergocalciferol (ERGOCALCIFEROL) 1.25 MG (17182 UT) capsule Take 50,000 Units by mouth every 30 (thirty) days        gabapentin (NEURONTIN) 300 mg capsule Take 1 capsule (300 mg total) by mouth daily at bedtime 30 capsule 0    hydrALAZINE (APRESOLINE) 100 MG tablet TAKE 1 TABLET (100 MG TOTAL) BY MOUTH EVERY 8 (EIGHT) HOURS 270 tablet 3    Insulin Lispro-aabc, 1 U Dial, (Lyumjev KwikPen) 100 UNIT/ML SOPN Inject 6u sc with breakfast/lunch/dinner.      Sodium Zirconium Cyclosilicate (Lokelma) 10 g Take 1 packet (10 g total) by mouth daily Take one pack as directed on Monday, Wednesday, Friday and sunday 90 each 3    Tenapanor HCl, CKD, 30 MG TABS Take 1 tablet by mouth 2 (two) times a day 180 tablet 3    torsemide (DEMADEX) 100 mg tablet TAKE 1 TABLET (100 MG TOTAL) BY MOUTH DAILY 90 tablet 3    Glucagon (Gvoke HypoPen 2-Pack) 1 MG/0.2ML SOAJ Inject 1 mg under the skin      linaGLIPtin (Tradjenta) 5 MG TABS Take 5 mg by mouth daily      [DISCONTINUED] cloNIDine (CATAPRES-TTS-3) 0.3 mg/24 hr Place 1 patch (0.3 mg total) on the skin once a week 4 patch 0     No current facility-administered medications on file prior to visit.      Social History     Tobacco Use    Smoking status: Never     Passive exposure: Never    Smokeless tobacco: Never    Tobacco comments:     NO TOBACCO USE   Vaping Use    Vaping status: Never Used   Substance and Sexual Activity    Alcohol use: Not Currently    Drug use: Not Currently    Sexual activity: Not Currently     Comment: defer        Objective   BP (!) 188/73 (BP Location: Right arm, Patient Position: Sitting)   Pulse 68   Ht 5' 1\" " (1.549 m)   Wt 68 kg (150 lb)   SpO2 98%   BMI 28.34 kg/m²      Physical Exam  Constitutional:       General: She is not in acute distress.     Appearance: She is well-developed. She is not ill-appearing, toxic-appearing or diaphoretic.   HENT:      Head: Normocephalic and atraumatic.   Eyes:      General: No scleral icterus.     Conjunctiva/sclera: Conjunctivae normal.   Neck:      Trachea: No tracheal deviation.   Cardiovascular:      Rate and Rhythm: Normal rate and regular rhythm.      Heart sounds: Normal heart sounds.      Comments: Left upper extremity with good thrill along the antecubital fossa however becomes more pulsatile as removed centrally towards the shoulder.  Pulmonary:      Effort: Pulmonary effort is normal.      Breath sounds: Normal breath sounds.   Abdominal:      General: There is no distension.      Palpations: Abdomen is soft. There is no mass (no appreciable aortic pulsation/aneurysm).      Tenderness: There is no abdominal tenderness. There is no guarding or rebound.   Musculoskeletal:         General: Normal range of motion.      Cervical back: Normal range of motion and neck supple.   Skin:     General: Skin is warm and dry.   Neurological:      Mental Status: She is alert and oriented to person, place, and time.   Psychiatric:         Behavior: Behavior normal.               Administrative Statements   I have spent a total time of 30 minutes in caring for this patient on the day of the visit/encounter including Diagnostic results, Prognosis, Risks and benefits of tx options, Instructions for management, Patient and family education, Importance of tx compliance, Risk factor reductions, Impressions, Counseling / Coordination of care, Documenting in the medical record, Reviewing / ordering tests, medicine, procedures  , and Obtaining or reviewing history  .         Operative Scheduling Information:    Hospital:  [facility]    Physician:  Jacqueline    Surgery: Revision of left upper  extremity fistula, possible conversion to graft, possible ligation    Urgency:  Standard    Level:  Level 3: Outpatients to be scheduled for elective surgery than can be delayed up to 4 weeks without reasonable expectation of detriment to patient    Case Length:  3 hours    Post-op Bed:  Outpatient    OR Table:  Standard    Equipment Needs:  Product/Implant: Tapered 4-7 mm [place] graft    Medication Instructions:  None    Hydration:  No    Contrast Allergy:  no

## 2025-02-14 NOTE — ASSESSMENT & PLAN NOTE
Lab Results   Component Value Date    EGFR 5 01/08/2025    EGFR 8 (L) 10/30/2024    EGFR 5 (L) 10/29/2024    CREATININE 6.85 (H) 01/08/2025    CREATININE 5.61 (H) 10/30/2024    CREATININE 8.44 (H) 10/29/2024   History of left upper extremity AV fistula created back in 2022.  In July 2024 she did undergo a fistulogram.  She is now referred to the office by the dialysis center due to a dry scab along an aneurysmal segment of her fistula.  Patient denies any bleeding from the site.  She denies any trauma to the arm.  Due to potential for catastrophic bleeding if scab dislodged do recommend revision of fistula, possible conversion to graft, possible ligation.  She understands that she will likely require a temporary dialysis catheter following surgery.  All questions answered to her satisfaction.  Today's office visit was facilitated through the [place] line  #[id number]    Orders:    Protime-INR; Future

## 2025-02-17 ENCOUNTER — PREP FOR PROCEDURE (OUTPATIENT)
Dept: VASCULAR SURGERY | Facility: CLINIC | Age: 70
End: 2025-02-17

## 2025-02-17 NOTE — TELEPHONE ENCOUNTER
Verified patient's insurance   CONFIRMED - Patient's insurance is [employer]  Is patient requesting a call when authorization has been obtained? Patient did not request a call.    Surgery Date: 3/10/25  Primary Surgeon: JOSEPH Mcguire/ Lis Moran (NPI: [id number])  Assisting Surgeon: Not Applicable (N/A)  Facility: [facility] (Tax: [id number] / NPI: [id number])  Inpatient / Outpatient: Outpatient  Level: 3    Clearance Received: No clearance ordered.  Consent Received: Yes, scanned into Epic on 2/14/25.  Medication Hold / Last Dose: Not Applicable (N/A)  IR Notified: Not Applicable (N/A)  Rep. Notified: Not Applicable (N/A)  Equipment Needs:  4-7mm Tapered GORE graft  Vas Lab Requested: Not Applicable (N/A)  Patient Contacted: 2/17/25 - Ely YANCEY & HARESH     Diagnosis: N18.6  Procedure/ CPT Code(s): REVISION of Arteriovenous Graft withOUT Thrombosis // CPT: 87193    For varicose vein related procedures:   Last LEVDR: Not Applicable (N/A)  CEAP Classification: Not Applicable (N/A)  VCSS: Not Applicable (N/A)    Post Operative Date/ Time: 3/25/25 , 230p [facility] with Lis Moran (NPI: [id number])     *Please review medication hold(s), PATs, and check H&P with patient.*  PATIENT WAS MAILED SURGERY/SHOWERING/DISCHARGE/COVID INSTRUCTIONS AFTER REVIEWING WITH THEM VIA PHONE CALL.

## 2025-02-19 NOTE — TELEPHONE ENCOUNTER
Received call from pt asking if she can have her pre-op blood work done at [facility].  Lab work faxed to [facility] at .  Attempted to notify lab at number provided but was unable to reach anyone.    Phone for [facility] [phone]        Jennifer #[id number]

## 2025-02-20 DIAGNOSIS — I50.33 ACUTE ON CHRONIC DIASTOLIC HEART FAILURE (HCC): ICD-10-CM

## 2025-02-21 RX ORDER — CLONIDINE HYDROCHLORIDE 0.3 MG/1
0.3 TABLET ORAL 3 TIMES DAILY
Qty: 270 TABLET | Refills: 4 | Status: SHIPPED | OUTPATIENT
Start: 2025-02-21

## 2025-02-24 ENCOUNTER — APPOINTMENT (OUTPATIENT)
Dept: LAB | Facility: HOSPITAL | Age: 70
End: 2025-02-24
Payer: COMMERCIAL

## 2025-02-24 DIAGNOSIS — T82.590A MALFUNCTION OF ARTERIOVENOUS DIALYSIS FISTULA, INITIAL ENCOUNTER (HCC): ICD-10-CM

## 2025-02-24 DIAGNOSIS — N18.6 ESRD (END STAGE RENAL DISEASE) ON DIALYSIS (HCC): ICD-10-CM

## 2025-02-24 DIAGNOSIS — Z99.2 ESRD (END STAGE RENAL DISEASE) ON DIALYSIS (HCC): ICD-10-CM

## 2025-02-24 LAB
ANION GAP SERPL CALCULATED.3IONS-SCNC: 9 MMOL/L (ref 4–13)
BUN SERPL-MCNC: 48 MG/DL (ref 5–25)
CALCIUM SERPL-MCNC: 8.1 MG/DL (ref 8.4–10.2)
CHLORIDE SERPL-SCNC: 99 MMOL/L (ref 96–108)
CO2 SERPL-SCNC: 31 MMOL/L (ref 21–32)
CREAT SERPL-MCNC: 7.76 MG/DL (ref 0.6–1.3)
ERYTHROCYTE [DISTWIDTH] IN BLOOD BY AUTOMATED COUNT: 14.9 % (ref 11.6–15.1)
GFR SERPL CREATININE-BSD FRML MDRD: 4 ML/MIN/1.73SQ M
GLUCOSE SERPL-MCNC: 165 MG/DL (ref 65–140)
HCT VFR BLD AUTO: 35.8 % (ref 34.8–46.1)
HGB BLD-MCNC: 11.2 G/DL (ref 11.5–15.4)
INR PPP: 0.97 (ref 0.85–1.19)
MCH RBC QN AUTO: 29.2 PG (ref 26.8–34.3)
MCHC RBC AUTO-ENTMCNC: 31.3 G/DL (ref 31.4–37.4)
MCV RBC AUTO: 94 FL (ref 82–98)
PLATELET # BLD AUTO: 221 THOUSANDS/UL (ref 149–390)
PMV BLD AUTO: 10.2 FL (ref 8.9–12.7)
POTASSIUM SERPL-SCNC: 5.1 MMOL/L (ref 3.5–5.3)
PROTHROMBIN TIME: 13.1 SECONDS (ref 12.3–15)
RBC # BLD AUTO: 3.83 MILLION/UL (ref 3.81–5.12)
SODIUM SERPL-SCNC: 139 MMOL/L (ref 135–147)
WBC # BLD AUTO: 5.17 THOUSAND/UL (ref 4.31–10.16)

## 2025-02-24 PROCEDURE — 85027 COMPLETE CBC AUTOMATED: CPT

## 2025-02-24 PROCEDURE — 36415 COLL VENOUS BLD VENIPUNCTURE: CPT

## 2025-02-24 PROCEDURE — 80048 BASIC METABOLIC PNL TOTAL CA: CPT

## 2025-02-24 PROCEDURE — 85610 PROTHROMBIN TIME: CPT

## 2025-02-25 DIAGNOSIS — N18.6 ESRD (END STAGE RENAL DISEASE) ON DIALYSIS (HCC): ICD-10-CM

## 2025-02-25 DIAGNOSIS — Z99.2 ESRD (END STAGE RENAL DISEASE) ON DIALYSIS (HCC): ICD-10-CM

## 2025-02-25 DIAGNOSIS — E83.39 HYPERPHOSPHATEMIA: ICD-10-CM

## 2025-03-03 ENCOUNTER — APPOINTMENT (OUTPATIENT)
Dept: LAB | Facility: HOSPITAL | Age: 70
End: 2025-03-03
Payer: COMMERCIAL

## 2025-03-03 DIAGNOSIS — N18.6 ESRD (END STAGE RENAL DISEASE) ON DIALYSIS (HCC): ICD-10-CM

## 2025-03-03 DIAGNOSIS — Z99.2 ESRD (END STAGE RENAL DISEASE) ON DIALYSIS (HCC): ICD-10-CM

## 2025-03-03 LAB
ATRIAL RATE: 70 BPM
P AXIS: 34 DEGREES
PR INTERVAL: 174 MS
QRS AXIS: 47 DEGREES
QRSD INTERVAL: 90 MS
QT INTERVAL: 504 MS
QTC INTERVAL: 544 MS
T WAVE AXIS: 157 DEGREES
VENTRICULAR RATE: 70 BPM

## 2025-03-03 PROCEDURE — 93010 ELECTROCARDIOGRAM REPORT: CPT | Performed by: STUDENT IN AN ORGANIZED HEALTH CARE EDUCATION/TRAINING PROGRAM

## 2025-03-06 RX ORDER — INSULIN GLARGINE 100 [IU]/ML
INJECTION, SOLUTION SUBCUTANEOUS
COMMUNITY
Start: 2025-01-29

## 2025-03-06 RX ORDER — OMEPRAZOLE 20 MG/1
1 CAPSULE, DELAYED RELEASE ORAL DAILY PRN
COMMUNITY
Start: 2025-02-18

## 2025-03-06 RX ORDER — EZETIMIBE 10 MG/1
10 TABLET ORAL DAILY
COMMUNITY

## 2025-03-06 NOTE — PRE-PROCEDURE INSTRUCTIONS
Pre-Surgery Instructions:   Medication Instructions    atorvastatin (LIPITOR) 40 mg tablet Take day of surgery.    B Complex-C-Folic Acid (Renal Vitamin) 0.8 MG TABS Hold day of surgery.    calcium acetate (PHOSLO) capsule Hold day of surgery.    carvedilol (COREG) 25 mg tablet Take day of surgery.    cloNIDine (CATAPRES) 0.3 mg tablet Take day of surgery.    doxazosin (CARDURA) 4 mg tablet Take day of surgery.    ergocalciferol (ERGOCALCIFEROL) 1.25 MG (20184 UT) capsule Takes monthly LD 2/25  will not take day of surgery    gabapentin (NEURONTIN) 300 mg capsule Take night before surgery    hydrALAZINE (APRESOLINE) 100 MG tablet Take day of surgery.    Insulin Lispro-aabc, 1 U Dial, (Lyumjev KwikPen) 100 UNIT/ML SOPN Hold day of surgery.    Lantus SoloStar 100 units/mL SOPN Take day of surgery.    linaGLIPtin (Tradjenta) 5 MG TABS Hold day of surgery.    Sodium Zirconium Cyclosilicate (Lokelma) 10 g Hold day of surgery.    torsemide (DEMADEX) 100 mg tablet Hold day of surgery.    ezetimibe (ZETIA) 10 mg tablet Take day of surgery.   Screening call with assistance of [employer]  # [phone].    Medication instructions for day of surgery reviewed. Please take all instructed medications with only a sip of water.       You will receive a call one business day prior to surgery with an arrival time and hospital directions. If your surgery is scheduled on a Monday, the hospital will be calling you on the Friday prior to your surgery. If you have not heard from anyone by 8pm, please call the hospital supervisor through the hospital  at [phone]. ([place] [phone] or [place] [phone]).    Do not eat or drink anything after midnight the night before your surgery, including candy, mints, lifesavers, or chewing gum. Do not drink alcohol 24hrs before your surgery. Try not to smoke at least 24hrs before your surgery.       Follow the pre surgery showering instructions as listed in the “My Surgical  Experience Booklet” or otherwise provided by your surgeon's office. Do not use a blade to shave the surgical area 1 week before surgery. It is okay to use a clean electric clippers up to 24 hours before surgery. Do not apply any lotions, creams, including makeup, cologne, deodorant, or perfumes after showering on the day of your surgery. Do not use dry shampoo, hair spray, hair gel, or any type of hair products.     No contact lenses, eye make-up, or artificial eyelashes. Remove nail polish, including gel polish, and any artificial, gel, or acrylic nails if possible. Remove all jewelry including rings and body piercing jewelry.     Wear causal clothing that is easy to take on and off. Consider your type of surgery.    Keep any valuables, jewelry, piercings at home. Please bring any specially ordered equipment (sling, braces) if indicated.    Arrange for a responsible person to drive you to and from the hospital on the day of your surgery. Please confirm the visitor policy for the day of your procedure when you receive your phone call with an arrival time.     Call the surgeon's office with any new illnesses, exposures, or additional questions prior to surgery.    Please reference your “My Surgical Experience Booklet” for additional information to prepare for your upcoming surgery.

## 2025-03-07 RX ORDER — PROMETHAZINE HYDROCHLORIDE 25 MG/ML
12.5 INJECTION, SOLUTION INTRAMUSCULAR; INTRAVENOUS ONCE AS NEEDED
Status: CANCELLED | OUTPATIENT
Start: 2025-03-07

## 2025-03-07 RX ORDER — ONDANSETRON 2 MG/ML
4 INJECTION INTRAMUSCULAR; INTRAVENOUS ONCE AS NEEDED
Status: CANCELLED | OUTPATIENT
Start: 2025-03-07

## 2025-03-07 RX ORDER — FENTANYL CITRATE/PF 50 MCG/ML
50 SYRINGE (ML) INJECTION
Refills: 0 | Status: CANCELLED | OUTPATIENT
Start: 2025-03-07

## 2025-03-07 RX ORDER — HYDROMORPHONE HCL/PF 1 MG/ML
0.5 SYRINGE (ML) INJECTION
Refills: 0 | Status: CANCELLED | OUTPATIENT
Start: 2025-03-07

## 2025-03-10 ENCOUNTER — HOSPITAL ENCOUNTER (OUTPATIENT)
Facility: HOSPITAL | Age: 70
Setting detail: OUTPATIENT SURGERY
Discharge: HOME | End: 2025-03-10
Attending: SURGERY | Admitting: SURGERY
Payer: COMMERCIAL

## 2025-03-10 LAB — GLUCOSE SERPL-MCNC: 102 MG/DL (ref 65–140)

## 2025-03-10 PROCEDURE — 82948 REAGENT STRIP/BLOOD GLUCOSE: CPT

## 2025-03-10 RX ORDER — CHLORHEXIDINE GLUCONATE ORAL RINSE 1.2 MG/ML
15 SOLUTION DENTAL ONCE
Status: DISCONTINUED | OUTPATIENT
Start: 2025-03-10 | End: 2025-03-10 | Stop reason: HOSPADM

## 2025-03-10 RX ORDER — SODIUM CHLORIDE 9 MG/ML
50 INJECTION, SOLUTION INTRAVENOUS CONTINUOUS
Status: DISCONTINUED | OUTPATIENT
Start: 2025-03-10 | End: 2025-03-10 | Stop reason: HOSPADM

## 2025-03-10 RX ORDER — CEFAZOLIN SODIUM 2 G/50ML
2000 SOLUTION INTRAVENOUS ONCE
Status: DISCONTINUED | OUTPATIENT
Start: 2025-03-10 | End: 2025-03-10 | Stop reason: HOSPADM

## 2025-03-10 NOTE — PROGRESS NOTES
Received message from vascular office that case is to be cancelled due to Surgeon being sick. Office will reach out to pt to reschedule. Information relayed to pt, pt understands.

## 2025-03-13 ENCOUNTER — ANESTHESIA EVENT (OUTPATIENT)
Dept: PERIOP | Facility: HOSPITAL | Age: 70
DRG: 264 | End: 2025-03-13
Payer: COMMERCIAL

## 2025-03-19 ENCOUNTER — APPOINTMENT (INPATIENT)
Dept: RADIOLOGY | Facility: HOSPITAL | Age: 70
DRG: 264 | End: 2025-03-19
Payer: COMMERCIAL

## 2025-03-19 ENCOUNTER — HOSPITAL ENCOUNTER (INPATIENT)
Facility: HOSPITAL | Age: 70
LOS: 1 days | Discharge: HOME | DRG: 264 | End: 2025-03-20
Attending: SURGERY | Admitting: SURGERY
Payer: COMMERCIAL

## 2025-03-19 ENCOUNTER — ANESTHESIA (OUTPATIENT)
Dept: PERIOP | Facility: HOSPITAL | Age: 70
DRG: 264 | End: 2025-03-19
Payer: COMMERCIAL

## 2025-03-19 DIAGNOSIS — Z99.2 ESRD (END STAGE RENAL DISEASE) ON DIALYSIS (HCC): ICD-10-CM

## 2025-03-19 DIAGNOSIS — N18.6 ESRD (END STAGE RENAL DISEASE) ON DIALYSIS (HCC): ICD-10-CM

## 2025-03-19 DIAGNOSIS — T82.9XXD COMPLICATION OF ARTERIOVENOUS DIALYSIS FISTULA, SUBSEQUENT ENCOUNTER: Primary | ICD-10-CM

## 2025-03-19 PROBLEM — M89.9 CHRONIC KIDNEY DISEASE-MINERAL AND BONE DISORDER: Status: ACTIVE | Noted: 2025-03-19

## 2025-03-19 PROBLEM — N18.5 CHRONIC KIDNEY DISEASE-MINERAL BONE DISORDER (CKD-MBD) WITH STAGE 5 CHRONIC KIDNEY DISEASE, ON CHRONIC DIALYSIS (HCC): Status: ACTIVE | Noted: 2025-03-19

## 2025-03-19 PROBLEM — N18.9 CHRONIC KIDNEY DISEASE-MINERAL AND BONE DISORDER: Status: ACTIVE | Noted: 2025-03-19

## 2025-03-19 PROBLEM — N25.81 SECONDARY HYPERPARATHYROIDISM (HCC): Status: ACTIVE | Noted: 2025-03-19

## 2025-03-19 PROBLEM — E87.5 HYPERKALEMIA: Status: ACTIVE | Noted: 2025-03-19

## 2025-03-19 PROBLEM — D63.1 ANEMIA DUE TO CHRONIC KIDNEY DISEASE, ON CHRONIC DIALYSIS (HCC): Status: ACTIVE | Noted: 2025-03-19

## 2025-03-19 PROBLEM — E83.9 CHRONIC KIDNEY DISEASE-MINERAL BONE DISORDER (CKD-MBD) WITH STAGE 5 CHRONIC KIDNEY DISEASE, ON CHRONIC DIALYSIS (HCC): Status: ACTIVE | Noted: 2025-03-19

## 2025-03-19 PROBLEM — E83.9 CHRONIC KIDNEY DISEASE-MINERAL AND BONE DISORDER: Status: ACTIVE | Noted: 2025-03-19

## 2025-03-19 PROBLEM — M89.9 CHRONIC KIDNEY DISEASE-MINERAL BONE DISORDER (CKD-MBD) WITH STAGE 5 CHRONIC KIDNEY DISEASE, ON CHRONIC DIALYSIS (HCC): Status: ACTIVE | Noted: 2025-03-19

## 2025-03-19 LAB
ABO GROUP BLD: NORMAL
ABO GROUP BLD: NORMAL
ANION GAP SERPL CALCULATED.3IONS-SCNC: 4 MMOL/L (ref 4–13)
BLD GP AB SCN SERPL QL: POSITIVE
BLOOD GROUP ANTIBODIES SERPL: NORMAL
BUN SERPL-MCNC: 40 MG/DL (ref 5–25)
CALCIUM SERPL-MCNC: 7.8 MG/DL (ref 8.4–10.2)
CHLORIDE SERPL-SCNC: 98 MMOL/L (ref 96–108)
CO2 SERPL-SCNC: 32 MMOL/L (ref 21–32)
CREAT SERPL-MCNC: 6.55 MG/DL (ref 0.6–1.3)
GFR SERPL CREATININE-BSD FRML MDRD: 5 ML/MIN/1.73SQ M
GLUCOSE P FAST SERPL-MCNC: 96 MG/DL (ref 65–99)
GLUCOSE SERPL-MCNC: 133 MG/DL (ref 65–140)
GLUCOSE SERPL-MCNC: 136 MG/DL (ref 65–140)
GLUCOSE SERPL-MCNC: 149 MG/DL (ref 65–140)
GLUCOSE SERPL-MCNC: 40 MG/DL (ref 65–140)
GLUCOSE SERPL-MCNC: 61 MG/DL (ref 65–140)
GLUCOSE SERPL-MCNC: 71 MG/DL (ref 65–140)
GLUCOSE SERPL-MCNC: 96 MG/DL (ref 65–140)
POTASSIUM SERPL-SCNC: 5.9 MMOL/L (ref 3.5–5.3)
RH BLD: POSITIVE
RH BLD: POSITIVE
SODIUM SERPL-SCNC: 134 MMOL/L (ref 135–147)
SPECIMEN EXPIRATION DATE: NORMAL

## 2025-03-19 PROCEDURE — 99222 1ST HOSP IP/OBS MODERATE 55: CPT | Performed by: INTERNAL MEDICINE

## 2025-03-19 PROCEDURE — 36558 INSERT TUNNELED CV CATH: CPT | Performed by: RADIOLOGY

## 2025-03-19 PROCEDURE — 86922 COMPATIBILITY TEST ANTIGLOB: CPT

## 2025-03-19 PROCEDURE — 86901 BLOOD TYPING SEROLOGIC RH(D): CPT

## 2025-03-19 PROCEDURE — 99152 MOD SED SAME PHYS/QHP 5/>YRS: CPT | Performed by: RADIOLOGY

## 2025-03-19 PROCEDURE — 99223 1ST HOSP IP/OBS HIGH 75: CPT | Performed by: STUDENT IN AN ORGANIZED HEALTH CARE EDUCATION/TRAINING PROGRAM

## 2025-03-19 PROCEDURE — 76937 US GUIDE VASCULAR ACCESS: CPT | Performed by: RADIOLOGY

## 2025-03-19 PROCEDURE — C1768 GRAFT, VASCULAR: HCPCS | Performed by: SURGERY

## 2025-03-19 PROCEDURE — NC001 PR NO CHARGE: Performed by: RADIOLOGY

## 2025-03-19 PROCEDURE — 86850 RBC ANTIBODY SCREEN: CPT

## 2025-03-19 PROCEDURE — NC001 PR NO CHARGE: Performed by: PHYSICIAN ASSISTANT

## 2025-03-19 PROCEDURE — 86900 BLOOD TYPING SEROLOGIC ABO: CPT

## 2025-03-19 PROCEDURE — 80048 BASIC METABOLIC PNL TOTAL CA: CPT

## 2025-03-19 PROCEDURE — 36558 INSERT TUNNELED CV CATH: CPT

## 2025-03-19 PROCEDURE — C1750 CATH, HEMODIALYSIS,LONG-TERM: HCPCS

## 2025-03-19 PROCEDURE — C1769 GUIDE WIRE: HCPCS

## 2025-03-19 PROCEDURE — 02H633Z INSERTION OF INFUSION DEVICE INTO RIGHT ATRIUM, PERCUTANEOUS APPROACH: ICD-10-PCS | Performed by: RADIOLOGY

## 2025-03-19 PROCEDURE — NC001 PR NO CHARGE: Performed by: SURGERY

## 2025-03-19 PROCEDURE — 87081 CULTURE SCREEN ONLY: CPT | Performed by: SURGERY

## 2025-03-19 PROCEDURE — 77001 FLUOROGUIDE FOR VEIN DEVICE: CPT | Performed by: RADIOLOGY

## 2025-03-19 PROCEDURE — 03180JD BYPASS LEFT BRACHIAL ARTERY TO UPPER ARM VEIN WITH SYNTHETIC SUBSTITUTE, OPEN APPROACH: ICD-10-PCS | Performed by: SURGERY

## 2025-03-19 PROCEDURE — 36832 AV FISTULA REVISION OPEN: CPT | Performed by: SURGERY

## 2025-03-19 PROCEDURE — 82948 REAGENT STRIP/BLOOD GLUCOSE: CPT

## 2025-03-19 PROCEDURE — 86921 COMPATIBILITY TEST INCUBATE: CPT

## 2025-03-19 PROCEDURE — 86870 RBC ANTIBODY IDENTIFICATION: CPT | Performed by: SURGERY

## 2025-03-19 PROCEDURE — C1894 INTRO/SHEATH, NON-LASER: HCPCS

## 2025-03-19 DEVICE — PROPATEN VASCULAR GRAFT SW 4-7MMX45CM TAPERED HEPARIN
Type: IMPLANTABLE DEVICE | Site: ARM | Status: FUNCTIONAL
Brand: GORE PROPATEN VASCULAR GRAFT

## 2025-03-19 RX ORDER — INSULIN LISPRO 100 [IU]/ML
1-5 INJECTION, SOLUTION INTRAVENOUS; SUBCUTANEOUS
Status: DISCONTINUED | OUTPATIENT
Start: 2025-03-20 | End: 2025-03-20 | Stop reason: HOSPADM

## 2025-03-19 RX ORDER — PROPOFOL 10 MG/ML
INJECTION, EMULSION INTRAVENOUS AS NEEDED
Status: DISCONTINUED | OUTPATIENT
Start: 2025-03-19 | End: 2025-03-19

## 2025-03-19 RX ORDER — SODIUM CHLORIDE 9 MG/ML
75 INJECTION, SOLUTION INTRAVENOUS CONTINUOUS
Status: DISCONTINUED | OUTPATIENT
Start: 2025-03-19 | End: 2025-03-19

## 2025-03-19 RX ORDER — HEPARIN SODIUM 5000 [USP'U]/ML
5000 INJECTION, SOLUTION INTRAVENOUS; SUBCUTANEOUS EVERY 8 HOURS SCHEDULED
Status: DISCONTINUED | OUTPATIENT
Start: 2025-03-20 | End: 2025-03-20 | Stop reason: HOSPADM

## 2025-03-19 RX ORDER — HYDROMORPHONE HCL/PF 1 MG/ML
0.5 SYRINGE (ML) INJECTION
Refills: 0 | Status: DISCONTINUED | OUTPATIENT
Start: 2025-03-19 | End: 2025-03-19 | Stop reason: HOSPADM

## 2025-03-19 RX ORDER — MAGNESIUM HYDROXIDE 1200 MG/15ML
LIQUID ORAL AS NEEDED
Status: DISCONTINUED | OUTPATIENT
Start: 2025-03-19 | End: 2025-03-19 | Stop reason: HOSPADM

## 2025-03-19 RX ORDER — MIDAZOLAM HYDROCHLORIDE 2 MG/2ML
INJECTION, SOLUTION INTRAMUSCULAR; INTRAVENOUS AS NEEDED
Status: DISCONTINUED | OUTPATIENT
Start: 2025-03-19 | End: 2025-03-19

## 2025-03-19 RX ORDER — LIDOCAINE HCL/PF 100 MG/5ML
SYRINGE (ML) INJECTION AS NEEDED
Status: DISCONTINUED | OUTPATIENT
Start: 2025-03-19 | End: 2025-03-19

## 2025-03-19 RX ORDER — INSULIN LISPRO 100 [IU]/ML
1-5 INJECTION, SOLUTION INTRAVENOUS; SUBCUTANEOUS
Status: DISCONTINUED | OUTPATIENT
Start: 2025-03-19 | End: 2025-03-19

## 2025-03-19 RX ORDER — ACETAMINOPHEN 325 MG/1
975 TABLET ORAL EVERY 6 HOURS PRN
Status: DISCONTINUED | OUTPATIENT
Start: 2025-03-19 | End: 2025-03-20 | Stop reason: HOSPADM

## 2025-03-19 RX ORDER — HEPARIN SODIUM 5000 [USP'U]/ML
5000 INJECTION, SOLUTION INTRAVENOUS; SUBCUTANEOUS EVERY 8 HOURS SCHEDULED
Status: DISCONTINUED | OUTPATIENT
Start: 2025-03-19 | End: 2025-03-19

## 2025-03-19 RX ORDER — FUROSEMIDE 10 MG/ML
120 INJECTION INTRAMUSCULAR; INTRAVENOUS ONCE
Status: COMPLETED | OUTPATIENT
Start: 2025-03-19 | End: 2025-03-19

## 2025-03-19 RX ORDER — FENTANYL CITRATE/PF 50 MCG/ML
50 SYRINGE (ML) INJECTION
Refills: 0 | Status: DISCONTINUED | OUTPATIENT
Start: 2025-03-19 | End: 2025-03-19 | Stop reason: HOSPADM

## 2025-03-19 RX ORDER — CARVEDILOL 25 MG/1
25 TABLET ORAL 2 TIMES DAILY WITH MEALS
Status: DISCONTINUED | OUTPATIENT
Start: 2025-03-20 | End: 2025-03-20 | Stop reason: HOSPADM

## 2025-03-19 RX ORDER — PROTAMINE SULFATE 10 MG/ML
INJECTION, SOLUTION INTRAVENOUS AS NEEDED
Status: DISCONTINUED | OUTPATIENT
Start: 2025-03-19 | End: 2025-03-19

## 2025-03-19 RX ORDER — CEFAZOLIN SODIUM 2 G/50ML
2000 SOLUTION INTRAVENOUS ONCE
Status: COMPLETED | OUTPATIENT
Start: 2025-03-19 | End: 2025-03-19

## 2025-03-19 RX ORDER — CALCIUM ACETATE 667 MG/1
1334 CAPSULE ORAL
Status: DISCONTINUED | OUTPATIENT
Start: 2025-03-20 | End: 2025-03-20 | Stop reason: HOSPADM

## 2025-03-19 RX ORDER — PANTOPRAZOLE SODIUM 40 MG/1
40 TABLET, DELAYED RELEASE ORAL
Status: DISCONTINUED | OUTPATIENT
Start: 2025-03-20 | End: 2025-03-20 | Stop reason: HOSPADM

## 2025-03-19 RX ORDER — PROMETHAZINE HYDROCHLORIDE 25 MG/ML
12.5 INJECTION, SOLUTION INTRAMUSCULAR; INTRAVENOUS ONCE AS NEEDED
Status: DISCONTINUED | OUTPATIENT
Start: 2025-03-19 | End: 2025-03-19 | Stop reason: HOSPADM

## 2025-03-19 RX ORDER — INSULIN GLARGINE 100 [IU]/ML
16 INJECTION, SOLUTION SUBCUTANEOUS EVERY MORNING
Status: DISCONTINUED | OUTPATIENT
Start: 2025-03-20 | End: 2025-03-20 | Stop reason: HOSPADM

## 2025-03-19 RX ORDER — ONDANSETRON 2 MG/ML
INJECTION INTRAMUSCULAR; INTRAVENOUS AS NEEDED
Status: DISCONTINUED | OUTPATIENT
Start: 2025-03-19 | End: 2025-03-19

## 2025-03-19 RX ORDER — CEFAZOLIN SODIUM 1 G/3ML
INJECTION, POWDER, FOR SOLUTION INTRAMUSCULAR; INTRAVENOUS AS NEEDED
Status: COMPLETED | OUTPATIENT
Start: 2025-03-19 | End: 2025-03-19

## 2025-03-19 RX ORDER — HEPARIN SODIUM 1000 [USP'U]/ML
INJECTION, SOLUTION INTRAVENOUS; SUBCUTANEOUS AS NEEDED
Status: DISCONTINUED | OUTPATIENT
Start: 2025-03-19 | End: 2025-03-19

## 2025-03-19 RX ORDER — DEXTROSE MONOHYDRATE 25 G/50ML
25 INJECTION, SOLUTION INTRAVENOUS ONCE
Status: COMPLETED | OUTPATIENT
Start: 2025-03-19 | End: 2025-03-19

## 2025-03-19 RX ORDER — FENTANYL CITRATE 50 UG/ML
INJECTION, SOLUTION INTRAMUSCULAR; INTRAVENOUS AS NEEDED
Status: DISCONTINUED | OUTPATIENT
Start: 2025-03-19 | End: 2025-03-19

## 2025-03-19 RX ORDER — EZETIMIBE 10 MG/1
10 TABLET ORAL DAILY
Status: DISCONTINUED | OUTPATIENT
Start: 2025-03-20 | End: 2025-03-20 | Stop reason: HOSPADM

## 2025-03-19 RX ORDER — HYDRALAZINE HYDROCHLORIDE 25 MG/1
100 TABLET, FILM COATED ORAL EVERY 8 HOURS SCHEDULED
Status: DISCONTINUED | OUTPATIENT
Start: 2025-03-19 | End: 2025-03-20 | Stop reason: HOSPADM

## 2025-03-19 RX ORDER — ONDANSETRON 2 MG/ML
4 INJECTION INTRAMUSCULAR; INTRAVENOUS ONCE AS NEEDED
Status: DISCONTINUED | OUTPATIENT
Start: 2025-03-19 | End: 2025-03-19 | Stop reason: HOSPADM

## 2025-03-19 RX ORDER — ATORVASTATIN CALCIUM 40 MG/1
40 TABLET, FILM COATED ORAL DAILY
Status: DISCONTINUED | OUTPATIENT
Start: 2025-03-20 | End: 2025-03-20 | Stop reason: HOSPADM

## 2025-03-19 RX ORDER — FENTANYL CITRATE 50 UG/ML
INJECTION, SOLUTION INTRAMUSCULAR; INTRAVENOUS AS NEEDED
Status: COMPLETED | OUTPATIENT
Start: 2025-03-19 | End: 2025-03-19

## 2025-03-19 RX ORDER — LIDOCAINE HYDROCHLORIDE 10 MG/ML
INJECTION, SOLUTION EPIDURAL; INFILTRATION; INTRACAUDAL; PERINEURAL AS NEEDED
Status: DISCONTINUED | OUTPATIENT
Start: 2025-03-19 | End: 2025-03-19 | Stop reason: HOSPADM

## 2025-03-19 RX ORDER — DOXAZOSIN 4 MG/1
4 TABLET ORAL 2 TIMES DAILY
Status: DISCONTINUED | OUTPATIENT
Start: 2025-03-19 | End: 2025-03-20 | Stop reason: HOSPADM

## 2025-03-19 RX ADMIN — FUROSEMIDE 120 MG: 10 INJECTION, SOLUTION INTRAMUSCULAR; INTRAVENOUS at 17:55

## 2025-03-19 RX ADMIN — DOXAZOSIN 4 MG: 4 TABLET ORAL at 19:14

## 2025-03-19 RX ADMIN — FENTANYL CITRATE 50 MCG: 50 INJECTION INTRAMUSCULAR; INTRAVENOUS at 14:51

## 2025-03-19 RX ADMIN — HYDRALAZINE HYDROCHLORIDE 100 MG: 25 TABLET ORAL at 22:28

## 2025-03-19 RX ADMIN — FENTANYL CITRATE 25 MCG: 50 INJECTION INTRAMUSCULAR; INTRAVENOUS at 08:26

## 2025-03-19 RX ADMIN — FENTANYL CITRATE 25 MCG: 50 INJECTION INTRAMUSCULAR; INTRAVENOUS at 09:05

## 2025-03-19 RX ADMIN — FENTANYL CITRATE 25 MCG: 50 INJECTION INTRAMUSCULAR; INTRAVENOUS at 09:00

## 2025-03-19 RX ADMIN — SODIUM CHLORIDE 10 UNITS: 9 INJECTION, SOLUTION INTRAMUSCULAR; INTRAVENOUS; SUBCUTANEOUS at 17:55

## 2025-03-19 RX ADMIN — MIDAZOLAM 2 MG: 1 INJECTION INTRAMUSCULAR; INTRAVENOUS at 08:08

## 2025-03-19 RX ADMIN — FENTANYL CITRATE 25 MCG: 50 INJECTION INTRAMUSCULAR; INTRAVENOUS at 08:20

## 2025-03-19 RX ADMIN — SODIUM CHLORIDE 75 ML/HR: 0.9 INJECTION, SOLUTION INTRAVENOUS at 06:26

## 2025-03-19 RX ADMIN — PROPOFOL 160 MG: 10 INJECTION, EMULSION INTRAVENOUS at 08:12

## 2025-03-19 RX ADMIN — ONDANSETRON 4 MG: 2 INJECTION INTRAMUSCULAR; INTRAVENOUS at 08:20

## 2025-03-19 RX ADMIN — CEFAZOLIN SODIUM 2000 MG: 2 SOLUTION INTRAVENOUS at 08:20

## 2025-03-19 RX ADMIN — PROTAMINE SULFATE 40 MG: 10 INJECTION, SOLUTION INTRAVENOUS at 09:55

## 2025-03-19 RX ADMIN — LIDOCAINE HYDROCHLORIDE 80 MG: 20 INJECTION INTRAVENOUS at 08:12

## 2025-03-19 RX ADMIN — DEXTROSE MONOHYDRATE 25 ML: 25 INJECTION, SOLUTION INTRAVENOUS at 17:55

## 2025-03-19 RX ADMIN — HEPARIN SODIUM 5000 UNITS: 5000 INJECTION INTRAVENOUS; SUBCUTANEOUS at 13:09

## 2025-03-19 RX ADMIN — SODIUM ZIRCONIUM CYCLOSILICATE 10 G: 10 POWDER, FOR SUSPENSION ORAL at 17:55

## 2025-03-19 RX ADMIN — HEPARIN SODIUM 1000 UNITS: 1000 INJECTION, SOLUTION INTRAVENOUS; SUBCUTANEOUS at 09:16

## 2025-03-19 RX ADMIN — CEFAZOLIN 1000 MG: 1 INJECTION, POWDER, FOR SOLUTION INTRAMUSCULAR; INTRAVENOUS; PARENTERAL at 14:46

## 2025-03-19 RX ADMIN — HEPARIN SODIUM 5000 UNITS: 1000 INJECTION, SOLUTION INTRAVENOUS; SUBCUTANEOUS at 08:50

## 2025-03-19 RX ADMIN — FENTANYL CITRATE 50 MCG: 50 INJECTION INTRAMUSCULAR; INTRAVENOUS at 14:45

## 2025-03-19 NOTE — PROCEDURES
Central Line    Date/Time: 3/19/2025 3:06 PM    Performed by: Tila Davila MD  Authorized by: Tila Davila MD    Patient location:  IR  Other Assisting Provider: No    Consent:     Consent obtained:  Written ( used)    Consent given by:  Patient    Risks discussed:  Arterial puncture, bleeding and infection    Alternatives discussed:  No treatment and delayed treatment  Universal protocol:     Procedure explained and questions answered to patient or proxy's satisfaction: yes      Relevant documents present and verified: yes      Test results available and properly labeled: yes      Radiology Images displayed and confirmed.  If images not available, report reviewed: yes      Required blood products, implants, devices, and special equipment available: yes      Site/side marked: yes      Immediately prior to procedure, a time out was called: yes      Patient identity confirmed:  Verbally with patient and arm band  Pre-procedure details:     Hand hygiene: Hand hygiene performed prior to insertion      Sterile barrier technique: All elements of maximal sterile technique followed      Skin preparation:  2% chlorhexidine    Skin preparation agent: Skin preparation agent completely dried prior to procedure    Indications:     Central line indications: dialysis    Anesthesia (see MAR for exact dosages):     Anesthesia method:  Local infiltration (and fentanyl)  Procedure details:     Location: Right external jugular.    Vessel type: vein      Laterality:  Right    Approach: percutaneous technique used      Patient position:  Flat    Catheter type:  Double lumen    Catheter size:  15.5 Fr    Landmarks identified: yes      Ultrasound guidance: yes      Sterile ultrasound techniques: Sterile gel and sterile probe covers were used      Number of attempts:  1    Successful placement: yes    Post-procedure details:     Post-procedure:  Dressing applied and line sutured    Assessment:  Placement verified by x-ray and blood  return through all ports    Post-procedure complications: none      Patient tolerance of procedure:  Tolerated well, no immediate complications  Comments:      Right external jugular tunneled dialysis catheter, 24 cm tip to cuff    Okay for use

## 2025-03-19 NOTE — ASSESSMENT & PLAN NOTE
Malfunction of arteriovenous dialysis fistula s/p Left-revision of LUE fistula conversion to graft by Dr. Phillips 3/19/2025  Vascular following.

## 2025-03-19 NOTE — OP NOTE
OPERATIVE REPORT  PATIENT NAME: Lela Corado    :  1955  MRN: [id number]  Pt Location: [facility] OR ROOM 04    SURGERY DATE: 3/19/2025    Surgeons and Role:     * Lis Phillips, DO - Primary    Preop Diagnosis:  Malfunction of arteriovenous dialysis fistula, initial encounter (formerly [facility]) [T82.590A]    Post-Op Diagnosis Codes:     * Malfunction of arteriovenous dialysis fistula, initial encounter (formerly [facility]) [T82.590A]    Procedure(s):  Left - revision of left uper extemity fistula. psb conversion to graft. possible fistula ligation    Specimen(s):  * No specimens in log *    Estimated Blood Loss:   Minimal    Drains:  * No LDAs found *    Anesthesia Type:   General    Operative Indications:  Malfunction of arteriovenous dialysis fistula, initial encounter (formerly [facility]) [T82.590A]  Patient is a 69-year-old woman with end-stage renal disease currently dialyzing through a left brachiocephalic AV fistula which was created back in .  She was recently seen in the office with complaints of prolonged decannulation bleeding at a site with aneurysmal degeneration contained scabbed area. There were no signs of infection. The fistula otherwise has been functioning adequately. I however expressed that I was concerned with potential catastrophic bleed if the scab was dislodged/disrupted.  She was actually scheduled to undergo surgery last week however due to my personal illness I needed to reschedule her surgery.  She presents today for attempted revision of left upper extremity AV fistula, possible ligation versus conversion to an AV graft.  Of note she did report that last evening she bled from her fistula.  The procedure, risk, benefits, alternatives, and anticipated postop course were discussed again in detail using a .  The patient was agreeable to proceed.  Written was obtained.  The operative site was marked.  Patient brought to the operating room for the above-mentioned procedure.    Operative Findings:  Unable to  salvage fistula. Fistula densely adherent to the overlying skin which contained focal areas of aneurysmal degeneration and overall thinning of the skin.  Attempt at dissecting the fistula off the overlying skin was impossible creating multiple sites of inadvertent entry into fistula/venous outflow tract lumen.  As such decision was to proceed with conversion to an AV graft using an interposition 4-7 mm [place] graft.      Complications:   None  As above    Procedure and Technique:  The patient was brought identified in the preop holding area.  The patient's name, laterality, nature procedure verified.  The operative site was marked.  The patient was brought to the operating room where she was positioned supine on the OR table.  After adequate duction of general anesthesia the left upper extremity was placed in 9 degrees abduction on an arm table.  The left upper extremity was circumferentially prepped and draped in usual sterile fashion.  A preoperative dose of antibiotics was administered.  A formal timeout was performed and all were in agreement.  We initiated the procedure with a small incision peripheral to the aneurysmal segments.  The incision was deepened down through the subcutaneous tissue down onto the fascia which was circumferentially dissected free and encircled with a vessel loop.  Attention was next turned towards the outflow and a separate incision was carried out central to the aneurysmal segments.  The incision was again deepened down through the subcutaneous tissue.  The fascia was identified and sharp dissection carried out to free from the surrounding tissue.  The fistula outflow tract was also controlled with a vessel loop.  At this time the incision was lengthened over the aneurysmal segments.  Dissection proceeded using commendation of sharp dissection and electrocautery.  During the process of dissecting the overlying skin off the fistula there was multiple inadvertent entry into the actual  fistula lumen.  At this time it was deemed that the fistula was not salvageable.  Decision was made to proceed with conversion to an AV graft using an interposition 4 to 7 mm [place] graft.  The patient was given 5000 units of IV heparin.  The inflow of the fistula was occluded using a profunda clamp.  At the outflow was controlled using the vessel loop.  The fistula was transected using Metzenbaum scissors.  Next a Radha-Wick tunneler was used to create a subcutaneous tunnel lateral to the existing fistula.  A 4 to 7 mm graft was next brought through the subcutaneous tunnel.  The 4 mm end of the graft was transected and spatulated.  An end-to-end anastomosis was fashioned using a running 5-0 Prolene suture.  Once the proximal anastomosis was completed the graft was clamped and the profunda clamp released.  There was needle hole oozing from the proximal anastomosis which was controlled with topical hemostatic agents.  Attention was neck turned towards the outflow venous anastomosis.  The graft was cut to length and spatulated.  A second 5-0 Prolene suture was next anastomosis in a running fashion.  Prior to completing the anastomosis the fistula was appropriately forward and backbled and de-aired.  The anastomosis completed and suture line secured.  The clamps and Vesseloops were released.  A single repair suture was required at the venous anastomosis.  The anastomosis was next packed with fibrillar and a dry gauze.  Attention was next turned towards the diffuse oozing from the wound bed.  The oozing was controlled using combination of electrocautery, suture ligation, and additional topical hemostatic agent.  Patient was reversed using protamine.  Once hemostasis was deemed satisfactory the incision was closed in a multilayer fashion.  The skin was reapproximate using a running 4-0 Monocryl subcuticular stitch.  A sterile NAYAN negative pressure dressing was applied.  The patient tolerated procedure well.  All needles,  sponge counts, and intervals were reported correct.  The patient was waken, explained and transferred to recovery room in stable condition.  Of note due to did need to convert to an AV graft patient will require admission with IR consultation for placement of a tunneled dialysis catheter.  I have attempted to call daughter Sheridan without success.  I also checked in the waiting area and was told she was not there.     I was present for the entire procedure.    Patient Disposition:  PACU              SIGNATURE: Lis Phillips DO  DATE: March 19, 2025  TIME: 12:43 PM    Vascular Quality Initiative - Hemodialysis Access Placement    Pre-admission Information   Functional status: Restricted in physically strenuous activity but ambulatory and able to carry out work of a light or sedentary nature.     ESRD: ESRD: Hemodialysis dependent.  {Current Access brachiocephalic fistula  Historical Information      Previous Access: left   Access Type/Location: ACCESS TYPE: Surgical AVF  Access Location: Upper arm Cephalic.         Procedure Information      Status: Outpatient     Side:left      Anesthesia: General     Access Type: Access Type: AV Graft  Reason not autogenous: Other, please list.   Conduit Type: Prosthetic Graft Inflow Artery is:  Brachial, Upper Arm  Intraoperative Artery taget diameter is: 8mm.  Outflow Vein is: Cephalic, Upper Arm.  Intraoperative Vein target diameter is: 10mm.  Anastomotic Artery Length end-to-end anastomosis, Anastomotic Vein Length end-to-end anastomosis.  Anastomotic Material is: Monofilament non-absorbable suture.  Anastomosis type is: Continuous  Cocomitant Procedure performed-: None    Completion Fistulogram: no     Preop ARTERIAL evaluation and/or treatment: duplex    Preop VENOUS evaluation and/or treatment:     *Obtain Target Diameters from study          Post op Information     Discharge Status: Home    Post op Complications: None immediately present

## 2025-03-19 NOTE — ASSESSMENT & PLAN NOTE
Lab Results   Component Value Date    HGBA1C 7.9 (H) 01/08/2025     Recent Labs     03/19/25  0624 03/19/25  1205 03/19/25  1604   POCGLU 71 136 149*     Blood Sugar Average: Last 72 hrs:  (P) [id number]    Continue sliding scale

## 2025-03-19 NOTE — ASSESSMENT & PLAN NOTE
On maintenance HD every TTS at [facility]  Last HD 3/18/2025 UF -2.7 liters. 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 -ready to use per ID  Access: left-revision of LUE fistula conversion to graft by Dr. Phillips 3/19/2025

## 2025-03-19 NOTE — ASSESSMENT & PLAN NOTE
Patient with ESRD currently dialyzing through a left brachiocephalic AVF created in 2022.  Fistula complicated by prolonged decannulation bleeding at the site with aneurysmal degeneration contained within a scabbed area from repeated cannulization's.    S/p LUE AVF ligation and fistula revision w/ graft w/ Dr. Moran on 3/19. Admitted to medicine post-operatively with IR consultation for temporary HD cath placement as new fistula is not mature.    -appreciate IR timely consultation and procedure  -continue to follow nephro recs regarding HD  -save left arm from sticks  -okay for diet  -pain and nausea control prn  -encourage pulm toilet:   -IS q1h while awake   -OOB to chair   -ambulate TID with assistance  -remainder of care per primary team

## 2025-03-19 NOTE — H&P
"  H & P    Plan: Revision of left upper extremity fistula, possible conversion to AVG  Operative site marked  /81   Pulse 62   Temp (!) 97.3 °F (36.3 °C) (Temporal)   Resp 16   Ht 5' 1\" (1.549 m)   Wt 68.3 kg (150 lb 9.2 oz)   SpO2 100%   BMI 28.45 kg/m²     Of note Patient report that she has been experiencing more bleeding following dialysis. Also reports that last night fistula site with scab had begun to bleed more        Assessment & Plan  Malfunction of arteriovenous dialysis fistula, initial encounter ([facility])     Orders:    Case request operating room: revision of left uper extemity fistula, possible conversion to graft, possible fistula ligation; Standing    Basic metabolic panel; Future    CBC and Platelet; Future    Protime-INR; Future     End stage renal disease ([facility])        Lab Results   Component Value Date     EGFR 5 01/08/2025     EGFR 8 (L) 10/30/2024     EGFR 5 (L) 10/29/2024     CREATININE 6.85 (H) 01/08/2025     CREATININE 5.61 (H) 10/30/2024     CREATININE 8.44 (H) 10/29/2024            ESRD (end stage renal disease) on dialysis ([facility])        Lab Results   Component Value Date     EGFR 5 01/08/2025     EGFR 8 (L) 10/30/2024     EGFR 5 (L) 10/29/2024     CREATININE 6.85 (H) 01/08/2025     CREATININE 5.61 (H) 10/30/2024     CREATININE 8.44 (H) 10/29/2024   History of left upper extremity AV fistula created back in 2022.  In July 2024 she did undergo a fistulogram.  She is now referred to the office by the dialysis center due to a dry scab along an aneurysmal segment of her fistula.  Patient denies any bleeding from the site.  She denies any trauma to the arm.  Due to potential for catastrophic bleeding if scab dislodged do recommend revision of fistula, possible conversion to graft, possible ligation.  She understands that she will likely require a temporary dialysis catheter following surgery.  All questions answered to her satisfaction.  Today's office visit was facilitated through the " [place] line  #[id number]     Orders:    Protime-INR; Future           History of Present Illness     HPI  Lela Corado is a 69 y.o. female who presents presents to the office at the request of the dialysis center secondary to a scab along the left upper extremity AV fistula.  She previously underwent left upper extremity fistula superficialization back in September 2022.  She has had any interim a fistula gram in July 2024.  They are currently using the fistula without issues.  On exam she has aneurysmal segments along the upper extremity.  Along one of the aneurysmal segments there is a dry scab that has been present for approximately a week.  There is no surrounding erythema to suggest infection.  There is no fluctuance.  Given concern with dislodgment of the scab with potential catastrophic bleeding I am recommending a revision of the left upper extremity fistula, possible conversion to graft, possible ligation.  We did discuss that should she bleed she can apply digital compression and call 911 immediately.  Today's office visit was facilitated through the [place] line  #[id number]     Pt here for f/u to AVF created 7/10/24. Pt has dialysis on T/Th/Sat at [facility] for almost 2 yrs. Pt c/o small lump on left arm by dialysis site but no pain.   History obtained from: patient     Review of Systems   Constitutional: Negative.    HENT: Negative.     Eyes: Negative.    Respiratory: Negative.     Cardiovascular: Negative.    Gastrointestinal: Negative.    Endocrine: Negative.    Genitourinary: Negative.    Musculoskeletal: Negative.    Skin:  Positive for wound.   Allergic/Immunologic: Negative.    Neurological: Negative.    Hematological: Negative.    Psychiatric/Behavioral: Negative.        Past Medical History  Medical History        Past Medical History:   Diagnosis Date    CHF (congestive heart failure) ([facility])      CKD (chronic kidney disease) stage 5, GFR less than 15 ml/min ([facility])       Diabetes mellitus ([facility])      Diabetic nephropathy (HCC)      Hemodialysis patient ([facility])       via permacath right chest / [facility] Tues/Thurs and Sat    Hyperlipidemia      Hypertension      Muscle weakness      Orthodontics       sleeps with retainer at night    Risk for falls      Uses walker       per dtr active in the house able to cook/light cleaning as able         Surgical History         Past Surgical History:   Procedure Laterality Date    CATARACT EXTRACTION Bilateral      EGD        IR AV FISTULAGRAM/GRAFTOGRAM   5/9/2022    IR AV FISTULAGRAM/GRAFTOGRAM   3/31/2023    IR AV FISTULAGRAM/GRAFTOGRAM   8/9/2023    IR AV FISTULAGRAM/GRAFTOGRAM   3/13/2024    IR AV FISTULAGRAM/GRAFTOGRAM   7/10/2024    IR BIOPSY BONE MARROW   9/15/2021    IR BIOPSY KIDNEY RANDOM   9/15/2021    IR NON-TUNNELED CENTRAL LINE PLACEMENT   12/7/2021    IR TUNNELED CENTRAL LINE CHECK/CHANGE/REPOSITION   4/1/2022    IR TUNNELED DIALYSIS CATHETER PLACEMENT   12/9/2021    IR TUNNELED DIALYSIS CATHETER REMOVAL   10/21/2022    PA ARTERIOVENOUS ANASTOMOSIS OPEN DIRECT Left 2/16/2022     Procedure: CREATION FISTULA ARTERIOVENOUS (AV);  Surgeon: Lis Phillips DO;  Location: [facility];  Service: Vascular    PA REVJ OPN ARVEN FSTL W/O THRMBC DIAL GRF Left 9/2/2022     Procedure: REVISION AV FISTULA (superficalize/transposition);  Surgeon: Lis Phillips DO;  Location: [facility];  Service: Vascular         Family History         Family History   Problem Relation Age of Onset    Hypertension Mother      Diabetes Father      Hypertension Sister      Diabetes Sister      Hypertension Brother            reports that she has never smoked. She has never been exposed to tobacco smoke. She has never used smokeless tobacco. She reports that she does not currently use alcohol. She reports that she does not currently use drugs.  Medications Ordered Prior to Encounter          Current Outpatient Medications on File Prior to Visit   Medication  Sig Dispense Refill    atorvastatin (LIPITOR) 40 mg tablet Take 1 tablet (40 mg total) by mouth daily 90 tablet 3    B Complex-C-Folic Acid (Renal Vitamin) 0.8 MG TABS Take 1 tablet (0.8 mg total) by mouth in the morning 90 tablet 3    calcium acetate (PHOSLO) capsule Take 2 capsules (1,334 mg total) by mouth 3 (three) times a day with meals 270 capsule 3    carvedilol (COREG) 25 mg tablet Take 1 tablet (25 mg total) by mouth 2 (two) times a day with meals 180 tablet 3    cloNIDine (CATAPRES) 0.3 mg tablet Take 1 tablet (0.3 mg total) by mouth 3 (three) times a day 270 tablet 4    doxazosin (CARDURA) 4 mg tablet Take 1 tablet (4 mg total) by mouth 2 (two) times a day 180 tablet 3    ergocalciferol (ERGOCALCIFEROL) 1.25 MG (62611 UT) capsule Take 50,000 Units by mouth every 30 (thirty) days          gabapentin (NEURONTIN) 300 mg capsule Take 1 capsule (300 mg total) by mouth daily at bedtime 30 capsule 0    hydrALAZINE (APRESOLINE) 100 MG tablet TAKE 1 TABLET (100 MG TOTAL) BY MOUTH EVERY 8 (EIGHT) HOURS 270 tablet 3    Insulin Lispro-aabc, 1 U Dial, (Elsyumzena Sosa) 100 UNIT/ML SOPN Inject 6u sc with breakfast/lunch/dinner.        Sodium Zirconium Cyclosilicate (Lokelma) 10 g Take 1 packet (10 g total) by mouth daily Take one pack as directed on Monday, Wednesday, Friday and sunday 90 each 3    Tenapanor HCl, CKD, 30 MG TABS Take 1 tablet by mouth 2 (two) times a day 180 tablet 3    torsemide (DEMADEX) 100 mg tablet TAKE 1 TABLET (100 MG TOTAL) BY MOUTH DAILY 90 tablet 3    Glucagon (Gvoke HypoPen 2-Pack) 1 MG/0.2ML SOAJ Inject 1 mg under the skin        linaGLIPtin (Tradjenta) 5 MG TABS Take 5 mg by mouth daily        [DISCONTINUED] cloNIDine (CATAPRES-TTS-3) 0.3 mg/24 hr Place 1 patch (0.3 mg total) on the skin once a week 4 patch 0      No current facility-administered medications on file prior to visit.         Allergies         Allergies   Allergen Reactions    Amlodipine Edema and Eye Swelling    Lisinopril  Angioedema       Bilateral periorbital edema that was significant         Medications Ordered Prior to Encounter          Current Outpatient Medications on File Prior to Visit   Medication Sig Dispense Refill    atorvastatin (LIPITOR) 40 mg tablet Take 1 tablet (40 mg total) by mouth daily 90 tablet 3    B Complex-C-Folic Acid (Renal Vitamin) 0.8 MG TABS Take 1 tablet (0.8 mg total) by mouth in the morning 90 tablet 3    calcium acetate (PHOSLO) capsule Take 2 capsules (1,334 mg total) by mouth 3 (three) times a day with meals 270 capsule 3    carvedilol (COREG) 25 mg tablet Take 1 tablet (25 mg total) by mouth 2 (two) times a day with meals 180 tablet 3    cloNIDine (CATAPRES) 0.3 mg tablet Take 1 tablet (0.3 mg total) by mouth 3 (three) times a day 270 tablet 4    doxazosin (CARDURA) 4 mg tablet Take 1 tablet (4 mg total) by mouth 2 (two) times a day 180 tablet 3    ergocalciferol (ERGOCALCIFEROL) 1.25 MG (89933 UT) capsule Take 50,000 Units by mouth every 30 (thirty) days          gabapentin (NEURONTIN) 300 mg capsule Take 1 capsule (300 mg total) by mouth daily at bedtime 30 capsule 0    hydrALAZINE (APRESOLINE) 100 MG tablet TAKE 1 TABLET (100 MG TOTAL) BY MOUTH EVERY 8 (EIGHT) HOURS 270 tablet 3    Insulin Lispro-aabc, 1 U Dial, (Lyumjev KwikPen) 100 UNIT/ML SOPN Inject 6u sc with breakfast/lunch/dinner.        Sodium Zirconium Cyclosilicate (Lokelma) 10 g Take 1 packet (10 g total) by mouth daily Take one pack as directed on Monday, Wednesday, Friday and sunday 90 each 3    Tenapanor HCl, CKD, 30 MG TABS Take 1 tablet by mouth 2 (two) times a day 180 tablet 3    torsemide (DEMADEX) 100 mg tablet TAKE 1 TABLET (100 MG TOTAL) BY MOUTH DAILY 90 tablet 3    Glucagon (Gvoke HypoPen 2-Pack) 1 MG/0.2ML SOAJ Inject 1 mg under the skin        linaGLIPtin (Tradjenta) 5 MG TABS Take 5 mg by mouth daily        [DISCONTINUED] cloNIDine (CATAPRES-TTS-3) 0.3 mg/24 hr Place 1 patch (0.3 mg total) on the skin once a week 4  "patch 0      No current facility-administered medications on file prior to visit.         Social History               Tobacco Use    Smoking status: Never       Passive exposure: Never    Smokeless tobacco: Never    Tobacco comments:       NO TOBACCO USE   Vaping Use    Vaping status: Never Used   Substance and Sexual Activity    Alcohol use: Not Currently    Drug use: Not Currently    Sexual activity: Not Currently       Comment: defer            Objective     BP (!) 188/73 (BP Location: Right arm, Patient Position: Sitting)   Pulse 68   Ht 5' 1\" (1.549 m)   Wt 68 kg (150 lb)   SpO2 98%   BMI 28.34 kg/m²      Physical Exam  Constitutional:       General: She is not in acute distress.     Appearance: She is well-developed. She is not ill-appearing, toxic-appearing or diaphoretic.   HENT:      Head: Normocephalic and atraumatic.   Eyes:      General: No scleral icterus.     Conjunctiva/sclera: Conjunctivae normal.   Neck:      Trachea: No tracheal deviation.   Cardiovascular:      Rate and Rhythm: Normal rate and regular rhythm.      Heart sounds: Normal heart sounds.      Comments: Left upper extremity with good thrill along the antecubital fossa however becomes more pulsatile as removed centrally towards the shoulder.  Pulmonary:      Effort: Pulmonary effort is normal.      Breath sounds: Normal breath sounds.   Abdominal:      General: There is no distension.      Palpations: Abdomen is soft. There is no mass (no appreciable aortic pulsation/aneurysm).      Tenderness: There is no abdominal tenderness. There is no guarding or rebound.   Musculoskeletal:         General: Normal range of motion.      Cervical back: Normal range of motion and neck supple.   Skin:     General: Skin is warm and dry.   Neurological:      Mental Status: She is alert and oriented to person, place, and time.   Psychiatric:         Behavior: Behavior normal.               "

## 2025-03-19 NOTE — ASSESSMENT & PLAN NOTE
On Micera 30 mcg IV every 4 weeks as outpatient  On Venofer 50 mg weekly as outpatient  Recent HGB outpatient 3/18/2025- 10.7  Transfuse HGB < 7.0

## 2025-03-19 NOTE — CONSULTS
e-Consult ([facility])  - Interventional Radiology  Lela Corado 69 y.o. female MRN: [id number]  Unit/Bed#: E5 -01 Encounter: [id number]          Interventional Radiology has been consulted to evaluate Lela Corado    We were consulted concerning this patient with recent conversion of AVF to AVG and need for HD access.    Inpatient Consult to IR  Consult performed by: Ingrid Lopez PA-C  Consult ordered by: MARLENA Ascencio        03/19/25    Assessment/Recommendation:   Patient is a 70 y/o female with HTN, HLD, CHF, DM, ESRD on HD via LUE AVF who presented for outpatient fistula revision with vascular surgery. Patient reportedly had scab (not infected) noted on the fistula site and more bleeding post dialysis. There was concern for potential significant bleeding should scab dislodge, so patient brought in for possible revision of fistula. Please see op note regarding specifics of procedure. Patient required conversion of AVF to a AV graft and will now need alternative HD access for 4-6 weeks per vascular. IR consulted to assess patient for tunneled HD catheter placement. As per discussion with nephrology, plan would be to complete dialysis tomorrow as this is patient's usual scheduled day.    -Case discussed with Dr. Davila  -Plan for IR tunneled HD catheter placement today, pending IR schedule  -Patient has been NPO for OR today and in recovery, please keep NPO  -Appreciate nephrology and IM recommendations  -Remainder of care per primary team    11-20 minutes, >50% of the total time devoted to medical consultative verbal/EMR discussion between providers. Written report will be generated in the EMR.     Thank you for allowing Interventional Radiology to participate in the care of Lela Corado. Please don't hesitate to call or TigerText us with any questions.     Ingrid Lopez PA-C

## 2025-03-19 NOTE — ANESTHESIA PREPROCEDURE EVALUATION
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)      /RENAL   (+) ESRD (end stage renal disease) on dialysis (HCC)      HEMATOLOGY   (+) Acute blood loss anemia   (+) Acute on chronic anemia      Neurology/Sleep   (+) Syncope      Blood   (+) Positive D dimer      Surgery/Wound/Pain   (+) Preoperative clearance      Other   (+) Nephrotic range proteinuria   (+) Refused diphtheria-tetanus vaccine      Lab Results   Component Value Date     06/08/2018    SODIUM 139 02/24/2025    K 5.1 02/24/2025    CL 99 02/24/2025    CO2 31 02/24/2025    ANIONGAP 10 06/08/2018    AGAP 9 02/24/2025    BUN 48 (H) 02/24/2025    CREATININE 7.76 (H) 02/24/2025    GLUC 165 (H) 02/24/2025    GLUF 85 01/08/2025    CALCIUM 8.1 (L) 02/24/2025    AST 14 01/08/2025    ALT 11 01/08/2025    ALKPHOS 61 01/08/2025    PROT 7.5 06/08/2018    TP 7.4 01/08/2025    BILITOT 0.3 06/08/2018    TBILI 0.64 01/08/2025    EGFR 4 02/24/2025     Lab Results   Component Value Date    WBC 5.17 02/24/2025    HGB 11.2 (L) 02/24/2025    HCT 35.8 02/24/2025    MCV 94 02/24/2025     02/24/2025       Physical Exam    Airway    Mallampati score: III  TM Distance: >3 FB  Neck ROM: full     Dental       Cardiovascular  Rhythm: regular, Rate: normal, Cardiovascular exam normal    Pulmonary  Pulmonary exam normal Breath sounds clear to auscultation    Other Findings  post-pubertal.      Anesthesia Plan  ASA Score- 3     Anesthesia Type- general  with ASA Monitors.         Additional Monitors:     Airway Plan: LMA.           Plan Factors-Exercise tolerance (METS): >4 METS.    Chart reviewed. EKG reviewed. Imaging results reviewed. Existing labs reviewed. Patient summary reviewed.                  Induction-     Postoperative Plan-         Informed Consent-       NPO Status:  Vitals Value Taken Time   Date of last liquid 03/19/25 03/19/25 0607   Time of last liquid 0530 03/19/25 0607   Date of last solid 03/18/25 03/19/25 0607   Time of last solid 1800 03/19/25 0607

## 2025-03-19 NOTE — H&P
"H&P - Hospitalist   Name: Lela Corado 69 y.o. female I MRN: [id number]  Unit/Bed#: E5 -01 I Date of Admission: 3/19/2025   Date of Service: 3/19/2025 I Hospital Day: 0     Assessment & Plan  Complication of AV dialysis fistula  69  year old female was admitted today due to complication of AV dialysis fistula.  She is s/p Left - revision of left uper extemity fistula. psb conversion to graft. possible fistula ligation.  Temporary HD catheter placement by IR today  Appreciate vascular surgery recommendations.  ESRD (end stage renal disease) on dialysis ([facility])  Lab Results   Component Value Date    EGFR 5 03/19/2025    EGFR 4 02/24/2025    EGFR 5 01/08/2025    CREATININE 6.55 (H) 03/19/2025    CREATININE 7.76 (H) 02/24/2025    CREATININE 6.85 (H) 01/08/2025     End-stage kidney disease on hemodialysis Tuesday Thursday Saturday  HD management per nephrology  Type 2 diabetes mellitus with chronic kidney disease on chronic dialysis, with long-term current use of insulin ([facility])  Lab Results   Component Value Date    HGBA1C 7.9 (H) 01/08/2025     Recent Labs     03/19/25  0624 03/19/25  1205 03/19/25  1604   POCGLU 71 136 149*     Blood Sugar Average: Last 72 hrs:  (P) 118.4538959483507927    Continue sliding scale  Essential (primary) hypertension  Resume home regimen: Carvedilol, Hydralazine, Cardura  Anemia due to chronic kidney disease, on chronic dialysis ([facility])  Continue monitoring          Hyperkalemia  Continue lokelma, management per nephrology    Recent Labs     03/19/25  0941   K 5.9*           VTE Pharmacologic Prophylaxis:   Moderate Risk (Score 3-4) - Pharmacological DVT Prophylaxis Ordered: heparin.  Code Status: Level 1 - Full Code     Anticipated Length of Stay: Patient will be admitted on an inpatient basis with an anticipated length of stay of greater than 2 midnights secondary to av fistula complication, management.    History of Present Illness   Chief Complaint: \"surgery\"    Lela " Mak is a 69 y.o. female with a PMH of ESRD on HD, hypertension, diabetes mellitus, CHF, and hyperlipidemia who presents with left revision of left upper extremity fistula conversion to graft.  She also underwent tunneled HD catheter via IR.  Admission was requested for postop management and dialysis reinitiation.  Patient currently asymptomatic and without any acute complaints.   via [employer] services were utilized.    Review of Systems   Constitutional:  Negative for chills and fever.   HENT:  Negative for congestion.    Respiratory:  Negative for cough, shortness of breath and wheezing.    Cardiovascular:  Negative for chest pain and palpitations.   Gastrointestinal:  Negative for abdominal pain and diarrhea.   Skin:  Negative for color change and pallor.   Neurological:  Negative for headaches.       Historical Information   Past Medical History:   Diagnosis Date    CHF (congestive heart failure) (HCC)     CKD (chronic kidney disease) stage 5, GFR less than 15 ml/min (HCC)     Diabetes mellitus (HCC)     Diabetic nephropathy (HCC)     Hemodialysis patient (HCC)     via permacath right chest / [facility] Tues/Thurs and Sat    Hyperlipidemia     Hypertension     Muscle weakness     Orthodontics     sleeps with retainer at night    Risk for falls     Uses walker     pt denies as of 3/6; per dtr active in the house able to cook/light cleaning as able     Past Surgical History:   Procedure Laterality Date    CATARACT EXTRACTION Bilateral     COLONOSCOPY      EGD      IR AV FISTULAGRAM/GRAFTOGRAM  05/09/2022    IR AV FISTULAGRAM/GRAFTOGRAM  03/31/2023    IR AV FISTULAGRAM/GRAFTOGRAM  08/09/2023    IR AV FISTULAGRAM/GRAFTOGRAM  03/13/2024    IR AV FISTULAGRAM/GRAFTOGRAM  07/10/2024    IR BIOPSY BONE MARROW  09/15/2021    IR BIOPSY KIDNEY RANDOM  09/15/2021    IR NON-TUNNELED CENTRAL LINE PLACEMENT  12/07/2021    IR TUNNELED CENTRAL LINE CHECK/CHANGE/REPOSITION  04/01/2022    IR TUNNELED DIALYSIS  CATHETER PLACEMENT  12/09/2021    IR TUNNELED DIALYSIS CATHETER PLACEMENT  3/19/2025    IR TUNNELED DIALYSIS CATHETER REMOVAL  10/21/2022    MD ARTERIOVENOUS ANASTOMOSIS OPEN DIRECT Left 02/16/2022    Procedure: CREATION FISTULA ARTERIOVENOUS (AV);  Surgeon: Lis Phillips DO;  Location: [facility];  Service: Vascular    MD REVJ OPN ARVEN FSTL W/O THRMBC DIAL GRF Left 09/02/2022    Procedure: REVISION AV FISTULA (superficalize/transposition);  Surgeon: Lis Phillips DO;  Location: [facility];  Service: Vascular     Social History     Tobacco Use    Smoking status: Never     Passive exposure: Never    Smokeless tobacco: Never    Tobacco comments:     NO TOBACCO USE   Vaping Use    Vaping status: Never Used   Substance and Sexual Activity    Alcohol use: Not Currently    Drug use: Not Currently    Sexual activity: Not Currently     Comment: defer     E-Cigarette/Vaping    E-Cigarette Use Never User      E-Cigarette/Vaping Substances    Nicotine No     THC No     CBD No     Flavoring No     Other No     Unknown No      Family History   Problem Relation Age of Onset    Hypertension Mother     Diabetes Father     Hypertension Sister     Diabetes Sister     Hypertension Brother      Social History:  Marital Status: /Civil Union     Meds/Allergies   I have reviewed home medications with patient personally.  Prior to Admission medications    Medication Sig Start Date End Date Taking? Authorizing Provider   atorvastatin (LIPITOR) 40 mg tablet Take 1 tablet (40 mg total) by mouth daily 1/6/21  Yes Beryl Nava MD   B Complex-C-Folic Acid (Renal Vitamin) 0.8 MG TABS Take 1 tablet (0.8 mg total) by mouth in the morning 8/31/23  Yes Patito Gar MD   calcium acetate (PHOSLO) capsule Take 2 capsules (1,334 mg total) by mouth 3 (three) times a day with meals 8/13/24  Yes Patito Gar MD   carvedilol (COREG) 25 mg tablet Take 1 tablet (25 mg total) by mouth 2 (two) times a day with meals 2/27/24  Yes Patito Griffin  MD Cong   cloNIDine (CATAPRES) 0.3 mg tablet TAKE 1 TABLET (0.3 MG TOTAL) BY MOUTH 3 (THREE) TIMES A DAY 2/21/25  Yes MARLENA Khan   ergocalciferol (ERGOCALCIFEROL) 1.25 MG (65944 UT) capsule Take 50,000 Units by mouth every 30 (thirty) days   10/22/19  Yes Historical Provider, MD   ezetimibe (ZETIA) 10 mg tablet Take 10 mg by mouth daily   Yes Historical Provider, MD   gabapentin (NEURONTIN) 300 mg capsule Take 1 capsule (300 mg total) by mouth daily at bedtime 12/14/21  Yes Gee Mullins DO   hydrALAZINE (APRESOLINE) 100 MG tablet TAKE 1 TABLET (100 MG TOTAL) BY MOUTH EVERY 8 (EIGHT) HOURS 1/21/25  Yes Patito Gar MD   Insulin Lispro-aabc, 1 U Dial, (Lyumjev KwikPen) 100 UNIT/ML SOPN Inject 6u sc with breakfast/lunch/dinner. 1/20/23  Yes Historical Provider, MD   Lanemily SoloStar 100 units/mL SOPN INJECT 16UNITS SUBCUTANEOUSLY EVERY MORNING 1/29/25  Yes Historical Provider, MD   linaGLIPtin (Tradjenta) 5 MG TABS Take 5 mg by mouth daily 5/4/21 3/19/25 Yes Historical Provider, MD   omeprazole (PriLOSEC) 20 mg delayed release capsule Take 1 capsule by mouth daily as needed 2/18/25  Yes Historical Provider, MD   doxazosin (CARDURA) 4 mg tablet Take 1 tablet (4 mg total) by mouth 2 (two) times a day 3/19/24   Patito Gar MD   Glucagon (Gvoke HypoPen 2-Pack) 1 MG/0.2ML SOAJ Inject 1 mg under the skin 4/13/22   Historical Provider, MD   Sodium Zirconium Cyclosilicate (Lokelma) 10 g Take 1 packet (10 g total) by mouth daily Take one pack as directed on Monday, Wednesday, Friday and krystal 3/26/24   Patito Gar MD   Tenapanor HCl, CKD, 30 MG TABS Take 1 tablet by mouth 2 (two) times a day 2/25/25   Patito Gar MD   torsemide (DEMADEX) 100 mg tablet TAKE 1 TABLET (100 MG TOTAL) BY MOUTH DAILY 9/10/24   Patito Gar MD   cloNIDine (CATAPRES-TTS-3) 0.3 mg/24 hr Place 1 patch (0.3 mg total) on the skin once a week 6/15/22 8/31/23  Rishi Linares MD     Allergies   Allergen Reactions     Amlodipine Edema and Eye Swelling    Lisinopril Angioedema     Bilateral periorbital edema that was significant       Objective :  Temp:  [97 °F (36.1 °C)-97.5 °F (36.4 °C)] 97.3 °F (36.3 °C)  HR:  [53-62] 59  BP: (125-216)/(57-93) 180/79  Resp:  [10-19] 10  SpO2:  [78 %-100 %] 78 %  O2 Device: Nasal cannula  Nasal Cannula O2 Flow Rate (L/min):  [2 L/min-3 L/min] 3 L/min    Physical Exam  Vitals reviewed.   Constitutional:       General: She is not in acute distress.  HENT:      Head: Normocephalic.      Nose: Nose normal.      Mouth/Throat:      Mouth: Mucous membranes are moist.   Eyes:      General: No scleral icterus.  Cardiovascular:      Rate and Rhythm: Normal rate and regular rhythm.   Pulmonary:      Effort: Pulmonary effort is normal. No respiratory distress.      Breath sounds: No wheezing.   Abdominal:      General: There is no distension.      Palpations: Abdomen is soft.      Tenderness: There is no abdominal tenderness.   Musculoskeletal:      Comments: Left AV fistula   Skin:     General: Skin is warm.   Neurological:      Mental Status: She is alert.   Psychiatric:         Mood and Affect: Mood normal.         Behavior: Behavior normal.       Lines/Drains:  Lines/Drains/Airways       Active Status       Name Placement date Placement time Site Days    CVC Central Lines 03/19/25 Double 03/19/25  1506  --  less than 1    HD Permanent Double Catheter 03/19/25  1457  Other (Comment)  less than 1                    Central Line:  Goal for removal: Will discontinue when meds requiring line are completed.             Lab Results: I have reviewed the following results:      Results from last 7 days   Lab Units 03/19/25  0941   SODIUM mmol/L 134*   POTASSIUM mmol/L 5.9*   CHLORIDE mmol/L 98   CO2 mmol/L 32   BUN mg/dL 40*   CREATININE mg/dL 6.55*   ANION GAP mmol/L 4   CALCIUM mg/dL 7.8*   GLUCOSE RANDOM mg/dL 96         Results from last 7 days   Lab Units 03/19/25  1604 03/19/25  1205 03/19/25  0624   POC  GLUCOSE mg/dl 149* 136 71     Lab Results   Component Value Date    HGBA1C 7.9 (H) 01/08/2025    HGBA1C 8.3 (H) 10/25/2024    HGBA1C 8.6 (H) 07/09/2024           IR tunneled dialysis    ** Please Note: This note has been constructed using a voice recognition system. **

## 2025-03-19 NOTE — ASSESSMENT & PLAN NOTE
Lab Results   Component Value Date    HGBA1C 7.9 (H) 01/08/2025     Recent Labs     03/19/25  0624 03/19/25  1205 03/19/25  1604   POCGLU 71 136 149*     Blood Sugar Average: Last 72 hrs:  (P) 118.5873478830532551    -patient appropriate for CCD2  -hypoglycemia protocol  -SSI  -holding home hypoglycemic agents while inpatient

## 2025-03-19 NOTE — ASSESSMENT & PLAN NOTE
Lab Results   Component Value Date    EGFR 5 03/19/2025    EGFR 4 02/24/2025    EGFR 5 01/08/2025    CREATININE 6.55 (H) 03/19/2025    CREATININE 7.76 (H) 02/24/2025    CREATININE 6.85 (H) 01/08/2025     End-stage kidney disease on hemodialysis Tuesday Thursday Saturday  HD management per nephrology

## 2025-03-19 NOTE — ANESTHESIA POSTPROCEDURE EVALUATION
Post-Op Assessment Note    CV Status:  Stable  Pain Score: 0    Pain management: adequate       Mental Status:  Alert and awake   Hydration Status:  Euvolemic   PONV Controlled:  Controlled   Airway Patency:  Patent  Airway: intubated     Post Op Vitals Reviewed: Yes    No anethesia notable event occurred.    Staff: Anesthesiologist           Last Filed PACU Vitals:  Vitals Value Taken Time   Temp 97.5 °F (36.4 °C) 03/19/25 1209   Pulse 58 03/19/25 1218   /65 03/19/25 1209   Resp 16 03/19/25 1209   SpO2 98 % 03/19/25 1218   Vitals shown include unfiled device data.    Modified Rolando:     Vitals Value Taken Time   Activity 2 03/19/25 1209   Respiration 2 03/19/25 1209   Circulation 2 03/19/25 1209   Consciousness 2 03/19/25 1209   Oxygen Saturation 2 03/19/25 1209     Modified Rolando Score: 10

## 2025-03-19 NOTE — ASSESSMENT & PLAN NOTE
On Lokelma 10 gr. Non HD days- will resume now  Potassium prior to surgery 5.9 this am-discussed with Dr. Padilla; will treat with insulin 10 units IV, dextrose, a 620 mg IV x 1  Will repeat BMP now with call parameters for potassium greater than 5.8  Plan hemodialysis in a.m.

## 2025-03-19 NOTE — ANESTHESIA POSTPROCEDURE EVALUATION
Post-Op Assessment Note    CV Status:  Stable    Pain management: adequate       Mental Status:  Sleepy and arousable   Hydration Status:  Euvolemic   PONV Controlled:  Controlled   Airway Patency:  Patent     Post Op Vitals Reviewed: Yes    No anethesia notable event occurred.    Staff: CRNA           Last Filed PACU Vitals:  Vitals Value Taken Time   Temp 97 °F (36.1 °C) 03/19/25 1109   Pulse 54 03/19/25 1113   /64 03/19/25 1109   Resp 16 03/19/25 1109   SpO2 96 % 03/19/25 1113   Vitals shown include unfiled device data.    Modified Rolando:     Vitals Value Taken Time   Activity 2 03/19/25 1109   Respiration 2 03/19/25 1109   Circulation 2 03/19/25 1109   Consciousness 1 03/19/25 1109   Oxygen Saturation 2 03/19/25 1109     Modified Rolando Score: 9

## 2025-03-19 NOTE — ASSESSMENT & PLAN NOTE
Lab Results   Component Value Date    HGBA1C 7.9 (H) 01/08/2025     Recent Labs     03/19/25  0624 03/19/25  1205 03/19/25  1604   POCGLU 71 136 149*     Blood Sugar Average: Last 72 hrs:  (P) 118.7430473650351119    -patient appropriate for CCD2  -hypoglycemia protocol  -SSI  -holding home hypoglycemic agents while inpatient

## 2025-03-19 NOTE — PLAN OF CARE
Problem: PAIN - ADULT  Goal: Verbalizes/displays adequate comfort level or baseline comfort level  Description: Interventions:  - Encourage patient to monitor pain and request assistance  - Assess pain using appropriate pain scale  - Administer analgesics based on type and severity of pain and evaluate response  - Implement non-pharmacological measures as appropriate and evaluate response  - Consider cultural and social influences on pain and pain management  - Notify physician/advanced practitioner if interventions unsuccessful or patient reports new pain  Outcome: Progressing     Problem: INFECTION - ADULT  Goal: Absence or prevention of progression during hospitalization  Description: INTERVENTIONS:  - Assess and monitor for signs and symptoms of infection  - Monitor lab/diagnostic results  - Monitor all insertion sites, i.e. indwelling lines, tubes, and drains  - Monitor endotracheal if appropriate and nasal secretions for changes in amount and color  - [place] appropriate cooling/warming therapies per order  - Administer medications as ordered  - Instruct and encourage patient and family to use good hand hygiene technique  - Identify and instruct in appropriate isolation precautions for identified infection/condition  Outcome: Progressing  Goal: Absence of fever/infection during neutropenic period  Description: INTERVENTIONS:  - Monitor WBC    Outcome: Progressing     Problem: SAFETY ADULT  Goal: Patient will remain free of falls  Description: INTERVENTIONS:  - Educate patient/family on patient safety including physical limitations  - Instruct patient to call for assistance with activity   - Consult OT/PT to assist with strengthening/mobility   - Keep Call bell within reach  - Keep bed low and locked with side rails adjusted as appropriate  - Keep care items and personal belongings within reach  - Initiate and maintain comfort rounds  - Make Fall Risk Sign visible to staff  - Apply yellow socks and bracelet  for high fall risk patients  - Consider moving patient to room near nurses station  Outcome: Progressing  Goal: Maintain or return to baseline ADL function  Description: INTERVENTIONS:  -  Assess patient's ability to carry out ADLs; assess patient's baseline for ADL function and identify physical deficits which impact ability to perform ADLs (bathing, care of mouth/teeth, toileting, grooming, dressing, etc.)  - Assess/evaluate cause of self-care deficits   - Assess range of motion  - Assess patient's mobility; develop plan if impaired  - Assess patient's need for assistive devices and provide as appropriate  - Encourage maximum independence but intervene and supervise when necessary  - Involve family in performance of ADLs  - Assess for home care needs following discharge   - Consider OT consult to assist with ADL evaluation and planning for discharge  - Provide patient education as appropriate  Outcome: Progressing  Goal: Maintains/Returns to pre admission functional level  Description: INTERVENTIONS:  - Perform AM-PAC 6 Click Basic Mobility/ Daily Activity assessment daily.  - Set and communicate daily mobility goal to care team and patient/family/caregiver.   - Collaborate with rehabilitation services on mobility goals if consulted  - Out of bed for toileting  - Record patient progress and toleration of activity level   Outcome: Progressing     Problem: DISCHARGE PLANNING  Goal: Discharge to home or other facility with appropriate resources  Description: INTERVENTIONS:  - Identify barriers to discharge w/patient and caregiver  - Arrange for needed discharge resources and transportation as appropriate  - Identify discharge learning needs (meds, wound care, etc.)  - Arrange for interpretive services to assist at discharge as needed  - Refer to Case Management Department for coordinating discharge planning if the patient needs post-hospital services based on physician/advanced practitioner order or complex needs  related to functional status, cognitive ability, or social support system  Outcome: Progressing     Problem: Knowledge Deficit  Goal: Patient/family/caregiver demonstrates understanding of disease process, treatment plan, medications, and discharge instructions  Description: Complete learning assessment and assess knowledge base.  Interventions:  - Provide teaching at level of understanding  - Provide teaching via preferred learning methods  Outcome: Progressing     Problem: METABOLIC, FLUID AND ELECTROLYTES - ADULT  Goal: Fluid balance maintained  Description: INTERVENTIONS:  - Monitor labs   - Monitor I/O and WT  - Instruct patient on fluid and nutrition as appropriate  - Assess for signs & symptoms of volume excess or deficit  Outcome: Progressing     Problem: SKIN/TISSUE INTEGRITY - ADULT  Goal: Incision(s), wounds(s) or drain site(s) healing without S/S of infection  Description: INTERVENTIONS  - Assess and document dressing, incision, wound bed, drain sites and surrounding tissue  - Provide patient and family education

## 2025-03-19 NOTE — ASSESSMENT & PLAN NOTE
On calcitriol 0.75 mcg for secondary hyperparathyroidism  On calcium acetate 1 tablet 3 times daily   Continue to monitor PTH, phos and Vit D outpateint

## 2025-03-19 NOTE — QUICK NOTE
POST-OP CHECK NOTE:      Subjective:  69 y.o. female Day of Surgery s/p LUE AVF ligation & revision w/ graft w/ Dr. Moran.  Since surgery she has had a temporary HD catheter placed with IR.  Patients denies pain. She denies any nausea, chest pain, or shortness of breath.   utilized during patient encounter.      Objective:  Vitals:    03/19/25 1448 03/19/25 1453 03/19/25 1458 03/19/25 1503   BP: (!) 207/88 (!) 202/89 (!) 195/84 (!) 180/79   Pulse: 60 58 (!) 53 59   Resp: 19 16 13 (!) 10   Temp:       TempSrc:       SpO2: 100% 100% 100% (!) 78%   Weight:       Height:            Physical Exam:  General: well-developed, well-nourished female lying in bed in NAD, appears comfortable  Cardiovascular: grossly well perfused perfused, regular rate.  Lungs: normal WOB on RA. No increased respiratory effort.  Abdomen: Soft, non-tender, non-distended.  Extremities: No edema, clubbing or cyanosis LUE incision NAYAN dressing in place with minimal strikethrough. Palpable thrill proximal & distal to overlying dressing. Neurovascularly intact distally with 5/5  strength in bilteral upper extremities.  Skin: Warm, dry. No rashes, ecchymoses, or abrasions.    Assessment & Plan  Type 2 diabetes mellitus with chronic kidney disease on chronic dialysis, with long-term current use of insulin ([facility])  Lab Results   Component Value Date    HGBA1C 7.9 (H) 01/08/2025     Recent Labs     03/19/25  0624 03/19/25  1205 03/19/25  1604   POCGLU 71 136 149*     Blood Sugar Average: Last 72 hrs:  (P) 118.9542389773400241    -patient appropriate for CCD2  -hypoglycemia protocol  -SSI  -holding home hypoglycemic agents while inpatient  Essential (primary) hypertension  Stable and well controlled    -management per primary  -CTM vitals q4h  Complication of AV dialysis fistula  Patient with ESRD currently dialyzing through a left brachiocephalic AVF created in 2022.  Fistula complicated by prolonged decannulation bleeding at the  site with aneurysmal degeneration contained within a scabbed area from repeated cannulization's.    S/p LUE AVF ligation and fistula revision w/ graft w/ Dr. Moran on 3/19. Admitted to medicine post-operatively with IR consultation for temporary HD cath placement as new fistula is not mature.    -appreciate IR timely consultation and procedure  -continue to follow nephro recs regarding HD  -save left arm from sticks  -okay for diet  -pain and nausea control prn  -encourage pulm toilet:   -IS q1h while awake   -OOB to chair   -ambulate TID with assistance  -remainder of care per primary team  -maintain NAYAN dressing until post-op visit. May change AA batteries in battery pack prn  Chronic kidney disease-mineral bone disorder (CKD-MBD) with stage 5 chronic kidney disease, on chronic dialysis (HCC)  Lab Results   Component Value Date    EGFR 5 03/19/2025    EGFR 4 02/24/2025    EGFR 5 01/08/2025    CREATININE 6.55 (H) 03/19/2025    CREATININE 7.76 (H) 02/24/2025    CREATININE 6.85 (H) 01/08/2025     Patient previously on hemodialysis with LUE AVF however is being held postoperative revision of this fistula after ligation and revision of new graft anastomosis.    -nephrology consulted for HD, appreciate recs  -recommend strict I/Os  -daily chemistry  -other medical management per primary  Secondary hyperparathyroidism (HCC)    Hyperkalemia  3/19 K=5.9.      Blaine Stark MD  General Surgery  03/19/25  4:36 PM

## 2025-03-19 NOTE — ASSESSMENT & PLAN NOTE
69  year old female was admitted today due to complication of AV dialysis fistula.  She is s/p Left - revision of left uper extemity fistula. psb conversion to graft. possible fistula ligation.  Temporary HD catheter placement by IR today  Appreciate vascular surgery recommendations.

## 2025-03-19 NOTE — ASSESSMENT & PLAN NOTE
BP acceptable  Home medications: Cardura 8 mg daily, Coreg 25 mg BID, Hydralazine 100 mg TID, Torsemide 100 mg non HD days,     Avoid hypotension

## 2025-03-19 NOTE — ASSESSMENT & PLAN NOTE
Patient with ESRD currently dialyzing through a left brachiocephalic AVF created in 2022.  Fistula complicated by prolonged decannulation bleeding at the site with aneurysmal degeneration contained within a scabbed area from repeated cannulization's.    S/p LUE AVF ligation and fistula revision w/ graft w/ Dr. Moran on 3/19. Admitted to medicine post-operatively with IR consultation for temporary HD cath placement as new fistula is not mature.    -appreciate IR timely consultation and procedure  -continue to follow nephro recs regarding HD  -save left arm from sticks  -okay for diet  -pain and nausea control prn  -encourage pulm toilet:   -IS q1h while awake   -OOB to chair   -ambulate TID with assistance  -remainder of care per primary team  -maintain NAYAN dressing until post-op visit. May change AA batteries in battery pack prn

## 2025-03-19 NOTE — CONSULTS
NEPHROLOGY HOSPITAL CONSULTATION   Lela Corado 69 y.o. female MRN: [id number]  Unit/Bed#: E5 -01 Encounter: [id number]    Brief History of Admission - PMH of ERSD on HD TTS, HTN, DM, CHF, HLD, who presented for for left-revision of LUE fistula conversion to graft today by Dr. Phillips. Now S/P tunneled HD catheter via IR for access over the next 4-6 weeks.  Nephrology consulted for ERSD on HD TTS    Assessment & Plan  ESRD (end stage renal disease) on dialysis (HCC)  On maintenance HD every TTS at [facility]  Last HD 3/18/2025 UF -2.7 liters. 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 -ready to use per ID  Access: left-revision of LUE fistula conversion to graft by Dr. Phillips 3/19/2025  Essential (primary) hypertension  BP acceptable  Home medications: Cardura 8 mg daily, Coreg 25 mg BID, Hydralazine 100 mg TID, Torsemide 100 mg non HD days,     Avoid hypotension  Anemia due to chronic kidney disease, on chronic dialysis (HCC)  On Micera 30 mcg IV every 4 weeks as outpatient  On Venofer 50 mg weekly as outpatient  Recent HGB outpatient 3/18/2025- 10.7  Transfuse HGB < 7.0  Hyperkalemia  On Lokelma 10 gr. Non HD days- will resume now  Potassium prior to surgery 5.9 this am-discussed with Dr. Padilla; will treat with insulin 10 units IV, dextrose, a 620 mg IV x 1  Will repeat BMP now with call parameters for potassium greater than 5.8  Plan hemodialysis in a.m.  Chronic kidney disease-mineral and bone disorder  On calcitriol 0.75 mcg for secondary hyperparathyroidism  On calcium acetate 1 tablet 3 times daily   Continue to monitor PTH, phos and Vit D outpateint  Complication of AV dialysis fistula  Malfunction of arteriovenous dialysis fistula s/p Left-revision of LUE fistula conversion to  graft by Dr. Phillips 3/19/2025  Vascular following.  Type 2 diabetes mellitus with chronic kidney disease on chronic dialysis, with long-term current use of insulin (HCC)  Lab Results   Component Value Date    HGBA1C 7.9 (H) 01/08/2025   Management per primary team        HISTORY OF PRESENT ILLNESS:  Requesting Physician: Barry Vernon MD  Reason for Consult: ERSD on HD TTS    Lela Corado is a 69 y.o. female  with PMH of ERSD on HD TTS, HTN, DM, CHF, HLD, who presented for for left-revision of LUE fistula conversion to graft today by Dr. Phillips. Now S/P tunneled HD catheter via IR for access over the next 4-6 weeks.  Nephrology consulted for ERSD on HD TTS.  On discussion, patient reports feeling okay post tunneled catheter placement.  Denies discomfort at this moment.  Denes chest pain, shortness of breath, dizziness, lightheadedness, or N/V.  Reports makes a little urine      PAST MEDICAL HISTORY:  Past Medical History:   Diagnosis Date    CHF (congestive heart failure) (HCC)     CKD (chronic kidney disease) stage 5, GFR less than 15 ml/min (HCC)     Diabetes mellitus (HCC)     Diabetic nephropathy (HCC)     Hemodialysis patient (HCC)     via permacath right chest / [facility] Tues/Thurs and Sat    Hyperlipidemia     Hypertension     Muscle weakness     Orthodontics     sleeps with retainer at night    Risk for falls     Uses walker     pt denies as of 3/6; per dtr active in the house able to cook/light cleaning as able       PAST SURGICAL HISTORY:  Past Surgical History:   Procedure Laterality Date    CATARACT EXTRACTION Bilateral     COLONOSCOPY      EGD      IR AV FISTULAGRAM/GRAFTOGRAM  05/09/2022    IR AV FISTULAGRAM/GRAFTOGRAM  03/31/2023    IR AV FISTULAGRAM/GRAFTOGRAM  08/09/2023    IR AV FISTULAGRAM/GRAFTOGRAM  03/13/2024    IR AV FISTULAGRAM/GRAFTOGRAM  07/10/2024    IR BIOPSY BONE MARROW  09/15/2021    IR BIOPSY KIDNEY RANDOM  09/15/2021    IR NON-TUNNELED CENTRAL LINE PLACEMENT  12/07/2021     IR TUNNELED CENTRAL LINE CHECK/CHANGE/REPOSITION  04/01/2022    IR TUNNELED DIALYSIS CATHETER PLACEMENT  12/09/2021    IR TUNNELED DIALYSIS CATHETER REMOVAL  10/21/2022    AZ ARTERIOVENOUS ANASTOMOSIS OPEN DIRECT Left 02/16/2022    Procedure: CREATION FISTULA ARTERIOVENOUS (AV);  Surgeon: Lis Phillips DO;  Location: [facility];  Service: Vascular    AZ REVJ OPN ARVEN FSTL W/O THRMBC DIAL GRF Left 09/02/2022    Procedure: REVISION AV FISTULA (superficalize/transposition);  Surgeon: Lis Phillips DO;  Location: [facility];  Service: Vascular       ALLERGIES:  Allergies   Allergen Reactions    Amlodipine Edema and Eye Swelling    Lisinopril Angioedema     Bilateral periorbital edema that was significant       SOCIAL HISTORY:  Social History     Substance and Sexual Activity   Alcohol Use Not Currently     Social History     Substance and Sexual Activity   Drug Use Not Currently     Social History     Tobacco Use   Smoking Status Never    Passive exposure: Never   Smokeless Tobacco Never   Tobacco Comments    NO TOBACCO USE       FAMILY HISTORY:  Family History   Problem Relation Age of Onset    Hypertension Mother     Diabetes Father     Hypertension Sister     Diabetes Sister     Hypertension Brother        MEDICATIONS:    Current Facility-Administered Medications:     acetaminophen (TYLENOL) tablet 975 mg, 975 mg, Oral, Q6H PRN, Blaine Stark MD    heparin (porcine) subcutaneous injection 5,000 Units, 5,000 Units, Subcutaneous, Q8H DEBBIE, Lis Phillips DO, 5,000 Units at 03/19/25 1309    insulin lispro (HumALOG/ADMELOG) 100 units/mL subcutaneous injection 1-5 Units, 1-5 Units, Subcutaneous, TID AC **AND** Fingerstick Glucose (POCT), , , TID AC, Blaine Stark MD    sodium chloride 0.9 % infusion, 75 mL/hr, Intravenous, Continuous, Cersencio Oliver DO, Last Rate: 75 mL/hr at 03/19/25 1109, 75 mL/hr at 03/19/25 1109    REVIEW OF SYSTEMS:  Constitutional: Negative for fatigue, anorexia, fever, chills,  "diaphoresis  HENT: Negative for postnasal drip  Eyes: Negative for visual disturbance.   Respiratory: Negative for cough, shortness of breath and wheezing.   Cardiovascular: Negative for chest pain, palpitations and leg swelling.   Gastrointestinal: Negative for abdominal pain, constipation, diarrhea, nausea and vomiting.   Genitourinary: No dysuria, hematuria  Endocrine: Negative for polyuria.   Musculoskeletal: Negative for arthralgias, back pain and joint swelling.   Skin: Negative for rash.   Neurological: Negative for focal weakness, headaches, dizziness.  Hematological: Negative for easy bruising or bleeding.  Psychiatric/Behavioral: Negative for confusion and sleep disturbance.   All the systems were reviewed and were negative except as documented on the HPI.    PHYSICAL EXAM:  Current Weight: Weight - Scale: 68 kg (150 lb)  First Weight: Weight - Scale: 68.3 kg (150 lb 9.2 oz)  Vitals:    03/19/25 1200 03/19/25 1209 03/19/25 1242 03/19/25 1246   BP:  141/65 131/57    Pulse: 56 56 58    Resp: 16 16     Temp:  97.5 °F (36.4 °C) (!) 97.3 °F (36.3 °C)    TempSrc:       SpO2: 100% 97% 96%    Weight:    68 kg (150 lb)   Height:    5' 1\" (1.549 m)       Intake/Output Summary (Last 24 hours) at 3/19/2025 1420  Last data filed at 3/19/2025 1017  Gross per 24 hour   Intake 850 ml   Output --   Net 850 ml     Physical Exam  Vitals and nursing note reviewed.   Constitutional:       Appearance: Normal appearance.   HENT:      Head: Normocephalic and atraumatic.      Nose: Nose normal.      Mouth/Throat:      Mouth: Mucous membranes are moist.      Pharynx: Oropharynx is clear.   Eyes:      Extraocular Movements: Extraocular movements intact.      Conjunctiva/sclera: Conjunctivae normal.   Cardiovascular:      Rate and Rhythm: Normal rate and regular rhythm.      Pulses: Normal pulses.      Heart sounds: Normal heart sounds.   Pulmonary:      Effort: Pulmonary effort is normal.      Breath sounds: Normal breath sounds. " "  Abdominal:      General: Bowel sounds are normal.      Palpations: Abdomen is soft.   Musculoskeletal:         General: Normal range of motion.      Cervical back: Normal range of motion and neck supple.      Comments: Left upper arm AVG dressing clean dry and intact   Skin:     General: Skin is warm and dry.   Neurological:      General: No focal deficit present.      Mental Status: She is alert and oriented to person, place, and time.   Psychiatric:         Mood and Affect: Mood normal.         Behavior: Behavior normal.           Invasive Devices: Right IJ CVC- site clean dry and ntact     Lab Results:   Results from last 7 days   Lab Units 03/19/25  0941   POTASSIUM mmol/L 5.9*   CHLORIDE mmol/L 98   CO2 mmol/L 32   BUN mg/dL 40*   CREATININE mg/dL 6.55*   CALCIUM mg/dL 7.8*     Other Studies:     Portions of the record may have been created with voice recognition software. Occasional wrong word or \"sound a like\" substitutions may have occurred due to the inherent limitations of voice recognition software. Read the chart carefully and recognize, using context, where substitutions have occurred.If you have any questions, please contact the dictating provider.    "

## 2025-03-19 NOTE — ASSESSMENT & PLAN NOTE
Lab Results   Component Value Date    EGFR 5 03/19/2025    EGFR 4 02/24/2025    EGFR 5 01/08/2025    CREATININE 6.55 (H) 03/19/2025    CREATININE 7.76 (H) 02/24/2025    CREATININE 6.85 (H) 01/08/2025     Patient previously on hemodialysis with LUE AVF however is being held postoperative revision of this fistula after ligation and revision of new graft anastomosis.    -nephrology consulted for HD, appreciate recs  -recommend strict I/Os  -daily chemistry  -other medical management per primary

## 2025-03-20 ENCOUNTER — APPOINTMENT (INPATIENT)
Dept: DIALYSIS | Facility: HOSPITAL | Age: 70
DRG: 264 | End: 2025-03-20
Payer: COMMERCIAL

## 2025-03-20 VITALS
HEIGHT: 61 IN | BODY MASS INDEX: 28.32 KG/M2 | WEIGHT: 150 LBS | DIASTOLIC BLOOD PRESSURE: 72 MMHG | HEART RATE: 74 BPM | OXYGEN SATURATION: 93 % | TEMPERATURE: 99.6 F | SYSTOLIC BLOOD PRESSURE: 145 MMHG | RESPIRATION RATE: 16 BRPM

## 2025-03-20 LAB
ABO GROUP BLD BPU: NORMAL
ABO GROUP BLD BPU: NORMAL
ANION GAP SERPL CALCULATED.3IONS-SCNC: 11 MMOL/L (ref 4–13)
BPU ID: NORMAL
BPU ID: NORMAL
BUN SERPL-MCNC: 53 MG/DL (ref 5–25)
CALCIUM SERPL-MCNC: 8.1 MG/DL (ref 8.4–10.2)
CHLORIDE SERPL-SCNC: 95 MMOL/L (ref 96–108)
CO2 SERPL-SCNC: 26 MMOL/L (ref 21–32)
CREAT SERPL-MCNC: 7.73 MG/DL (ref 0.6–1.3)
CROSSMATCH: NORMAL
CROSSMATCH: NORMAL
ERYTHROCYTE [DISTWIDTH] IN BLOOD BY AUTOMATED COUNT: 15.9 % (ref 11.6–15.1)
GFR SERPL CREATININE-BSD FRML MDRD: 4 ML/MIN/1.73SQ M
GLUCOSE SERPL-MCNC: 142 MG/DL (ref 65–140)
GLUCOSE SERPL-MCNC: 169 MG/DL (ref 65–140)
GLUCOSE SERPL-MCNC: 170 MG/DL (ref 65–140)
GLUCOSE SERPL-MCNC: 180 MG/DL (ref 65–140)
HCT VFR BLD AUTO: 33.5 % (ref 34.8–46.1)
HGB BLD-MCNC: 10.5 G/DL (ref 11.5–15.4)
MCH RBC QN AUTO: 28.8 PG (ref 26.8–34.3)
MCHC RBC AUTO-ENTMCNC: 31.3 G/DL (ref 31.4–37.4)
MCV RBC AUTO: 92 FL (ref 82–98)
MRSA NOSE QL CULT: NORMAL
PLATELET # BLD AUTO: 185 THOUSANDS/UL (ref 149–390)
PMV BLD AUTO: 10.2 FL (ref 8.9–12.7)
POTASSIUM SERPL-SCNC: 6.5 MMOL/L (ref 3.5–5.3)
RBC # BLD AUTO: 3.64 MILLION/UL (ref 3.81–5.12)
SODIUM SERPL-SCNC: 132 MMOL/L (ref 135–147)
UNIT DISPENSE STATUS: NORMAL
UNIT DISPENSE STATUS: NORMAL
UNIT PRODUCT CODE: NORMAL
UNIT PRODUCT CODE: NORMAL
UNIT PRODUCT VOLUME: 300 ML
UNIT PRODUCT VOLUME: 350 ML
UNIT RH: NORMAL
UNIT RH: NORMAL
WBC # BLD AUTO: 5.81 THOUSAND/UL (ref 4.31–10.16)

## 2025-03-20 PROCEDURE — 85027 COMPLETE CBC AUTOMATED: CPT | Performed by: STUDENT IN AN ORGANIZED HEALTH CARE EDUCATION/TRAINING PROGRAM

## 2025-03-20 PROCEDURE — NC001 PR NO CHARGE: Performed by: STUDENT IN AN ORGANIZED HEALTH CARE EDUCATION/TRAINING PROGRAM

## 2025-03-20 PROCEDURE — 90935 HEMODIALYSIS ONE EVALUATION: CPT | Performed by: NURSE PRACTITIONER

## 2025-03-20 PROCEDURE — 82948 REAGENT STRIP/BLOOD GLUCOSE: CPT

## 2025-03-20 PROCEDURE — 80048 BASIC METABOLIC PNL TOTAL CA: CPT | Performed by: NURSE PRACTITIONER

## 2025-03-20 PROCEDURE — 5A1D70Z PERFORMANCE OF URINARY FILTRATION, INTERMITTENT, LESS THAN 6 HOURS PER DAY: ICD-10-PCS | Performed by: INTERNAL MEDICINE

## 2025-03-20 PROCEDURE — 99239 HOSP IP/OBS DSCHRG MGMT >30: CPT | Performed by: STUDENT IN AN ORGANIZED HEALTH CARE EDUCATION/TRAINING PROGRAM

## 2025-03-20 RX ADMIN — ATORVASTATIN CALCIUM 40 MG: 40 TABLET, FILM COATED ORAL at 12:43

## 2025-03-20 RX ADMIN — HEPARIN SODIUM 5000 UNITS: 5000 INJECTION INTRAVENOUS; SUBCUTANEOUS at 14:50

## 2025-03-20 RX ADMIN — HYDRALAZINE HYDROCHLORIDE 100 MG: 25 TABLET ORAL at 14:51

## 2025-03-20 RX ADMIN — INSULIN LISPRO 1 UNITS: 100 INJECTION, SOLUTION INTRAVENOUS; SUBCUTANEOUS at 17:04

## 2025-03-20 RX ADMIN — DOXAZOSIN 4 MG: 4 TABLET ORAL at 12:42

## 2025-03-20 RX ADMIN — INSULIN GLARGINE 16 UNITS: 100 INJECTION, SOLUTION SUBCUTANEOUS at 08:10

## 2025-03-20 RX ADMIN — CARVEDILOL 25 MG: 25 TABLET, FILM COATED ORAL at 12:43

## 2025-03-20 RX ADMIN — CALCIUM ACETATE 1334 MG: 667 CAPSULE ORAL at 17:04

## 2025-03-20 RX ADMIN — CALCIUM ACETATE 1334 MG: 667 CAPSULE ORAL at 08:07

## 2025-03-20 RX ADMIN — PANTOPRAZOLE SODIUM 40 MG: 40 TABLET, DELAYED RELEASE ORAL at 05:34

## 2025-03-20 RX ADMIN — HYDRALAZINE HYDROCHLORIDE 100 MG: 25 TABLET ORAL at 05:34

## 2025-03-20 RX ADMIN — HEPARIN SODIUM 5000 UNITS: 5000 INJECTION INTRAVENOUS; SUBCUTANEOUS at 05:34

## 2025-03-20 RX ADMIN — INSULIN LISPRO 1 UNITS: 100 INJECTION, SOLUTION INTRAVENOUS; SUBCUTANEOUS at 08:10

## 2025-03-20 RX ADMIN — EZETIMIBE 10 MG: 10 TABLET ORAL at 12:42

## 2025-03-20 RX ADMIN — CALCIUM ACETATE 1334 MG: 667 CAPSULE ORAL at 12:43

## 2025-03-20 NOTE — ASSESSMENT & PLAN NOTE
Lab Results   Component Value Date    HGBA1C 7.9 (H) 01/08/2025     Recent Labs     03/19/25  2136 03/20/25  0806 03/20/25  1117 03/20/25  1558   POCGLU 133 180* 142* 169*     Blood Sugar Average: Last 72 hrs:  (P) 120.7626707008083052    Resume home regimen on discharge

## 2025-03-20 NOTE — DISCHARGE SUMMARY
Discharge Summary - Hospitalist   Name: Lela Corado 69 y.o. female I MRN: [id number]  Unit/Bed#: E5 -01 I Date of Admission: 3/19/2025   Date of Service: 3/20/2025 I Hospital Day: 1     Assessment & Plan  Complication of AV dialysis fistula  69  year old female was admitted today due to complication of AV dialysis fistula.  She is s/p Left - revision of left uper extemity fistula. psb conversion to graft. possible fistula ligation.  Temporary HD catheter placed by IR and is functioning  Cleared by vascular surgery for discharge  ESRD (end stage renal disease) on dialysis ([facility])  Lab Results   Component Value Date    EGFR 4 03/20/2025    EGFR 5 03/19/2025    EGFR 4 02/24/2025    CREATININE 7.73 (H) 03/20/2025    CREATININE 6.55 (H) 03/19/2025    CREATININE 7.76 (H) 02/24/2025     Mild hyponatremia. Managed via HD  Recent Labs     03/19/25  0941 03/20/25  0511   SODIUM 134* 132*             End-stage kidney disease on hemodialysis Tuesday Thursday Saturday  HD management per nephrology, cleared by nephrology for discharge  Type 2 diabetes mellitus with chronic kidney disease on chronic dialysis, with long-term current use of insulin ([facility])  Lab Results   Component Value Date    HGBA1C 7.9 (H) 01/08/2025     Recent Labs     03/19/25  2136 03/20/25  0806 03/20/25  1117 03/20/25  1558   POCGLU 133 180* 142* 169*     Blood Sugar Average: Last 72 hrs:  (P) 120.3355722830599656    Resume home regimen on discharge  Essential (primary) hypertension  Resume home regimen: Carvedilol, Hydralazine, Cardura  Anemia due to chronic kidney disease, on chronic dialysis ([facility])  Continue monitoring    Results from last 7 days   Lab Units 03/20/25  0511   HEMOGLOBIN g/dL 10.5*   MCV fL 92   RDW % 15.9*     Hyperkalemia  Received lokelma and underwent dialysis this afternoon. Cleared by nephrology for discharge.    Recent Labs     03/19/25  0941 03/20/25  0511   K 5.9* 6.5*       Chronic diastolic heart failure ([facility])  Wt  Readings from Last 3 Encounters:   03/19/25 68 kg (150 lb)   02/14/25 68 kg (150 lb)   07/11/24 67.9 kg (149 lb 11.1 oz)     Not in acute exacerbation. Volume management via HD     Discharging Physician / Practitioner: Barry Vernon MD  PCP: Nito Stone MD  Admission Date:   Admission Orders (From admission, onward)       Ordered        03/19/25 1157  Inpatient Admission  Once                          Discharge Date: 03/20/25    Medical Problems       Resolved Problems  Date Reviewed: 7/10/2024   None         Consultations During Hospital Stay:  Nephrology, IR    Procedures Performed:   HD cath placement    Significant Findings / Test Results:   Hyperkalemia     Outpatient Tests Requested:  Pcp, nephrology, vascular surgery  CBC, BMP    Complications:  none    Reason for Admission: AV fistula    Hospital Course:     Lela Corado is a 69 y.o. female patient who originally presented to the hospital on 3/19/2025 due to complication of AV fistula.    Patient is 69-year-old female history of ESRD on HD, hypertension, type 2 diabetes mellitus, CHF, and hyperlipidemia who presented here for left revision of left upper extremity fistula conversion to graft.  A temporary HD catheter was placed by IR.  Patient tolerated dialysis today.  Vascular surgery nephrology cleared her for discharge.  They will assist in arranging her outpatient follow-up for transition of care.    Patient agrees to follow-up with her providers as scheduled and to take her medications as prescribed. All questions were addressed.    she understood the need to seek immediate medical attention should she develop any chest pain, shortness of breath, severe pain, fever, chills, or any other concerning symptoms.    Please see above list of diagnoses and related plan for additional information.     Condition at Discharge: good     Discharge Day Visit / Exam:     Subjective:  patient seen and examined at bedside. No new issues  "overnight.  Vitals: Blood Pressure: 145/72 (03/20/25 1557)  Pulse: 74 (03/20/25 1557)  Temperature: 99.6 °F (37.6 °C) (03/20/25 1557)  Temp Source: Oral (03/20/25 1557)  Respirations: 16 (03/20/25 1557)  Height: 5' 1\" (154.9 cm) (03/19/25 1246)  Weight - Scale: 68 kg (150 lb) (03/19/25 1246)  SpO2: 93 % (03/20/25 1557)  Exam:   Physical Exam  Vitals reviewed.   Constitutional:       General: She is not in acute distress.  HENT:      Head: Normocephalic.      Nose: Nose normal.      Mouth/Throat:      Mouth: Mucous membranes are moist.   Eyes:      General: No scleral icterus.  Cardiovascular:      Rate and Rhythm: Normal rate.   Pulmonary:      Effort: Pulmonary effort is normal. No respiratory distress.   Abdominal:      General: There is no distension.      Palpations: Abdomen is soft.      Tenderness: There is no abdominal tenderness.   Skin:     General: Skin is warm.   Neurological:      Mental Status: She is alert.   Psychiatric:         Mood and Affect: Mood normal.         Behavior: Behavior normal.       Discharge instructions/Information to patient and family:   See after visit summary for information provided to patient and family.      Provisions for Follow-Up Care:  See after visit summary for information related to follow-up care and any pertinent home health orders.      Disposition:     Home    Planned Readmission: No     Discharge Statement:  I spent 40 minutes discharging the patient. This time was spent on the day of discharge. I had direct contact with the patient on the day of discharge. Greater than 50% of the total time was spent examining patient, answering all patient questions, arranging and discussing plan of care with patient as well as directly providing post-discharge instructions.  Additional time then spent on discharge activities.    Discharge Medications:  See after visit summary for reconciled discharge medications provided to patient and family.      ** Please Note: This note has " been constructed using a voice recognition system **

## 2025-03-20 NOTE — PROGRESS NOTES
Progress Note - Nephrology   Name: Lela Corado 69 y.o. female I MRN: [id number]  Unit/Bed#: E5 -01 I Date of Admission: 3/19/2025   Date of Service: 3/20/2025 I Hospital Day: 1       HEMODIALYSIS PROCEDURE NOTE  The patient was seen and examined on hemodialysis.  Tolerating well  Time: 3.5 hours  Sodium: 137 Blood flow: 400   Dialyzer: F160 Potassium: 2/1 Dialysate flow: 500   Access: right CVC Bicarbonate: 35 Ultrafiltration goal: 3-4   Medications on HD: NA       Assessment & Plan  ESRD (end stage renal disease) on dialysis (HCC)  On maintenance HD every TTS at [facility]  Last HD 3/18/2025 UF -2.7 liters. 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 -ready to use per ID-night clean dry and intact  Access: left-revision of LUE fistula conversion to graft by Dr. Phillips 3/19/2025  Essential (primary) hypertension  BP acceptable  Home medications: Cardura 8 mg daily, Coreg 25 mg BID, Hydralazine 100 mg TID, Torsemide 100 mg non HD days,   Currently on carvedilol 25 mg 2 times daily, doxazosin 4 mg 2 times daily, hydralazine to 100 mg every 8 hours-holding blood pressure medication a.m. of dialysis days  Avoid hypotension  Anemia due to chronic kidney disease, on chronic dialysis (HCC)  On Micera 30 mcg IV every 4 weeks as outpatient  On Venofer 50 mg weekly as outpatient  Current hemoglobin 10.5  Transfuse HGB < 7.0  Hyperkalemia  On Lokelma 10 gr. Non HD days- will resume now  Status post medical treatment and Lokelma yesterday for potassium of 5.9  Potassium today 6.5-on hemodialysis with 2K bath with 1K bath last hour  Repeat BMP in a.m. if potassium still  elevated will then short HD treatment tomorrow  Low potassium diet  Chronic kidney disease-mineral and bone disorder  On calcitriol 0.75 mcg for secondary hyperparathyroidism  On calcium acetate 1 tablet 3 times daily   Continue to monitor PTH, phos and Vit D outpateint  Complication of AV dialysis fistula  Malfunction of arteriovenous dialysis fistula s/p Left-revision of LUE fistula conversion to graft by Dr. Phillips 3/19/2025  Vascular following.  Type 2 diabetes mellitus with chronic kidney disease on chronic dialysis, with long-term current use of insulin ([facility])  Lab Results   Component Value Date    HGBA1C 7.9 (H) 01/08/2025   Management per primary team      Overall plan and recommendations has been reviewed with primary team and I agree with the plan as stated below      Review of Systems  Patient seen and examined at bedside  Review of Systems   Constitutional: Negative.  Negative for activity change, appetite change, chills, diaphoresis, fatigue and fever.   HENT: Negative.  Negative for congestion and facial swelling.    Respiratory: Negative.     Cardiovascular: Negative.    Gastrointestinal: Negative.    Endocrine: Negative.    Genitourinary: Negative.    Musculoskeletal: Negative.    Skin: Negative.    Allergic/Immunologic: Negative.    Neurological: Negative.    Hematological: Negative.    Psychiatric/Behavioral: Negative.           Physical Exam:  Current Weight: Weight - Scale: 68 kg (150 lb)  Vitals:    03/20/25 0930 03/20/25 1000 03/20/25 1030 03/20/25 1100   BP: 128/63 127/87 110/61 122/70   BP Location:       Pulse: 66 67 67 65   Resp: 16 16 16 16   Temp:       TempSrc:       SpO2:       Weight:       Height:           Physical Exam  Vitals and nursing note reviewed.   Constitutional:       Appearance: Normal appearance.   HENT:      Head: Normocephalic and atraumatic.      Nose: Nose normal.      Mouth/Throat:      Mouth: Mucous membranes are moist.      Pharynx: Oropharynx is clear.   Eyes:       Extraocular Movements: Extraocular movements intact.      Conjunctiva/sclera: Conjunctivae normal.   Neck:      Comments: Right chest CVC site clean dry and intact and functioning well on dialysis  Cardiovascular:      Rate and Rhythm: Normal rate and regular rhythm.      Pulses: Normal pulses.      Heart sounds: Normal heart sounds.   Pulmonary:      Effort: Pulmonary effort is normal.      Breath sounds: Normal breath sounds.   Abdominal:      General: Bowel sounds are normal.      Palpations: Abdomen is soft.   Musculoskeletal:         General: Normal range of motion.      Cervical back: Normal range of motion and neck supple.      Comments: Left upper extremity dressing clean dry and intact   Skin:     General: Skin is warm and dry.   Neurological:      General: No focal deficit present.      Mental Status: She is alert and oriented to person, place, and time.   Psychiatric:         Mood and Affect: Mood normal.         Behavior: Behavior normal.            Medications:    Current Facility-Administered Medications:     acetaminophen (TYLENOL) tablet 975 mg, 975 mg, Oral, Q6H PRN, Blaine Stark MD    atorvastatin (LIPITOR) tablet 40 mg, 40 mg, Oral, Daily, Barry Vernon MD    calcium acetate (PHOSLO) capsule 1,334 mg, 1,334 mg, Oral, TID With Meals, Barry Vernon MD, 1,334 mg at 03/20/25 0807    carvedilol (COREG) tablet 25 mg, 25 mg, Oral, BID With Meals, Barry Vernon MD    doxazosin (CARDURA) tablet 4 mg, 4 mg, Oral, BID, Barry Vernon MD, 4 mg at 03/19/25 1914    ezetimibe (ZETIA) tablet 10 mg, 10 mg, Oral, Daily, Barry Vernon MD    heparin (porcine) subcutaneous injection 5,000 Units, 5,000 Units, Subcutaneous, [facility]H [facility], 5,000 Units at 03/20/25 0534    hydrALAZINE (APRESOLINE) tablet 100 mg, 100 mg, Oral, Q8H [facility], 100 mg at 03/20/25 0534    insulin glargine (LANTUS) subcutaneous injection 16 Units 0.16 mL, 16 Units, Subcutaneous, QAM, Barry Vernon MD, 16 Units at  "03/20/25 0810    insulin lispro (HumALOG/ADMELOG) 100 units/mL subcutaneous injection 1-5 Units, 1-5 Units, Subcutaneous, TID AC, 1 Units at 03/20/25 0810 **AND** Fingerstick Glucose (POCT), , , TID AC, Barry Vernon MD    pantoprazole (PROTONIX) EC tablet 40 mg, 40 mg, Oral, Early Morning, Barry Vernon MD, 40 mg at 03/20/25 0534    Sodium Zirconium Cyclosilicate (Lokelma) 10 g, 10 g, Oral, Once per day on Sunday Monday Wednesday Friday, Janet Manning, SHIVNP, 10 g at 03/19/25 1755    Laboratory Results:  Results from last 7 days   Lab Units 03/20/25  0511 03/19/25  0941   WBC Thousand/uL 5.81  --    HEMOGLOBIN g/dL 10.5*  --    HEMATOCRIT % 33.5*  --    PLATELETS Thousands/uL 185  --    SODIUM mmol/L 132* 134*   POTASSIUM mmol/L 6.5* 5.9*   CHLORIDE mmol/L 95* 98   CO2 mmol/L 26 32   BUN mg/dL 53* 40*   CREATININE mg/dL 7.73* 6.55*   CALCIUM mg/dL 8.1* 7.8*       Medical records have been reviewed through [facility] and care everywhere for this patient encounter    Portions of the record may have been created with voice recognition software. Occasional wrong word or \"sound a like\" substitutions may have occurred due to the inherent limitations of voice recognition software. Read the chart carefully and recognize,   "

## 2025-03-20 NOTE — ASSESSMENT & PLAN NOTE
On Micera 30 mcg IV every 4 weeks as outpatient  On Venofer 50 mg weekly as outpatient  Current hemoglobin 10.5  Transfuse HGB < 7.0

## 2025-03-20 NOTE — ASSESSMENT & PLAN NOTE
Lab Results   Component Value Date    HGBA1C 7.9 (H) 01/08/2025     Recent Labs     03/19/25  1604 03/19/25 2004 03/19/25 2024 03/19/25 2136   POCGLU 149* 40* 61* 133     Blood Sugar Average: Last 72 hrs:  (P) [id number]    -patient appropriate for CCD2  -hypoglycemia protocol  -SSI  -holding home hypoglycemic agents while inpatient

## 2025-03-20 NOTE — ASSESSMENT & PLAN NOTE
Lab Results   Component Value Date    EGFR 4 03/20/2025    EGFR 5 03/19/2025    EGFR 4 02/24/2025    CREATININE 7.73 (H) 03/20/2025    CREATININE 6.55 (H) 03/19/2025    CREATININE 7.76 (H) 02/24/2025     Patient previously on hemodialysis with LUE AVF however is being held postoperative revision of this fistula after ligation and revision of new graft anastomosis.    -nephrology consulted for HD, appreciate recs  -recommend strict I/Os  -daily chemistry  -other medical management per primary

## 2025-03-20 NOTE — PLAN OF CARE
Post-Dialysis RN Treatment Note    Blood Pressure:  Pre-132/63 mm/Hg  Post-123/60 mmHg   EDW: 65.4 kg    Weight:  Pre-70.2 kg   Post-66.3 kg   Mode of weight measurement: Bed Scale   Volume Removed:  4500 ml    Treatment duration: 210 minutes    NS given:  No    Treatment shortened-No   Medications given during Rx: Not Applicable   Estimated Kt/V:  Not Applicable   Access type: Permacath/TDC   Needle Gauge: N/A   Access Issues: No    Report called to primary nurse:   Yes-Jared Adorno RN          Goal-remove 3-4 L as tolerated using 2 K+ bath over 2 1/2 hr hd tx- then 1 K+ bath last hr.    Problem: METABOLIC, FLUID AND ELECTROLYTES - ADULT  Goal: Fluid balance maintained  Description: INTERVENTIONS:  - Monitor labs   - Monitor I/O and WT  - Instruct patient on fluid and nutrition as appropriate  - Assess for signs & symptoms of volume excess or deficit  Outcome: Progressing

## 2025-03-20 NOTE — ASSESSMENT & PLAN NOTE
69  year old female was admitted today due to complication of AV dialysis fistula.  She is s/p Left - revision of left uper extemity fistula. psb conversion to graft. possible fistula ligation.  Temporary HD catheter placed by IR and is functioning  Cleared by vascular surgery for discharge

## 2025-03-20 NOTE — NURSING NOTE
Patient discharge home. Discharge instructions reviewed and questions answered. IV out. HD cath and wound vac intact. Instructions on removing wound vac is provided. All personal belongings sent with patient.

## 2025-03-20 NOTE — UTILIZATION REVIEW
Initial Clinical Review    Elective outpatient procedure, converted to inpatient admission due to operative findings, unable to salvage fistula, s/p LUE fistula ligation , conversion to AV graft ,   Pt  needs temporary HD catheter. Consults to IR , nephrology , and Internal med .      Elective outpt surgical procedure  Age/Sex: 69 y.o. female  with hx ESRD on HD TTS  Surgery Date: 3/19/25 @ 0829  Procedure: Left - revision of left uper extemity fistula. psb conversion to graft. possible fistula ligation   Anesthesia: general   Operative Findings: Unable to salvage fistula. Fistula densely adherent to the overlying skin which contained focal areas of aneurysmal degeneration and overall thinning of the skin.  Attempt at dissecting the fistula off the overlying skin was impossible creating multiple sites of inadvertent entry into fistula/venous outflow tract lumen.  As such decision was to proceed with conversion to an AV graft using an interposition 4-7 mm [place] graft.      Date 3/19 POD #0    IR consult - although plans were for revision the access site will not be usable for a period of time Needs bridging with tunneled dialysis catheter Plan for IR tunneled HD catheter placement today, pending IR schedule . Keep NPO .   3/19/25 @ 1506   Right external jugular tunneled dialysis catheter, 24 cm tip to cuff    Placement verified by x-ray and blood return through all ports      Nephrology consult - Complication of dialysis fistula-with conversion to a graft . No urgent need for HD . Continue HD TTS . Hyperkalemia 5.9- will temporize and give Lokelma with repeat dialysis tomorrow   Vasc sx - LUE incision NAYAN dressing in place with minimal strikethrough. Palpable thrill proximal & distal to overlying dressing. Neurovascularly intact distally with 5/5  strength in bilteral upper extremities.maintain NAYAN dressing until post-op visit. May change AA batteries in battery pack prn . Daily labs . save left arm from sticks     Internal med consult - ongoing monitoring of labs with hx anemia, ESRD . K 5.9 - managed per nephrology . Continue home antihypertensives :Carvedilol, Hydralazine, Cardura .  Patient will be admitted on an inpatient basis with an anticipated length of stay of greater than 2 midnights secondary to av fistula complication, management.     POD#1 Progress Note: 3/20 Day 2  Temporary HD cath placement as new fistula is not mature. Patient reports no pain. LUE incision with jonas dressing in place and minimal strikethrough. Palpable thrill proximal and distal to overlying dressing. Neurovascularly intact with normal strength and sensation in bilateral upper extremities    No changes in sensation or function of left upper extremity  . For HD today K up to 6.5 today .  Dialysis today :2 K+ bath over 2 1/2 hr HD tx- then 1 K+ bath last hr. Right chest CVC site clean dry and intact and functioning well on dialysis .     Weight:  Pre-70.2 kg   Post-66.3 kg  . Volume Removed:  4500 ml .  Per nephrology, continue Lokelma .  Currently on carvedilol 25 mg 2 times daily, doxazosin 4 mg 2 times daily, hydralazine to 100 mg every 8 hours-holding blood pressure medication a.m. of dialysis days  . K  level tomorrow am . Will evaluate patient tomorrow possible HD given data points and potassium level . Low Potassium diet .    Update - pt cleared by vascular and nephrology for discharge to home     Admission Orders: Date/Time/Statement:   Admission Orders (From admission, onward)       Ordered        03/19/25 1157  Inpatient Admission  Once                          Orders Placed This Encounter   Procedures    Inpatient Admission     Standing Status:   Standing     Number of Occurrences:   1     Level of Care:   Med Surg [16]     Estimated length of stay:   More than 2 Midnights     Certification:   I certify that inpatient services are medically necessary for this patient for a duration of greater than two midnights. See H&P and MD  Progress Notes for additional information about the patient's course of treatment.     Diet: level 2 diabetic diet  Mobility: OOB to chair TID  DVT Prophylaxis: SCD, OOB ambulation , heparin     Medications/Pain Control:   Scheduled Medications:  atorvastatin, 40 mg, Oral, Daily  calcium acetate, 1,334 mg, Oral, TID With Meals  carvedilol, 25 mg, Oral, BID With Meals  doxazosin, 4 mg, Oral, BID  ezetimibe, 10 mg, Oral, Daily  heparin (porcine), 5,000 Units, Subcutaneous, Q8H DEB[facility]E  hydrALAZINE, 100 mg, Oral, Q8H DEBBIE  insulin glargine, 16 Units, Subcutaneous, QAM  insulin lispro, 1-5 Units, Subcutaneous, TID AC  pantoprazole, 40 mg, Oral, Early Morning  Sodium Zirconium Cyclosilicate, 10 g, Oral, Once per day on Sunday Monday Wednesday Friday    insulin regular (HumuLIN R,NovoLIN R) 10 Units in sodium chloride (PF) 0.9 % 10 mL IV syringe  Dose: 10 Units  Freq: Once Route: IV  Start: 03/19/25 1615 End: 03/19/25 1755  furosemide (LASIX) injection 120 mg  Dose: 120 mg  Freq: Once Route: IV  Start: 03/19/25 1600 End: 03/19/25 1755  dextrose 50 % IV solution 25 mL  Dose: 25 mL  Freq: Once Route: IV  Start: 03/19/25 1600 End: 03/19/25 1755      ceFAZolin (ANCEF) IVPB (premix in dextrose) 2,000 mg 50 mL  Dose: 2,000 mg  Freq: Once Route: IV  Start: 03/19/25 0800 End: 03/19/25 0820  heparin (porcine) subcutaneous injection 5,000 Units  Dose: 5,000 Units  Freq: Every 8 hours scheduled Route: SC  Start: 03/19/25 1400 End: 03/19/25 1908  Continuous IV Infusions:   sodium chloride 0.9 % infusion  Rate: 75 mL/hr Dose: 75 mL/hr  Freq: Continuous Route: IV  Indications of Use: IV Hydration  Last Dose: Stopped (03/19/25 1602)  Start: 03/19/25 0545 End: 03/19/25 1544  PRN Meds:  acetaminophen, 975 mg, Oral, Q6H PRN      Vital Signs (last 3 days)       Date/Time Temp Pulse Resp BP MAP (mmHg) SpO2 Calculated FIO2 (%) - Nasal Cannula Nasal Cannula O2 Flow Rate (L/min) O2 Device Patient Position - Orthostatic VS Pain    03/20/25 1030 -- 67  16 110/61 -- -- -- -- -- -- --    03/20/25 1000 -- 67 16 127/87 -- -- -- -- -- -- --    03/20/25 0930 -- 66 16 128/63 -- -- -- -- -- -- --    03/20/25 0900 -- 65 16 125/59 -- -- -- -- -- -- --    03/20/25 0830 97.9 °F (36.6 °C) 72 16 132/63 98 -- -- -- -- Lying --    03/20/25 08:07:14 98 °F (36.7 °C) 67 17 133/61 85 94 % -- -- None (Room air) Sitting --    03/20/25 05:34:46 -- 66 -- 157/70 99 96 % -- -- -- -- --    03/20/25 0534 -- -- -- 157/70 -- -- -- -- -- -- --    03/19/25 22:29:56 -- 70 -- 152/67 95 95 % -- -- -- -- --    03/19/25 2228 -- -- -- 152/67 -- -- -- -- -- -- --    03/19/25 22:18:57 97.2 °F (36.2 °C) 68 17 138/59 85 96 % -- -- -- -- --    03/19/25 19:20:32 97.4 °F (36.3 °C) 54 -- -- -- 97 % -- -- -- -- No Pain    03/19/25 19:17:10 -- 53 -- 127/53 78 97 % -- -- -- -- --    03/19/25 1914 -- -- -- 127/59 -- -- -- -- -- -- --    03/19/25 1903 -- 57 -- -- -- -- -- -- -- -- --    03/19/25 1503 -- 59 10 180/79 -- 78 % 32 3 L/min Nasal cannula -- --    03/19/25 1458 -- 53 13 195/84 -- 100 % 32 3 L/min Nasal cannula -- --    03/19/25 1453 -- 58 16 202/89 -- 100 % 32 3 L/min Nasal cannula -- --    03/19/25 1448 -- 60 19 207/88 -- 100 % 32 3 L/min Nasal cannula -- --    03/19/25 1443 -- 60 17 216/93 -- 100 % 32 3 L/min Nasal cannula -- --    03/19/25 1246 -- -- -- -- -- -- -- -- -- -- No Pain    03/19/25 12:42:33 97.3 °F (36.3 °C) 58 -- 131/57 82 96 % -- -- -- -- --    03/19/25 1209 97.5 °F (36.4 °C) 56 16 141/65 93 97 % -- -- None (Room air) -- No Pain    03/19/25 1200 -- 56 16 -- -- 100 % -- -- None (Room air) -- --    03/19/25 1154 -- 54 16 125/59 85 100 % 28 2 L/min Nasal cannula -- No Pain    03/19/25 1139 -- 55 16 145/67 97 100 % 28 2 L/min Nasal cannula -- No Pain    03/19/25 1124 -- 55 16 134/63 90 95 % -- -- None (Room air) -- --    03/19/25 1109 97 °F (36.1 °C) 54 16 136/64 92 96 % -- -- None (Room air) -- No Pain    03/19/25 0747 -- -- -- -- -- -- -- -- -- -- No Pain    03/19/25 0555 97.3 °F (36.3 °C)  62 16 161/81 -- 100 % -- -- None (Room air) -- No Pain          Weight (last 2 days)       Date/Time Weight    03/19/25 1246 68 (150)    03/19/25 0555 68.3 (150.57)            Pertinent Labs/Diagnostic Test Results:   Radiology:  IR tunneled dialysis catheter placement   Final Interpretation by Tila Davila MD (03/19 1608)   Impression:      Placement of a right EXTERNAL jugular vein 15.5 [place] 28 cm tunneled dialysis catheter      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            Workstation performed: [id number]               Results from last 7 days   Lab Units 03/20/25  0511   WBC Thousand/uL 5.81   HEMOGLOBIN g/dL 10.5*   HEMATOCRIT % 33.5*   PLATELETS Thousands/uL 185         Results from last 7 days   Lab Units 03/20/25  0511 03/19/25  0941   SODIUM mmol/L 132* 134*   POTASSIUM mmol/L 6.5* 5.9*   CHLORIDE mmol/L 95* 98   CO2 mmol/L 26 32   ANION GAP mmol/L 11 4   BUN mg/dL 53* 40*   CREATININE mg/dL 7.73* 6.55*   EGFR ml/min/1.73sq m 4 5   CALCIUM mg/dL 8.1* 7.8*         Results from last 7 days   Lab Units 03/20/25  1117 03/20/25  0806 03/19/25  2136 03/19/25  2024 03/19/25  2004 03/19/25  1604 03/19/25  1205 03/19/25  0624   POC GLUCOSE mg/dl 142* 180* 133 61* 40* 149* 136 71     Results from last 7 days   Lab Units 03/20/25  0511 03/19/25  0941   GLUCOSE RANDOM mg/dL 170* 96                   Network Utilization Review Department  ATTENTION: Please call with any questions or concerns to [phone] and carefully listen to the prompts so that you are directed to the right person. All voicemails are confidential.   For Discharge needs, contact Care Management DC Support Team at [phone] opt. [phone]  Send all requests for admission clinical reviews, approved or denied determinations and any other requests to dedicated fax number below belonging to the campus where the patient is receiving treatment. List of dedicated fax numbers for the Facilities:  FACILITY  NAME UR FAX NUMBER   ADMISSION DENIALS (Administrative/Medical Necessity) [phone]   DISCHARGE SUPPORT TEAM (NETWORK) [phone]   PARENT CHILD HEALTH (Maternity/NICU/Pediatrics) [phone]   [facility] [phone]   [facility] [phone]   [facility] [phone]   [facility] [phone]   Critical access hospital [phone]   [facility] [phone]   [facility] [phone]   [facility] [phone]   [facility] [phone]   [facility] [phone]   [facility] [phone]   [facility] [phone]

## 2025-03-20 NOTE — ASSESSMENT & PLAN NOTE
On maintenance HD every TTS at [facility]  Last HD 3/18/2025 UF -2.7 liters. 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 -ready to use per ID-night clean dry and intact  Access: left-revision of LUE fistula conversion to graft by Dr. Phillips 3/19/2025

## 2025-03-20 NOTE — ASSESSMENT & PLAN NOTE
BP acceptable  Home medications: Cardura 8 mg daily, Coreg 25 mg BID, Hydralazine 100 mg TID, Torsemide 100 mg non HD days,   Currently on carvedilol 25 mg 2 times daily, doxazosin 4 mg 2 times daily, hydralazine to 100 mg every 8 hours-holding blood pressure medication a.m. of dialysis days  Avoid hypotension   Detail Level: Zone Detail Level: Simple

## 2025-03-20 NOTE — PLAN OF CARE
Problem: PAIN - ADULT  Goal: Verbalizes/displays adequate comfort level or baseline comfort level  Description: Interventions:  - Encourage patient to monitor pain and request assistance  - Assess pain using appropriate pain scale  - Administer analgesics based on type and severity of pain and evaluate response  - Implement non-pharmacological measures as appropriate and evaluate response  - Consider cultural and social influences on pain and pain management  - Notify physician/advanced practitioner if interventions unsuccessful or patient reports new pain  Outcome: Progressing     Problem: INFECTION - ADULT  Goal: Absence or prevention of progression during hospitalization  Description: INTERVENTIONS:  - Assess and monitor for signs and symptoms of infection  - Monitor lab/diagnostic results  - Monitor all insertion sites, i.e. indwelling lines, tubes, and drains  - Monitor endotracheal if appropriate and nasal secretions for changes in amount and color  - [place] appropriate cooling/warming therapies per order  - Administer medications as ordered  - Instruct and encourage patient and family to use good hand hygiene technique  - Identify and instruct in appropriate isolation precautions for identified infection/condition  Outcome: Progressing  Goal: Absence of fever/infection during neutropenic period  Description: INTERVENTIONS:  - Monitor WBC    Outcome: Progressing     Problem: SAFETY ADULT  Goal: Patient will remain free of falls  Description: INTERVENTIONS:  - Educate patient/family on patient safety including physical limitations  - Instruct patient to call for assistance with activity   - Consult OT/PT to assist with strengthening/mobility   - Keep Call bell within reach  - Keep bed low and locked with side rails adjusted as appropriate  - Keep care items and personal belongings within reach  - Initiate and maintain comfort rounds  - Make Fall Risk Sign visible to staff  - Offer Toileting every 2 Hours,  in advance of need  - Initiate/Maintain alarm  - Obtain necessary fall risk management equipment:   - Apply yellow socks and bracelet for high fall risk patients  - Consider moving patient to room near nurses station  Outcome: Progressing  Goal: Maintain or return to baseline ADL function  Description: INTERVENTIONS:  -  Assess patient's ability to carry out ADLs; assess patient's baseline for ADL function and identify physical deficits which impact ability to perform ADLs (bathing, care of mouth/teeth, toileting, grooming, dressing, etc.)  - Assess/evaluate cause of self-care deficits   - Assess range of motion  - Assess patient's mobility; develop plan if impaired  - Assess patient's need for assistive devices and provide as appropriate  - Encourage maximum independence but intervene and supervise when necessary  - Involve family in performance of ADLs  - Assess for home care needs following discharge   - Consider OT consult to assist with ADL evaluation and planning for discharge  - Provide patient education as appropriate  Outcome: Progressing  Goal: Maintains/Returns to pre admission functional level  Description: INTERVENTIONS:  - Perform AM-PAC 6 Click Basic Mobility/ Daily Activity assessment daily.  - Set and communicate daily mobility goal to care team and patient/family/caregiver.   - Collaborate with rehabilitation services on mobility goals if consulted  - Perform Range of Motion 3 times a day.  - Reposition patient every 2 hours.  - Dangle patient 3 times a day  - Stand patient 3 times a day  - Ambulate patient 3 times a day  - Out of bed to chair 3 times a day   - Out of bed for meals 3 times a day  - Out of bed for toileting  - Record patient progress and toleration of activity level   Outcome: Progressing     Problem: DISCHARGE PLANNING  Goal: Discharge to home or other facility with appropriate resources  Description: INTERVENTIONS:  - Identify barriers to discharge w/patient and caregiver  -  Arrange for needed discharge resources and transportation as appropriate  - Identify discharge learning needs (meds, wound care, etc.)  - Arrange for interpretive services to assist at discharge as needed  - Refer to Case Management Department for coordinating discharge planning if the patient needs post-hospital services based on physician/advanced practitioner order or complex needs related to functional status, cognitive ability, or social support system  Outcome: Progressing     Problem: Knowledge Deficit  Goal: Patient/family/caregiver demonstrates understanding of disease process, treatment plan, medications, and discharge instructions  Description: Complete learning assessment and assess knowledge base.  Interventions:  - Provide teaching at level of understanding  - Provide teaching via preferred learning methods  Outcome: Progressing     Problem: METABOLIC, FLUID AND ELECTROLYTES - ADULT  Goal: Fluid balance maintained  Description: INTERVENTIONS:  - Monitor labs   - Monitor I/O and WT  - Instruct patient on fluid and nutrition as appropriate  - Assess for signs & symptoms of volume excess or deficit  Outcome: Progressing     Problem: SKIN/TISSUE INTEGRITY - ADULT  Goal: Incision(s), wounds(s) or drain site(s) healing without S/S of infection  Description: INTERVENTIONS  - Assess and document dressing, incision, wound bed, drain sites and surrounding tissue  - Provide patient and family education  - Perform skin care/dressing changes   Outcome: Progressing

## 2025-03-20 NOTE — ASSESSMENT & PLAN NOTE
Received lokelma and underwent dialysis this afternoon. Cleared by nephrology for discharge.    Recent Labs     03/19/25  0941 03/20/25  0511   K 5.9* 6.5*

## 2025-03-20 NOTE — ASSESSMENT & PLAN NOTE
Wt Readings from Last 3 Encounters:   03/19/25 68 kg (150 lb)   02/14/25 68 kg (150 lb)   07/11/24 67.9 kg (149 lb 11.1 oz)     Not in acute exacerbation. Volume management via HD

## 2025-03-20 NOTE — PLAN OF CARE
Problem: PAIN - ADULT  Goal: Verbalizes/displays adequate comfort level or baseline comfort level  Description: Interventions:  - Encourage patient to monitor pain and request assistance  - Assess pain using appropriate pain scale  - Administer analgesics based on type and severity of pain and evaluate response  - Implement non-pharmacological measures as appropriate and evaluate response  - Consider cultural and social influences on pain and pain management  - Notify physician/advanced practitioner if interventions unsuccessful or patient reports new pain  Outcome: Progressing     Problem: INFECTION - ADULT  Goal: Absence or prevention of progression during hospitalization  Description: INTERVENTIONS:  - Assess and monitor for signs and symptoms of infection  - Monitor lab/diagnostic results  - Monitor all insertion sites, i.e. indwelling lines, tubes, and drains  - Monitor endotracheal if appropriate and nasal secretions for changes in amount and color  - [place] appropriate cooling/warming therapies per order  - Administer medications as ordered  - Instruct and encourage patient and family to use good hand hygiene technique  - Identify and instruct in appropriate isolation precautions for identified infection/condition  Outcome: Progressing  Goal: Absence of fever/infection during neutropenic period  Description: INTERVENTIONS:  - Monitor WBC    Outcome: Progressing     Problem: SAFETY ADULT  Goal: Patient will remain free of falls  Description: INTERVENTIONS:  - Educate patient/family on patient safety including physical limitations  - Instruct patient to call for assistance with activity   - Consult OT/PT to assist with strengthening/mobility   - Keep Call bell within reach  - Keep bed low and locked with side rails adjusted as appropriate  - Keep care items and personal belongings within reach  - Initiate and maintain comfort rounds  - Make Fall Risk Sign visible to staff  - Apply yellow socks and bracelet  for high fall risk patients  - Consider moving patient to room near nurses station  Outcome: Progressing  Goal: Maintain or return to baseline ADL function  Description: INTERVENTIONS:  -  Assess patient's ability to carry out ADLs; assess patient's baseline for ADL function and identify physical deficits which impact ability to perform ADLs (bathing, care of mouth/teeth, toileting, grooming, dressing, etc.)  - Assess/evaluate cause of self-care deficits   - Assess range of motion  - Assess patient's mobility; develop plan if impaired  - Assess patient's need for assistive devices and provide as appropriate  - Encourage maximum independence but intervene and supervise when necessary  - Involve family in performance of ADLs  - Assess for home care needs following discharge   - Consider OT consult to assist with ADL evaluation and planning for discharge  - Provide patient education as appropriate  Outcome: Progressing  Goal: Maintains/Returns to pre admission functional level  Description: INTERVENTIONS:  - Perform AM-PAC 6 Click Basic Mobility/ Daily Activity assessment daily.  - Set and communicate daily mobility goal to care team and patient/family/caregiver.   - Collaborate with rehabilitation services on mobility goals if consulted  - Out of bed for toileting  - Record patient progress and toleration of activity level   Outcome: Progressing     Problem: DISCHARGE PLANNING  Goal: Discharge to home or other facility with appropriate resources  Description: INTERVENTIONS:  - Identify barriers to discharge w/patient and caregiver  - Arrange for needed discharge resources and transportation as appropriate  - Identify discharge learning needs (meds, wound care, etc.)  - Arrange for interpretive services to assist at discharge as needed  - Refer to Case Management Department for coordinating discharge planning if the patient needs post-hospital services based on physician/advanced practitioner order or complex needs  related to functional status, cognitive ability, or social support system  Outcome: Progressing     Problem: Knowledge Deficit  Goal: Patient/family/caregiver demonstrates understanding of disease process, treatment plan, medications, and discharge instructions  Description: Complete learning assessment and assess knowledge base.  Interventions:  - Provide teaching at level of understanding  - Provide teaching via preferred learning methods  Outcome: Progressing     Problem: METABOLIC, FLUID AND ELECTROLYTES - ADULT  Goal: Fluid balance maintained  Description: INTERVENTIONS:  - Monitor labs   - Monitor I/O and WT  - Instruct patient on fluid and nutrition as appropriate  - Assess for signs & symptoms of volume excess or deficit  Outcome: Progressing     Problem: SKIN/TISSUE INTEGRITY - ADULT  Goal: Incision(s), wounds(s) or drain site(s) healing without S/S of infection  Description: INTERVENTIONS  - Assess and document dressing, incision, wound bed, drain sites and surrounding tissue  - Provide patient and family education  - Perform skin care/dressing changes every shift  Outcome: Progressing

## 2025-03-20 NOTE — ASSESSMENT & PLAN NOTE
Continue monitoring    Results from last 7 days   Lab Units 03/20/25  0511   HEMOGLOBIN g/dL 10.5*   MCV fL 92   RDW % 15.9*

## 2025-03-20 NOTE — DISCHARGE INSTR - AVS FIRST PAGE
DISCHARGE INSTRUCTIONS  DIALYSIS GRAFT SURGERY    ACTIVITY:  Limit use of the operated arm to what is necessary for the first day after surgery. On the second day after surgery, you may start to increase use of your arm as tolerated.  Avoid heavy lifting (no more than 15 lbs) for the first one week.  You should start to exercise your hand on the side of the graft by squeezing a stress ball or a rolled-up sock. This increases blood flow in your graft and arm so your it will function better.    Feel for a thrill every day. The thrill is the vibration or pulse you feel over the graft that means the blood is flowing through it. If you cannot feel a thrill, call our office ([phone]).    DIET:   Resume your normal diet.  Good nutrition is important for healing of your incision.    DRESSING:   You may have surgical glue at your surgical site. There are stitches present under the skin which will absorb on their own.  The glue is used to cover the access, assist in closure, and prevent contamination. This adhesive will darken and peel away on its own within one to two weeks. Do not pick at it.  You have a wound VAC in place that should remain in place until 3/24/2025. You may need to replace the AA batteries before the VAC is ready to be removed. If you have any concerns regarding the wound VAC, contact the vascular surgery office. On 3/24/2025, VAC may be removed and thrown away.    INCISION:   If you do not have a dialysis catheter in place, you may shower and get your incision wet.  Wash incision daily with soap and water, but do not rub or scrub the incision; rinse thoroughly and pat dry.  You may have stitches or staples to close your incision and it is okay for these to get wet.  Do not bathe in a tub or swim for the first 4 week following surgery or if you have any open wounds.  It is normal to have mild swelling or discoloration around the incision.   If increasing redness or pain develops, call our office  immediately.  Numbness in the region of the incision may occur following the surgery.  This normally improves over six to twelve months.  If you have numbness or pain in your hand, please call our office immediately.  DO NOT put any powders, creams, ointments, or lotions on your incision.     ARM SWELLING:    Most patients have some noticeable arm swelling after surgery.  This usually disappears within a few weeks.  If swelling is present, elevate the arm whenever possible.      RESTRICTIONS:   Do NOT have blood draws, IV's, or blood pressures performed on the operated arm.    GRAFT USE:    Your graft will be used for dialysis after the pain and swelling has diminished. This takes at least two weeks after graft placement.  If you are using a catheter for dialysis, this will not be removed until after your graft is being used for dialysis without any issues.    FOLLOW UP APPOINTMENTS:  Making and keeping follow up appointments and ultrasound tests are important to your recovery.  If you have difficulty making it to or keeping your follow up appointments, call the office.    If you have increased pain, fever >101.5, increased drainage, redness or a bad smell at your surgery site, new coldness/numbness of your arm or leg, please call us immediately and GO directly to the ER.    PLEASE CALL THE OFFICE IF YOU HAVE ANY QUESTIONS  [phone]  [phone] [address], [address], [place] [address]  [address] [place] [address]  [address] [place] [address]  [address] [facility], 1st Floor, [place] [address]  [address] [facility], [address], [place] [address]  [address] [facility], [address], [place] [address]  [address] [facility], [address], 2nd Floor, [place] [address]  [address] [facility], 1st Floor, [address], [place] [address]  [address] [place] [address]  [address] [facility], [address], [place] [address]

## 2025-03-20 NOTE — ASSESSMENT & PLAN NOTE
Lab Results   Component Value Date    EGFR 4 03/20/2025    EGFR 5 03/19/2025    EGFR 4 02/24/2025    CREATININE 7.73 (H) 03/20/2025    CREATININE 6.55 (H) 03/19/2025    CREATININE 7.76 (H) 02/24/2025     Mild hyponatremia. Managed via HD  Recent Labs     03/19/25  0941 03/20/25  0511   SODIUM 134* 132*             End-stage kidney disease on hemodialysis Tuesday Thursday Saturday  HD management per nephrology, cleared by nephrology for discharge

## 2025-03-20 NOTE — PROGRESS NOTES
Progress Note - Vascular Surgery   Name: Lela Corado 69 y.o. female I MRN: [id number]  Unit/Bed#: E5 -01 I Date of Admission: 3/19/2025   Date of Service: 3/20/2025 I Hospital Day: 1    Assessment & Plan  Complication of AV dialysis fistula  Patient with ESRD currently dialyzing through a left brachiocephalic AVF created in 2022.  Fistula complicated by prolonged decannulation bleeding at the site with aneurysmal degeneration contained within a scabbed area from repeated cannulization's.    S/p LUE AVF ligation and fistula revision w/ graft w/ Dr. Moran on 3/19. Admitted to medicine post-operatively with IR consultation for temporary HD cath placement as new fistula is not mature.    -appreciate IR timely consultation and procedure  -continue to follow nephro recs regarding HD  -save left arm from sticks  -okay for diet  -pain and nausea control prn  -encourage pulm toilet:   -IS q1h while awake   -OOB to chair   -ambulate TID with assistance  -remainder of care per primary team  -maintain NAYAN dressing until post-op visit. May change AA batteries in battery pack prn  Type 2 diabetes mellitus with chronic kidney disease on chronic dialysis, with long-term current use of insulin ([facility])  Lab Results   Component Value Date    HGBA1C 7.9 (H) 01/08/2025     Recent Labs     03/19/25  1604 03/19/25 2004 03/19/25 2024 03/19/25  2136   POCGLU 149* 40* 61* 133     Blood Sugar Average: Last 72 hrs:  (P) [id number]    -patient appropriate for CCD2  -hypoglycemia protocol  -SSI  -holding home hypoglycemic agents while inpatient  Essential (primary) hypertension  Stable and well controlled    -management per primary  -CTM vitals q4h  Chronic kidney disease-mineral bone disorder (CKD-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    -nephrology consulted for HD, appreciate recs  -recommend strict I/Os  -daily chemistry  -other medical management per primary  Hyperkalemia  3/19 K=5.9.  3/20 K = 6.5    Please contact the SecureChat role for the Vascular Surgery service with any questions/concerns.    Subjective   NAEON. AVSS. Patient reports no pain. Tolerating diet. Voiding spontaneously.  No changes in sensation or function of left upper extremity.  Denies fever, chills, nausea, vomiting.     Objective :  Temp:  [97 °F (36.1 °C)-97.5 °F (36.4 °C)] 97.2 °F (36.2 °C)  HR:  [53-70] 66  BP: (125-216)/(53-93) 157/70  Resp:  [10-19] 17  SpO2:  [78 %-100 %] 96 %  O2 Device: Nasal cannula  Nasal Cannula O2 Flow Rate (L/min):  [2 L/min-3 L/min] 3 L/min    I/O         03/18 0701  03/19 0700 03/19 0701  03/20 0700    I.V. (mL/kg)  850 (12.5)    Total Intake(mL/kg)  850 (12.5)    Net  +850                Lines/Drains/Airways       Active Status       Name Placement date Placement time Site Days    CVC Central Lines 03/19/25 Double 03/19/25  1506  --  less than 1    HD Permanent Double Catheter 03/19/25  1457  Other (Comment)  less than 1                  Physical Exam  General: NAD  HENT: NCAT MMM  Neck: supple, no JVD  CV: nl rate  Lungs: nl wob. No resp distress  ABD: Soft, nontender, nondistended  Extrem: No CCE.  LUE incision with jonas dressing in place and minimal strikethrough.  Palpable thrill proximal and distal to overlying dressing.  Neurovascularly intact with normal strength and sensation in bilateral upper extremities  Neuro: AAOx3      Lab Results: I have reviewed the following results:  Recent Labs     03/20/25  0511   WBC 5.81   HGB 10.5*   HCT 33.5*      SODIUM 132*   K 6.5*   CL 95*   CO2 26   BUN 53*   CREATININE 7.73*    GLUC 170*       VTE Pharmacologic Prophylaxis: VTE covered by:  heparin (porcine), Subcutaneous, 5,000 Units at 03/20/25 0534      VTE Mechanical Prophylaxis: sequential compression device    Marcos Fuentes MD  General Surgery Resident

## 2025-03-20 NOTE — ASSESSMENT & PLAN NOTE
On [facility] 10 gr. Non HD days- will resume now  Status post medical treatment and [facility] yesterday for potassium of 5.9  Potassium today 6.5-on hemodialysis with 2K bath with 1K bath last hour  Repeat BMP in a.m. if potassium still elevated will then short HD treatment tomorrow  Low potassium diet

## 2025-03-21 NOTE — UTILIZATION REVIEW
NOTIFICATION OF INPATIENT ADMISSION   AUTHORIZATION REQUEST   SERVICING FACILITY:   [place] [address]  Tax ID: [id number]  NPI: [id number] ATTENDING PROVIDER:  Attending Name and NPI#: Barry Vernon Md [[id number]]  Address: [address] [place] [address]  Phone: [phone]     ADMISSION INFORMATION:  Place of Service: Inpatient Acute [facility]  Place of Service Code: 21  Inpatient Admission Date/Time: 3/19/25 11:57 AM  Discharge Date/Time: 3/20/2025  6:50 PM  Admitting Diagnosis Code/Description:  Malfunction of arteriovenous dialysis fistula, initial encounter ([facility]) [T82.590A]     UTILIZATION REVIEW CONTACT:  Flora Ryan Utilization   Network Utilization Review Department  Phone: [phone]  Fax [phone]  Email: [email or web address]  Contact for approvals/pending authorizations, clinical reviews, and discharge.     PHYSICIAN ADVISORY SERVICES:  Medical Necessity Denial & [employer] Review  Phone: [phone]  Fax: [phone]  Email: [email or web address]     DISCHARGE SUPPORT TEAM:  For Patients Discharge Needs & Updates  Phone: [phone] opt. 2 Fax: [phone]  Email: [email or web address]

## 2025-03-21 NOTE — UTILIZATION REVIEW
NOTIFICATION OF ADMISSION DISCHARGE   This is a Notification of Discharge from [facility]. Please be advised that this patient has been discharge from our facility. Below you will find the admission and discharge date and time including the patient’s disposition.   UTILIZATION REVIEW CONTACT:  Flora Ryan MA  Utilization   Network Utilization Review Department  Phone: [phone] x carefully listen to the prompts. All voicemails are confidential.  Email: [email or web address]     ADMISSION INFORMATION  PRESENTATION DATE: 3/19/2025  5:31 AM  OBERVATION ADMISSION DATE: N/A  INPATIENT ADMISSION DATE: 3/19/25 11:57 AM   DISCHARGE DATE: 3/20/2025  6:50 PM   DISPOSITION:Home/Self Care    Network Utilization Review Department  ATTENTION: Please call with any questions or concerns to [phone] and carefully listen to the prompts so that you are directed to the right person. All voicemails are confidential.   For Discharge needs, contact Care Management DC Support Team at [phone] opt. 2  Send all requests for admission clinical reviews, approved or denied determinations and any other requests to dedicated fax number below belonging to the campus where the patient is receiving treatment. List of dedicated fax numbers for the Facilities:  FACILITY NAME UR FAX NUMBER   ADMISSION DENIALS (Administrative/Medical Necessity) [phone]   DISCHARGE SUPPORT TEAM ([facility]) [phone]   PARENT CHILD HEALTH (Maternity/NICU/Pediatrics) [phone]   [facility] [phone]   [facility] [phone]   [facility] [phone]   [facility] [phone]   [facility] [phone]   [facility] [phone]   [facility] [phone]   [facility]  [phone]   [facility] [phone]   [facility] [phone]   [facility] [phone]   [facility] [phone]

## 2025-03-24 ENCOUNTER — HOSPITAL ENCOUNTER (EMERGENCY)
Facility: HOSPITAL | Age: 70
Discharge: HOME | End: 2025-03-24
Attending: EMERGENCY MEDICINE | Admitting: EMERGENCY MEDICINE
Payer: COMMERCIAL

## 2025-03-24 VITALS
BODY MASS INDEX: 28.49 KG/M2 | HEART RATE: 70 BPM | DIASTOLIC BLOOD PRESSURE: 73 MMHG | TEMPERATURE: 98.8 F | OXYGEN SATURATION: 97 % | SYSTOLIC BLOOD PRESSURE: 177 MMHG | WEIGHT: 150.79 LBS | RESPIRATION RATE: 14 BRPM

## 2025-03-24 DIAGNOSIS — Z48.00 DRESSING CHANGE: Primary | ICD-10-CM

## 2025-03-24 PROCEDURE — 99284 EMERGENCY DEPT VISIT MOD MDM: CPT | Performed by: EMERGENCY MEDICINE

## 2025-03-24 PROCEDURE — 99283 EMERGENCY DEPT VISIT LOW MDM: CPT

## 2025-03-24 NOTE — ED PROVIDER NOTES
Time reflects when diagnosis was documented in both MDM as applicable and the Disposition within this note       Time User Action Codes Description Comment    3/24/2025  1:11 PM James Beth Add [Z48.00] Dressing change           ED Disposition       ED Disposition   Discharge    Condition   Stable    Date/Time   Mon Mar 24, 2025  1:11 PM    Comment   Lela Mejiasirving discharge to home/self care.                   Assessment & Plan       Medical Decision Making  Patient with hx fo DM, HTN, ESRD, s/p Left upper Arm Dialysis Graft on 3/18/2025, comes for removal of Post-op dressing/drain, denies arm pain, fever or any other symptoms. On exam, postop dressing/drain combo in place. Case discussed with Vascular Surgery, AP came down and removed the dressing/drain combo, dressed with ABD pad, advised pt can be discharged home, wound care and dressing with ABD Pad instructions given to patient by Vascular.    Problems Addressed:  Dressing change: acute illness or injury        ED Course as of 03/24/25 2145   Mon Mar 24, 2025   1237 Case discussed with Vascular, will come down.   1310 Vascular surgery AP removed the dressing/drain and surgical wound covered with ABD pad, wound instructions given by vascular to the patient, will give ABD supplies for 1 week has advised by vascular.       Medications - No data to display    ED Risk Strat Scores                            SBIRT 22yo+      Flowsheet Row Most Recent Value   Initial Alcohol Screen: US AUDIT-C     1. How often do you have a drink containing alcohol? 0 Filed at: 03/24/2025 1313   2. How many drinks containing alcohol do you have on a typical day you are drinking?  0 Filed at: 03/24/2025 1313   3b. FEMALE Any Age, or MALE 65+: How often do you have 4 or more drinks on one occassion? 0 Filed at: 03/24/2025 1313   Audit-C Score 0 Filed at: 03/24/2025 1313   FELIX: How many times in the past year have you...    Used an illegal drug or used a prescription medication  for non-medical reasons? Never Filed at: 03/24/2025 1313                            History of Present Illness       Chief Complaint   Patient presents with    Post-op Problem     Pt reports with drain to left arm from recent surgery. Pt reports doctor told her she can take it out today but would prefer if someone did it for her        Past Medical History:   Diagnosis Date    CHF (congestive heart failure) (HCC)     CKD (chronic kidney disease) stage 5, GFR less than 15 ml/min (HCC)     Diabetes mellitus (HCC)     Diabetic nephropathy (HCC)     Hemodialysis patient (HCC)     via [facility] right chest / [facility] Tues/Thurs and Sat    Hyperlipidemia     Hypertension     Muscle weakness     Orthodontics     sleeps with retainer at night    Risk for falls     Uses walker     pt denies as of 3/6; per dtr active in the house able to cook/light cleaning as able      Past Surgical History:   Procedure Laterality Date    CATARACT EXTRACTION Bilateral     COLONOSCOPY      EGD      IR AV FISTULAGRAM/GRAFTOGRAM  05/09/2022    IR AV FISTULAGRAM/GRAFTOGRAM  03/31/2023    IR AV FISTULAGRAM/GRAFTOGRAM  08/09/2023    IR AV FISTULAGRAM/GRAFTOGRAM  03/13/2024    IR AV FISTULAGRAM/GRAFTOGRAM  07/10/2024    IR BIOPSY BONE MARROW  09/15/2021    IR BIOPSY KIDNEY RANDOM  09/15/2021    IR NON-TUNNELED CENTRAL LINE PLACEMENT  12/07/2021    IR TUNNELED CENTRAL LINE CHECK/CHANGE/REPOSITION  04/01/2022    IR TUNNELED DIALYSIS CATHETER PLACEMENT  12/09/2021    IR TUNNELED DIALYSIS CATHETER PLACEMENT  3/19/2025    IR TUNNELED DIALYSIS CATHETER REMOVAL  10/21/2022    AR ARTERIOVENOUS ANASTOMOSIS OPEN DIRECT Left 02/16/2022    Procedure: CREATION FISTULA ARTERIOVENOUS (AV);  Surgeon: Lis Phillips DO;  Location: [facility];  Service: Vascular    AR REVJ OPN ARVEN FSTL W/O THRMBC DIAL GRF Left 09/02/2022    Procedure: REVISION AV FISTULA (superficalize/transposition);  Surgeon: Lis Phillips DO;  Location: [facility];  Service: Vascular     WV REVJ OPN ARVEN FSTL W/O THRMBC DIAL GRF Left 3/19/2025    Procedure: revision of left uper extemity fistula, psb conversion to graft, possible fistula ligation;  Surgeon: Lis Phillips DO;  Location: [facility];  Service: Vascular      Family History   Problem Relation Age of Onset    Hypertension Mother     Diabetes Father     Hypertension Sister     Diabetes Sister     Hypertension Brother       Social History     Tobacco Use    Smoking status: Never     Passive exposure: Never    Smokeless tobacco: Never    Tobacco comments:     NO TOBACCO USE   Vaping Use    Vaping status: Never Used   Substance Use Topics    Alcohol use: Not Currently    Drug use: Not Currently      E-Cigarette/Vaping    E-Cigarette Use Never User       E-Cigarette/Vaping Substances    Nicotine No     THC No     CBD No     Flavoring No     Other No     Unknown No       I have reviewed and agree with the history as documented.       History provided by:  Patient   used: No    Medical Problem  Location:  Left upper arm  Quality:  Post op dressing/drain need to come out  Severity:  Moderate  Onset quality:  Gradual  Duration:  5 days  Timing:  Sporadic  Progression:  Unchanged  Chronicity:  New  Context:  Patient with Left upper Arm Dialysis Graft, comes for removal of Post-op dressing and drain  Relieved by:  Nothing  Worsened by:  Nothing  Ineffective treatments:  None  Associated symptoms: no abdominal pain, no chest pain, no cough, no diarrhea, no fever, no headaches, no nausea, no rash, no shortness of breath, no sore throat and no vomiting    Risk factors:  DM, HTN, ESRD      Review of Systems   Constitutional:  Negative for chills and fever.   HENT:  Negative for facial swelling, sore throat and trouble swallowing.    Eyes:  Negative for pain and visual disturbance.   Respiratory:  Negative for cough, chest tightness and shortness of breath.    Cardiovascular:  Negative for chest pain and leg swelling.    Gastrointestinal:  Negative for abdominal pain, diarrhea, nausea and vomiting.   Genitourinary:  Negative for dysuria and flank pain.   Musculoskeletal:  Negative for back pain, neck pain and neck stiffness.   Skin:  Negative for pallor and rash.   Allergic/Immunologic: Negative for environmental allergies and immunocompromised state.   Neurological:  Negative for dizziness and headaches.   Hematological:  Negative for adenopathy. Does not bruise/bleed easily.   Psychiatric/Behavioral:  Negative for agitation and behavioral problems.    All other systems reviewed and are negative.          Objective       ED Triage Vitals [03/24/25 1157]   Temperature Pulse Blood Pressure Respirations SpO2 Patient Position - Orthostatic VS   98.8 °F (37.1 °C) 70 (!) 177/73 14 97 % --      Temp src Heart Rate Source BP Location FiO2 (%) Pain Score    -- -- -- -- --      Vitals      Date and Time Temp Pulse SpO2 Resp BP Pain Score FACES Pain Rating User   03/24/25 1157 98.8 °F (37.1 °C) 70 97 % 14 177/73 -- -- EVELYN            Physical Exam  Vitals and nursing note reviewed.   Constitutional:       General: She is not in acute distress.     Appearance: She is well-developed.   HENT:      Head: Normocephalic and atraumatic.   Eyes:      Extraocular Movements: Extraocular movements intact.   Cardiovascular:      Rate and Rhythm: Normal rate and regular rhythm.   Pulmonary:      Effort: Pulmonary effort is normal. No respiratory distress.   Abdominal:      Palpations: Abdomen is soft.      Tenderness: There is no abdominal tenderness. There is no guarding or rebound.   Musculoskeletal:         General: Normal range of motion.      Cervical back: Normal range of motion and neck supple.      Comments: Left Upper Extremity: Left upper arm with postop dressing, drain coming out through the dressing, no swelling or erythema, no tenderness, neurovascularly tact distally   Skin:     General: Skin is warm and dry.   Neurological:      General: No  focal deficit present.      Mental Status: She is alert and oriented to person, place, and time.   Psychiatric:         Mood and Affect: Mood normal.         Behavior: Behavior normal.         Results Reviewed       None            No orders to display       Procedures    ED Medication and Procedure Management   Prior to Admission Medications   Prescriptions Last Dose Informant Patient Reported? Taking?   B Complex-C-Folic Acid (Renal Vitamin) 0.8 MG TABS   No No   Sig: Take 1 tablet (0.8 mg total) by mouth in the morning   Glucagon (Gvoke HypoPen 2-Pack) 1 MG/0.2ML SOAJ  Self Yes No   Sig: Inject 1 mg under the skin   Insulin Lispro-aabc, 1 U Dial, (Lyumjev KwikPen) 100 UNIT/ML SOPN   Yes No   Sig: Inject 6u sc with breakfast/lunch/dinner.   Lantus SoloStar 100 units/mL SOPN   Yes No   Sig: INJECT 16UNITS SUBCUTANEOUSLY EVERY MORNING   Sodium Zirconium Cyclosilicate (Lokelma) 10 g   No No   Sig: Take 1 packet (10 g total) by mouth daily Take one pack as directed on Monday, Wednesday, Friday and sunday   Tenapanor HCl, CKD, 30 MG TABS   No No   Sig: Take 1 tablet by mouth 2 (two) times a day   atorvastatin (LIPITOR) 40 mg tablet  Self No No   Sig: Take 1 tablet (40 mg total) by mouth daily   calcium acetate (PHOSLO) capsule   No No   Sig: Take 2 capsules (1,334 mg total) by mouth 3 (three) times a day with meals   carvedilol (COREG) 25 mg tablet   No No   Sig: Take 1 tablet (25 mg total) by mouth 2 (two) times a day with meals   cloNIDine (CATAPRES) 0.3 mg tablet   No No   Sig: TAKE 1 TABLET (0.3 MG TOTAL) BY MOUTH 3 (THREE) TIMES A DAY   doxazosin (CARDURA) 4 mg tablet   No No   Sig: Take 1 tablet (4 mg total) by mouth 2 (two) times a day   ergocalciferol (ERGOCALCIFEROL) 1.25 MG (19387 UT) capsule  Self Yes No   Sig: Take 50,000 Units by mouth every 30 (thirty) days     ezetimibe (ZETIA) 10 mg tablet   Yes No   Sig: Take 10 mg by mouth daily   gabapentin (NEURONTIN) 300 mg capsule  Self No No   Sig: Take 1 capsule  (300 mg total) by mouth daily at bedtime   hydrALAZINE (APRESOLINE) 100 MG tablet   No No   Sig: TAKE 1 TABLET (100 MG TOTAL) BY MOUTH EVERY 8 (EIGHT) HOURS   linaGLIPtin (Tradjenta) 5 MG TABS  Self Yes No   Sig: Take 5 mg by mouth daily   omeprazole (PriLOSEC) 20 mg delayed release capsule   Yes No   Sig: Take 1 capsule by mouth daily as needed   torsemide (DEMADEX) 100 mg tablet   No No   Sig: TAKE 1 TABLET (100 MG TOTAL) BY MOUTH DAILY      Facility-Administered Medications: None     Discharge Medication List as of 3/24/2025  1:12 PM        CONTINUE these medications which have NOT CHANGED    Details   atorvastatin (LIPITOR) 40 mg tablet Take 1 tablet (40 mg total) by mouth daily, Starting Wed 1/6/2021, Normal      B Complex-C-Folic Acid (Renal Vitamin) 0.8 MG TABS Take 1 tablet (0.8 mg total) by mouth in the morning, Starting Thu 8/31/2023, Normal      calcium acetate (PHOSLO) capsule Take 2 capsules (1,334 mg total) by mouth 3 (three) times a day with meals, Starting Tue 8/13/2024, Normal      carvedilol (COREG) 25 mg tablet Take 1 tablet (25 mg total) by mouth 2 (two) times a day with meals, Starting Tue 2/27/2024, Normal      cloNIDine (CATAPRES) 0.3 mg tablet TAKE 1 TABLET (0.3 MG TOTAL) BY MOUTH 3 (THREE) TIMES A DAY, Starting Fri 2/21/2025, Normal      doxazosin (CARDURA) 4 mg tablet Take 1 tablet (4 mg total) by mouth 2 (two) times a day, Starting Tue 3/19/2024, Normal      ergocalciferol (ERGOCALCIFEROL) 1.25 MG (63942 UT) capsule Take 50,000 Units by mouth every 30 (thirty) days  , Starting Tue 10/22/2019, Historical Med      ezetimibe (ZETIA) 10 mg tablet Take 10 mg by mouth daily, Historical Med      gabapentin (NEURONTIN) 300 mg capsule Take 1 capsule (300 mg total) by mouth daily at bedtime, Starting Tue 12/14/2021, Normal      Glucagon (Gvoke HypoPen 2-Pack) 1 MG/0.2ML SOAJ Inject 1 mg under the skin, Starting Wed 4/13/2022, Historical Med      hydrALAZINE (APRESOLINE) 100 MG tablet TAKE 1 TABLET  (100 MG TOTAL) BY MOUTH EVERY 8 (EIGHT) HOURS, Starting Tue 1/21/2025, Normal      Insulin Lispro-aabc, 1 U Dial, (Lyumjev KwikPen) 100 UNIT/ML SOPN Inject 6u sc with breakfast/lunch/dinner., Historical Med      Lantus SoloStar 100 units/mL SOPN INJECT 16UNITS SUBCUTANEOUSLY EVERY MORNING, Historical Med      linaGLIPtin (Tradjenta) 5 MG TABS Take 5 mg by mouth daily, Starting Tue 5/4/2021, Until Wed 3/19/2025, Historical Med      omeprazole (PriLOSEC) 20 mg delayed release capsule Take 1 capsule by mouth daily as needed, Starting Tue 2/18/2025, Historical Med      Sodium Zirconium Cyclosilicate (Lokelma) 10 g Take 1 packet (10 g total) by mouth daily Take one pack as directed on Monday, Wednesday, Friday and sunday, Starting Tue 3/26/2024, Normal      Tenapanor HCl, CKD, 30 MG TABS Take 1 tablet by mouth 2 (two) times a day, Starting Tue 2/25/2025, Normal      torsemide (DEMADEX) 100 mg tablet TAKE 1 TABLET (100 MG TOTAL) BY MOUTH DAILY, Starting Tue 9/10/2024, Normal           No discharge procedures on file.  ED SEPSIS DOCUMENTATION   Time reflects when diagnosis was documented in both MDM as applicable and the Disposition within this note       Time User Action Codes Description Comment    3/24/2025  1:11 PM James Beth Add [Z48.00] Dressing change                  James Beth MD  03/24/25 9417

## 2025-03-24 NOTE — DISCHARGE INSTRUCTIONS
Please follow instructions given to you by vascular surgery for wound care, follow-up with outpatient.

## 2025-03-24 NOTE — ED NOTES
VAS came to bedside, removed drain, and placed a dressing. Patient sent home with supplies.     Juvencio Saldana RN  03/24/25 7035

## 2025-03-25 ENCOUNTER — TELEPHONE (OUTPATIENT)
Dept: VASCULAR SURGERY | Facility: CLINIC | Age: 70
End: 2025-03-25

## 2025-03-25 NOTE — TELEPHONE ENCOUNTER
Vascular Nurse Navigator Post Op Call    Procedure: Left - revision of left uper extemity fistula. psb conversion to graft. possible fistula ligation     Date of Procedure: 3/19/25    Surgeon: Wes Phillips DO - Primary     Discharge Date:  3/20/25    Discharge Disposition:  Home      Painful tingling or numbness in your fingers?: No    Paleness/Coolness in hands/fingers?: No    Redness, swelling or pus from your wound?: No    Bleeding?: No    Thrill present?: Yes    Fever/chills?: No    Uncontrolled Pain?: No      Reviewed discharge instructions and incision care with patient.      Dialysis Days and Location: [facility] at [facility]    NEXT SCHEDULED OFFICE VISIT:  4/4/25 at 2:30 pm with Dr. Phillips at [facility]    Transportation Confirmed?: Yes      Any Questions or Concerns?    Spoke with patient with the assistance of  # [id number] - [place].  Patient stated that she is doing good since discharge.  She stated that she removed the dressing yesterday and her incision looks good.  Reviewed incision care with her - wash daily with soap and water.  She inquired if a dressing is needed.  Advised her to follow instructions provided by MARLENA Parra yesterday while she was in the ER.  All questions answered.  No concerns expressed at this time.

## 2025-04-03 ENCOUNTER — TELEPHONE (OUTPATIENT)
Dept: VASCULAR SURGERY | Facility: CLINIC | Age: 70
End: 2025-04-03

## 2025-04-04 ENCOUNTER — OFFICE VISIT (OUTPATIENT)
Dept: VASCULAR SURGERY | Facility: CLINIC | Age: 70
End: 2025-04-04

## 2025-04-04 VITALS
BODY MASS INDEX: 27.56 KG/M2 | TEMPERATURE: 97.4 F | SYSTOLIC BLOOD PRESSURE: 190 MMHG | HEART RATE: 67 BPM | WEIGHT: 146 LBS | HEIGHT: 61 IN | OXYGEN SATURATION: 97 % | DIASTOLIC BLOOD PRESSURE: 88 MMHG

## 2025-04-04 DIAGNOSIS — T82.9XXD COMPLICATION OF ARTERIOVENOUS DIALYSIS FISTULA, SUBSEQUENT ENCOUNTER: ICD-10-CM

## 2025-04-04 DIAGNOSIS — N18.6 END STAGE RENAL DISEASE (HCC): Primary | ICD-10-CM

## 2025-04-04 PROCEDURE — 99024 POSTOP FOLLOW-UP VISIT: CPT | Performed by: SURGERY

## 2025-04-04 NOTE — PROGRESS NOTES
Name: Lela Corado      : 1955      MRN: [id number]  Encounter Provider: Lis Phillips DO  Encounter Date: 2025   Encounter department: [facility]  :  Assessment & Plan  End stage renal disease (HCC)  Lab Results   Component Value Date    EGFR 4 2025    EGFR 5 2025    EGFR 4 2025    CREATININE 7.73 (H) 2025    CREATININE 6.55 (H) 2025    CREATININE 7.76 (H) 2025       Orders:    VAS DIALYSIS ACCESS EVALUATION- LEFT AV GRAFT (UPPER); Future    Complication of arteriovenous dialysis fistula, subsequent encounter  Patient returns to the office status post left upper extremity AV fistula revision/conversion to an upper extremity AV graft.  Patient does not report any significant discomfort.  There is minimal swelling.  The incision is healing with minimal scab.  The graft is patent with good thrill/bruit.  Will obtain a hemodialysis duplex to assess flow volumes.  If volumes are adequate and incision fully heals anticipate graft cannulation in approximately 2-4 weeks.  Patient is currently dialyzing through a right chest wall catheter.  Today's office visit was facilitated through the [place] language line  [id number].             History of Present Illness   HPI  Lela Corado is a 69 y.o. female who presents status post revision/conversion of left upper extremity AV fistula to an AV graft on 3/10/2025.  Patient denies any left upper extremity pain.  The incision is healing with minimal scab.  The graft is patent with a good bruit.  Will obtain a hemodialysis duplex in the next couple weeks.    Patient is here for Revision of left upper extremity fistula, possible conversion to graft, patient HD Tuesday, Wednesday and Saturday, at [facility] on [address] [facility].   History obtained from: patient    Review of Systems  Past Medical History   Past Medical History:   Diagnosis Date    CHF (congestive heart failure) (HCC)     CKD  (chronic kidney disease) stage 5, GFR less than 15 ml/min (HCC)     Diabetes mellitus (HCC)     Diabetic nephropathy (HCC)     Hemodialysis patient (HCC)     via permacath right chest / [facility] Tues/Thurs and Sat    Hyperlipidemia     Hypertension     Muscle weakness     Orthodontics     sleeps with retainer at night    Risk for falls     Uses walker     pt denies as of 3/6; per dtr active in the house able to cook/light cleaning as able     Past Surgical History:   Procedure Laterality Date    CATARACT EXTRACTION Bilateral     COLONOSCOPY      EGD      IR AV FISTULAGRAM/GRAFTOGRAM  05/09/2022    IR AV FISTULAGRAM/GRAFTOGRAM  03/31/2023    IR AV FISTULAGRAM/GRAFTOGRAM  08/09/2023    IR AV FISTULAGRAM/GRAFTOGRAM  03/13/2024    IR AV FISTULAGRAM/GRAFTOGRAM  07/10/2024    IR BIOPSY BONE MARROW  09/15/2021    IR BIOPSY KIDNEY RANDOM  09/15/2021    IR NON-TUNNELED CENTRAL LINE PLACEMENT  12/07/2021    IR TUNNELED CENTRAL LINE CHECK/CHANGE/REPOSITION  04/01/2022    IR TUNNELED DIALYSIS CATHETER PLACEMENT  12/09/2021    IR TUNNELED DIALYSIS CATHETER PLACEMENT  3/19/2025    IR TUNNELED DIALYSIS CATHETER REMOVAL  10/21/2022    OR ARTERIOVENOUS ANASTOMOSIS OPEN DIRECT Left 02/16/2022    Procedure: CREATION FISTULA ARTERIOVENOUS (AV);  Surgeon: Lis Phillips DO;  [facility];  Service: Vascular    OR REVEWELINA PRASAD ARVEN FSTL W/O THRMBC DIAL GRF Left 09/02/2022    Procedure: REVISION AV FISTULA (superficalize/transposition);  Surgeon: Lis Phillips DO;  [facility];  Service: Vascular    OR REVJ OPN ARVEN FSTL W/O THRMBC DIAL GRF Left 3/19/2025    Procedure: revision of left uper extemity fistula, psb conversion to graft, possible fistula ligation;  Surgeon: Lis Phillips DO;  Location[facility];  Service: Vascular     Family History   Problem Relation Age of Onset    Hypertension Mother     Diabetes Father     Hypertension Sister     Diabetes Sister     Hypertension Brother       reports that she  has never smoked. She has never been exposed to tobacco smoke. She has never used smokeless tobacco. She reports that she does not currently use alcohol. She reports that she does not currently use drugs.  Current Outpatient Medications   Medication Instructions    atorvastatin (LIPITOR) 40 mg, Oral, Daily    calcium acetate (PHOSLO) 1,334 mg, Oral, 3 times daily with meals    carvedilol (COREG) 25 mg, Oral, 2 times daily with meals    cloNIDine (CATAPRES) 0.3 mg, Oral, 3 times daily    doxazosin (CARDURA) 4 mg, Oral, 2 times daily    ergocalciferol (ERGOCALCIFEROL) 50,000 Units, Every 30 days    ezetimibe (ZETIA) 10 mg, Daily    gabapentin (NEURONTIN) 300 mg, Oral, Daily at bedtime    Gvoke HypoPen 2-Pack 1 mg    hydrALAZINE (APRESOLINE) 100 mg, Oral, Every 8 hours scheduled    Insulin Lispro-aabc, 1 U Dial, (Lyumjev KwikPen) 100 UNIT/ML SOPN Inject 6u sc with breakfast/lunch/dinner.    Lantus SoloStar 100 units/mL SOPN INJECT 16UNITS SUBCUTANEOUSLY EVERY MORNING    omeprazole (PriLOSEC) 20 mg delayed release capsule 1 capsule, Daily PRN    Renal Vitamin 0.8 mg, Oral, Daily    Sodium Zirconium Cyclosilicate (LOKELMA) 10 g, Oral, Daily, Take one pack as directed on Monday, Wednesday, Friday and sunday    Tenapanor HCl, CKD, 30 MG TABS 1 tablet, Oral, 2 times daily    torsemide (DEMADEX) 100 mg, Oral, Daily    Tradjenta 5 mg, Daily     Allergies   Allergen Reactions    Amlodipine Edema and Eye Swelling    Lisinopril Angioedema     Bilateral periorbital edema that was significant      Current Outpatient Medications on File Prior to Visit   Medication Sig Dispense Refill    atorvastatin (LIPITOR) 40 mg tablet Take 1 tablet (40 mg total) by mouth daily 90 tablet 3    B Complex-C-Folic Acid (Renal Vitamin) 0.8 MG TABS Take 1 tablet (0.8 mg total) by mouth in the morning 90 tablet 3    calcium acetate (PHOSLO) capsule Take 2 capsules (1,334 mg total) by mouth 3 (three) times a day with meals 270 capsule 3    carvedilol  (COREG) 25 mg tablet Take 1 tablet (25 mg total) by mouth 2 (two) times a day with meals 180 tablet 3    cloNIDine (CATAPRES) 0.3 mg tablet TAKE 1 TABLET (0.3 MG TOTAL) BY MOUTH 3 (THREE) TIMES A  tablet 4    ergocalciferol (ERGOCALCIFEROL) 1.25 MG (90174 UT) capsule Take 50,000 Units by mouth every 30 (thirty) days        ezetimibe (ZETIA) 10 mg tablet Take 10 mg by mouth daily      gabapentin (NEURONTIN) 300 mg capsule Take 1 capsule (300 mg total) by mouth daily at bedtime 30 capsule 0    hydrALAZINE (APRESOLINE) 100 MG tablet TAKE 1 TABLET (100 MG TOTAL) BY MOUTH EVERY 8 (EIGHT) HOURS 270 tablet 3    linaGLIPtin (Tradjenta) 5 MG TABS Take 5 mg by mouth daily      omeprazole (PriLOSEC) 20 mg delayed release capsule Take 1 capsule by mouth daily as needed      Sodium Zirconium Cyclosilicate (Lokelma) 10 g Take 1 packet (10 g total) by mouth daily Take one pack as directed on Monday, Wednesday, Friday and sunday 90 each 3    torsemide (DEMADEX) 100 mg tablet TAKE 1 TABLET (100 MG TOTAL) BY MOUTH DAILY 90 tablet 3    doxazosin (CARDURA) 4 mg tablet Take 1 tablet (4 mg total) by mouth 2 (two) times a day (Patient not taking: Reported on 4/4/2025) 180 tablet 3    Glucagon (Gvoke HypoPen 2-Pack) 1 MG/0.2ML SOAJ Inject 1 mg under the skin (Patient not taking: Reported on 4/4/2025)      Insulin Lispro-aabc, 1 U Dial, (Lyumjev KwikPen) 100 UNIT/ML SOPN Inject 6u sc with breakfast/lunch/dinner.      Lantus SoloStar 100 units/mL SOPN INJECT 16UNITS SUBCUTANEOUSLY EVERY MORNING      Tenapanor HCl, CKD, 30 MG TABS Take 1 tablet by mouth 2 (two) times a day (Patient not taking: Reported on 4/4/2025) 180 tablet 3    [DISCONTINUED] cloNIDine (CATAPRES-TTS-3) 0.3 mg/24 hr Place 1 patch (0.3 mg total) on the skin once a week 4 patch 0     No current facility-administered medications on file prior to visit.      Social History     Tobacco Use    Smoking status: Never     Passive exposure: Never    Smokeless tobacco: Never     "Tobacco comments:     NO TOBACCO USE   Vaping Use    Vaping status: Never Used   Substance and Sexual Activity    Alcohol use: Not Currently    Drug use: Not Currently    Sexual activity: Not Currently     Comment: defer        Objective   BP (!) 190/88 (BP Location: Right arm, Patient Position: Sitting, Cuff Size: Large)   Pulse 67   Temp (!) 97.4 °F (36.3 °C) (Temporal)   Ht 5' 1\" (1.549 m)   Wt 66.2 kg (146 lb)   SpO2 97%   BMI 27.59 kg/m²      Physical Exam  Constitutional:       General: She is not in acute distress.     Appearance: She is not ill-appearing, toxic-appearing or diaphoretic.   HENT:      Head: Normocephalic and atraumatic.   Cardiovascular:      Rate and Rhythm: Normal rate.   Skin:     General: Skin is warm and dry.   Neurological:      Mental Status: She is alert.   Psychiatric:         Mood and Affect: Mood normal.         Behavior: Behavior normal.         Thought Content: Thought content normal.         Judgment: Judgment normal.       LUE AVG  Left upper extremity incision is healing with minimal scab.  The left upper extremity AV graft is patent with good thrill/bruit.  The left hand is warm and well-perfused.    Administrative Statements   I have spent a total time of 20 minutes in caring for this patient on the day of the visit/encounter including Diagnostic results, Prognosis, Risks and benefits of tx options, Instructions for management, Patient and family education, Importance of tx compliance, Risk factor reductions, Impressions, Counseling / Coordination of care, Documenting in the medical record, Reviewing/placing orders in the medical record (including tests, medications, and/or procedures), and Obtaining or reviewing history  .  "

## 2025-04-04 NOTE — ASSESSMENT & PLAN NOTE
Patient returns to the office status post left upper extremity AV fistula revision/conversion to an upper extremity AV graft.  Patient does not report any significant discomfort.  There is minimal swelling.  The incision is healing with minimal scab.  The graft is patent with good thrill/bruit.  Will obtain a hemodialysis duplex to assess flow volumes.  If volumes are adequate and incision fully heals anticipate graft cannulation in approximately 2-4 weeks.  Patient is currently dialyzing through a right chest wall catheter.  Today's office visit was facilitated through the [place]an language line  [id number].

## 2025-04-30 ENCOUNTER — HOSPITAL ENCOUNTER (OUTPATIENT)
Dept: NON INVASIVE DIAGNOSTICS | Facility: HOSPITAL | Age: 70
Discharge: HOME | End: 2025-04-30
Attending: SURGERY
Payer: COMMERCIAL

## 2025-04-30 DIAGNOSIS — N18.6 END STAGE RENAL DISEASE (HCC): ICD-10-CM

## 2025-04-30 PROCEDURE — 93990 DOPPLER FLOW TESTING: CPT

## 2025-05-01 ENCOUNTER — TELEPHONE (OUTPATIENT)
Age: 70
End: 2025-05-01

## 2025-05-01 PROCEDURE — 93990 DOPPLER FLOW TESTING: CPT | Performed by: SURGERY

## 2025-05-01 NOTE — TELEPHONE ENCOUNTER
Received a call from Trinidad with [facility] stating pt completed testing yesterday to evaluate access flows. She is asking when they can begin cannulation?  Trinidad- C/b# [phone]

## 2025-05-01 NOTE — TELEPHONE ENCOUNTER
Called and s/w Trinidad and relayed information provided by MARLENA Rachel. She verbalized understanding.

## 2025-05-01 NOTE — TELEPHONE ENCOUNTER
Chart last office note reviewed.  Preliminary HD access duplex findings indicates appears to be matured.  Await final results, as long as no significant swelling, most likely graft can be accessed.  Contact office with any issues or interruptions in HD sessions.

## 2025-05-06 ENCOUNTER — DOCUMENTATION (OUTPATIENT)
Dept: OTHER | Facility: HOSPITAL | Age: 70
End: 2025-05-06

## 2025-05-06 NOTE — PROGRESS NOTES
Patient was out of kelma.  New order placed earlier today per nurse practitioner.  Informed dialysis unit.  We will repeat potassium level on Tuesday.  Had dialysis earlier today.  Advised him to inform patient to make sure she is taking her Lokelma and is following a low potassium diet

## 2025-05-26 ENCOUNTER — TELEPHONE (OUTPATIENT)
Dept: OTHER | Facility: OTHER | Age: 70
End: 2025-05-26

## 2025-05-26 NOTE — TELEPHONE ENCOUNTER
Patient requires a . Patient called in requesting an appointment with Dr. Phillips at [facility] in [place]. Please return her call when the office reopens, thank you!

## 2025-05-29 ENCOUNTER — TRANSCRIBE ORDERS (OUTPATIENT)
Dept: RADIOLOGY | Facility: HOSPITAL | Age: 70
End: 2025-05-29

## 2025-05-29 DIAGNOSIS — N18.6 ESRD (END STAGE RENAL DISEASE) (HCC): Primary | ICD-10-CM

## 2025-05-30 DIAGNOSIS — Z99.2 ESRD (END STAGE RENAL DISEASE) ON DIALYSIS (HCC): ICD-10-CM

## 2025-05-30 DIAGNOSIS — N25.81 SECONDARY HYPERPARATHYROIDISM (HCC): Primary | ICD-10-CM

## 2025-05-30 DIAGNOSIS — N18.6 ESRD (END STAGE RENAL DISEASE) ON DIALYSIS (HCC): ICD-10-CM

## 2025-05-30 DIAGNOSIS — I50.32 CHRONIC DIASTOLIC HEART FAILURE (HCC): ICD-10-CM

## 2025-06-05 NOTE — PROGRESS NOTES
ANDREY DAVIS consulted by Dr Sharon BALDERAS CM had a discussion with Dr Sharon Jones, but ANDREY DAVIS did not meet with or assess pt during this visit  Dr Sharon Jones states that pt is out of insulin and is without insurance at this time  Dr Sharon Jones states that pt reports she has been in the process of trying to get her MA restored for the past month  It is not clear why pt allowed her coverage to lapse  Dr Sharon Jones and ANDREY DAVIS discussed prescription assistance programs through pharmaceutical companies, but ANDREY DAVIS advised that approval and meds sent would take a few weeks if pt submits documentation right away  ANDREY DAVIS provided education that the pt's best option would be to purchase OTC Novolin from [facility] for $24 99 per vial until pt's MA is approved  ANDREY DAVIS has no immediate assistance for pt's insulin needs at this time  ANDREY DAVIS is available for additional support as needed via social work ambulatory order 
Detail Level: Detailed
Quality 226: Preventive Care And Screening: Tobacco Use: Screening And Cessation Intervention: Patient screened for tobacco use and is an ex/non-smoker
Quality 394b: Td/Tdap Immunizations For Adolescents: Patient had one tetanus, diphtheria toxoids and acellular pertussis vaccine (Tdap) on or between the patient's 10th and 13th birthdays.
Quality 130: Documentation Of Current Medications In The Medical Record: Current Medications Documented
Quality 431: Preventive Care And Screening: Unhealthy Alcohol Use - Screening: Patient not identified as an unhealthy alcohol user when screened for unhealthy alcohol use using a systematic screening method
Quality 47: Advance Care Plan: Advance Care Planning discussed and documented in the medical record; patient did not wish or was not able to name a surrogate decision maker or provide an advance care plan.

## 2025-06-18 ENCOUNTER — HOSPITAL ENCOUNTER (OUTPATIENT)
Dept: RADIOLOGY | Facility: HOSPITAL | Age: 70
Discharge: HOME | End: 2025-06-18
Attending: RADIOLOGY
Payer: COMMERCIAL

## 2025-06-18 DIAGNOSIS — Z99.2 ESRD (END STAGE RENAL DISEASE) ON DIALYSIS (HCC): ICD-10-CM

## 2025-06-18 DIAGNOSIS — N18.6 ESRD (END STAGE RENAL DISEASE) ON DIALYSIS (HCC): ICD-10-CM

## 2025-06-18 PROCEDURE — 36589 REMOVAL TUNNELED CV CATH: CPT | Performed by: NURSE PRACTITIONER

## 2025-06-18 PROCEDURE — 36589 REMOVAL TUNNELED CV CATH: CPT

## 2025-06-18 RX ORDER — LIDOCAINE HYDROCHLORIDE AND EPINEPHRINE 10; 10 MG/ML; UG/ML
INJECTION, SOLUTION INFILTRATION; PERINEURAL AS NEEDED
Status: COMPLETED | OUTPATIENT
Start: 2025-06-18 | End: 2025-06-18

## 2025-06-18 RX ADMIN — LIDOCAINE HYDROCHLORIDE,EPINEPHRINE BITARTRATE 5 ML: 10; .01 INJECTION, SOLUTION INFILTRATION; PERINEURAL at 10:47

## 2025-06-18 NOTE — DISCHARGE INSTRUCTIONS
Perma-cath Removal   WHAT YOU NEED TO KNOW:   A perma-cath is a catheter placed through a vein into or near your right atrium. Your right atrium is the right upper chamber of your heart. A perma-cath is used for dialysis in an emergency or until a long-term device is ready to use. Or you no longer need dialysis.    DISCHARGE INSTRUCTIONS:   Call 911 for any of the following:   You feel lightheaded, short of breath, and have chest pain.    You start bleeding    Contact Interventional Radiology at [phone] ([place] PATIENTS: Contact Interventional Radiology at [phone]) ([place] PATIENTS: Contact Interventional Radiology at [phone]) if:  Blood soaks through your bandage.   You have new swelling in your arm, neck, face, or chest on your right side.  Your bruises or pain get worse.   You have a fever or chills.  Persistent nausea or vomiting.   Your incision is red, swollen, or draining pus.   You have questions or concerns about your condition or care.  Self-care:   Resume your normal diet. Small sips of flat soda will help with nausea.  Keep your dressings dry. Do not get your perma-cath site wet until the incision closes.  You may take a tub bath, but do not get your dressings wet. Water in your wound can cause bacteria to grow and cause an infection. If your dressing gets wet, remove the wet dressing and apply a dry bandaid. Keep it covered until the incision closes. This should only take a few days to heal. Do not use soaps or ointments.  Immediately after your catheter is removed, no strenuous activity for twenty four hours and stay in an upright sitting position for two hours.   Follow up with your healthcare provider as directed: Write down your questions so you remember to ask them during your visits.

## 2025-06-18 NOTE — BRIEF OP NOTE (RAD/CATH)
IR TUNNELED DIALYSIS CATHETER REMOVAL Procedure Note    PATIENT NAME: Lela Corado  : 1955  MRN: [id number]    Pre-op Diagnosis:   1. ESRD (end stage renal disease) on dialysis (HCC)      Post-op Diagnosis:   1. ESRD (end stage renal disease) on dialysis (HCC)        Provider:   MARLENA Pizarro  Assistants:     No qualified resident was available.    Estimated Blood Loss: none    Findings: Right EJ tunneled dialysis catheter removed. Catheter intact. Hemostasis achieved with 5 minutes manual compression. Steri strips, sterile gauze and Tegaderm applied    Specimens: none    Complications:  none immediately    Anesthesia: local    MARLENA Pizarro     Date: 2025  Time: 11:01 AM

## 2025-06-24 ENCOUNTER — TELEPHONE (OUTPATIENT)
Dept: VASCULAR SURGERY | Facility: CLINIC | Age: 70
End: 2025-06-24

## 2025-08-01 ENCOUNTER — APPOINTMENT (OUTPATIENT)
Dept: LAB | Facility: HOSPITAL | Age: 70
End: 2025-08-01
Payer: COMMERCIAL

## 2025-08-01 DIAGNOSIS — Z79.4 ENCOUNTER FOR LONG-TERM (CURRENT) USE OF INSULIN (HCC): ICD-10-CM

## 2025-08-01 DIAGNOSIS — E13.42 DIABETIC POLYNEUROPATHY ASSOCIATED WITH OTHER SPECIFIED DIABETES MELLITUS (HCC): ICD-10-CM

## 2025-08-01 DIAGNOSIS — N25.81 SECONDARY HYPERPARATHYROIDISM OF RENAL ORIGIN (HCC): Primary | ICD-10-CM

## 2025-08-01 LAB
25(OH)D3 SERPL-MCNC: 55.9 NG/ML (ref 30–100)
ALBUMIN SERPL BCG-MCNC: 4.1 G/DL (ref 3.5–5)
ALP SERPL-CCNC: 95 U/L (ref 34–104)
ALT SERPL W P-5'-P-CCNC: 13 U/L (ref 7–52)
ANION GAP SERPL CALCULATED.3IONS-SCNC: 13 MMOL/L (ref 4–13)
AST SERPL W P-5'-P-CCNC: 21 U/L (ref 13–39)
BILIRUB SERPL-MCNC: 0.53 MG/DL (ref 0.2–1)
BUN SERPL-MCNC: 40 MG/DL (ref 5–25)
CALCIUM SERPL-MCNC: 9.4 MG/DL (ref 8.4–10.2)
CHLORIDE SERPL-SCNC: 91 MMOL/L (ref 96–108)
CHOLEST SERPL-MCNC: 129 MG/DL (ref ?–200)
CO2 SERPL-SCNC: 35 MMOL/L (ref 21–32)
CREAT SERPL-MCNC: 6.1 MG/DL (ref 0.6–1.3)
EST. AVERAGE GLUCOSE BLD GHB EST-MCNC: 174 MG/DL
GFR SERPL CREATININE-BSD FRML MDRD: 6 ML/MIN/1.73SQ M
GLUCOSE P FAST SERPL-MCNC: 60 MG/DL (ref 65–99)
HBA1C MFR BLD: 7.7 %
HDLC SERPL-MCNC: 54 MG/DL
LDLC SERPL CALC-MCNC: 60 MG/DL (ref 0–100)
NONHDLC SERPL-MCNC: 75 MG/DL
POTASSIUM SERPL-SCNC: 4.6 MMOL/L (ref 3.5–5.3)
PROT SERPL-MCNC: 7.5 G/DL (ref 6.4–8.4)
PTH-INTACT SERPL-MCNC: 245.7 PG/ML (ref 12–88)
SODIUM SERPL-SCNC: 139 MMOL/L (ref 135–147)
T4 FREE SERPL-MCNC: 1.67 NG/DL (ref 0.61–1.12)
TRIGL SERPL-MCNC: 77 MG/DL (ref ?–150)
TSH SERPL DL<=0.05 MIU/L-ACNC: 3.8 UIU/ML (ref 0.45–4.5)

## 2025-08-01 PROCEDURE — 84443 ASSAY THYROID STIM HORMONE: CPT

## 2025-08-01 PROCEDURE — 82306 VITAMIN D 25 HYDROXY: CPT

## 2025-08-01 PROCEDURE — 80053 COMPREHEN METABOLIC PANEL: CPT

## 2025-08-01 PROCEDURE — 80061 LIPID PANEL: CPT

## 2025-08-01 PROCEDURE — 83970 ASSAY OF PARATHORMONE: CPT

## 2025-08-01 PROCEDURE — 84439 ASSAY OF FREE THYROXINE: CPT

## 2025-08-01 PROCEDURE — 83036 HEMOGLOBIN GLYCOSYLATED A1C: CPT

## 2025-08-01 PROCEDURE — 36415 COLL VENOUS BLD VENIPUNCTURE: CPT

## 2025-08-04 ENCOUNTER — OFFICE VISIT (OUTPATIENT)
Dept: VASCULAR SURGERY | Facility: CLINIC | Age: 70
End: 2025-08-04
Payer: COMMERCIAL

## 2025-08-04 VITALS
BODY MASS INDEX: 29.1 KG/M2 | SYSTOLIC BLOOD PRESSURE: 180 MMHG | DIASTOLIC BLOOD PRESSURE: 76 MMHG | HEART RATE: 72 BPM | OXYGEN SATURATION: 98 % | WEIGHT: 154 LBS

## 2025-08-04 DIAGNOSIS — N18.6 ESRD (END STAGE RENAL DISEASE) ON DIALYSIS (HCC): Primary | ICD-10-CM

## 2025-08-04 DIAGNOSIS — Z99.2 ESRD (END STAGE RENAL DISEASE) ON DIALYSIS (HCC): Primary | ICD-10-CM

## 2025-08-04 PROCEDURE — 99214 OFFICE O/P EST MOD 30 MIN: CPT | Performed by: SURGERY

## 2025-08-04 RX ORDER — LANCETS
EACH MISCELLANEOUS 2 TIMES DAILY
COMMUNITY
Start: 2025-07-30

## 2025-08-04 RX ORDER — ISONIAZID 300 MG/1
900 TABLET ORAL WEEKLY
COMMUNITY
Start: 2025-04-09

## 2025-08-04 RX ORDER — GLUCOSAMINE HCL/CHONDROITIN SU 500-400 MG
CAPSULE ORAL
COMMUNITY
Start: 2025-07-28

## 2025-08-04 RX ORDER — INSULIN LISPRO 100 [IU]/ML
INJECTION, SOLUTION INTRAVENOUS; SUBCUTANEOUS
COMMUNITY
Start: 2025-06-01

## 2025-08-04 RX ORDER — CLONIDINE 0.3 MG/24H
1 PATCH, EXTENDED RELEASE TRANSDERMAL
COMMUNITY
Start: 2025-08-01

## 2025-08-04 RX ORDER — TORSEMIDE 20 MG/1
1 TABLET ORAL DAILY
COMMUNITY
Start: 2025-05-09

## 2025-08-04 RX ORDER — BLOOD SUGAR DIAGNOSTIC
STRIP MISCELLANEOUS 2 TIMES DAILY
COMMUNITY
Start: 2025-05-07

## 2025-08-04 RX ORDER — PEN NEEDLE, DIABETIC 31 GX5/16"
NEEDLE, DISPOSABLE MISCELLANEOUS
COMMUNITY
Start: 2025-05-28

## 2025-08-19 DIAGNOSIS — N18.6 ESRD (END STAGE RENAL DISEASE) ON DIALYSIS (HCC): ICD-10-CM

## 2025-08-19 DIAGNOSIS — Z99.2 ESRD (END STAGE RENAL DISEASE) ON DIALYSIS (HCC): ICD-10-CM

## 2025-08-19 DIAGNOSIS — E87.5 HYPERKALEMIA: ICD-10-CM

## (undated) DEVICE — PICO 7 SINGLE 10X20CM: Brand: PICO™ 7

## (undated) DEVICE — DRAPE SHEET THREE QUARTER

## (undated) DEVICE — SUT SILK 2-0 30 IN A305H

## (undated) DEVICE — 10FR FRAZIER SUCTION HANDLE: Brand: CARDINAL HEALTH

## (undated) DEVICE — VESSEL CANNULA

## (undated) DEVICE — IV CATH INTROCAN 18G X 1 1/4 SAFETY

## (undated) DEVICE — GLOVE SRG BIOGEL 7.5

## (undated) DEVICE — SUT MONOCRYL 3-0 SH 27 IN Y416H

## (undated) DEVICE — PROXIMATE SKIN STAPLERS (35 WIDE) CONTAINS 35 STAINLESS STEEL STAPLES (FIXED HEAD): Brand: PROXIMATE

## (undated) DEVICE — LIGACLIP MCA MULTIPLE CLIP APPLIERS, 20 SMALL CLIPS: Brand: LIGACLIP

## (undated) DEVICE — TIBURON SPLIT SHEET: Brand: CONVERTORS

## (undated) DEVICE — PETRI DISH STERILE

## (undated) DEVICE — SUT MONOCRYL 4-0 PS-2 27 IN Y426H

## (undated) DEVICE — TELFA NON-ADHERENT ABSORBENT DRESSING: Brand: TELFA

## (undated) DEVICE — DRAPE SHEET X-LG

## (undated) DEVICE — ULTRASOUND GEL STERILE FOIL PK

## (undated) DEVICE — SUT PROLENE 7-0 BV 175-6 24 IN 8735H

## (undated) DEVICE — GLOVE INDICATOR PI UNDERGLOVE SZ 8 BLUE

## (undated) DEVICE — SCD SEQUENTIAL COMPRESSION COMFORT SLEEVE MEDIUM KNEE LENGTH: Brand: KENDALL SCD

## (undated) DEVICE — BAG DECANTER

## (undated) DEVICE — SURGICEL FIBRILLAR 1 X 2

## (undated) DEVICE — CLIP APPLIER: Brand: PREMIUM SURGICLIP II

## (undated) DEVICE — REM POLYHESIVE ADULT PATIENT RETURN ELECTRODE: Brand: VALLEYLAB

## (undated) DEVICE — STRL BETHLEHEM A V FISTULA PK: Brand: CARDINAL HEALTH

## (undated) DEVICE — LIGACLIP MCA MULTIPLE CLIP APPLIERS, 20 MEDIUM CLIPS: Brand: LIGACLIP

## (undated) DEVICE — 40529 DERMAPROX PAD 11'' X 15'' X 1'': Brand: 40529 DERMAPROX PAD 11'' X 15'' X 1''

## (undated) DEVICE — SUT SILK 4-0 30 IN A303H

## (undated) DEVICE — INDICATED FOR SURGICAL CLAMPING DURING CARDIOVASCULAR PERIPHERAL VASCULAR, AND GENERAL SURGERY.: Brand: SOFT/FIBRA® SPRING CLIP  1/2 FORCE

## (undated) DEVICE — INTENDED FOR TISSUE SEPARATION, AND OTHER PROCEDURES THAT REQUIRE A SHARP SURGICAL BLADE TO PUNCTURE OR CUT.: Brand: BARD-PARKER SAFETY BLADES SIZE 15, STERILE

## (undated) DEVICE — SUT SILK 3-0 30 IN A304H

## (undated) DEVICE — SKIN MARKER DUAL TIP WITH RULER CAP, FLEXIBLE RULER AND LABELS: Brand: DEVON

## (undated) DEVICE — ADHESIVE SKIN HIGH VISCOSITY EXOFIN 1ML

## (undated) DEVICE — DECANTER: Brand: UNBRANDED

## (undated) DEVICE — INTENDED FOR TISSUE SEPARATION, AND OTHER PROCEDURES THAT REQUIRE A SHARP SURGICAL BLADE TO PUNCTURE OR CUT.: Brand: BARD-PARKER ® CARBON RIB-BACK BLADES

## (undated) DEVICE — CHLORAPREP HI-LITE 26ML ORANGE

## (undated) DEVICE — SUT PROLENE 6-0 BV130 30 IN 8709H

## (undated) DEVICE — EXOFIN PRECISION PEN HIGH VISCOSITY TOPICAL SKIN ADHESIVE: Brand: EXOFIN PRECISION PEN, 1G

## (undated) DEVICE — APPLIER SURGICLIP PREMIUM SMALL 9IN

## (undated) DEVICE — 3M™ TEGADERM™ TRANSPARENT FILM DRESSING FRAME STYLE, 1628, 6 IN X 8 IN (15 CM X 20 CM), 10/CT 8CT/CASE: Brand: 3M™ TEGADERM™

## (undated) DEVICE — COBAN 4 IN STERILE